# Patient Record
Sex: MALE | Race: WHITE | NOT HISPANIC OR LATINO | Employment: FULL TIME | URBAN - METROPOLITAN AREA
[De-identification: names, ages, dates, MRNs, and addresses within clinical notes are randomized per-mention and may not be internally consistent; named-entity substitution may affect disease eponyms.]

---

## 2017-06-28 ENCOUNTER — TRANSCRIBE ORDERS (OUTPATIENT)
Dept: ADMINISTRATIVE | Facility: HOSPITAL | Age: 57
End: 2017-06-28

## 2017-06-28 DIAGNOSIS — R01.1 UNDIAGNOSED CARDIAC MURMURS: Primary | ICD-10-CM

## 2017-07-05 ENCOUNTER — HOSPITAL ENCOUNTER (OUTPATIENT)
Dept: NON INVASIVE DIAGNOSTICS | Facility: CLINIC | Age: 57
Discharge: HOME | End: 2017-07-05
Payer: COMMERCIAL

## 2017-07-05 DIAGNOSIS — R01.1 UNDIAGNOSED CARDIAC MURMURS: ICD-10-CM

## 2017-07-05 LAB
CHEST PAIN STATEMENT: NORMAL
MAX DIASTOLIC BP: 98 MMHG
MAX HEART RATE: 150 BPM
MAX PREDICTED HEART RATE: 163 BPM
MAX. SYSTOLIC BP: 170 MMHG
PROTOCOL NAME: NORMAL
TARGET HR FORMULA: NORMAL
TEST INDICATION: NORMAL
TIME IN EXERCISE PHASE: 361 S

## 2017-07-05 PROCEDURE — 93017 CV STRESS TEST TRACING ONLY: CPT

## 2019-01-03 ENCOUNTER — APPOINTMENT (EMERGENCY)
Dept: RADIOLOGY | Facility: HOSPITAL | Age: 59
DRG: 438 | End: 2019-01-03
Payer: COMMERCIAL

## 2019-01-03 ENCOUNTER — HOSPITAL ENCOUNTER (INPATIENT)
Facility: HOSPITAL | Age: 59
LOS: 20 days | Discharge: HOME HEALTH | DRG: 438 | End: 2019-01-23
Attending: EMERGENCY MEDICINE | Admitting: SURGERY
Payer: COMMERCIAL

## 2019-01-03 DIAGNOSIS — R10.9 ABDOMINAL PAIN: ICD-10-CM

## 2019-01-03 DIAGNOSIS — K85.90 ACUTE PANCREATITIS, UNSPECIFIED COMPLICATION STATUS, UNSPECIFIED PANCREATITIS TYPE: ICD-10-CM

## 2019-01-03 DIAGNOSIS — R09.02 HYPOXIA: ICD-10-CM

## 2019-01-03 DIAGNOSIS — R06.89 ACUTE RESPIRATORY INSUFFICIENCY: ICD-10-CM

## 2019-01-03 DIAGNOSIS — K85.10 ACUTE BILIARY PANCREATITIS, UNSPECIFIED COMPLICATION STATUS: ICD-10-CM

## 2019-01-03 DIAGNOSIS — K83.09 CHOLANGITIS: ICD-10-CM

## 2019-01-03 DIAGNOSIS — I48.91 ATRIAL FIBRILLATION WITH RVR (HCC): ICD-10-CM

## 2019-01-03 DIAGNOSIS — G93.41 METABOLIC ENCEPHALOPATHY: ICD-10-CM

## 2019-01-03 DIAGNOSIS — K85.90 PANCREATITIS: Primary | ICD-10-CM

## 2019-01-03 LAB
ALBUMIN SERPL BCP-MCNC: 3.9 G/DL (ref 3.5–5)
ALP SERPL-CCNC: 80 U/L (ref 46–116)
ALT SERPL W P-5'-P-CCNC: 97 U/L (ref 12–78)
ANION GAP SERPL CALCULATED.3IONS-SCNC: 5 MMOL/L (ref 4–13)
AST SERPL W P-5'-P-CCNC: 94 U/L (ref 5–45)
ATRIAL RATE: 74 BPM
BASOPHILS # BLD AUTO: 0.02 THOUSANDS/ΜL (ref 0–0.1)
BASOPHILS NFR BLD AUTO: 0 % (ref 0–1)
BILIRUB SERPL-MCNC: 1.83 MG/DL (ref 0.2–1)
BUN SERPL-MCNC: 24 MG/DL (ref 5–25)
CALCIUM SERPL-MCNC: 8.4 MG/DL (ref 8.3–10.1)
CHLORIDE SERPL-SCNC: 106 MMOL/L (ref 100–108)
CHOLEST SERPL-MCNC: 146 MG/DL (ref 50–200)
CO2 SERPL-SCNC: 26 MMOL/L (ref 21–32)
CREAT SERPL-MCNC: 1.26 MG/DL (ref 0.6–1.3)
EOSINOPHIL # BLD AUTO: 0.01 THOUSAND/ΜL (ref 0–0.61)
EOSINOPHIL NFR BLD AUTO: 0 % (ref 0–6)
ERYTHROCYTE [DISTWIDTH] IN BLOOD BY AUTOMATED COUNT: 12.7 % (ref 11.6–15.1)
GFR SERPL CREATININE-BSD FRML MDRD: 62 ML/MIN/1.73SQ M
GLUCOSE SERPL-MCNC: 159 MG/DL (ref 65–140)
HCT VFR BLD AUTO: 46.1 % (ref 36.5–49.3)
HDLC SERPL-MCNC: 59 MG/DL (ref 40–60)
HGB BLD-MCNC: 15.8 G/DL (ref 12–17)
HOLD SPECIMEN: NORMAL
IMM GRANULOCYTES # BLD AUTO: 0.06 THOUSAND/UL (ref 0–0.2)
IMM GRANULOCYTES NFR BLD AUTO: 1 % (ref 0–2)
LDLC SERPL CALC-MCNC: 77 MG/DL (ref 0–100)
LIPASE SERPL-CCNC: 6403 U/L (ref 73–393)
LYMPHOCYTES # BLD AUTO: 0.45 THOUSANDS/ΜL (ref 0.6–4.47)
LYMPHOCYTES NFR BLD AUTO: 4 % (ref 14–44)
MCH RBC QN AUTO: 30.7 PG (ref 26.8–34.3)
MCHC RBC AUTO-ENTMCNC: 34.3 G/DL (ref 31.4–37.4)
MCV RBC AUTO: 90 FL (ref 82–98)
MONOCYTES # BLD AUTO: 0.74 THOUSAND/ΜL (ref 0.17–1.22)
MONOCYTES NFR BLD AUTO: 7 % (ref 4–12)
NEUTROPHILS # BLD AUTO: 10.07 THOUSANDS/ΜL (ref 1.85–7.62)
NEUTS SEG NFR BLD AUTO: 88 % (ref 43–75)
NONHDLC SERPL-MCNC: 87 MG/DL
NRBC BLD AUTO-RTO: 0 /100 WBCS
P AXIS: 56 DEGREES
PLATELET # BLD AUTO: 152 THOUSANDS/UL (ref 149–390)
PLATELET # BLD AUTO: 162 THOUSANDS/UL (ref 149–390)
PMV BLD AUTO: 9 FL (ref 8.9–12.7)
PMV BLD AUTO: 9.2 FL (ref 8.9–12.7)
POTASSIUM SERPL-SCNC: 3.5 MMOL/L (ref 3.5–5.3)
PR INTERVAL: 160 MS
PROT SERPL-MCNC: 6.9 G/DL (ref 6.4–8.2)
QRS AXIS: -36 DEGREES
QRSD INTERVAL: 88 MS
QT INTERVAL: 370 MS
QTC INTERVAL: 410 MS
RBC # BLD AUTO: 5.15 MILLION/UL (ref 3.88–5.62)
SODIUM SERPL-SCNC: 137 MMOL/L (ref 136–145)
T WAVE AXIS: 22 DEGREES
TRIGL SERPL-MCNC: 52 MG/DL
TROPONIN I SERPL-MCNC: <0.02 NG/ML
VENTRICULAR RATE: 74 BPM
WBC # BLD AUTO: 11.35 THOUSAND/UL (ref 4.31–10.16)

## 2019-01-03 PROCEDURE — 96361 HYDRATE IV INFUSION ADD-ON: CPT

## 2019-01-03 PROCEDURE — 80053 COMPREHEN METABOLIC PANEL: CPT | Performed by: EMERGENCY MEDICINE

## 2019-01-03 PROCEDURE — 99223 1ST HOSP IP/OBS HIGH 75: CPT | Performed by: SURGERY

## 2019-01-03 PROCEDURE — 96375 TX/PRO/DX INJ NEW DRUG ADDON: CPT

## 2019-01-03 PROCEDURE — 83690 ASSAY OF LIPASE: CPT | Performed by: EMERGENCY MEDICINE

## 2019-01-03 PROCEDURE — 85049 AUTOMATED PLATELET COUNT: CPT | Performed by: PHYSICIAN ASSISTANT

## 2019-01-03 PROCEDURE — 74177 CT ABD & PELVIS W/CONTRAST: CPT

## 2019-01-03 PROCEDURE — 36415 COLL VENOUS BLD VENIPUNCTURE: CPT

## 2019-01-03 PROCEDURE — C9113 INJ PANTOPRAZOLE SODIUM, VIA: HCPCS | Performed by: EMERGENCY MEDICINE

## 2019-01-03 PROCEDURE — 71046 X-RAY EXAM CHEST 2 VIEWS: CPT

## 2019-01-03 PROCEDURE — 93005 ELECTROCARDIOGRAM TRACING: CPT

## 2019-01-03 PROCEDURE — 80061 LIPID PANEL: CPT | Performed by: PHYSICIAN ASSISTANT

## 2019-01-03 PROCEDURE — 93010 ELECTROCARDIOGRAM REPORT: CPT | Performed by: INTERNAL MEDICINE

## 2019-01-03 PROCEDURE — 84484 ASSAY OF TROPONIN QUANT: CPT | Performed by: EMERGENCY MEDICINE

## 2019-01-03 PROCEDURE — 85025 COMPLETE CBC W/AUTO DIFF WBC: CPT | Performed by: EMERGENCY MEDICINE

## 2019-01-03 PROCEDURE — 99285 EMERGENCY DEPT VISIT HI MDM: CPT

## 2019-01-03 PROCEDURE — 96374 THER/PROPH/DIAG INJ IV PUSH: CPT

## 2019-01-03 RX ORDER — MORPHINE SULFATE 4 MG/ML
4 INJECTION, SOLUTION INTRAMUSCULAR; INTRAVENOUS ONCE
Status: COMPLETED | OUTPATIENT
Start: 2019-01-03 | End: 2019-01-03

## 2019-01-03 RX ORDER — ONDANSETRON 2 MG/ML
4 INJECTION INTRAMUSCULAR; INTRAVENOUS EVERY 6 HOURS PRN
Status: DISCONTINUED | OUTPATIENT
Start: 2019-01-03 | End: 2019-01-23 | Stop reason: HOSPADM

## 2019-01-03 RX ORDER — PANTOPRAZOLE SODIUM 40 MG/1
40 INJECTION, POWDER, FOR SOLUTION INTRAVENOUS ONCE
Status: COMPLETED | OUTPATIENT
Start: 2019-01-03 | End: 2019-01-03

## 2019-01-03 RX ORDER — ACETAMINOPHEN 325 MG/1
650 TABLET ORAL EVERY 6 HOURS PRN
Status: DISCONTINUED | OUTPATIENT
Start: 2019-01-03 | End: 2019-01-03

## 2019-01-03 RX ORDER — HYDROMORPHONE HCL/PF 1 MG/ML
1 SYRINGE (ML) INJECTION
Status: DISCONTINUED | OUTPATIENT
Start: 2019-01-03 | End: 2019-01-04

## 2019-01-03 RX ORDER — HYDROMORPHONE HCL/PF 1 MG/ML
1 SYRINGE (ML) INJECTION ONCE
Status: COMPLETED | OUTPATIENT
Start: 2019-01-03 | End: 2019-01-03

## 2019-01-03 RX ORDER — OXYCODONE HYDROCHLORIDE 10 MG/1
10 TABLET ORAL EVERY 4 HOURS PRN
Status: DISCONTINUED | OUTPATIENT
Start: 2019-01-03 | End: 2019-01-04

## 2019-01-03 RX ORDER — SODIUM CHLORIDE, SODIUM GLUCONATE, SODIUM ACETATE, POTASSIUM CHLORIDE, MAGNESIUM CHLORIDE, SODIUM PHOSPHATE, DIBASIC, AND POTASSIUM PHOSPHATE .53; .5; .37; .037; .03; .012; .00082 G/100ML; G/100ML; G/100ML; G/100ML; G/100ML; G/100ML; G/100ML
200 INJECTION, SOLUTION INTRAVENOUS CONTINUOUS
Status: DISCONTINUED | OUTPATIENT
Start: 2019-01-03 | End: 2019-01-04

## 2019-01-03 RX ORDER — OXYCODONE HYDROCHLORIDE 5 MG/1
5 TABLET ORAL EVERY 4 HOURS PRN
Status: DISCONTINUED | OUTPATIENT
Start: 2019-01-03 | End: 2019-01-04

## 2019-01-03 RX ORDER — ACETAMINOPHEN 325 MG/1
650 TABLET ORAL EVERY 6 HOURS PRN
Status: DISCONTINUED | OUTPATIENT
Start: 2019-01-03 | End: 2019-01-23 | Stop reason: HOSPADM

## 2019-01-03 RX ORDER — HYDROMORPHONE HCL/PF 1 MG/ML
0.2 SYRINGE (ML) INJECTION
Status: DISCONTINUED | OUTPATIENT
Start: 2019-01-03 | End: 2019-01-03

## 2019-01-03 RX ORDER — ONDANSETRON 2 MG/ML
4 INJECTION INTRAMUSCULAR; INTRAVENOUS ONCE
Status: COMPLETED | OUTPATIENT
Start: 2019-01-03 | End: 2019-01-03

## 2019-01-03 RX ORDER — MAGNESIUM HYDROXIDE/ALUMINUM HYDROXICE/SIMETHICONE 120; 1200; 1200 MG/30ML; MG/30ML; MG/30ML
30 SUSPENSION ORAL ONCE
Status: COMPLETED | OUTPATIENT
Start: 2019-01-03 | End: 2019-01-03

## 2019-01-03 RX ORDER — SODIUM CHLORIDE AND POTASSIUM CHLORIDE .9; .15 G/100ML; G/100ML
125 SOLUTION INTRAVENOUS CONTINUOUS
Status: DISCONTINUED | OUTPATIENT
Start: 2019-01-03 | End: 2019-01-03

## 2019-01-03 RX ADMIN — IOHEXOL 100 ML: 350 INJECTION, SOLUTION INTRAVENOUS at 09:23

## 2019-01-03 RX ADMIN — SODIUM CHLORIDE 1000 ML: 0.9 INJECTION, SOLUTION INTRAVENOUS at 22:43

## 2019-01-03 RX ADMIN — ACETAMINOPHEN 650 MG: 325 TABLET, FILM COATED ORAL at 13:14

## 2019-01-03 RX ADMIN — MORPHINE SULFATE 4 MG: 4 INJECTION INTRAVENOUS at 09:29

## 2019-01-03 RX ADMIN — SODIUM CHLORIDE 1000 ML: 0.9 INJECTION, SOLUTION INTRAVENOUS at 07:45

## 2019-01-03 RX ADMIN — ONDANSETRON 4 MG: 2 INJECTION INTRAMUSCULAR; INTRAVENOUS at 13:15

## 2019-01-03 RX ADMIN — ACETAMINOPHEN 650 MG: 325 TABLET, FILM COATED ORAL at 22:43

## 2019-01-03 RX ADMIN — HYDROMORPHONE HYDROCHLORIDE 1 MG: 1 INJECTION, SOLUTION INTRAMUSCULAR; INTRAVENOUS; SUBCUTANEOUS at 10:06

## 2019-01-03 RX ADMIN — SODIUM CHLORIDE, SODIUM GLUCONATE, SODIUM ACETATE, POTASSIUM CHLORIDE, MAGNESIUM CHLORIDE, SODIUM PHOSPHATE, DIBASIC, AND POTASSIUM PHOSPHATE 200 ML/HR: .53; .5; .37; .037; .03; .012; .00082 INJECTION, SOLUTION INTRAVENOUS at 22:33

## 2019-01-03 RX ADMIN — OXYCODONE HYDROCHLORIDE 5 MG: 5 TABLET ORAL at 22:32

## 2019-01-03 RX ADMIN — HYDROMORPHONE HYDROCHLORIDE 0.2 MG: 1 INJECTION, SOLUTION INTRAMUSCULAR; INTRAVENOUS; SUBCUTANEOUS at 11:44

## 2019-01-03 RX ADMIN — ALUMINUM HYDROXIDE, MAGNESIUM HYDROXIDE, AND SIMETHICONE 30 ML: 200; 200; 20 SUSPENSION ORAL at 08:06

## 2019-01-03 RX ADMIN — ONDANSETRON 4 MG: 2 INJECTION INTRAMUSCULAR; INTRAVENOUS at 07:50

## 2019-01-03 RX ADMIN — OXYCODONE HYDROCHLORIDE 10 MG: 10 TABLET ORAL at 18:35

## 2019-01-03 RX ADMIN — OXYCODONE HYDROCHLORIDE 5 MG: 5 TABLET ORAL at 13:14

## 2019-01-03 RX ADMIN — PANTOPRAZOLE SODIUM 40 MG: 40 INJECTION, POWDER, FOR SOLUTION INTRAVENOUS at 08:06

## 2019-01-03 RX ADMIN — HYDROMORPHONE HYDROCHLORIDE 0.2 MG: 1 INJECTION, SOLUTION INTRAMUSCULAR; INTRAVENOUS; SUBCUTANEOUS at 16:06

## 2019-01-03 RX ADMIN — SODIUM CHLORIDE, SODIUM GLUCONATE, SODIUM ACETATE, POTASSIUM CHLORIDE, MAGNESIUM CHLORIDE, SODIUM PHOSPHATE, DIBASIC, AND POTASSIUM PHOSPHATE 200 ML/HR: .53; .5; .37; .037; .03; .012; .00082 INJECTION, SOLUTION INTRAVENOUS at 16:01

## 2019-01-03 RX ADMIN — SODIUM CHLORIDE 1000 ML: 0.9 INJECTION, SOLUTION INTRAVENOUS at 18:32

## 2019-01-03 RX ADMIN — OXYCODONE HYDROCHLORIDE 10 MG: 10 TABLET ORAL at 12:51

## 2019-01-03 RX ADMIN — SODIUM CHLORIDE, SODIUM GLUCONATE, SODIUM ACETATE, POTASSIUM CHLORIDE, MAGNESIUM CHLORIDE, SODIUM PHOSPHATE, DIBASIC, AND POTASSIUM PHOSPHATE 200 ML/HR: .53; .5; .37; .037; .03; .012; .00082 INJECTION, SOLUTION INTRAVENOUS at 10:46

## 2019-01-03 NOTE — ED PROVIDER NOTES
History  Chief Complaint   Patient presents with    Vomiting     Patient presents to the ER with vomiting since Midnight  Patient is a 69-year-old gentleman who presents with epigastric abdominal pain described as burning and constant  Patient denies radiation to his back  He did have several episodes of vomiting that was described as bilious and nonbloody  Patient also had 1 episode of diarrhea  He admits to having sick contact with his wife having recent GI illness  Patient denies having any chest pain or shortness of breath  He denies any fevers  Patient also states that he has not had any dysuria  He does state his pain is worse with p  O  Intake  None       Past Medical History:   Diagnosis Date    Gastroesophageal reflux        Past Surgical History:   Procedure Laterality Date    CHOLECYSTECTOMY      COLONOSCOPY N/A 3/21/2016    Procedure: COLONOSCOPY;  Surgeon: Bao Oneil DO;  Location: [facility]; Service:     [place] EGD TRANSORAL BIOPSY SINGLE/MULTIPLE N/A 3/21/2016    Procedure: ESOPHAGOGASTRODUODENOSCOPY (EGD); Surgeon: Bao Oneil DO;  Location: [facility]; Service: General       History reviewed  No pertinent family history  I have reviewed and agree with the history as documented  Social History   Substance Use Topics    Smoking status: Never Smoker    Smokeless tobacco: Never Used    Alcohol use No      Comment: rarely        Review of Systems   Constitutional: Negative for chills, fatigue and fever  HENT: Negative for sinus pressure and sore throat  Eyes: Negative for visual disturbance  Respiratory: Negative for cough and shortness of breath  Cardiovascular: Negative for chest pain, palpitations and leg swelling  Gastrointestinal: Positive for abdominal pain, diarrhea, nausea and vomiting  Negative for constipation  Genitourinary: Negative for dysuria  Neurological: Negative for dizziness, light-headedness and headaches  Psychiatric/Behavioral: Negative for agitation and confusion  All other systems reviewed and are negative  Physical Exam  Physical Exam   Constitutional: He appears well-nourished  Mild distress secondary to abdominal discomfort   HENT:   Head: Normocephalic and atraumatic  Mouth/Throat: Oropharynx is clear and moist    Eyes: Pupils are equal, round, and reactive to light  Conjunctivae and EOM are normal    Neck: Normal range of motion  Neck supple  Cardiovascular: Normal rate, regular rhythm, normal heart sounds and intact distal pulses  No murmur heard  Pulmonary/Chest: Effort normal and breath sounds normal  No respiratory distress  He has no rales  Abdominal: Soft  Bowel sounds are normal  He exhibits no distension  There is tenderness  There is no rebound and no guarding  Epigastric tenderness with palpation, no guarding or rebound appreciated   Musculoskeletal: He exhibits no edema or deformity  Neurological: He is alert  He has normal strength  No cranial nerve deficit  Skin: Skin is warm and dry  Capillary refill takes less than 2 seconds  No rash noted  No erythema  Psychiatric: He has a normal mood and affect  Nursing note and vitals reviewed  Vital Signs  ED Triage Vitals [01/03/19 0733]   Temperature Pulse Respirations Blood Pressure SpO2   97 6 °F (36 4 °C) 69 18 153/91 97 %      Temp Source Heart Rate Source Patient Position - Orthostatic VS BP Location FiO2 (%)   Oral Monitor Lying Right arm --      Pain Score       8           Vitals:    01/03/19 0733   BP: 153/91   Pulse: 69   Patient Position - Orthostatic VS: Lying       Visual Acuity      ED Medications  Medications   ondansetron (ZOFRAN) injection 4 mg (4 mg Intravenous Given 1/3/19 0750)       Diagnostic Studies  Results Reviewed     Procedure Component Value Units Date/Time    Comprehensive metabolic panel [[id number]] Collected:  01/03/19 0744    Lab Status:   In process Specimen:  Blood from Arm, Left Updated:  01/03/19 0749    Lipase [[id number]] Collected:  01/03/19 0744    Lab Status: In process Specimen:  Blood from Arm, Left Updated:  01/03/19 0749    CBC and differential [[id number]] Collected:  01/03/19 0744    Lab Status: In process Specimen:  Blood from Arm, Left Updated:  01/03/19 0749                 No orders to display              Procedures  Procedures       Phone Contacts  ED Phone Contact    ED Course  ED Course as of Jan 08 0014   Thu Jan 03, 2019   0914 AST: (!) 94   0914 Patient with elevation in AST and ALT and lipase of 6400  Appears to have acute pancreatitis ALT: (!) 97   0935 Patient continues with abdominal pain, Dilaudid administered    0935 Past medical history includes cholecystectomy approximately 20 years ago and history of diverticulosis    0942 EKG pending    4184 Call out to read surgery for evaluation and admission    0945 Patient to be admitted to Dr Yojana Germain service    1006 CT findings reviewed     Acute interstitial edematous pancreatitis with extensive pancreatic and peripancreatic inflammatory edema   Within the limitations imposed by respiratory motion throughout the upper abdomen there is no convincing evidence of pancreatic necrosis or   underlying pancreatic mass   Nonetheless, when better pain control can be achieved and the patient is able to tolerate better breath-hold imaging, more accurate characterization utilizing pancreatic MRI with MRCP is recommended for further investigation   of the possible etiology and complications of acute pancreatitis  MDM  Number of Diagnoses or Management Options  Diagnosis management comments: Patient with abdominal pain may be related to gastroenteritis considering recent exposure to sick contacts, cannot rule out pancreatitis versus bowel other than viral   Patient administered PPI as well as Maalox and laboratory data pending    Patient did not improve after Maalox and PPI plan to administer morphine and check CT scan of abdomen  CritCare Time    Disposition  Final diagnoses:   None     ED Disposition     None      Follow-up Information    None         Patient's Medications    No medications on file     No discharge procedures on file      ED Provider  Electronically Signed by           Mirella Myers DO  01/03/19 501 So  Guerline Gupta,   01/08/19 0387

## 2019-01-03 NOTE — PROGRESS NOTES
Pt admit to unit by Dr Shayna Mayes by Acute Sx d/t acute pancreatitis  C/o 10/10 pain in LUQ abdomen radiating to the back  Phys  assessment WDL, ex labored breathing, abdominal guarding, and cool skin  Stating 85% on R/a, 98% on 2L nc  IVF running  PRN tylenol, oxycodone, and zofran given, POSS 2   Wife called per pt request

## 2019-01-03 NOTE — SOCIAL WORK
64yo male admitted with acute pancreatitis  He is alert but sleepy from pain medication  Wife is at bedside, Bethel Henderson, phone [phone]  They live in [place] in split level home with 2 BRENDA and 2 steps inside  Pt  Is independent with ADLS, employed, drives  He uses no DME, no Hx HHC, no hx rehab, no POA and no hx mental illness or D&A  He has 3 adult children who live locally to assist  PCP is Dr Terri Steen  They use "[email or web address]" pharmacy in  [facility]  Discussed possible need for [address] and wife chose [facility] if needed  Wife will transport home at discharge  CM reviewed d/c planning process including the following: identifying help at home, patient preference for d/c planning needs, Discharge Lounge, [facility] Meds to Bed program, availability of treatment team to discuss questions or concerns patient and/or family may have regarding understanding medications and recognizing signs and symptoms once discharged  CM also encouraged patient to follow up with all recommended appointments after discharge  Patient advised of importance for patient and family to participate in managing patients medical well being  Patient/caregiver received discharge checklist  Content reviewed  Patient/caregiver encouraged to participate in discharge plan of care prior to discharge home

## 2019-01-03 NOTE — H&P
Acute Care Surgery H&P- Isidro Wayne 1960, 60 y o  male MRN: [id number]    Unit/Bed#: ED 29 Encounter: [id number]    Primary Care Provider: Anne Marie Mooney DO   Date and time admitted to hospital: 1/3/2019  7:26 AM    Assessment and Plan:  61 y/o male with acute pancreatitis, dilated CBD with remote history of laparoscopic cholecystectomy    Plan:  IVF, pain management  Lipid panel  MRCP         History of Present Illness     HPI:  Isidro Wayne is a 60 y o  male who presents with acute onset of bandlike abdominal pain, upper abdomen  Patient states that he was in his usual state of health until around 1:00AM, when he developed sudden onset of upper abdominal pain that radiated across his upper abdomen  He denies radiation to his back  He admits to nausea and some vomiting as well as some loose stools  His wife states that she recently had a GI virus  He denies significant alcohol intake  He admits to laparoscopic cholecystectomy about 30 years ago  He also admits to frequent acid reflux and epigastric discomfort that is associated with the reflux  He takes Prilosec as needed  Review of Systems   Constitutional: Negative  HENT: Negative  Eyes: Negative  Respiratory: Negative  Cardiovascular: Negative  Gastrointestinal: Positive for abdominal pain, diarrhea and nausea  Endocrine: Negative  Genitourinary: Negative  Musculoskeletal: Negative  Allergic/Immunologic: Negative  Neurological: Negative  Hematological: Negative  Psychiatric/Behavioral: Negative  Historical Information   Past Medical History:   Diagnosis Date    Gastroesophageal reflux      Past Surgical History:   Procedure Laterality Date    CHOLECYSTECTOMY      COLONOSCOPY N/A 3/21/2016    Procedure: COLONOSCOPY;  Surgeon: Jossy Franks DO;  [facility]; Service:     [place] EGD TRANSORAL BIOPSY SINGLE/MULTIPLE N/A 3/21/2016    Procedure: ESOPHAGOGASTRODUODENOSCOPY (EGD);   Surgeon: Trell Mistry DO;  [facility]; Service: General     Social History   History   Alcohol Use No     Comment: rarely     History   Drug Use No     History   Smoking Status    Never Smoker   Smokeless Tobacco    Never Used     Family History: non-contributory    Meds/Allergies   all medications and allergies reviewed  No Known Allergies    Objective   First Vitals:   Blood Pressure: 153/91 (01/03/19 0733)  Pulse: 69 (01/03/19 0733)  Temperature: 97 6 °F (36 4 °C) (01/03/19 0733)  Temp Source: Oral (01/03/19 0733)  Respirations: 18 (01/03/19 0733)  Height: 6' 4" (193 cm) (01/03/19 0733)  Weight - Scale: 99 8 kg (220 lb) (01/03/19 0733)  SpO2: 97 % (01/03/19 0733)    Current Vitals:   Blood Pressure: 153/91 (01/03/19 0733)  Pulse: 69 (01/03/19 0733)  Temperature: 97 6 °F (36 4 °C) (01/03/19 0733)  Temp Source: Oral (01/03/19 0733)  Respirations: 18 (01/03/19 0733)  Height: 6' 4" (193 cm) (01/03/19 6230)  Weight - Scale: 99 8 kg (220 lb) (01/03/19 0733)  SpO2: 97 % (01/03/19 0733)    No intake or output data in the 24 hours ending 01/03/19 1029    Invasive Devices     Peripheral Intravenous Line            Peripheral IV 01/03/19 Left Antecubital less than 1 day                Physical Exam   Constitutional: He is oriented to person, place, and time  He appears well-developed and well-nourished  HENT:   Head: Normocephalic and atraumatic  Right Ear: External ear normal    Left Ear: External ear normal    Eyes: Pupils are equal, round, and reactive to light  Conjunctivae are normal    Neck: Normal range of motion  No tracheal deviation present  Cardiovascular: Normal rate and regular rhythm  Pulmonary/Chest: Breath sounds normal  No respiratory distress  He has no wheezes  He has no rales  Abdominal: Soft  He exhibits no distension  There is tenderness (point of maximal tenderness in RUQ; mild epigastric)  There is no rebound  Hypoactive BS   Musculoskeletal: Normal range of motion   He exhibits no edema, tenderness or deformity  Neurological: He is alert and oriented to person, place, and time  No cranial nerve deficit  Skin: Skin is warm and dry  No rash noted  Lab Results:   CBC:   Lab Results   Component Value Date    WBC 11 35 (H) 01/03/2019    HGB 15 8 01/03/2019    HCT 46 1 01/03/2019    MCV 90 01/03/2019     01/03/2019    MCH 30 7 01/03/2019    MCHC 34 3 01/03/2019    RDW 12 7 01/03/2019    MPV 9 0 01/03/2019    NRBC 0 01/03/2019   , CMP:   Lab Results   Component Value Date    SODIUM 137 01/03/2019    K 3 5 01/03/2019     01/03/2019    CO2 26 01/03/2019    BUN 24 01/03/2019    CREATININE 1 26 01/03/2019    CALCIUM 8 4 01/03/2019    AST 94 (H) 01/03/2019    ALT 97 (H) 01/03/2019    ALKPHOS 80 01/03/2019    EGFR 62 01/03/2019   , Amylase: No results found for: AMYLASE, Lipase:   Lab Results   Component Value Date    LIPASE 6,403 (H) 01/03/2019     Imaging: I have personally reviewed pertinent reports  EKG, Pathology, and Other Studies: I have personally reviewed pertinent reports        Code Status: Level 1 - Full Code  Advance Directive and Living Will:      Power of :    POLST:        Iveth Mancini PA-C  1/3/2019

## 2019-01-04 ENCOUNTER — APPOINTMENT (INPATIENT)
Dept: RADIOLOGY | Facility: HOSPITAL | Age: 59
DRG: 438 | End: 2019-01-04
Payer: COMMERCIAL

## 2019-01-04 ENCOUNTER — ANESTHESIA EVENT (INPATIENT)
Dept: GASTROENTEROLOGY | Facility: HOSPITAL | Age: 59
DRG: 438 | End: 2019-01-04
Payer: COMMERCIAL

## 2019-01-04 ENCOUNTER — ANESTHESIA (INPATIENT)
Dept: GASTROENTEROLOGY | Facility: HOSPITAL | Age: 59
DRG: 438 | End: 2019-01-04
Payer: COMMERCIAL

## 2019-01-04 PROBLEM — K85.90 PANCREATITIS: Status: ACTIVE | Noted: 2019-01-03

## 2019-01-04 LAB
ALBUMIN SERPL BCP-MCNC: 2.5 G/DL (ref 3.5–5)
ALBUMIN SERPL BCP-MCNC: 2.7 G/DL (ref 3.5–5)
ALP SERPL-CCNC: 34 U/L (ref 46–116)
ALP SERPL-CCNC: 48 U/L (ref 46–116)
ALT SERPL W P-5'-P-CCNC: 144 U/L (ref 12–78)
ALT SERPL W P-5'-P-CCNC: 324 U/L (ref 12–78)
AMYLASE SERPL-CCNC: 580 IU/L (ref 25–115)
ANION GAP SERPL CALCULATED.3IONS-SCNC: 6 MMOL/L (ref 4–13)
ANION GAP SERPL CALCULATED.3IONS-SCNC: 7 MMOL/L (ref 4–13)
AST SERPL W P-5'-P-CCNC: 103 U/L (ref 5–45)
AST SERPL W P-5'-P-CCNC: 255 U/L (ref 5–45)
BASOPHILS # BLD MANUAL: 0 THOUSAND/UL (ref 0–0.1)
BASOPHILS # BLD MANUAL: 0.02 THOUSAND/UL (ref 0–0.1)
BASOPHILS NFR MAR MANUAL: 0 % (ref 0–1)
BASOPHILS NFR MAR MANUAL: 1 % (ref 0–1)
BILIRUB DIRECT SERPL-MCNC: 0.48 MG/DL (ref 0–0.2)
BILIRUB SERPL-MCNC: 2.76 MG/DL (ref 0.2–1)
BILIRUB SERPL-MCNC: 3.49 MG/DL (ref 0.2–1)
BUN SERPL-MCNC: 26 MG/DL (ref 5–25)
BUN SERPL-MCNC: 30 MG/DL (ref 5–25)
CA-I BLD-SCNC: 0.96 MMOL/L (ref 1.12–1.32)
CALCIUM SERPL-MCNC: 6.7 MG/DL (ref 8.3–10.1)
CALCIUM SERPL-MCNC: 6.7 MG/DL (ref 8.3–10.1)
CHLORIDE SERPL-SCNC: 109 MMOL/L (ref 100–108)
CHLORIDE SERPL-SCNC: 110 MMOL/L (ref 100–108)
CO2 SERPL-SCNC: 22 MMOL/L (ref 21–32)
CO2 SERPL-SCNC: 22 MMOL/L (ref 21–32)
CREAT SERPL-MCNC: 1.38 MG/DL (ref 0.6–1.3)
CREAT SERPL-MCNC: 1.72 MG/DL (ref 0.6–1.3)
EOSINOPHIL # BLD MANUAL: 0 THOUSAND/UL (ref 0–0.4)
EOSINOPHIL # BLD MANUAL: 0 THOUSAND/UL (ref 0–0.4)
EOSINOPHIL NFR BLD MANUAL: 0 % (ref 0–6)
EOSINOPHIL NFR BLD MANUAL: 0 % (ref 0–6)
ERYTHROCYTE [DISTWIDTH] IN BLOOD BY AUTOMATED COUNT: 13.2 % (ref 11.6–15.1)
ERYTHROCYTE [DISTWIDTH] IN BLOOD BY AUTOMATED COUNT: 13.6 % (ref 11.6–15.1)
GFR SERPL CREATININE-BSD FRML MDRD: 43 ML/MIN/1.73SQ M
GFR SERPL CREATININE-BSD FRML MDRD: 56 ML/MIN/1.73SQ M
GIANT PLATELETS BLD QL SMEAR: PRESENT
GLUCOSE SERPL-MCNC: 137 MG/DL (ref 65–140)
GLUCOSE SERPL-MCNC: 150 MG/DL (ref 65–140)
HCT VFR BLD AUTO: 46.4 % (ref 36.5–49.3)
HCT VFR BLD AUTO: 51.5 % (ref 36.5–49.3)
HGB BLD-MCNC: 15.1 G/DL (ref 12–17)
HGB BLD-MCNC: 17.3 G/DL (ref 12–17)
LACTATE SERPL-SCNC: 3.6 MMOL/L (ref 0.5–2)
LACTATE SERPL-SCNC: 3.6 MMOL/L (ref 0.5–2)
LACTATE SERPL-SCNC: 4.2 MMOL/L (ref 0.5–2)
LIPASE SERPL-CCNC: 2217 U/L (ref 73–393)
LYMPHOCYTES # BLD AUTO: 0.08 THOUSAND/UL (ref 0.6–4.47)
LYMPHOCYTES # BLD AUTO: 0.18 THOUSAND/UL (ref 0.6–4.47)
LYMPHOCYTES # BLD AUTO: 4 % (ref 14–44)
LYMPHOCYTES # BLD AUTO: 5 % (ref 14–44)
MAGNESIUM SERPL-MCNC: 1.9 MG/DL (ref 1.6–2.6)
MCH RBC QN AUTO: 30.3 PG (ref 26.8–34.3)
MCH RBC QN AUTO: 30.4 PG (ref 26.8–34.3)
MCHC RBC AUTO-ENTMCNC: 32.5 G/DL (ref 31.4–37.4)
MCHC RBC AUTO-ENTMCNC: 33.6 G/DL (ref 31.4–37.4)
MCV RBC AUTO: 91 FL (ref 82–98)
MCV RBC AUTO: 93 FL (ref 82–98)
METAMYELOCYTES NFR BLD MANUAL: 4 % (ref 0–1)
METAMYELOCYTES NFR BLD MANUAL: 6 % (ref 0–1)
MONOCYTES # BLD AUTO: 0.06 THOUSAND/UL (ref 0–1.22)
MONOCYTES # BLD AUTO: 0.25 THOUSAND/UL (ref 0–1.22)
MONOCYTES NFR BLD: 3 % (ref 4–12)
MONOCYTES NFR BLD: 7 % (ref 4–12)
MYELOCYTES NFR BLD MANUAL: 3 % (ref 0–1)
NEUTROPHILS # BLD MANUAL: 1.69 THOUSAND/UL (ref 1.85–7.62)
NEUTROPHILS # BLD MANUAL: 2.97 THOUSAND/UL (ref 1.85–7.62)
NEUTS BAND NFR BLD MANUAL: 10 % (ref 0–8)
NEUTS BAND NFR BLD MANUAL: 28 % (ref 0–8)
NEUTS SEG NFR BLD AUTO: 53 % (ref 43–75)
NEUTS SEG NFR BLD AUTO: 74 % (ref 43–75)
NRBC BLD AUTO-RTO: 0 /100 WBCS
NRBC BLD AUTO-RTO: 0 /100 WBCS
PHOSPHATE SERPL-MCNC: 2.6 MG/DL (ref 2.7–4.5)
PLATELET # BLD AUTO: 139 THOUSANDS/UL (ref 149–390)
PLATELET # BLD AUTO: 160 THOUSANDS/UL (ref 149–390)
PLATELET BLD QL SMEAR: ABNORMAL
PLATELET BLD QL SMEAR: ADEQUATE
PMV BLD AUTO: 9.4 FL (ref 8.9–12.7)
PMV BLD AUTO: 9.5 FL (ref 8.9–12.7)
POTASSIUM SERPL-SCNC: 4.6 MMOL/L (ref 3.5–5.3)
POTASSIUM SERPL-SCNC: 5 MMOL/L (ref 3.5–5.3)
PROT SERPL-MCNC: 4.8 G/DL (ref 6.4–8.2)
PROT SERPL-MCNC: 5.4 G/DL (ref 6.4–8.2)
RBC # BLD AUTO: 4.98 MILLION/UL (ref 3.88–5.62)
RBC # BLD AUTO: 5.69 MILLION/UL (ref 3.88–5.62)
RBC MORPH BLD: NORMAL
RBC MORPH BLD: PRESENT
SMUDGE CELLS BLD QL SMEAR: PRESENT
SODIUM SERPL-SCNC: 138 MMOL/L (ref 136–145)
SODIUM SERPL-SCNC: 138 MMOL/L (ref 136–145)
VARIANT LYMPHS # BLD AUTO: 2 %
WBC # BLD AUTO: 2.09 THOUSAND/UL (ref 4.31–10.16)
WBC # BLD AUTO: 3.53 THOUSAND/UL (ref 4.31–10.16)
WBC TOXIC VACUOLES BLD QL SMEAR: PRESENT

## 2019-01-04 PROCEDURE — C2625 STENT, NON-COR, TEM W/DEL SY: HCPCS | Performed by: INTERNAL MEDICINE

## 2019-01-04 PROCEDURE — C1769 GUIDE WIRE: HCPCS | Performed by: INTERNAL MEDICINE

## 2019-01-04 PROCEDURE — 99232 SBSQ HOSP IP/OBS MODERATE 35: CPT | Performed by: SURGERY

## 2019-01-04 PROCEDURE — 85007 BL SMEAR W/DIFF WBC COUNT: CPT | Performed by: PHYSICIAN ASSISTANT

## 2019-01-04 PROCEDURE — 99254 IP/OBS CNSLTJ NEW/EST MOD 60: CPT | Performed by: INTERNAL MEDICINE

## 2019-01-04 PROCEDURE — 82248 BILIRUBIN DIRECT: CPT | Performed by: INTERNAL MEDICINE

## 2019-01-04 PROCEDURE — 71045 X-RAY EXAM CHEST 1 VIEW: CPT

## 2019-01-04 PROCEDURE — 94002 VENT MGMT INPAT INIT DAY: CPT

## 2019-01-04 PROCEDURE — 83735 ASSAY OF MAGNESIUM: CPT | Performed by: PHYSICIAN ASSISTANT

## 2019-01-04 PROCEDURE — 43273 ENDOSCOPIC PANCREATOSCOPY: CPT | Performed by: INTERNAL MEDICINE

## 2019-01-04 PROCEDURE — 83605 ASSAY OF LACTIC ACID: CPT | Performed by: EMERGENCY MEDICINE

## 2019-01-04 PROCEDURE — 87040 BLOOD CULTURE FOR BACTERIA: CPT | Performed by: PHYSICIAN ASSISTANT

## 2019-01-04 PROCEDURE — 85027 COMPLETE CBC AUTOMATED: CPT | Performed by: PHYSICIAN ASSISTANT

## 2019-01-04 PROCEDURE — 43274 ERCP DUCT STENT PLACEMENT: CPT | Performed by: INTERNAL MEDICINE

## 2019-01-04 PROCEDURE — 83605 ASSAY OF LACTIC ACID: CPT | Performed by: SURGERY

## 2019-01-04 PROCEDURE — 84100 ASSAY OF PHOSPHORUS: CPT | Performed by: PHYSICIAN ASSISTANT

## 2019-01-04 PROCEDURE — 74328 X-RAY BILE DUCT ENDOSCOPY: CPT

## 2019-01-04 PROCEDURE — C2617 STENT, NON-COR, TEM W/O DEL: HCPCS | Performed by: INTERNAL MEDICINE

## 2019-01-04 PROCEDURE — 80053 COMPREHEN METABOLIC PANEL: CPT | Performed by: PHYSICIAN ASSISTANT

## 2019-01-04 PROCEDURE — 94760 N-INVAS EAR/PLS OXIMETRY 1: CPT

## 2019-01-04 PROCEDURE — 83690 ASSAY OF LIPASE: CPT | Performed by: PHYSICIAN ASSISTANT

## 2019-01-04 PROCEDURE — 82330 ASSAY OF CALCIUM: CPT | Performed by: PHYSICIAN ASSISTANT

## 2019-01-04 PROCEDURE — 82150 ASSAY OF AMYLASE: CPT | Performed by: PHYSICIAN ASSISTANT

## 2019-01-04 PROCEDURE — 43264 ERCP REMOVE DUCT CALCULI: CPT | Performed by: INTERNAL MEDICINE

## 2019-01-04 DEVICE — BILIARY STENT
Type: IMPLANTABLE DEVICE | Site: BILE DUCT | Status: NON-FUNCTIONAL
Brand: ADVANIX™ BILIARY
Removed: 2019-04-26

## 2019-01-04 RX ORDER — SODIUM CHLORIDE, SODIUM LACTATE, POTASSIUM CHLORIDE, CALCIUM CHLORIDE 600; 310; 30; 20 MG/100ML; MG/100ML; MG/100ML; MG/100ML
200 INJECTION, SOLUTION INTRAVENOUS CONTINUOUS
Status: DISCONTINUED | OUTPATIENT
Start: 2019-01-04 | End: 2019-01-04

## 2019-01-04 RX ORDER — SODIUM CHLORIDE 9 MG/ML
200 INJECTION, SOLUTION INTRAVENOUS CONTINUOUS
Status: DISCONTINUED | OUTPATIENT
Start: 2019-01-04 | End: 2019-01-04

## 2019-01-04 RX ORDER — ONDANSETRON 2 MG/ML
INJECTION INTRAMUSCULAR; INTRAVENOUS AS NEEDED
Status: DISCONTINUED | OUTPATIENT
Start: 2019-01-04 | End: 2019-01-04 | Stop reason: SURG

## 2019-01-04 RX ORDER — FENTANYL CITRATE 50 UG/ML
50 INJECTION, SOLUTION INTRAMUSCULAR; INTRAVENOUS EVERY 2 HOUR PRN
Status: DISCONTINUED | OUTPATIENT
Start: 2019-01-04 | End: 2019-01-05

## 2019-01-04 RX ORDER — ALBUMIN, HUMAN INJ 5% 5 %
SOLUTION INTRAVENOUS CONTINUOUS PRN
Status: DISCONTINUED | OUTPATIENT
Start: 2019-01-04 | End: 2019-01-04 | Stop reason: SURG

## 2019-01-04 RX ORDER — HYDROMORPHONE HCL/PF 1 MG/ML
1 SYRINGE (ML) INJECTION EVERY 4 HOURS
Status: DISCONTINUED | OUTPATIENT
Start: 2019-01-04 | End: 2019-01-05

## 2019-01-04 RX ORDER — SODIUM CHLORIDE, SODIUM GLUCONATE, SODIUM ACETATE, POTASSIUM CHLORIDE, MAGNESIUM CHLORIDE, SODIUM PHOSPHATE, DIBASIC, AND POTASSIUM PHOSPHATE .53; .5; .37; .037; .03; .012; .00082 G/100ML; G/100ML; G/100ML; G/100ML; G/100ML; G/100ML; G/100ML
200 INJECTION, SOLUTION INTRAVENOUS CONTINUOUS
Status: DISCONTINUED | OUTPATIENT
Start: 2019-01-04 | End: 2019-01-07

## 2019-01-04 RX ORDER — SUCCINYLCHOLINE CHLORIDE 20 MG/ML
INJECTION INTRAMUSCULAR; INTRAVENOUS AS NEEDED
Status: DISCONTINUED | OUTPATIENT
Start: 2019-01-04 | End: 2019-01-04 | Stop reason: SURG

## 2019-01-04 RX ORDER — HYDROMORPHONE HCL/PF 1 MG/ML
0.5 SYRINGE (ML) INJECTION
Status: DISCONTINUED | OUTPATIENT
Start: 2019-01-04 | End: 2019-01-04

## 2019-01-04 RX ORDER — ROCURONIUM BROMIDE 10 MG/ML
INJECTION, SOLUTION INTRAVENOUS AS NEEDED
Status: DISCONTINUED | OUTPATIENT
Start: 2019-01-04 | End: 2019-01-04 | Stop reason: SURG

## 2019-01-04 RX ORDER — CHLORHEXIDINE GLUCONATE 0.12 MG/ML
15 RINSE ORAL EVERY 12 HOURS SCHEDULED
Status: DISCONTINUED | OUTPATIENT
Start: 2019-01-04 | End: 2019-01-14

## 2019-01-04 RX ORDER — PROPOFOL 10 MG/ML
INJECTION, EMULSION INTRAVENOUS AS NEEDED
Status: DISCONTINUED | OUTPATIENT
Start: 2019-01-04 | End: 2019-01-04 | Stop reason: SURG

## 2019-01-04 RX ORDER — PROPOFOL 10 MG/ML
5-50 INJECTION, EMULSION INTRAVENOUS
Status: DISCONTINUED | OUTPATIENT
Start: 2019-01-04 | End: 2019-01-04

## 2019-01-04 RX ORDER — HYDROMORPHONE HCL/PF 1 MG/ML
0.5 SYRINGE (ML) INJECTION
Status: DISCONTINUED | OUTPATIENT
Start: 2019-01-04 | End: 2019-01-05

## 2019-01-04 RX ORDER — HYDROMORPHONE HCL/PF 1 MG/ML
0.5 SYRINGE (ML) INJECTION EVERY 2 HOUR PRN
Status: DISCONTINUED | OUTPATIENT
Start: 2019-01-04 | End: 2019-01-04

## 2019-01-04 RX ORDER — SODIUM CHLORIDE, SODIUM GLUCONATE, SODIUM ACETATE, POTASSIUM CHLORIDE, MAGNESIUM CHLORIDE, SODIUM PHOSPHATE, DIBASIC, AND POTASSIUM PHOSPHATE .53; .5; .37; .037; .03; .012; .00082 G/100ML; G/100ML; G/100ML; G/100ML; G/100ML; G/100ML; G/100ML
1000 INJECTION, SOLUTION INTRAVENOUS ONCE
Status: COMPLETED | OUTPATIENT
Start: 2019-01-04 | End: 2019-01-05

## 2019-01-04 RX ORDER — MAGNESIUM SULFATE 1 G/100ML
1 INJECTION INTRAVENOUS ONCE
Status: COMPLETED | OUTPATIENT
Start: 2019-01-04 | End: 2019-01-05

## 2019-01-04 RX ORDER — LIDOCAINE HYDROCHLORIDE 10 MG/ML
INJECTION, SOLUTION INFILTRATION; PERINEURAL AS NEEDED
Status: DISCONTINUED | OUTPATIENT
Start: 2019-01-04 | End: 2019-01-04 | Stop reason: SURG

## 2019-01-04 RX ORDER — FENTANYL CITRATE 50 UG/ML
INJECTION, SOLUTION INTRAMUSCULAR; INTRAVENOUS AS NEEDED
Status: DISCONTINUED | OUTPATIENT
Start: 2019-01-04 | End: 2019-01-04 | Stop reason: SURG

## 2019-01-04 RX ORDER — SODIUM CHLORIDE, SODIUM LACTATE, POTASSIUM CHLORIDE, CALCIUM CHLORIDE 600; 310; 30; 20 MG/100ML; MG/100ML; MG/100ML; MG/100ML
INJECTION, SOLUTION INTRAVENOUS CONTINUOUS PRN
Status: DISCONTINUED | OUTPATIENT
Start: 2019-01-04 | End: 2019-01-04 | Stop reason: SURG

## 2019-01-04 RX ORDER — HYDROMORPHONE HCL/PF 1 MG/ML
1 SYRINGE (ML) INJECTION EVERY 2 HOUR PRN
Status: DISCONTINUED | OUTPATIENT
Start: 2019-01-04 | End: 2019-01-04

## 2019-01-04 RX ADMIN — SODIUM CHLORIDE, SODIUM GLUCONATE, SODIUM ACETATE, POTASSIUM CHLORIDE, MAGNESIUM CHLORIDE, SODIUM PHOSPHATE, DIBASIC, AND POTASSIUM PHOSPHATE 200 ML/HR: .53; .5; .37; .037; .03; .012; .00082 INJECTION, SOLUTION INTRAVENOUS at 05:23

## 2019-01-04 RX ADMIN — FENTANYL CITRATE 100 MCG: 50 INJECTION, SOLUTION INTRAMUSCULAR; INTRAVENOUS at 11:54

## 2019-01-04 RX ADMIN — SODIUM PHOSPHATE, MONOBASIC, MONOHYDRATE 21 MMOL: 276; 142 INJECTION, SOLUTION INTRAVENOUS at 17:25

## 2019-01-04 RX ADMIN — OXYCODONE HYDROCHLORIDE 10 MG: 10 TABLET ORAL at 02:28

## 2019-01-04 RX ADMIN — SODIUM CHLORIDE, SODIUM GLUCONATE, SODIUM ACETATE, POTASSIUM CHLORIDE, MAGNESIUM CHLORIDE, SODIUM PHOSPHATE, DIBASIC, AND POTASSIUM PHOSPHATE 200 ML/HR: .53; .5; .37; .037; .03; .012; .00082 INJECTION, SOLUTION INTRAVENOUS at 13:30

## 2019-01-04 RX ADMIN — ROCURONIUM BROMIDE 30 MG: 10 INJECTION INTRAVENOUS at 12:49

## 2019-01-04 RX ADMIN — DEXAMETHASONE SODIUM PHOSPHATE 4 MG: 10 INJECTION INTRAMUSCULAR; INTRAVENOUS at 12:48

## 2019-01-04 RX ADMIN — IOHEXOL 3 ML: 240 INJECTION, SOLUTION INTRATHECAL; INTRAVASCULAR; INTRAVENOUS; ORAL at 12:48

## 2019-01-04 RX ADMIN — MAGNESIUM SULFATE HEPTAHYDRATE 1 G: 1 INJECTION, SOLUTION INTRAVENOUS at 16:22

## 2019-01-04 RX ADMIN — SODIUM CHLORIDE 2000 ML: 0.9 INJECTION, SOLUTION INTRAVENOUS at 08:01

## 2019-01-04 RX ADMIN — SODIUM CHLORIDE, SODIUM GLUCONATE, SODIUM ACETATE, POTASSIUM CHLORIDE, MAGNESIUM CHLORIDE, SODIUM PHOSPHATE, DIBASIC, AND POTASSIUM PHOSPHATE 1000 ML: .53; .5; .37; .037; .03; .012; .00082 INJECTION, SOLUTION INTRAVENOUS at 15:47

## 2019-01-04 RX ADMIN — VASOPRESSIN 2 UNITS: 20 INJECTION INTRAVENOUS at 12:26

## 2019-01-04 RX ADMIN — SODIUM CHLORIDE, SODIUM LACTATE, POTASSIUM CHLORIDE, AND CALCIUM CHLORIDE: .6; .31; .03; .02 INJECTION, SOLUTION INTRAVENOUS at 11:58

## 2019-01-04 RX ADMIN — PIPERACILLIN SODIUM AND TAZOBACTAM SODIUM 3.38 G: 36; 4.5 INJECTION, POWDER, FOR SOLUTION INTRAVENOUS at 20:37

## 2019-01-04 RX ADMIN — HYDROMORPHONE HYDROCHLORIDE 0.5 MG: 1 INJECTION, SOLUTION INTRAMUSCULAR; INTRAVENOUS; SUBCUTANEOUS at 07:57

## 2019-01-04 RX ADMIN — HYDROMORPHONE HYDROCHLORIDE 0.5 MG: 1 INJECTION, SOLUTION INTRAMUSCULAR; INTRAVENOUS; SUBCUTANEOUS at 16:15

## 2019-01-04 RX ADMIN — HYDROMORPHONE HYDROCHLORIDE 0.5 MG: 1 INJECTION, SOLUTION INTRAMUSCULAR; INTRAVENOUS; SUBCUTANEOUS at 23:29

## 2019-01-04 RX ADMIN — SUCCINYLCHOLINE CHLORIDE 100 MG: 20 INJECTION, SOLUTION INTRAMUSCULAR; INTRAVENOUS at 11:54

## 2019-01-04 RX ADMIN — PHENYLEPHRINE HYDROCHLORIDE 60 MCG/MIN: 10 INJECTION INTRAVENOUS at 12:23

## 2019-01-04 RX ADMIN — ONDANSETRON 4 MG: 2 INJECTION INTRAMUSCULAR; INTRAVENOUS at 12:48

## 2019-01-04 RX ADMIN — HYDROMORPHONE HYDROCHLORIDE: 10 INJECTION INTRAMUSCULAR; INTRAVENOUS; SUBCUTANEOUS at 10:15

## 2019-01-04 RX ADMIN — HYDROMORPHONE HYDROCHLORIDE 1 MG: 1 INJECTION, SOLUTION INTRAMUSCULAR; INTRAVENOUS; SUBCUTANEOUS at 20:36

## 2019-01-04 RX ADMIN — SODIUM CHLORIDE, SODIUM LACTATE, POTASSIUM CHLORIDE, AND CALCIUM CHLORIDE 500 ML: .6; .31; .03; .02 INJECTION, SOLUTION INTRAVENOUS at 10:40

## 2019-01-04 RX ADMIN — ALBUMIN (HUMAN): 12.5 SOLUTION INTRAVENOUS at 12:24

## 2019-01-04 RX ADMIN — SODIUM CHLORIDE 1000 ML: 0.9 INJECTION, SOLUTION INTRAVENOUS at 07:57

## 2019-01-04 RX ADMIN — SODIUM CHLORIDE, SODIUM LACTATE, POTASSIUM CHLORIDE, AND CALCIUM CHLORIDE: .6; .31; .03; .02 INJECTION, SOLUTION INTRAVENOUS at 11:45

## 2019-01-04 RX ADMIN — CALCIUM GLUCONATE 3 G: 98 INJECTION, SOLUTION INTRAVENOUS at 17:20

## 2019-01-04 RX ADMIN — SODIUM CHLORIDE 1000 ML: 0.9 INJECTION, SOLUTION INTRAVENOUS at 20:37

## 2019-01-04 RX ADMIN — SODIUM CHLORIDE, SODIUM GLUCONATE, SODIUM ACETATE, POTASSIUM CHLORIDE, MAGNESIUM CHLORIDE, SODIUM PHOSPHATE, DIBASIC, AND POTASSIUM PHOSPHATE 1000 ML: .53; .5; .37; .037; .03; .012; .00082 INJECTION, SOLUTION INTRAVENOUS at 23:19

## 2019-01-04 RX ADMIN — PROPOFOL 150 MG: 10 INJECTION, EMULSION INTRAVENOUS at 11:54

## 2019-01-04 RX ADMIN — CHLORHEXIDINE GLUCONATE 0.12% ORAL RINSE 15 ML: 1.2 LIQUID ORAL at 20:36

## 2019-01-04 RX ADMIN — LIDOCAINE HYDROCHLORIDE 50 MG: 10 INJECTION, SOLUTION INFILTRATION; PERINEURAL at 11:54

## 2019-01-04 NOTE — ANESTHESIA PREPROCEDURE EVALUATION
Review of Systems/Medical History  Patient summary reviewed  Chart reviewed  No history of anesthetic complications     Cardiovascular  Negative cardio ROS Exercise tolerance (METS): >4,     Pulmonary  Negative pulmonary ROS        GI/Hepatic    GERD , Pancreatic problem (pancreatitis),             Endo/Other     GYN       Hematology   Musculoskeletal       Neurology      Comment: Altered mental status, on dilaudid PCA Psychology           Physical Exam    Airway    Mallampati score: III  TM Distance: >3 FB  Neck ROM: full     Dental       Cardiovascular  Comment: Negative ROS, Rhythm: regular, Rate: abnormal,     Pulmonary  Pulmonary exam normal Breath sounds clear to auscultation,     Other Findings  Somnolent, difficult to arouse  Anesthesia Plan  ASA Score- 1 Emergent    Anesthesia Type- general with ASA Monitors  Additional Monitors:   Airway Plan: ETT  Plan Factors-    Induction- intravenous  Postoperative Plan- Plan for postoperative opioid use  Planned trial extubation    Informed Consent- Anesthetic plan and risks discussed with patient and spouse  I personally reviewed this patient with the CRNA  Discussed and agreed on the Anesthesia Plan with the CRNA  Florentino Davalos

## 2019-01-04 NOTE — OP NOTE
OPERATIVE REPORT  PATIENT NAME: Mamie Figueroa    :  1960  MRN: [id number]  Pt Location: [facility]    SURGERY DATE: 2019    Surgeon(s) and Role:     Munira Olmstead MD - Primary    Preop Diagnosis:  Pancreatitis [K85 90]    Post-Op Diagnosis Codes:     * Pancreatitis [K85 90]    Procedure(s) (LRB):  ENDOSCOPIC RETROGRADE CHOLANGIOPANCREATOGRAPHY (ERCP) (N/A)    Specimen(s):  * No specimens in log *    Estimated Blood Loss:   Minimal    ENDOSCOPIC RETROGRADE CHOLAGIOPANCREATOGRAPHY    PROCEDURE: ERCP/ Sphincterotomy, Stone Extraction with Balloon, Biliary Plastic Stent Placement    INDICATIONS: Obstructive Jaundice, Elevated Liver Enzymes and Biliary Pancreatitis    POST-OP DIAGNOSIS: See the impression below    SEDATION: Monitored anesthesia care, check anesthesia records    PHYSICAL EXAM:    Blood pressure 125/72, pulse (!) 114, temperature 99 4 °F (37 4 °C), temperature source Tympanic, resp  rate 18, height 6' 4" (1 93 m), weight 99 8 kg (220 lb), SpO2 92 %  Body mass index is 26 78 kg/m²  General: NAD  Heart: S1 & S2 normal, RRR  Lungs: CTA, No rales or rhonchi  Abdomen: Soft, nontender, nondistended, good bowel sounds    CONSENT:  Informed consent was obtained for the procedure, including sedation after explaining the risks and benefits of the procedure  Risks including but not limited to severe pancreatitis, bleeding, perforation, infection, aspiration were discussed in detail  Also explained about less than 100% sensitivity with the exam and other alternatives  PREPARATION:   EKG tracing, pulse oximetry, blood pressure were monitored throughout the procedure  Patient was identified by myself both verbally and by visual inspection of ID band  DESCRIPTION:   Patient was placed in the prone position and was sedated with the above medication   The standard lateral viewing gastroduodenoscope was introduced in to the oropharynx and the esophagus was intubated under direct visualization using sliding technique  Scope was passed down the esophagus up to the second part of the duodenum  The scope was withdrawn and noted the ampulla on the medial wall  Ampulla was cannulated with sphinctertome catheter and cholangiogram was performed  FINDINGS:    The ampulla was identified  It was prominent  The intra duodenal portion was enlarged  The bile duct was cannulated on 1st attempt  Minimal contrast was injected to confirm that we were in the right location  A 1 cm sphincterotomy was done  Balloon sweeps were done with a 9-12 mm extraction balloon  A very small stone fragment was extracted  Further balloon sweeps did not reveal any stones  The 12 mm balloon easily traverse the biliary orifice  Finally a 10 [place] by 5 cm straight biliary stent was placed successfully  IMPRESSIONS:      1  Likely biliary pancreatitis and cholangitis (has had prior cholecystectomy)  Successful ERCP with sphincterotomy, possible small stone removed and a stent was placed  RECOMMENDATIONS:     1  Follow LFT's    2  Aggressive hydration for pancreatitis  3  Antibiotics  4  Follow clinically  5  Transfer to ICU  COMPLICATIONS: None; patient tolerated the procedure well          DISPOSITION: PACU           CONDITION: Stable

## 2019-01-04 NOTE — UTILIZATION REVIEW
[address]  Utilization Review Department  Phone: [phone]; Fax [phone]  [email or web address]  ATTENTION: Please call with any questions or concerns to [phone]  and carefully listen to the prompts so that you are directed to the right person  Send all requests for admission clinical reviews, approved or denied determinations and any other requests to fax [phone]  All voicemails are confidential       Initial Clinical Review    Admission: Date/Time/Statement: 1/3/19 AT 1027 ADMIT INPATIENT - 1/4/19 TRANSFER TO LEVEL 2 STEPDOWN      Orders Placed This Encounter   Procedures    Inpatient Admission     Standing Status:   Standing     Number of Occurrences:   1     Order Specific Question:   Admitting Physician     Answer:   Annabel Meredith [[id number]]     Order Specific Question:   Level of Care     Answer:   Med Surg [16]     Order Specific Question:   Estimated length of stay     Answer:   More than 2 Midnights     Order Specific Question:   Certification     Answer:   I certify that inpatient services are medically necessary for this patient for a duration of greater than two midnights  See H&P and MD Progress Notes for additional information about the patient's course of treatment  ED: Date/Time/Mode of Arrival:   ED Arrival Information     Expected Arrival Acuity Means of Arrival Escorted By Service Admission Type    - 1/3/2019 07:15 Urgent Walk-In Self Surgery-General Urgent    Arrival Complaint    abdominal pain        Chief Complaint:   Chief Complaint   Patient presents with    Vomiting     Patient presents to the ER with vomiting since Midnight  Chief Complaint: Upper Abdominal Pain with Nausea + Vomiting    History of Illness:   Anil Howard is a 60 y o  male who presents with acute onset of bandlike abdominal pain, upper abdomen    Patient states that he was in his usual state of health until around 1:00AM, when he developed sudden onset of upper abdominal pain that radiated across his upper abdomen  He denies radiation to his back  He admits to nausea and some vomiting as well as some loose stools  His wife states that she recently had a GI virus  He denies significant alcohol intake  He admits to laparoscopic cholecystectomy about 30 years ago  He also admits to frequent acid reflux and epigastric discomfort that is associated with the reflux  He takes Prilosec as needed        Review of Systems   Constitutional: Negative  Respiratory: Negative  Cardiovascular: Negative  Gastrointestinal: Positive for abdominal pain, diarrhea and nausea  Physical Exam:  Cardiovascular: Normal rate and regular rhythm  Pulmonary/Chest: Breath sounds normal  No respiratory distress  He has no wheezes  He has no rales  Abdominal: Soft  He exhibits no distension  There is tenderness (point of maximal tenderness in RUQ; mild epigastric)  There is no rebound     Hypoactive BS       ED Vital Signs:   ED Triage Vitals [01/03/19 0733]   Temperature Pulse Respirations Blood Pressure SpO2   97 6 °F (36 4 °C) 69 18 153/91 97 %      Temp Source Heart Rate Source Patient Position - Orthostatic VS BP Location FiO2 (%)   Oral Monitor Lying Right arm --      Pain Score       8        Wt Readings from Last 1 Encounters:   01/03/19 99 8 kg (220 lb)      01/03 0701 01/04 0700 01/04 0701 01/04 1237  Most Recent    Temperature (°F) 97 6100 7 98 399 4  99 4 (37 4)    Pulse 69110 100123  114    Respirations 1218 1220  18    Blood Pressure 102/64153/91 115/80144/86  125/72    Shock Index 0 450 99 0 850 91  0 91    SpO2 (%) w/ 2-3 L O2 8598 9099  92      I/O   01/02 0701 01/03 0700 01/03 0701 01/04 0700 01/04 0701 01/05 0700   I V  (mL/kg)  3366 7 (33 7)    IV Piggyback  2000 500   Total Intake(mL/kg)  5366 7 (53 8) 500 (5)   Urine (mL/kg/hr)  1000 260 (0 5)   Total Output  1000 260   Net  +4366 7 +240       LABSDiagnostic Test Results:     CBC   WBC 11 35 (H) 01/03/2019     HGB 15 8 01/03/2019     HCT 46 1 01/03/2019     MCV 90 01/03/2019      01/03/2019     MCH 30 7 01/03/2019     MCHC 34 3 01/03/2019     RDW 12 7 01/03/2019     MPV 9 0 01/03/2019     NRBC 0 01/03/2019     CMP:         Lab Results   Component Value Date     SODIUM 137 01/03/2019     K 3 5 01/03/2019      01/03/2019     CO2 26 01/03/2019     BUN 24 01/03/2019     CREATININE 1 26 01/03/2019     CALCIUM 8 4 01/03/2019     AST 94 (H) 01/03/2019     ALT 97 (H) 01/03/2019     ALKPHOS 80 01/03/2019     EGFR 62 01/03/2019   ,  Lipase:         Lab Results   Component Value Date     LIPASE 6,403 (H) 01/03/2019       BLOOD CULTURES X 2 PENDING      CT ABDOMEN PELVIS -  Acute interstitial edematous pancreatitis with extensive pancreatic and peripancreatic inflammatory edema   Within the limitations imposed by respiratory motion throughout the upper abdomen there is no convincing evidence of pancreatic necrosis or   underlying pancreatic mass   Nonetheless, when better pain control can be achieved and the patient is able to tolerate better breath-hold imaging, more accurate characterization utilizing pancreatic MRI with MRCP is recommended for further investigation   of the possible etiology and complications of acute pancreatitis        ED Treatment:   Medication Administration from 01/03/2019 0714 to 01/03/2019 1302       Date/Time Order Dose Route Action Action by Comments     01/03/2019 0750 ondansetron (ZOFRAN) injection 4 mg 4 mg Intravenous Given Jo Acuna RN      01/03/2019 0806 aluminum-magnesium hydroxide-simethicone (MYLANTA) 200-200-20 mg/5 mL oral suspension 30 mL 30 mL Oral Given Jo Acuna RN      01/03/2019 0806 pantoprazole (PROTONIX) injection 40 mg 40 mg Intravenous Given Jo Acuna RN      01/03/2019 0841 sodium chloride 0 9 % bolus 1,000 mL 0 mL Intravenous Stopped Jo Acuna RN      01/03/2019 0745 sodium chloride 0 9 % bolus 1,000 mL 1,000 mL Intravenous New Bag [facility]      01/03/2019 1719 morphine (PF) 4 mg/mL injection 4 mg 4 mg Intravenous Given Agnes Brown RN      01/03/2019 0923 iohexol (OMNIPAQUE) 350 MG/ML injection (MULTI-DOSE) 100 mL 100 mL Intravenous Given Prashanth Vidal      01/03/2019 1006 HYDROmorphone (DILAUDID) injection 1 mg 1 mg Intravenous Given Agnes Brown RN      01/03/2019 1046 multi-electrolyte (ISOLYTE-S PH 7 4 equivalent) IV solution 200 mL/hr Intravenous New 2309 Callie Rebollar RN      01/03/2019 1251 oxyCODONE (ROXICODONE) immediate release tablet 10 mg 10 mg Oral Given Agnes Brown RN      01/03/2019 1144 HYDROmorphone (DILAUDID) injection 0 2 mg 0 2 mg Intravenous Given Agnes Brown RN           Past Medical/Surgical History:   Past Medical History:   Diagnosis Date    Gastroesophageal reflux        Admitting Diagnosis: Pancreatitis [K85 90]  Abdominal pain [R10 9]    Age/Sex: 60 y o  male    Assessment/Plan:   59 y/o male with acute pancreatitis, dilated CBD with remote history of laparoscopic cholecystectomy     Plan:  IVF, pain management  Lipid panel  MRCP       Admission Orders:  1/3/19 AT 1027 ADMIT INPATIENT - 1/4/19 TRANSFER TO LEVEL 2 STEPDOWN/ ICU    CARDIO PULM MONITORING  VS PER ICU Q1HR  NEURO CHECKS Q1HR    Up + OOB with Assistance as Tolerated  SCD    NPO    Continuous IV Infusions:   HYDROmorphone    PCA  0 2 mg q6 mins prn       lactated ringers 200 mL/hr    sodium chloride 200 mL/hr Last Rate: Stopped (01/04/19 1036)       Scheduled Meds:   Current Facility-Administered Medications:  acetaminophen 650 mg Oral Q6H PRN     chlorhexidine 15 mL Swish & Spit Q12H [facility]     enoxaparin 40 mg Subcutaneous Daily     HYDROmorphone  Intravenous Continuous     lactated ringers 500 mL Intravenous Once  Last Rate: 500 mL (01/04/19 1040)   lactated ringers 200 mL/hr Intravenous Continuous     ondansetron 4 mg Intravenous Q6H PRN     piperacillin-tazobactam 3 375 g Intravenous Q6H     sodium chloride 200 mL/hr Intravenous Continuous  Last Rate: Stopped (01/04/19 1036)       PRN Meds:     Acetaminophen 650 mg q6hrs prn given x 1    IV HYDROmorphone 0 5 mg q1hr prn given x 1    Ondansetron    Oxycodone 5 mg q4hrs prn given x 2    Oxycodone 10 mg q4hrs prn given x 3    CONSULT GI         Gastroenterology  Consults Date of Service: 1/4/2019 10:30 AM      ASSESSMENT/PLAN:   Pancreatitis  Cholangitis     Pt presented with N/V/D & abdominal pain & was found to have elevated LFT's & Lipase, with pancreatitis on imaging  His lipase improved but his TB went from 1 8->3 5  He had a temp of 100 7 overnight & is tachycardic  He also is having some episodes of confusion   Despite no stone being seen on imaging I would proceed immediately to ERCP for further evaluation   -blood culture  -Start Abx - Zosyn  -NPO  -Change fluids to LR  -ERCP urgently

## 2019-01-04 NOTE — PROGRESS NOTES
Progress Note - Surgery   Homero Urena 60 y o  male MRN: [id number]  Unit/Bed#: -01 Encounter: [id number]    Assessment/Plan:  61 y/o male with acute pancreatitis, dilated CBD with remote history of laparoscopic cholecystectomy    Continue NPO  Isolyte @ 200 cc/hour  Follow-up lipase/LFTs  Pain control   GI consult, MRCP vs ERCP given worsening LFTs    Subjective/Objective     Subjective:  Patient febrile to 100 7 overnight  Patient still appears uncomfortable in bed complaining of persistent abdominal pain  Denies nausea vomiting chest pain or shortness of breath  Objective:     Vitals: Blood pressure 111/76, pulse (!) 110, temperature (!) 100 7 °F (38 2 °C), temperature source Oral, resp  rate 12, height 6' 4" (1 93 m), weight 99 8 kg (220 lb), SpO2 96 %  ,Body mass index is 26 78 kg/m²  I/O       01/02 0701 - 01/03 0700 01/03 0701 - 01/04 0700    I V  (mL/kg)  3366 7 (33 7)    IV Piggyback  2000    Total Intake(mL/kg)  5366 7 (53 8)    Urine (mL/kg/hr)  800    Total Output   800    Net   +4566 7                Physical Exam:  GEN: NAD  HEENT: MMM  CV: RRR  Lung: Normal effort  Ab: Soft,RUQ/epigastric ttp/ND  No rebound/guarding  Extrem: No CCE  Neuro:  A+Ox3    Lab, Imaging and other studies:   CBC with diff:   Lab Results   Component Value Date    WBC 3 53 (L) 01/04/2019    HGB 17 3 (H) 01/04/2019    HCT 51 5 (H) 01/04/2019    MCV 91 01/04/2019     01/04/2019    MCH 30 4 01/04/2019    MCHC 33 6 01/04/2019    RDW 13 2 01/04/2019    MPV 9 4 01/04/2019    NRBC 0 01/03/2019   , BMP/CMP:   Lab Results   Component Value Date    SODIUM 138 01/04/2019    K 4 6 01/04/2019     (H) 01/04/2019    CO2 22 01/04/2019    BUN 26 (H) 01/04/2019    CREATININE 1 38 (H) 01/04/2019    CALCIUM 6 7 (L) 01/04/2019     (H) 01/04/2019     (H) 01/04/2019    ALKPHOS 48 01/04/2019    EGFR 56 01/04/2019     VTE Pharmacologic Prophylaxis: Enoxaparin (Lovenox)  VTE Mechanical Prophylaxis: sequential compression device

## 2019-01-04 NOTE — CONSULTS
[address] 60 y o  male MRN: [id number]  Unit/Bed#: [facility] Encounter: [id number]    Physician Requesting Consult: Chaitanya Serra MD    Reason for Consultation / Chief Complaint: Acute pancreatitis     History of Present Illness:  Clarence Holland is a 60 y o  male who presents to [facility] with abdominal pain radiating upper abdomen that came on suddenly  He expressed nausea and some vomiting with loose stools  He has hx of GERD and a cholecystectomy 30 years ago  He had a CT abdomen with contrast which was consistent with acute interstitial edematous pancreatitis with extensive pancreatic and peripancreatic inflammatory edema  LFTs increased 1/4 AM and he was taken for an ERCP  A small stone was extracted, sphincterotomy was performed and a biliary stent was placed  He was intubated for the procedure  There were some secretions suctioned concerning for aspiration and he was tachypnic at the end of the procedure  For these reasons he was brought to the ICU intubated  History obtained from chart review  Past Medical History:  Past Medical History:   Diagnosis Date    Gastroesophageal reflux        Past Surgical History:  Past Surgical History:   Procedure Laterality Date    CHOLECYSTECTOMY      COLONOSCOPY N/A 3/21/2016    Procedure: COLONOSCOPY;  Surgeon: Melisa Muniz DO;  Location: [facility]; Service:     [place] EGD TRANSORAL BIOPSY SINGLE/MULTIPLE N/A 3/21/2016    Procedure: ESOPHAGOGASTRODUODENOSCOPY (EGD); Surgeon: Melisa Muniz DO;  Location: [facility]; Service: General       Past Family History:  History reviewed  No pertinent family history      Social History:  History   Smoking Status    Never Smoker   Smokeless Tobacco    Never Used     History   Alcohol Use No     Comment: rarely     History   Drug Use No     Marital Status: Single  Exercise History: N/A    Home Medications:   Prior to Admission medications    Not on File       Inpatient Medications:  Scheduled Meds:  Current Facility-Administered Medications:  acetaminophen 650 mg Oral Q6H PRN Anamaria Starring    chlorhexidine 15 mL Swish & Spit Q12H [address] PATSY Zheng-[facility]    enoxaparin 40 mg Subcutaneous Daily Lesrubén Handing, PA-C    HYDROmorphone  Intravenous Continuous Samantha Matamoros MD    lactated ringers 500 mL Intravenous Once , PA-LULU Last Rate: 500 mL (19 1040)   lactated ringers 200 mL/hr Intravenous Continuous , PA-C    ondansetron 4 mg Intravenous Q6H PRN Kristan Handing, PA-C    piperacillin-tazobactam 3 375 g Intravenous Q6H , PA-C    sodium chloride 200 mL/hr Intravenous Continuous Kristan Handing, PA-C Last Rate: Stopped (19 1036)     Facility-Administered Medications Ordered in Other Encounters:  fentanyl citrate (PF)  Intravenous PRN Scott Flint, CRNA   lidocaine   PRN Scott Flint, CRNA   phenlyephrine   PRN Scott Flint, CRNA   propofol  Intravenous PRN Scott Flint, CRNA   succinylcholine  Intravenous PRN Scott Flint, CRNA     Continuous Infusions:  HYDROmorphone     lactated ringers 200 mL/hr    sodium chloride 200 mL/hr Last Rate: Stopped (19 1036)     PRN Meds:    acetaminophen 650 mg Q6H PRN   ondansetron 4 mg Q6H PRN       Allergies:  No Known Allergies    ROS:   Review of Systems    Vitals:  Vitals:    19 2232 19 0742 19 1043 19 1107   BP: 111/76 115/80 144/86 125/72   BP Location: Right arm Right arm Right arm    Pulse: (!) 110 100 (!) 123 (!) 114   Resp: 20 18   Temp: (!) 100 7 °F (38 2 °C) 99 °F (37 2 °C) 98 3 °F (36 8 °C) 99 4 °F (37 4 °C)   TempSrc: Oral Oral Oral Tympanic   SpO2:  99% 90% 92%   Weight:       Height:         Temperature:   Temp (24hrs), Av 8 °F (37 1 °C), Min:97 7 °F (36 5 °C), Max:100 7 °F (38 2 °C)    Current Temperature: 99 4 °F (37 4 °C)    Weights:   IBW: 86 8 kg  Body mass index is 26 78 kg/m²      Hemodynamic Monitoring:  N/A     Non-Invasive/Invasive Ventilation Settings:  Respiratory    Lab Data (Last 4 hours)    None         O2/Vent Data (Last 4 hours)    None              No results found for: PHART, CGW8TYE, PO2ART, JQX9GHX, X1FFFPLV, BEART, SOURCE  SpO2: SpO2: 92 %     Physical Exam:  Physical Exam   Constitutional: No distress  HENT:   Head: Normocephalic and atraumatic  Eyes: Right eye exhibits no discharge  No scleral icterus  Neck: No JVD present  No tracheal deviation present  Cardiovascular: Regular rhythm  Tachycardic   Pulmonary/Chest: Effort normal    Intubated -80-5   Abdominal: Soft  He exhibits no distension  There is tenderness  There is no rebound and no guarding  RUQ tenderness  NG w/ dark bilious   Musculoskeletal: He exhibits no edema or deformity  Neurological:   Sedated on propofol   Skin: No rash noted  He is not diaphoretic  Labs:    Results from last 7 days  Lab Units 01/04/19  0435 01/03/19  1429 01/03/19  0744   WBC Thousand/uL 3 53*  --  11 35*   HEMOGLOBIN g/dL 17 3*  --  15 8   HEMATOCRIT % 51 5*  --  46 1   PLATELETS Thousands/uL 160 152 162   NEUTROS PCT %  --   --  88*   BANDS PCT % 10*  --   --    MONOS PCT %  --   --  7   MONO PCT % 7  --   --        Results from last 7 days  Lab Units 01/04/19  0435 01/03/19  0744   SODIUM mmol/L 138 137   POTASSIUM mmol/L 4 6 3 5   CHLORIDE mmol/L 109* 106   CO2 mmol/L 22 26   ANION GAP mmol/L 7 5   BUN mg/dL 26* 24   CREATININE mg/dL 1 38* 1 26   CALCIUM mg/dL 6 7* 8 4   ALT U/L 144* 97*   AST U/L 103* 94*   ALK PHOS U/L 48 80   ALBUMIN g/dL 2 7* 3 9   TOTAL BILIRUBIN mg/dL 3 49* 1 83*                Results from last 7 days  Lab Units 01/03/19  0818   TROPONIN I ng/mL <0 02         ABG:No results found for: PHART, EWA8JLA, PO2ART, TZS6URS, U2JWXZDS, BEART, SOURCE  VBG:          Imaging: Xr Chest 2 Views    Result Date: 1/3/2019  Impression: No active pulmonary disease on examination which is somewhat limited secondary to low lung volumes   Workstation performed: [id number] Ct Abdomen Pelvis W Contrast    Result Date: 1/3/2019  Impression: Acute interstitial edematous pancreatitis with extensive pancreatic and peripancreatic inflammatory edema  Within the limitations imposed by respiratory motion throughout the upper abdomen there is no convincing evidence of pancreatic necrosis or underlying pancreatic mass  Nonetheless, when better pain control can be achieved and the patient is able to tolerate better breath-hold imaging, more accurate characterization utilizing pancreatic MRI with MRCP is recommended for further investigation of the possible etiology and complications of acute pancreatitis  The study was marked in [facility] for immediate notification  Workstation performed: [id number]  I have personally reviewed pertinent reports  EKG: N/A  Micro:        ______________________________________________________________________    Assessment and Plan:     58yM w/ biliary pancreatitis and cholangitis    Neuro: Acute Encephalopathy - possibly 2/2 cholangitis  [facility] ICU daily  Sleep/Wake cycle     CV: Sinu tachycardia likely 2/2 volume depletion  Follow rhythm on telemetry   Continue Isolyte at 200  Accurate I/O    Lung: Currently intubated  Will decrease ventilator settings with plan for extubation    GI: Acute pancreatitis s/p ERCP and biliary stent placement  NPO/NGT  Will assess for early enteral feedings    F/E/N:   Rush@Zuki  F/u AM labs and replete as necessary  Obtain lactate  Will assess for early enteral feedings    :   If unable to extubate shortly will place rodriguez  Regardless will need accurate I/O with boluses as needed    ID:   Blood cultures pending  On Zosyn for cholangitis    Heme:   F/u Hgb   Lovenox for DVT PPx    Endo:   No issues    Msk/Skin:   No issues    Disposition:     Counseling / Coordination of Care  Total time spent today 25 minutes  Greater than 50% of total time was spent with the patient and / or family counseling and / or coordination of care   A description of the counseling / coordination of care: 25  ______________________________________________________________________    VTE Pharmacologic Prophylaxis: Enoxaparin (Lovenox)  VTE Mechanical Prophylaxis: sequential compression device    Invasive lines and devices: Invasive Devices     Peripheral Intravenous Line            Peripheral IV 01/03/19 Left Antecubital 1 day    Peripheral IV 01/04/19 Left Wrist less than 1 day    Peripheral IV 01/04/19 Right Hand less than 1 day          Airway            ETT  Cuffed;Oral 8 mm less than 1 day                Code Status: Level 1 - Full Code  POA:    POLST:      Given critical illness, patient length of stay will require greater than two midnights  Portions of the record may have been created with voice recognition software  Occasional wrong word or "sound a like" substitutions may have occurred due to the inherent limitations of voice recognition software  Read the chart carefully and recognize, using context, where substitutions have occurred        Caprice Husain         Inpatient consult to Surgical Critical Care     Performed by  Josef Rocha by Maria Eugenia Jones

## 2019-01-04 NOTE — QUICK NOTE
Nurse provider rounds completed with Destiney Strogn    Patient to be taken for ERCP soon, then to ICU post procedure  Continuing IVF and PCA

## 2019-01-04 NOTE — CONSULTS
Consultation - 126 [facility]  Mamie Figueroa 60 y o  male MRN: [id number]  Unit/Bed#: [facility] 827-01 Encounter: [id number]        ASSESSMENT/PLAN:   Pancreatitis  Cholangitis    Pt presented with N/V/D & abdominal pain & was found to have elevated LFT's & Lipase, with pancreatitis on imaging  His lipase improved but his TB went from 1 8->3 5  He had a temp of 100 7 overnight & is tachycardic  He also is having some episodes of confusion  Despite no stone being seen on imaging I would proceed immediately to ERCP for further evaluation   -blood culture  -Start Abx - Zosyn  -NPO  -Change fluids to LR  -ERCP urgently      Consults    Reason for Consult / Principal Problem: Acute pancreatitis    HPI: Mamie Figueroa is a 60y o  year old male s/p cholecystectomy >20yrs ago who presents with N/V/D & abd pain  He states yesterday morning after work, at [employer] everything started  He thought at 1st he had a stomach bug as his wife recently had one but the pain grew worse & he came to the ER  He was found to have elevated LFT's & Lipase  CT showed acute pancreatitis & dilated CBD  Pt complaining of severe pain throughout his abdomen but worse in the upper abdomen  He denies alcohol  No previous episodes  He states he has had elevated LFT's in the past but can't give me anymore information on this  Review of Systems: as per HPI  Review of Systems   Respiratory: Positive for shortness of breath  All other systems reviewed and are negative  Historical Information   Past Medical History:   Diagnosis Date    Gastroesophageal reflux      Past Surgical History:   Procedure Laterality Date    CHOLECYSTECTOMY      COLONOSCOPY N/A 3/21/2016    Procedure: COLONOSCOPY;  Surgeon: Tg Young DO;  Location: [facility]; Service:     TX EGD TRANSORAL BIOPSY SINGLE/MULTIPLE N/A 3/21/2016    Procedure: ESOPHAGOGASTRODUODENOSCOPY (EGD); Surgeon: Tg Young DO;  Location: [facility];   Service: General Social History   History   Alcohol Use No     Comment: rarely     History   Drug Use No     History   Smoking Status    Never Smoker   Smokeless Tobacco    Never Used     History reviewed  No pertinent family history  Meds/Allergies     No prescriptions prior to admission  Current Facility-Administered Medications   Medication Dose Route Frequency    acetaminophen (TYLENOL) tablet 650 mg  650 mg Oral Q6H PRN    enoxaparin (LOVENOX) subcutaneous injection 40 mg  40 mg Subcutaneous Daily    HYDROmorphone (DILAUDID) 1 mg/mL 50 mL PCA   Intravenous Continuous    lactated ringers bolus 500 mL  500 mL Intravenous Once    lactated ringers infusion  200 mL/hr Intravenous Continuous    ondansetron (ZOFRAN) injection 4 mg  4 mg Intravenous Q6H PRN    piperacillin-tazobactam (ZOSYN) 3 375 g in sodium chloride 0 9 % 50 mL IVPB  3 375 g Intravenous Q6H    sodium chloride 0 9 % bolus 2,000 mL  2,000 mL Intravenous Once    sodium chloride 0 9 % infusion  200 mL/hr Intravenous Continuous       No Known Allergies    Objective     Blood pressure 115/80, pulse 100, temperature 99 °F (37 2 °C), temperature source Oral, resp  rate 12, height 6' 4" (1 93 m), weight 99 8 kg (220 lb), SpO2 99 %  Intake/Output Summary (Last 24 hours) at 01/04/19 1030  Last data filed at 01/04/19 0523   Gross per 24 hour   Intake          5366 67 ml   Output             1000 ml   Net          4366 67 ml       PHYSICAL EXAM     Physical Exam   Constitutional:   Ill appearing   HENT:   Head: Normocephalic and atraumatic  Appears dry   Eyes: Scleral icterus is present  Neck: Normal range of motion  Cardiovascular:   tachy   Pulmonary/Chest: Breath sounds normal    Appears SOB   Abdominal: Soft  Bowel sounds are normal  There is tenderness  Musculoskeletal: Normal range of motion  Neurological:   Somewhat confused   Skin: Skin is warm and dry         Lab Results:   CBC: Lab Results   Component Value Date    WBC 3 53 (L) 01/04/2019    HGB 17 3 (H) 01/04/2019    HCT 51 5 (H) 01/04/2019    MCV 91 01/04/2019     01/04/2019    MCH 30 4 01/04/2019    MCHC 33 6 01/04/2019    RDW 13 2 01/04/2019    MPV 9 4 01/04/2019    NRBC 0 01/04/2019   ,   CMP: Lab Results   Component Value Date    K 4 6 01/04/2019     (H) 01/04/2019    CO2 22 01/04/2019    BUN 26 (H) 01/04/2019    CREATININE 1 38 (H) 01/04/2019    CALCIUM 6 7 (L) 01/04/2019     (H) 01/04/2019     (H) 01/04/2019    ALKPHOS 48 01/04/2019    EGFR 56 01/04/2019   ,   Lipase:   Lab Results   Component Value Date    LIPASE 2,217 (H) 01/04/2019   ,  Imaging Studies: I have personally reviewed pertinent reports  CT:  Acute interstitial edematous pancreatitis with extensive pancreatic and peripancreatic inflammatory edema  Within the limitations imposed by respiratory motion throughout the upper abdomen there is no convincing evidence of pancreatic necrosis or   underlying pancreatic mass  Nonetheless, when better pain control can be achieved and the patient is able to tolerate better breath-hold imaging, more accurate characterization utilizing pancreatic MRI with MRCP is recommended for further investigation   of the possible etiology and complications of acute pancreatitis

## 2019-01-04 NOTE — ANESTHESIA POSTPROCEDURE EVALUATION
Post-Op Assessment Note      CV Status:  Stable    Mental Status:  Unresponsive    Hydration Status:  Euvolemic    PONV Controlled:  Controlled    Airway Patency:  Patent  Airway: intubated    Post Op Vitals Reviewed: Yes          Staff: Anesthesiologist, CRNA       Comments: transferred to ICU intiubated, ventilated and monitored              BP   111/57   Temp      Pulse 110   Resp   16 bagged   SpO2   96%

## 2019-01-04 NOTE — PROGRESS NOTES
Patient febrile overnight  Dark urine output  Wbc ~3  Bilirubin increasing  CBD dilated  Tender with guarding throughout abdomen  Severe epigastric and back pain   Will give 2L bolus over 4 hours  Start dilaudid pca  Will escalate level of care to ICU  Gi consult for ERCP for suspected cholangitis and help elucidaet cause of pancreatitis  deneis alcohol use  H/o of cholecystectomy  Only takes vitamins at home

## 2019-01-05 ENCOUNTER — APPOINTMENT (INPATIENT)
Dept: RADIOLOGY | Facility: HOSPITAL | Age: 59
DRG: 438 | End: 2019-01-05
Payer: COMMERCIAL

## 2019-01-05 LAB
ALBUMIN SERPL BCP-MCNC: 2.1 G/DL (ref 3.5–5)
ALP SERPL-CCNC: 29 U/L (ref 46–116)
ALT SERPL W P-5'-P-CCNC: 420 U/L (ref 12–78)
AMMONIA PLAS-SCNC: <10 UMOL/L (ref 11–35)
ANION GAP SERPL CALCULATED.3IONS-SCNC: 8 MMOL/L (ref 4–13)
ANISOCYTOSIS BLD QL SMEAR: PRESENT
ARTERIAL PATENCY WRIST A: YES
ARTERIAL PATENCY WRIST A: YES
AST SERPL W P-5'-P-CCNC: 236 U/L (ref 5–45)
BASE EXCESS BLDA CALC-SCNC: -1.5 MMOL/L
BASE EXCESS BLDA CALC-SCNC: -4.2 MMOL/L
BASE EXCESS BLDA CALC-SCNC: -4.6 MMOL/L
BASE EXCESS BLDA CALC-SCNC: -5 MMOL/L (ref -2–3)
BASOPHILS # BLD MANUAL: 0 THOUSAND/UL (ref 0–0.1)
BASOPHILS NFR MAR MANUAL: 0 % (ref 0–1)
BILIRUB SERPL-MCNC: 2.57 MG/DL (ref 0.2–1)
BODY TEMPERATURE: 100 DEGREES FEHRENHEIT
BODY TEMPERATURE: 101.7 DEGREES FEHRENHEIT
BUN SERPL-MCNC: 37 MG/DL (ref 5–25)
CA-I BLD-SCNC: 0.95 MMOL/L (ref 1.12–1.32)
CA-I BLD-SCNC: 1.06 MMOL/L (ref 1.12–1.32)
CALCIUM SERPL-MCNC: 6.9 MG/DL (ref 8.3–10.1)
CHLORIDE SERPL-SCNC: 109 MMOL/L (ref 100–108)
CO2 SERPL-SCNC: 23 MMOL/L (ref 21–32)
CREAT SERPL-MCNC: 1.67 MG/DL (ref 0.6–1.3)
EOSINOPHIL # BLD MANUAL: 0 THOUSAND/UL (ref 0–0.4)
EOSINOPHIL NFR BLD MANUAL: 0 % (ref 0–6)
ERYTHROCYTE [DISTWIDTH] IN BLOOD BY AUTOMATED COUNT: 13.6 % (ref 11.6–15.1)
FIO2 GAS DIL.REBREATH: 50 L
GFR SERPL CREATININE-BSD FRML MDRD: 44 ML/MIN/1.73SQ M
GLUCOSE SERPL-MCNC: 140 MG/DL (ref 65–140)
GLUCOSE SERPL-MCNC: 146 MG/DL (ref 65–140)
HCO3 BLDA-SCNC: 21.5 MMOL/L (ref 22–28)
HCO3 BLDA-SCNC: 22.9 MMOL/L (ref 22–28)
HCO3 BLDA-SCNC: 23.8 MMOL/L (ref 22–28)
HCO3 BLDA-SCNC: 23.9 MMOL/L (ref 22–28)
HCT VFR BLD AUTO: 47.8 % (ref 36.5–49.3)
HCT VFR BLD CALC: 45 % (ref 36.5–49.3)
HGB BLD-MCNC: 16.1 G/DL (ref 12–17)
HGB BLDA-MCNC: 15.3 G/DL (ref 12–17)
HOROWITZ INDEX BLDA+IHG-RTO: 80 MM[HG]
HOROWITZ INDEX BLDA+IHG-RTO: 90 MM[HG]
HOROWITZ INDEX BLDA+IHG-RTO: 90 MM[HG]
LACTATE SERPL-SCNC: 3 MMOL/L (ref 0.5–2)
LACTATE SERPL-SCNC: 3 MMOL/L (ref 0.5–2)
LACTATE SERPL-SCNC: 3.1 MMOL/L (ref 0.5–2)
LACTATE SERPL-SCNC: 3.1 MMOL/L (ref 0.5–2)
LACTATE SERPL-SCNC: 3.2 MMOL/L (ref 0.5–2)
LACTATE SERPL-SCNC: 3.3 MMOL/L (ref 0.5–2)
LIPASE SERPL-CCNC: 1076 U/L (ref 73–393)
LYMPHOCYTES # BLD AUTO: 0.23 THOUSAND/UL (ref 0.6–4.47)
LYMPHOCYTES # BLD AUTO: 7 % (ref 14–44)
MAGNESIUM SERPL-MCNC: 2.1 MG/DL (ref 1.6–2.6)
MCH RBC QN AUTO: 30.7 PG (ref 26.8–34.3)
MCHC RBC AUTO-ENTMCNC: 33.7 G/DL (ref 31.4–37.4)
MCV RBC AUTO: 91 FL (ref 82–98)
METAMYELOCYTES NFR BLD MANUAL: 4 % (ref 0–1)
MONOCYTES # BLD AUTO: 0.2 THOUSAND/UL (ref 0–1.22)
MONOCYTES NFR BLD: 6 % (ref 4–12)
NEUTROPHILS # BLD MANUAL: 2.71 THOUSAND/UL (ref 1.85–7.62)
NEUTS BAND NFR BLD MANUAL: 1 % (ref 0–8)
NEUTS SEG NFR BLD AUTO: 80 % (ref 43–75)
NRBC BLD AUTO-RTO: 0 /100 WBCS
O2 CT BLDA-SCNC: 18.6 ML/DL (ref 16–23)
O2 CT BLDA-SCNC: 18.7 ML/DL (ref 16–23)
O2 CT BLDA-SCNC: 21.8 ML/DL (ref 16–23)
OXYHGB MFR BLDA: 91.8 % (ref 94–97)
OXYHGB MFR BLDA: 94.4 % (ref 94–97)
OXYHGB MFR BLDA: 96.9 % (ref 94–97)
PCO2 BLD: 24 MMOL/L (ref 21–32)
PCO2 BLD: 51.3 MM HG (ref 36–44)
PCO2 BLDA: 41.7 MM HG (ref 36–44)
PCO2 BLDA: 42.7 MM HG (ref 36–44)
PCO2 BLDA: 56.8 MM HG (ref 36–44)
PCO2 TEMP ADJ BLDA: 46 MM HG (ref 36–44)
PEEP RESPIRATORY: 5 CM[H2O]
PEEP RESPIRATORY: 5 CM[H2O]
PEEP RESPIRATORY: 8 CM[H2O]
PH BLD: 7.26 [PH] (ref 7.35–7.45)
PH BLD: 7.34 [PH] (ref 7.35–7.45)
PH BLDA: 7.24 [PH] (ref 7.35–7.45)
PH BLDA: 7.33 [PH] (ref 7.35–7.45)
PH BLDA: 7.37 [PH] (ref 7.35–7.45)
PHOSPHATE SERPL-MCNC: 2.9 MG/DL (ref 2.7–4.5)
PLATELET # BLD AUTO: 138 THOUSANDS/UL (ref 149–390)
PLATELET BLD QL SMEAR: ABNORMAL
PMV BLD AUTO: 9.5 FL (ref 8.9–12.7)
PO2 BLD: 71 MM HG (ref 75–129)
PO2 BLD: 82.9 MM HG (ref 75–129)
PO2 BLDA: 125.3 MM HG (ref 75–129)
PO2 BLDA: 63.8 MM HG (ref 75–129)
PO2 BLDA: 74 MM HG (ref 75–129)
POIKILOCYTOSIS BLD QL SMEAR: PRESENT
POTASSIUM BLD-SCNC: 4.7 MMOL/L (ref 3.5–5.3)
POTASSIUM SERPL-SCNC: 4.7 MMOL/L (ref 3.5–5.3)
PROT SERPL-MCNC: 4.7 G/DL (ref 6.4–8.2)
RBC # BLD AUTO: 5.25 MILLION/UL (ref 3.88–5.62)
RBC MORPH BLD: PRESENT
SAO2 % BLD FROM PO2: 91 % (ref 95–98)
SODIUM BLD-SCNC: 138 MMOL/L (ref 136–145)
SODIUM SERPL-SCNC: 140 MMOL/L (ref 136–145)
SPECIMEN SOURCE: ABNORMAL
VARIANT LYMPHS # BLD AUTO: 2 %
VENT AC: 18
VENT AC: 20
VENT AC: 20
VENT- AC: AC
VT SETTING VENT: 450 ML
WBC # BLD AUTO: 3.34 THOUSAND/UL (ref 4.31–10.16)

## 2019-01-05 PROCEDURE — 83605 ASSAY OF LACTIC ACID: CPT | Performed by: EMERGENCY MEDICINE

## 2019-01-05 PROCEDURE — 71045 X-RAY EXAM CHEST 1 VIEW: CPT

## 2019-01-05 PROCEDURE — 85014 HEMATOCRIT: CPT

## 2019-01-05 PROCEDURE — 82140 ASSAY OF AMMONIA: CPT | Performed by: EMERGENCY MEDICINE

## 2019-01-05 PROCEDURE — 82330 ASSAY OF CALCIUM: CPT

## 2019-01-05 PROCEDURE — 82805 BLOOD GASES W/O2 SATURATION: CPT | Performed by: SURGERY

## 2019-01-05 PROCEDURE — 94002 VENT MGMT INPAT INIT DAY: CPT

## 2019-01-05 PROCEDURE — 85007 BL SMEAR W/DIFF WBC COUNT: CPT | Performed by: EMERGENCY MEDICINE

## 2019-01-05 PROCEDURE — 85027 COMPLETE CBC AUTOMATED: CPT | Performed by: EMERGENCY MEDICINE

## 2019-01-05 PROCEDURE — 83735 ASSAY OF MAGNESIUM: CPT | Performed by: EMERGENCY MEDICINE

## 2019-01-05 PROCEDURE — 82330 ASSAY OF CALCIUM: CPT | Performed by: SURGERY

## 2019-01-05 PROCEDURE — 99291 CRITICAL CARE FIRST HOUR: CPT | Performed by: EMERGENCY MEDICINE

## 2019-01-05 PROCEDURE — 83690 ASSAY OF LIPASE: CPT | Performed by: EMERGENCY MEDICINE

## 2019-01-05 PROCEDURE — 84132 ASSAY OF SERUM POTASSIUM: CPT

## 2019-01-05 PROCEDURE — 94760 N-INVAS EAR/PLS OXIMETRY 1: CPT

## 2019-01-05 PROCEDURE — 82947 ASSAY GLUCOSE BLOOD QUANT: CPT

## 2019-01-05 PROCEDURE — 99232 SBSQ HOSP IP/OBS MODERATE 35: CPT | Performed by: INTERNAL MEDICINE

## 2019-01-05 PROCEDURE — 5A1955Z RESPIRATORY VENTILATION, GREATER THAN 96 CONSECUTIVE HOURS: ICD-10-PCS | Performed by: EMERGENCY MEDICINE

## 2019-01-05 PROCEDURE — 0BH17EZ INSERTION OF ENDOTRACHEAL AIRWAY INTO TRACHEA, VIA NATURAL OR ARTIFICIAL OPENING: ICD-10-PCS | Performed by: EMERGENCY MEDICINE

## 2019-01-05 PROCEDURE — 02HV33Z INSERTION OF INFUSION DEVICE INTO SUPERIOR VENA CAVA, PERCUTANEOUS APPROACH: ICD-10-PCS | Performed by: SURGERY

## 2019-01-05 PROCEDURE — 94660 CPAP INITIATION&MGMT: CPT

## 2019-01-05 PROCEDURE — 83605 ASSAY OF LACTIC ACID: CPT | Performed by: PHYSICIAN ASSISTANT

## 2019-01-05 PROCEDURE — 84295 ASSAY OF SERUM SODIUM: CPT

## 2019-01-05 PROCEDURE — 84100 ASSAY OF PHOSPHORUS: CPT | Performed by: EMERGENCY MEDICINE

## 2019-01-05 PROCEDURE — 82803 BLOOD GASES ANY COMBINATION: CPT

## 2019-01-05 PROCEDURE — 36620 INSERTION CATHETER ARTERY: CPT

## 2019-01-05 PROCEDURE — 80053 COMPREHEN METABOLIC PANEL: CPT | Performed by: EMERGENCY MEDICINE

## 2019-01-05 PROCEDURE — 31500 INSERT EMERGENCY AIRWAY: CPT | Performed by: EMERGENCY MEDICINE

## 2019-01-05 PROCEDURE — 82805 BLOOD GASES W/O2 SATURATION: CPT | Performed by: PHYSICIAN ASSISTANT

## 2019-01-05 PROCEDURE — 36600 WITHDRAWAL OF ARTERIAL BLOOD: CPT

## 2019-01-05 PROCEDURE — 3E043XZ INTRODUCTION OF VASOPRESSOR INTO CENTRAL VEIN, PERCUTANEOUS APPROACH: ICD-10-PCS | Performed by: SURGERY

## 2019-01-05 PROCEDURE — 36556 INSERT NON-TUNNEL CV CATH: CPT | Performed by: EMERGENCY MEDICINE

## 2019-01-05 PROCEDURE — 83605 ASSAY OF LACTIC ACID: CPT | Performed by: SURGERY

## 2019-01-05 RX ORDER — SODIUM CHLORIDE, SODIUM GLUCONATE, SODIUM ACETATE, POTASSIUM CHLORIDE, MAGNESIUM CHLORIDE, SODIUM PHOSPHATE, DIBASIC, AND POTASSIUM PHOSPHATE .53; .5; .37; .037; .03; .012; .00082 G/100ML; G/100ML; G/100ML; G/100ML; G/100ML; G/100ML; G/100ML
1000 INJECTION, SOLUTION INTRAVENOUS ONCE
Status: COMPLETED | OUTPATIENT
Start: 2019-01-05 | End: 2019-01-05

## 2019-01-05 RX ORDER — MIDAZOLAM HYDROCHLORIDE 1 MG/ML
2 INJECTION INTRAMUSCULAR; INTRAVENOUS ONCE
Status: COMPLETED | OUTPATIENT
Start: 2019-01-05 | End: 2019-01-05

## 2019-01-05 RX ORDER — METOPROLOL TARTRATE 5 MG/5ML
5 INJECTION INTRAVENOUS ONCE
Status: DISCONTINUED | OUTPATIENT
Start: 2019-01-05 | End: 2019-01-05

## 2019-01-05 RX ORDER — MIDAZOLAM HYDROCHLORIDE 1 MG/ML
INJECTION INTRAMUSCULAR; INTRAVENOUS
Status: COMPLETED
Start: 2019-01-05 | End: 2019-01-05

## 2019-01-05 RX ORDER — PROPOFOL 10 MG/ML
5-50 INJECTION, EMULSION INTRAVENOUS
Status: DISCONTINUED | OUTPATIENT
Start: 2019-01-05 | End: 2019-01-08

## 2019-01-05 RX ORDER — SUCCINYLCHOLINE CHLORIDE 20 MG/ML
100 INJECTION INTRAMUSCULAR; INTRAVENOUS ONCE
Status: COMPLETED | OUTPATIENT
Start: 2019-01-05 | End: 2019-01-05

## 2019-01-05 RX ORDER — PROPOFOL 10 MG/ML
INJECTION, EMULSION INTRAVENOUS
Status: COMPLETED
Start: 2019-01-05 | End: 2019-01-05

## 2019-01-05 RX ORDER — SODIUM CHLORIDE, SODIUM GLUCONATE, SODIUM ACETATE, POTASSIUM CHLORIDE, MAGNESIUM CHLORIDE, SODIUM PHOSPHATE, DIBASIC, AND POTASSIUM PHOSPHATE .53; .5; .37; .037; .03; .012; .00082 G/100ML; G/100ML; G/100ML; G/100ML; G/100ML; G/100ML; G/100ML
500 INJECTION, SOLUTION INTRAVENOUS ONCE
Status: DISCONTINUED | OUTPATIENT
Start: 2019-01-05 | End: 2019-01-05

## 2019-01-05 RX ORDER — ETOMIDATE 2 MG/ML
20 INJECTION INTRAVENOUS ONCE
Status: COMPLETED | OUTPATIENT
Start: 2019-01-05 | End: 2019-01-05

## 2019-01-05 RX ORDER — SODIUM CHLORIDE, SODIUM GLUCONATE, SODIUM ACETATE, POTASSIUM CHLORIDE, MAGNESIUM CHLORIDE, SODIUM PHOSPHATE, DIBASIC, AND POTASSIUM PHOSPHATE .53; .5; .37; .037; .03; .012; .00082 G/100ML; G/100ML; G/100ML; G/100ML; G/100ML; G/100ML; G/100ML
1000 INJECTION, SOLUTION INTRAVENOUS ONCE
Status: DISCONTINUED | OUTPATIENT
Start: 2019-01-05 | End: 2019-01-08

## 2019-01-05 RX ADMIN — PROPOFOL 20 MCG/KG/MIN: 10 INJECTION, EMULSION INTRAVENOUS at 08:44

## 2019-01-05 RX ADMIN — PROPOFOL 25 MCG/KG/MIN: 10 INJECTION, EMULSION INTRAVENOUS at 22:25

## 2019-01-05 RX ADMIN — PIPERACILLIN SODIUM AND TAZOBACTAM SODIUM 3.38 G: 36; 4.5 INJECTION, POWDER, FOR SOLUTION INTRAVENOUS at 05:26

## 2019-01-05 RX ADMIN — ETOMIDATE 20 MG: 20 INJECTION, SOLUTION INTRAVENOUS at 08:50

## 2019-01-05 RX ADMIN — SODIUM CHLORIDE, SODIUM GLUCONATE, SODIUM ACETATE, POTASSIUM CHLORIDE, MAGNESIUM CHLORIDE, SODIUM PHOSPHATE, DIBASIC, AND POTASSIUM PHOSPHATE 1000 ML: .53; .5; .37; .037; .03; .012; .00082 INJECTION, SOLUTION INTRAVENOUS at 21:28

## 2019-01-05 RX ADMIN — SODIUM CHLORIDE, SODIUM GLUCONATE, SODIUM ACETATE, POTASSIUM CHLORIDE, MAGNESIUM CHLORIDE, SODIUM PHOSPHATE, DIBASIC, AND POTASSIUM PHOSPHATE 1000 ML: .53; .5; .37; .037; .03; .012; .00082 INJECTION, SOLUTION INTRAVENOUS at 07:00

## 2019-01-05 RX ADMIN — MIDAZOLAM HYDROCHLORIDE 2 MG: 1 INJECTION INTRAMUSCULAR; INTRAVENOUS at 12:52

## 2019-01-05 RX ADMIN — ENOXAPARIN SODIUM 40 MG: 40 INJECTION SUBCUTANEOUS at 08:21

## 2019-01-05 RX ADMIN — ACETAMINOPHEN 650 MG: 325 TABLET, FILM COATED ORAL at 09:25

## 2019-01-05 RX ADMIN — CHLORHEXIDINE GLUCONATE 0.12% ORAL RINSE 15 ML: 1.2 LIQUID ORAL at 20:07

## 2019-01-05 RX ADMIN — SODIUM CHLORIDE, SODIUM GLUCONATE, SODIUM ACETATE, POTASSIUM CHLORIDE, MAGNESIUM CHLORIDE, SODIUM PHOSPHATE, DIBASIC, AND POTASSIUM PHOSPHATE 1000 ML: .53; .5; .37; .037; .03; .012; .00082 INJECTION, SOLUTION INTRAVENOUS at 14:33

## 2019-01-05 RX ADMIN — HYDROMORPHONE HYDROCHLORIDE 0.5 MG: 1 INJECTION, SOLUTION INTRAMUSCULAR; INTRAVENOUS; SUBCUTANEOUS at 08:21

## 2019-01-05 RX ADMIN — SODIUM CHLORIDE, SODIUM GLUCONATE, SODIUM ACETATE, POTASSIUM CHLORIDE, MAGNESIUM CHLORIDE, SODIUM PHOSPHATE, DIBASIC, AND POTASSIUM PHOSPHATE 200 ML/HR: .53; .5; .37; .037; .03; .012; .00082 INJECTION, SOLUTION INTRAVENOUS at 00:10

## 2019-01-05 RX ADMIN — PIPERACILLIN SODIUM AND TAZOBACTAM SODIUM 3.38 G: 36; 4.5 INJECTION, POWDER, FOR SOLUTION INTRAVENOUS at 00:25

## 2019-01-05 RX ADMIN — Medication 100 MG: at 08:50

## 2019-01-05 RX ADMIN — SODIUM CHLORIDE, SODIUM GLUCONATE, SODIUM ACETATE, POTASSIUM CHLORIDE, MAGNESIUM CHLORIDE, SODIUM PHOSPHATE, DIBASIC, AND POTASSIUM PHOSPHATE 200 ML/HR: .53; .5; .37; .037; .03; .012; .00082 INJECTION, SOLUTION INTRAVENOUS at 13:00

## 2019-01-05 RX ADMIN — PIPERACILLIN SODIUM AND TAZOBACTAM SODIUM 3.38 G: 36; 4.5 INJECTION, POWDER, FOR SOLUTION INTRAVENOUS at 13:03

## 2019-01-05 RX ADMIN — PIPERACILLIN SODIUM AND TAZOBACTAM SODIUM 3.38 G: 36; 4.5 INJECTION, POWDER, FOR SOLUTION INTRAVENOUS at 23:57

## 2019-01-05 RX ADMIN — MIDAZOLAM HYDROCHLORIDE 2 MG: 1 INJECTION, SOLUTION INTRAMUSCULAR; INTRAVENOUS at 12:52

## 2019-01-05 RX ADMIN — ONDANSETRON 4 MG: 2 INJECTION INTRAMUSCULAR; INTRAVENOUS at 02:18

## 2019-01-05 RX ADMIN — PROPOFOL 15 MCG/KG/MIN: 10 INJECTION, EMULSION INTRAVENOUS at 14:33

## 2019-01-05 RX ADMIN — SODIUM CHLORIDE, SODIUM GLUCONATE, SODIUM ACETATE, POTASSIUM CHLORIDE, MAGNESIUM CHLORIDE, SODIUM PHOSPHATE, DIBASIC, AND POTASSIUM PHOSPHATE 200 ML/HR: .53; .5; .37; .037; .03; .012; .00082 INJECTION, SOLUTION INTRAVENOUS at 19:44

## 2019-01-05 RX ADMIN — PIPERACILLIN SODIUM AND TAZOBACTAM SODIUM 3.38 G: 36; 4.5 INJECTION, POWDER, FOR SOLUTION INTRAVENOUS at 18:09

## 2019-01-05 RX ADMIN — HYDROMORPHONE HYDROCHLORIDE 1 MG: 1 INJECTION, SOLUTION INTRAMUSCULAR; INTRAVENOUS; SUBCUTANEOUS at 00:25

## 2019-01-05 RX ADMIN — CALCIUM GLUCONATE 2 G: 98 INJECTION, SOLUTION INTRAVENOUS at 21:28

## 2019-01-05 RX ADMIN — HYDROMORPHONE HYDROCHLORIDE 1 MG: 1 INJECTION, SOLUTION INTRAMUSCULAR; INTRAVENOUS; SUBCUTANEOUS at 04:30

## 2019-01-05 RX ADMIN — ACETAMINOPHEN 650 MG: 325 TABLET, FILM COATED ORAL at 19:26

## 2019-01-05 RX ADMIN — SODIUM CHLORIDE, SODIUM GLUCONATE, SODIUM ACETATE, POTASSIUM CHLORIDE, MAGNESIUM CHLORIDE, SODIUM PHOSPHATE, DIBASIC, AND POTASSIUM PHOSPHATE 1000 ML: .53; .5; .37; .037; .03; .012; .00082 INJECTION, SOLUTION INTRAVENOUS at 09:30

## 2019-01-05 RX ADMIN — SODIUM CHLORIDE, SODIUM GLUCONATE, SODIUM ACETATE, POTASSIUM CHLORIDE, MAGNESIUM CHLORIDE, SODIUM PHOSPHATE, DIBASIC, AND POTASSIUM PHOSPHATE 1000 ML: .53; .5; .37; .037; .03; .012; .00082 INJECTION, SOLUTION INTRAVENOUS at 12:52

## 2019-01-05 RX ADMIN — CHLORHEXIDINE GLUCONATE 0.12% ORAL RINSE 15 ML: 1.2 LIQUID ORAL at 08:21

## 2019-01-05 RX ADMIN — FENTANYL CITRATE 50 MCG/HR: 50 INJECTION, SOLUTION INTRAMUSCULAR; INTRAVENOUS at 09:15

## 2019-01-05 NOTE — PROGRESS NOTES
Pt mental status decreased from baseline, unable to answer orientation questions, will only respond "yes ma'am", does not follow commands  Weak cough noted with some NG content in oral cavity, pt suctioned  90% on 6 LPM NC, increased WOB noted  Resp called to apply face mask  Dr Uriel Genao notified, will be down to evaluate

## 2019-01-05 NOTE — PROGRESS NOTES
[facility]  Progress Note - Kenny Montalvo 60 y o  male MRN: [id number]    Unit/Bed#: 3150 [facility] -01 Encounter: [id number]    Assessment/Plan:  Acute pancreatitis  Cholangitis  -Current BiSAP score is 4  -likely secondary to biliary pancreatitis  -status post ERCP 1/4/19 with sphincterotomy, biliary stent placement and  CBD stone removal, T bili trending down, AST/ALT likely elevated secondary to manipulation  -lactate stable at 3 1  -urine output is being maintained at 0 5-1 cc per kg per hour, with heart rate now less than 120, however BUN is increasing-may need to consider more aggressive IV hydration now the patient is intubated, some studies show lactated Ringer's may be more beneficial in reduction of SIRS  -continue monitor electrolytes and replete accordingly, calcium currently low  -intubated earlier this morning due to respiratory distress likely secondary to fluid overload from IV fluids infusion as part of pancreatitis treatment  -T-max 100 6° with tachycardia with a presumable biliary source for possible pulmonary infectious(?  Aspiration) source meet sepsis criteria currently on Zosyn  -monitor for signs of abdominal compartment syndrome with aggressive IV hydration  -care as per critical care team        Subjective:   Patient intubated  Objective:     Vitals: Blood pressure 92/56, pulse (!) 110, temperature 100 4 °F (38 °C), temperature source Probe, resp  rate 18, height 6' 4" (1 93 m), weight 99 8 kg (220 lb), SpO2 94 %  ,Body mass index is 26 78 kg/m²  Intake/Output Summary (Last 24 hours) at 01/05/19 1247  Last data filed at 01/05/19 1101   Gross per 24 hour   Intake          8432 38 ml   Output             1960 ml   Net          6472 38 ml       Review of Systems: as per HPI  Review of Systems   Unable to perform ROS: Intubated       Physical Exam:     Physical Exam   HENT:   Head: Atraumatic  Eyes: No scleral icterus  Neck: Neck supple     Cardiovascular: Tachycardia   Pulmonary/Chest:   Intubated   Abdominal: Soft  He exhibits distension  There is no tenderness (mild distention)  Musculoskeletal: He exhibits no edema  Neurological:   Sedated   Skin: Skin is warm and dry     Psychiatric:   Sedated         Invasive Devices     Peripheral Intravenous Line            Peripheral IV 01/03/19 Left Antecubital 2 days    Peripheral IV 01/05/19 Right Arm less than 1 day          Drain            NG/OG/Enteral Tube Nasogastric 16 Fr Right nares less than 1 day    Urethral Catheter 16 Fr  less than 1 day          Airway            ETT  Cuffed 8 mm less than 1 day                        CBC: Lab Results   Component Value Date    WBC 3 34 (L) 01/05/2019    HGB 15 3 01/05/2019    HCT 45 01/05/2019    MCV 91 01/05/2019     (L) 01/05/2019    MCH 30 7 01/05/2019    MCHC 33 7 01/05/2019    RDW 13 6 01/05/2019    MPV 9 5 01/05/2019    NRBC 0 01/05/2019   ,   CMP: Lab Results   Component Value Date    K 4 7 01/05/2019     (H) 01/05/2019    CO2 24 01/05/2019    BUN 37 (H) 01/05/2019    CREATININE 1 67 (H) 01/05/2019    GLUCOSE 146 (H) 01/05/2019    CALCIUM 6 9 (L) 01/05/2019     (H) 01/05/2019     (H) 01/05/2019    ALKPHOS 29 (L) 01/05/2019    EGFR 44 01/05/2019   ,   Lipase:   Lab Results   Component Value Date    LIPASE 1,076 (H) 01/05/2019   ,  PT/INR: No results found for: PT, INR,   Troponin: No results found for: TROPONINI,   Magnesium: No components found for: MAG,   Phosphorous:   Lab Results   Component Value Date    PHOS 2 9 01/05/2019

## 2019-01-05 NOTE — PROCEDURES
Central Line Insertion  Date/Time: 1/5/2019 1:16 PM  Performed by: Brittani Khan  Authorized by: Brittani Khan     Consent:     Consent obtained:  Emergent situation    Consent given by:  Spouse    Risks discussed:  Arterial puncture, bleeding, infection, pneumothorax and incorrect placement  Pre-procedure details:     Hand hygiene: Hand hygiene performed prior to insertion      Sterile barrier technique: All elements of maximal sterile technique followed      Skin preparation:  2% chlorhexidine    Skin preparation agent: Skin preparation agent completely dried prior to procedure    Indications:     Central line indications: medications requiring central line and hemodynamic monitoring    Sedation:     Sedation type: Anxiolysis and moderate (conscious) sedation  Anesthesia (see MAR for exact dosages): Anesthesia method:  Local infiltration    Local anesthetic:  Lidocaine 1% w/o epi  Procedure details:     Location:  Left internal jugular    Vessel type: vein      Laterality:  Left    Approach: percutaneous technique used      Patient position:  Trendelenburg    Catheter type:  Triple lumen 20cm    Ultrasound guidance: yes    Post-procedure details:     Post-procedure:  Dressing applied and line sutured    Assessment:  Blood return through all ports and no pneumothorax on x-ray    Post-procedure complications: none    Comments: An attempt was made through the right IJ however the wire would not threat past 20cm  The needle was withdrawn and pressure was held  The patient was then re-prepped and draped for the left side  The left internal jugular vein was cannulated with one needle stick  The wire was advanced without difficulty  The line was secured with suture  CXR was performed

## 2019-01-05 NOTE — PROCEDURES
Intubation  Date/Time: 1/5/2019 9:23 AM  Performed by: Manuela Vega  Authorized by: Manuela Vega     Patient location:  Other (comment) (ICU)  Other Assisting Provider: No    Consent:     Consent obtained:  Emergent situation    Alternatives discussed:  No treatment  Universal protocol:     Procedure explained and questions answered to patient or proxy's satisfaction: yes      Relevant documents present and verified: yes      Test results available and properly labeled: yes      Radiology Images displayed and confirmed  If images not available, report reviewed: yes      Required blood products, implants, devices, and special equipment available: yes      Site/side marked: yes      Patient identity confirmed:  Arm band  Pre-procedure details:     Patient status:  Altered mental status    Pretreatment medications:  Etomidate    Paralytics:  Succinylcholine  Indications:     Indications for intubation: airway protection    Procedure details:     Preoxygenation:  BiPAP    CPR in progress: no      Intubation method:  Oral    Oral intubation technique: MacGrath  Laryngoscope blade: Mac 3    Tube size (mm):  8 0    Tube type:  Cuffed    Number of attempts:  1    Ventilation between attempts: no      Cricoid pressure: no      Tube visualized through cords: yes    Placement assessment:     ETT to lip:  24    Tube secured with:  ETT vital    Breath sounds:  Equal    Placement verification: chest rise, condensation, CXR verification, direct visualization, equal breath sounds and ETCO2 detector      CXR findings:  ETT in proper place  Post-procedure details:     Patient tolerance of procedure:   Tolerated well, no immediate complications

## 2019-01-05 NOTE — PROGRESS NOTES
Progress Note - Critical Care   Araceli Hull 60 y o  male MRN: [id number]  Unit/Bed#:  -01 Encounter: [id number]    Assessment: 63yM w/ biliary pancreatitis and presumed cholangitis s/p ERCP 1/4    Plan:          Neuro: Acute encephalopathy 2/2 cholangitis  Continue to monitor  Will improve as infection resolves  Restraints must be continued  Delirium precautions                 CV:   Remains in sinus tach 2/2 intravascular volume depletion  Isolyte was dropped to 100ml/hr from 200 last night for respiratory concerns  Unfortunately will need further aggressive resuscitation at the expense of his pulmonary status  Will bolus w/ another 1L of isolyte                 Lung:   Transitioned to high flow last night for decreasing oxygenation   CXR showing bilateral pleural effusions  Continue on high flow  May need intubation later because of necessary volume overload                 GI:   Abdomen tender 2/2 pancreatitis and cholangitis  Lipase trending down again to 1000 from 2200 from 6400  Cont NPO  Early feeding when able                 FEN:   F/u AM labs and replete as needed                 :   Continue rodriguez - necessary for accurate IO given mental status                 ID:   Day 2 of zosyn for cholangitis  F/u Blood cultures - in process from 1/4                 Heme:   Lovenox 40qd                 Endo:   No issues                            Msk/Skin:   Frequent turning                 Disposition:   Critically ill  Required ICU management    Chief Complaint: Unable to obtain ROS given mental status  HPI/24hr events:   Further fluid boluses (1L at 8PM and 1L at 10PM) for persistent lactate and tachycardia  Increased work of breathing and decreased oxygenation so was moved to high flow nasal cannula  CXR showing b/l effusions       Physical Exam:     Neuro: GCS 12 (E3 V4 M5), encephalopathic, not oriented x3  CV: sinus tachycardia 110-150, BP stable  Pulm: on high flow at 60 to keep sat >92%, CXR w/ effusions  Abd: Soft, somewhat distended, tender to epigastrum  : Jones in place, dark yellow urine      Vitals:    19 0505 19 0515 19 0535 19 0600   BP: 95/62 97/62 105/61 102/69   BP Location:       Pulse: (!) 124 (!) 122 (!) 120 (!) 118   Resp: 14 13 14 14   Temp:       TempSrc:       SpO2: 94% 94% 94% 94%   Weight:       Height:                 Temperature:   Temp (24hrs), Av 4 °F (37 4 °C), Min:98 3 °F (36 8 °C), Max:102 °F (38 9 °C)    Current: Temperature: 98 8 °F (37 1 °C)    Weights:   IBW: 86 8 kg    Body mass index is 26 78 kg/m²  Weight (last 2 days)     Date/Time   Weight    19 1315  99 8 (220)    19 0733  99 8 (220)              Hemodynamic Monitoring:  SvO2:        Non-Invasive/Invasive Ventilation Settings:  Respiratory    Lab Data (Last 4 hours)    None         O2/Vent Data (Last 4 hours)       0328          Non-Invasive Ventilation Mode HFNC                 No results found for: PHART, KIK1VHB, PO2ART, KIG2PQL, M6VBPYOY, BEART, SOURCE  SpO2: SpO2: 94 %    Intake and Outputs:  I/O       701 -  07 -  0700    I V  (mL/kg) 3366 7 (33 7) 5366 7 (53 8)    NG/GT  135    IV Piggyback  2358  7    Total Intake(mL/kg) 5366 7 (53 8) 7860 4 (78 8)    Urine (mL/kg/hr) 1000 1635 (0 7)    Emesis/NG output  450    Total Output 1000 2085    Net +4366 7 +5775 4              UOP: 1 64 for 24 hours  Nutrition:        Diet Orders            Start     Ordered    19 1047  Diet NPO  Diet effective now     Question Answer Comment   Diet Type NPO    RD to adjust diet per protocol?  Yes        19 1052          Labs:     Results from last 7 days  Lab Units 19  0314 19  0259 19  1348 19  0435  19  0744   WBC Thousand/uL  --  3 34* 2 09* 3 53*  --  11 35*   HEMOGLOBIN g/dL  --  16 1 15 1 17 3*  --  15 8   I STAT HEMOGLOBIN g/dl 15 3  --   --   --   --   --    HEMATOCRIT %  --  47 8 46 4 51 5*  --  46 1 HEMATOCRIT, ISTAT % 45  --   --   --   --   --    PLATELETS Thousands/uL  --  138* 139* 160  < > 162   NEUTROS PCT %  --   --   --   --   --  88*   MONOS PCT %  --   --   --   --   --  7   MONO PCT %  --  6 3* 7  --   --    < > = values in this interval not displayed  Results from last 7 days  Lab Units 01/05/19  0314 01/05/19  0259 01/04/19  1348 01/04/19  0435   SODIUM mmol/L  --  140 138 138   POTASSIUM mmol/L  --  4 7 5 0 4 6   CHLORIDE mmol/L  --  109* 110* 109*   CO2 mmol/L  --  23 22 22   CO2, I-STAT mmol/L 24  --   --   --    BUN mg/dL  --  37* 30* 26*   CREATININE mg/dL  --  1 67* 1 72* 1 38*   CALCIUM mg/dL  --  6 9* 6 7* 6 7*   ALK PHOS U/L  --  29* 34* 48   ALT U/L  --  420* 324* 144*   AST U/L  --  236* 255* 103*   GLUCOSE, ISTAT mg/dl 146*  --   --   --        Results from last 7 days  Lab Units 01/05/19  0259 01/04/19  1348   MAGNESIUM mg/dL 2 1 1 9       Results from last 7 days  Lab Units 01/05/19  0259 01/04/19  1348   PHOSPHORUS mg/dL 2 9 2 6*            Results from last 7 days  Lab Units 01/05/19  0331   LACTIC ACID mmol/L 3 1*       0  Lab Value Date/Time   TROPONINI <0 02 01/03/2019 0818       Imaging: Xr Chest Portable    Result Date: 1/4/2019  Impression: Left greater than right pleural effusions  Pulmonary vascular congestive failure  Satisfactory positioning of endotracheal and enteric tubes  Workstation performed: [id number]     Xr Chest 2 Views    Result Date: 1/3/2019  Impression: No active pulmonary disease on examination which is somewhat limited secondary to low lung volumes  Workstation performed: [id number]     Fl Ercp Biliary Only    Result Date: 1/4/2019  Impression: Fluoroscopic guidance provided for surgical procedure  Please refer to the separate procedure notes for additional details    Workstation performed: [id number]     Ct Abdomen Pelvis W Contrast    Result Date: 1/3/2019  Impression: Acute interstitial edematous pancreatitis with extensive pancreatic and peripancreatic inflammatory edema  Within the limitations imposed by respiratory motion throughout the upper abdomen there is no convincing evidence of pancreatic necrosis or underlying pancreatic mass  Nonetheless, when better pain control can be achieved and the patient is able to tolerate better breath-hold imaging, more accurate characterization utilizing pancreatic MRI with MRCP is recommended for further investigation of the possible etiology and complications of acute pancreatitis  The study was marked in [facility] for immediate notification  Workstation performed: [id number]  I have personally reviewed pertinent reports  EKG: N/A    Micro:  No results found for: Tiffani Sommer, WOUNDCULT, SPUTUMCULTUR    Allergies: No Known Allergies    Medications:   Scheduled Meds:  Current Facility-Administered Medications:  acetaminophen 650 mg Oral Q6H PRN Adonay Henning    chlorhexidine 15 mL Swish [employer] Spit Q12H [address] Fawn Zheng PA-C    enoxaparin 40 mg Subcutaneous Daily Isaura Roblero PA-C    fentanyl citrate (PF) 50 mcg Intravenous Q2H PRN Luli Andrea, ANJUM    HYDROmorphone 0 5 mg Intravenous Q3H PRN Luli Andrea, PA-LULU    HYDROmorphone 1 mg Intravenous Q4H Luli Andrea, PA-C    multi-electrolyte 100 mL/hr Intravenous Continuous Gilberto Sagastume MD Last Rate: 100 mL/hr (01/05/19 0348)   ondansetron 4 mg Intravenous Q6H PRN Selwyn Gorman PA-C    piperacillin-tazobactam 3 375 g Intravenous Q6H Hayley Brink PA-C Last Rate: 3 375 g (01/05/19 0526)     Continuous Infusions:  multi-electrolyte 100 mL/hr Last Rate: 100 mL/hr (01/05/19 0348)     PRN Meds:    acetaminophen 650 mg Q6H PRN   fentanyl citrate (PF) 50 mcg Q2H PRN   HYDROmorphone 0 5 mg Q3H PRN   ondansetron 4 mg Q6H PRN       VTE Pharmacologic Prophylaxis: Sequential compression device (Venodyne)  and Enoxaparin (Lovenox)  VTE Mechanical Prophylaxis: sequential compression device    Invasive lines and devices:   Invasive Devices     Peripheral Intravenous Line            Peripheral IV 01/03/19 Left Antecubital 1 day    Peripheral IV 01/04/19 Left Wrist less than 1 day    Peripheral IV 01/04/19 Right Hand less than 1 day          Drain            NG/OG/Enteral Tube Nasogastric 16 Fr Right nares less than 1 day    Urethral Catheter 16 Fr  less than 1 day                   Counseling / Coordination of Care  Total Critical Care time spent 25 minutes excluding procedures, teaching and family updates  Code Status: Level 1 - Full Code     Portions of the record may have been created with voice recognition software  Occasional wrong word or "sound a like" substitutions may have occurred due to the inherent limitations of voice recognition software  Read the chart carefully and recognize, using context, where substitutions have occurred       Dilia Ramirez MD

## 2019-01-05 NOTE — PROGRESS NOTES
Spoke with family and answered questions, reviewed labs values with them  Patient is calmer, propofol @ 15 mcg/kg/min  and fentanyl @ 50 mcg  Family was at bedside and left at approximately 17:30  Patient's urine output was 300cc with MAP maintained above 65 after five (5) Liters of fluid boluses and maitenance rate of 200cc/hr during day shift 7 am-3 pm  Spoke with Nikolai Gaines NP inquiring about additional labs  ABG, Ionized calcium and lactate sent  Patients temperature is increasing from a low of 98 3 to a high of 102 0  Patient is currently 15 7 liters positive

## 2019-01-05 NOTE — NUTRITION
01/05/19 1554   Recommendations/Interventions   Nutrition Recommendations Continue EN/PN as ordered; Tube Feeding Recommendation provided  (Pending on lipase, mag, Phosphorus, gradually increase as tolerated Jevity 1 2 to goal  65ml/hr = 1872kcal with 87gm Pro; 61gm Fat; 1263ml H20)

## 2019-01-05 NOTE — PROGRESS NOTES
Critical Care Interval Progress Note:   Jose Carlos Chaney 60 y o  male MRN: [id number]    Unit/Bed#: 3[facility] -01 Encounter: [id number]    Summary of Critical Care:    Still complains of abdominal pain, localized to upper abdomen - no improvement s/p ERCP procedure  Abdomen soft with mild distension and tenderness localized to upper abdomen w/o guarding/rebound  Reviewed all radiographs and recent labs  Persistent lactic acidosis despite (+)3L/24 HRS fluid balance with moderate urine output  Reviewed all lines and tubes  1 Cholangitis - S/P ERCP  Continue broad spectrum antibiotic coverage (pip-tazo)  2  Metabolic acidosis - Continue present IV fluids @ 200 ml/HR and bolus fluids  Follow serial endpoints of resuscitation  3  AZ  4  Hyperbilirubinemia - Repeat LFTs 1/5 s/p ERCP today    Counseling / Coordination of Care: Total Critical Care time spent 30 minutes excluding procedures, teaching and family updates

## 2019-01-05 NOTE — PROGRESS NOTES
Found patient to be agitated and trying to get oob his high flow on given dilaudid for pain and this helped however his am abg demonstrated need for bipap so for a brief time this was done pt sitting up in cardiac chair and appeared comfortable

## 2019-01-05 NOTE — PROGRESS NOTES
Pt was intubated secondary to his lung status and his continued requirement for large amount of volume for his illness he was sedated and tolerated procedure well

## 2019-01-05 NOTE — RESPIRATORY THERAPY NOTE
RT Protocol Note  Odette Chapman 60 y o  male MRN: [id number]  Unit/Bed#: [facility] -01 Encounter: [id number]    Assessment    Principal Problem:    Acute pancreatitis  Active Problems:    Pancreatitis      Home Pulmonary Medications:    Home Devices/Therapy:  (None per chart)    Past Medical History:   Diagnosis Date    Gastroesophageal reflux      Social History     Social History    Marital status: Single     Spouse name: N/A    Number of children: N/A    Years of education: N/A     Social History Main Topics    Smoking status: Never Smoker    Smokeless tobacco: Never Used    Alcohol use No      Comment: rarely    Drug use: No    Sexual activity: Not Asked     Other Topics Concern    None     Social History Narrative    None       Subjective         Objective    Physical Exam:   Assessment Type: Assess only  Chest Assessment: Chest expansion symmetrical  Bilateral Breath Sounds: Diminished, Clear    Vitals:  Blood pressure 95/62, pulse (!) 124, temperature 98 8 °F (37 1 °C), temperature source Oral, resp  rate 14, height 6' 4" (1 93 m), weight 99 8 kg (220 lb), SpO2 94 %  Imaging and other studies: I have personally reviewed pertinent reports  O2 Device: vent     Plan    Respiratory Plan: Vent/NIV/HFNC        Resp Comments: Pt desat to high 80s on 6L NC now placed on HFNC 60L and 60% Fio2  Pt Spo2 improved and pt tolerating well  Pt admit with acute pancreatitis  He has no known pulmonary history and takes no respiratory meds at home per chart  No bronchodilators indicated at this time  Will wean Fio2 as tolerated and continue to monitor pt per protocol

## 2019-01-05 NOTE — PROGRESS NOTES
Progress Note - General Surgery   Tommy Choudhury 60 y o  male MRN: [id number]  Unit/Bed#:  -01 Encounter: [id number]    Assessment:  60 y o  M w/ suspected biliary pancreatitis and cholangitis    He is POD 1 s/p ERCP w/ sphincterotomy, stent placement    Persistently encephalopathic post procedure; this has not resolved yet as of this AM    Plan:  Cont aggressive fluid resuscitation  Closely monitor respiratory status  Cont broad spectrum abx  Monitor blood cx  Trend lipase    Subjective/Objective   Chief Complaint:     Subjective:     Objective:     Blood pressure 102/69, pulse (!) 118, temperature 98 8 °F (37 1 °C), temperature source Oral, resp  rate 14, height 6' 4" (1 93 m), weight 99 8 kg (220 lb), SpO2 94 %  ,Body mass index is 26 78 kg/m²  Intake/Output Summary (Last 24 hours) at 01/05/19 0651  Last data filed at 01/05/19 0534   Gross per 24 hour   Intake          7860 37 ml   Output             2085 ml   Net          5775 37 ml       Invasive Devices     Peripheral Intravenous Line            Peripheral IV 01/03/19 Left Antecubital 1 day    Peripheral IV 01/04/19 Left Wrist less than 1 day    Peripheral IV 01/04/19 Right Hand less than 1 day          Drain            NG/OG/Enteral Tube Nasogastric 16 Fr Right nares less than 1 day    Urethral Catheter 16 Fr  less than 1 day                Physical Exam:   Agitated, unable to answer questions   Pulm normal respiratory effort, tachypneic  Abd soft, mildly tender, nondistended  CV Sinus tachycardia    Lab, Imaging and other studies:  I have personally reviewed pertinent lab results    , CBC:   Lab Results   Component Value Date    WBC 3 34 (L) 01/05/2019    HGB 15 3 01/05/2019    HCT 45 01/05/2019    MCV 91 01/05/2019     (L) 01/05/2019    MCH 30 7 01/05/2019    MCHC 33 7 01/05/2019    RDW 13 6 01/05/2019    MPV 9 5 01/05/2019    NRBC 0 01/05/2019   , CMP:   Lab Results   Component Value Date    SODIUM 140 01/05/2019    K 4 7 01/05/2019  (H) 01/05/2019    CO2 24 01/05/2019    BUN 37 (H) 01/05/2019    CREATININE 1 67 (H) 01/05/2019    GLUCOSE 146 (H) 01/05/2019    CALCIUM 6 9 (L) 01/05/2019     (H) 01/05/2019     (H) 01/05/2019    ALKPHOS 29 (L) 01/05/2019    EGFR 44 01/05/2019   , Coagulation: No results found for: PT, INR, APTT  VTE Pharmacologic Prophylaxis: Heparin  VTE Mechanical Prophylaxis: sequential compression device

## 2019-01-06 ENCOUNTER — APPOINTMENT (INPATIENT)
Dept: NON INVASIVE DIAGNOSTICS | Facility: HOSPITAL | Age: 59
DRG: 438 | End: 2019-01-06
Payer: COMMERCIAL

## 2019-01-06 LAB
ALBUMIN SERPL BCP-MCNC: 1.5 G/DL (ref 3.5–5)
ALP SERPL-CCNC: 26 U/L (ref 46–116)
ALT SERPL W P-5'-P-CCNC: 202 U/L (ref 12–78)
ANION GAP SERPL CALCULATED.3IONS-SCNC: 7 MMOL/L (ref 4–13)
ARTERIAL PATENCY WRIST A: YES
ARTERIAL PATENCY WRIST A: YES
AST SERPL W P-5'-P-CCNC: 96 U/L (ref 5–45)
ATRIAL RATE: 107 BPM
ATRIAL RATE: 77 BPM
ATRIAL RATE: 87 BPM
BASE EXCESS BLDA CALC-SCNC: -0.9 MMOL/L
BASE EXCESS BLDA CALC-SCNC: -1.2 MMOL/L
BASOPHILS # BLD MANUAL: 0 THOUSAND/UL (ref 0–0.1)
BASOPHILS NFR MAR MANUAL: 0 % (ref 0–1)
BILIRUB SERPL-MCNC: 1.67 MG/DL (ref 0.2–1)
BODY TEMPERATURE: 101.1 DEGREES FEHRENHEIT
BODY TEMPERATURE: 101.1 DEGREES FEHRENHEIT
BUN SERPL-MCNC: 42 MG/DL (ref 5–25)
CA-I BLD-SCNC: 1.05 MMOL/L (ref 1.12–1.32)
CALCIUM SERPL-MCNC: 7 MG/DL (ref 8.3–10.1)
CHLORIDE SERPL-SCNC: 109 MMOL/L (ref 100–108)
CO2 SERPL-SCNC: 25 MMOL/L (ref 21–32)
CREAT SERPL-MCNC: 2.18 MG/DL (ref 0.6–1.3)
DOHLE BOD BLD QL SMEAR: PRESENT
EOSINOPHIL # BLD MANUAL: 0.14 THOUSAND/UL (ref 0–0.4)
EOSINOPHIL NFR BLD MANUAL: 4 % (ref 0–6)
ERYTHROCYTE [DISTWIDTH] IN BLOOD BY AUTOMATED COUNT: 13.8 % (ref 11.6–15.1)
GFR SERPL CREATININE-BSD FRML MDRD: 32 ML/MIN/1.73SQ M
GLUCOSE SERPL-MCNC: 132 MG/DL (ref 65–140)
GLUCOSE SERPL-MCNC: 153 MG/DL (ref 65–140)
HCO3 BLDA-SCNC: 24 MMOL/L (ref 22–28)
HCO3 BLDA-SCNC: 24.2 MMOL/L (ref 22–28)
HCT VFR BLD AUTO: 35.3 % (ref 36.5–49.3)
HGB BLD-MCNC: 11.8 G/DL (ref 12–17)
HOROWITZ INDEX BLDA+IHG-RTO: 70 MM[HG]
HOROWITZ INDEX BLDA+IHG-RTO: 70 MM[HG]
LACTATE SERPL-SCNC: 2.8 MMOL/L (ref 0.5–2)
LG PLATELETS BLD QL SMEAR: PRESENT
LIPASE SERPL-CCNC: 488 U/L (ref 73–393)
LYMPHOCYTES # BLD AUTO: 0.18 THOUSAND/UL (ref 0.6–4.47)
LYMPHOCYTES # BLD AUTO: 5 % (ref 14–44)
MCH RBC QN AUTO: 30.3 PG (ref 26.8–34.3)
MCHC RBC AUTO-ENTMCNC: 33.4 G/DL (ref 31.4–37.4)
MCV RBC AUTO: 91 FL (ref 82–98)
METAMYELOCYTES NFR BLD MANUAL: 4 % (ref 0–1)
MONOCYTES # BLD AUTO: 0.22 THOUSAND/UL (ref 0–1.22)
MONOCYTES NFR BLD: 6 % (ref 4–12)
NEUTROPHILS # BLD MANUAL: 2.88 THOUSAND/UL (ref 1.85–7.62)
NEUTS BAND NFR BLD MANUAL: 14 % (ref 0–8)
NEUTS SEG NFR BLD AUTO: 66 % (ref 43–75)
NRBC BLD AUTO-RTO: 0 /100 WBCS
NRBC BLD AUTO-RTO: 1 /100 WBC (ref 0–2)
O2 CT BLDA-SCNC: 17.8 ML/DL (ref 16–23)
O2 CT BLDA-SCNC: 18.4 ML/DL (ref 16–23)
OXYHGB MFR BLDA: 97.4 % (ref 94–97)
OXYHGB MFR BLDA: 97.5 % (ref 94–97)
P AXIS: 29 DEGREES
PCO2 BLDA: 41.5 MM HG (ref 36–44)
PCO2 BLDA: 41.9 MM HG (ref 36–44)
PEEP RESPIRATORY: 8 CM[H2O]
PEEP RESPIRATORY: 8 CM[H2O]
PH BLDA: 7.38 [PH] (ref 7.35–7.45)
PH BLDA: 7.38 [PH] (ref 7.35–7.45)
PLATELET # BLD AUTO: 87 THOUSANDS/UL (ref 149–390)
PLATELET BLD QL SMEAR: ABNORMAL
PMV BLD AUTO: 9.6 FL (ref 8.9–12.7)
PO2 BLDA: 105.8 MM HG (ref 75–129)
PO2 BLDA: 120 MM HG (ref 75–129)
POTASSIUM SERPL-SCNC: 4 MMOL/L (ref 3.5–5.3)
PR INTERVAL: 142 MS
PROT SERPL-MCNC: 4 G/DL (ref 6.4–8.2)
QRS AXIS: -2 DEGREES
QRS AXIS: 3 DEGREES
QRS AXIS: 3 DEGREES
QRSD INTERVAL: 83 MS
QRSD INTERVAL: 88 MS
QRSD INTERVAL: 88 MS
QT INTERVAL: 304 MS
QT INTERVAL: 329 MS
QT INTERVAL: 375 MS
QTC INTERVAL: 396 MS
QTC INTERVAL: 406 MS
QTC INTERVAL: 425 MS
RBC # BLD AUTO: 3.9 MILLION/UL (ref 3.88–5.62)
RBC MORPH BLD: PRESENT
SODIUM SERPL-SCNC: 141 MMOL/L (ref 136–145)
SPECIMEN SOURCE: ABNORMAL
SPECIMEN SOURCE: ABNORMAL
T WAVE AXIS: -1 DEGREES
T WAVE AXIS: -3 DEGREES
T WAVE AXIS: -6 DEGREES
VARIANT LYMPHS # BLD AUTO: 1 %
VENT AC: 20
VENT AC: 20
VENT- AC: AC
VENT- AC: AC
VENTRICULAR RATE: 107 BPM
VENTRICULAR RATE: 77 BPM
VENTRICULAR RATE: 87 BPM
VT SETTING VENT: 450 ML
VT SETTING VENT: 450 ML
WBC # BLD AUTO: 3.6 THOUSAND/UL (ref 4.31–10.16)

## 2019-01-06 PROCEDURE — 83690 ASSAY OF LIPASE: CPT | Performed by: SURGERY

## 2019-01-06 PROCEDURE — 99254 IP/OBS CNSLTJ NEW/EST MOD 60: CPT | Performed by: INTERNAL MEDICINE

## 2019-01-06 PROCEDURE — 94003 VENT MGMT INPAT SUBQ DAY: CPT

## 2019-01-06 PROCEDURE — 93005 ELECTROCARDIOGRAM TRACING: CPT

## 2019-01-06 PROCEDURE — 93306 TTE W/DOPPLER COMPLETE: CPT

## 2019-01-06 PROCEDURE — 82330 ASSAY OF CALCIUM: CPT | Performed by: SURGERY

## 2019-01-06 PROCEDURE — 99232 SBSQ HOSP IP/OBS MODERATE 35: CPT | Performed by: INTERNAL MEDICINE

## 2019-01-06 PROCEDURE — 83605 ASSAY OF LACTIC ACID: CPT | Performed by: SURGERY

## 2019-01-06 PROCEDURE — 85007 BL SMEAR W/DIFF WBC COUNT: CPT | Performed by: SURGERY

## 2019-01-06 PROCEDURE — 82805 BLOOD GASES W/O2 SATURATION: CPT | Performed by: SURGERY

## 2019-01-06 PROCEDURE — 99291 CRITICAL CARE FIRST HOUR: CPT | Performed by: EMERGENCY MEDICINE

## 2019-01-06 PROCEDURE — 93010 ELECTROCARDIOGRAM REPORT: CPT | Performed by: INTERNAL MEDICINE

## 2019-01-06 PROCEDURE — 5A2204Z RESTORATION OF CARDIAC RHYTHM, SINGLE: ICD-10-PCS | Performed by: SURGERY

## 2019-01-06 PROCEDURE — 94760 N-INVAS EAR/PLS OXIMETRY 1: CPT

## 2019-01-06 PROCEDURE — 82948 REAGENT STRIP/BLOOD GLUCOSE: CPT

## 2019-01-06 PROCEDURE — 80053 COMPREHEN METABOLIC PANEL: CPT | Performed by: SURGERY

## 2019-01-06 PROCEDURE — 85027 COMPLETE CBC AUTOMATED: CPT | Performed by: SURGERY

## 2019-01-06 RX ORDER — HALOPERIDOL 5 MG/ML
INJECTION INTRAMUSCULAR
Status: COMPLETED
Start: 2019-01-06 | End: 2019-01-06

## 2019-01-06 RX ORDER — MIDAZOLAM HYDROCHLORIDE 1 MG/ML
INJECTION INTRAMUSCULAR; INTRAVENOUS
Status: COMPLETED
Start: 2019-01-06 | End: 2019-01-06

## 2019-01-06 RX ORDER — DIGOXIN 0.25 MG/ML
500 INJECTION INTRAMUSCULAR; INTRAVENOUS ONCE
Status: COMPLETED | OUTPATIENT
Start: 2019-01-06 | End: 2019-01-06

## 2019-01-06 RX ORDER — MIDAZOLAM HYDROCHLORIDE 1 MG/ML
3 INJECTION INTRAMUSCULAR; INTRAVENOUS ONCE
Status: COMPLETED | OUTPATIENT
Start: 2019-01-06 | End: 2019-01-06

## 2019-01-06 RX ORDER — MAGNESIUM SULFATE HEPTAHYDRATE 40 MG/ML
2 INJECTION, SOLUTION INTRAVENOUS ONCE
Status: COMPLETED | OUTPATIENT
Start: 2019-01-06 | End: 2019-01-06

## 2019-01-06 RX ORDER — ADENOSINE 3 MG/ML
INJECTION, SOLUTION INTRAVENOUS CODE/TRAUMA/SEDATION MEDICATION
Status: COMPLETED | OUTPATIENT
Start: 2019-01-06 | End: 2019-01-06

## 2019-01-06 RX ORDER — METOPROLOL TARTRATE 5 MG/5ML
5 INJECTION INTRAVENOUS ONCE
Status: COMPLETED | OUTPATIENT
Start: 2019-01-06 | End: 2019-01-06

## 2019-01-06 RX ORDER — DIGOXIN 0.25 MG/ML
250 INJECTION INTRAMUSCULAR; INTRAVENOUS EVERY 6 HOURS
Status: DISCONTINUED | OUTPATIENT
Start: 2019-01-06 | End: 2019-01-06

## 2019-01-06 RX ORDER — MIDAZOLAM HYDROCHLORIDE 1 MG/ML
2 INJECTION INTRAMUSCULAR; INTRAVENOUS ONCE
Status: COMPLETED | OUTPATIENT
Start: 2019-01-06 | End: 2019-01-06

## 2019-01-06 RX ORDER — METOPROLOL TARTRATE 5 MG/5ML
5 INJECTION INTRAVENOUS EVERY 6 HOURS
Status: DISCONTINUED | OUTPATIENT
Start: 2019-01-06 | End: 2019-01-09

## 2019-01-06 RX ORDER — AMIODARONE HYDROCHLORIDE 50 MG/ML
INJECTION, SOLUTION INTRAVENOUS CODE/TRAUMA/SEDATION MEDICATION
Status: COMPLETED | OUTPATIENT
Start: 2019-01-06 | End: 2019-01-06

## 2019-01-06 RX ORDER — HALOPERIDOL 5 MG/ML
2 INJECTION INTRAMUSCULAR ONCE
Status: COMPLETED | OUTPATIENT
Start: 2019-01-06 | End: 2019-01-06

## 2019-01-06 RX ORDER — FENTANYL CITRATE 50 UG/ML
INJECTION, SOLUTION INTRAMUSCULAR; INTRAVENOUS
Status: COMPLETED
Start: 2019-01-06 | End: 2019-01-06

## 2019-01-06 RX ORDER — FENTANYL CITRATE 50 UG/ML
50 INJECTION, SOLUTION INTRAMUSCULAR; INTRAVENOUS ONCE
Status: COMPLETED | OUTPATIENT
Start: 2019-01-06 | End: 2019-01-06

## 2019-01-06 RX ADMIN — ADENOSINE 12 MG: 3 SOLUTION INTRAVENOUS at 06:42

## 2019-01-06 RX ADMIN — PIPERACILLIN SODIUM AND TAZOBACTAM SODIUM 3.38 G: 36; 4.5 INJECTION, POWDER, FOR SOLUTION INTRAVENOUS at 11:13

## 2019-01-06 RX ADMIN — AMIODARONE HYDROCHLORIDE 150 MG: 50 INJECTION, SOLUTION INTRAVENOUS at 06:47

## 2019-01-06 RX ADMIN — HYDROCORTISONE SODIUM SUCCINATE 100 MG: 100 INJECTION, POWDER, FOR SOLUTION INTRAMUSCULAR; INTRAVENOUS at 09:06

## 2019-01-06 RX ADMIN — AMIODARONE HYDROCHLORIDE 150 MG: 50 INJECTION, SOLUTION INTRAVENOUS at 08:37

## 2019-01-06 RX ADMIN — HYDROCORTISONE SODIUM SUCCINATE 100 MG: 100 INJECTION, POWDER, FOR SOLUTION INTRAMUSCULAR; INTRAVENOUS at 13:41

## 2019-01-06 RX ADMIN — DEXTROSE MONOHYDRATE 1 MG/MIN: 50 INJECTION, SOLUTION INTRAVENOUS at 07:12

## 2019-01-06 RX ADMIN — DEXTROSE MONOHYDRATE 1 MG/MIN: 50 INJECTION, SOLUTION INTRAVENOUS at 22:37

## 2019-01-06 RX ADMIN — HYDROCORTISONE SODIUM SUCCINATE 100 MG: 100 INJECTION, POWDER, FOR SOLUTION INTRAMUSCULAR; INTRAVENOUS at 22:26

## 2019-01-06 RX ADMIN — SODIUM CHLORIDE, SODIUM LACTATE, POTASSIUM CHLORIDE, AND CALCIUM CHLORIDE 1000 ML: .6; .31; .03; .02 INJECTION, SOLUTION INTRAVENOUS at 05:19

## 2019-01-06 RX ADMIN — FENTANYL CITRATE 75 MCG/HR: 50 INJECTION, SOLUTION INTRAMUSCULAR; INTRAVENOUS at 22:25

## 2019-01-06 RX ADMIN — FENTANYL CITRATE 50 MCG: 50 INJECTION, SOLUTION INTRAMUSCULAR; INTRAVENOUS at 07:46

## 2019-01-06 RX ADMIN — ADENOSINE 6 MG: 3 SOLUTION INTRAVENOUS at 06:40

## 2019-01-06 RX ADMIN — MIDAZOLAM HYDROCHLORIDE 2 MG: 1 INJECTION, SOLUTION INTRAMUSCULAR; INTRAVENOUS at 07:46

## 2019-01-06 RX ADMIN — FENTANYL CITRATE 50 MCG: 50 INJECTION INTRAMUSCULAR; INTRAVENOUS at 07:46

## 2019-01-06 RX ADMIN — CHLORHEXIDINE GLUCONATE 0.12% ORAL RINSE 15 ML: 1.2 LIQUID ORAL at 22:26

## 2019-01-06 RX ADMIN — DEXMEDETOMIDINE 0.6 MCG/KG/HR: 100 INJECTION, SOLUTION, CONCENTRATE INTRAVENOUS at 18:06

## 2019-01-06 RX ADMIN — FENTANYL CITRATE 75 MCG/HR: 50 INJECTION, SOLUTION INTRAMUSCULAR; INTRAVENOUS at 04:32

## 2019-01-06 RX ADMIN — PHENYLEPHRINE HYDROCHLORIDE 50 MCG/MIN: 10 INJECTION INTRAVENOUS at 06:43

## 2019-01-06 RX ADMIN — HALOPERIDOL 2 MG: 5 INJECTION INTRAMUSCULAR at 08:28

## 2019-01-06 RX ADMIN — DIGOXIN 500 MCG: 0.25 INJECTION INTRAMUSCULAR; INTRAVENOUS at 18:04

## 2019-01-06 RX ADMIN — SODIUM CHLORIDE, SODIUM LACTATE, POTASSIUM CHLORIDE, AND CALCIUM CHLORIDE 1000 ML: .6; .31; .03; .02 INJECTION, SOLUTION INTRAVENOUS at 00:43

## 2019-01-06 RX ADMIN — PROPOFOL 10 MCG/KG/MIN: 10 INJECTION, EMULSION INTRAVENOUS at 06:09

## 2019-01-06 RX ADMIN — ACETAMINOPHEN 650 MG: 325 TABLET, FILM COATED ORAL at 02:29

## 2019-01-06 RX ADMIN — MIDAZOLAM HYDROCHLORIDE 3 MG: 1 INJECTION INTRAMUSCULAR; INTRAVENOUS at 07:45

## 2019-01-06 RX ADMIN — MIDAZOLAM HYDROCHLORIDE 3 MG: 1 INJECTION, SOLUTION INTRAMUSCULAR; INTRAVENOUS at 07:45

## 2019-01-06 RX ADMIN — PIPERACILLIN SODIUM AND TAZOBACTAM SODIUM 3.38 G: 36; 4.5 INJECTION, POWDER, FOR SOLUTION INTRAVENOUS at 05:55

## 2019-01-06 RX ADMIN — ACETAMINOPHEN 650 MG: 325 TABLET, FILM COATED ORAL at 09:27

## 2019-01-06 RX ADMIN — MAGNESIUM SULFATE HEPTAHYDRATE 2 G: 40 INJECTION, SOLUTION INTRAVENOUS at 18:04

## 2019-01-06 RX ADMIN — AMIODARONE HYDROCHLORIDE 150 MG: 50 INJECTION, SOLUTION INTRAVENOUS at 06:35

## 2019-01-06 RX ADMIN — PHENYLEPHRINE HYDROCHLORIDE 40 MCG/MIN: 10 INJECTION INTRAVENOUS at 07:19

## 2019-01-06 RX ADMIN — DEXMEDETOMIDINE 0.5 MCG/KG/HR: 100 INJECTION, SOLUTION, CONCENTRATE INTRAVENOUS at 08:14

## 2019-01-06 RX ADMIN — ENOXAPARIN SODIUM 40 MG: 40 INJECTION SUBCUTANEOUS at 09:01

## 2019-01-06 RX ADMIN — NOREPINEPHRINE BITARTRATE 30 MCG/MIN: 1 INJECTION INTRAVENOUS at 08:42

## 2019-01-06 RX ADMIN — VASOPRESSIN 0.04 UNITS/MIN: 20 INJECTION INTRAVENOUS at 09:04

## 2019-01-06 RX ADMIN — CHLORHEXIDINE GLUCONATE 0.12% ORAL RINSE 15 ML: 1.2 LIQUID ORAL at 09:01

## 2019-01-06 RX ADMIN — DIGOXIN 500 MCG: 0.25 INJECTION INTRAMUSCULAR; INTRAVENOUS at 09:00

## 2019-01-06 RX ADMIN — HALOPERIDOL LACTATE 2 MG: 5 INJECTION, SOLUTION INTRAMUSCULAR at 08:28

## 2019-01-06 RX ADMIN — DEXMEDETOMIDINE 0.7 MCG/KG/HR: 100 INJECTION, SOLUTION, CONCENTRATE INTRAVENOUS at 09:44

## 2019-01-06 RX ADMIN — DEXMEDETOMIDINE 0.5 MCG/KG/HR: 100 INJECTION, SOLUTION, CONCENTRATE INTRAVENOUS at 13:57

## 2019-01-06 RX ADMIN — PHENYLEPHRINE HYDROCHLORIDE 55 MCG/MIN: 10 INJECTION INTRAVENOUS at 17:04

## 2019-01-06 RX ADMIN — SODIUM CHLORIDE, SODIUM GLUCONATE, SODIUM ACETATE, POTASSIUM CHLORIDE, MAGNESIUM CHLORIDE, SODIUM PHOSPHATE, DIBASIC, AND POTASSIUM PHOSPHATE 200 ML/HR: .53; .5; .37; .037; .03; .012; .00082 INJECTION, SOLUTION INTRAVENOUS at 02:31

## 2019-01-06 RX ADMIN — METOPROLOL TARTRATE 5 MG: 5 INJECTION, SOLUTION INTRAVENOUS at 08:22

## 2019-01-06 RX ADMIN — PIPERACILLIN SODIUM AND TAZOBACTAM SODIUM 3.38 G: 36; 4.5 INJECTION, POWDER, FOR SOLUTION INTRAVENOUS at 17:06

## 2019-01-06 RX ADMIN — SODIUM CHLORIDE, SODIUM GLUCONATE, SODIUM ACETATE, POTASSIUM CHLORIDE, MAGNESIUM CHLORIDE, SODIUM PHOSPHATE, DIBASIC, AND POTASSIUM PHOSPHATE 200 ML/HR: .53; .5; .37; .037; .03; .012; .00082 INJECTION, SOLUTION INTRAVENOUS at 13:07

## 2019-01-06 NOTE — PROGRESS NOTES
Dr Jarad Hernandes notified of change in neuro exam   Pt no longer responding to pain  precedex on hold   Will reassess

## 2019-01-06 NOTE — PROGRESS NOTES
CCS Resident busy  Tylenol given for 102  1  Report given and neuro/turn completed with RN-evening  GCS 9 patient does not follow commands but does localize to pain

## 2019-01-06 NOTE — RESPIRATORY THERAPY NOTE
RT Ventilator Management Note  Clarence Holland 60 y o  male MRN: [id number]  Unit/Bed#:  -01 Encounter: [id number]      Daily Screen       1/5/2019 0913 1/5/2019 1148          Patient safety screen outcome[de-identified] Failed -      Not Ready for Weaning due to[de-identified] - -              Physical Exam:   Assessment Type: Assess only  Respiratory Pattern: Assisted, Symmetrical  Chest Assessment: Chest expansion symmetrical  Bilateral Breath Sounds: Diminished, Coarse, Crackles  Suction: ET Tube      Resp Comments: Pt remained on ACVC vent settings overnight  FiO2 weaned as tolerated  Pt currently on 20/450/60/8  Will continue to monitor

## 2019-01-06 NOTE — PROGRESS NOTES
Called by the surgical critical care team to responded patient who had gone into atrial fibrillation rapid ventricular response heart rate into the 190s at approximately 6:20 a m  today  At that time patient's blood pressure dropped significantly and patient became hypotensive  Surgical Critical Care team attempted synchronized cardioversion x4 in attempt to restore sinus rhythm 2 patient with witnessed conversion to atrial fibrillation  Unfortunately cardioversions were unsuccessful  When I arrived room patient was tachycardic with what appeared to be in regular rhythm on telemetry into the 190s with what appeared to be atrial fibrillation however in the setting of significant tachycardia this could not be confirmed  Two attempts at adenosine at both 6 mg and 12 mg were given however there was no affect on patient's rate and now pause of the AV node was seen on telemetry  I attempted amiodarone bolus total of 300 mg were given and patient was initiated on infusion with patient's heart rate improving into the 120s-140s  Patient was initiated on Sami-Synephrine for blood pressure support by critical care team   Will continue to monitor patient this time

## 2019-01-06 NOTE — CONSULTS
Cardiology Consult  Gemma Membreno 60 y o  male MRN: [id number]  Unit/Bed#: 3150 [facility] -01 Encounter: [id number]      Reason for Consult / Principal Problem: AF    Physician Requesting Consult: Debborah Lundborg, MD    OP Cardiologist: n/a    Assessment:  1  AF with RVR, new diagnosis; now NSR  -precipitated by acute pancreatitis/infection  -received 3x amio 150mg boluses overnight and now on gtt; was loaded with 500mcg dig this AM with 500mcg more planned q8  -echo is ordered  -[id number] score presumably 0  2  Severe pancreatitis, presumed gallstone  -s/p ERCP/sphincterectomy/stenting 1/4  3  Possible ascending cholangitis, aspiration PNA  4  GERD  5  AZ  6  Lactic acidosis  7  Hypoxic respiratory failure    Plan:  1  Agree with continuing amio gtt at 1mg/kg/hr for now  Avoid giving any more digoxin in the setting of worsening renal insufficiency  If RVR recurs, amiodarone bolus can be repeated  2  Agree with checking 2D echo  Will review after completion  3  Management of pancreatitis, respiratory failure per critical care/surgery teams  HPI: Gemma Membreno 60y o  year old male with a history of GERD who presented 1/3 with severe acute epigastric pain  He was diagnosed with pancreatitis and underwent ERCP 1/4 and had sphincterectomy, stone removal, and stent placement  He was started on empiric abx for possible ascending cholangitis with multiple SIRS criteria  He had worsening hypoxemia requiring BPAP/HFNC use and had to be intubated yesterday  He has had aggressive IVF resuscitation  Overnight the patient went into AF with RVR with rates 190s and was unsuccessfully cardioverted x4  Adenosine 6mg and 12mg was given without response  He was given 2x amio boluses 150mg and then started on gtt with improvement of heart rates  He received another bolus at 8am and was loaded with dig 500mcg with resolution of AF with RVR   He was started on ancelmo gtt and is now on 80mcg, shock vaso, Isolyte 200cc/hr  Course has been complicated by AZ, worsening 2 18 today  Lactic downtrending to 2 8  Has FHx of premature CAD  He had a negative exercise stress 07/05/2017 with duration of 6min  Never smoker, no EtOH or drug use  Unable to obtain ROS as pt is intubated, sedated  Family History: reviewed  Historical Information   Past Medical History:   Diagnosis Date    Gastroesophageal reflux      Past Surgical History:   Procedure Laterality Date    CHOLECYSTECTOMY      COLONOSCOPY N/A 3/21/2016    Procedure: COLONOSCOPY;  Surgeon: Adán Marie DO;  Location: [facility]; Service:     ERCP N/A 1/4/2019    Procedure: ENDOSCOPIC RETROGRADE CHOLANGIOPANCREATOGRAPHY (ERCP); Surgeon: Zaria Hernandez MD;  Location: [facility]; Service: Gastroenterology    AK EGD TRANSORAL BIOPSY SINGLE/MULTIPLE N/A 3/21/2016    Procedure: ESOPHAGOGASTRODUODENOSCOPY (EGD); Surgeon: Adán Marie DO;  Location: [facility]; Service: General     Social History   History   Alcohol Use No     Comment: rarely     History   Drug Use No     History   Smoking Status    Never Smoker   Smokeless Tobacco    Never Used     Family History: History reviewed  No pertinent family history  Review of Systems:  Review of Systems  Except as noted in the HPI and above, a comprehensive 14 point review of systems was negative      Scheduled Meds:  Current Facility-Administered Medications:  acetaminophen 650 mg Oral Q6H PRN Bryanna Khanllor    amiodarone 1 mg/min Intravenous Continuous Julia Hou PA-C Last Rate: 1 mg/min (01/06/19 9778)   chlorhexidine 15 mL Swish & Spit Q12H [facility] Fawn Zheng PA-C    dexmedetomidine 0 1-0 7 mcg/kg/hr Intravenous Titrated Bhargavi Lee MD Last Rate: 0 7 mcg/kg/hr (01/06/19 0944)   digoxin 250 mcg Intravenous Q6H Bhargavi Lee MD    enoxaparin 40 mg Subcutaneous Daily Chapin Manzano PA-C    fentaNYL 75 mcg/hr Intravenous Continuous Bhargavi Lee MD Last Rate: 75 mcg/hr (01/06/19 1012) hydrocortisone sodium succinate 100 mg Intravenous [facility] Ema Lyon MD    metoprolol 5 mg Intravenous Q6H Ema Lyon MD    multi-electrolyte 200 mL/hr Intravenous Continuous Ema Lyon MD Last Rate: 200 mL/hr (01/06/19 0231)   multi-electrolyte 1,000 mL Intravenous Once Chandler Donato PA-C Last Rate: Stopped (01/05/19 1300)   norepinephrine 1-30 mcg/min Intravenous Titrated Micaela Leone MD Last Rate: Stopped (01/06/19 0917)   ondansetron 4 mg Intravenous Q6H PRN Anabella Garcia PA-C    phenylephrine        phenylephine  mcg/min Intravenous Titrated Ema Lyon MD Last Rate: 80 mcg/min (01/06/19 1132)   piperacillin-tazobactam 3 375 g Intravenous Q6H Jessica Contreras PA-C Last Rate: 3 375 g (01/06/19 1113)   propofol 5-50 mcg/kg/min Intravenous Titrated Ema Lyon MD Last Rate: Stopped (01/06/19 0706)   vasopressin (PITRESSIN) in 0 9 % sodium chloride 100 mL 0 04 Units/min Intravenous Continuous Micaela Leone MD Last Rate: 0 04 Units/min (01/06/19 0904)     Continuous Infusions:  amiodarone 1 mg/min Last Rate: 1 mg/min (01/06/19 0712)   dexmedetomidine 0 1-0 7 mcg/kg/hr Last Rate: 0 7 mcg/kg/hr (01/06/19 0944)   fentaNYL 75 mcg/hr Last Rate: 75 mcg/hr (01/06/19 0432)   multi-electrolyte 200 mL/hr Last Rate: 200 mL/hr (01/06/19 0231)   norepinephrine 1-30 mcg/min Last Rate: Stopped (01/06/19 0917)   phenylephine  mcg/min Last Rate: 80 mcg/min (01/06/19 1132)   propofol 5-50 mcg/kg/min Last Rate: Stopped (01/06/19 0706)   vasopressin (PITRESSIN) in 0 9 % sodium chloride 100 mL 0 04 Units/min Last Rate: 0 04 Units/min (01/06/19 0904)     PRN Meds:   acetaminophen    ondansetron  all current active meds have been reviewed    No Known Allergies    Objective   Vitals: Blood pressure (!) 75/48, pulse 64, temperature (!) 101 5 °F (38 6 °C), resp  rate 20, height 6' 4" (1 93 m), weight 99 8 kg (220 lb), SpO2 98 %  , Body mass index is 26 78 kg/m² , Orthostatic Blood Pressures      Most Recent Value   Blood Pressure   75/48 filed at 01/06/2019 3389   Patient Position - Orthostatic VS  Lying filed at 01/06/2019 0600            Intake/Output Summary (Last 24 hours) at 01/06/19 1136  Last data filed at 01/06/19 1008   Gross per 24 hour   Intake         44163 61 ml   Output             1665 ml   Net          9546 61 ml       Invasive Devices     Central Venous Catheter Line            CVC Central Lines 01/05/19 Triple 20cm less than 1 day          Arterial Line            Arterial Line 01/05/19 Right Radial less than 1 day          Drain            NG/OG/Enteral Tube Nasogastric 16 Fr Right nares 1 day    Urethral Catheter 16 Fr  1 day          Airway            ETT  Cuffed 8 mm 1 day                Physical Exam:  Physical Exam   Constitutional: He appears well-developed and well-nourished  No distress  HENT:   Head: Normocephalic and atraumatic  Eyes: Pupils are equal, round, and reactive to light  Conjunctivae are normal    Neck: Normal range of motion  Neck supple  No JVD present  Cardiovascular: Normal rate, regular rhythm, normal heart sounds and intact distal pulses  Exam reveals no gallop and no friction rub  No murmur heard  Pulmonary/Chest: Effort normal  He has no wheezes  He has no rales  Vented sounds   Abdominal: He exhibits distension  Musculoskeletal: Normal range of motion  He exhibits edema (1+ pitting edema, anasarca)  Neurological:   sedated   Skin: Skin is warm and dry  He is not diaphoretic  Psychiatric: He has a normal mood and affect  Lab Results: I have personally reviewed pertinent lab results  Imaging: I have personally reviewed pertinent reports  EKG: Personally reviewed    ECHO: ordered  Previous Cath/PCI: n/a  Code Status: Level 1 - Full Code  Advance Directive and Living Will:      Power of :    POLST:        Ck Shanks MD  Cardiology Fellow

## 2019-01-06 NOTE — PROGRESS NOTES
[facility]  Progress Note - Abelardo Dinh 60 y o  male MRN: [id number]    Unit/Bed#: 3150 [facility] -01 Encounter: [id number]    Assessment/Plan:  Severe Acute pancreatitis  Cholangitis  -Current BiSAP score is 4, now at 48 hr patient with acute severe pancreatitis as demonstrated by persistent organ failure based off modified Clint score  -possibly secondary to biliary pancreatitis  -status post ERCP 1/4/19 with sphincterotomy, biliary stent placement and  CBD stone removal, T bili and AST/ALT trending down  -lactate slowly decreasing to 2 8  -urine output is being maintained at 0 5-1 cc per kg per hour, with heart rate now less than 120, unfortunately BUN continues to increase which is consistent with severe acute pancreatitis as is Hct decreased by 10% at 48hr   -events noted overnight patient went to AFib with RVR with cardioversion attempts with shock and medications patient now in sinus rhythm on amiodarone and cardiology follow  -patient remains intubated  -unfortunately patient continues to remain septic, concern for necrotizing pancreatitis +/- aspiration pneumonia, may need to consider antibiotic escalation to meropenem or imipenem for better penetration, and addition of Vanc for HAP; less likely biliary cause as there is no longer an obstruction with liver enzymes and T bili continuing to trend down  -consider CT to assess for necrosis  -continue monitor electrolytes and replete accordingly, calcium currently low  -monitor for signs of abdominal compartment syndrome with aggressive IV hydration  -patient remains critically ill with mortality rates approaching>15%  -care as per critical care team    Subjective:   Patient remains intubated, sedated  No response to abdominal exam   Overnight events of AFib with RVR noted patient now in normal sinus rhythm  Objective:     Vitals: Blood pressure (!) 75/48, pulse 62, temperature (!) 101 5 °F (38 6 °C), resp   rate 20, height 6' 4" (1 93 m), weight 99 8 kg (220 lb), SpO2 99 %  ,Body mass index is 26 78 kg/m²  Intake/Output Summary (Last 24 hours) at 01/06/19 1256  Last data filed at 01/06/19 1200   Gross per 24 hour   Intake         91693 54 ml   Output             1570 ml   Net         24544 54 ml       Review of Systems: as per HPI  Review of Systems   Unable to perform ROS: Intubated       Physical Exam:     Physical Exam   Constitutional: No distress  HENT:   Head: Atraumatic  Eyes: Scleral icterus is present  Neck: Neck supple  Cardiovascular: Normal rate  Pulmonary/Chest: Effort normal and breath sounds normal    Intubated   Abdominal: Soft  There is no tenderness  Sluggish bowel sounds   Musculoskeletal: He exhibits no edema  Neurological:   Sedated   Skin: Skin is warm and dry     Psychiatric:   Sedated         Invasive Devices     Central Venous Catheter Line            CVC Central Lines 01/05/19 Triple 20cm less than 1 day          Arterial Line            Arterial Line 01/05/19 Right Radial less than 1 day          Drain            NG/OG/Enteral Tube Nasogastric 16 Fr Right nares 1 day    Urethral Catheter 16 Fr  1 day          Airway            ETT  Cuffed 8 mm 1 day                        CBC: Lab Results   Component Value Date    WBC 3 60 (L) 01/06/2019    HGB 11 8 (L) 01/06/2019    HCT 35 3 (L) 01/06/2019    MCV 91 01/06/2019    PLT 87 (L) 01/06/2019    MCH 30 3 01/06/2019    MCHC 33 4 01/06/2019    RDW 13 8 01/06/2019    MPV 9 6 01/06/2019    NRBC 0 01/06/2019    NRBC 1 01/06/2019   ,   CMP: Lab Results   Component Value Date    K 4 0 01/06/2019     (H) 01/06/2019    CO2 25 01/06/2019    BUN 42 (H) 01/06/2019    CREATININE 2 18 (H) 01/06/2019    CALCIUM 7 0 (L) 01/06/2019    AST 96 (H) 01/06/2019     (H) 01/06/2019    ALKPHOS 26 (L) 01/06/2019    EGFR 32 01/06/2019   ,   Lipase:   Lab Results   Component Value Date    LIPASE 488 (H) 01/06/2019

## 2019-01-06 NOTE — RESPIRATORY THERAPY NOTE
RT Ventilator Management Note  [facility] 60 y o  male MRN: [id number]  Unit/Bed#:  -01 Encounter: [id number]      Daily Screen       1/5/2019 0913 1/5/2019 1148          Patient safety screen outcome[de-identified] Failed -      Not Ready for Weaning due to[de-identified] - -              Physical Exam:   Assessment Type: Assess only  Respiratory Pattern: Assisted, Symmetrical  Chest Assessment: Chest expansion symmetrical  Bilateral Breath Sounds: Diminished, Coarse, Crackles  Suction: ET Tube      Resp Comments: pt stable on current vent settings  pt found on 100% fio2 due to cardiovwersion  starting to wean fio2 by 10%   no weans at this time

## 2019-01-06 NOTE — PROGRESS NOTES
Progress Note - Critical Care   Wilfred Crouch 60 y o  male MRN: [id number]  Unit/Bed#:  ICU  Encounter: [id number]    Assessment: 63yM w/ biliary pancreatitis and presumed cholangitis s/p ERCP     Plan:          Neuro: Acute encephalopathy 2/2 cholangitis  Continue to monitor - intermittently following commands now  Will improve as infection resolves  Restraints must be continued  Delirium precautions                 CV:   Remains in sinus tach 2/2 intravascular volume depletion  10 8L in yesterday  Further aggressive resuscitation - giving another liter                 Lung:   Mixed picture of necessary volume overload and aspiration around time of ERCP  Intubated yesterday  Decreasing FiO2 on vent during day into night  ET tube still w/ gastric contents being suctioned   - 42 - 106 - 23 - BE-1 2                 GI:   Abdomen tender 2/2 pancreatitis and cholangitis  Lipase continues to trend down to 488 today  AST/ALT down and Tbili down to 1 67 from 2 57 w/ max of 3 5 on   Trickle TF continue  Continue with serial abdominal exams due to concern for abdominal compartment syndrome                 FEN:   Creatinine has increased to 2 18 from 1 67 yesterday with normal baseline  Lactate 2 8 from 3 3 earlier in day  Base deficit 1 2  Continue Isolyte at 200  Trickle TF continue                 :   Continue rodriguez - necessary for accurate IO given mental status  UO 1 3L in last 24 hrs  Color is improving from ricky yellow                 ID:   Febrile to 102  100 4 this AM  Day3 of zosyn for possible cholangitis  F/u Blood cultures from  - No growth x 24hrs                 Heme:   Lovenox 40qd                 Endo:   No issues                            Msk/Skin:   Frequent turning                 Disposition:   Critically ill  Required ICU management    Chief Complaint: Unable to obtain ROS given mental status       HPI/24hr events:   Further resuscitation with IVF     Physical Exam: Neuro: Intermittently following commands this AM   CV: sinus tachycardia mostly around 110 when agitation is controlled  Pulm: Intubated on AC  Abd: Soft, somewhat distended, no concerns for compartment syndrome at this time  : Jones in place landers yellow urine  Extremities: LOPEZ, in restraints, some edema developing in hands but feet without edema at this time  Vitals:    19 0200 19 0300 19 0339 19 0400   BP: (!) 83/58      BP Location: Right arm      Pulse: 102 (!) 108  102   Resp: 20 20  20   Temp: (!) 101 5 °F (38 6 °C) (!) 101 5 °F (38 6 °C)  100 4 °F (38 °C)   TempSrc: Probe Probe  Probe   SpO2: 99% 97% 98% 97%   Weight:       Height:         Arterial Line BP: 98/50  Arterial Line MAP (mmHg): 64 mmHg    Temperature:   Temp (24hrs), Av 1 °F (38 4 °C), Min:99 3 °F (37 4 °C), Max:102 2 °F (39 °C)    Current: Temperature: 100 4 °F (38 °C)    Weights:   IBW: 86 8 kg    Body mass index is 26 78 kg/m²    Weight (last 2 days)     None          Hemodynamic Monitoring:  SvO2:        Non-Invasive/Invasive Ventilation Settings:  Respiratory    Lab Data (Last 4 hours)       0428            pH, Arterial       7 376           pCO2, Arterial       41 9           pO2, Arterial       105 8           HCO3, Arterial       24 0           Base Excess, Arterial       -1 2                O2/Vent Data        0339   Most Recent         Vent Mode AC/VC  AC/VC      Resp Rate (BPM) (BPM) 20  20      Vt (mL) (mL) 450  450      FIO2 (%) (%) 60  60      PEEP (cmH2O) (cmH2O) 8  8      MV 9 6  9 6                Lab Results   Component Value Date    PHART 7 376 2019    WEB1XBA 41 9 2019    PO2ART 105 8 2019    SYR8WLL 24 0 2019    BEART -1 2 2019    SOURCE Line, Arterial 2019     SpO2: SpO2: 97 %    Intake and Outputs:  I/O        0701 -  0700  07 -  0700    I V  (mL/kg) 3366 7 (33 7) 5366 7 (53 8)    NG/GT  135    IV Piggyback 2000 2358 7    Total Intake(mL/kg) 5366 7 (53 8) 7860 4 (78 8)    Urine (mL/kg/hr) 1000 1635 (0 7)    Emesis/NG output  450    Total Output 1000 2085    Net +4366 7 +5775 4              UOP: 1 64 for 24 hours  Nutrition:        Diet Orders            Start     Ordered    01/05/19 3683  Diet Enteral/Parenteral; Tube Feeding No Oral Diet; Jevity 1 2 Daquan; Continuous; 10  Diet effective now     Comments:  Start trickle feeds at 10 cc/hr, call physician for residuals >350   Question Answer Comment   Diet Type Enteral/Parenteral    Enteral/Parenteral Tube Feeding No Oral Diet    Tube Feeding Formula: Jevity 1 2 Daquan    Bolus/Cyclic/Continuous Continuous    Tube Feeding Goal Rate (mL/hr): 10    RD to adjust diet per protocol? Yes        01/05/19 0845          Labs:     Results from last 7 days  Lab Units 01/05/19  0314 01/05/19  0259 01/04/19  1348 01/04/19  0435  01/03/19  0744   WBC Thousand/uL  --  3 34* 2 09* 3 53*  --  11 35*   HEMOGLOBIN g/dL  --  16 1 15 1 17 3*  --  15 8   I STAT HEMOGLOBIN g/dl 15 3  --   --   --   --   --    HEMATOCRIT %  --  47 8 46 4 51 5*  --  46 1   HEMATOCRIT, ISTAT % 45  --   --   --   --   --    PLATELETS Thousands/uL  --  138* 139* 160  < > 162   NEUTROS PCT %  --   --   --   --   --  88*   MONOS PCT %  --   --   --   --   --  7   MONO PCT %  --  6 3* 7  --   --    < > = values in this interval not displayed       Results from last 7 days  Lab Units 01/06/19  0427 01/05/19  0314 01/05/19  0259 01/04/19  1348   SODIUM mmol/L 141  --  140 138   POTASSIUM mmol/L 4 0  --  4 7 5 0   CHLORIDE mmol/L 109*  --  109* 110*   CO2 mmol/L 25  --  23 22   CO2, I-STAT mmol/L  --  24  --   --    BUN mg/dL 42*  --  37* 30*   CREATININE mg/dL 2 18*  --  1 67* 1 72*   CALCIUM mg/dL 7 0*  --  6 9* 6 7*   ALK PHOS U/L 26*  --  29* 34*   ALT U/L 202*  --  420* 324*   AST U/L 96*  --  236* 255*   GLUCOSE, ISTAT mg/dl  --  146*  --   --        Results from last 7 days  Lab Units 01/05/19  0259 01/04/19  1847 MAGNESIUM mg/dL 2 1 1 9       Results from last 7 days  Lab Units 01/05/19  0259 01/04/19  1348   PHOSPHORUS mg/dL 2 9 2 6*            Results from last 7 days  Lab Units 01/06/19  0427   LACTIC ACID mmol/L 2 8*       0  Lab Value Date/Time   TROPONINI <0 02 01/03/2019 0818       Imaging: Xr Chest Portable    Result Date: 1/4/2019  Impression: Left greater than right pleural effusions  Pulmonary vascular congestive failure  Satisfactory positioning of endotracheal and enteric tubes  Workstation performed: [id number]     Xr Chest 2 Views    Result Date: 1/3/2019  Impression: No active pulmonary disease on examination which is somewhat limited secondary to low lung volumes  Workstation performed: [id number]     Fl Ercp Biliary Only    Result Date: 1/4/2019  Impression: Fluoroscopic guidance provided for surgical procedure  Please refer to the separate procedure notes for additional details  Workstation performed: [id number]     Ct Abdomen Pelvis W Contrast    Result Date: 1/3/2019  Impression: Acute interstitial edematous pancreatitis with extensive pancreatic and peripancreatic inflammatory edema  Within the limitations imposed by respiratory motion throughout the upper abdomen there is no convincing evidence of pancreatic necrosis or underlying pancreatic mass  Nonetheless, when better pain control can be achieved and the patient is able to tolerate better breath-hold imaging, more accurate characterization utilizing pancreatic MRI with MRCP is recommended for further investigation of the possible etiology and complications of acute pancreatitis  The study was marked in [facility] for immediate notification  Workstation performed: [id number]  I have personally reviewed pertinent reports        EKG: N/A    Micro:  Lab Results   Component Value Date    BLOODCX No Growth at 24 hrs  01/04/2019    BLOODCX No Growth at 24 hrs  01/04/2019       Allergies: No Known Allergies    Medications:   Scheduled Meds:    Current Facility-Administered Medications:  acetaminophen 650 mg Oral Q6H PRN Iveth Henning    chlorhexidine 15 mL Swish & Spit Q12H [facility] Fawn Zheng PA-C    enoxaparin 40 mg Subcutaneous Daily Nirali Grullon PA-C    fentaNYL 75 mcg/hr Intravenous Continuous Lety Black MD Last Rate: 75 mcg/hr (01/06/19 0432)   multi-electrolyte 200 mL/hr Intravenous Continuous Lety Black MD Last Rate: 200 mL/hr (01/06/19 0231)   multi-electrolyte 1,000 mL Intravenous Once Doris LipANUJM porras Last Rate: Stopped (01/05/19 1300)   ondansetron 4 mg Intravenous Q6H PRN Nirali Grullon PA-C    piperacillin-tazobactam 3 375 g Intravenous Q6H Pan Chou PA-C Last Rate: 3 375 g (01/05/19 2357)   propofol 5-50 mcg/kg/min Intravenous Titrated Lety Black MD Last Rate: 10 mcg/kg/min (01/06/19 0225)     Continuous Infusions:    fentaNYL 75 mcg/hr Last Rate: 75 mcg/hr (01/06/19 0432)   multi-electrolyte 200 mL/hr Last Rate: 200 mL/hr (01/06/19 0231)   propofol 5-50 mcg/kg/min Last Rate: 10 mcg/kg/min (01/06/19 0225)     PRN Meds:    acetaminophen 650 mg Q6H PRN   ondansetron 4 mg Q6H PRN       VTE Pharmacologic Prophylaxis: Sequential compression device (Venodyne)  and Enoxaparin (Lovenox)  VTE Mechanical Prophylaxis: sequential compression device    Invasive lines and devices: Invasive Devices     Central Venous Catheter Line            CVC Central Lines 01/05/19 Triple 20cm less than 1 day          Peripheral Intravenous Line            Peripheral IV 01/05/19 Right Arm less than 1 day          Arterial Line            Arterial Line 01/05/19 Right Radial less than 1 day          Drain            NG/OG/Enteral Tube Nasogastric 16 Fr Right nares 1 day    Urethral Catheter 16 Fr  1 day          Airway            ETT  Cuffed 8 mm less than 1 day                   Counseling / Coordination of Care  Total Critical Care time spent 25 minutes excluding procedures, teaching and family updates         Code Status: Level 1 - Full Code Portions of the record may have been created with voice recognition software  Occasional wrong word or "sound a like" substitutions may have occurred due to the inherent limitations of voice recognition software  Read the chart carefully and recognize, using context, where substitutions have occurred       Jennifer Mcdonnell MD

## 2019-01-07 LAB
ALBUMIN SERPL BCP-MCNC: 1.4 G/DL (ref 3.5–5)
ALP SERPL-CCNC: 38 U/L (ref 46–116)
ALT SERPL W P-5'-P-CCNC: 132 U/L (ref 12–78)
ANION GAP SERPL CALCULATED.3IONS-SCNC: 7 MMOL/L (ref 4–13)
APTT PPP: 28 SECONDS (ref 26–38)
AST SERPL W P-5'-P-CCNC: 76 U/L (ref 5–45)
ATRIAL RATE: 126 BPM
ATRIAL RATE: 78 BPM
ATRIAL RATE: 91 BPM
BASOPHILS # BLD MANUAL: 0 THOUSAND/UL (ref 0–0.1)
BASOPHILS NFR MAR MANUAL: 0 % (ref 0–1)
BILIRUB SERPL-MCNC: 1.13 MG/DL (ref 0.2–1)
BUN SERPL-MCNC: 40 MG/DL (ref 5–25)
CALCIUM SERPL-MCNC: 7.4 MG/DL (ref 8.3–10.1)
CHLORIDE SERPL-SCNC: 106 MMOL/L (ref 100–108)
CO2 SERPL-SCNC: 25 MMOL/L (ref 21–32)
CREAT SERPL-MCNC: 1.65 MG/DL (ref 0.6–1.3)
DOHLE BOD BLD QL SMEAR: PRESENT
EOSINOPHIL # BLD MANUAL: 0 THOUSAND/UL (ref 0–0.4)
EOSINOPHIL NFR BLD MANUAL: 0 % (ref 0–6)
ERYTHROCYTE [DISTWIDTH] IN BLOOD BY AUTOMATED COUNT: 13.9 % (ref 11.6–15.1)
ERYTHROCYTE [DISTWIDTH] IN BLOOD BY AUTOMATED COUNT: 14.1 % (ref 11.6–15.1)
GFR SERPL CREATININE-BSD FRML MDRD: 45 ML/MIN/1.73SQ M
GLUCOSE SERPL-MCNC: 167 MG/DL (ref 65–140)
HCT VFR BLD AUTO: 33.7 % (ref 36.5–49.3)
HCT VFR BLD AUTO: 36.4 % (ref 36.5–49.3)
HGB BLD-MCNC: 11.4 G/DL (ref 12–17)
HGB BLD-MCNC: 12.5 G/DL (ref 12–17)
INR PPP: 1.03 (ref 0.86–1.17)
LACTATE SERPL-SCNC: 1.7 MMOL/L (ref 0.5–2)
LIPASE SERPL-CCNC: 189 U/L (ref 73–393)
LYMPHOCYTES # BLD AUTO: 0.18 THOUSAND/UL (ref 0.6–4.47)
LYMPHOCYTES # BLD AUTO: 3 % (ref 14–44)
MCH RBC QN AUTO: 30.1 PG (ref 26.8–34.3)
MCH RBC QN AUTO: 30.6 PG (ref 26.8–34.3)
MCHC RBC AUTO-ENTMCNC: 33.8 G/DL (ref 31.4–37.4)
MCHC RBC AUTO-ENTMCNC: 34.3 G/DL (ref 31.4–37.4)
MCV RBC AUTO: 89 FL (ref 82–98)
MCV RBC AUTO: 89 FL (ref 82–98)
MONOCYTES # BLD AUTO: 0.06 THOUSAND/UL (ref 0–1.22)
MONOCYTES NFR BLD: 1 % (ref 4–12)
NEUTROPHILS # BLD MANUAL: 5.72 THOUSAND/UL (ref 1.85–7.62)
NEUTS BAND NFR BLD MANUAL: 1 % (ref 0–8)
NEUTS SEG NFR BLD AUTO: 92 % (ref 43–75)
NRBC BLD AUTO-RTO: 1 /100 WBCS
P AXIS: 19 DEGREES
PLATELET # BLD AUTO: 101 THOUSANDS/UL (ref 149–390)
PLATELET # BLD AUTO: 79 THOUSANDS/UL (ref 149–390)
PLATELET BLD QL SMEAR: ABNORMAL
PMV BLD AUTO: 10.1 FL (ref 8.9–12.7)
PMV BLD AUTO: 10.1 FL (ref 8.9–12.7)
POTASSIUM SERPL-SCNC: 3.8 MMOL/L (ref 3.5–5.3)
PR INTERVAL: 150 MS
PROT SERPL-MCNC: 4.6 G/DL (ref 6.4–8.2)
PROTHROMBIN TIME: 13.6 SECONDS (ref 11.8–14.2)
QRS AXIS: -6 DEGREES
QRS AXIS: -7 DEGREES
QRS AXIS: -9 DEGREES
QRSD INTERVAL: 100 MS
QRSD INTERVAL: 92 MS
QRSD INTERVAL: 96 MS
QT INTERVAL: 325 MS
QT INTERVAL: 338 MS
QT INTERVAL: 442 MS
QTC INTERVAL: 416 MS
QTC INTERVAL: 444 MS
QTC INTERVAL: 504 MS
RBC # BLD AUTO: 3.79 MILLION/UL (ref 3.88–5.62)
RBC # BLD AUTO: 4.08 MILLION/UL (ref 3.88–5.62)
RBC MORPH BLD: PRESENT
SODIUM SERPL-SCNC: 138 MMOL/L (ref 136–145)
T WAVE AXIS: 113 DEGREES
T WAVE AXIS: 13 DEGREES
T WAVE AXIS: 51 DEGREES
TOXIC GRANULES BLD QL SMEAR: PRESENT
VARIANT LYMPHS # BLD AUTO: 3 %
VENTRICULAR RATE: 112 BPM
VENTRICULAR RATE: 78 BPM
VENTRICULAR RATE: 91 BPM
WBC # BLD AUTO: 6.15 THOUSAND/UL (ref 4.31–10.16)
WBC # BLD AUTO: 6.77 THOUSAND/UL (ref 4.31–10.16)

## 2019-01-07 PROCEDURE — 85730 THROMBOPLASTIN TIME PARTIAL: CPT | Performed by: INTERNAL MEDICINE

## 2019-01-07 PROCEDURE — 99291 CRITICAL CARE FIRST HOUR: CPT | Performed by: SURGERY

## 2019-01-07 PROCEDURE — 36620 INSERTION CATHETER ARTERY: CPT

## 2019-01-07 PROCEDURE — 83690 ASSAY OF LIPASE: CPT | Performed by: SURGERY

## 2019-01-07 PROCEDURE — 99232 SBSQ HOSP IP/OBS MODERATE 35: CPT | Performed by: INTERNAL MEDICINE

## 2019-01-07 PROCEDURE — 94003 VENT MGMT INPAT SUBQ DAY: CPT | Performed by: SOCIAL WORKER

## 2019-01-07 PROCEDURE — 93010 ELECTROCARDIOGRAM REPORT: CPT | Performed by: INTERNAL MEDICINE

## 2019-01-07 PROCEDURE — 85007 BL SMEAR W/DIFF WBC COUNT: CPT | Performed by: EMERGENCY MEDICINE

## 2019-01-07 PROCEDURE — 94760 N-INVAS EAR/PLS OXIMETRY 1: CPT

## 2019-01-07 PROCEDURE — 83605 ASSAY OF LACTIC ACID: CPT | Performed by: EMERGENCY MEDICINE

## 2019-01-07 PROCEDURE — 85610 PROTHROMBIN TIME: CPT | Performed by: INTERNAL MEDICINE

## 2019-01-07 PROCEDURE — 93306 TTE W/DOPPLER COMPLETE: CPT | Performed by: INTERNAL MEDICINE

## 2019-01-07 PROCEDURE — 85027 COMPLETE CBC AUTOMATED: CPT | Performed by: INTERNAL MEDICINE

## 2019-01-07 PROCEDURE — 85027 COMPLETE CBC AUTOMATED: CPT | Performed by: EMERGENCY MEDICINE

## 2019-01-07 PROCEDURE — 80053 COMPREHEN METABOLIC PANEL: CPT | Performed by: EMERGENCY MEDICINE

## 2019-01-07 PROCEDURE — 93005 ELECTROCARDIOGRAM TRACING: CPT

## 2019-01-07 PROCEDURE — 94760 N-INVAS EAR/PLS OXIMETRY 1: CPT | Performed by: SOCIAL WORKER

## 2019-01-07 RX ORDER — HEPARIN SODIUM 1000 [USP'U]/ML
4000 INJECTION, SOLUTION INTRAVENOUS; SUBCUTANEOUS AS NEEDED
Status: DISCONTINUED | OUTPATIENT
Start: 2019-01-07 | End: 2019-01-10

## 2019-01-07 RX ORDER — FENTANYL CITRATE 50 UG/ML
50 INJECTION, SOLUTION INTRAMUSCULAR; INTRAVENOUS ONCE
Status: COMPLETED | OUTPATIENT
Start: 2019-01-07 | End: 2019-01-07

## 2019-01-07 RX ORDER — POTASSIUM CHLORIDE 14.9 MG/ML
20 INJECTION INTRAVENOUS ONCE
Status: COMPLETED | OUTPATIENT
Start: 2019-01-07 | End: 2019-01-08

## 2019-01-07 RX ORDER — HEPARIN SODIUM 1000 [USP'U]/ML
2000 INJECTION, SOLUTION INTRAVENOUS; SUBCUTANEOUS AS NEEDED
Status: DISCONTINUED | OUTPATIENT
Start: 2019-01-07 | End: 2019-01-10

## 2019-01-07 RX ORDER — METOPROLOL TARTRATE 5 MG/5ML
5 INJECTION INTRAVENOUS ONCE
Status: COMPLETED | OUTPATIENT
Start: 2019-01-07 | End: 2019-01-07

## 2019-01-07 RX ORDER — HEPARIN SODIUM 10000 [USP'U]/100ML
3-30 INJECTION, SOLUTION INTRAVENOUS
Status: DISCONTINUED | OUTPATIENT
Start: 2019-01-07 | End: 2019-01-10

## 2019-01-07 RX ORDER — FENTANYL CITRATE 50 UG/ML
INJECTION, SOLUTION INTRAMUSCULAR; INTRAVENOUS
Status: COMPLETED
Start: 2019-01-07 | End: 2019-01-07

## 2019-01-07 RX ORDER — HYDROMORPHONE HCL/PF 1 MG/ML
0.5 SYRINGE (ML) INJECTION
Status: DISCONTINUED | OUTPATIENT
Start: 2019-01-07 | End: 2019-01-09

## 2019-01-07 RX ADMIN — HEPARIN SODIUM AND DEXTROSE 11.1 UNITS/KG/HR: 10000; 5 INJECTION INTRAVENOUS at 20:03

## 2019-01-07 RX ADMIN — DEXMEDETOMIDINE 0.2 MCG/KG/HR: 100 INJECTION, SOLUTION, CONCENTRATE INTRAVENOUS at 00:46

## 2019-01-07 RX ADMIN — CHLORHEXIDINE GLUCONATE 0.12% ORAL RINSE 15 ML: 1.2 LIQUID ORAL at 20:23

## 2019-01-07 RX ADMIN — CHLORHEXIDINE GLUCONATE 0.12% ORAL RINSE 15 ML: 1.2 LIQUID ORAL at 08:41

## 2019-01-07 RX ADMIN — DEXMEDETOMIDINE 0.5 MCG/KG/HR: 100 INJECTION, SOLUTION, CONCENTRATE INTRAVENOUS at 20:38

## 2019-01-07 RX ADMIN — FENTANYL CITRATE 50 MCG: 50 INJECTION INTRAMUSCULAR; INTRAVENOUS at 04:11

## 2019-01-07 RX ADMIN — FENTANYL CITRATE 50 MCG: 50 INJECTION INTRAMUSCULAR; INTRAVENOUS at 04:52

## 2019-01-07 RX ADMIN — HYDROMORPHONE HYDROCHLORIDE 0.5 MG: 1 INJECTION, SOLUTION INTRAMUSCULAR; INTRAVENOUS; SUBCUTANEOUS at 15:44

## 2019-01-07 RX ADMIN — SODIUM CHLORIDE, SODIUM GLUCONATE, SODIUM ACETATE, POTASSIUM CHLORIDE, MAGNESIUM CHLORIDE, SODIUM PHOSPHATE, DIBASIC, AND POTASSIUM PHOSPHATE 200 ML/HR: .53; .5; .37; .037; .03; .012; .00082 INJECTION, SOLUTION INTRAVENOUS at 07:00

## 2019-01-07 RX ADMIN — DEXTROSE MONOHYDRATE 1 MG/MIN: 50 INJECTION, SOLUTION INTRAVENOUS at 14:10

## 2019-01-07 RX ADMIN — HYDROMORPHONE HYDROCHLORIDE 0.5 MG: 1 INJECTION, SOLUTION INTRAMUSCULAR; INTRAVENOUS; SUBCUTANEOUS at 12:02

## 2019-01-07 RX ADMIN — PIPERACILLIN SODIUM AND TAZOBACTAM SODIUM 3.38 G: 36; 4.5 INJECTION, POWDER, FOR SOLUTION INTRAVENOUS at 17:11

## 2019-01-07 RX ADMIN — HYDROMORPHONE HYDROCHLORIDE 0.5 MG: 1 INJECTION, SOLUTION INTRAMUSCULAR; INTRAVENOUS; SUBCUTANEOUS at 20:08

## 2019-01-07 RX ADMIN — METOPROLOL TARTRATE 5 MG: 5 INJECTION, SOLUTION INTRAVENOUS at 15:05

## 2019-01-07 RX ADMIN — METOPROLOL TARTRATE 5 MG: 5 INJECTION, SOLUTION INTRAVENOUS at 04:52

## 2019-01-07 RX ADMIN — HYDROCORTISONE SODIUM SUCCINATE 100 MG: 100 INJECTION, POWDER, FOR SOLUTION INTRAMUSCULAR; INTRAVENOUS at 14:59

## 2019-01-07 RX ADMIN — DEXMEDETOMIDINE 0.5 MCG/KG/HR: 100 INJECTION, SOLUTION, CONCENTRATE INTRAVENOUS at 08:39

## 2019-01-07 RX ADMIN — HYDROCORTISONE SODIUM SUCCINATE 100 MG: 100 INJECTION, POWDER, FOR SOLUTION INTRAMUSCULAR; INTRAVENOUS at 05:12

## 2019-01-07 RX ADMIN — DEXMEDETOMIDINE 0.5 MCG/KG/HR: 100 INJECTION, SOLUTION, CONCENTRATE INTRAVENOUS at 12:20

## 2019-01-07 RX ADMIN — PIPERACILLIN SODIUM AND TAZOBACTAM SODIUM 3.38 G: 36; 4.5 INJECTION, POWDER, FOR SOLUTION INTRAVENOUS at 00:47

## 2019-01-07 RX ADMIN — FENTANYL CITRATE 75 MCG/HR: 50 INJECTION, SOLUTION INTRAMUSCULAR; INTRAVENOUS at 15:05

## 2019-01-07 RX ADMIN — METOPROLOL TARTRATE 5 MG: 5 INJECTION, SOLUTION INTRAVENOUS at 08:41

## 2019-01-07 RX ADMIN — FENTANYL CITRATE 50 MCG: 50 INJECTION, SOLUTION INTRAMUSCULAR; INTRAVENOUS at 04:11

## 2019-01-07 RX ADMIN — ENOXAPARIN SODIUM 40 MG: 40 INJECTION SUBCUTANEOUS at 08:40

## 2019-01-07 RX ADMIN — METOPROLOL TARTRATE 5 MG: 1 INJECTION, SOLUTION INTRAVENOUS at 04:51

## 2019-01-07 RX ADMIN — PIPERACILLIN SODIUM AND TAZOBACTAM SODIUM 3.38 G: 36; 4.5 INJECTION, POWDER, FOR SOLUTION INTRAVENOUS at 12:40

## 2019-01-07 RX ADMIN — DEXMEDETOMIDINE 0.5 MCG/KG/HR: 100 INJECTION, SOLUTION, CONCENTRATE INTRAVENOUS at 16:37

## 2019-01-07 RX ADMIN — PIPERACILLIN SODIUM AND TAZOBACTAM SODIUM 3.38 G: 36; 4.5 INJECTION, POWDER, FOR SOLUTION INTRAVENOUS at 05:12

## 2019-01-07 RX ADMIN — POTASSIUM CHLORIDE 20 MEQ: 200 INJECTION, SOLUTION INTRAVENOUS at 08:40

## 2019-01-07 RX ADMIN — HYDROCORTISONE SODIUM SUCCINATE 100 MG: 100 INJECTION, POWDER, FOR SOLUTION INTRAMUSCULAR; INTRAVENOUS at 21:11

## 2019-01-07 NOTE — PROGRESS NOTES
Cardiology Progress note  Unit/Bed#:  - Encounter: [id number]        Shilpi Choudhury 60 y o  male [id number]  Hospital Stay Days: 4    Assessment and Plan      Current Problem List   Principal Problem:    Acute pancreatitis  Active Problems:    Pancreatitis    Assessment/Plan:         1  A-Fib with RVR - Went back into last night at 3:00 for short time, received metoprolol X 2  Slowed down  · Patient converted overnight, but now is back in A-Fib  · Currently at 1 of amio  · Continue current regimen  · Echo showed systolic function, EF of 92%, moderatly dilated  Atrium bilaterally moderately dilated  Mitral valve within normal limits  Aortic valve WNL  Mild tricuspid regurg  IVC moderately dilated  · EKG this morning showed Iregular irregular  Rate of 75  Normal Axis  No T wave changes    · Low pressor PRN for rate control  2   Severe Pancreatitis  ·  S/P ERCP sphincterotomy  3  Possible Ascending cholangitis/Aspiration PNA  ·  Per ICU team    ·        Subjective     Patient seen and examined  Patient is back into A-Fib, no RVR currently, pulses stable overnight  Multiple episodes of bradycardia overnight      Objective     Vitals: Temp (24hrs), Av 1 °F (37 3 °C), Min:97 9 °F (36 6 °C), Max:100 °F (37 8 °C)  Current: Temperature: 99 3 °F (37 4 °C)  Patient Vitals for the past 24 hrs:   BP Temp Temp src Pulse Resp SpO2   19 1500 - 99 3 °F (37 4 °C) - 80 20 95 %   19 1400 - 99 3 °F (37 4 °C) - 78 20 94 %   19 1300 - 99 3 °F (37 4 °C) - 94 22 96 %   19 1203 - - - - - 96 %   19 1200 - 99 3 °F (37 4 °C) - 82 20 96 %   19 1100 - 99 3 °F (37 4 °C) - 80 20 96 %   19 1000 - 99 3 °F (37 4 °C) - 84 20 96 %   19 0925 - 99 3 °F (37 4 °C) - 86 22 95 %   19 0900 - 99 3 °F (37 4 °C) - (!) 112 (!) 31 95 %   19 0800 - 99 3 °F (37 4 °C) - 84 20 97 %   19 0700 - 99 °F (37 2 °C) - 86 20 97 %   19 0600 - 99 °F (37 2 °C) - 88 20 98 %   01/07/19 0500 116/89 98 6 °F (37 °C) - 84 20 97 %   01/07/19 0430 - - - - - 97 %   01/07/19 0400 121/82 98 2 °F (36 8 °C) Probe (!) 108 20 96 %   01/07/19 0300 121/79 97 9 °F (36 6 °C) - (!) 48 20 99 %   01/07/19 0200 119/78 97 9 °F (36 6 °C) - (!) 50 20 99 %   01/07/19 0100 109/75 98 2 °F (36 8 °C) - (!) 52 20 98 %   01/07/19 0000 115/75 98 2 °F (36 8 °C) Probe (!) 50 20 99 %   01/06/19 2353 - 98 2 °F (36 8 °C) - (!) 54 20 100 %   01/06/19 2347 - - - - - 98 %   01/06/19 2300 107/69 98 2 °F (36 8 °C) - (!) 52 20 100 %   01/06/19 2200 109/71 98 6 °F (37 °C) - (!) 50 19 100 %   01/06/19 2100 - 99 °F (37 2 °C) - (!) 52 20 99 %   01/06/19 2000 - 99 3 °F (37 4 °C) Probe (!) 54 20 97 %   01/06/19 1926 - - - - - 98 %   01/06/19 1900 - 99 7 °F (37 6 °C) - 56 20 98 %   01/06/19 1830 - 99 7 °F (37 6 °C) - 56 20 98 %   01/06/19 1800 - 99 7 °F (37 6 °C) - 76 20 99 %   01/06/19 1730 - 99 7 °F (37 6 °C) - 66 20 98 %   01/06/19 1700 - 100 °F (37 8 °C) - 60 20 98 %   01/06/19 1634 - - - - - 99 %   01/06/19 1630 - 100 °F (37 8 °C) - 60 20 99 %   01/06/19 1600 - 100 °F (37 8 °C) - 60 20 99 %    Body mass index is 26 78 kg/m²  Physical Exam:  GENERAL: NAD  HEENT:  Intubated  CARDIAC:  Iregular rate and ryhthym  PULMONARY:  CTA B/L, no wheezing/rales/rhonci, non-labored breathing  ABDOMEN:  Soft, NT/ND,no rebound/guarding/rigidity  Extremities:  No edema, cyanosis, or clubbing  NEUROLOGIC: No laterlizing def  SKIN:  No rashes or erythema noted on exposed skin     Psych: Normal affect    Invasive Devices     Central Venous Catheter Line            CVC Central Lines 01/05/19 Triple 20cm 2 days          Arterial Line            Arterial Line 01/07/19 Left Radial less than 1 day          Drain            NG/OG/Enteral Tube Nasogastric 16 Fr Right nares 3 days    Urethral Catheter 16 Fr  3 days          Airway            ETT  Cuffed 8 mm 2 days                    Labs:     Results from last 7 days  Lab Units 01/07/19 0443 01/06/19  0427 01/05/19  0314 01/05/19  0259 01/04/19  1348 01/04/19  0435 01/03/19  1429 01/03/19  0744   WBC Thousand/uL 6 15 3 60*  --  3 34* 2 09* 3 53*  --  11 35*   HEMOGLOBIN g/dL 12 5 11 8*  --  16 1 15 1 17 3*  --  15 8   I STAT HEMOGLOBIN g/dl  --   --  15 3  --   --   --   --   --    HEMATOCRIT % 36 4* 35 3*  --  47 8 46 4 51 5*  --  46 1   HEMATOCRIT, ISTAT %  --   --  45  --   --   --   --   --    PLATELETS Thousands/uL 101* 87*  --  138* 139* 160 152 162   NEUTROS PCT %  --   --   --   --   --   --   --  88*   MONOS PCT %  --   --   --   --   --   --   --  7   MONO PCT % 1* 6  --  6 3* 7  --   --       Results from last 7 days  Lab Units 01/07/19 0443 01/06/19  0427 01/05/19  0314 01/05/19  0259 01/04/19  1348 01/04/19  0435 01/03/19  0818 01/03/19  0744   SODIUM mmol/L 138 141  --  140 138 138  --  137   POTASSIUM mmol/L 3 8 4 0  --  4 7 5 0 4 6  --  3 5   CHLORIDE mmol/L 106 109*  --  109* 110* 109*  --  106   CO2 mmol/L 25 25  --  23 22 22  --  26   CO2, I-STAT mmol/L  --   --  24  --   --   --   --   --    BUN mg/dL 40* 42*  --  37* 30* 26*  --  24   CREATININE mg/dL 1 65* 2 18*  --  1 67* 1 72* 1 38*  --  1 26   CALCIUM mg/dL 7 4* 7 0*  --  6 9* 6 7* 6 7*  --  8 4   ALK PHOS U/L 38* 26*  --  29* 34* 48  --  80   ALT U/L 132* 202*  --  420* 324* 144*  --  97*   AST U/L 76* 96*  --  236* 255* 103*  --  94*   GLUCOSE, ISTAT mg/dl  --   --  146*  --   --   --   --   --    TROPONIN I ng/mL  --   --   --   --   --   --  <0 02  --    MAGNESIUM mg/dL  --   --   --  2 1 1 9  --   --   --    PHOSPHORUS mg/dL  --   --   --  2 9 2 6*  --   --   --    EGFR ml/min/1 73sq m 45 32  --  44 43 56  --  62           Results from last 7 days  Lab Units 01/07/19  0443   LACTIC ACID mmol/L 1 7       Results from last 7 days  Lab Units 01/03/19  0818   TROPONIN I ng/mL <0 02     No results found for: PHART, DVG8GOL, PO2ART, JIV0ACQ, J0CDDFPA, BEART, SOURCE  No components found for: HIV1X2  No results found for: HAV, HEPAIGM, HEPBIGM, HEPBCAB, HBEAG, HEPCAB  No results found for: SPEP, UPEP No results found for: HGBA1C  No results found for: CHOL Lab Results   Component Value Date    HDL 59 01/03/2019    Lab Results   Component Value Date    LDLCALC 77 01/03/2019    Lab Results   Component Value Date    TRIG 52 01/03/2019     No components found for: PROCAL      Micro:    Results from last 7 days  Lab Units 01/04/19  1044   BLOOD CULTURE  No Growth at 72 hrs  No Growth at 72 hrs  Urinalysis:  No results found for: AMPHETUR, BDZUR, COCAINEUR, OPIATEUR, PCPUR, THCUR, ETOH, ACTMNPHEN, SALICYLATE       Invalid input(s): URIBILINOGEN        Intake and Outputs:  I/O       01/05 0701 - 01/06 0700 01/06 0701 - 01/07 0700 01/07 0701 - 01/08 0700    I V  (mL/kg) 9956 2 (99 8) 7266 7 (72 8) 1566 7 (15 7)    NG/GT 60  20    IV Piggyback 1100 1150 100    Feedings 158 212 80    Total Intake(mL/kg) 99294 2 (113) 8628 7 (86 5) 1766 7 (17 7)    Urine (mL/kg/hr) 1415 (0 6) 1375 (0 6) 700 (0 8)    Emesis/NG output 200      Total Output 1615 1375 700    Net +9659 2 +7253 7 +1066 7               Nutrition:  Diet Enteral/Parenteral; Tube Feeding No Oral Diet; Jevity 1 5; Continuous; 71  Radiology Results:   XR chest portable   Final Result by Rohan Mosqueda MD (01/05 1400)   Left IJ catheter tip in the SVC  No pneumothorax  Central vascular prominence with bibasilar opacities and moderate to large bilateral pleural effusions  Workstation performed: [id number]         XR chest portable   Final Result by Rohan Mosqueda MD (01/05 1359)      Central vascular prominence with bibasilar opacities and moderate to large bilateral pleural effusions  Workstation performed: [id number]         XR chest portable   Final Result by Francia Owusu MD (01/05 1253)      Increasing large left pleural effusion  Pulmonary vascular congestive changes              Workstation performed: [id number]         XR chest portable   Final Result by Magi Palafox MD (01/04 1511)      Left greater than right pleural effusions  Pulmonary vascular congestive failure  Satisfactory positioning of endotracheal and enteric tubes  Workstation performed: [id number]         FL ERCP biliary only   Final Result by Mikie Montoya MD (01/04 1347)      Fluoroscopic guidance provided for surgical procedure  Please refer to the separate procedure notes for additional details  Workstation performed: [id number]         XR chest 2 views   Final Result by Vasiliy Ann DO (01/03 1033)   No active pulmonary disease on examination which is somewhat limited secondary to low lung volumes  Workstation performed: [id number]         CT abdomen pelvis w contrast   Final Result by Magi Palafox MD (01/03 1444)      Acute interstitial edematous pancreatitis with extensive pancreatic and peripancreatic inflammatory edema  Within the limitations imposed by respiratory motion throughout the upper abdomen there is no convincing evidence of pancreatic necrosis or    underlying pancreatic mass  Nonetheless, when better pain control can be achieved and the patient is able to tolerate better breath-hold imaging, more accurate characterization utilizing pancreatic MRI with MRCP is recommended for further investigation    of the possible etiology and complications of acute pancreatitis  The study was marked in [facility] for immediate notification        Workstation performed: [id number]           Scheduled Medications:    chlorhexidine 15 mL Q12H [address]   enoxaparin 40 mg Daily   hydrocortisone sodium succinate 100 mg Q8H [address]   metoprolol 5 mg Q6H   multi-electrolyte 1,000 mL Once   piperacillin-tazobactam 3 375 g Q6H     PRN MEDS:    acetaminophen 650 mg Q6H PRN   HYDROmorphone 0 5 mg Q3H PRN   ondansetron 4 mg Q6H PRN   perflutren lipid microsphere 0 8 mL/min Once in imaging     Last 24 Hour Meds: :   Medication Administration - last 24 hours from 01/06/2019 1539 to 01/07/2019 1539       Date/Time Order Dose Route Action Action by     01/07/2019 0840 enoxaparin (LOVENOX) subcutaneous injection 40 mg 40 mg Subcutaneous Given Jose James RN     01/07/2019 1240 piperacillin-tazobactam (ZOSYN) 3 375 g in sodium chloride 0 9 % 50 mL IVPB 3 375 g Intravenous [address] Jose James RN     01/07/2019 0512 piperacillin-tazobactam (ZOSYN) 3 375 g in sodium chloride 0 9 % 50 mL IVPB 3 375 g Intravenous [address] [facility]     01/07/2019 0047 piperacillin-tazobactam (ZOSYN) 3 375 g in sodium chloride 0 9 % 50 mL IVPB 3 375 g Intravenous [address] [facility] val[facility]     01/06/2019 1706 piperacillin-tazobactam (ZOSYN) 3 375 g in sodium chloride 0 9 % 50 mL IVPB 3 375 g Intravenous [address] Perez Manzo RN     01/07/2019 0841 chlorhexidine (PERIDEX) 0 12 % oral rinse 15 mL 15 mL Swish & Spit Given Jose James RN     01/06/2019 2226 chlorhexidine (PERIDEX) 0 12 % oral rinse 15 mL 15 mL Swish & Spit [facility]     01/07/2019 1116 multi-electrolyte (ISOLYTE-S PH 7 4 equivalent) IV solution 0 mL/hr Intravenous Stopped Jose James RN     01/07/2019 0700 multi-electrolyte (ISOLYTE-S PH 7 4 equivalent) IV solution 200 mL/hr Intravenous New Bag Jose James RN     01/07/2019 1505 fentaNYL 1250 mcg in sodium chloride 0 9% 125mL drip 75 mcg/hr Intravenous [address] [facility]     01/06/2019 2225 fentaNYL 1250 mcg in sodium chloride 0 9% 125mL drip 75 mcg/hr Intravenous [address] [facility]     01/06/2019 2224 fentaNYL 1250 mcg in sodium chloride 0 9% 125mL drip 0 mcg/hr Intravenous Stopped Dana Schaefer RN     01/07/2019 1410 amiodarone (CORDARONE) 900 mg in dextrose 5 % 500 mL infusion 1 mg/min Intravenous [address] [facility]     01/06/2019 2237 amiodarone (CORDARONE) 900 mg in dextrose 5 % 500 mL infusion 1 mg/min Intravenous [address] [facility]     01/06/2019 2231 amiodarone (CORDARONE) 900 mg in dextrose 5 % 500 mL infusion 0 mg/min Intravenous Stopped David Cortez RN     01/07/2019 1241 phenylephrine (JAM-SYNEPHRINE) 50 mg (STANDARD CONCENTRATION) in sodium chloride 0 9% 250 mL 15 mcg/min Intravenous Rate/Dose Change Monserrat Pina RN     01/07/2019 8551 phenylephrine (JAM-SYNEPHRINE) 50 mg (STANDARD CONCENTRATION) in sodium chloride 0 9% 250 mL 20 mcg/min Intravenous Rate/Dose Change Monserrat Pina RN     01/07/2019 6790 phenylephrine (JAM-SYNEPHRINE) 50 mg (STANDARD CONCENTRATION) in sodium chloride 0 9% 250 mL 25 mcg/min Intravenous Rate/Dose Change David Cortez RN     01/06/2019 1814 phenylephrine (JAM-SYNEPHRINE) 50 mg (STANDARD CONCENTRATION) in sodium chloride 0 9% 250 mL 40 mcg/min Intravenous Rate/Dose Change Chayo Yoon RN     01/06/2019 1722 phenylephrine (JAM-SYNEPHRINE) 50 mg (STANDARD CONCENTRATION) in sodium chloride 0 9% 250 mL 45 mcg/min Intravenous Rate/Dose Change Chayo Yoon RN     01/06/2019 1705 phenylephrine (JAM-SYNEPHRINE) 50 mg (STANDARD CONCENTRATION) in sodium chloride 0 9% 250 mL 50 mcg/min Intravenous Rate/Dose Change Chayo Yoon RN     01/06/2019 1704 phenylephrine (JAM-SYNEPHRINE) 50 mg (STANDARD CONCENTRATION) in sodium chloride 0 9% 250 mL 55 mcg/min Intravenous [address] Chayo Yoon RN     01/07/2019 1220 dexmedetomidine (PRECEDEX) 200 mcg in sodium chloride 0 9 % 50 mL infusion 0 5 mcg/kg/hr Intravenous [address] Monserrat Pina RN     01/07/2019 0839 dexmedetomidine (PRECEDEX) 200 mcg in sodium chloride 0 9 % 50 mL infusion 0 5 mcg/kg/hr Intravenous [address] Monserrat Pina RN     01/07/2019 0448 dexmedetomidine (PRECEDEX) 200 mcg in sodium chloride 0 9 % 50 mL infusion 0 5 mcg/kg/hr Intravenous Rate/Dose Change David Cortez RN     01/07/2019 0046 dexmedetomidine (PRECEDEX) 200 mcg in sodium chloride 0 9 % 50 mL infusion 0 2 mcg/kg/hr Intravenous [address] David [place], [place]     01/07/2019 0118 dexmedetomidine (PRECEDEX) 200 mcg in sodium chloride 0 9 % 50 mL infusion 0 mcg/kg/hr Intravenous Stopped Sophie Soares RN     01/06/2019 2155 dexmedetomidine (PRECEDEX) 200 mcg in sodium chloride 0 9 % 50 mL infusion 0 3 mcg/kg/hr Intravenous Rate/Dose Change Sophie Soares RN     01/06/2019 1814 dexmedetomidine (PRECEDEX) 200 mcg in sodium chloride 0 9 % 50 mL infusion 0 5 mcg/kg/hr Intravenous Rate/Dose Change Randa Herbert RN     01/06/2019 1806 dexmedetomidine (PRECEDEX) 200 mcg in sodium chloride 0 9 % 50 mL infusion 0 6 mcg/kg/hr Intravenous [address] Randa eHrbert RN     01/06/2019 1642 dexmedetomidine (PRECEDEX) 200 mcg in sodium chloride 0 9 % 50 mL infusion 0 6 mcg/kg/hr Intravenous Rate/Dose Change Randa Herbert RN     01/07/2019 1459 hydrocortisone sodium succinate (PF) (Solu-CORTEF) injection 100 mg 100 mg Intravenous Given Deisy Lino RN     01/07/2019 9591 hydrocortisone sodium succinate (PF) (Solu-CORTEF) injection 100 mg 100 mg Intravenous Given Sophie Soares, AMELIA     01/06/2019 2226 hydrocortisone sodium succinate (PF) (Solu-CORTEF) injection 100 mg 100 mg Intravenous Given Sophie Soares, AMELIA     01/07/2019 1505 metoprolol (LOPRESSOR) injection 5 mg 5 mg Intravenous Given Deisy Lino, RN     01/07/2019 0841 metoprolol (LOPRESSOR) injection 5 mg 5 mg Intravenous Given Deiys Lino, RN     01/07/2019 5958 metoprolol (LOPRESSOR) injection 5 mg 5 mg Intravenous Not Given Sophie Soares, AMELIA     01/06/2019 3005 metoprolol (LOPRESSOR) injection 5 mg 5 mg Intravenous Not Given Sophie Soares, AMELIA     01/06/2019 1804 digoxin (LANOXIN) 500 mcg in dextrose 5 % 25 mL (20 mcg/mL) IV soln 500 mcg Intravenous Not Given Randa Herbert RN     01/06/2019 1901 magnesium sulfate 2 g/50 mL IVPB (premix) 2 g 0 g Intravenous Stopped Randa Herbert RN     01/06/2019 1804 magnesium sulfate 2 g/50 mL IVPB (premix) 2 g 2 g Intravenous [address] Randa Herbert RN     01/06/2019 3609 digoxin (LANOXIN) injection 500 mcg 500 mcg Intravenous Given Osei Camacho RN     01/07/2019 0411 fentanyl citrate (PF) 100 MCG/2ML 50 mcg 50 mcg Intravenous Given Javon Dickens RN     01/07/2019 0451 metoprolol (LOPRESSOR) injection 5 mg 5 mg Intravenous Given Javon Dickens RN     01/07/2019 0452 metoprolol (LOPRESSOR) injection 5 mg 5 mg Intravenous Given Javon Dickens RN     01/07/2019 0452 fentanyl citrate (PF) 100 MCG/2ML 50 mcg 50 mcg Intravenous Given Javon Dickens RN     01/07/2019 0840 potassium chloride 20 mEq IVPB (premix) 20 mEq Intravenous New Bag Alek Pedersen RN     01/07/2019 1202 HYDROmorphone (DILAUDID) injection 0 5 mg 0 5 mg Intravenous Given AMELIA Urrutia, DO    PLEASE NOTE:  This encounter was completed utilizing the [employer] Direct Speech Voice Recognition Software  Grammatical errors, random word insertions, pronoun errors and incomplete sentences are occasional consequences of the system due to software limitations, ambient noise and hardware issues  These may be missed by proof reading prior to affixing electronic signature  Any questions or concerns about the content, text or information contained within the body of this dictation should be directly addressed to the physician for clarification  Please do not hesitate to call me directly if you have any any questions or concerns

## 2019-01-07 NOTE — PROGRESS NOTES
[facility] resident Kristyn Rico made aware of pt  Intermittent dropping of his HR down to 48  Lowered Precedex to  3  No new orders at this time

## 2019-01-07 NOTE — QUICK NOTE
Patient seen     Improved since earlier this am  Remains in sinus rythym  Pressor requirements decreased    Vitals:    01/06/19 1926   BP:    Pulse:    Resp:    Temp:    SpO2: 98%     Gtts: Amio @1  Precedex  Sami @40   Levo- OFF    Intubated/Sedated  RRR, sinus   Abdomen soft, non distended, not tense   Non tender   Rodriguez in place, urine yellow in rodriguez     Plan:   Continue sedation   Follow up echo   Continue gtts, wean sami as able   Continue amio   Continue [email or web address] for now, will likely advance tomorrow

## 2019-01-07 NOTE — PROGRESS NOTES
[facility]  Progress Note - Araceli Hull 60 y o  male MRN: [id number]    Unit/Bed#: 3150 [facility] -01 Encounter: [id number]    Assessment/Plan:  Severe Acute pancreatitis  Cholangitis  -Current BiSAP score is 4  -status post ERCP 1/4/19 with sphincterotomy, biliary stent placement and  CBD stone removal, T bili-1 13 and AST-76 ALT-132 AP-38 trending down  -lactate has normalized, creatinine improving, improved responsiveness, BUN decreasing  -urine output is being maintained at 0 5-1 cc per kg per hour  -patient remains intubated  -patient has remained afebrile for past 24 hr  -continues on Zosyn day 4  Defer antibiotic management as per critical care team  -consider CT to assess for necrosis, however patient is now improving  -continue monitor electrolytes and replete accordingly, calcium currently low  -monitor for signs of abdominal compartment syndrome with aggressive IV hydration  -patient remains critically ill   -continues to have issues with cardiac rhythm is atrial fibrillation managed as per Critical Care and Cardiology  -continue trickle feeds  -care as per critical care team    Subjective:   Remains intubated, afebrile times 24 hr   More responsive  Continues to have issues with heart rate control  Objective:     Vitals: Blood pressure 116/89, pulse 82, temperature 99 3 °F (37 4 °C), resp  rate 20, height 6' 4" (1 93 m), weight 99 8 kg (220 lb), SpO2 96 %  ,Body mass index is 26 78 kg/m²  Intake/Output Summary (Last 24 hours) at 01/07/19 1627  Last data filed at 01/07/19 1556   Gross per 24 hour   Intake          5934 76 ml   Output             1725 ml   Net          4209 76 ml       Review of Systems: as per HPI  Review of Systems   Unable to perform ROS: Intubated       Physical Exam:     Physical Exam   Constitutional: No distress  HENT:   Head: Atraumatic  Eyes: Scleral icterus is present  Neck: Neck supple     Cardiovascular:   Irregularly irregular rhythm Pulmonary/Chest:   Intubated   Abdominal: Soft  Bowel sounds are normal    Musculoskeletal: He exhibits no edema  Neurological:   Sedated but responds to noxious stimuli   Skin: Skin is warm and dry     Psychiatric:   Sedated         Invasive Devices     Central Venous Catheter Line            CVC Central Lines 01/05/19 Triple 20cm 2 days          Arterial Line            Arterial Line 01/07/19 Left Radial less than 1 day          Drain            NG/OG/Enteral Tube Nasogastric 16 Fr Right nares 3 days    Urethral Catheter 16 Fr  3 days          Airway            ETT  Cuffed 8 mm 2 days                        CBC: Lab Results   Component Value Date    WBC 6 15 01/07/2019    HGB 12 5 01/07/2019    HCT 36 4 (L) 01/07/2019    MCV 89 01/07/2019     (L) 01/07/2019    MCH 30 6 01/07/2019    MCHC 34 3 01/07/2019    RDW 13 9 01/07/2019    MPV 10 1 01/07/2019    NRBC 1 01/07/2019   ,   CMP: Lab Results   Component Value Date    K 3 8 01/07/2019     01/07/2019    CO2 25 01/07/2019    BUN 40 (H) 01/07/2019    CREATININE 1 65 (H) 01/07/2019    CALCIUM 7 4 (L) 01/07/2019    AST 76 (H) 01/07/2019     (H) 01/07/2019    ALKPHOS 38 (L) 01/07/2019    EGFR 45 01/07/2019   ,   Lipase:   Lab Results   Component Value Date    LIPASE 189 01/07/2019

## 2019-01-07 NOTE — RESPIRATORY THERAPY NOTE
RT Ventilator Management Note  Odette Chapman 60 y o  male MRN: [id number]  Unit/Bed#:  -01 Encounter: [id number]      Daily Screen       1/5/2019 0913 1/5/2019 1148          Patient safety screen outcome[de-identified] Failed -      Not Ready for Weaning due to[de-identified] - -              Physical Exam:   Assessment Type: Assess only  Respiratory Pattern: Assisted, Symmetrical  Chest Assessment: Chest expansion symmetrical  Bilateral Breath Sounds: Coarse, Crackles  Suction: ET Tube      Resp Comments: (P) Pt remained on ACVC vent settings overnight  FiO2 titrated to 50%  No other changes made  Will continue to monitor

## 2019-01-07 NOTE — PROGRESS NOTES
Progress Note - Critical Care   Wardcristiane Dugan 60 y o  male MRN: [id number]  Unit/Bed#:  -01 Encounter: [id number]    Assessment: 63yM w/ severe biliary pancreatitis and presumed cholangitis s/p ERCP 1/4    Plan:          Neuro:   Acute encephalopathy 2/2 cholangitis  Follows commands this AM - continue to follow  Will improve as infection resolves  On fentanyl at 75ml/hr  No standing sedation - will give versed for agitation  Restraints must be continued                 CV:   New onset unstable Afib requiring multiple attempts at cardioversion AM 1/6, placed on amiodarone, given digoxin and Lopressor  Phenylephrine, levo and vasopressin were started then as well  He converted to sinus rhythm but now is back in rate controlled Afib  Levo and vaso have come off and phenylephrine has come down to 40 w/ MAP currently >80  Continue with amiodarine  Continue w/ lopressor 5q6 w/ hold parameters  Cardiology is following                 Lung:   Mixed picture of volume overload and aspiration around time of ERCP  Intubated 1/5  On [facility] -50-8                 GI:   Abdomen tender 2/2 pancreatitis and cholangitis  No concern for abdominal compartment syndrome yet  Lipase yesterday 488  LFTs still going down  AST 76, , Tbili 1 13  F/u AM lipase  Trickle TF continue  Continue with serial abdominal exams due to concern for abdominal compartment syndrome  GI is following                 FEN:   AZ - improving  Cr 1 65 from 2 18 from 1 67 w/ normal baseline  Lactate has cleared   1 7 this AM from 2 8 yesterday AM  8 6L in yesterday and UO 1 375 last 24 hrs (~0 6ml/kg/hr)  +27 1 L since admission  K 3 8  Continue Isolyte at 200  Trickle TF continue                 :   UO 1 375 last 24 hrs (~0 6ml/kg/hr)  Color has improved  Continue rodriguez - necessary for accurate IO given mental status                 ID:   Afebrile since 1/6 2PM  WBC 6 15 from 3 6 - diff is pending  Day4 of zosyn for possible cholangitis  F/u Blood cultures from  - No growth x 48hrs                 Heme:   Lovenox 40qd  Hgb stable at 12 5 from 11 8                 Endo:   No issues                            Msk/Skin:   Frequent turning                 Disposition:   Critically ill  Required ICU management    Chief Complaint: Unable to obtain ROS given mental status  HPI/24hr events:   Cardioversion yesterday AM  Overall stability has improved  Physical Exam:     Neuro: Intermittently follows commands  CV: Afib but rate controlled  On phenylephrine at 40  Pulm: Intubated on -247-44-5  Abd: Soft, edematous  : Jones w/ yellow urine  Extremities: Edema of hands, no edema of feet  LOPEZ      Vitals:    19 0400 19 0430 19 0500 19 0600   BP: 121/82  116/89    Pulse: (!) 108  84 88   Resp: 20  20 20   Temp: 98 2 °F (36 8 °C)  98 6 °F (37 °C) 99 °F (37 2 °C)   TempSrc: Probe      SpO2: 96% 97% 97% 98%   Weight:       Height:         Arterial Line BP: 142/84  Arterial Line MAP (mmHg): 100 mmHg    Temperature:   Temp (24hrs), Av 1 °F (37 8 °C), Min:97 9 °F (36 6 °C), Max:101 5 °F (38 6 °C)    Current: Temperature: 99 °F (37 2 °C)    Weights:   IBW: 86 8 kg    Body mass index is 26 78 kg/m²  Weight (last 2 days)     None          Hemodynamic Monitoring:  SvO2:        Non-Invasive/Invasive Ventilation Settings:  Respiratory    Lab Data (Last 4 hours)    None         O2/Vent Data (Last 4 hours)       0430           Vent Mode AC/VC       Resp Rate (BPM) (BPM) 20       Vt (mL) (mL) 450       FIO2 (%) (%) 50       PEEP (cmH2O) (cmH2O) 8       MV 9 5                 No results found for: PHART, FYB0HKT, PO2ART, DLQ2NYJ, E7PIMQZL, BEART, SOURCE  SpO2: SpO2: 98 %    Intake and Outputs:  I/O       701 -  07 -  07    I V  (mL/kg) 3366 7 (33 7) 5366 7 (53 8)    NG/GT  135    IV Piggyback  4664  7    Total Intake(mL/kg) 5366 7 (53 8) 7860 4 (78 8)    Urine (mL/kg/hr) 1000 1635 (0 7)    Emesis/NG output  450    Total Output 1000 2085    Net +4366 7 +5775 4              UOP: 1 64 for 24 hours  Nutrition:        Diet Orders            Start     Ordered    01/05/19 2262  Diet Enteral/Parenteral; Tube Feeding No Oral Diet; Jevity 1 2 Daquan; Continuous; 10  Diet effective now     Comments:  Start trickle feeds at 10 cc/hr, call physician for residuals >350   Question Answer Comment   Diet Type Enteral/Parenteral    Enteral/Parenteral Tube Feeding No Oral Diet    Tube Feeding Formula: Jevity 1 2 Daquan    Bolus/Cyclic/Continuous Continuous    Tube Feeding Goal Rate (mL/hr): 10    RD to adjust diet per protocol? Yes        01/05/19 0845          Labs:     Results from last 7 days  Lab Units 01/07/19 0443 01/06/19 0427 01/05/19  0314 01/05/19  0259 01/04/19  1348  01/03/19  0744   WBC Thousand/uL 6 15 3 60*  --  3 34* 2 09*  < > 11 35*   HEMOGLOBIN g/dL 12 5 11 8*  --  16 1 15 1  < > 15 8   I STAT HEMOGLOBIN g/dl  --   --  15 3  --   --   --   --    HEMATOCRIT % 36 4* 35 3*  --  47 8 46 4  < > 46 1   HEMATOCRIT, ISTAT %  --   --  45  --   --   --   --    PLATELETS Thousands/uL 101* 87*  --  138* 139*  < > 162   NEUTROS PCT %  --   --   --   --   --   --  88*   MONOS PCT %  --   --   --   --   --   --  7   MONO PCT %  --  6  --  6 3*  < >  --    < > = values in this interval not displayed       Results from last 7 days  Lab Units 01/07/19 0443 01/06/19  0427 01/05/19  0314 01/05/19  0259   SODIUM mmol/L 138 141  --  140   POTASSIUM mmol/L 3 8 4 0  --  4 7   CHLORIDE mmol/L 106 109*  --  109*   CO2 mmol/L 25 25  --  23   CO2, I-STAT mmol/L  --   --  24  --    BUN mg/dL 40* 42*  --  37*   CREATININE mg/dL 1 65* 2 18*  --  1 67*   CALCIUM mg/dL 7 4* 7 0*  --  6 9*   ALK PHOS U/L 38* 26*  --  29*   ALT U/L 132* 202*  --  420*   AST U/L 76* 96*  --  236*   GLUCOSE, ISTAT mg/dl  --   --  146*  --        Results from last 7 days  Lab Units 01/05/19  0259 01/04/19  1348   MAGNESIUM mg/dL 2 1 1 9 Results from last 7 days  Lab Units 01/05/19  0259 01/04/19  1348   PHOSPHORUS mg/dL 2 9 2 6*            Results from last 7 days  Lab Units 01/07/19  0443   LACTIC ACID mmol/L 1 7       0  Lab Value Date/Time   TROPONINI <0 02 01/03/2019 0818       Imaging: Xr Chest Portable    Result Date: 1/4/2019  Impression: Left greater than right pleural effusions  Pulmonary vascular congestive failure  Satisfactory positioning of endotracheal and enteric tubes  Workstation performed: [id number]     Xr Chest 2 Views    Result Date: 1/3/2019  Impression: No active pulmonary disease on examination which is somewhat limited secondary to low lung volumes  Workstation performed: [id number]     Fl Ercp Biliary Only    Result Date: 1/4/2019  Impression: Fluoroscopic guidance provided for surgical procedure  Please refer to the separate procedure notes for additional details  Workstation performed: [id number]     Ct Abdomen Pelvis W Contrast    Result Date: 1/3/2019  Impression: Acute interstitial edematous pancreatitis with extensive pancreatic and peripancreatic inflammatory edema  Within the limitations imposed by respiratory motion throughout the upper abdomen there is no convincing evidence of pancreatic necrosis or underlying pancreatic mass  Nonetheless, when better pain control can be achieved and the patient is able to tolerate better breath-hold imaging, more accurate characterization utilizing pancreatic MRI with MRCP is recommended for further investigation of the possible etiology and complications of acute pancreatitis  The study was marked in [facility] for immediate notification  Workstation performed: [id number]  I have personally reviewed pertinent reports        EKG: N/A    Micro:  Lab Results   Component Value Date    BLOODCX No Growth at 48 hrs  01/04/2019    BLOODCX No Growth at 48 hrs  01/04/2019       Allergies: No Known Allergies    Medications:   Scheduled Meds:    Current Facility-Administered Medications:  acetaminophen 650 mg Oral Q6H PRN Chucky Benton    amiodarone 1 mg/min Intravenous Continuous Reza Ornelas PA-C Last Rate: 1 mg/min (01/06/19 2237)   chlorhexidine 15 mL Swish [employer] Spit Q12H [address] Fawn Zheng PA-C    dexmedetomidine 0 1-0 7 mcg/kg/hr Intravenous Titrated [place] Mg MD Last Rate: 0 5 mcg/kg/hr (01/07/19 0448)   enoxaparin 40 mg Subcutaneous Daily Pillo Ingram PA-C    fentaNYL 75 mcg/hr Intravenous Continuous [place] Mg MD Last Rate: 75 mcg/hr (01/06/19 2225)   hydrocortisone sodium succinate 100 mg Intravenous [facility] Salinas Mg MD    metoprolol 5 mg Intravenous Q6H [place] gM MD    multi-electrolyte 200 mL/hr Intravenous Continuous [place] Mg MD Last Rate: 200 mL/hr (01/06/19 1307)   multi-electrolyte 1,000 mL Intravenous Once Reza Ornelas PA-C Last Rate: Stopped (01/05/19 1300)   norepinephrine 1-30 mcg/min Intravenous Titrated Courtney Hackett MD Last Rate: Stopped (01/06/19 0917)   ondansetron 4 mg Intravenous Q6H PRN Pillo Ingram PA-C    perflutren lipid microsphere 0 8 mL/min Intravenous Once in imaging Gabi Diaz MD    phenylephine  mcg/min Intravenous Titrated [place] Mg MD Last Rate: 40 mcg/min (01/06/19 1814)   piperacillin-tazobactam 3 375 g Intravenous Q6H Ying Gann PA-C Last Rate: 3 375 g (01/07/19 0512)   propofol 5-50 mcg/kg/min Intravenous Titrated [place] Mg MD Last Rate: Stopped (01/06/19 0706)   vasopressin (PITRESSIN) in 0 9 % sodium chloride 100 mL 0 04 Units/min Intravenous Continuous Courtney Hackett MD Last Rate: Stopped (01/06/19 1357)     Continuous Infusions:    amiodarone 1 mg/min Last Rate: 1 mg/min (01/06/19 2237)   dexmedetomidine 0 1-0 7 mcg/kg/hr Last Rate: 0 5 mcg/kg/hr (01/07/19 2528)   fentaNYL 75 mcg/hr Last Rate: 75 mcg/hr (01/06/19 6840)   multi-electrolyte 200 mL/hr Last Rate: 200 mL/hr (01/06/19 1307)   norepinephrine 1-30 mcg/min Last Rate: Stopped (01/06/19 0921) phenylephine  mcg/min Last Rate: 40 mcg/min (01/06/19 1814)   propofol 5-50 mcg/kg/min Last Rate: Stopped (01/06/19 0706)   vasopressin (PITRESSIN) in 0 9 % sodium chloride 100 mL 0 04 Units/min Last Rate: Stopped (01/06/19 1357)     PRN Meds:    acetaminophen 650 mg Q6H PRN   ondansetron 4 mg Q6H PRN   perflutren lipid microsphere 0 8 mL/min Once in imaging       VTE Pharmacologic Prophylaxis: Sequential compression device (Venodyne)  and Enoxaparin (Lovenox)  VTE Mechanical Prophylaxis: sequential compression device    Invasive lines and devices: Invasive Devices     Central Venous Catheter Line            CVC Central Lines 01/05/19 Triple 20cm 1 day          Arterial Line            Arterial Line 01/07/19 Left Radial less than 1 day          Drain            NG/OG/Enteral Tube Nasogastric 16 Fr Right nares 2 days    Urethral Catheter 16 Fr  2 days          Airway            ETT  Cuffed 8 mm 1 day                   Counseling / Coordination of Care  Total Critical Care time spent 25 minutes excluding procedures, teaching and family updates  Code Status: Level 1 - Full Code     Portions of the record may have been created with voice recognition software  Occasional wrong word or "sound a like" substitutions may have occurred due to the inherent limitations of voice recognition software  Read the chart carefully and recognize, using context, where substitutions have occurred       Akhil Matta MD

## 2019-01-07 NOTE — RESPIRATORY THERAPY NOTE
RT Ventilator Management Note  Isidro Wayne 60 y o  male MRN: [id number]  Unit/Bed#:  -01 Encounter: [id number]      Daily Screen       1/5/2019 1148 1/7/2019 0745          Patient safety screen outcome[de-identified] - Failed      Not Ready for Weaning due to[de-identified] - Underline problem not resolved              Physical Exam:   Assessment Type: Assess only  General Appearance: Drowsy, Lethargic  Respiratory Pattern: Assisted  Chest Assessment: Chest expansion symmetrical  Bilateral Breath Sounds: Diminished, Clear  Suction: ET Tube      Resp Comments: attempted sbt  Pt had increased resp rate with low TV  Pt placed back on a/c

## 2019-01-07 NOTE — PROGRESS NOTES
Progress Note - General Surgery   Homero Urena 60 y o  male MRN: [id number]  Unit/Bed#:  -01 Encounter: [id number]    Assessment:  60year old F with acute biliary pancreatitis, presumed cholangitis  s/p ERCP, new onset unstable afib    Plan:  Continue trickle tube feeds, slowly advance as tolerated  Amiodarone per ICU and cardiology  Serial abdominal exams  Trend endpoints  Antibiotics and supportive care per ICU    Subjective/Objective     Subjective: Multiple episodes of rapid afib requiring digoxin, amio load, amio gtt  Pressor requirements decreased and tolerative trickel tube feeds otherwise  Objective:    Blood pressure 116/89, pulse 84, temperature 99 3 °F (37 4 °C), resp  rate 20, height 6' 4" (1 93 m), weight 99 8 kg (220 lb), SpO2 97 %  ,Body mass index is 26 78 kg/m²        Intake/Output Summary (Last 24 hours) at 01/07/19 0830  Last data filed at 01/07/19 0600   Gross per 24 hour   Intake          6398 03 ml   Output             1250 ml   Net          5148 03 ml       Invasive Devices     Central Venous Catheter Line            CVC Central Lines 01/05/19 Triple 20cm 1 day          Arterial Line            Arterial Line 01/07/19 Left Radial less than 1 day          Drain            NG/OG/Enteral Tube Nasogastric 16 Fr Right nares 2 days    Urethral Catheter 16 Fr  2 days          Airway            ETT  Cuffed 8 mm 1 day                Physical Exam:   General: intubated and sedated, arousable  Eyes: PERRL  ENT: moist mucous membranes  Neck: ETT in place  CV: regular rate, sinus rhythm this morning  Chest: breath sounds bilaterally  Mechanically ventilated  Abdomen: soft, tender, distended without rigidity  Extremities: atraumatic, no edema        Results from last 7 days  Lab Units 01/07/19  0443 01/06/19  0427 01/05/19  0314 01/05/19  0259   WBC Thousand/uL 6 15 3 60*  --  3 34*   HEMOGLOBIN g/dL 12 5 11 8*  --  16 1   I STAT HEMOGLOBIN g/dl  --   --  15 3  --    HEMATOCRIT % 36 4* 35 3* --  47 8   HEMATOCRIT, ISTAT %  --   --  45  --    PLATELETS Thousands/uL 101* 87*  --  138*       Results from last 7 days  Lab Units 01/07/19  0443 01/06/19  0427 01/05/19  0314 01/05/19  0259   POTASSIUM mmol/L 3 8 4 0  --  4 7   CHLORIDE mmol/L 106 109*  --  109*   CO2 mmol/L 25 25  --  23   CO2, I-STAT mmol/L  --   --  24  --    BUN mg/dL 40* 42*  --  37*   CREATININE mg/dL 1 65* 2 18*  --  1 67*   GLUCOSE, ISTAT mg/dl  --   --  146*  --    CALCIUM mg/dL 7 4* 7 0*  --  6 9*

## 2019-01-08 LAB
ALBUMIN SERPL BCP-MCNC: 1.3 G/DL (ref 3.5–5)
ALP SERPL-CCNC: 78 U/L (ref 46–116)
ALT SERPL W P-5'-P-CCNC: 100 U/L (ref 12–78)
ANION GAP SERPL CALCULATED.3IONS-SCNC: 5 MMOL/L (ref 4–13)
APTT PPP: 32 SECONDS (ref 26–38)
APTT PPP: 35 SECONDS (ref 26–38)
APTT PPP: 36 SECONDS (ref 26–38)
APTT PPP: 45 SECONDS (ref 26–38)
AST SERPL W P-5'-P-CCNC: 81 U/L (ref 5–45)
BASOPHILS # BLD MANUAL: 0 THOUSAND/UL (ref 0–0.1)
BASOPHILS NFR MAR MANUAL: 0 % (ref 0–1)
BILIRUB SERPL-MCNC: 0.8 MG/DL (ref 0.2–1)
BUN SERPL-MCNC: 39 MG/DL (ref 5–25)
CALCIUM SERPL-MCNC: 7.1 MG/DL (ref 8.3–10.1)
CHLORIDE SERPL-SCNC: 107 MMOL/L (ref 100–108)
CO2 SERPL-SCNC: 27 MMOL/L (ref 21–32)
CREAT SERPL-MCNC: 1.4 MG/DL (ref 0.6–1.3)
EOSINOPHIL # BLD MANUAL: 0 THOUSAND/UL (ref 0–0.4)
EOSINOPHIL NFR BLD MANUAL: 0 % (ref 0–6)
ERYTHROCYTE [DISTWIDTH] IN BLOOD BY AUTOMATED COUNT: 14.2 % (ref 11.6–15.1)
GFR SERPL CREATININE-BSD FRML MDRD: 55 ML/MIN/1.73SQ M
GLUCOSE SERPL-MCNC: 191 MG/DL (ref 65–140)
GLUCOSE SERPL-MCNC: 196 MG/DL (ref 65–140)
GLUCOSE SERPL-MCNC: 227 MG/DL (ref 65–140)
GLUCOSE SERPL-MCNC: 233 MG/DL (ref 65–140)
GLUCOSE SERPL-MCNC: 242 MG/DL (ref 65–140)
HCT VFR BLD AUTO: 32.7 % (ref 36.5–49.3)
HGB BLD-MCNC: 11.2 G/DL (ref 12–17)
LIPASE SERPL-CCNC: 185 U/L (ref 73–393)
LYMPHOCYTES # BLD AUTO: 0.47 THOUSAND/UL (ref 0.6–4.47)
LYMPHOCYTES # BLD AUTO: 5 % (ref 14–44)
MAGNESIUM SERPL-MCNC: 3.1 MG/DL (ref 1.6–2.6)
MCH RBC QN AUTO: 30.4 PG (ref 26.8–34.3)
MCHC RBC AUTO-ENTMCNC: 34.3 G/DL (ref 31.4–37.4)
MCV RBC AUTO: 89 FL (ref 82–98)
METAMYELOCYTES NFR BLD MANUAL: 1 % (ref 0–1)
MONOCYTES # BLD AUTO: 0.37 THOUSAND/UL (ref 0–1.22)
MONOCYTES NFR BLD: 4 % (ref 4–12)
NEUTROPHILS # BLD MANUAL: 8.42 THOUSAND/UL (ref 1.85–7.62)
NEUTS BAND NFR BLD MANUAL: 1 % (ref 0–8)
NEUTS SEG NFR BLD AUTO: 89 % (ref 43–75)
NRBC BLD AUTO-RTO: 0 /100 WBCS
NRBC BLD AUTO-RTO: 1 /100 WBC (ref 0–2)
PHOSPHATE SERPL-MCNC: 1.2 MG/DL (ref 2.7–4.5)
PHOSPHATE SERPL-MCNC: 1.3 MG/DL (ref 2.7–4.5)
PLATELET # BLD AUTO: 80 THOUSANDS/UL (ref 149–390)
PLATELET BLD QL SMEAR: ABNORMAL
PMV BLD AUTO: 10.6 FL (ref 8.9–12.7)
POTASSIUM SERPL-SCNC: 3.5 MMOL/L (ref 3.5–5.3)
POTASSIUM SERPL-SCNC: 4 MMOL/L (ref 3.5–5.3)
PROT SERPL-MCNC: 4.5 G/DL (ref 6.4–8.2)
RBC # BLD AUTO: 3.69 MILLION/UL (ref 3.88–5.62)
RBC MORPH BLD: NORMAL
SODIUM SERPL-SCNC: 139 MMOL/L (ref 136–145)
WBC # BLD AUTO: 9.35 THOUSAND/UL (ref 4.31–10.16)

## 2019-01-08 PROCEDURE — 94003 VENT MGMT INPAT SUBQ DAY: CPT | Performed by: SOCIAL WORKER

## 2019-01-08 PROCEDURE — 99291 CRITICAL CARE FIRST HOUR: CPT | Performed by: SURGERY

## 2019-01-08 PROCEDURE — 83690 ASSAY OF LIPASE: CPT | Performed by: SURGERY

## 2019-01-08 PROCEDURE — 80053 COMPREHEN METABOLIC PANEL: CPT | Performed by: SURGERY

## 2019-01-08 PROCEDURE — 85730 THROMBOPLASTIN TIME PARTIAL: CPT | Performed by: EMERGENCY MEDICINE

## 2019-01-08 PROCEDURE — 94760 N-INVAS EAR/PLS OXIMETRY 1: CPT | Performed by: SOCIAL WORKER

## 2019-01-08 PROCEDURE — 85730 THROMBOPLASTIN TIME PARTIAL: CPT | Performed by: INTERNAL MEDICINE

## 2019-01-08 PROCEDURE — 84132 ASSAY OF SERUM POTASSIUM: CPT | Performed by: EMERGENCY MEDICINE

## 2019-01-08 PROCEDURE — 99232 SBSQ HOSP IP/OBS MODERATE 35: CPT | Performed by: INTERNAL MEDICINE

## 2019-01-08 PROCEDURE — 85007 BL SMEAR W/DIFF WBC COUNT: CPT | Performed by: SURGERY

## 2019-01-08 PROCEDURE — 85027 COMPLETE CBC AUTOMATED: CPT | Performed by: SURGERY

## 2019-01-08 PROCEDURE — 82948 REAGENT STRIP/BLOOD GLUCOSE: CPT

## 2019-01-08 PROCEDURE — 83735 ASSAY OF MAGNESIUM: CPT | Performed by: SURGERY

## 2019-01-08 PROCEDURE — 84100 ASSAY OF PHOSPHORUS: CPT | Performed by: SURGERY

## 2019-01-08 PROCEDURE — 84100 ASSAY OF PHOSPHORUS: CPT | Performed by: EMERGENCY MEDICINE

## 2019-01-08 PROCEDURE — 94760 N-INVAS EAR/PLS OXIMETRY 1: CPT

## 2019-01-08 RX ORDER — FUROSEMIDE 10 MG/ML
20 INJECTION INTRAMUSCULAR; INTRAVENOUS ONCE
Status: COMPLETED | OUTPATIENT
Start: 2019-01-08 | End: 2019-01-08

## 2019-01-08 RX ORDER — AMIODARONE HYDROCHLORIDE 200 MG/1
200 TABLET ORAL
Status: DISCONTINUED | OUTPATIENT
Start: 2019-01-08 | End: 2019-01-13

## 2019-01-08 RX ADMIN — HEPARIN SODIUM 4000 UNITS: 1000 INJECTION, SOLUTION INTRAVENOUS; SUBCUTANEOUS at 03:40

## 2019-01-08 RX ADMIN — DEXMEDETOMIDINE 0.7 MCG/KG/HR: 100 INJECTION, SOLUTION, CONCENTRATE INTRAVENOUS at 16:41

## 2019-01-08 RX ADMIN — DEXMEDETOMIDINE 0.7 MCG/KG/HR: 100 INJECTION, SOLUTION, CONCENTRATE INTRAVENOUS at 20:19

## 2019-01-08 RX ADMIN — HYDROCORTISONE SODIUM SUCCINATE 50 MG: 100 INJECTION, POWDER, FOR SOLUTION INTRAMUSCULAR; INTRAVENOUS at 20:28

## 2019-01-08 RX ADMIN — FENTANYL CITRATE 75 MCG/HR: 50 INJECTION, SOLUTION INTRAMUSCULAR; INTRAVENOUS at 08:40

## 2019-01-08 RX ADMIN — AMIODARONE HYDROCHLORIDE 200 MG: 200 TABLET ORAL at 12:05

## 2019-01-08 RX ADMIN — POTASSIUM PHOSPHATE, MONOBASIC AND POTASSIUM PHOSPHATE, DIBASIC 30 MMOL: 224; 236 INJECTION, SOLUTION INTRAVENOUS at 11:58

## 2019-01-08 RX ADMIN — HEPARIN SODIUM 4000 UNITS: 1000 INJECTION, SOLUTION INTRAVENOUS; SUBCUTANEOUS at 18:25

## 2019-01-08 RX ADMIN — FUROSEMIDE 20 MG: 10 INJECTION, SOLUTION INTRAVENOUS at 11:00

## 2019-01-08 RX ADMIN — INSULIN LISPRO 2 UNITS: 100 INJECTION, SOLUTION INTRAVENOUS; SUBCUTANEOUS at 14:31

## 2019-01-08 RX ADMIN — INSULIN LISPRO 2 UNITS: 100 INJECTION, SOLUTION INTRAVENOUS; SUBCUTANEOUS at 23:27

## 2019-01-08 RX ADMIN — HEPARIN SODIUM AND DEXTROSE 19.1 UNITS/KG/HR: 10000; 5 INJECTION INTRAVENOUS at 12:06

## 2019-01-08 RX ADMIN — DEXTROSE MONOHYDRATE 1 MG/MIN: 50 INJECTION, SOLUTION INTRAVENOUS at 05:43

## 2019-01-08 RX ADMIN — HEPARIN SODIUM 4000 UNITS: 1000 INJECTION, SOLUTION INTRAVENOUS; SUBCUTANEOUS at 11:02

## 2019-01-08 RX ADMIN — METOPROLOL TARTRATE 5 MG: 5 INJECTION, SOLUTION INTRAVENOUS at 20:28

## 2019-01-08 RX ADMIN — METOPROLOL TARTRATE 5 MG: 5 INJECTION, SOLUTION INTRAVENOUS at 14:33

## 2019-01-08 RX ADMIN — HYDROMORPHONE HYDROCHLORIDE 0.5 MG: 1 INJECTION, SOLUTION INTRAMUSCULAR; INTRAVENOUS; SUBCUTANEOUS at 07:17

## 2019-01-08 RX ADMIN — PIPERACILLIN SODIUM AND TAZOBACTAM SODIUM 3.38 G: 36; 4.5 INJECTION, POWDER, FOR SOLUTION INTRAVENOUS at 12:04

## 2019-01-08 RX ADMIN — HYDROMORPHONE HYDROCHLORIDE 0.5 MG: 1 INJECTION, SOLUTION INTRAMUSCULAR; INTRAVENOUS; SUBCUTANEOUS at 19:36

## 2019-01-08 RX ADMIN — FUROSEMIDE 20 MG: 10 INJECTION, SOLUTION INTRAVENOUS at 14:21

## 2019-01-08 RX ADMIN — PIPERACILLIN SODIUM AND TAZOBACTAM SODIUM 3.38 G: 36; 4.5 INJECTION, POWDER, FOR SOLUTION INTRAVENOUS at 00:05

## 2019-01-08 RX ADMIN — HYDROCORTISONE SODIUM SUCCINATE 100 MG: 100 INJECTION, POWDER, FOR SOLUTION INTRAMUSCULAR; INTRAVENOUS at 05:39

## 2019-01-08 RX ADMIN — FUROSEMIDE 20 MG: 10 INJECTION, SOLUTION INTRAMUSCULAR; INTRAVENOUS at 18:15

## 2019-01-08 RX ADMIN — CHLORHEXIDINE GLUCONATE 0.12% ORAL RINSE 15 ML: 1.2 LIQUID ORAL at 08:38

## 2019-01-08 RX ADMIN — DEXMEDETOMIDINE 0.7 MCG/KG/HR: 100 INJECTION, SOLUTION, CONCENTRATE INTRAVENOUS at 13:18

## 2019-01-08 RX ADMIN — CHLORHEXIDINE GLUCONATE 0.12% ORAL RINSE 15 ML: 1.2 LIQUID ORAL at 20:28

## 2019-01-08 RX ADMIN — PIPERACILLIN SODIUM AND TAZOBACTAM SODIUM 3.38 G: 36; 4.5 INJECTION, POWDER, FOR SOLUTION INTRAVENOUS at 05:39

## 2019-01-08 RX ADMIN — METOPROLOL TARTRATE 5 MG: 5 INJECTION, SOLUTION INTRAVENOUS at 08:39

## 2019-01-08 RX ADMIN — DEXMEDETOMIDINE 0.4 MCG/KG/HR: 100 INJECTION, SOLUTION, CONCENTRATE INTRAVENOUS at 02:07

## 2019-01-08 RX ADMIN — HYDROMORPHONE HYDROCHLORIDE 0.5 MG: 1 INJECTION, SOLUTION INTRAMUSCULAR; INTRAVENOUS; SUBCUTANEOUS at 00:46

## 2019-01-08 RX ADMIN — DEXMEDETOMIDINE 0.5 MCG/KG/HR: 100 INJECTION, SOLUTION, CONCENTRATE INTRAVENOUS at 08:50

## 2019-01-08 RX ADMIN — HYDROMORPHONE HYDROCHLORIDE 0.5 MG: 1 INJECTION, SOLUTION INTRAMUSCULAR; INTRAVENOUS; SUBCUTANEOUS at 16:33

## 2019-01-08 RX ADMIN — INSULIN LISPRO 2 UNITS: 100 INJECTION, SOLUTION INTRAVENOUS; SUBCUTANEOUS at 17:08

## 2019-01-08 RX ADMIN — AMIODARONE HYDROCHLORIDE 200 MG: 200 TABLET ORAL at 16:33

## 2019-01-08 RX ADMIN — HYDROMORPHONE HYDROCHLORIDE 0.5 MG: 1 INJECTION, SOLUTION INTRAMUSCULAR; INTRAVENOUS; SUBCUTANEOUS at 12:49

## 2019-01-08 RX ADMIN — METOPROLOL TARTRATE 5 MG: 5 INJECTION, SOLUTION INTRAVENOUS at 03:43

## 2019-01-08 RX ADMIN — PIPERACILLIN SODIUM AND TAZOBACTAM SODIUM 3.38 G: 36; 4.5 INJECTION, POWDER, FOR SOLUTION INTRAVENOUS at 17:08

## 2019-01-08 NOTE — RESPIRATORY THERAPY NOTE
resp care      01/08/19 1142   Respiratory Assessment   Resp Comments pt started on psv wean 10  will cont as fiorella      ETT  Cuffed 8 mm   Placement Date/Time: 01/05/19 (c) 1226   Type: Cuffed  Tube Size: 8 mm  Location: Oral  Insertion attempts: 1  Placement Verification: Chest x-ray;End tidal CO2;Symmetrical chest wall movement  Secured at (cm): 24   Secured at (cm) 24   Measured from Lips   Secured Location Right   Additional Assessments   SpO2 93 %

## 2019-01-08 NOTE — RESPIRATORY THERAPY NOTE
RT Ventilator Management Note  Odette Chapman 60 y o  male MRN: [id number]  Unit/Bed#: 3150 [address] -01 Encounter: [id number]      Daily Screen       1/7/2019 0745 1/8/2019 0729          Patient safety screen outcome[de-identified] Failed Failed      Not Ready for Weaning due to[de-identified] Underline problem not resolved PEEP > 8cmH2O;Underline problem not resolved              Physical Exam:   Assessment Type: Assess only  General Appearance: Drowsy  Respiratory Pattern: Assisted  Chest Assessment: Chest expansion symmetrical  Bilateral Breath Sounds: Rhonchi  O2 Device: vent      Resp Comments: Pt agitated with low sats and increased bp after sx  Pt still has mod  thick brown secretions  SBT not started

## 2019-01-08 NOTE — PROGRESS NOTES
Transferred pt into new bed it is a [facility] bed we were going to stand him and sit him in chair but he became very agitated and was pushing us away and refused to turn to be cleaned up as he was incontinent of stool  We abandoned standing or even sitting on bediside and placed him into total lift chair and then changed beds out   Pt was very agitated and tried several times to extubate himself

## 2019-01-08 NOTE — PROGRESS NOTES
Cardiology  Unit/Bed#: 3150 [facility] ICU  Encounter: [id number]        Dallin Amado 60 y o  male [id number]  Hospital Stay Days: 5    Assessment and Plan      Current Problem List   Principal Problem:    Acute pancreatitis  Active Problems:    Pancreatitis    Assessment/Plan:         1  A-Fib with RVR - patient now has reconverted to sinus rhythm              overnight  ? Will transition to p o  Amiodarone today, continue amiodarone drip  ? Echo showed systolic function, EF of 30%, moderatly dilated  Atrium bilaterally moderately dilated  Mitral valve within normal limits  Aortic valve WNL  Mild tricuspid regurg  IVC moderately dilated  ? Low pressor PRN for rate control  2   Severe Pancreatitis  ? S/P ERCP sphincterotomy             3  Possible Ascending cholangitis/Aspiration PNA  ? Per ICU team            Subjective     Patient seen and examined at bedside  Patient is currently intubated  Overnight it appears that patient reconverted to sinus rhythm  Patient amiodarone drip still at 1  Will transition patient to p o  Amiodarone today  Patient had 2 episodes of bradycardia on telemetry overnight  Patient is now in sinus rhythm     Objective     Vitals: Temp (24hrs), Av °F (37 2 °C), Min:98 2 °F (36 8 °C), Max:99 3 °F (37 4 °C)  Current: Temperature: 98 2 °F (36 8 °C)  Patient Vitals for the past 24 hrs:   Temp Temp src Pulse Resp SpO2   19 1000 98 2 °F (36 8 °C) Probe 64 21 93 %   19 0900 98 6 °F (37 °C) Probe 68 (!) 24 92 %   19 0800 99 °F (37 2 °C) Probe 86 (!) 29 91 %   19 0600 99 °F (37 2 °C) Probe 80 (!) 24 93 %   19 0500 99 °F (37 2 °C) Probe 74 22 93 %   19 0400 98 6 °F (37 °C) Probe 60 22 94 %   19 0354 - - - - 93 %   19 0345 98 6 °F (37 °C) - 74 (!) 23 93 %   19 0300 98 6 °F (37 °C) - 70 20 93 %   19 0200 99 °F (37 2 °C) Probe 58 21 91 %   19 0100 99 3 °F (37 4 °C) - 78 22 92 %   19 0046 - - 74 (!) 25 93 %   01/08/19 0035 - - - - 92 %   01/08/19 0000 99 °F (37 2 °C) - 70 22 90 %   01/07/19 2300 99 °F (37 2 °C) - 72 20 91 %   01/07/19 2200 99 °F (37 2 °C) - 58 21 93 %   01/07/19 2100 99 °F (37 2 °C) Probe 56 20 91 %   01/07/19 2045 99 °F (37 2 °C) - (!) 54 20 91 %   01/07/19 2030 99 °F (37 2 °C) - 64 21 93 %   01/07/19 2015 98 6 °F (37 °C) - 62 22 92 %   01/07/19 2004 - - - - 95 %   01/07/19 2000 98 6 °F (37 °C) Probe (!) 54 20 91 %   01/07/19 1915 99 °F (37 2 °C) Probe 60 20 93 %   01/07/19 1900 - Probe - - -   01/07/19 1800 99 3 °F (37 4 °C) Probe (!) 50 20 91 %   01/07/19 1756 99 3 °F (37 4 °C) - (!) 52 21 92 %   01/07/19 1737 99 3 °F (37 4 °C) - 78 20 94 %   01/07/19 1700 99 3 °F (37 4 °C) - 80 20 93 %   01/07/19 1600 99 3 °F (37 4 °C) - 82 20 96 %   01/07/19 1556 - - - - 96 %   01/07/19 1551 99 3 °F (37 4 °C) - 82 20 95 %   01/07/19 1500 99 3 °F (37 4 °C) - 80 20 95 %   01/07/19 1400 99 3 °F (37 4 °C) - 78 20 94 %   01/07/19 1300 99 3 °F (37 4 °C) - 94 22 96 %   01/07/19 1203 - - - - 96 %   01/07/19 1200 99 3 °F (37 4 °C) - 82 20 96 %   01/07/19 1100 99 3 °F (37 4 °C) - 80 20 96 %    Body mass index is 26 78 kg/m²  Physical Exam:  GENERAL:  More awake today appears slightly agitated  HEENT:  Patient intubated  CARDIAC:  RRR, +S1/S2, no S3/S4 heard, no m/g/r  PULMONARY:  CTA B/L, no wheezing/rales/rhonci, non-labored breathing  ABDOMEN:  Soft, NT/ND,no rebound/guarding/rigidity  Extremities:  No edema, cyanosis, or clubbing  SKIN:  No rashes or erythema noted on exposed skin     Psych: Normal affect    Invasive Devices     Central Venous Catheter Line            CVC Central Lines 01/05/19 Triple 20cm 2 days          Arterial Line            Arterial Line 01/07/19 Left Radial 1 day          Drain            NG/OG/Enteral Tube Nasogastric 16 Fr Right nares 3 days    Urethral Catheter 16 Fr  3 days          Airway            ETT  Cuffed 8 mm 3 days                    Labs:     Results from last 7 days  Lab Units 01/08/19  0534 01/07/19 1955 01/07/19 0443 01/06/19 0427 01/05/19  0314 01/05/19  0259 01/04/19  1348 01/04/19  0435 01/03/19  1429 01/03/19  0744   WBC Thousand/uL 9 35 6 77 6 15 3 60*  --  3 34* 2 09* 3 53*  --  11 35*   HEMOGLOBIN g/dL 11 2* 11 4* 12 5 11 8*  --  16 1 15 1 17 3*  --  15 8   I STAT HEMOGLOBIN g/dl  --   --   --   --  15 3  --   --   --   --   --    HEMATOCRIT % 32 7* 33 7* 36 4* 35 3*  --  47 8 46 4 51 5*  --  46 1   HEMATOCRIT, ISTAT %  --   --   --   --  45  --   --   --   --   --    PLATELETS Thousands/uL 80* 79* 101* 87*  --  138* 139* 160 152 162   NEUTROS PCT %  --   --   --   --   --   --   --   --   --  88*   MONOS PCT %  --   --   --   --   --   --   --   --   --  7   MONO PCT % 4  --  1* 6  --  6 3* 7  --   --       Results from last 7 days  Lab Units 01/08/19  0946 01/08/19  0534 01/08/19  0204 01/07/19 1955 01/07/19 0443 01/06/19 0427 01/05/19 0314 01/05/19  0259 01/04/19  1348 01/04/19  0435 01/03/19  0818 01/03/19  0744   SODIUM mmol/L  --  139  --   --  138 141  --  140 138 138  --  137   POTASSIUM mmol/L  --  4 0  --   --  3 8 4 0  --  4 7 5 0 4 6  --  3 5   CHLORIDE mmol/L  --  107  --   --  106 109*  --  109* 110* 109*  --  106   CO2 mmol/L  --  27  --   --  25 25  --  23 22 22  --  26   CO2, I-STAT mmol/L  --   --   --   --   --   --  24  --   --   --   --   --    BUN mg/dL  --  39*  --   --  40* 42*  --  37* 30* 26*  --  24   CREATININE mg/dL  --  1 40*  --   --  1 65* 2 18*  --  1 67* 1 72* 1 38*  --  1 26   CALCIUM mg/dL  --  7 1*  --   --  7 4* 7 0*  --  6 9* 6 7* 6 7*  --  8 4   ALK PHOS U/L  --  78  --   --  38* 26*  --  29* 34* 48  --  80   ALT U/L  --  100*  --   --  132* 202*  --  420* 324* 144*  --  97*   AST U/L  --  81*  --   --  76* 96*  --  236* 255* 103*  --  94*   GLUCOSE, ISTAT mg/dl  --   --   --   --   --   --  146*  --   --   --   --   --    TROPONIN I ng/mL  --   --   --   --   --   --   --   --   --   --  <0 02  --    MAGNESIUM mg/dL  --  3 1*  -- --   --   --   --  2 1 1 9  --   --   --    PHOSPHORUS mg/dL  --  1 3*  --   --   --   --   --  2 9 2 6*  --   --   --    INR   --   --   --  1 03  --   --   --   --   --   --   --   --    PTT seconds 35  --  32 28  --   --   --   --   --   --   --   --    EGFR ml/min/1 73sq m  --  55  --   --  45 32  --  44 43 56  --  62       Results from last 7 days  Lab Units 01/08/19  0946 01/08/19  0204 01/07/19  1955   INR   --   --  1 03   PTT seconds 35 32 28       Results from last 7 days  Lab Units 01/07/19  0443   LACTIC ACID mmol/L 1 7       Results from last 7 days  Lab Units 01/03/19  0818   TROPONIN I ng/mL <0 02     No results found for: PHART, FYT0XFK, PO2ART, CZE4MYA, T7KXHFIT, BEART, SOURCE  No components found for: HIV1X2  No results found for: HAV, HEPAIGM, HEPBIGM, HEPBCAB, HBEAG, HEPCAB  No results found for: SPEP, UPEP No results found for: HGBA1C  No results found for: CHOL Lab Results   Component Value Date    HDL 59 01/03/2019    Lab Results   Component Value Date    LDLCALC 77 01/03/2019    Lab Results   Component Value Date    TRIG 52 01/03/2019     No components found for: PROCAL      Micro:    Results from last 7 days  Lab Units 01/04/19  1044   BLOOD CULTURE  No Growth at 72 hrs  No Growth at 72 hrs  Urinalysis:  No results found for: AMPHETUR, BDZUR, COCAINEUR, OPIATEUR, PCPUR, THCUR, ETOH, ACTMNPHEN, SALICYLATE       Invalid input(s): URIBILINOGEN        Intake and Outputs:  I/O       01/06 0701 - 01/07 0700 01/07 0701 - 01/08 0700 01/08 0701 - 01/09 0700    P  O   0     I V  (mL/kg) 7266 7 (72 8) 2477 1 (24 8) 140 6 (1 4)    NG/GT  20     IV Piggyback 1150 100     Feedings 212 912 142    Total Intake(mL/kg) 8628 7 (86 5) 3509 1 (35 2) 282 6 (2 8)    Urine (mL/kg/hr) 1375 (0 6) 1550 (0 6) 225 (0 6)    Emesis/NG output  0     Total Output 1375 1550 225    Net +7253 7 +1959 1 +57 6               Nutrition:  Diet Enteral/Parenteral; Tube Feeding No Oral Diet; Jevity 1 5; Continuous; 71  Radiology Results:   XR chest portable   Final Result by Allyson Abraham MD (01/05 1400)   Left IJ catheter tip in the SVC  No pneumothorax  Central vascular prominence with bibasilar opacities and moderate to large bilateral pleural effusions  Workstation performed: [id number]         XR chest portable   Final Result by Allyson Abraham MD (01/05 1359)      Central vascular prominence with bibasilar opacities and moderate to large bilateral pleural effusions  Workstation performed: [id number]         XR chest portable   Final Result by Herminia Urena MD (01/05 1253)      Increasing large left pleural effusion  Pulmonary vascular congestive changes  Workstation performed: [id number]         XR chest portable   Final Result by Herminia Urena MD (01/04 1511)      Left greater than right pleural effusions  Pulmonary vascular congestive failure  Satisfactory positioning of endotracheal and enteric tubes  Workstation performed: [id number]         FL ERCP biliary only   Final Result by Devyn Maher MD (01/04 1347)      Fluoroscopic guidance provided for surgical procedure  Please refer to the separate procedure notes for additional details  Workstation performed: [id number]         XR chest 2 views   Final Result by Maria Ines Duenas DO (01/03 1033)   No active pulmonary disease on examination which is somewhat limited secondary to low lung volumes  Workstation performed: [id number]         CT abdomen pelvis w contrast   Final Result by Herminia Urena MD (01/03 0833)      Acute interstitial edematous pancreatitis with extensive pancreatic and peripancreatic inflammatory edema  Within the limitations imposed by respiratory motion throughout the upper abdomen there is no convincing evidence of pancreatic necrosis or    underlying pancreatic mass    Nonetheless, when better pain control can be achieved and the patient is able to tolerate better breath-hold imaging, more accurate characterization utilizing pancreatic MRI with MRCP is recommended for further investigation    of the possible etiology and complications of acute pancreatitis  The study was marked in [facility] for immediate notification        Workstation performed: [id number]           Scheduled Medications:    amiodarone 200 mg TID With Meals   chlorhexidine 15 mL Q12H [address]   furosemide 20 mg Once   hydrocortisone sodium succinate 50 mg Q12H [address]   insulin lispro 1-6 Units Q6H [address]   metoprolol 5 mg Q6H   piperacillin-tazobactam 3 375 g Q6H   potassium phosphate 30 mmol Once     PRN MEDS:    acetaminophen 650 mg Q6H PRN   heparin (porcine) 2,000 Units PRN   heparin (porcine) 4,000 Units PRN   HYDROmorphone 0 5 mg Q3H PRN   ondansetron 4 mg Q6H PRN   perflutren lipid microsphere 0 8 mL/min Once in imaging     Last 24 Hour Meds: :   Medication Administration - last 24 hours from 01/07/2019 1047 to 01/08/2019 1047       Date/Time Order Dose Route Action Action by     01/08/2019 0539 piperacillin-tazobactam (ZOSYN) 3 375 g in sodium chloride 0 9 % 50 mL IVPB 3 375 g Intravenous New Bag Rene Rob, AMELIA     01/08/2019 0005 piperacillin-tazobactam (ZOSYN) 3 375 g in sodium chloride 0 9 % 50 mL IVPB 3 375 g Intravenous [employer], RN     01/07/2019 1746 piperacillin-tazobactam (ZOSYN) 3 375 g in sodium chloride 0 9 % 50 mL IVPB 0 g Intravenous Stopped Uday Acosta RN     01/07/2019 1711 piperacillin-tazobactam (ZOSYN) 3 375 g in sodium chloride 0 9 % 50 mL IVPB 3 375 g Intravenous [address] Uday Acosta RN     01/07/2019 1240 piperacillin-tazobactam (ZOSYN) 3 375 g in sodium chloride 0 9 % 50 mL IVPB 3 375 g Intravenous [address] Sharri Smalls RN     01/08/2019 0838 chlorhexidine (PERIDEX) 0 12 % oral rinse 15 mL 15 mL Swish & Spit Given Stefan Hamilton, [address] [facility]     01/07/2019 2023 chlorhexidine (PERIDEX) 0 12 % oral rinse 15 mL 15 mL Swish & Spit Given Shima Bui RN     01/07/2019 1116 multi-electrolyte (ISOLYTE-S PH 7 4 equivalent) IV solution 0 mL/hr Intravenous Stopped Jose James RN     01/08/2019 0840 fentaNYL 1250 mcg in sodium chloride 0 9% 125mL drip 75 mcg/hr Intravenous [address] [facility]     01/07/2019 1505 fentaNYL 1250 mcg in sodium chloride 0 9% 125mL drip 75 mcg/hr Intravenous [address] [facility]     01/08/2019 0543 amiodarone (CORDARONE) 900 mg in dextrose 5 % 500 mL infusion 1 mg/min Intravenous New Bag Jamey Joseph RN     01/07/2019 1410 amiodarone (CORDARONE) 900 mg in dextrose 5 % 500 mL infusion 1 mg/min Intravenous New Bag Jose James, AMELIA     01/07/2019 1800 phenylephrine (JAM-SYNEPHRINE) 50 mg (STANDARD CONCENTRATION) in sodium chloride 0 9% 250 mL 0 mcg/min Intravenous Stopped Aria Izquierdo RN     01/07/2019 1744 phenylephrine (JAM-SYNEPHRINE) 50 mg (STANDARD CONCENTRATION) in sodium chloride 0 9% 250 mL 10 mcg/min Intravenous Rate/Dose Change Aria Izquierdo RN     01/07/2019 1241 phenylephrine (JAM-SYNEPHRINE) 50 mg (STANDARD CONCENTRATION) in sodium chloride 0 9% 250 mL 15 mcg/min Intravenous Rate/Dose Change Jose James RN     01/08/2019 0850 dexmedetomidine (PRECEDEX) 200 mcg in sodium chloride 0 9 % 50 mL infusion 0 5 mcg/kg/hr Intravenous [address] Swati Pal RN     01/08/2019 0207 dexmedetomidine (PRECEDEX) 200 mcg in sodium chloride 0 9 % 50 mL infusion 0 4 mcg/kg/hr Intravenous New Bag Jignesh Nicole, AMELIA     01/07/2019 2048 dexmedetomidine (PRECEDEX) 200 mcg in sodium chloride 0 9 % 50 mL infusion 0 4 mcg/kg/hr Intravenous Rate/Dose Change Jignesh Nicole, AMELIA     01/07/2019 2038 dexmedetomidine (PRECEDEX) 200 mcg in sodium chloride 0 9 % 50 mL infusion 0 5 mcg/kg/hr Intravenous New Bag Jignesh Nicole, AMELIA     01/07/2019 1637 dexmedetomidine (PRECEDEX) 200 mcg in sodium chloride 0 9 % 50 mL infusion 0 5 mcg/kg/hr Intravenous [address] Aria Izquierdo RN     01/07/2019 1220 dexmedetomidine (PRECEDEX) 200 mcg in sodium chloride 0 9 % 50 mL infusion 0 5 mcg/kg/hr [facility]venous [facility]     01/08/2019 3283 hydrocortisone sodium succinate (PF) (Solu-CORTEF) injection 100 mg 100 mg Intravenous Given Angeline Trevizo, RN     01/07/2019 2111 hydrocortisone sodium succinate (PF) (Solu-CORTEF) injection 100 mg 100 mg Intravenous Given Silvia Lancaster, RN     01/07/2019 1459 hydrocortisone sodium succinate (PF) (Solu-CORTEF) injection 100 mg 100 mg Intravenous Given Radha Thurston, RN     01/08/2019 0839 metoprolol (LOPRESSOR) injection 5 mg 5 mg Intravenous Given Ivonne Argueta, RN     01/08/2019 0343 metoprolol (LOPRESSOR) injection 5 mg 5 mg Intravenous Given Silvia Canthor, RN     01/07/2019 2030 metoprolol (LOPRESSOR) injection 5 mg 5 mg Intravenous Not Given Silvia Canthor, RN     01/07/2019 1505 metoprolol (LOPRESSOR) injection 5 mg 5 mg Intravenous Given Radha Thurston, RN     01/08/2019 8909 HYDROmorphone (DILAUDID) injection 0 5 mg 0 5 mg Intravenous Given Angeline Trevizo, RN     01/08/2019 0046 HYDROmorphone (DILAUDID) injection 0 5 mg 0 5 mg Intravenous Given Silvia Lancaster, RN     01/07/2019 2008 HYDROmorphone (DILAUDID) injection 0 5 mg 0 5 mg Intravenous Given Silvia Canthor, RN     01/07/2019 1544 HYDROmorphone (DILAUDID) injection 0 5 mg 0 5 mg Intravenous Given Dean Naik, RN     01/07/2019 1202 HYDROmorphone (DILAUDID) injection 0 5 mg 0 5 mg Intravenous Given Miroslava Terri, RN     01/07/2019 1952 heparin (ACS LOW)   Intravenous Not Given Nelsy Galvez, RN     01/08/2019 9802 heparin (porcine) 25,000 units in 250 mL infusion (premix) 15 1 Units/kg/hr Intravenous Rate/Dose Change Silvia Lancaster, RN     01/07/2019 2003 heparin (porcine) 25,000 units in 250 mL infusion (premix) 11 1 Units/kg/hr Intravenous 500 Qamar Nicole, AMELIA     01/07/2019 1915 heparin (porcine) 25,000 units in 250 mL infusion (premix)   Intravenous Canceled Entry Silvia Lancaster RN     01/08/2019 0340 heparin (porcine) injection 4,000 Units 4,000 Units Intravenous Given Chayo Maxwell RN     01/08/2019 0723 insulin lispro (HumaLOG) 100 units/mL subcutaneous injection 1-6 Units 1 Units Subcutaneous Not Given Monty Choudhury RN     01/08/2019 8915 insulin lispro (HumaLOG) 100 units/mL subcutaneous injection 1-6 Units 1 Units Subcutaneous Not Given AMELIA Wiggins, DO    PLEASE NOTE:  This encounter was completed utilizing the [employer] Direct Speech Voice Recognition Software  Grammatical errors, random word insertions, pronoun errors and incomplete sentences are occasional consequences of the system due to software limitations, ambient noise and hardware issues  These may be missed by proof reading prior to affixing electronic signature  Any questions or concerns about the content, text or information contained within the body of this dictation should be directly addressed to the physician for clarification  Please do not hesitate to call me directly if you have any any questions or concerns

## 2019-01-08 NOTE — PROGRESS NOTES
Progress Note - General Surgery   Cristiane Floyd 60 y o  male MRN: [id number]  Unit/Bed#:  -01 Encounter: [id number]    Assessment:  60 y o  M w/ suspected biliary pancreatitis and cholangitis    s/p ERCP w/ sphincterotomy, stent placement    Hypoxic respiratory failure and septic shock from cholangitis  This appears to be improving  His lactate has cleared and his his UOP is acceptable  May need some diuresis for vent weaning    Plan:  Wean vent  Diurese as able  Cont abx and supportive measures    Subjective/Objective   Chief Complaint:     Subjective:     Objective:     Blood pressure 116/89, pulse 80, temperature 99 °F (37 2 °C), temperature source Probe, resp  rate (!) 24, height 6' 4" (1 93 m), weight 99 8 kg (220 lb), SpO2 93 %  ,Body mass index is 26 78 kg/m²  Intake/Output Summary (Last 24 hours) at 01/08/19 0942  Last data filed at 01/08/19 0549   Gross per 24 hour   Intake          2844 92 ml   Output             1300 ml   Net          1544 92 ml       Invasive Devices     Central Venous Catheter Line            CVC Central Lines 01/05/19 Triple 20cm 2 days          Arterial Line            Arterial Line 01/07/19 Left Radial 1 day          Drain            NG/OG/Enteral Tube Nasogastric 16 Fr Right nares 3 days    Urethral Catheter 16 Fr  3 days          Airway            ETT  Cuffed 8 mm 3 days                Physical Exam:   GCS 11T  Pulm A/C ventilation  Abd soft, nontender  CV Sinus tachycardia    Lab, Imaging and other studies:  I have personally reviewed pertinent lab results    , CBC:   Lab Results   Component Value Date    WBC 9 35 01/08/2019    HGB 11 2 (L) 01/08/2019    HCT 32 7 (L) 01/08/2019    MCV 89 01/08/2019    PLT 80 (L) 01/08/2019    MCH 30 4 01/08/2019    MCHC 34 3 01/08/2019    RDW 14 2 01/08/2019    MPV 10 6 01/08/2019    NRBC 0 01/08/2019    NRBC 1 01/08/2019   , CMP:   Lab Results   Component Value Date    SODIUM 139 01/08/2019    K 4 0 01/08/2019     01/08/2019    CO2 27 01/08/2019    BUN 39 (H) 01/08/2019    CREATININE 1 40 (H) 01/08/2019    CALCIUM 7 1 (L) 01/08/2019    AST 81 (H) 01/08/2019     (H) 01/08/2019    ALKPHOS 78 01/08/2019    EGFR 55 01/08/2019   , Coagulation:   Lab Results   Component Value Date    INR 1 03 01/07/2019     VTE Pharmacologic Prophylaxis: Heparin  VTE Mechanical Prophylaxis: sequential compression device

## 2019-01-08 NOTE — UTILIZATION REVIEW
Continued Stay Review    Date: 1-8-19       Vital Signs: /89   Pulse 82   Temp 98 2 °F (36 8 °C) (Probe)   Resp (!) 23   Ht 6' 4" (1 93 m)   Wt 99 8 kg (220 lb)   SpO2 91%   BMI 26 78 kg/m²        Assessment/Plan:    60 y o  M w/ suspected biliary pancreatitis and cholangitis  s/p ERCP w/ sphincterotomy, stent placement     Hypoxic respiratory failure and septic shock from cholangitis  Continue iv antibiotics   Plan to wean from mechanical ventilation,  And continue diuresis   Arterial line present  Cvc line present   NG /OG present  enteral tube feedig  Urinary catheter  Present   Continue telemetry     Cardiology   Atrial fibrillation:  New onset atrial fibrillation in the setting of sepsis and catecholamine surges   At this time his shock precludes usage of AV justine blockers   Digoxin is usually ineffective in this setting   Continue IV amiodarone which seems to be controlling his rates and today was in sinus rhythm          acetaminophen 650 mg Oral Q6H PRN   amiodarone 1 mg/min Intravenous Continuous   amiodarone 200 mg Oral TID With Meals   chlorhexidine 15 mL Swish & Spit Q12H [address]   dexmedetomidine 0 1-0 7 mcg/kg/hr Intravenous Titrated   fentaNYL 75 mcg/hr Intravenous Continuous   furosemide 20 mg Intravenous Once   heparin (porcine) 3-20 Units/kg/hr (Order-Specific) Intravenous Titrated   heparin (porcine) 2,000 Units Intravenous PRN   heparin (porcine) 4,000 Units Intravenous PRN   hydrocortisone sodium succinate 50 mg Intravenous Q12H [address]   HYDROmorphone 0 5 mg Intravenous Q3H PRN   influenza vaccine 0 5 mL Intramuscular Prior to discharge   insulin lispro 1-6 Units Subcutaneous Q6H [address]   metoprolol 5 mg Intravenous Q6H   ondansetron 4 mg Intravenous Q6H PRN   perflutren lipid microsphere 0 8 mL/min Intravenous Once in imaging   piperacillin-tazobactam 3 375 g Intravenous Q6H   potassium phosphate 30 mmol Intravenous Once     Pertinent Labs/Diagnostic Results:     Hb 11 2  hct  22 7    Bun 39  Creatinine  1 40     Age/Sex: 60 y o  male     Discharge Plan: to be determined

## 2019-01-08 NOTE — RESPIRATORY THERAPY NOTE
RT Ventilator Management Note  Kell Harris 60 y o  male MRN: [id number]  Unit/Bed#: Dee [facility] -01 Encounter: [id number]      Daily Screen       1/5/2019 1148 1/7/2019 0745          Patient safety screen outcome[de-identified] - Failed      Not Ready for Weaning due to[de-identified] - Underline problem not resolved              Physical Exam:   Assessment Type: Assess only  Respiratory Pattern: Assisted  Chest Assessment: Chest expansion symmetrical  Bilateral Breath Sounds: Diminished  O2 Device: vent      Resp Comments: pt remained on AC/VC mode/settings all evening/night  Increased FiO2 due to low SpO2 at one point during the night  No other vent changes made   Pt stable on vent

## 2019-01-08 NOTE — PROGRESS NOTES
Progress Note - Critical Care   Clarence Holland 60 y o  male MRN: [id number]  Unit/Bed#:  -01 Encounter: [id number]    Assessment: 63yM w/ severe biliary pancreatitis and presumed cholangitis s/p ERCP 1/4    Plan:          Neuro:   Acute encephalopathy 2/2 cholangitis  Follows commands  - On fentanyl at 75ml/hr  - Precedex at 0 4  - No standing sedation - will give versed for agitation  - Restraints must be continued                 CV:   New onset unstable Afib requiring multiple attempts at cardioversion AM 1/6, placed on amiodarone, given digoxin and Lopressor  Phenylephrine, levo and vasopressin were started then as well  He has since converted to sinus  HR 60-90 w/ a few episodes of self-resolving bradycardia  Heparin gtt started after converting to sinus last night   Phenylephrine weaned off  Hypertensive this /70  - Continue with amiodarone at 1  Continue w/ lopressor 5q6 w/ hold parameters  - Cardiology is following                 Lung:   Mixed picture of volume overload and aspiration around time of ERCP  Intubated 1/5  On AC -60-8  - Goal wean vent as diurese                 GI:   Abdomen tender 2/2 pancreatitis and cholangitis  - F/u AM labs  - Trickle TF continue  - Continue with serial abdominal exams due to concern for abdominal compartment syndrome  - GI is following                 FEN:   AZ - improving  - F/u AM Cr  Cr yesterday 1 65 from 2 18 from 1 67 w/ normal baseline  3 51 In yesterday  1 55 UO (~0 65ml/kg/hr)  1 96L positive in 24 hrs  29L positive since admission  - Would start diuresis today as believe his intravascular volume can handle it  - Trickle TF continue                 :   UO 1 55 (~0 65ml/kg/hr)  Clear yellow urine  - Will start diuresis today  -Continue rodriguez - necessary for accurate IO given mental status                 ID:   Afebrile   WBC pending  Normal yesterday     Day5 of zosyn for possible cholangitis  - Last day of zosyn  - F/u Blood cultures from  - No growth x 72hrs                 Heme:   Hep ACS gtt  Hgb pending  Has been stable  Endo:   Decrease hydrocortisone from 100q8 to 50q12                            Msk/Skin:   Frequent turning                 Disposition:   Critically ill  Required ICU management    Chief Complaint: Unable to obtain ROS given mental status  HPI/24hr events:   Intermittent bradycardia events  Self-resolved  Came off of phenylephrine  Physical Exam:     Neuro: Will follow commands  CV: RRR  Off of pressors  Slightly hypertensive  Pulm: Remains intubated -[id number]  Abd: Soft, non-tender, edema  : Jones w/ clear yellow urine  Scrotal edema  Extremities: Edema of hands  LOPEZ      Vitals:    19 0354 19 0400 19 0500 19 0600   BP:       Pulse:  60 74 80   Resp:  22 22 (!) 24   Temp:  98 6 °F (37 °C) 99 °F (37 2 °C) 99 °F (37 2 °C)   TempSrc:  Probe Probe Probe   SpO2: 93% 94% 93% 93%   Weight:       Height:         Arterial Line BP: 160/70  Arterial Line MAP (mmHg): 94 mmHg    Temperature:   Temp (24hrs), Av 1 °F (37 3 °C), Min:98 6 °F (37 °C), Max:99 3 °F (37 4 °C)    Current: Temperature: 99 °F (37 2 °C)    Weights:   IBW: 86 8 kg    Body mass index is 26 78 kg/m²  Weight (last 2 days)     None          Hemodynamic Monitoring:  SvO2:        Non-Invasive/Invasive Ventilation Settings:  Respiratory    Lab Data (Last 4 hours)    None         O2/Vent Data (Last 4 hours)       0354           Vent Mode AC/VC       Resp Rate (BPM) (BPM) 20       Vt (mL) (mL) 450       FIO2 (%) (%) 60       PEEP (cmH2O) (cmH2O) 8       MV 9 67                 No results found for: PHART, TTR6TWP, PO2ART, FCQ2NLQ, P8RQCVDF, BEART, SOURCE  SpO2: SpO2: 93 %    Intake and Outputs:  I/O       701 -  0700 701 -  07    I V  (mL/kg) 3366 7 (33 7) 5366 7 (53 8)    NG/GT  135    IV Piggyback  3028  7    Total Intake(mL/kg) 5366 7 (53 8) 7860 4 (78 8) Urine (mL/kg/hr) 1000 1635 (0 7)    Emesis/NG output  450    Total Output 1000 2085    Net +4366 7 +5775 4              Nutrition:        Diet Orders            Start     Ordered    01/07/19 1104  Diet Enteral/Parenteral; Tube Feeding No Oral Diet; Jevity 1 5; Continuous; 71  Diet effective now     Comments:  Increase TF by 10 cc every 8 hours to a goal of 65   Question Answer Comment   Diet Type Enteral/Parenteral    Enteral/Parenteral Tube Feeding No Oral Diet    Tube Feeding Formula: Jevity 1 5    Bolus/Cyclic/Continuous Continuous    Tube Feeding Goal Rate (mL/hr): 71    RD to adjust diet per protocol? Yes        01/07/19 1119          Labs:     Results from last 7 days  Lab Units 01/07/19  1955 01/07/19 0443 01/06/19 0427 01/05/19  0259  01/03/19  0744   WBC Thousand/uL 6 77 6 15 3 60*  --  3 34*  < > 11 35*   HEMOGLOBIN g/dL 11 4* 12 5 11 8*  --  16 1  < > 15 8   I STAT HEMOGLOBIN   --   --   --   < >  --   --   --    HEMATOCRIT % 33 7* 36 4* 35 3*  --  47 8  < > 46 1   HEMATOCRIT, ISTAT   --   --   --   < >  --   --   --    PLATELETS Thousands/uL 79* 101* 87*  --  138*  < > 162   NEUTROS PCT %  --   --   --   --   --   --  88*   MONOS PCT %  --   --   --   --   --   --  7   MONO PCT %  --  1* 6  --  6  < >  --    < > = values in this interval not displayed       Results from last 7 days  Lab Units 01/07/19 0443 01/06/19 0427 01/05/19  0314 01/05/19  0259   SODIUM mmol/L 138 141  --  140   POTASSIUM mmol/L 3 8 4 0  --  4 7   CHLORIDE mmol/L 106 109*  --  109*   CO2 mmol/L 25 25  --  23   CO2, I-STAT mmol/L  --   --  24  --    BUN mg/dL 40* 42*  --  37*   CREATININE mg/dL 1 65* 2 18*  --  1 67*   CALCIUM mg/dL 7 4* 7 0*  --  6 9*   ALK PHOS U/L 38* 26*  --  29*   ALT U/L 132* 202*  --  420*   AST U/L 76* 96*  --  236*   GLUCOSE, ISTAT mg/dl  --   --  146*  --        Results from last 7 days  Lab Units 01/05/19  0259 01/04/19  1348   MAGNESIUM mg/dL 2 1 1 9       Results from last 7 days  Lab Units 01/05/19  0259 01/04/19  1348   PHOSPHORUS mg/dL 2 9 2 6*        Results from last 7 days  Lab Units 01/08/19  0204 01/07/19  1955   INR   --  1 03   PTT seconds 32 28       Results from last 7 days  Lab Units 01/07/19  0443   LACTIC ACID mmol/L 1 7       0  Lab Value Date/Time   TROPONINI <0 02 01/03/2019 0818       Imaging: Xr Chest Portable    Result Date: 1/4/2019  Impression: Left greater than right pleural effusions  Pulmonary vascular congestive failure  Satisfactory positioning of endotracheal and enteric tubes  Workstation performed: [id number]     Xr Chest 2 Views    Result Date: 1/3/2019  Impression: No active pulmonary disease on examination which is somewhat limited secondary to low lung volumes  Workstation performed: [id number]     Fl Ercp Biliary Only    Result Date: 1/4/2019  Impression: Fluoroscopic guidance provided for surgical procedure  Please refer to the separate procedure notes for additional details  Workstation performed: [id number]     Ct Abdomen Pelvis W Contrast    Result Date: 1/3/2019  Impression: Acute interstitial edematous pancreatitis with extensive pancreatic and peripancreatic inflammatory edema  Within the limitations imposed by respiratory motion throughout the upper abdomen there is no convincing evidence of pancreatic necrosis or underlying pancreatic mass  Nonetheless, when better pain control can be achieved and the patient is able to tolerate better breath-hold imaging, more accurate characterization utilizing pancreatic MRI with MRCP is recommended for further investigation of the possible etiology and complications of acute pancreatitis  The study was marked in [facility] for immediate notification  Workstation performed: [id number]  I have personally reviewed pertinent reports        EKG: N/A    Micro:  Lab Results   Component Value Date    BLOODCX No Growth at 72 hrs  01/04/2019    BLOODCX No Growth at 72 hrs  01/04/2019       Allergies: No Known Allergies    Medications:   Scheduled Meds:    Current Facility-Administered Medications:  acetaminophen 650 mg Oral Q6H PRN Lindsay [place]    amiodarone 1 mg/min Intravenous Continuous Kaylan Shearer PA-C Last Rate: 1 mg/min (01/08/19 0543)   chlorhexidine 15 mL Swish & Spit Q12H [address] Fawn Zheng PA-C    dexmedetomidine 0 1-0 7 mcg/kg/hr Intravenous Titrated Cristiane Crooks MD Last Rate: 0 4 mcg/kg/hr (01/08/19 0207)   fentaNYL 75 mcg/hr Intravenous Continuous Cristiane Crooks MD Last Rate: 75 mcg/hr (01/07/19 1505)   heparin (porcine) 3-20 Units/kg/hr (Order-Specific) Intravenous Titrated John Faith MD Last Rate: 15 1 Units/kg/hr (01/08/19 0338)   heparin (porcine) 2,000 Units Intravenous PRN John Faith MD    heparin (porcine) 4,000 Units Intravenous PRN John Faith MD    hydrocortisone sodium succinate 50 mg Intravenous Q12H [address] Cristiane Crooks MD    HYDROmorphone 0 5 mg Intravenous Q3H PRN Cristiane Crooks MD    insulin lispro 1-6 Units Subcutaneous Q6H [address] Chuyita Anglin DO    metoprolol 5 mg Intravenous Q6H Cristiane Crooks MD    multi-electrolyte 1,000 mL Intravenous Once Kaylan Shearer PA-C Last Rate: Stopped (01/05/19 1300)   norepinephrine 1-30 mcg/min Intravenous Titrated Gricelda Gerard MD Last Rate: Stopped (01/06/19 0917)   ondansetron 4 mg Intravenous Q6H PRN Nato Stanley PA-C    perflutren lipid microsphere 0 8 mL/min Intravenous Once in imaging Konstantin Ford MD    phenylephine  mcg/min Intravenous Titrated Cristiane Crooks MD Last Rate: Stopped (01/07/19 1800)   piperacillin-tazobactam 3 375 g Intravenous Q6H Lauri Estrada MD Last Rate: 3 375 g (01/08/19 0539)   propofol 5-50 mcg/kg/min Intravenous Titrated Cristiane Crooks MD Last Rate: Stopped (01/06/19 0706)   vasopressin (PITRESSIN) in 0 9 % sodium chloride 100 mL 0 04 Units/min Intravenous Continuous Gricelda Gerard MD Last Rate: Stopped (01/06/19 0329)     Continuous Infusions:    amiodarone 1 mg/min Last Rate: 1 mg/min (01/08/19 0543)   dexmedetomidine 0 1-0 7 mcg/kg/hr Last Rate: 0 4 mcg/kg/hr (01/08/19 0207)   fentaNYL 75 mcg/hr Last Rate: 75 mcg/hr (01/07/19 1505)   heparin (porcine) 3-20 Units/kg/hr (Order-Specific) Last Rate: 15 1 Units/kg/hr (01/08/19 0338)   norepinephrine 1-30 mcg/min Last Rate: Stopped (01/06/19 0917)   phenylephine  mcg/min Last Rate: Stopped (01/07/19 1800)   propofol 5-50 mcg/kg/min Last Rate: Stopped (01/06/19 0706)   vasopressin (PITRESSIN) in 0 9 % sodium chloride 100 mL 0 04 Units/min Last Rate: Stopped (01/06/19 1357)     PRN Meds:    acetaminophen 650 mg Q6H PRN   heparin (porcine) 2,000 Units PRN   heparin (porcine) 4,000 Units PRN   HYDROmorphone 0 5 mg Q3H PRN   ondansetron 4 mg Q6H PRN   perflutren lipid microsphere 0 8 mL/min Once in imaging       VTE Pharmacologic Prophylaxis: Sequential compression device (Venodyne)  and Enoxaparin (Lovenox)  VTE Mechanical Prophylaxis: sequential compression device    Invasive lines and devices: Invasive Devices     Central Venous Catheter Line            CVC Central Lines 01/05/19 Triple 20cm 2 days          Arterial Line            Arterial Line 01/07/19 Left Radial 1 day          Drain            NG/OG/Enteral Tube Nasogastric 16 Fr Right nares 3 days    Urethral Catheter 16 Fr  3 days          Airway            ETT  Cuffed 8 mm 2 days                   Counseling / Coordination of Care  Total Critical Care time spent 25 minutes excluding procedures, teaching and family updates  Code Status: Level 1 - Full Code     Portions of the record may have been created with voice recognition software  Occasional wrong word or "sound a like" substitutions may have occurred due to the inherent limitations of voice recognition software  Read the chart carefully and recognize, using context, where substitutions have occurred       Stevie Yañez MD

## 2019-01-08 NOTE — NUTRITION
Replete Phosphorus  Adjust Mg  Recommend decrease Jevity 1 5 to goal rate of 63 ml/hr to provide qd: 1512 ml,2268 Kcal,96 5 gm PRO,326 gm CHO,75 gm Fat,1149 ml Free H2O,2 3 mg CHO/kg/min  Check Ionized Ca  Monitor renal parameters

## 2019-01-09 ENCOUNTER — APPOINTMENT (INPATIENT)
Dept: RADIOLOGY | Facility: HOSPITAL | Age: 59
DRG: 438 | End: 2019-01-09
Payer: COMMERCIAL

## 2019-01-09 LAB
ALBUMIN SERPL BCP-MCNC: 1.5 G/DL (ref 3.5–5)
ALP SERPL-CCNC: 92 U/L (ref 46–116)
ALT SERPL W P-5'-P-CCNC: 94 U/L (ref 12–78)
ANION GAP SERPL CALCULATED.3IONS-SCNC: 8 MMOL/L (ref 4–13)
ANISOCYTOSIS BLD QL SMEAR: PRESENT
APTT PPP: 50 SECONDS (ref 26–38)
APTT PPP: 51 SECONDS (ref 26–38)
AST SERPL W P-5'-P-CCNC: 83 U/L (ref 5–45)
BACTERIA BLD CULT: NORMAL
BACTERIA BLD CULT: NORMAL
BASOPHILS # BLD MANUAL: 0.24 THOUSAND/UL (ref 0–0.1)
BASOPHILS NFR MAR MANUAL: 2 % (ref 0–1)
BILIRUB SERPL-MCNC: 1.22 MG/DL (ref 0.2–1)
BUN SERPL-MCNC: 36 MG/DL (ref 5–25)
CALCIUM SERPL-MCNC: 6.9 MG/DL (ref 8.3–10.1)
CHLORIDE SERPL-SCNC: 107 MMOL/L (ref 100–108)
CO2 SERPL-SCNC: 28 MMOL/L (ref 21–32)
CREAT SERPL-MCNC: 1.32 MG/DL (ref 0.6–1.3)
EOSINOPHIL # BLD MANUAL: 0 THOUSAND/UL (ref 0–0.4)
EOSINOPHIL NFR BLD MANUAL: 0 % (ref 0–6)
ERYTHROCYTE [DISTWIDTH] IN BLOOD BY AUTOMATED COUNT: 14.5 % (ref 11.6–15.1)
GFR SERPL CREATININE-BSD FRML MDRD: 59 ML/MIN/1.73SQ M
GLUCOSE SERPL-MCNC: 191 MG/DL (ref 65–140)
GLUCOSE SERPL-MCNC: 192 MG/DL (ref 65–140)
GLUCOSE SERPL-MCNC: 196 MG/DL (ref 65–140)
GLUCOSE SERPL-MCNC: 211 MG/DL (ref 65–140)
HCT VFR BLD AUTO: 35.5 % (ref 36.5–49.3)
HGB BLD-MCNC: 12 G/DL (ref 12–17)
LYMPHOCYTES # BLD AUTO: 0.37 THOUSAND/UL (ref 0.6–4.47)
LYMPHOCYTES # BLD AUTO: 3 % (ref 14–44)
MCH RBC QN AUTO: 29.9 PG (ref 26.8–34.3)
MCHC RBC AUTO-ENTMCNC: 33.8 G/DL (ref 31.4–37.4)
MCV RBC AUTO: 89 FL (ref 82–98)
MONOCYTES # BLD AUTO: 0.24 THOUSAND/UL (ref 0–1.22)
MONOCYTES NFR BLD: 2 % (ref 4–12)
MYELOCYTES NFR BLD MANUAL: 1 % (ref 0–1)
NEUTROPHILS # BLD MANUAL: 11.12 THOUSAND/UL (ref 1.85–7.62)
NEUTS BAND NFR BLD MANUAL: 1 % (ref 0–8)
NEUTS SEG NFR BLD AUTO: 90 % (ref 43–75)
NRBC BLD AUTO-RTO: 0 /100 WBCS
PHOSPHATE SERPL-MCNC: 1.5 MG/DL (ref 2.7–4.5)
PLATELET # BLD AUTO: 72 THOUSANDS/UL (ref 149–390)
PLATELET BLD QL SMEAR: ABNORMAL
PMV BLD AUTO: 10.3 FL (ref 8.9–12.7)
POLYCHROMASIA BLD QL SMEAR: PRESENT
POTASSIUM SERPL-SCNC: 3.5 MMOL/L (ref 3.5–5.3)
PROT SERPL-MCNC: 4.7 G/DL (ref 6.4–8.2)
RBC # BLD AUTO: 4.01 MILLION/UL (ref 3.88–5.62)
RBC MORPH BLD: PRESENT
SODIUM SERPL-SCNC: 143 MMOL/L (ref 136–145)
TOXIC GRANULES BLD QL SMEAR: PRESENT
VARIANT LYMPHS # BLD AUTO: 1 %
WBC # BLD AUTO: 12.22 THOUSAND/UL (ref 4.31–10.16)

## 2019-01-09 PROCEDURE — 82948 REAGENT STRIP/BLOOD GLUCOSE: CPT

## 2019-01-09 PROCEDURE — 84100 ASSAY OF PHOSPHORUS: CPT | Performed by: NURSE PRACTITIONER

## 2019-01-09 PROCEDURE — 97163 PT EVAL HIGH COMPLEX 45 MIN: CPT

## 2019-01-09 PROCEDURE — 80053 COMPREHEN METABOLIC PANEL: CPT | Performed by: PHYSICIAN ASSISTANT

## 2019-01-09 PROCEDURE — 85730 THROMBOPLASTIN TIME PARTIAL: CPT | Performed by: EMERGENCY MEDICINE

## 2019-01-09 PROCEDURE — G8987 SELF CARE CURRENT STATUS: HCPCS

## 2019-01-09 PROCEDURE — G8978 MOBILITY CURRENT STATUS: HCPCS

## 2019-01-09 PROCEDURE — 99291 CRITICAL CARE FIRST HOUR: CPT | Performed by: SURGERY

## 2019-01-09 PROCEDURE — 71045 X-RAY EXAM CHEST 1 VIEW: CPT

## 2019-01-09 PROCEDURE — 94760 N-INVAS EAR/PLS OXIMETRY 1: CPT | Performed by: SOCIAL WORKER

## 2019-01-09 PROCEDURE — 85027 COMPLETE CBC AUTOMATED: CPT | Performed by: PHYSICIAN ASSISTANT

## 2019-01-09 PROCEDURE — G8988 SELF CARE GOAL STATUS: HCPCS

## 2019-01-09 PROCEDURE — 94760 N-INVAS EAR/PLS OXIMETRY 1: CPT

## 2019-01-09 PROCEDURE — 97167 OT EVAL HIGH COMPLEX 60 MIN: CPT

## 2019-01-09 PROCEDURE — 94003 VENT MGMT INPAT SUBQ DAY: CPT | Performed by: SOCIAL WORKER

## 2019-01-09 PROCEDURE — G8979 MOBILITY GOAL STATUS: HCPCS

## 2019-01-09 PROCEDURE — 85007 BL SMEAR W/DIFF WBC COUNT: CPT | Performed by: PHYSICIAN ASSISTANT

## 2019-01-09 PROCEDURE — 99232 SBSQ HOSP IP/OBS MODERATE 35: CPT | Performed by: INTERNAL MEDICINE

## 2019-01-09 RX ORDER — HYDROMORPHONE HCL/PF 1 MG/ML
0.5 SYRINGE (ML) INJECTION EVERY 2 HOUR PRN
Status: DISCONTINUED | OUTPATIENT
Start: 2019-01-09 | End: 2019-01-23 | Stop reason: HOSPADM

## 2019-01-09 RX ORDER — POTASSIUM CHLORIDE 29.8 MG/ML
40 INJECTION INTRAVENOUS ONCE
Status: COMPLETED | OUTPATIENT
Start: 2019-01-09 | End: 2019-01-09

## 2019-01-09 RX ORDER — LORAZEPAM 2 MG/ML
INJECTION INTRAMUSCULAR
Status: COMPLETED
Start: 2019-01-09 | End: 2019-01-09

## 2019-01-09 RX ORDER — MIDAZOLAM HYDROCHLORIDE 1 MG/ML
2 INJECTION INTRAMUSCULAR; INTRAVENOUS ONCE
Status: COMPLETED | OUTPATIENT
Start: 2019-01-09 | End: 2019-01-09

## 2019-01-09 RX ORDER — FUROSEMIDE 10 MG/ML
20 INJECTION INTRAMUSCULAR; INTRAVENOUS ONCE
Status: COMPLETED | OUTPATIENT
Start: 2019-01-09 | End: 2019-01-09

## 2019-01-09 RX ORDER — LORAZEPAM 2 MG/ML
2 INJECTION INTRAMUSCULAR ONCE
Status: COMPLETED | OUTPATIENT
Start: 2019-01-09 | End: 2019-01-09

## 2019-01-09 RX ORDER — FUROSEMIDE 10 MG/ML
40 INJECTION INTRAMUSCULAR; INTRAVENOUS ONCE
Status: COMPLETED | OUTPATIENT
Start: 2019-01-09 | End: 2019-01-09

## 2019-01-09 RX ADMIN — DEXMEDETOMIDINE 1.5 MCG/KG/HR: 100 INJECTION, SOLUTION, CONCENTRATE INTRAVENOUS at 21:50

## 2019-01-09 RX ADMIN — HEPARIN SODIUM 2000 UNITS: 1000 INJECTION INTRAVENOUS; SUBCUTANEOUS at 09:32

## 2019-01-09 RX ADMIN — INSULIN LISPRO 2 UNITS: 100 INJECTION, SOLUTION INTRAVENOUS; SUBCUTANEOUS at 06:17

## 2019-01-09 RX ADMIN — HYDROMORPHONE HYDROCHLORIDE 0.5 MG: 1 INJECTION, SOLUTION INTRAMUSCULAR; INTRAVENOUS; SUBCUTANEOUS at 00:27

## 2019-01-09 RX ADMIN — METOPROLOL TARTRATE 25 MG: 25 TABLET, FILM COATED ORAL at 22:09

## 2019-01-09 RX ADMIN — METOPROLOL TARTRATE 5 MG: 5 INJECTION, SOLUTION INTRAVENOUS at 09:36

## 2019-01-09 RX ADMIN — CHLORHEXIDINE GLUCONATE 0.12% ORAL RINSE 15 ML: 1.2 LIQUID ORAL at 09:54

## 2019-01-09 RX ADMIN — LORAZEPAM 2 MG: 2 INJECTION, SOLUTION INTRAMUSCULAR; INTRAVENOUS at 11:27

## 2019-01-09 RX ADMIN — POTASSIUM CHLORIDE 40 MEQ: 400 INJECTION, SOLUTION INTRAVENOUS at 00:50

## 2019-01-09 RX ADMIN — FUROSEMIDE 40 MG: 10 INJECTION, SOLUTION INTRAMUSCULAR; INTRAVENOUS at 12:39

## 2019-01-09 RX ADMIN — DEXMEDETOMIDINE 0.7 MCG/KG/HR: 100 INJECTION, SOLUTION, CONCENTRATE INTRAVENOUS at 00:28

## 2019-01-09 RX ADMIN — HYDROMORPHONE HYDROCHLORIDE 0.5 MG: 1 INJECTION, SOLUTION INTRAMUSCULAR; INTRAVENOUS; SUBCUTANEOUS at 14:25

## 2019-01-09 RX ADMIN — INSULIN LISPRO 4 UNITS: 100 INJECTION, SOLUTION INTRAVENOUS; SUBCUTANEOUS at 17:51

## 2019-01-09 RX ADMIN — HEPARIN SODIUM 2000 UNITS: 1000 INJECTION INTRAVENOUS; SUBCUTANEOUS at 00:12

## 2019-01-09 RX ADMIN — POTASSIUM CHLORIDE 40 MEQ: 400 INJECTION, SOLUTION INTRAVENOUS at 09:36

## 2019-01-09 RX ADMIN — HYDROCORTISONE SODIUM SUCCINATE 50 MG: 100 INJECTION, POWDER, FOR SOLUTION INTRAMUSCULAR; INTRAVENOUS at 22:08

## 2019-01-09 RX ADMIN — HYDROMORPHONE HYDROCHLORIDE 0.5 MG: 1 INJECTION, SOLUTION INTRAMUSCULAR; INTRAVENOUS; SUBCUTANEOUS at 18:00

## 2019-01-09 RX ADMIN — DEXMEDETOMIDINE 1.5 MCG/KG/HR: 100 INJECTION, SOLUTION, CONCENTRATE INTRAVENOUS at 19:57

## 2019-01-09 RX ADMIN — HEPARIN SODIUM 2000 UNITS: 1000 INJECTION INTRAVENOUS; SUBCUTANEOUS at 17:49

## 2019-01-09 RX ADMIN — SODIUM PHOSPHATE, MONOBASIC, MONOHYDRATE 30 MMOL: 276; 142 INJECTION, SOLUTION INTRAVENOUS at 02:02

## 2019-01-09 RX ADMIN — METOPROLOL TARTRATE 25 MG: 25 TABLET, FILM COATED ORAL at 15:31

## 2019-01-09 RX ADMIN — AMIODARONE HYDROCHLORIDE 200 MG: 200 TABLET ORAL at 08:00

## 2019-01-09 RX ADMIN — DEXMEDETOMIDINE 0.7 MCG/KG/HR: 100 INJECTION, SOLUTION, CONCENTRATE INTRAVENOUS at 03:34

## 2019-01-09 RX ADMIN — DEXTROSE MONOHYDRATE 1 MG/MIN: 50 INJECTION, SOLUTION INTRAVENOUS at 14:29

## 2019-01-09 RX ADMIN — HEPARIN SODIUM AND DEXTROSE 27.1 UNITS/KG/HR: 10000; 5 INJECTION INTRAVENOUS at 12:39

## 2019-01-09 RX ADMIN — DEXMEDETOMIDINE 1.5 MCG/KG/HR: 100 INJECTION, SOLUTION, CONCENTRATE INTRAVENOUS at 22:42

## 2019-01-09 RX ADMIN — AMIODARONE HYDROCHLORIDE 200 MG: 200 TABLET ORAL at 15:31

## 2019-01-09 RX ADMIN — POTASSIUM & SODIUM PHOSPHATES POWDER PACK 280-160-250 MG 2 PACKET: 280-160-250 PACK at 17:48

## 2019-01-09 RX ADMIN — METOPROLOL TARTRATE 5 MG: 5 INJECTION, SOLUTION INTRAVENOUS at 03:00

## 2019-01-09 RX ADMIN — CHLORHEXIDINE GLUCONATE 0.12% ORAL RINSE 15 ML: 1.2 LIQUID ORAL at 22:09

## 2019-01-09 RX ADMIN — HEPARIN SODIUM AND DEXTROSE 29.1 UNITS/KG/HR: 10000; 5 INJECTION INTRAVENOUS at 22:41

## 2019-01-09 RX ADMIN — MIDAZOLAM 2 MG: 1 INJECTION INTRAMUSCULAR; INTRAVENOUS at 06:26

## 2019-01-09 RX ADMIN — DEXMEDETOMIDINE 1.5 MCG/KG/HR: 100 INJECTION, SOLUTION, CONCENTRATE INTRAVENOUS at 16:48

## 2019-01-09 RX ADMIN — INSULIN LISPRO 2 UNITS: 100 INJECTION, SOLUTION INTRAVENOUS; SUBCUTANEOUS at 12:52

## 2019-01-09 RX ADMIN — POTASSIUM & SODIUM PHOSPHATES POWDER PACK 280-160-250 MG 2 PACKET: 280-160-250 PACK at 22:09

## 2019-01-09 RX ADMIN — HYDROMORPHONE HYDROCHLORIDE 0.5 MG: 1 INJECTION, SOLUTION INTRAMUSCULAR; INTRAVENOUS; SUBCUTANEOUS at 03:20

## 2019-01-09 RX ADMIN — HYDROMORPHONE HYDROCHLORIDE 0.5 MG: 1 INJECTION, SOLUTION INTRAMUSCULAR; INTRAVENOUS; SUBCUTANEOUS at 08:19

## 2019-01-09 RX ADMIN — DEXMEDETOMIDINE 0.7 MCG/KG/HR: 100 INJECTION, SOLUTION, CONCENTRATE INTRAVENOUS at 11:26

## 2019-01-09 RX ADMIN — ONDANSETRON 4 MG: 2 INJECTION INTRAMUSCULAR; INTRAVENOUS at 23:26

## 2019-01-09 RX ADMIN — AMIODARONE HYDROCHLORIDE 200 MG: 200 TABLET ORAL at 12:39

## 2019-01-09 RX ADMIN — HYDROMORPHONE HYDROCHLORIDE 0.5 MG: 1 INJECTION, SOLUTION INTRAMUSCULAR; INTRAVENOUS; SUBCUTANEOUS at 05:57

## 2019-01-09 RX ADMIN — FUROSEMIDE 20 MG: 10 INJECTION, SOLUTION INTRAVENOUS at 06:25

## 2019-01-09 RX ADMIN — DEXMEDETOMIDINE 1.5 MCG/KG/HR: 100 INJECTION, SOLUTION, CONCENTRATE INTRAVENOUS at 15:22

## 2019-01-09 RX ADMIN — DEXMEDETOMIDINE 1 MCG/KG/HR: 100 INJECTION, SOLUTION, CONCENTRATE INTRAVENOUS at 13:33

## 2019-01-09 RX ADMIN — LORAZEPAM 2 MG: 2 INJECTION INTRAMUSCULAR at 11:27

## 2019-01-09 RX ADMIN — HYDROCORTISONE SODIUM SUCCINATE 50 MG: 100 INJECTION, POWDER, FOR SOLUTION INTRAMUSCULAR; INTRAVENOUS at 09:36

## 2019-01-09 RX ADMIN — FENTANYL CITRATE 75 MCG/HR: 50 INJECTION, SOLUTION INTRAMUSCULAR; INTRAVENOUS at 00:30

## 2019-01-09 RX ADMIN — HEPARIN SODIUM AND DEXTROSE 25.1 UNITS/KG/HR: 10000; 5 INJECTION INTRAVENOUS at 02:57

## 2019-01-09 RX ADMIN — POTASSIUM & SODIUM PHOSPHATES POWDER PACK 280-160-250 MG 2 PACKET: 280-160-250 PACK at 15:30

## 2019-01-09 NOTE — PROGRESS NOTES
Progress Note - General Surgery   Kylelemayco Holes 60 y o  male MRN: [id number]  Unit/Bed#:  -01 Encounter: [id number]    Assessment:  60 y o  M w/ suspected biliary pancreatitis and cholangitis    s/p ERCP w/ sphincterotomy, stent placement    Hypoxic respiratory failure and septic shock from cholangitis  Shock has resolved and he is HOD stable  He is in sinus, he has diuresed well yesterday  Will likely need more lasix today    On amio gtt and heparin gtt per cards for Afib     Plan:  Wean vent  Continue diuresis  Off abx at this time    Subjective/Objective   Chief Complaint:     Subjective:     Objective:     Blood pressure 116/89, pulse 68, temperature 98 9 °F (37 2 °C), temperature source Oral, resp  rate 20, height 6' 4" (1 93 m), weight 99 8 kg (220 lb), SpO2 96 %  ,Body mass index is 26 78 kg/m²  Intake/Output Summary (Last 24 hours) at 01/09/19 4506  Last data filed at 01/09/19 8682   Gross per 24 hour   Intake          3387 21 ml   Output             4625 ml   Net         -1237 79 ml       Invasive Devices     Central Venous Catheter Line            CVC Central Lines 01/05/19 Triple 20cm 3 days          Arterial Line            Arterial Line 01/07/19 Left Radial 2 days          Drain            NG/OG/Enteral Tube Nasogastric 16 Fr Right nares 4 days    Urethral Catheter 16 Fr  4 days          Airway            ETT  Cuffed 8 mm 3 days                Physical Exam:   GCS 11T  Pulm A/c ventillation  Abd soft, nontender  CV Sinus tachycardia    Lab, Imaging and other studies:  I have personally reviewed pertinent lab results    , CBC:   No results found for: WBC, HGB, HCT, MCV, PLT, ADJUSTEDWBC, MCH, MCHC, RDW, MPV, NRBC, CMP:   Lab Results   Component Value Date    K 3 5 01/08/2019   , Coagulation:   No results found for: PT, INR, APTT  VTE Pharmacologic Prophylaxis: Heparin  VTE Mechanical Prophylaxis: sequential compression device

## 2019-01-09 NOTE — PROGRESS NOTES
Progress Note - Kell Harris 60 y o  male MRN: [id number]    Unit/Bed#: Dee Bennett -01 Encounter: [id number]        Subjective:     He remains intubated  Good urine output  Noted to have diarrhea    Objective:     Vitals: Blood pressure 132/76, pulse 72, temperature 99 °F (37 2 °C), temperature source Tympanic, resp  rate (!) 28, height 6' 4" (1 93 m), weight 99 8 kg (220 lb), SpO2 93 %  ,Body mass index is 26 78 kg/m²  Intake/Output Summary (Last 24 hours) at 01/09/19 1306  Last data filed at 01/09/19 1200   Gross per 24 hour   Intake          3417 93 ml   Output             4825 ml   Net         -1407 07 ml       Physical Exam:     General Appearance:  Intubated  Lungs: On ventilation  Heart: Regular rate and rhythm, in sinus but tachycardic  Abdomen: Soft, non-tender, non-distended; bowel sounds normal  Extremities:  +2 edema    Invasive Devices     Central Venous Catheter Line            CVC Central Lines 01/05/19 Triple 20cm 3 days          Arterial Line            Arterial Line 01/07/19 Left Radial 2 days          Drain            NG/OG/Enteral Tube Nasogastric 16 Fr Right nares 4 days    Urethral Catheter 16 Fr  4 days          Airway            ETT  Cuffed 8 mm 4 days                Lab Results:      Results from last 7 days  Lab Units 01/09/19  0602  01/03/19  0744   WBC Thousand/uL 12 22*  < > 11 35*   HEMOGLOBIN g/dL 12 0  < > 15 8   I STAT HEMOGLOBIN   --   < >  --    HEMATOCRIT % 35 5*  < > 46 1   HEMATOCRIT, ISTAT   --   < >  --    PLATELETS Thousands/uL 72*  < > 162   NEUTROS PCT %  --   --  88*   LYMPHS PCT %  --   --  4*   LYMPHO PCT % 3*  < >  --    MONOS PCT %  --   --  7   MONO PCT % 2*  < >  --    EOS PCT % 0  < > 0   < > = values in this interval not displayed      Results from last 7 days  Lab Units 01/09/19  0602  01/05/19  0314   POTASSIUM mmol/L 3 5  < >  --    CHLORIDE mmol/L 107  < >  --    CO2 mmol/L 28  < >  --    CO2, I-STAT mmol/L  --   --  24   BUN mg/dL 36*  < >  -- CREATININE mg/dL 1 32*  < >  --    CALCIUM mg/dL 6 9*  < >  --    ALK PHOS U/L 92  < >  --    ALT U/L 94*  < >  --    AST U/L 83*  < >  --    GLUCOSE, ISTAT mg/dl  --   --  146*   < > = values in this interval not displayed  Results from last 7 days  Lab Units 01/07/19  1955   INR  1 03       Results from last 7 days  Lab Units 01/08/19  0534  01/04/19  0435   LIPASE u/L 185  < > 2,217*   AMYLASE IU/L  --   --  580*   < > = values in this interval not displayed  Imaging Studies: I have personally reviewed pertinent imaging studies  Xr Chest Portable    Result Date: 1/5/2019  Impression: Left IJ catheter tip in the SVC  No pneumothorax  Central vascular prominence with bibasilar opacities and moderate to large bilateral pleural effusions  Workstation performed: [id number]     Xr Chest Portable    Result Date: 1/5/2019  Impression: Central vascular prominence with bibasilar opacities and moderate to large bilateral pleural effusions  Workstation performed: [id number]     Xr Chest Portable    Result Date: 1/5/2019  Impression: Increasing large left pleural effusion  Pulmonary vascular congestive changes  Workstation performed: [id number]     Xr Chest Portable    Result Date: 1/4/2019  Impression: Left greater than right pleural effusions  Pulmonary vascular congestive failure  Satisfactory positioning of endotracheal and enteric tubes  Workstation performed: [id number]     Xr Chest 2 Views    Result Date: 1/3/2019  Impression: No active pulmonary disease on examination which is somewhat limited secondary to low lung volumes  Workstation performed: [id number]     Fl Ercp Biliary Only    Result Date: 1/4/2019  Impression: Fluoroscopic guidance provided for surgical procedure  Please refer to the separate procedure notes for additional details    Workstation performed: [id number]     Ct Abdomen Pelvis W Contrast    Result Date: 1/3/2019  Impression: Acute interstitial edematous pancreatitis with extensive pancreatic and peripancreatic inflammatory edema  Within the limitations imposed by respiratory motion throughout the upper abdomen there is no convincing evidence of pancreatic necrosis or underlying pancreatic mass  Nonetheless, when better pain control can be achieved and the patient is able to tolerate better breath-hold imaging, more accurate characterization utilizing pancreatic MRI with MRCP is recommended for further investigation of the possible etiology and complications of acute pancreatitis  The study was marked in [facility] for immediate notification  Workstation performed: [id number]       ASSESMENT/ PLAN:     1  Acute pancreatitis -likely biliary  Status post ERCP with sphincterotomy and biliary stent placement  Continue supportive care  He had diarrhea today this is likely secondary to feeding  Continue to monitor  2  Acute cholangitis -status post ERCP and stent placement  LFTs are improving  Of antibiotics  3  Bilateral effusion and edema - continue diuresis    4  AFib -now rate controlled and in sinus  on amiodarone drip and anticoagulation

## 2019-01-09 NOTE — PROGRESS NOTES
Patient becoming more agitated and pointing to his back  He shakes his head, "yes" to having pain in back  Patient has been repositioned more than 2 times per hour  Patient given more pain medication

## 2019-01-09 NOTE — PROGRESS NOTES
Patient is more cooperative and not as agitated  Patient had a bowel movement and was resistant to turn  Patient was given dilaudid and patient was less agitated  Patient is easy to arouse from sleep  Hi-low tubing was found to be cut in half hanging by a small sliver  Respiratory and CCS Resident Sara aware  Mouth care was completed and deep mouth suctioning  Patient is putting out significant amounts of urine post lasix administration for  Three separate doses

## 2019-01-09 NOTE — PROGRESS NOTES
Patient continues to be cooperative and somewhat less agitated  Patient's ptt result was 45, heparin continuous medication rates increased  K and phos rechecked  K was 4 5 and phos was 1 2  Repletion was ordered

## 2019-01-09 NOTE — PHYSICAL THERAPY NOTE
Physical Therapy Evaluation     Patient's Name: Lio Husbands    Admitting Diagnosis  Pancreatitis [K85 90]  Abdominal pain [R10 9]    Problem List  Patient Active Problem List   Diagnosis    Acute pancreatitis    Pancreatitis       Past Medical History  Past Medical History:   Diagnosis Date    Gastroesophageal reflux        Past Surgical History  Past Surgical History:   Procedure Laterality Date    CHOLECYSTECTOMY      COLONOSCOPY N/A 3/21/2016    Procedure: COLONOSCOPY;  Surgeon: Eric Roy DO;  [facility]: [facility]; Service:     ERCP N/A 1/4/2019    Procedure: ENDOSCOPIC RETROGRADE CHOLANGIOPANCREATOGRAPHY (ERCP); Surgeon: Westley Londono MD;  [facility]; Service: Gastroenterology    [place] EGD TRANSORAL BIOPSY SINGLE/MULTIPLE N/A 3/21/2016    Procedure: ESOPHAGOGASTRODUODENOSCOPY (EGD); Surgeon: Eric Roy DO;  [facility];   Service: General      01/09/19 1200   Note Type   Note type Eval/Treat   Pain Assessment   Pain Assessment FLACC   Pain Rating: FLACC (Rest) - Face 0   Pain Rating: FLACC (Rest) - Legs 0   Pain Rating: FLACC (Rest) - Activity 0   Pain Rating: FLACC (Rest) - Cry 0   Pain Rating: FLACC (Rest) - Consolability 0   Score: FLACC (Rest) 0   Pain Rating: FLACC (Activity) - Face 0   Pain Rating: FLACC (Activity) - Legs 0   Pain Rating: FLACC (Activity) - Activity 0   Pain Rating: FLACC (Activity) - Cry 0   Pain Rating: FLACC (Activity) - Consolability 0   Score: FLACC (Activity) 0   Home Living   Type of Home House   Home Layout Two level  (split level per CM note)   Prior Function   Level of [place] Independent with ADLs and functional mobility   Lives With Spouse   Receives Help From Family   ADL Assistance Independent   IADLs Independent   Vocational Full time employment   Comments Per CM note, pt intubated and non verbal at this time   Restrictions/Precautions   Weight Bearing Precautions Per Order No   General   Family/Caregiver Present No   Cognition Orientation Level Oriented to person   RLE Assessment   RLE Assessment (grossly 3/5 ankle; unable to follow commands for add testing)   LLE Assessment   LLE Assessment (grossly 3/5 ankle; unable to follow commands for add testing)   Coordination   Movements are Fluid and Coordinated 0   Bed Mobility   Supine to Sit Unable to assess   Sit to Supine Unable to assess   Transfers   Sit to Stand Unable to assess   Ambulation/Elevation   Gait pattern Not appropriate; Not tested   Endurance Deficit   Endurance Deficit Yes   Endurance Deficit Description severe fatigue with inability to maintain alert throughout session without constant verbal and tactile stimulation   Activity Tolerance   Activity Tolerance Patient limited by fatigue;Treatment limited secondary to medical complications (Comment)   Nurse Made Aware yes, lonnie Henriquez gave clearance to work with pt   Assessment   Prognosis Fair   Problem List Decreased strength;Decreased range of motion;Decreased endurance; Impaired balance;Decreased mobility; Decreased coordination;Decreased safety awareness;Decreased cognition   Assessment Pt is 60 y o  male seen for PT evaluation s/p admit to [facility] on 1/3/2019 w/ Acute pancreatitis s/p ERCP with new onset Afib  PT consulted to assess pt's functional mobility and d/c needs  Order placed for PT eval and tx  Comorbidities affecting pt's physical performance at time of assessment include: intubated, fatigue  PTA, pt was ambulates unrestricted distances and all terrain, lives in multi-level home and works full time  Personal factors affecting pt at time of IE include: inaccessible home environment, stairs to enter home, inability to ambulate household distances, decreased cognition, decreased initiation and engagement, unable to perform physical activity, inability to perform IADLs and inability to perform ADLs   Please find objective findings from PT assessment regarding body systems outlined above with impairments and limitations including weakness, decreased ROM, impaired balance, decreased endurance, decreased activity tolerance, decreased functional mobility tolerance and decreased safety awareness  Pt required constant verbal and tactile stimulation to maintain alert throughout session with severe fatigue  Pt inconsistently following single step commands for mobility  Demonstrated ability to move B/L ankle although did not follow instruction for additional LE testing  The following objective measures performed on IE also reveal limitations: Barthel Index: 0/100  Pt's clinical presentation is currently unstable/unpredictable seen in pt's presentation of intubated, critical care monitoring  Pt to benefit from continued PT tx to address deficits as defined above and maximize level of functional independent mobility and consistency  From PT/mobility standpoint, recommendation at time of d/c would be IP rehab pending progress in order to facilitate return to PLOF  Barriers to Discharge Inaccessible home environment   Goals   Patient Goals None stated, non verbal intubated at this time   STG Expiration Date 01/21/19   Short Term Goal #1 1  Follow >75% of mobility commands during session  2  Complete long sit in chair position with mod Ax1  3  Improve LE strength to 4/5 to A with transfers  5  PT to see for additional mobility assessment   Plan   Treatment/Interventions Bed mobility;Gait training;LE strengthening/ROM; Functional transfer training;Patient/family training;OT;Spoke to nursing   PT Frequency 2-3x/wk  (consider increased frequency if following increased commands)   Recommendation   Recommendation Short-term skilled PT   Equipment Recommended (TBD)   PT - OK to Discharge (to rehab when medically stable )   Barthel Index   Feeding 0   Bathing 0   Grooming Score 0   Dressing Score 0   Bladder Score 0   Bowels Score 0   Toilet Use Score 0   Transfers (Bed/Chair) Score 0   Mobility (Level Surface) Score 0   Stairs Score 0   Barthel Index Score 0           Jammie Baltazar, PT

## 2019-01-09 NOTE — SOCIAL WORK
Pt is intubated and sedated  CM met pt's wife at bedside, introduced self and made aware of CM role at dc  CM discuss with pt's wife dcp for pt  PT and OT's recommendation is STR  CM gave pt's wife Tirsodima Bardalestania STR referral list to review

## 2019-01-09 NOTE — PLAN OF CARE
Problem: PHYSICAL THERAPY ADULT  Goal: Performs mobility at highest level of function for planned discharge setting  See evaluation for individualized goals  Treatment/Interventions: Bed mobility, Gait training, LE strengthening/ROM, Functional transfer training, Patient/family training, OT, Spoke to nursing  Equipment Recommended:  (TBD)       See flowsheet documentation for full assessment, interventions and recommendations  Prognosis: Fair  Problem List: Decreased strength, Decreased range of motion, Decreased endurance, Impaired balance, Decreased mobility, Decreased coordination, Decreased safety awareness, Decreased cognition  Assessment: Pt is 60 y o  male seen for PT evaluation s/p admit to [facility] on 1/3/2019 w/ Acute pancreatitis s/p ERCP with new onset Afib  PT consulted to assess pt's functional mobility and d/c needs  Order placed for PT eval and tx  Comorbidities affecting pt's physical performance at time of assessment include: intubated, fatigue  PTA, pt was ambulates unrestricted distances and all terrain, lives in multi-level home and works full time  Personal factors affecting pt at time of IE include: inaccessible home environment, stairs to enter home, inability to ambulate household distances, decreased cognition, decreased initiation and engagement, unable to perform physical activity, inability to perform IADLs and inability to perform ADLs  Please find objective findings from PT assessment regarding body systems outlined above with impairments and limitations including weakness, decreased ROM, impaired balance, decreased endurance, decreased activity tolerance, decreased functional mobility tolerance and decreased safety awareness  Pt required constant verbal and tactile stimulation to maintain alert throughout session with severe fatigue  Pt inconsistently following single step commands for mobility   Demonstrated ability to move B/L ankle although did not follow instruction for additional LE testing  The following objective measures performed on IE also reveal limitations: Barthel Index: 0/100  Pt's clinical presentation is currently unstable/unpredictable seen in pt's presentation of intubated, critical care monitoring  Pt to benefit from continued PT tx to address deficits as defined above and maximize level of functional independent mobility and consistency  From PT/mobility standpoint, recommendation at time of d/c would be IP rehab pending progress in order to facilitate return to PLOF  Barriers to Discharge: Inaccessible home environment     Recommendation: Short-term skilled PT     PT - OK to Discharge:  (to rehab when medically stable )    See flowsheet documentation for full assessment

## 2019-01-09 NOTE — PLAN OF CARE
Problem: OCCUPATIONAL THERAPY ADULT  Goal: Performs self-care activities at highest level of function for planned discharge setting  See evaluation for individualized goals  Treatment Interventions: ADL retraining, Functional transfer training, UE strengthening/ROM, Endurance training, Cognitive reorientation, Patient/family training, Equipment evaluation/education, Neuromuscular reeducation, Fine motor coordination activities, Compensatory technique education, Continued evaluation, Energy conservation, Activityengagement          See flowsheet documentation for full assessment, interventions and recommendations  Limitation: Decreased ADL status, Decreased UE ROM, Decreased UE strength, Decreased Safe judgement during ADL, Decreased cognition, Decreased endurance, Decreased sensation, Visual deficit, Decreased fine motor control, Decreased self-care trans, Decreased high-level ADLs  Prognosis: Fair  Assessment: Pt is a 59 y/o male seen for OT eval s/p adm to [facility] w/ acute onset of bandlike abdominal pain  Pt is dx'd w/ acute pancreatitis  Comorbidities include a h/o GERD  Pt with active OT orders and up and OOB as tolerated orders  Pt is s/p the following procedure "ENDOSCOPIC RETROGRADE CHOLANGIOPANCREATOGRAPHY (ERCP) (N/A)"  Pt lives with spouse in split level home, 2 BRENDA, 2 steps inside  Pt was I w/  ADLS and IADLS, drove, & required no use of DME PTA  Pt is currently demonstrating the following occupational deficits: Total A overall ADLS, bed mobility  Unable to assess transfers/functional mobility at this time   These deficits that are impacting pt's baseline areas of occupation are a result of the following impairments: pain, endurance, activity tolerance, functional mobility, forward functional reach, balance, trunk control, functional standing tolerance, unsupportive home environment, decreased I w/ ADLS/IADLS, strength, ROM, cognitive impairments, decreased safety awareness and decreased insight into deficits  The following Occupational Performance Areas to address include: grooming, bathing/shower, toilet hygiene, dressing, socialization, health maintenance, functional mobility, clothing management, household maintenance and job performance/volunteering  Pt scored overall 0/100 on the Barthel Index  Based on the aforementioned OT evaluation, functional performance deficits, and assessments, pt has been identified as a high complexity evaluation  Recommend STR upon D/C when medically stable   Pt to continue to benefit from acute immediate OT services to address the following goals 3-5x/week to  w/in 10-14 days:      OT Discharge Recommendation: Short Term Rehab  OT - OK to Discharge: Yes (when medically stable)      Comments: Chiqui Silvestre MS, OTR/L

## 2019-01-09 NOTE — OCCUPATIONAL THERAPY NOTE
[address]  Evaluation     Patient Name: Roque Mc  [id number] Date: 1/9/2019  Problem List  Patient Active Problem List   Diagnosis    Acute pancreatitis    Pancreatitis     Past Medical History  Past Medical History:   Diagnosis Date    Gastroesophageal reflux      Past Surgical History  Past Surgical History:   Procedure Laterality Date    CHOLECYSTECTOMY      COLONOSCOPY N/A 3/21/2016    Procedure: COLONOSCOPY;  Surgeon: Pastora Tyson DO;  [facility]; Service:     ERCP N/A 1/4/2019    Procedure: ENDOSCOPIC RETROGRADE CHOLANGIOPANCREATOGRAPHY (ERCP); Surgeon: Caren Kuhn MD;  [facility]; Service: Gastroenterology    [place] EGD TRANSORAL BIOPSY SINGLE/MULTIPLE N/A 3/21/2016    Procedure: ESOPHAGOGASTRODUODENOSCOPY (EGD); Surgeon: Pastora Tyson DO;  [facility]; Service: General           01/09/19 1156   Note Type   Note type Eval/Treat   Restrictions/Precautions   Weight Bearing Precautions Per Order No   Other Precautions Agitated;Cognitive; Bed Alarm; Restraints;Multiple lines;Telemetry; Fall Risk;Pain  (intubated; peg tube; B/L wrist restraints)   Pain Assessment   Pain Assessment FLACC   Pain Rating: FLACC (Rest) - Face 0   Pain Rating: FLACC (Rest) - Legs 0   Pain Rating: FLACC (Rest) - Activity 0   Pain Rating: FLACC (Rest) - Cry 0   Pain Rating: FLACC (Rest) - Consolability 0   Score: FLACC (Rest) 0   Pain Rating: FLACC (Activity) - Face 0   Pain Rating: FLACC (Activity) - Legs 0   Pain Rating: FLACC (Activity) - Activity 0   Pain Rating: FLACC (Activity) - Cry 0   Pain Rating: FLACC (Activity) - Consolability 0   Score: FLACC (Activity) 0   Home Living   Type of Home House   Home Layout Two level; Other (Comment)  (2 BRENDA, 2 steps inside)   Home Equipment Other (Comment)  (none)   Additional Comments Pt unable to report 2* to intubation, per chart review pt lives in split level home w/ 2 BRENDA, 2 steps inside   Prior Function   Level of [place] Independent with ADLs and functional mobility   Lives With Spouse   Receives Help From Family   ADL Assistance Independent   IADLs Independent   Vocational Full time employment   Comments Per chart review, pt I PTA, no DME   Lifestyle   Autonomy I w/ ADLS/IADLS, transfers/functional mobility PTA   Reciprocal Relationships Pt lives w/ spouse per chart review   Service to Others Works full time; pt unable to report what he does   Intrinsic Gratification Pt unable to report   Psychosocial   Psychosocial (WDL) X   Patient Behaviors/Mood Appropriate for situation   ADL   Eating Assistance Unable to assess   Grooming Assistance 1  Total Assistance   [address] 1  Total Assistance   [address] 1  Total Assistance   [address] 1  Total Assistance   [address] 1  Total [address]  1  Total [address] 1  Total Assistance   Bed Mobility   Supine to Sit Unable to assess   Sit to Supine Unable to assess   Transfers   Sit to Stand Unable to assess   Functional Mobility   Additional Comments Unable to assess   Balance   Static Sitting Poor -   Activity Tolerance   Activity Tolerance Patient limited by fatigue;Treatment limited secondary to medical complications (Comment)   Medical Staff Made Aware RNLaverne, PT, Suzanne   Nurse Made Aware yes, [place]   RUE Assessment   RUE Assessment X  (noted wingling in fingers; PROM WFL; no AROM observed)   LUE Assessment   LUE Assessment X  (noted some active digits/wrist mvmt; PROM WFL; limited AROM )   Hand Function   Gross Motor Coordination Impaired   Fine Motor Coordination Impaired   Sensation   Light Touch Not tested   Proprioception   Proprioception Not tested   Cognition   Overall Cognitive Status Unable to assess   Arousal/Participation Lethargic  (semi-arousable; minimally responsive)   Attention ARACELI   Orientation Level Oriented to person;Disoriented to place; Disoriented to time;Disoriented to situation   Memory Unable to assess   Following Commands Follows one step commands inconsistently  (approx 10% of the time)   Comments Pt is very lethargic; mainly keeps eyes closed throughout session; makes grimace occasionally to name; able slightly wiggle fingers/toes but inconsistently folllowing commands and minially responsive   Assessment   Limitation Decreased ADL status; Decreased UE ROM; Decreased UE strength;Decreased Safe judgement during ADL;Decreased cognition;Decreased endurance;Decreased sensation;Visual deficit; Decreased fine motor control;Decreased self-care trans;Decreased high-level ADLs   Prognosis Fair   Assessment Pt is a 61 y/o male seen for OT eval s/p adm to [facility] w/ acute onset of bandlike abdominal pain  Pt is dx'd w/ acute pancreatitis  Comorbidities include a h/o GERD  Pt with active OT orders and up and OOB as tolerated orders  Pt is s/p the following procedure "ENDOSCOPIC RETROGRADE CHOLANGIOPANCREATOGRAPHY (ERCP) (N/A)"  Pt lives with spouse in split level home, 2 BRENDA, 2 steps inside  Pt was I w/  ADLS and IADLS, drove, & required no use of DME PTA  Pt is currently demonstrating the following occupational deficits: Total A overall ADLS, bed mobility  Unable to assess transfers/functional mobility at this time  These deficits that are impacting pt's baseline areas of occupation are a result of the following impairments: pain, endurance, activity tolerance, functional mobility, forward functional reach, balance, trunk control, functional standing tolerance, unsupportive home environment, decreased I w/ ADLS/IADLS, strength, ROM, cognitive impairments, decreased safety awareness and decreased insight into deficits  The following Occupational Performance Areas to address include: grooming, bathing/shower, toilet hygiene, dressing, socialization, health maintenance, functional mobility, clothing management, household maintenance and job performance/volunteering   Pt scored overall 0/100 on the Barthel Index  Based on the aforementioned OT evaluation, functional performance deficits, and assessments, pt has been identified as a high complexity evaluation  Recommend STR upon D/C when medically stable  Pt to continue to benefit from acute immediate OT services to address the following goals 3-5x/week to  w/in 10-14 days:    Goals   Patient Goals not able to express 2* to intubation   LTG Time Frame 10-   Long Term Goal #1 see below listed goals   Plan   Treatment Interventions ADL retraining;Functional transfer training;UE strengthening/ROM; Endurance training;Cognitive reorientation;Patient/family training;Equipment evaluation/education; Neuromuscular reeducation; Fine motor coordination activities; Compensatory technique education;Continued evaluation; Energy conservation; Activityengagement   Goal Expiration Date 19   OT Frequency 3-5x/wk   Recommendation   OT Discharge Recommendation Short Term Rehab   OT - OK to Discharge Yes  (when medically stable)   Barthel Index   Feeding 0   Bathing 0   Grooming Score 0   Dressing Score 0   Bladder Score 0   Bowels Score 0   Toilet Use Score 0   Transfers (Bed/Chair) Score 0   Mobility (Level Surface) Score 0   Stairs Score 0   Barthel Index Score 0   Modified Valentine Scale   Modified Valentine Scale 5     GOALS    1) Pt will complete rolling left/right in bed with Mod assist, as prerequisite for further engagement in ADLS     2) Pt will complete supine to sit transfer with Mod A using B/L UEs to initiate bed mobility     3) Pt will tolerate sitting at EOB 20 minutes with Mod assist and stable vital signs, as prerequisite for further engagement in ADLS     4) Pt will complete grooming task with Min assist and increased time to increase independence in functional tasks    5) Pt will increase B/L UE ROM 1/2 MMT to increase functional UB use during ADLS     6) Pt will complete UB ADLS with Mod A and use of AD/DME as needed to increase independence in functional tasks    7) Pt will complete LB ADLS with Mod A and use of AD/DME as needed to increase independence in functional tasks    8) Pt will follow 100% simple one step verbal commands and be A/Ox4 consistently t/o use of external environmental cues to increased awareness for functional tasks    9) Pt will demonstrate 75% carryover of E C  techniques t/o fx'l I/ADL/ leisure tasks w/o cues s/p skilled education    10) Pt shahram tolerate coma stim w/ G participation/tolerance for 20 mins to increase arousal for engagement in functional tasks    Shaji Guzman MS, OTR/L

## 2019-01-09 NOTE — RESPIRATORY THERAPY NOTE
RT Ventilator Management Note  Zay Ward 60 y o  male MRN: [id number]  Unit/Bed#: [facility] -01 Encounter: [id number]      Daily Screen       1/8/2019 1142 1/9/2019 0754          Patient safety screen outcome[de-identified] Passed Failed      Not Ready for Weaning due to[de-identified] - PEEP > 8cmH2O;Underline problem not resolved;Poor inspiratory effort              Physical Exam:   Assessment Type: Assess only  General Appearance: Sedated  Respiratory Pattern: Assisted  Chest Assessment: Chest expansion symmetrical  Subjective Data: Rn made me and resident aware that the pt bit through his hi-lo tube  Resp Comments: Pt awake and responsive  02 decreased to 50%  Attempted SBT  Pt had increased resp rate >40, increased rsbi and appeared agonal with resp pattern  Pt placed back on AC after only a few min  on sbt  psv 10

## 2019-01-09 NOTE — RESPIRATORY THERAPY NOTE
RT Ventilator Management Note  Hal Michelle 60 y o  male MRN: [id number]  Unit/Bed#: [address] [facility] -01 Encounter: [id number]      Daily Screen       1/8/2019 0729 1/8/2019 1142          Patient safety screen outcome[de-identified] Failed Passed      Not Ready for Weaning due to[de-identified] PEEP > 8cmH2O;Underline problem not resolved -              Physical Exam:   Assessment Type: Assess only  General Appearance: Sedated  Respiratory Pattern: Assisted  Chest Assessment: Chest expansion symmetrical  Bilateral Breath Sounds: Diminished, Coarse  Cough: Non-productive  Suction: ET Tube  Subjective Data: Rn made me and resident aware that the pt bit through his hi-lo tube  Resp Comments: Pt is stable on current vent settings  No distress noted  No changes made to vent  Will continue to moniter pt

## 2019-01-09 NOTE — PROGRESS NOTES
Progress Note - Critical Care   Albin Shay 60 y o  male MRN: [id number]  Unit/Bed#:  -01 Encounter: [id number]    Assessment: 63yM w/ severe biliary pancreatitis and presumed cholangitis s/p ERCP 1/4    Plan:           Neuro:   Acute encephalopathy 2/2 cholangitis  Follows commands  - On fentanyl at 75ml/hr  Decrease to 25    - Precedex at 0 4  - No standing sedation - will give versed for agitation (Given this AM)  - Restraints must be continued                 CV:   New onset unstable Afib requiring multiple attempts at cardioversion AM 1/6, placed on amiodarone, given digoxin and Lopressor  Phenylephrine, levo and vasopressin were started then as well  He has since converted to sinus  Pressors have come off  Has been mostly hypertensive  BP range 124/64 - 190/78  - Continue heparin gtt  PTT have been low (45 from 36 from 35)  - Continue with amiodarone at 1  Continue w/ amiodarone 200mg TID thru NGT  Continue w/ lopressor 4Y7 w/ hold parameters (Received last 5 doses)  - Cardiology is following                 Lung:   Mixed picture of volume overload and aspiration around time of ERCP  Intubated 1/5  On AC -60-8  - F/u CXR  - Goal wean vent as diurese                 GI:   Abdomen tender 2/2 pancreatitis and cholangitis  - F/u AM labs  - Continue Jevity 1 5 at goal 71 (Nutrition rec goal of 63)  - Dark stool - will monitor  - GI is following                 FEN:   AZ - improving  Cr yesterday 1 4 from 1 65 from 2 18 from 1 67 w/ normal baseline  Received 20 x 3 IV lasix yesterday  Giving another 20 IV this AM  3 39 In yesterday  4 63 UO (1 78ml/kg/hr)  1 24L negative in 24 hrs   27 8L positive since admission  - Further diuresis throughout day  - TF at goal                 :   UO 4 63  Clear yellow urine  - Further diuresis  -Continue rodriguez - necessary for accurate IO given mental status                 ID:   Afebrile   Completed 5 days of zosyn  - F/u Blood cultures from 1/4 - No growth x 4days                 Heme:   Hep ACS gtt  Hgb pending  Has been stable  Endo:   Hydrocortisone at 50q12  - Decrease to 50qd                            Msk/Skin:   Frequent turning                 Disposition:   Critically ill  Required ICU management    Chief Complaint: Unable to obtain ROS given mental status  Shakes head that he does not have pain, yes to he is anxious  HPI/24hr events:   Diuresis continued  Making good urine  Physical Exam:     Neuro: Follows commands  Moves all extremities  CV: RRR off of pressors  Hypertensive  Pulm: Remains intubated  -905-26-8  Abd: Soft, non-tender, non-distended  : Jones w/ clear yellow urine  Extremities: Edema of hands, feet    Vitals:    19 0041 19 0200 19 0307 19 0430   BP:       Pulse: 68 76 68    Resp: 20 (!) 25 20    Temp:       TempSrc:       SpO2: 94% 94% 95% 96%   Weight:       Height:         Arterial Line BP: 168/82  Arterial Line MAP (mmHg): 104 mmHg    Temperature:   Temp (24hrs), Av 7 °F (37 1 °C), Min:98 2 °F (36 8 °C), Max:99 3 °F (37 4 °C)    Current: Temperature: 98 9 °F (37 2 °C)    Weights:   IBW: 86 8 kg    Body mass index is 26 78 kg/m²  Weight (last 2 days)     None          Hemodynamic Monitoring:  SvO2:        Non-Invasive/Invasive Ventilation Settings:  Respiratory    Lab Data (Last 4 hours)    None         O2/Vent Data (Last 4 hours)      430           Vent Mode AC/VC       Resp Rate (BPM) (BPM) 20       Vt (mL) (mL) 450       FIO2 (%) (%) 60       PEEP (cmH2O) (cmH2O) 8       MV 11 2                 No results found for: PHART, KFC6AOD, PO2ART, OKA9CCH, M2UMCXMN, BEART, SOURCE  SpO2: SpO2: 96 %    Intake and Outputs:  I/O       701 -  0700 701 -  07    I V  (mL/kg) 3366 7 (33 7) 5366 7 (53 8)    NG/GT  135    IV Piggyback 20008  7    Total Intake(mL/kg) 5366 7 (53 8) 7860 4 (78 8)    Urine (mL/kg/hr) 1000 1635 (0 7)    Emesis/NG output  450    Total Output 1000 2085    Net +4366 7 +5775 4              Nutrition:        Diet Orders            Start     Ordered    01/07/19 1104  Diet Enteral/Parenteral; Tube Feeding No Oral Diet; Jevity 1 5; Continuous; 71  Diet effective now     Comments:  Increase TF by 10 cc every 8 hours to a goal of 65   Question Answer Comment   Diet Type Enteral/Parenteral    Enteral/Parenteral Tube Feeding No Oral Diet    Tube Feeding Formula: Jevity 1 5    Bolus/Cyclic/Continuous Continuous    Tube Feeding Goal Rate (mL/hr): 71    RD to adjust diet per protocol? Yes        01/07/19 1119          Labs:     Results from last 7 days  Lab Units 01/08/19  0534 01/07/19  1955 01/07/19  0443 01/06/19  0427 01/03/19  0744   WBC Thousand/uL 9 35 6 77 6 15 3 60*  < > 11 35*   HEMOGLOBIN g/dL 11 2* 11 4* 12 5 11 8*  < > 15 8   I STAT HEMOGLOBIN   --   --   --   --   < >  --    HEMATOCRIT % 32 7* 33 7* 36 4* 35 3*  < > 46 1   HEMATOCRIT, ISTAT   --   --   --   --   < >  --    PLATELETS Thousands/uL 80* 79* 101* 87*  < > 162   NEUTROS PCT %  --   --   --   --   --  88*   MONOS PCT %  --   --   --   --   --  7   MONO PCT % 4  --  1* 6  < >  --    < > = values in this interval not displayed       Results from last 7 days  Lab Units 01/08/19 2322 01/08/19  0534 01/07/19 0443 01/06/19  0427 01/05/19  0314   SODIUM mmol/L  --  139 138 141  --    POTASSIUM mmol/L 3 5 4 0 3 8 4 0  --    CHLORIDE mmol/L  --  107 106 109*  --    CO2 mmol/L  --  27 25 25  --    CO2, I-STAT mmol/L  --   --   --   --  24   BUN mg/dL  --  39* 40* 42*  --    CREATININE mg/dL  --  1 40* 1 65* 2 18*  --    CALCIUM mg/dL  --  7 1* 7 4* 7 0*  --    ALK PHOS U/L  --  78 38* 26*  --    ALT U/L  --  100* 132* 202*  --    AST U/L  --  81* 76* 96*  --    GLUCOSE, ISTAT mg/dl  --   --   --   --  146*       Results from last 7 days  Lab Units 01/08/19  0534 01/05/19  0259 01/04/19  1348   MAGNESIUM mg/dL 3 1* 2 1 1 9       Results from last 7 days  Lab Units 01/08/19  2322 01/08/19  0534 01/05/19  0259   PHOSPHORUS mg/dL 1 2* 1 3* 2 9        Results from last 7 days  Lab Units 01/08/19  2322 01/08/19  1707 01/08/19  0946  01/07/19  1955   INR   --   --   --   --  1 03   PTT seconds 45* 36 35  < > 28   < > = values in this interval not displayed  Results from last 7 days  Lab Units 01/07/19  0443   LACTIC ACID mmol/L 1 7       0  Lab Value Date/Time   TROPONINI <0 02 01/03/2019 0818       Imaging: Xr Chest Portable    Result Date: 1/4/2019  Impression: Left greater than right pleural effusions  Pulmonary vascular congestive failure  Satisfactory positioning of endotracheal and enteric tubes  Workstation performed: [id number]     Xr Chest 2 Views    Result Date: 1/3/2019  Impression: No active pulmonary disease on examination which is somewhat limited secondary to low lung volumes  Workstation performed: [id number]     Fl Ercp Biliary Only    Result Date: 1/4/2019  Impression: Fluoroscopic guidance provided for surgical procedure  Please refer to the separate procedure notes for additional details  Workstation performed: [id number]     Ct Abdomen Pelvis W Contrast    Result Date: 1/3/2019  Impression: Acute interstitial edematous pancreatitis with extensive pancreatic and peripancreatic inflammatory edema  Within the limitations imposed by respiratory motion throughout the upper abdomen there is no convincing evidence of pancreatic necrosis or underlying pancreatic mass  Nonetheless, when better pain control can be achieved and the patient is able to tolerate better breath-hold imaging, more accurate characterization utilizing pancreatic MRI with MRCP is recommended for further investigation of the possible etiology and complications of acute pancreatitis  The study was marked in [facility] for immediate notification  Workstation performed: [id number]  I have personally reviewed pertinent reports        EKG: N/A    Micro:  Lab Results   Component Value Date BLOODCX No Growth After 4 Days  01/04/2019    BLOODCX No Growth After 4 Days   01/04/2019       Allergies: No Known Allergies    Medications:   Scheduled Meds:    Current Facility-Administered Medications:  acetaminophen 650 mg Oral Q6H PRN Gino Jarrell    amiodarone 1 mg/min Intravenous Continuous Lajuan Mcardle, PA-C Last Rate: 1 mg/min (01/08/19 0543)   amiodarone 200 mg Oral TID With Meals Fred Munroe MD    chlorhexidine 15 mL Swish & Spit Q12H [facility] Fawn Zheng PA-C    dexmedetomidine 0 1-0 7 mcg/kg/hr Intravenous Titrated Hugh Richardson MD Last Rate: 0 7 mcg/kg/hr (01/09/19 0334)   fentaNYL 75 mcg/hr Intravenous Continuous Hugh Richardson MD Last Rate: 75 mcg/hr (01/09/19 0030)   furosemide 20 mg Intravenous Once Hugh Richardson MD    heparin (porcine) 3-30 Units/kg/hr (Order-Specific) Intravenous Titrated Althea Simeon MD Last Rate: 25 1 Units/kg/hr (01/09/19 0257)   heparin (porcine) 2,000 Units Intravenous PRN Fred Munroe MD    heparin (porcine) 4,000 Units Intravenous PRN Fred Munroe MD    hydrocortisone sodium succinate 50 mg Intravenous Q12H [facility] Hugh Richardson MD    HYDROmorphone 0 5 mg Intravenous Q3H PRN Hugh Richardson MD    influenza vaccine 0 5 mL Intramuscular Prior to discharge Warden Pushpa MD    insulin lispro 1-6 Units Subcutaneous Q6H [facility] Olimpia Roman DO    metoprolol 5 mg Intravenous Q6H Hugh Richardson MD    midazolam 2 mg Intravenous Once Hugh Richardson MD    ondansetron 4 mg Intravenous Q6H PRN Liss Griffin PA-C    perflutren lipid microsphere 0 8 mL/min Intravenous Once in imaging Ron Dang MD    sodium phosphate 30 mmol Intravenous Once Althea Simeon MD Last Rate: 30 mmol (01/09/19 0202)     Continuous Infusions:    amiodarone 1 mg/min Last Rate: 1 mg/min (01/08/19 0543)   dexmedetomidine 0 1-0 7 mcg/kg/hr Last Rate: 0 7 mcg/kg/hr (01/09/19 0334)   fentaNYL 75 mcg/hr Last Rate: 75 mcg/hr (01/09/19 0030)   heparin (porcine) 3-30 Units/kg/hr (Order-Specific) Last Rate: 25 1 Units/kg/hr (01/09/19 0257)     PRN Meds:    acetaminophen 650 mg Q6H PRN   heparin (porcine) 2,000 Units PRN   heparin (porcine) 4,000 Units PRN   HYDROmorphone 0 5 mg Q3H PRN   influenza vaccine 0 5 mL Prior to discharge   ondansetron 4 mg Q6H PRN   perflutren lipid microsphere 0 8 mL/min Once in imaging       VTE Pharmacologic Prophylaxis: Sequential compression device (Venodyne)  and Enoxaparin (Lovenox)  VTE Mechanical Prophylaxis: sequential compression device    Invasive lines and devices: Invasive Devices     Central Venous Catheter Line            CVC Central Lines 01/05/19 Triple 20cm 3 days          Arterial Line            Arterial Line 01/07/19 Left Radial 2 days          Drain            NG/OG/Enteral Tube Nasogastric 16 Fr Right nares 4 days    Urethral Catheter 16 Fr  4 days          Airway            ETT  Cuffed 8 mm 3 days                   Counseling / Coordination of Care  Total Critical Care time spent 25 minutes excluding procedures, teaching and family updates  Code Status: Level 1 - Full Code     Portions of the record may have been created with voice recognition software  Occasional wrong word or "sound a like" substitutions may have occurred due to the inherent limitations of voice recognition software  Read the chart carefully and recognize, using context, where substitutions have occurred       Sami English MD

## 2019-01-09 NOTE — PROGRESS NOTES
Cardiology Progress note  Unit/Bed#:  - Encounter: [id number]        Ru Landry 60 y o  male [id number]  Hospital Stay Days: 6    Assessment and Plan      Current Problem List   Principal Problem:    Acute pancreatitis  Active Problems:    Pancreatitis    Assessment/Plan:  1   A-Fib with RVR - patient now continues in sinus rhythm                  ? Continue p o  Amiodarone, continue amiodarone drip, will transition off drip once patient is extubated  ? Echo showed systolic function, EF of 28%, moderatly dilated  Atrium bilaterally moderately dilated  Mitral valve within normal limits  Aortic valve WNL   Mild tricuspid regurg  IVC moderately dilated  ? Lopressor PRN for rate control  ? Continue telemetry  ? Monitor electrolytes  ? On heparin drip, continue       2   Severe Pancreatitis  ?  S/P ERCP sphincterotomy           3  Possible Ascending cholangitis/Aspiration PNA  ?  Per ICU team      Subjective     Patient seen and examined at bedside  No acute events overnight  Patient remains on amio drip + PO amiodarone  Patient still in sinus rhythm, still intubated, now off antibiotics      Objective     Vitals: Temp (24hrs), Av 1 °F (37 3 °C), Min:98 9 °F (37 2 °C), Max:99 3 °F (37 4 °C)  Current: Temperature: 99 °F (37 2 °C)  Patient Vitals for the past 24 hrs:   BP Temp Temp src Pulse Resp SpO2   19 1200 132/76 99 °F (37 2 °C) Tympanic 72 (!) 28 93 %   19 1143 - - - - - 93 %   19 0900 152/81 - - 90 (!) 32 94 %   19 0800 154/93 - - 86 (!) 30 94 %   19 0754 - - - - - 94 %   19 0700 - - - 62 20 94 %   19 0645 - - - 64 22 94 %   19 0430 - - - - - 96 %   19 0400 - - - 68 22 97 %   19 0330 - - - 64 (!) 24 96 %   19 0307 - - - 68 20 95 %   19 0200 - - - 76 (!) 25 94 %   19 0115 - - - 68 21 94 %   19 0041 - - - 68 20 94 %   19 0033 - 98 9 °F (37 2 °C) Oral - - -   19 0001 - - - 82 (!) 28 94 %   01/08/19 2350 - - - 70 22 93 %   01/08/19 1939 - - - - - 93 %   01/08/19 1900 - - - 70 22 94 %   01/08/19 1800 - - - 68 22 94 %   01/08/19 1600 - 99 3 °F (37 4 °C) Oral 82 (!) 28 93 %   01/08/19 1531 - - - - - 94 %   01/08/19 1500 - - - 66 21 94 %   01/08/19 1400 - - - 70 21 92 %    Body mass index is 26 78 kg/m²  Physical Exam:  GENERAL: Patient intubated  HEENT:  NC/AT  CARDIAC:  RRR, +S1/S2, no S3/S4 heard, no m/g/r  PULMONARY:  CTA B/L, no wheezing/rales/rhonci, non-labored breathing  ABDOMEN:  Soft, NT/ND,no rebound/guarding/rigidity  Extremities:  No edema, cyanosis, or clubbing  NEUROLOGIC: Grossly intact  SKIN:  No rashes or erythema noted on exposed skin     Psych: Normal affect    Invasive Devices     Central Venous Catheter Line            CVC Central Lines 01/05/19 Triple 20cm 4 days          Arterial Line            Arterial Line 01/07/19 Left Radial 2 days          Drain            NG/OG/Enteral Tube Nasogastric 16 Fr Right nares 4 days    Urethral Catheter 16 Fr  4 days          Airway            ETT  Cuffed 8 mm 4 days                    Labs:     Results from last 7 days  Lab Units 01/09/19  0602 01/08/19  0534 01/07/19  1955 01/07/19  0443 01/06/19  0427 01/05/19  0314 01/05/19  0259 01/04/19  1348 01/04/19  0435 01/03/19  1429 01/03/19  0744   WBC Thousand/uL 12 22* 9 35 6 77 6 15 3 60*  --  3 34* 2 09* 3 53*  --  11 35*   HEMOGLOBIN g/dL 12 0 11 2* 11 4* 12 5 11 8*  --  16 1 15 1 17 3*  --  15 8   I STAT HEMOGLOBIN g/dl  --   --   --   --   --  15 3  --   --   --   --   --    HEMATOCRIT % 35 5* 32 7* 33 7* 36 4* 35 3*  --  47 8 46 4 51 5*  --  46 1   HEMATOCRIT, ISTAT %  --   --   --   --   --  45  --   --   --   --   --    PLATELETS Thousands/uL 72* 80* 79* 101* 87*  --  138* 139* 160 152 162   NEUTROS PCT %  --   --   --   --   --   --   --   --   --   --  88*   MONOS PCT %  --   --   --   --   --   --   --   --   --   --  7   MONO PCT % 2* 4  --  1* 6  --  6 3* 7  --   -- Results from last 7 days  Lab Units 01/09/19  0602 01/08/19  2322 01/08/19  1707 01/08/19  0946 01/08/19  0534 01/08/19  0204 01/07/19  1955 01/07/19  0443 01/06/19  0427 01/05/19  0314 01/05/19  0259 01/04/19  1348 01/04/19  0435 01/03/19  0818 01/03/19  0744   SODIUM mmol/L 143  --   --   --  139  --   --  138 141  --  140 138 138  --  137   POTASSIUM mmol/L 3 5 3 5  --   --  4 0  --   --  3 8 4 0  --  4 7 5 0 4 6  --  3 5   CHLORIDE mmol/L 107  --   --   --  107  --   --  106 109*  --  109* 110* 109*  --  106   CO2 mmol/L 28  --   --   --  27  --   --  25 25  --  23 22 22  --  26   CO2, I-STAT mmol/L  --   --   --   --   --   --   --   --   --  24  --   --   --   --   --    BUN mg/dL 36*  --   --   --  39*  --   --  40* 42*  --  37* 30* 26*  --  24   CREATININE mg/dL 1 32*  --   --   --  1 40*  --   --  1 65* 2 18*  --  1 67* 1 72* 1 38*  --  1 26   CALCIUM mg/dL 6 9*  --   --   --  7 1*  --   --  7 4* 7 0*  --  6 9* 6 7* 6 7*  --  8 4   ALK PHOS U/L 92  --   --   --  78  --   --  38* 26*  --  29* 34* 48  --  80   ALT U/L 94*  --   --   --  100*  --   --  132* 202*  --  420* 324* 144*  --  97*   AST U/L 83*  --   --   --  81*  --   --  76* 96*  --  236* 255* 103*  --  94*   GLUCOSE, ISTAT mg/dl  --   --   --   --   --   --   --   --   --  146*  --   --   --   --   --    TROPONIN I ng/mL  --   --   --   --   --   --   --   --   --   --   --   --   --  <0 02  --    MAGNESIUM mg/dL  --   --   --   --  3 1*  --   --   --   --   --  2 1 1 9  --   --   --    PHOSPHORUS mg/dL  --  1 2*  --   --  1 3*  --   --   --   --   --  2 9 2 6*  --   --   --    INR   --   --   --   --   --   --  1 03  --   --   --   --   --   --   --   --    PTT seconds 51* 45* 36 35  --  32 28  --   --   --   --   --   --   --   --    EGFR ml/min/1 73sq m 59  --   --   --  55  --   --  45 32  --  44 43 56  --  62       Results from last 7 days  Lab Units 01/09/19  0602 01/08/19  4612 01/08/19  1707 01/08/19  0946 01/08/19  5962 01/07/19 1955   INR   --   --   --   --   --  1 03   PTT seconds 51* 45* 36 35 32 28       Results from last 7 days  Lab Units 01/07/19  0443   LACTIC ACID mmol/L 1 7       Results from last 7 days  Lab Units 01/03/19  0818   TROPONIN I ng/mL <0 02     No results found for: PHART, TBE9SDT, PO2ART, END8WYP, C0BJYYXQ, BEART, SOURCE  No components found for: HIV1X2  No results found for: HAV, HEPAIGM, HEPBIGM, HEPBCAB, HBEAG, HEPCAB  No results found for: SPEP, UPEP No results found for: HGBA1C  No results found for: CHOL Lab Results   Component Value Date    HDL 59 01/03/2019    Lab Results   Component Value Date    LDLCALC 77 01/03/2019    Lab Results   Component Value Date    TRIG 52 01/03/2019     No components found for: PROCAL      Micro:    Results from last 7 days  Lab Units 01/04/19  1044   BLOOD CULTURE  No Growth After 5 Days  No Growth After 5 Days  Urinalysis:  No results found for: AMPHETUR, BDZUR, COCAINEUR, OPIATEUR, PCPUR, THCUR, ETOH, ACTMNPHEN, SALICYLATE       Invalid input(s): URIBILINOGEN        Intake and Outputs:  I/O       01/07 0701 - 01/08 0700 01/08 0701 - 01/09 0700 01/09 0701 - 01/10 0700    P  O  0      I V  (mL/kg) 2477 1 (24 8) 1651 2 (16 5) 482 3 (4 8)    NG/GT 20      IV Piggyback 100 600 100    Feedings 912 1136 410    Total Intake(mL/kg) 3509 1 (35 2) 3387 2 (33 9) 992 3 (9 9)    Urine (mL/kg/hr) 1550 (0 6) 4625 (1 9) 1200 (1 8)    Emesis/NG output 0      Total Output 1550 4625 1200    Net +1959 1 -1237 8 -207 7           Unmeasured Stool Occurrence  3 x         Nutrition:  Diet Enteral/Parenteral; Tube Feeding No Oral Diet; Jevity 1 5; Continuous; 71  Radiology Results:   XR chest portable ICU   Final Result by Pedro Hidalgo MD (01/09 3529)      Persistent bilateral pleural effusions, left greater than right, enlarged at the left lung base  Underlying interstitial edema stable  Lines and tubes stable              Workstation performed: [id number]         XR chest portable   Final Result by Anatoliy Jane MD (01/05 1400)   Left IJ catheter tip in the SVC  No pneumothorax  Central vascular prominence with bibasilar opacities and moderate to large bilateral pleural effusions  Workstation performed: [id number]         XR chest portable   Final Result by Anatoliy Jane MD (01/05 1359)      Central vascular prominence with bibasilar opacities and moderate to large bilateral pleural effusions  Workstation performed: [id number]         XR chest portable   Final Result by Edna Khan MD (01/05 1253)      Increasing large left pleural effusion  Pulmonary vascular congestive changes  Workstation performed: [id number]         XR chest portable   Final Result by Edna Khan MD (01/04 1511)      Left greater than right pleural effusions  Pulmonary vascular congestive failure  Satisfactory positioning of endotracheal and enteric tubes  Workstation performed: [id number]         [place] ERCP biliary only   Final Result by Adán Hauser MD (01/04 1347)      Fluoroscopic guidance provided for surgical procedure  Please refer to the separate procedure notes for additional details  Workstation performed: [id number]         XR chest 2 views   Final Result by Silvia Addison DO (01/03 1033)   No active pulmonary disease on examination which is somewhat limited secondary to low lung volumes  Workstation performed: [id number]         CT abdomen pelvis w contrast   Final Result by Edna Khan MD (01/03 4344)      Acute interstitial edematous pancreatitis with extensive pancreatic and peripancreatic inflammatory edema  Within the limitations imposed by respiratory motion throughout the upper abdomen there is no convincing evidence of pancreatic necrosis or    underlying pancreatic mass    Nonetheless, when better pain control can be achieved and the patient is able to tolerate better breath-hold imaging, more accurate characterization utilizing pancreatic MRI with MRCP is recommended for further investigation    of the possible etiology and complications of acute pancreatitis  The study was marked in [facility] for immediate notification        Workstation performed: [id number]         XR chest portable    (Results Pending)     Scheduled Medications:    amiodarone 200 mg TID With Meals   chlorhexidine 15 mL Q12H SANA   hydrocortisone sodium succinate 50 mg Q12H [address]   insulin lispro 2-12 Units Q6H [address]   metoprolol 5 mg Q6H     PRN MEDS:    acetaminophen 650 mg Q6H PRN   heparin (porcine) 2,000 Units PRN   heparin (porcine) 4,000 Units PRN   HYDROmorphone 0 5 mg Q2H PRN   influenza vaccine 0 5 mL Prior to discharge   ondansetron 4 mg Q6H PRN   perflutren lipid microsphere 0 8 mL/min Once in imaging     Last 24 Hour Meds: :   Medication Administration - last 24 hours from 01/08/2019 1339 to 01/09/2019 1339       Date/Time Order Dose Route Action Action by     01/08/2019 1738 piperacillin-tazobactam (ZOSYN) 3 375 g in sodium chloride 0 9 % 50 mL IVPB 0 g Intravenous Stopped Kari Miller RN     01/08/2019 1708 piperacillin-tazobactam (ZOSYN) 3 375 g in sodium chloride 0 9 % 50 mL IVPB 3 375 g Intravenous [address] Dawna Steele RN     01/09/2019 0954 chlorhexidine (PERIDEX) 0 12 % oral rinse 15 mL 15 mL Swish & Spit Given Mason Canales [facility]     01/08/2019 2028 chlorhexidine (PERIDEX) 0 12 % oral rinse 15 mL 15 mL Swish & Spit Given Kari Miller RN     01/09/2019 1126 fentaNYL 1250 mcg in sodium chloride 0 9% 125mL drip 0 mcg/hr Intravenous Stopped Dawna Steele RN     01/09/2019 0800 fentaNYL 1250 mcg in sodium chloride 0 9% 125mL drip 25 mcg/hr Intravenous Rate/Dose Change Dawna Steele RN     01/09/2019 0030 fentaNYL 1250 mcg in sodium chloride 0 9% 125mL drip 75 mcg/hr Intravenous [address] Kari Miller RN     01/09/2019 0029 fentaNYL 1250 mcg in sodium chloride 0 9% 125mL drip 0 mcg/hr Intravenous Stopped Kirstin Choudhury AMELIA Kyle     01/09/2019 1333 dexmedetomidine (PRECEDEX) 200 mcg in sodium chloride 0 9 % 50 mL infusion 1 mcg/kg/hr Intravenous [address] Candy Garcia RN     01/09/2019 1127 dexmedetomidine (PRECEDEX) 200 mcg in sodium chloride 0 9 % 50 mL infusion 1 mcg/kg/hr Intravenous Rate/Dose Change Candy Garcia RN     01/09/2019 1126 dexmedetomidine (PRECEDEX) 200 mcg in sodium chloride 0 9 % 50 mL infusion 0 7 mcg/kg/hr Intravenous [address] Candy Garcia RN     01/09/2019 0334 dexmedetomidine (PRECEDEX) 200 mcg in sodium chloride 0 9 % 50 mL infusion 0 7 mcg/kg/hr Intravenous [address] Ortiz Ratliff RN     01/09/2019 0333 dexmedetomidine (PRECEDEX) 200 mcg in sodium chloride 0 9 % 50 mL infusion 0 mcg/kg/hr Intravenous Stopped Ortiz Ratliff RN     01/09/2019 0028 dexmedetomidine (PRECEDEX) 200 mcg in sodium chloride 0 9 % 50 mL infusion 0 7 mcg/kg/hr Intravenous [address] Ortiz Ratliff RN     01/09/2019 0027 dexmedetomidine (PRECEDEX) 200 mcg in sodium chloride 0 9 % 50 mL infusion 0 mcg/kg/hr Intravenous Stopped Ortiz Ratliff RN     01/08/2019 2019 dexmedetomidine (PRECEDEX) 200 mcg in sodium chloride 0 9 % 50 mL infusion 0 7 mcg/kg/hr Intravenous [address] Ortiz Ratliff RN     01/08/2019 2018 dexmedetomidine (PRECEDEX) 200 mcg in sodium chloride 0 9 % 50 mL infusion 0 mcg/kg/hr Intravenous Stopped Ortiz Ratliff RN     01/08/2019 1641 dexmedetomidine (PRECEDEX) 200 mcg in sodium chloride 0 9 % 50 mL infusion 0 7 mcg/kg/hr Intravenous [address] Candy Garcia RN     01/08/2019 1913 vasopressin (PITRESSIN) 20 Units in sodium chloride 0 9 % 100 mL infusion 0 Units/min Intravenous Stopped Ortiz Ratliff RN     01/09/2019 0936 metoprolol (LOPRESSOR) injection 5 mg 5 mg Intravenous Given Candy Garcia RN     01/09/2019 0300 metoprolol (LOPRESSOR) injection 5 mg 5 mg Intravenous Given Ortiz Ratliff RN     01/08/2019 2028 metoprolol (LOPRESSOR) injection 5 mg 5 mg Intravenous Given Ortiz Ratliff RN 01/08/2019 1433 metoprolol (LOPRESSOR) injection 5 mg 5 mg Intravenous Given Theresa Bess RN     01/08/2019 1912 potassium chloride 20 mEq IVPB (premix) 0 mEq Intravenous Stopped [facility], RN     01/09/2019 8837 HYDROmorphone (DILAUDID) injection 0 5 mg 0 5 mg Intravenous Given Theresa Bess RN     01/09/2019 0557 HYDROmorphone (DILAUDID) injection 0 5 mg 0 5 mg Intravenous Given [facility], RN     01/09/2019 0320 HYDROmorphone (DILAUDID) injection 0 5 mg 0 5 mg Intravenous Given [facility], RN     01/09/2019 6514 HYDROmorphone (DILAUDID) injection 0 5 mg 0 5 mg Intravenous Given [facility], RN     01/08/2019 1936 HYDROmorphone (DILAUDID) injection 0 5 mg 0 5 mg Intravenous Given [facility], RN     01/08/2019 1633 HYDROmorphone (DILAUDID) injection 0 5 mg 0 5 mg Intravenous Given Theresa Bess RN     01/09/2019 1239 heparin (porcine) 25,000 units in 250 mL infusion (premix) 27 1 Units/kg/hr Intravenous [address] [facility]     01/09/2019 0933 heparin (porcine) 25,000 units in 250 mL infusion (premix) 27 1 Units/kg/hr Intravenous Rate/Dose Change Theresa Bess RN     01/09/2019 0257 heparin (porcine) 25,000 units in 250 mL infusion (premix) 25 1 Units/kg/hr Intravenous [address] [facility]      01/09/2019 0256 heparin (porcine) 25,000 units in 250 mL infusion (premix) 0 Units/kg/hr Intravenous Stopped [facility], RN     01/09/2019 0006 heparin (porcine) 25,000 units in 250 mL infusion (premix) 25 1 Units/kg/hr Intravenous Rate/Dose Change [facility], RN     01/08/2019 1946 heparin (porcine) 25,000 units in 250 mL infusion (premix) 23 11 Units/kg/hr Intravenous Rate/Dose Change [facility]     01/08/2019 1826 heparin (porcine) 25,000 units in 250 mL infusion (premix) 23 111 Units/kg/hr Intravenous Rate/Dose Change Theresa Bess RN     01/08/2019 1825 heparin (porcine) injection 4,000 Units 4,000 Units Intravenous Given [place] Callahan RN 01/09/2019 0932 heparin (porcine) injection 2,000 Units 2,000 Units Intravenous Given Theresa Bess RN     01/09/2019 0012 heparin (porcine) injection 2,000 Units 2,000 Units Intravenous Given [place] AMELIA Callahan     01/09/2019 1237 insulin lispro (HumaLOG) 100 units/mL subcutaneous injection 1-6 Units 1 Units Subcutaneous Not Given Theresa Bess RN     01/09/2019 0617 insulin lispro (HumaLOG) 100 units/mL subcutaneous injection 1-6 Units 2 Units Subcutaneous Given [place] AMELIA Callahan     01/08/2019 2327 insulin lispro (HumaLOG) 100 units/mL subcutaneous injection 1-6 Units 2 Units Subcutaneous Given [place] AMELIA Callahan     01/08/2019 1708 insulin lispro (HumaLOG) 100 units/mL subcutaneous injection 1-6 Units 2 Units Subcutaneous Given Theresa Bess RN     01/08/2019 1431 insulin lispro (HumaLOG) 100 units/mL subcutaneous injection 1-6 Units 2 Units Subcutaneous Given Theresa Bess RN     01/09/2019 3042 hydrocortisone sodium succinate (PF) (Solu-CORTEF) injection 50 mg 50 mg Intravenous Given Theresa Bess RN     01/08/2019 2028 hydrocortisone sodium succinate (PF) (Solu-CORTEF) injection 50 mg 50 mg Intravenous Given [place] Sindy RN     01/08/2019 1600 potassium phosphate 30 mmol in sodium chloride 0 9 % 250 mL infusion 0 mmol Intravenous Stopped Theresa Bess RN     01/09/2019 1239 amiodarone tablet 200 mg 200 mg Oral Given Theresa Bess RN     01/09/2019 0800 amiodarone tablet 200 mg 200 mg Oral Given Theresa Bess RN     01/08/2019 1633 amiodarone tablet 200 mg 200 mg Oral Given Theresa Bess RN     01/08/2019 1421 furosemide (LASIX) injection 20 mg 20 mg Intravenous Given Theresa Bess RN     01/08/2019 1815 furosemide (LASIX) injection 20 mg 20 mg Intravenous Given Theresa Bess RN     01/09/2019 0202 sodium phosphate 30 mmol in dextrose 5 % 250 mL Infusion 30 mmol Intravenous [address] [facility] Sindy,      01/09/2019 0250 potassium chloride 40 mEq IVPB (premix) 0 mEq Intravenous Stopped Rene Ryan, RN     01/09/2019 0050 potassium chloride 40 mEq IVPB (premix) 40 mEq Intravenous [address] Rene Ryan RN     01/09/2019 6825 midazolam (VERSED) injection 2 mg 2 mg Intravenous Given Rene Ryan RN     01/09/2019 3505 furosemide (LASIX) injection 20 mg 20 mg Intravenous Given Rene Ryan RN     01/09/2019 1200 potassium chloride 40 mEq IVPB (premix) 0 mEq Intravenous Stopped Corrinne Bonier, RN     01/09/2019 0936 potassium chloride 40 mEq IVPB (premix) 40 mEq Intravenous [address] [facility]     01/09/2019 1127 LORazepam (ATIVAN) 2 mg/mL injection 2 mg 2 mg Intravenous Given Corrinne Bonier, RN     01/09/2019 1239 furosemide (LASIX) injection 40 mg 40 mg Intravenous Given Corrinne Bonier, RN     01/09/2019 1252 insulin lispro (HumaLOG) 100 units/mL subcutaneous injection 2-12 Units 2 Units Subcutaneous Given Corrinne Bonier, RN          PLEASE NOTE:  This encounter was completed utilizing the [employer] Direct Speech Voice Recognition Software  Grammatical errors, random word insertions, pronoun errors and incomplete sentences are occasional consequences of the system due to software limitations, ambient noise and hardware issues  These may be missed by proof reading prior to affixing electronic signature  Any questions or concerns about the content, text or information contained within the body of this dictation should be directly addressed to the physician for clarification  Please do not hesitate to call me directly if you have any any questions or concerns

## 2019-01-10 ENCOUNTER — APPOINTMENT (INPATIENT)
Dept: RADIOLOGY | Facility: HOSPITAL | Age: 59
DRG: 438 | End: 2019-01-10
Payer: COMMERCIAL

## 2019-01-10 LAB
ANION GAP SERPL CALCULATED.3IONS-SCNC: 5 MMOL/L (ref 4–13)
ANION GAP SERPL CALCULATED.3IONS-SCNC: 6 MMOL/L (ref 4–13)
APTT PPP: 62 SECONDS (ref 26–38)
APTT PPP: 69 SECONDS (ref 26–38)
BASE EX.OXY STD BLDV CALC-SCNC: 75.4 % (ref 60–80)
BASE EXCESS BLDV CALC-SCNC: 5.4 MMOL/L
BASOPHILS # BLD MANUAL: 0 THOUSAND/UL (ref 0–0.1)
BASOPHILS NFR MAR MANUAL: 0 % (ref 0–1)
BUN SERPL-MCNC: 33 MG/DL (ref 5–25)
BUN SERPL-MCNC: 36 MG/DL (ref 5–25)
CA-I BLD-SCNC: 0.96 MMOL/L (ref 1.12–1.32)
CALCIUM SERPL-MCNC: 6.9 MG/DL (ref 8.3–10.1)
CALCIUM SERPL-MCNC: 7.3 MG/DL (ref 8.3–10.1)
CHLORIDE SERPL-SCNC: 105 MMOL/L (ref 100–108)
CHLORIDE SERPL-SCNC: 108 MMOL/L (ref 100–108)
CO2 SERPL-SCNC: 32 MMOL/L (ref 21–32)
CO2 SERPL-SCNC: 32 MMOL/L (ref 21–32)
CREAT SERPL-MCNC: 1.23 MG/DL (ref 0.6–1.3)
CREAT SERPL-MCNC: 1.31 MG/DL (ref 0.6–1.3)
EOSINOPHIL # BLD MANUAL: 0 THOUSAND/UL (ref 0–0.4)
EOSINOPHIL NFR BLD MANUAL: 0 % (ref 0–6)
ERYTHROCYTE [DISTWIDTH] IN BLOOD BY AUTOMATED COUNT: 14.3 % (ref 11.6–15.1)
GFR SERPL CREATININE-BSD FRML MDRD: 59 ML/MIN/1.73SQ M
GFR SERPL CREATININE-BSD FRML MDRD: 64 ML/MIN/1.73SQ M
GLUCOSE SERPL-MCNC: 142 MG/DL (ref 65–140)
GLUCOSE SERPL-MCNC: 146 MG/DL (ref 65–140)
GLUCOSE SERPL-MCNC: 186 MG/DL (ref 65–140)
GLUCOSE SERPL-MCNC: 196 MG/DL (ref 65–140)
GLUCOSE SERPL-MCNC: 200 MG/DL (ref 65–140)
GLUCOSE SERPL-MCNC: 233 MG/DL (ref 65–140)
HCO3 BLDV-SCNC: 32.5 MMOL/L (ref 24–30)
HCT VFR BLD AUTO: 32.9 % (ref 36.5–49.3)
HGB BLD-MCNC: 11.3 G/DL (ref 12–17)
LYMPHOCYTES # BLD AUTO: 0.15 THOUSAND/UL (ref 0.6–4.47)
LYMPHOCYTES # BLD AUTO: 1 % (ref 14–44)
MAGNESIUM SERPL-MCNC: 2 MG/DL (ref 1.6–2.6)
MCH RBC QN AUTO: 30.1 PG (ref 26.8–34.3)
MCHC RBC AUTO-ENTMCNC: 34.3 G/DL (ref 31.4–37.4)
MCV RBC AUTO: 88 FL (ref 82–98)
METAMYELOCYTES NFR BLD MANUAL: 1 % (ref 0–1)
MONOCYTES # BLD AUTO: 0.15 THOUSAND/UL (ref 0–1.22)
MONOCYTES NFR BLD: 1 % (ref 4–12)
MYELOCYTES NFR BLD MANUAL: 2 % (ref 0–1)
NEUTROPHILS # BLD MANUAL: 13.84 THOUSAND/UL (ref 1.85–7.62)
NEUTS BAND NFR BLD MANUAL: 2 % (ref 0–8)
NEUTS SEG NFR BLD AUTO: 93 % (ref 43–75)
NRBC BLD AUTO-RTO: 0 /100 WBCS
O2 CT BLDV-SCNC: 14.3 ML/DL
PCO2 BLDV: 58.6 MM HG (ref 42–50)
PH BLDV: 7.36 [PH] (ref 7.3–7.4)
PHOSPHATE SERPL-MCNC: 2.6 MG/DL (ref 2.7–4.5)
PLATELET # BLD AUTO: 70 THOUSANDS/UL (ref 149–390)
PLATELET BLD QL SMEAR: ABNORMAL
PMV BLD AUTO: 10.4 FL (ref 8.9–12.7)
PO2 BLDV: 43.8 MM HG (ref 35–45)
POTASSIUM SERPL-SCNC: 3.4 MMOL/L (ref 3.5–5.3)
POTASSIUM SERPL-SCNC: 3.8 MMOL/L (ref 3.5–5.3)
RBC # BLD AUTO: 3.75 MILLION/UL (ref 3.88–5.62)
RBC MORPH BLD: NORMAL
SODIUM SERPL-SCNC: 143 MMOL/L (ref 136–145)
SODIUM SERPL-SCNC: 145 MMOL/L (ref 136–145)
WBC # BLD AUTO: 14.57 THOUSAND/UL (ref 4.31–10.16)

## 2019-01-10 PROCEDURE — 94003 VENT MGMT INPAT SUBQ DAY: CPT

## 2019-01-10 PROCEDURE — 71045 X-RAY EXAM CHEST 1 VIEW: CPT

## 2019-01-10 PROCEDURE — 94640 AIRWAY INHALATION TREATMENT: CPT

## 2019-01-10 PROCEDURE — 85730 THROMBOPLASTIN TIME PARTIAL: CPT | Performed by: EMERGENCY MEDICINE

## 2019-01-10 PROCEDURE — 82330 ASSAY OF CALCIUM: CPT | Performed by: SURGERY

## 2019-01-10 PROCEDURE — 94760 N-INVAS EAR/PLS OXIMETRY 1: CPT

## 2019-01-10 PROCEDURE — 82948 REAGENT STRIP/BLOOD GLUCOSE: CPT

## 2019-01-10 PROCEDURE — 85007 BL SMEAR W/DIFF WBC COUNT: CPT | Performed by: SURGERY

## 2019-01-10 PROCEDURE — 82805 BLOOD GASES W/O2 SATURATION: CPT | Performed by: EMERGENCY MEDICINE

## 2019-01-10 PROCEDURE — 84100 ASSAY OF PHOSPHORUS: CPT | Performed by: SURGERY

## 2019-01-10 PROCEDURE — 99233 SBSQ HOSP IP/OBS HIGH 50: CPT | Performed by: SURGERY

## 2019-01-10 PROCEDURE — 80048 BASIC METABOLIC PNL TOTAL CA: CPT | Performed by: SURGERY

## 2019-01-10 PROCEDURE — 85027 COMPLETE CBC AUTOMATED: CPT | Performed by: SURGERY

## 2019-01-10 PROCEDURE — 5A09357 ASSISTANCE WITH RESPIRATORY VENTILATION, LESS THAN 24 CONSECUTIVE HOURS, CONTINUOUS POSITIVE AIRWAY PRESSURE: ICD-10-PCS | Performed by: SURGERY

## 2019-01-10 PROCEDURE — 85730 THROMBOPLASTIN TIME PARTIAL: CPT | Performed by: SURGERY

## 2019-01-10 PROCEDURE — 83735 ASSAY OF MAGNESIUM: CPT | Performed by: SURGERY

## 2019-01-10 RX ORDER — HYDRALAZINE HYDROCHLORIDE 20 MG/ML
10 INJECTION INTRAMUSCULAR; INTRAVENOUS ONCE
Status: COMPLETED | OUTPATIENT
Start: 2019-01-10 | End: 2019-01-10

## 2019-01-10 RX ORDER — FUROSEMIDE 10 MG/ML
40 INJECTION INTRAMUSCULAR; INTRAVENOUS ONCE
Status: COMPLETED | OUTPATIENT
Start: 2019-01-10 | End: 2019-01-10

## 2019-01-10 RX ORDER — SODIUM CHLORIDE FOR INHALATION 0.9 %
VIAL, NEBULIZER (ML) INHALATION
Status: COMPLETED
Start: 2019-01-10 | End: 2019-01-10

## 2019-01-10 RX ORDER — NITROGLYCERIN 20 MG/100ML
5-200 INJECTION INTRAVENOUS
Status: DISCONTINUED | OUTPATIENT
Start: 2019-01-10 | End: 2019-01-11

## 2019-01-10 RX ORDER — LEVALBUTEROL 1.25 MG/.5ML
1.25 SOLUTION, CONCENTRATE RESPIRATORY (INHALATION) ONCE
Status: DISCONTINUED | OUTPATIENT
Start: 2019-01-10 | End: 2019-01-10

## 2019-01-10 RX ORDER — METOPROLOL TARTRATE 5 MG/5ML
5 INJECTION INTRAVENOUS EVERY 6 HOURS
Status: DISCONTINUED | OUTPATIENT
Start: 2019-01-10 | End: 2019-01-11

## 2019-01-10 RX ORDER — HYDROMORPHONE HCL/PF 1 MG/ML
SYRINGE (ML) INJECTION
Status: COMPLETED
Start: 2019-01-10 | End: 2019-01-10

## 2019-01-10 RX ORDER — POTASSIUM CHLORIDE 14.9 MG/ML
20 INJECTION INTRAVENOUS ONCE
Status: COMPLETED | OUTPATIENT
Start: 2019-01-10 | End: 2019-01-10

## 2019-01-10 RX ORDER — POTASSIUM CHLORIDE 29.8 MG/ML
40 INJECTION INTRAVENOUS ONCE
Status: COMPLETED | OUTPATIENT
Start: 2019-01-10 | End: 2019-01-10

## 2019-01-10 RX ORDER — FUROSEMIDE 10 MG/ML
40 INJECTION INTRAMUSCULAR; INTRAVENOUS ONCE
Status: COMPLETED | OUTPATIENT
Start: 2019-01-10 | End: 2019-01-11

## 2019-01-10 RX ORDER — LABETALOL 20 MG/4 ML (5 MG/ML) INTRAVENOUS SYRINGE
5 ONCE
Status: COMPLETED | OUTPATIENT
Start: 2019-01-10 | End: 2019-01-10

## 2019-01-10 RX ORDER — POTASSIUM CHLORIDE 20MEQ/15ML
20 LIQUID (ML) ORAL 2 TIMES DAILY
Status: DISCONTINUED | OUTPATIENT
Start: 2019-01-10 | End: 2019-01-10

## 2019-01-10 RX ADMIN — DEXMEDETOMIDINE 1.3 MCG/KG/HR: 100 INJECTION, SOLUTION, CONCENTRATE INTRAVENOUS at 11:53

## 2019-01-10 RX ADMIN — DEXMEDETOMIDINE 1.5 MCG/KG/HR: 100 INJECTION, SOLUTION, CONCENTRATE INTRAVENOUS at 00:31

## 2019-01-10 RX ADMIN — DEXMEDETOMIDINE 1.5 MCG/KG/HR: 100 INJECTION, SOLUTION, CONCENTRATE INTRAVENOUS at 02:29

## 2019-01-10 RX ADMIN — METOPROLOL TARTRATE 25 MG: 25 TABLET, FILM COATED ORAL at 15:18

## 2019-01-10 RX ADMIN — DEXMEDETOMIDINE 0.7 MCG/KG/HR: 100 INJECTION, SOLUTION, CONCENTRATE INTRAVENOUS at 23:30

## 2019-01-10 RX ADMIN — POTASSIUM CHLORIDE 20 MEQ: 200 INJECTION, SOLUTION INTRAVENOUS at 18:00

## 2019-01-10 RX ADMIN — ALBUTEROL SULFATE 2.5 MG: 2.5 SOLUTION RESPIRATORY (INHALATION) at 12:09

## 2019-01-10 RX ADMIN — DEXMEDETOMIDINE 1.5 MCG/KG/HR: 100 INJECTION, SOLUTION, CONCENTRATE INTRAVENOUS at 10:03

## 2019-01-10 RX ADMIN — INSULIN LISPRO 4 UNITS: 100 INJECTION, SOLUTION INTRAVENOUS; SUBCUTANEOUS at 12:06

## 2019-01-10 RX ADMIN — LABETALOL 20 MG/4 ML (5 MG/ML) INTRAVENOUS SYRINGE 5 MG: at 12:02

## 2019-01-10 RX ADMIN — DEXMEDETOMIDINE 0.7 MCG/KG/HR: 100 INJECTION, SOLUTION, CONCENTRATE INTRAVENOUS at 20:37

## 2019-01-10 RX ADMIN — HYDRALAZINE HYDROCHLORIDE 10 MG: 20 INJECTION INTRAMUSCULAR; INTRAVENOUS at 15:57

## 2019-01-10 RX ADMIN — DEXMEDETOMIDINE 1.3 MCG/KG/HR: 100 INJECTION, SOLUTION, CONCENTRATE INTRAVENOUS at 13:41

## 2019-01-10 RX ADMIN — HYDROMORPHONE HYDROCHLORIDE 0.5 MG: 1 INJECTION, SOLUTION INTRAMUSCULAR; INTRAVENOUS; SUBCUTANEOUS at 01:45

## 2019-01-10 RX ADMIN — CHLORHEXIDINE GLUCONATE 0.12% ORAL RINSE 15 ML: 1.2 LIQUID ORAL at 20:40

## 2019-01-10 RX ADMIN — FUROSEMIDE 40 MG: 10 INJECTION, SOLUTION INTRAMUSCULAR; INTRAVENOUS at 10:53

## 2019-01-10 RX ADMIN — CHLORHEXIDINE GLUCONATE 0.12% ORAL RINSE 15 ML: 1.2 LIQUID ORAL at 08:44

## 2019-01-10 RX ADMIN — DEXMEDETOMIDINE 1.5 MCG/KG/HR: 100 INJECTION, SOLUTION, CONCENTRATE INTRAVENOUS at 06:55

## 2019-01-10 RX ADMIN — INSULIN LISPRO 2 UNITS: 100 INJECTION, SOLUTION INTRAVENOUS; SUBCUTANEOUS at 00:04

## 2019-01-10 RX ADMIN — METOPROLOL TARTRATE 5 MG: 5 INJECTION, SOLUTION INTRAVENOUS at 20:40

## 2019-01-10 RX ADMIN — FUROSEMIDE 40 MG: 10 INJECTION, SOLUTION INTRAMUSCULAR; INTRAVENOUS at 00:27

## 2019-01-10 RX ADMIN — HEPARIN SODIUM AND DEXTROSE 29.1 UNITS/KG/HR: 10000; 5 INJECTION INTRAVENOUS at 10:28

## 2019-01-10 RX ADMIN — Medication 2.5 MG: at 12:09

## 2019-01-10 RX ADMIN — METOPROLOL TARTRATE 25 MG: 25 TABLET, FILM COATED ORAL at 08:44

## 2019-01-10 RX ADMIN — DEXMEDETOMIDINE 1.5 MCG/KG/HR: 100 INJECTION, SOLUTION, CONCENTRATE INTRAVENOUS at 06:09

## 2019-01-10 RX ADMIN — DEXMEDETOMIDINE 1.5 MCG/KG/HR: 100 INJECTION, SOLUTION, CONCENTRATE INTRAVENOUS at 03:48

## 2019-01-10 RX ADMIN — FUROSEMIDE 40 MG: 10 INJECTION, SOLUTION INTRAMUSCULAR; INTRAVENOUS at 15:25

## 2019-01-10 RX ADMIN — HYDROCORTISONE SODIUM SUCCINATE 50 MG: 100 INJECTION, POWDER, FOR SOLUTION INTRAMUSCULAR; INTRAVENOUS at 20:40

## 2019-01-10 RX ADMIN — POTASSIUM & SODIUM PHOSPHATES POWDER PACK 280-160-250 MG 2 PACKET: 280-160-250 PACK at 00:09

## 2019-01-10 RX ADMIN — AMIODARONE HYDROCHLORIDE 200 MG: 200 TABLET ORAL at 12:02

## 2019-01-10 RX ADMIN — HYDROCORTISONE SODIUM SUCCINATE 50 MG: 100 INJECTION, POWDER, FOR SOLUTION INTRAMUSCULAR; INTRAVENOUS at 09:32

## 2019-01-10 RX ADMIN — DEXMEDETOMIDINE 1.1 MCG/KG/HR: 100 INJECTION, SOLUTION, CONCENTRATE INTRAVENOUS at 18:07

## 2019-01-10 RX ADMIN — NITROGLYCERIN 50 MCG/MIN: 20 INJECTION INTRAVENOUS at 16:39

## 2019-01-10 RX ADMIN — POTASSIUM CHLORIDE 20 MEQ: 20 SOLUTION ORAL at 10:56

## 2019-01-10 RX ADMIN — POTASSIUM CHLORIDE 40 MEQ: 400 INJECTION, SOLUTION INTRAVENOUS at 08:46

## 2019-01-10 RX ADMIN — HYDROMORPHONE HYDROCHLORIDE 0.5 MG: 1 INJECTION, SOLUTION INTRAMUSCULAR; INTRAVENOUS; SUBCUTANEOUS at 20:38

## 2019-01-10 RX ADMIN — INSULIN LISPRO 4 UNITS: 100 INJECTION, SOLUTION INTRAVENOUS; SUBCUTANEOUS at 06:06

## 2019-01-10 RX ADMIN — AMIODARONE HYDROCHLORIDE 200 MG: 200 TABLET ORAL at 15:44

## 2019-01-10 RX ADMIN — DEXMEDETOMIDINE 1.5 MCG/KG/HR: 100 INJECTION, SOLUTION, CONCENTRATE INTRAVENOUS at 08:35

## 2019-01-10 RX ADMIN — DEXTROSE MONOHYDRATE 1 MG/MIN: 50 INJECTION, SOLUTION INTRAVENOUS at 06:11

## 2019-01-10 RX ADMIN — POTASSIUM CHLORIDE 40 MEQ: 400 INJECTION, SOLUTION INTRAVENOUS at 21:35

## 2019-01-10 RX ADMIN — AMIODARONE HYDROCHLORIDE 200 MG: 200 TABLET ORAL at 08:43

## 2019-01-10 RX ADMIN — ISODIUM CHLORIDE 3 ML: 0.03 SOLUTION RESPIRATORY (INHALATION) at 12:09

## 2019-01-10 RX ADMIN — DEXMEDETOMIDINE 1.5 MCG/KG/HR: 100 INJECTION, SOLUTION, CONCENTRATE INTRAVENOUS at 05:13

## 2019-01-10 RX ADMIN — DEXMEDETOMIDINE 1.1 MCG/KG/HR: 100 INJECTION, SOLUTION, CONCENTRATE INTRAVENOUS at 15:29

## 2019-01-10 NOTE — NUTRITION
Replete Phosphorus and K+  If unable to safely advance PO diet in next 24 hrs then consider resume TF of Jevity 1 5 at eventual goal rate of 73 ml/hr to provide q: 1752 ml,2102 Kcal,97 gm PRO,297 gm CHO,69 gm Fat,1414 ml Free H2O,2 1 mg CHO/kg/min

## 2019-01-10 NOTE — PROGRESS NOTES
Progress Note - Critical Care   Teressa Garcia 60 y o  male MRN: [id number]  Unit/Bed#:  -01 Encounter: [id number]    Assessment: 63yM w/ severe biliary pancreatitis and presumed cholangitis s/p ERCP 1/4    Plan:           Neuro:   Acute encephalopathy 2/2 cholangitis - resolved  Follows commands  Redirectable  - Off fentanyl infusion  Receiving PRN dilaudid  4x 0 5mg in last 24 hrs  - Precedex at 1 5  - Ativan for additional agitation  - Restraints must be continued                 CV:   New onset unstable Afib requiring multiple attempts at cardioversion AM 1/6, placed on amiodarone, given digoxin and Lopressor  Phenylephrine, levo and vasopressin were started then as well  Pressors have come off  Has been mostly hypertensive  BP range 104/50 - 228/98  - Continue heparin gtt  PTT has become therapeutic at midnight to 62  - Continue with amiodarone at 1  Continue w/ amiodarone 200mg TID thru NGT    - 25mg TID lopressor has been started  - Cardiology is following                 Lung:   Mixed picture of volume overload and aspiration around time of ERCP  Intubated 1/5  On AC -50-8  - F/u AM CXR  - Spontaneous breathing trial  - Goal wean vent as diurese  Possible extubation today  GI:   Pancreatitis and cholangitis - resolved  Abdomen non-tender   - Continue Jevity 1 5 at goal 63  - Rectal tube placed for loose BMs  - GI is following                 FEN:   AZ - improving  Cr 1 3 today (from 1 4 <1 65 < 2 18 < 1 67 w/ normal baseline)  Received 3x Lasix IV yesterday: 20 in AM, 40 at noon, 40 at midnight  Diuresed well with each dose  BP tolerating this  3 8 In yesterday  5 35 UO (2 02ml/kg/hr based on initial weight of 110kg)   1 56L negative in 24 hrs  26 2L positive since admission  - Further diuresis throughout day  Hypokal of 3 4  - Replete w/ 40 IV K  Mg 2 0 and phos 2 6  - TF at goal                 :   UO 5 35  Clear yellow urine  - Further diuresis  - Continue rodriguez - necessary for accurate IO and scrotal swelling                ID:   Afebrile   Completed 5 days of zosyn  WBC 14 6 from 12 2  - Continue to follow  - F/u Blood cultures from  - No growth x 5 days                 Heme:   Hep ACS gtt  Hgb stable 11 3 from 12                  Endo:   Hydrocortisone at 50q12  - Decrease to 50qd  Elevated glucose on tube feeds  Glucose 233 this AM from 196 from 211  Received 12U insulin last 24hrs  On SSI algorithm 4    - Increase to algorithm 5                            Msk/Skin:   Frequent turning                 Disposition:   Critically ill  Required ICU management    Chief Complaint: N/A    HPI/24hr events:   Diuresis continued  Making good urine  Still follows commands  Physical Exam:     Neuro: Follows commands, will nod yes that he is anxious  No to pain  Moves all extremities  CV: RRR hypertensive off pressors  Pulm: Remains intubated  -[id number]  Abd: Soft, NT, ND  : Rodriguez w/ yellow urine, scrotal edema  Extremities: Edema of hands and feet    Vitals:    01/10/19 0215 01/10/19 0315 01/10/19 0353 01/10/19 0502   BP:       BP Location:       Pulse:   76 66   Resp:   (!) 29 (!) 23   Temp: 98 7 °F (37 1 °C)      TempSrc: Axillary      SpO2:  96% 97% 95%   Weight:       Height:         Arterial Line BP: 148/70  Arterial Line MAP (mmHg): 90 mmHg    Temperature:   Temp (24hrs), Av 9 °F (37 2 °C), Min:98 7 °F (37 1 °C), Max:99 1 °F (37 3 °C)    Current: Temperature: 98 7 °F (37 1 °C)    Weights:   IBW: 86 8 kg    Body mass index is 26 78 kg/m²    Weight (last 2 days)     None          Hemodynamic Monitoring:  SvO2:        Non-Invasive/Invasive Ventilation Settings:  Respiratory    Lab Data (Last 4 hours)    None         O2/Vent Data (Last 4 hours)      01/10 0315           Vent Mode AC/VC       Resp Rate (BPM) (BPM) 20       Vt (mL) (mL) 450       FIO2 (%) (%) 50       PEEP (cmH2O) (cmH2O) 8       MV 11 8                 No results found for: PHART, ZYN3OTD, PO2ART, CKZ9EWN, R0GIODDQ, BEART, SOURCE  SpO2: SpO2: 95 %    Intake and Outputs:  I/O       01/03 0701 - 01/04 0700 01/04 0701 - 01/05 0700    I V  (mL/kg) 3366 7 (33 7) 5366 7 (53 8)    NG/GT  135    IV Piggyback 2000 2358  7    Total Intake(mL/kg) 5366 7 (53 8) 7860 4 (78 8)    Urine (mL/kg/hr) 1000 1635 (0 7)    Emesis/NG output  450    Total Output 1000 2085    Net +4366 7 +5775 4              Nutrition:        Diet Orders            Start     Ordered    01/09/19 1600  Diet Enteral/Parenteral; Tube Feeding No Oral Diet; Jevity 1 5; Continuous; 63  Diet effective now     Comments:  Increase TF by 10 cc every 8 hours to a goal of 63   Question Answer Comment   Diet Type Enteral/Parenteral    Enteral/Parenteral Tube Feeding No Oral Diet    Tube Feeding Formula: Jevity 1 5    Bolus/Cyclic/Continuous Continuous    Tube Feeding Goal Rate (mL/hr): 63    RD to adjust diet per protocol? Yes        01/09/19 1559          Labs:     Results from last 7 days  Lab Units 01/10/19  0434 01/09/19  0602 01/08/19  0534  01/07/19  0443  01/03/19  0744   WBC Thousand/uL 14 57* 12 22* 9 35  < > 6 15  < > 11 35*   HEMOGLOBIN g/dL 11 3* 12 0 11 2*  < > 12 5  < > 15 8   I STAT HEMOGLOBIN   --   --   --   --   --   < >  --    HEMATOCRIT % 32 9* 35 5* 32 7*  < > 36 4*  < > 46 1   HEMATOCRIT, ISTAT   --   --   --   --   --   < >  --    PLATELETS Thousands/uL 70* 72* 80*  < > 101*  < > 162   NEUTROS PCT %  --   --   --   --   --   --  88*   MONOS PCT %  --   --   --   --   --   --  7   MONO PCT %  --  2* 4  --  1*  < >  --    < > = values in this interval not displayed       Results from last 7 days  Lab Units 01/10/19  0434 01/09/19  0602 01/08/19  2322 01/08/19  0534 01/07/19  0443  01/05/19  0314   SODIUM mmol/L 145 143  --  139 138  < >  --    POTASSIUM mmol/L 3 4* 3 5 3 5 4 0 3 8  < >  --    CHLORIDE mmol/L 108 107  --  107 106  < >  --    CO2 mmol/L 32 28  --  27 25  < >  --    CO2, I-STAT mmol/L  --   --   --   --   --   -- 24   BUN mg/dL 36* 36*  --  39* 40*  < >  --    CREATININE mg/dL 1 31* 1 32*  --  1 40* 1 65*  < >  --    CALCIUM mg/dL 6 9* 6 9*  --  7 1* 7 4*  < >  --    ALK PHOS U/L  --  92  --  78 38*  < >  --    ALT U/L  --  94*  --  100* 132*  < >  --    AST U/L  --  83*  --  81* 76*  < >  --    GLUCOSE, ISTAT mg/dl  --   --   --   --   --   --  146*   < > = values in this interval not displayed  Results from last 7 days  Lab Units 01/10/19  0434 01/08/19  0534 01/05/19  0259   MAGNESIUM mg/dL 2 0 3 1* 2 1       Results from last 7 days  Lab Units 01/10/19  0434 01/09/19  1343 01/08/19  2322   PHOSPHORUS mg/dL 2 6* 1 5* 1 2*        Results from last 7 days  Lab Units 01/10/19  0001 01/09/19  1521 01/09/19  0602  01/07/19  1955   INR   --   --   --   --  1 03   PTT seconds 62* 50* 51*  < > 28   < > = values in this interval not displayed  Results from last 7 days  Lab Units 01/07/19  0443   LACTIC ACID mmol/L 1 7       0  Lab Value Date/Time   TROPONINI <0 02 01/03/2019 0818       Imaging: Xr Chest Portable    Result Date: 1/4/2019  Impression: Left greater than right pleural effusions  Pulmonary vascular congestive failure  Satisfactory positioning of endotracheal and enteric tubes  Workstation performed: [id number]     Xr Chest 2 Views    Result Date: 1/3/2019  Impression: No active pulmonary disease on examination which is somewhat limited secondary to low lung volumes  Workstation performed: [id number]     Fl Ercp Biliary Only    Result Date: 1/4/2019  Impression: Fluoroscopic guidance provided for surgical procedure  Please refer to the separate procedure notes for additional details  Workstation performed: [id number]     Ct Abdomen Pelvis W Contrast    Result Date: 1/3/2019  Impression: Acute interstitial edematous pancreatitis with extensive pancreatic and peripancreatic inflammatory edema    Within the limitations imposed by respiratory motion throughout the upper abdomen there is no convincing evidence of pancreatic necrosis or underlying pancreatic mass  Nonetheless, when better pain control can be achieved and the patient is able to tolerate better breath-hold imaging, more accurate characterization utilizing pancreatic MRI with MRCP is recommended for further investigation of the possible etiology and complications of acute pancreatitis  The study was marked in [facility] for immediate notification  Workstation performed: [id number]  I have personally reviewed pertinent reports  EKG: N/A    Micro:  Lab Results   Component Value Date    BLOODCX No Growth After 5 Days  01/04/2019    BLOODCX No Growth After 5 Days   01/04/2019       Allergies: No Known Allergies    Medications:   Scheduled Meds:    Current Facility-Administered Medications:  acetaminophen 650 mg Oral Q6H PRN Fayetta Hamburg    amiodarone 1 mg/min Intravenous Continuous Doris Welsh PA-C Last Rate: 1 mg/min (01/10/19 7938)   amiodarone 200 mg Oral TID With Meals Mitch Horvath MD    chlorhexidine 15 mL Swish [employer] Spit Q12H [address] Fawn Zheng PA-C    dexmedetomidine 0 1-1 5 mcg/kg/hr Intravenous Titrated DAMARIS Saleh Last Rate: 1 5 mcg/kg/hr (01/10/19 4103)   heparin (porcine) 3-30 Units/kg/hr (Order-Specific) Intravenous Titrated Serge Sharma MD Last Rate: 29 1 Units/kg/hr (01/09/19 2241)   heparin (porcine) 2,000 Units Intravenous PRN Mitch Horvath MD    heparin (porcine) 4,000 Units Intravenous PRN Mitch Horvath MD    hydrocortisone sodium succinate 50 mg Intravenous Q12H [address] Lety Black MD    HYDROmorphone 0 5 mg Intravenous Q2H PRN Lety Black MD    influenza vaccine 0 5 mL Intramuscular Prior to discharge Miltonmarcelo Emanuel MD    insulin lispro 2-12 Units Subcutaneous Q6H [address] [facility] [facility], MD    metoprolol tartrate 25 mg Oral TID Mitch Horvath MD    ondansetron 4 mg Intravenous Q6H PRN Nirali Grullon PA-C    perflutren lipid microsphere 0 8 mL/min Intravenous Once in imaging Mitchell Quinteros MD Continuous Infusions:    amiodarone 1 mg/min Last Rate: 1 mg/min (01/10/19 5976)   dexmedetomidine 0 1-1 5 mcg/kg/hr Last Rate: 1 5 mcg/kg/hr (01/10/19 0609)   heparin (porcine) 3-30 Units/kg/hr (Order-Specific) Last Rate: 29 1 Units/kg/hr (01/09/19 2241)     PRN Meds:    acetaminophen 650 mg Q6H PRN   heparin (porcine) 2,000 Units PRN   heparin (porcine) 4,000 Units PRN   HYDROmorphone 0 5 mg Q2H PRN   influenza vaccine 0 5 mL Prior to discharge   ondansetron 4 mg Q6H PRN   perflutren lipid microsphere 0 8 mL/min Once in imaging       VTE Pharmacologic Prophylaxis: Sequential compression device (Venodyne)  and Enoxaparin (Lovenox)  VTE Mechanical Prophylaxis: sequential compression device    Invasive lines and devices: Invasive Devices     Central Venous Catheter Line            CVC Central Lines 01/05/19 Triple 20cm 4 days          Arterial Line            Arterial Line 01/07/19 Left Radial 3 days          Drain            NG/OG/Enteral Tube Nasogastric 16 Fr Right nares 5 days    Urethral Catheter 16 Fr  5 days          Airway            ETT  Cuffed 8 mm 4 days                   Counseling / Coordination of Care  Total Critical Care time spent 25 minutes excluding procedures, teaching and family updates  Code Status: Level 1 - Full Code     Portions of the record may have been created with voice recognition software  Occasional wrong word or "sound a like" substitutions may have occurred due to the inherent limitations of voice recognition software  Read the chart carefully and recognize, using context, where substitutions have occurred       Bella Heck MD

## 2019-01-10 NOTE — PROGRESS NOTES
Progress Note - General Surgery   Tor Rather 60 y o  male MRN: [id number]  Unit/Bed#:  -01 Encounter: [id number]    Assessment:  60 y o  M w/ suspected biliary pancreatitis and cholangitis    s/p ERCP w/ sphincterotomy, stent placement    Hypoxic respiratory failure and septic shock from cholangitis  Wean vent as able    On amio gtt and heparin gtt per cards for Afib     Abx course finished    Plan:  Wean vent  Diurese as able    Subjective/Objective   Chief Complaint:     Subjective:     Objective:     Blood pressure 147/83, pulse 66, temperature 98 7 °F (37 1 °C), temperature source Axillary, resp  rate (!) 23, height 6' 4" (1 93 m), weight 99 8 kg (220 lb), SpO2 95 %  ,Body mass index is 26 78 kg/m²  Intake/Output Summary (Last 24 hours) at 01/10/19 0557  Last data filed at 01/10/19 0401   Gross per 24 hour   Intake          4039 21 ml   Output             5450 ml   Net         -1410 79 ml       Invasive Devices     Central Venous Catheter Line            CVC Central Lines 01/05/19 Triple 20cm 4 days          Arterial Line            Arterial Line 01/07/19 Left Radial 3 days          Drain            NG/OG/Enteral Tube Nasogastric 16 Fr Right nares 5 days    Urethral Catheter 16 Fr  5 days          Airway            ETT  Cuffed 8 mm 4 days                Physical Exam:   GCS 11T  Pulm A/c ventillation  Abd soft, nontender  CV Sinus tachycardia    Lab, Imaging and other studies:  I have personally reviewed pertinent lab results    , CBC:   Lab Results   Component Value Date    WBC 14 57 (H) 01/10/2019    HGB 11 3 (L) 01/10/2019    HCT 32 9 (L) 01/10/2019    MCV 88 01/10/2019    PLT 70 (L) 01/10/2019    MCH 30 1 01/10/2019    MCHC 34 3 01/10/2019    RDW 14 3 01/10/2019    MPV 10 4 01/10/2019    NRBC 0 01/10/2019   , CMP:   Lab Results   Component Value Date    SODIUM 145 01/10/2019    K 3 4 (L) 01/10/2019     01/10/2019    CO2 32 01/10/2019    BUN 36 (H) 01/10/2019    CREATININE 1 31 (H) 01/10/2019    CALCIUM 6 9 (L) 01/10/2019    AST 83 (H) 01/09/2019    ALT 94 (H) 01/09/2019    ALKPHOS 92 01/09/2019    EGFR 59 01/10/2019   , Coagulation:   No results found for: PT, INR, APTT  VTE Pharmacologic Prophylaxis: Heparin  VTE Mechanical Prophylaxis: sequential compression device

## 2019-01-10 NOTE — RESPIRATORY THERAPY NOTE
RT Ventilator Management Note  Odette Chapman 60 y o  male MRN: [id number]  [facility] Encounter: [id number]      Daily Screen       1/9/2019 0754 1/10/2019 0817          Patient safety screen outcome[de-identified] Failed Failed      Not Ready for Weaning due to[de-identified] PEEP > 8cmH2O;Underline problem not resolved;Poor inspiratory effort PEEP > 8cmH2O              Physical Exam:   Assessment Type: Assess only  General Appearance: Sleeping  Respiratory Pattern: Assisted  Chest Assessment: Chest expansion symmetrical      Resp Comments: BS clear no rx needed at this time

## 2019-01-10 NOTE — RESPIRATORY THERAPY NOTE
RT Ventilator Management Note  Odette Chapman 60 y o  male MRN: [id number]  Unit/Bed#: 3[address] [facility] -01 Encounter: [id number]      Daily Screen       1/8/2019 1142 1/9/2019 0754          Patient safety screen outcome[de-identified] Passed Failed      Not Ready for Weaning due to[de-identified] - PEEP > 8cmH2O;Underline problem not resolved;Poor inspiratory effort              Physical Exam:   Assessment Type: Assess only  General Appearance: Sleeping  Respiratory Pattern: Assisted  Chest Assessment: Chest expansion symmetrical  Bilateral Breath Sounds: Diminished, Coarse  Cough: Non-productive  Suction: ET Tube      Resp Comments: Pt doing well on current vent settings  No changes made to vent at this time  Will continue to moniter pt

## 2019-01-11 ENCOUNTER — TELEPHONE (OUTPATIENT)
Dept: GASTROENTEROLOGY | Facility: CLINIC | Age: 59
End: 2019-01-11

## 2019-01-11 ENCOUNTER — APPOINTMENT (INPATIENT)
Dept: RADIOLOGY | Facility: HOSPITAL | Age: 59
DRG: 438 | End: 2019-01-11
Payer: COMMERCIAL

## 2019-01-11 LAB
ANION GAP SERPL CALCULATED.3IONS-SCNC: 7 MMOL/L (ref 4–13)
BASOPHILS # BLD MANUAL: 0 THOUSAND/UL (ref 0–0.1)
BASOPHILS NFR MAR MANUAL: 0 % (ref 0–1)
BUN SERPL-MCNC: 37 MG/DL (ref 5–25)
CA-I BLD-SCNC: 1 MMOL/L (ref 1.12–1.32)
CALCIUM SERPL-MCNC: 7.5 MG/DL (ref 8.3–10.1)
CHLORIDE SERPL-SCNC: 106 MMOL/L (ref 100–108)
CO2 SERPL-SCNC: 33 MMOL/L (ref 21–32)
CREAT SERPL-MCNC: 1.26 MG/DL (ref 0.6–1.3)
EOSINOPHIL # BLD MANUAL: 0 THOUSAND/UL (ref 0–0.4)
EOSINOPHIL NFR BLD MANUAL: 0 % (ref 0–6)
ERYTHROCYTE [DISTWIDTH] IN BLOOD BY AUTOMATED COUNT: 14.5 % (ref 11.6–15.1)
GFR SERPL CREATININE-BSD FRML MDRD: 62 ML/MIN/1.73SQ M
GLUCOSE SERPL-MCNC: 134 MG/DL (ref 65–140)
GLUCOSE SERPL-MCNC: 139 MG/DL (ref 65–140)
GLUCOSE SERPL-MCNC: 141 MG/DL (ref 65–140)
GLUCOSE SERPL-MCNC: 144 MG/DL (ref 65–140)
GLUCOSE SERPL-MCNC: 148 MG/DL (ref 65–140)
HCT VFR BLD AUTO: 35.5 % (ref 36.5–49.3)
HGB BLD-MCNC: 11.8 G/DL (ref 12–17)
LYMPHOCYTES # BLD AUTO: 0.28 THOUSAND/UL (ref 0.6–4.47)
LYMPHOCYTES # BLD AUTO: 2 % (ref 14–44)
MAGNESIUM SERPL-MCNC: 2.1 MG/DL (ref 1.6–2.6)
MCH RBC QN AUTO: 29.9 PG (ref 26.8–34.3)
MCHC RBC AUTO-ENTMCNC: 33.2 G/DL (ref 31.4–37.4)
MCV RBC AUTO: 90 FL (ref 82–98)
MONOCYTES # BLD AUTO: 0.42 THOUSAND/UL (ref 0–1.22)
MONOCYTES NFR BLD: 3 % (ref 4–12)
NEUTROPHILS # BLD MANUAL: 13.06 THOUSAND/UL (ref 1.85–7.62)
NEUTS BAND NFR BLD MANUAL: 1 % (ref 0–8)
NEUTS SEG NFR BLD AUTO: 92 % (ref 43–75)
NRBC BLD AUTO-RTO: 0 /100 WBCS
PHOSPHATE SERPL-MCNC: 3.6 MG/DL (ref 2.7–4.5)
PLATELET # BLD AUTO: 99 THOUSANDS/UL (ref 149–390)
PLATELET BLD QL SMEAR: ABNORMAL
PMV BLD AUTO: 10.2 FL (ref 8.9–12.7)
POLYCHROMASIA BLD QL SMEAR: PRESENT
POTASSIUM SERPL-SCNC: 4 MMOL/L (ref 3.5–5.3)
RBC # BLD AUTO: 3.95 MILLION/UL (ref 3.88–5.62)
RBC MORPH BLD: PRESENT
SODIUM SERPL-SCNC: 146 MMOL/L (ref 136–145)
TOXIC GRANULES BLD QL SMEAR: PRESENT
VARIANT LYMPHS # BLD AUTO: 2 %
WBC # BLD AUTO: 14.04 THOUSAND/UL (ref 4.31–10.16)

## 2019-01-11 PROCEDURE — G8997 SWALLOW GOAL STATUS: HCPCS

## 2019-01-11 PROCEDURE — 71045 X-RAY EXAM CHEST 1 VIEW: CPT

## 2019-01-11 PROCEDURE — 97530 THERAPEUTIC ACTIVITIES: CPT

## 2019-01-11 PROCEDURE — 83735 ASSAY OF MAGNESIUM: CPT | Performed by: SURGERY

## 2019-01-11 PROCEDURE — 99232 SBSQ HOSP IP/OBS MODERATE 35: CPT | Performed by: INTERNAL MEDICINE

## 2019-01-11 PROCEDURE — 94660 CPAP INITIATION&MGMT: CPT

## 2019-01-11 PROCEDURE — 84100 ASSAY OF PHOSPHORUS: CPT | Performed by: SURGERY

## 2019-01-11 PROCEDURE — 97535 SELF CARE MNGMENT TRAINING: CPT

## 2019-01-11 PROCEDURE — 99233 SBSQ HOSP IP/OBS HIGH 50: CPT | Performed by: SURGERY

## 2019-01-11 PROCEDURE — 82948 REAGENT STRIP/BLOOD GLUCOSE: CPT

## 2019-01-11 PROCEDURE — 85027 COMPLETE CBC AUTOMATED: CPT | Performed by: NURSE PRACTITIONER

## 2019-01-11 PROCEDURE — 80048 BASIC METABOLIC PNL TOTAL CA: CPT | Performed by: NURSE PRACTITIONER

## 2019-01-11 PROCEDURE — 82330 ASSAY OF CALCIUM: CPT | Performed by: SURGERY

## 2019-01-11 PROCEDURE — 85007 BL SMEAR W/DIFF WBC COUNT: CPT | Performed by: NURSE PRACTITIONER

## 2019-01-11 PROCEDURE — 92610 EVALUATE SWALLOWING FUNCTION: CPT

## 2019-01-11 PROCEDURE — 94760 N-INVAS EAR/PLS OXIMETRY 1: CPT

## 2019-01-11 PROCEDURE — G8996 SWALLOW CURRENT STATUS: HCPCS

## 2019-01-11 RX ORDER — ACETAZOLAMIDE 500 MG/5ML
500 INJECTION, POWDER, LYOPHILIZED, FOR SOLUTION INTRAVENOUS ONCE
Status: COMPLETED | OUTPATIENT
Start: 2019-01-11 | End: 2019-01-11

## 2019-01-11 RX ORDER — OXYCODONE HYDROCHLORIDE 5 MG/1
5 TABLET ORAL EVERY 6 HOURS PRN
Status: DISCONTINUED | OUTPATIENT
Start: 2019-01-11 | End: 2019-01-14

## 2019-01-11 RX ORDER — ACETAZOLAMIDE 500 MG/5ML
500 INJECTION, POWDER, LYOPHILIZED, FOR SOLUTION INTRAVENOUS EVERY 12 HOURS SCHEDULED
Status: COMPLETED | OUTPATIENT
Start: 2019-01-11 | End: 2019-01-11

## 2019-01-11 RX ORDER — LIDOCAINE HYDROCHLORIDE 10 MG/ML
10 INJECTION, SOLUTION EPIDURAL; INFILTRATION; INTRACAUDAL; PERINEURAL ONCE
Status: DISCONTINUED | OUTPATIENT
Start: 2019-01-11 | End: 2019-01-11

## 2019-01-11 RX ORDER — HYDRALAZINE HYDROCHLORIDE 20 MG/ML
10 INJECTION INTRAMUSCULAR; INTRAVENOUS EVERY 4 HOURS PRN
Status: DISCONTINUED | OUTPATIENT
Start: 2019-01-11 | End: 2019-01-14

## 2019-01-11 RX ORDER — OXYCODONE HYDROCHLORIDE 10 MG/1
10 TABLET ORAL EVERY 6 HOURS PRN
Status: DISCONTINUED | OUTPATIENT
Start: 2019-01-11 | End: 2019-01-14

## 2019-01-11 RX ORDER — HEPARIN SODIUM 5000 [USP'U]/ML
5000 INJECTION, SOLUTION INTRAVENOUS; SUBCUTANEOUS EVERY 8 HOURS SCHEDULED
Status: DISCONTINUED | OUTPATIENT
Start: 2019-01-11 | End: 2019-01-18

## 2019-01-11 RX ORDER — FUROSEMIDE 10 MG/ML
40 INJECTION INTRAMUSCULAR; INTRAVENOUS ONCE
Status: DISCONTINUED | OUTPATIENT
Start: 2019-01-11 | End: 2019-01-11

## 2019-01-11 RX ADMIN — HYDROMORPHONE HYDROCHLORIDE 0.5 MG: 1 INJECTION, SOLUTION INTRAMUSCULAR; INTRAVENOUS; SUBCUTANEOUS at 22:46

## 2019-01-11 RX ADMIN — WATER 10 ML: 1 INJECTION INTRAMUSCULAR; INTRAVENOUS; SUBCUTANEOUS at 17:36

## 2019-01-11 RX ADMIN — HYDRALAZINE HYDROCHLORIDE 10 MG: 20 INJECTION INTRAMUSCULAR; INTRAVENOUS at 12:02

## 2019-01-11 RX ADMIN — ACETAZOLAMIDE 500 MG: 500 INJECTION, POWDER, LYOPHILIZED, FOR SOLUTION INTRAVENOUS at 22:46

## 2019-01-11 RX ADMIN — HEPARIN SODIUM 5000 UNITS: 5000 INJECTION INTRAVENOUS; SUBCUTANEOUS at 13:47

## 2019-01-11 RX ADMIN — FUROSEMIDE 40 MG: 10 INJECTION, SOLUTION INTRAMUSCULAR; INTRAVENOUS at 02:28

## 2019-01-11 RX ADMIN — ACETAZOLAMIDE 500 MG: 500 INJECTION, POWDER, LYOPHILIZED, FOR SOLUTION INTRAVENOUS at 09:45

## 2019-01-11 RX ADMIN — METOPROLOL TARTRATE 25 MG: 25 TABLET, FILM COATED ORAL at 17:25

## 2019-01-11 RX ADMIN — HEPARIN SODIUM 5000 UNITS: 5000 INJECTION INTRAVENOUS; SUBCUTANEOUS at 22:46

## 2019-01-11 RX ADMIN — ACETAZOLAMIDE 500 MG: 500 INJECTION, POWDER, LYOPHILIZED, FOR SOLUTION INTRAVENOUS at 17:25

## 2019-01-11 RX ADMIN — METOPROLOL TARTRATE 25 MG: 25 TABLET, FILM COATED ORAL at 22:47

## 2019-01-11 RX ADMIN — METOPROLOL TARTRATE 5 MG: 5 INJECTION, SOLUTION INTRAVENOUS at 09:10

## 2019-01-11 RX ADMIN — AMIODARONE HYDROCHLORIDE 200 MG: 200 TABLET ORAL at 09:10

## 2019-01-11 RX ADMIN — HEPARIN SODIUM 5000 UNITS: 5000 INJECTION INTRAVENOUS; SUBCUTANEOUS at 09:10

## 2019-01-11 RX ADMIN — AMIODARONE HYDROCHLORIDE 200 MG: 200 TABLET ORAL at 12:02

## 2019-01-11 RX ADMIN — DEXMEDETOMIDINE 0.7 MCG/KG/HR: 100 INJECTION, SOLUTION, CONCENTRATE INTRAVENOUS at 02:34

## 2019-01-11 RX ADMIN — CHLORHEXIDINE GLUCONATE 0.12% ORAL RINSE 15 ML: 1.2 LIQUID ORAL at 09:09

## 2019-01-11 RX ADMIN — METOPROLOL TARTRATE 25 MG: 25 TABLET, FILM COATED ORAL at 10:05

## 2019-01-11 RX ADMIN — HYDROMORPHONE HYDROCHLORIDE 0.5 MG: 1 INJECTION, SOLUTION INTRAMUSCULAR; INTRAVENOUS; SUBCUTANEOUS at 03:29

## 2019-01-11 RX ADMIN — CALCIUM CHLORIDE 1 G: 100 INJECTION, SOLUTION INTRAVENOUS; INTRAVENTRICULAR at 13:39

## 2019-01-11 RX ADMIN — AMIODARONE HYDROCHLORIDE 200 MG: 200 TABLET ORAL at 17:25

## 2019-01-11 RX ADMIN — WATER 10 ML: 1 INJECTION INTRAMUSCULAR; INTRAVENOUS; SUBCUTANEOUS at 09:54

## 2019-01-11 NOTE — PROGRESS NOTES
Progress Note - General Surgery   Kenny Montalvo 60 y o  male MRN: [id number]  Unit/Bed#:  -01 Encounter: [id number]    Assessment:  60year old M with biliary pancreatitis s/p ERCP sphincterotomy and stent, hypoxic respiratory failure, Afib    Plan:  Continue diuresis  Respiratory support with BiPAP as needed  NPO for now while requiring respiratory support  Continue amiodarone and heparin drip per cardiology  Supportive care per ICU    Subjective/Objective     Subjective:  Extubated yesterday to BiPAP, self discontinued NG tube  Comfortable on BiPAP overnight, with some mild confusion  Otherwise doing relatively well  Denies nausea vomiting, and having bowel movements  Objective:    Blood pressure 103/55, pulse 56, temperature 98 8 °F (37 1 °C), temperature source Oral, resp  rate 18, height 6' 4" (1 93 m), weight 99 8 kg (220 lb), SpO2 99 %  ,Body mass index is 26 78 kg/m²        Intake/Output Summary (Last 24 hours) at 01/11/19 0629  Last data filed at 01/11/19 3846   Gross per 24 hour   Intake          2528 85 ml   Output             5755 ml   Net         -3226 15 ml       Invasive Devices     Central Venous Catheter Line            CVC Central Lines 01/05/19 Triple 20cm 5 days          Drain            Urethral Catheter 16 Fr  6 days                Physical Exam:   General: NAD, awake and alert  Eyes: PERRL  ENT: moist mucous membranes  Neck: supple  CV: regular rate  Chest: breath sounds bilaterally  Abdomen: soft, NT ND  Extremities: atraumatic, 2+ edema        Results from last 7 days  Lab Units 01/10/19  0434 01/09/19  0602 01/08/19  0534   WBC Thousand/uL 14 57* 12 22* 9 35   HEMOGLOBIN g/dL 11 3* 12 0 11 2*   HEMATOCRIT % 32 9* 35 5* 32 7*   PLATELETS Thousands/uL 70* 72* 80*       Results from last 7 days  Lab Units 01/11/19  0544 01/10/19  1543 01/10/19  0434  01/05/19  0314   POTASSIUM mmol/L 4 0 3 8 3 4*  < >  --    CHLORIDE mmol/L 106 105 108  < >  --    CO2 mmol/L 33* 32 32  < >  -- CO2, I-STAT mmol/L  --   --   --   --  24   BUN mg/dL 37* 33* 36*  < >  --    CREATININE mg/dL 1 26 1 23 1 31*  < >  --    GLUCOSE, ISTAT mg/dl  --   --   --   --  146*   CALCIUM mg/dL 7 5* 7 3* 6 9*  < >  --    < > = values in this interval not displayed  Results from last 7 days  Lab Units 01/10/19  0605 01/10/19  0001 01/09/19  1521  01/07/19 1955   INR   --   --   --   --  1 03   PTT seconds 69* 62* 50*  < > 28   < > = values in this interval not displayed

## 2019-01-11 NOTE — OCCUPATIONAL THERAPY NOTE
Occupational Therapy Treatment Note      Hal Bansaledward    1/11/2019    Patient Active Problem List   Diagnosis    Acute pancreatitis    Pancreatitis       Past Medical History:   Diagnosis Date    Gastroesophageal reflux        Past Surgical History:   Procedure Laterality Date    CHOLECYSTECTOMY      COLONOSCOPY N/A 3/21/2016    Procedure: COLONOSCOPY;  Surgeon: Alfonza Nyhan, DO;  Location: [facility]; Service:     ERCP N/A 1/4/2019    Procedure: ENDOSCOPIC RETROGRADE CHOLANGIOPANCREATOGRAPHY (ERCP); Surgeon: Daniel Valencia MD;  Location: [facility]; Service: Gastroenterology    NV EGD TRANSORAL BIOPSY SINGLE/MULTIPLE N/A 3/21/2016    Procedure: ESOPHAGOGASTRODUODENOSCOPY (EGD); Surgeon: Alfonza Nyhan, DO;  Location: [facility]; Service: General        01/11/19 1431   Restrictions/Precautions   Weight Bearing Precautions Per Order No   Other Precautions Agitated;Cognitive; Bed Alarm; Chair Alarm;Multiple lines;Telemetry; Fall Risk;Pain;O2   Lifestyle   Autonomy I w/ ADLS/IADLS, transfers/functional mobility PTA   Reciprocal Relationships Pt lives w/ spouse per chart review   Service to Others Works full time; pt unable to report what he does   Intrinsic Gratification Pt unable to report   Pain Assessment   Pain Assessment No/denies pain   Pain Score No Pain   Transfers   Sit to Stand 3  Moderate assistance   Additional items Verbal cues;Armrests  (1/2 sit/stand)   Stand to Sit 3  Moderate assistance   Additional items Assist x 1;Verbal cues; Increased time required;Armrests  (1/2 sit-stand)   Additional Comments Attempted sit-stand from chair however pt only performe 1/2 full stand and reported he was not agreeable to full standing  Required Mod A for 1/2 sit-stand and VC for safety; use of arm rests for support into standing   Functional Mobility   Additional Comments Pt not agreeable   Cognition   Overall Cognitive Status Impaired   Arousal/Participation Arousable; Uncooperative   Attention Attends with cues to redirect   Orientation Level Oriented to person;Oriented to place;Oriented to time   Memory Decreased recall of precautions;Decreased recall of recent events;Decreased short term memory   Following Commands Follows one step commands with increased time or repetition   Comments Pt initially cooperative and pleasant  Agreeable to therapy initially however throughout session pt became agitated and not agreeable to any activity  Pt had reported he wanted to get back to bed and then later reported he never said he wanted to get back to bed  Pt became increasingly agitated and asked therapists to leave the room   Activity Tolerance   Activity Tolerance Treatment limited secondary to agitation   Medical Staff Made Aware PTStephanie   Assessment   Assessment Patient participated in Skilled OT session 1/11/19 with interventions consisting of  education on activity engagement*   Patient agreeable to OT treatment session, upon arrival patient was found seated OOB to Recliner  In comparison to previous session, patient with improvements in alertness, verbal communication, activity tolerance, attempt at standing and orientation  Patient requiring verbal cues for correct technique, one step directives and frequent rest periods  Pt agitated throughout session; educated on importance of engaging in therapy however despite education pt not agreeable to further activity and asked therapists to leave the room  Patient continues to be functioning below baseline level, occupational performance remains limited secondary to factors listed above and increased risk for falls and injury  From OT standpoint, recommendation at time of d/c would be Short Term Rehab when medically stable  Patient to benefit from continued Occupational Therapy treatment while in the hospital to address deficits as defined above and maximize level of functional independence with ADLs and functional mobility      Plan   Treatment Interventions ADL retraining;Functional transfer training;UE strengthening/ROM; Endurance training;Cognitive reorientation;Patient/family training;Equipment evaluation/education; Compensatory technique education;Continued evaluation; Energy conservation; Activityengagement   Goal Expiration Date 01/23/19   Treatment Day 1   OT Frequency 3-5x/wk   Recommendation   OT Discharge Recommendation Short Term Rehab   OT - OK to Discharge Yes  (when medically stable)   Barthel Index   Feeding 5   Bathing 0   Grooming Score 5   Dressing Score 5   Bladder Score 0   Bowels Score 5   Toilet Use Score 5   Transfers (Bed/Chair) Score 5   Mobility (Level Surface) Score 0   Stairs Score 0   Barthel Index Score 30   Modified [place] Scale   Modified [place] Scale 4       Chiqui Silvestre MS, OTR/L

## 2019-01-11 NOTE — PROGRESS NOTES
Patient continues to do well on Bipap  Bipap was explained to patient by nurse earlier  Patient is GCS 14-15 and can be forgetful  Patient remains off Nitroglycerin drip and his precedex is down to  7 mcg/min  Patient remains NPO for now  I/O goals met for the day -2000 cc  Patient may have to remain on BiPAP or Hi flow during the day to help continue his positive progress  Patient continues to be restrained bilaterally for central line and potential fall prevention as he heads to the end of the bed but otherwise cooperative

## 2019-01-11 NOTE — PROGRESS NOTES
Progress Note - Cardiology   Odette Chapman 60 y o  male MRN: [id number]  Unit/Bed#: [address] [facility] -01 Encounter: [id number]  01/11/19  4:12 PM      Impression and Plan:     50-year-old with acute pancreatitis, status post ERCP and sphincterotomy, status post stone extraction and biliary stent placement, developed new onset atrial fibrillation with rapid rates, unsuccessfully electrically cardioverted, subsequently spontaneously converted but now is back in atrial fibrillation  Currently on IV and oral amiodarone-with probably inconsistent and limited absorption     Anticoagulation was initiated 36 hours after onset of atrial fibrillation      Currently on nasal cannula oxygen, extubated, off pressors      Plan:     Atrial fibrillation:  New onset atrial fibrillation in the setting of sepsis and catecholamine surges   Overall he will be much easier to manage while he is in sinus rhythm than to rate control his atrial fibrillation  Plan is to continue him on oral amiodarone and overlap this with IV amiodarone for 1 more day   I am not certain how well his absorption is with oral amiodarone  He was started on heparin  36 hours after onset of atrial fibrillation  Subsequently discontinued due to thrombocytopenia  Soon after initiation of heparin, went back into sinus rhythm  I think risk of thromboembolism would not be very high but risk of bleeding might be not negligible in the setting of thrombocytopenia with platelets around 70-35 as well as IV heparin  I discussed this with the surgical resident on 1/10/2019  Tomorrow will discontinue IV amiodarone  Hold off on IV heparin also  Cholangitis/Sepsis and pancreatitis:  Defer to the other teams  Altered diuresing very well  Likely since sepsis improving     Hypertension:  Started oral beta-blocker-increase to 25 3 times daily    Not on pressors anymore and in running hypertensive       ===================================================================    Chief Complaint:   Chief Complaint   Patient presents with    Vomiting     Patient presents to the ER with vomiting since Midnight  Subjective/Objective     Subjective: Intubated moves all extremities    Objective:  appears ill but comfortable , no distress at the time of exam      Patient Active Problem List   Diagnosis    Acute pancreatitis    Pancreatitis       Vitals: /88 (BP Location: Left arm)   Pulse 104   Temp 98 4 °F (36 9 °C) (Oral)   Resp (!) 27   Ht 6' 4" (1 93 m)   Wt 99 8 kg (220 lb)   SpO2 99%   BMI 26 78 kg/m²     I/O this shift: In: 709 1 [P O :240; I V :419 1; IV Piggyback:50]  Out: 8455 [Urine:1350]  Wt Readings from Last 3 Encounters:   01/03/19 99 8 kg (220 lb)   03/21/16 88 5 kg (195 lb)   07/17/13 95 8 kg (211 lb 2 1 oz)       Intake/Output Summary (Last 24 hours) at 01/11/19 1612  Last data filed at 01/11/19 1600   Gross per 24 hour   Intake          1649 94 ml   Output             4455 ml   Net         -2805 06 ml     I/O last 3 completed shifts:   In: 4499 5 [I V :2827 5; NG/GT:440; IV Piggyback:200; Feedings:1032]  Out: 6853 [Urine:8130; Emesis/NG output:300]    Invasive Devices     Central Venous Catheter Line            CVC Central Lines 01/05/19 Triple 20cm 6 days          Drain            Urethral Catheter 16 Fr  7 days                  Physical Exam:  GEN: Francheska Bazzi appears ill, alert,  pleasant and cooperative   HEENT: pupils equal, round, and reactive to light; extraocular muscles intact  NECK: supple, no carotid bruits or JVD  HEART: regular rhythm, normal S1 and S2, no murmur, no clicks, gallops or rubs   LUNGS: clear to auscultation bilaterally; no wheezes or rhonchi, no rales  ABDOMEN/GI: normal bowel sounds, soft, no tenderness, no distention  EXTREMITIES/Musculoskeltal: peripheral pulses normal; no clubbing, cyanosis, positive for edema and anasarca  NEURO: no focal motor findings   SKIN: normal without suspicious lesions on exposed skin              Lab Results:         Results from last 7 days  Lab Units 01/11/19  0544 01/10/19  0434 01/09/19  0602   WBC Thousand/uL 14 04* 14 57* 12 22*   HEMOGLOBIN g/dL 11 8* 11 3* 12 0   HEMATOCRIT % 35 5* 32 9* 35 5*   PLATELETS Thousands/uL 99* 70* 72*           Results from last 7 days  Lab Units 01/11/19  0544 01/10/19  1543 01/10/19  0434 01/09/19  0602  01/08/19  0534 01/07/19  0443  01/05/19  0314   POTASSIUM mmol/L 4 0 3 8 3 4* 3 5  < > 4 0 3 8  < >  --    CHLORIDE mmol/L 106 105 108 107  --  107 106  < >  --    CO2 mmol/L 33* 32 32 28  --  27 25  < >  --    CO2, I-STAT mmol/L  --   --   --   --   --   --   --   --  24   BUN mg/dL 37* 33* 36* 36*  --  39* 40*  < >  --    CREATININE mg/dL 1 26 1 23 1 31* 1 32*  --  1 40* 1 65*  < >  --    CALCIUM mg/dL 7 5* 7 3* 6 9* 6 9*  --  7 1* 7 4*  < >  --    ALK PHOS U/L  --   --   --  92  --  78 38*  < >  --    ALT U/L  --   --   --  94*  --  100* 132*  < >  --    AST U/L  --   --   --  83*  --  81* 76*  < >  --    GLUCOSE, ISTAT mg/dl  --   --   --   --   --   --   --   --  146*   < > = values in this interval not displayed  Results from last 7 days  Lab Units 01/07/19  1955   INR  1 03       Imaging: I have personally reviewed pertinent reports      EKG/Telemtry: No events on telemetry in sinus rhythm      Scheduled Meds:    Current Facility-Administered Medications:  acetaminophen 650 mg Oral Q6H PRN Ezella Belinda Henning   acetaZOLAMIDE 500 mg Intravenous Q12H [address] Janes Gvazdauskas, CRNP   amiodarone 200 mg Oral TID With Meals Matthew Yun MD   chlorhexidine 15 mL Swish & Spit Q12H [address] [place] VIVIANA Zheng PA-C   heparin (porcine) 5,000 Units Subcutaneous [facility] Ema Lyon MD   hydrALAZINE 10 mg Intravenous Q4H PRN DAMARIS Fernando   HYDROmorphone 0 5 mg Intravenous Q2H PRN Ema Lyon MD   influenza vaccine 0 5 mL Intramuscular Prior to discharge Sal Hatfield MD   insulin lispro 1-6 Units Subcutaneous TID AC DAMARIS Skinner   insulin lispro 1-6 Units Subcutaneous HS DAMARIS Martínez   metoprolol tartrate 25 mg Oral TID Violet Medrano MD   ondansetron 4 mg Intravenous Q6H PRN Nacho Burnett PA-C   oxyCODONE 10 mg Oral Q6H PRN DAMARIS Martínez   oxyCODONE 5 mg Oral Q6H PRN DAMARIS Martínez   perflutren lipid microsphere 0 8 mL/min Intravenous Once in imaging Erum Munroe MD     Continuous Infusions:       VTE Pharmacologic Prophylaxis: Heparin  VTE Mechanical Prophylaxis: sequential compression device      Counseling / Coordination of Care  Total time spent 20 minutes including teaching and family updates  More than 50% was spent counseling pt and family

## 2019-01-11 NOTE — TELEPHONE ENCOUNTER
----- Message from Emely Blackburn MD sent at 1/11/2019  3:20 PM EST -----  Please schedule follow-up in 1 month

## 2019-01-11 NOTE — PROGRESS NOTES
Progress Note - Cardiology for 1/10/19  Darcy Lu 60 y o  male MRN: [id number]  Unit/Bed#:  -01 Encounter: [id number]  01/11/19  8:42 AM      Patient was seen and examined on 1/10/2019 around 9:00 a m  Impression and Plan:     58-year-old with acute pancreatitis, status post ERCP and sphincterotomy, status post stone extraction and biliary stent placement, developed new onset atrial fibrillation with rapid rates, unsuccessfully electrically cardioverted, subsequently spontaneously converted but now is back in atrial fibrillation  Currently on IV and oral amiodarone-with probably inconsistent and limited absorption     Anticoagulation was initiated 36 hours after onset of atrial fibrillation      Overall his pressor requirements have decreased from 3 pressors this morning to a low dose of phenylephrine at this time   Remains intubated and on 50% FiO2 with stable oxygenation      Plan:     Atrial fibrillation:  New onset atrial fibrillation in the setting of sepsis and catecholamine surges   Overall he will be much easier to manage while he is in sinus rhythm than to rate control his atrial fibrillation  Plan is to continue him on oral amiodarone and overlap this with IV amiodarone   I am not certain how well his absorption is with oral amiodarone  He has been on heparin with thrombocytopenia  Heparin was started 36 hours after onset of atrial fibrillation  Soon after that, went back into sinus rhythm  I think risk of thromboembolism would not be very high but risk of bleeding might be not negligible in the setting of thrombocytopenia with platelets around 80-55 as well as IV heparin  I discussed this with the surgical rest and on 1/10/2019    Sepsis and pancreatitis:  Defer to the other teams  Altered diuresing very well  Likely since sepsis improving     Hypertension:  Start oral beta-blocker    Not on pressors anymore in running hypertensive       ===================================================================    Chief Complaint:   Chief Complaint   Patient presents with    Vomiting     Patient presents to the ER with vomiting since Midnight  Subjective/Objective     Subjective: Intubated moves all extremities    Objective:  appears ill but comfortable , no distress at the time of exam      Patient Active Problem List   Diagnosis    Acute pancreatitis    Pancreatitis       Vitals: /85   Pulse 70   Temp 98 8 °F (37 1 °C) (Oral)   Resp 20   Ht 6' 4" (1 93 m)   Wt 99 8 kg (220 lb)   SpO2 100%   BMI 26 78 kg/m²     No intake/output data recorded  Wt Readings from Last 3 Encounters:   01/03/19 99 8 kg (220 lb)   03/21/16 88 5 kg (195 lb)   07/17/13 95 8 kg (211 lb 2 1 oz)       Intake/Output Summary (Last 24 hours) at 01/11/19 0842  Last data filed at 01/11/19 0607   Gross per 24 hour   Intake          2164 45 ml   Output             5605 ml   Net         -3440 55 ml     I/O last 3 completed shifts:   In: 4499 5 [I V :2827 5; NG/GT:440; IV Piggyback:200; Feedings:1032]  Out: 3907 [Urine:8130; Emesis/NG output:300]    Invasive Devices     Central Venous Catheter Line            CVC Central Lines 01/05/19 Triple 20cm 5 days          Drain            Urethral Catheter 16 Fr  6 days                  Physical Exam:  GEN: Kaylin Apt appears ill, alert and oriented x 3, pleasant and cooperative   HEENT: pupils equal, round, and reactive to light; extraocular muscles intact  NECK: supple, no carotid bruits or JVD  HEART: regular rhythm, normal S1 and S2, no murmur, no clicks, gallops or rubs   LUNGS: clear to auscultation bilaterally; no wheezes or rhonchi, no rales  ABDOMEN/GI: normal bowel sounds, soft, no tenderness, no distention  EXTREMITIES/Musculoskeltal: peripheral pulses normal; no clubbing, cyanosis, positive for edema and anasarca  NEURO: no focal motor findings   SKIN: normal without suspicious lesions on exposed skin              Lab Results:       Results from last 7 days  Lab Units 01/11/19  0544 01/10/19  0434 01/09/19  0602   WBC Thousand/uL 14 04* 14 57* 12 22*   HEMOGLOBIN g/dL 11 8* 11 3* 12 0   HEMATOCRIT % 35 5* 32 9* 35 5*   PLATELETS Thousands/uL 99* 70* 72*           Results from last 7 days  Lab Units 01/11/19  0544 01/10/19  1543 01/10/19  0434 01/09/19  0602  01/08/19  0534 01/07/19  0443  01/05/19  0314   POTASSIUM mmol/L 4 0 3 8 3 4* 3 5  < > 4 0 3 8  < >  --    CHLORIDE mmol/L 106 105 108 107  --  107 106  < >  --    CO2 mmol/L 33* 32 32 28  --  27 25  < >  --    CO2, I-STAT mmol/L  --   --   --   --   --   --   --   --  24   BUN mg/dL 37* 33* 36* 36*  --  39* 40*  < >  --    CREATININE mg/dL 1 26 1 23 1 31* 1 32*  --  1 40* 1 65*  < >  --    CALCIUM mg/dL 7 5* 7 3* 6 9* 6 9*  --  7 1* 7 4*  < >  --    ALK PHOS U/L  --   --   --  92  --  78 38*  < >  --    ALT U/L  --   --   --  94*  --  100* 132*  < >  --    AST U/L  --   --   --  83*  --  81* 76*  < >  --    GLUCOSE, ISTAT mg/dl  --   --   --   --   --   --   --   --  146*   < > = values in this interval not displayed  Results from last 7 days  Lab Units 01/07/19  1955   INR  1 03       Imaging: I have personally reviewed pertinent reports      EKG/Telemtry: No events on telemetry in sinus rhythm      Scheduled Meds:    Current Facility-Administered Medications:  acetaminophen 650 mg Oral Q6H PRN Yumiko Moore    amiodarone 1 mg/min Intravenous Continuous Dami Rogers PA-C Last Rate: 1 mg/min (01/10/19 6556)   amiodarone 200 mg Oral TID With Meals Niki Garduno MD    chlorhexidine 15 mL Swish & Spit Q12H [address] [place] VIVIANA Zheng PA-C    dexmedetomidine 0 1-1 5 mcg/kg/hr Intravenous Titrated DAMARIS Morales Last Rate: 0 7 mcg/kg/hr (01/11/19 0234)   furosemide 40 mg Intravenous Once Caitlin Lu MD    heparin (porcine) 5,000 Units Subcutaneous [facility] Caitlin Lu MD    HYDROmorphone 0 5 mg Intravenous Q2H PRN Sharifne Hallmark MD [place]    influenza vaccine 0 5 mL Intramuscular Prior to discharge Debborah Lundborg, MD    insulin lispro 4-20 Units Subcutaneous Q6H [address] Jennifer Zhang MD    metoprolol 5 mg Intravenous Q6H Jennifer Zhang MD    nitroGLYcerin 5-200 mcg/min Intravenous Titrated DAMARIS Arroyo Last Rate: Stopped (01/10/19 2245)   ondansetron 4 mg Intravenous Q6H PRN Sarah Blue PA-C    perflutren lipid microsphere 0 8 mL/min Intravenous Once in imaging Thu Hull MD      Continuous Infusions:    amiodarone 1 mg/min Last Rate: 1 mg/min (01/10/19 2200)   dexmedetomidine 0 1-1 5 mcg/kg/hr Last Rate: 0 7 mcg/kg/hr (01/11/19 0234)   nitroGLYcerin 5-200 mcg/min Last Rate: Stopped (01/10/19 2245)       VTE Pharmacologic Prophylaxis: Heparin  VTE Mechanical Prophylaxis: sequential compression device      Counseling / Coordination of Care  Total time spent 20 minutes including teaching and family updates  More than 50% was spent counseling pt and family

## 2019-01-11 NOTE — PROGRESS NOTES
[facility]  Progress Note - Jose Carlos Chaney 60 y o  male MRN: [id number]    [facility] Encounter: [id number]    Assessment/Plan:  Severe Acute pancreatitis-resolving  Cholangitis-resolving  -likely secondary to obstructed biliary stone  -status post ERCP 1/4/19 with sphincterotomy, biliary stent placement and  CBD stone removal, T bili-1 22, AST-83 ALT-94 AP-92  overall trend is lower  -patient overall is improving  -abdominal exam benign, good bowel sounds  -patient was tolerating tube feeds however this was pulled out overnight  -recommend transition to oral feedings as per speech pathology recommendations  -continue management as per Critical Care and surgery teams  -recommend outpatient GI follow-up in 1 month, will need repeat ERCP for stent removal in 6-8 weeks  -GI will sign off for now  Please call with any future questions or concerns    Subjective:   Patient appears confused but responds appropriately to questioning  Denies any nausea vomiting abdominal pain  Objective:     Vitals: Blood pressure 163/87, pulse 70, temperature 98 2 °F (36 8 °C), temperature source Oral, resp  rate 21, height 6' 4" (1 93 m), weight 99 8 kg (220 lb), SpO2 98 %  ,Body mass index is 26 78 kg/m²  Intake/Output Summary (Last 24 hours) at 01/11/19 1455  Last data filed at 01/11/19 1200   Gross per 24 hour   Intake          1566 57 ml   Output             4355 ml   Net         -2788 43 ml       Review of Systems: as per HPI  Review of Systems    Physical Exam:     Physical Exam   Constitutional: No distress  HENT:   Head: Atraumatic  Eyes: No scleral icterus  Neck: Neck supple  Cardiovascular:   Irregular rhythm   Pulmonary/Chest: Effort normal  No respiratory distress  Abdominal: Soft  Bowel sounds are normal  There is no tenderness  Musculoskeletal: He exhibits edema  Neurological: He is alert  Skin: Skin is warm and dry     Psychiatric:   Moderately confused Invasive Devices     Central Venous Catheter Line            CVC Central Lines 01/05/19 Triple 20cm 6 days          Drain            Urethral Catheter 16 Fr  6 days                        CBC: Lab Results   Component Value Date    WBC 14 04 (H) 01/11/2019    HGB 11 8 (L) 01/11/2019    HCT 35 5 (L) 01/11/2019    MCV 90 01/11/2019    PLT 99 (L) 01/11/2019    MCH 29 9 01/11/2019    MCHC 33 2 01/11/2019    RDW 14 5 01/11/2019    MPV 10 2 01/11/2019    NRBC 0 01/11/2019   ,   CMP: Lab Results   Component Value Date    K 4 0 01/11/2019     01/11/2019    CO2 33 (H) 01/11/2019    BUN 37 (H) 01/11/2019    CREATININE 1 26 01/11/2019    CALCIUM 7 5 (L) 01/11/2019    EGFR 62 01/11/2019

## 2019-01-11 NOTE — PROGRESS NOTES
Progress Note - Critical Care   Yeni Fried 60 y o  male MRN: [id number]  Unit/Bed#:  -01 Encounter: [id number]    Assessment: 63yM w/ severe biliary pancreatitis and presumed cholangitis s/p ERCP 1/4    Plan:           Neuro:   Acute encephalopathy 2/2 cholangitis - resolved  Follows commands  Redirectable  - Receiving PRN dilaudid  2x 0 5mg in last 24 hrs  - If able to swallow will give oxy elixir today  - Precedex at 0 7  - Restraints must be continued                 CV:   New onset unstable Afib requiring multiple attempts at cardioversion AM 1/6, placed on amiodarone, given digoxin and Lopressor  Phenylephrine, levo and vasopressin were started then as well  Pressors have come off  Has been mostly hypertensive  Was started on a nitro drip yesterday which was weaned off around 11PM  Patient pulled out arterial line  Has been normotensive on cuff  Cuff systolics [phone]  393/08 when I saw patient at 6AM    Heparin drip discontinued yesterday per cardiology  - Continue with amiodarone at 1  Speech to assess for tolerance of oral meds  Hopefully can still continue w/ amiodarone 200mg TID    - 25mg TID lopressor had been started thru NGT  When NGT came out switched to 5 IV q6  - Cardiology is following                 Lung:   Mixed picture of volume overload and aspiration around time of ERCP  Intubated 1/5  Extubated 1/10  Has been on BiPAP all night  8PM VBG 7 362 - 58 6 - 43 8 - 32 5 - 5 4  CXR looks much better than yesterday  Left sided effusion  Consideration for tapping this today and improving respiratory status  - Continue BiPAP  GI:   Pancreatitis and cholangitis - resolved  Abdomen non-tender   Jevity 1 5 w/ goal 63 has been held since NGT came out                 FEN:   AZ - improving  Cr pending today  (1 26 < from 1 4 <1 65 < 2 18 < 1 67 w/ normal baseline)  Received 40 lasix 2:30 AM this morning, 40 x3 IV yesterday (0027, 1053 and 1525)   Diursed well with each dose  2 5 In yesterday  5 45 UO  3 3L negative in 24 hrs  23L positive since admission  - Further diuresis - another 40 IV lasix for 8AM  - F/u electrolytes                 :   UO 5 45  Clear yellow urine  - Further diuresis  - Continue rodriguez - necessary for accurate IO and scrotal swelling                ID:   Afebrile  Blood cultures from  - No growth x 5 days  Completed 5 days of zosyn  WBC 14 6 yesterday from   - Continue to follow                 Heme:   Hep ACS gtt was stopped yesterday  SQH q8 this AM                 Endo:   Hydrocortisone has been weaned off  Glucose controlled  Also not on steroids or Jevity now  - SSI algorithm 5                            Msk/Skin:   Frequent turning                 Disposition:   Critically ill  Required ICU management    Chief Complaint: N/A    HPI/24hr events:   Continues w/ diuresis  Physical Exam:     Neuro: Follows commands  Mostly anxious  CV: RRR  Pulm: BiPAP   Abd: Soft, NT, ND  : Rodriguez w/ clear yellow urine  Extremities: Improved edema    Vitals:    19 0316 19 0323 19 0400 19 0500   BP: 126/65  108/63 103/55   Pulse: 68  56 56   Resp: (!) 23  17 18   Temp:       TempSrc:       SpO2:  99%  99%   Weight:       Height:         Arterial Line BP: 186/66  Arterial Line MAP (mmHg): 92 mmHg    Temperature:   Temp (24hrs), Av 2 °F (36 8 °C), Min:97 5 °F (36 4 °C), Max:98 8 °F (37 1 °C)    Current: Temperature: 98 8 °F (37 1 °C)    Weights:   IBW: 86 8 kg    Body mass index is 26 78 kg/m²    Weight (last 2 days)     None          Hemodynamic Monitoring:  SvO2:        Non-Invasive/Invasive Ventilation Settings:  Respiratory    Lab Data (Last 4 hours)    None         O2/Vent Data (Last 4 hours)       0323          Non-Invasive Ventilation Mode BiPAP                 No results found for: PHART, KUX6GPH, PO2ART, RNP5REW, X6ABQGGW, BEART, SOURCE  SpO2: SpO2: 99 %    Intake and Outputs:  I/O        07 -  0700 01/04 0701 - 01/05 0700    I V  (mL/kg) 3366 7 (33 7) 5366 7 (53 8)    NG/GT  135    IV Piggyback 2000 2358  7    Total Intake(mL/kg) 5366 7 (53 8) 7860 4 (78 8)    Urine (mL/kg/hr) 1000 1635 (0 7)    Emesis/NG output  450    Total Output 1000 2085    Net +4366 7 +5775 4              Nutrition:        Diet Orders            Start     Ordered    01/09/19 1600  Diet Enteral/Parenteral; Tube Feeding No Oral Diet; Jevity 1 5; Continuous; 63  Diet effective now     Comments:  Increase TF by 10 cc every 8 hours to a goal of 63   Question Answer Comment   Diet Type Enteral/Parenteral    Enteral/Parenteral Tube Feeding No Oral Diet    Tube Feeding Formula: Jevity 1 5    Bolus/Cyclic/Continuous Continuous    Tube Feeding Goal Rate (mL/hr): 63    RD to adjust diet per protocol?  Yes        01/09/19 1559          Labs:     Results from last 7 days  Lab Units 01/10/19  0434 01/09/19  0602 01/08/19  0534   WBC Thousand/uL 14 57* 12 22* 9 35   HEMOGLOBIN g/dL 11 3* 12 0 11 2*   HEMATOCRIT % 32 9* 35 5* 32 7*   PLATELETS Thousands/uL 70* 72* 80*   MONO PCT % 1* 2* 4        Results from last 7 days  Lab Units 01/11/19  0544 01/10/19  1543 01/10/19  0434 01/09/19  0602  01/08/19  0534 01/07/19  0443  01/05/19  0314   SODIUM mmol/L 146* 143 145 143  --  139 138  < >  --    POTASSIUM mmol/L 4 0 3 8 3 4* 3 5  < > 4 0 3 8  < >  --    CHLORIDE mmol/L 106 105 108 107  --  107 106  < >  --    CO2 mmol/L 33* 32 32 28  --  27 25  < >  --    CO2, I-STAT mmol/L  --   --   --   --   --   --   --   --  24   BUN mg/dL 37* 33* 36* 36*  --  39* 40*  < >  --    CREATININE mg/dL 1 26 1 23 1 31* 1 32*  --  1 40* 1 65*  < >  --    CALCIUM mg/dL 7 5* 7 3* 6 9* 6 9*  --  7 1* 7 4*  < >  --    ALK PHOS U/L  --   --   --  92  --  78 38*  < >  --    ALT U/L  --   --   --  94*  --  100* 132*  < >  --    AST U/L  --   --   --  83*  --  81* 76*  < >  --    GLUCOSE, ISTAT mg/dl  --   --   --   --   --   --   --   --  146*   < > = values in this interval not displayed  Results from last 7 days  Lab Units 01/10/19  0434 01/08/19  0534 01/05/19  0259   MAGNESIUM mg/dL 2 0 3 1* 2 1       Results from last 7 days  Lab Units 01/10/19  0434 01/09/19  1343 01/08/19  2322   PHOSPHORUS mg/dL 2 6* 1 5* 1 2*        Results from last 7 days  Lab Units 01/10/19  0605 01/10/19  0001 01/09/19  1521  01/07/19  1955   INR   --   --   --   --  1 03   PTT seconds 69* 62* 50*  < > 28   < > = values in this interval not displayed  Results from last 7 days  Lab Units 01/07/19  0443   LACTIC ACID mmol/L 1 7       0  Lab Value Date/Time   TROPONINI <0 02 01/03/2019 0818       Imaging: Xr Chest Portable    Result Date: 1/4/2019  Impression: Left greater than right pleural effusions  Pulmonary vascular congestive failure  Satisfactory positioning of endotracheal and enteric tubes  Workstation performed: [id number]     Xr Chest 2 Views    Result Date: 1/3/2019  Impression: No active pulmonary disease on examination which is somewhat limited secondary to low lung volumes  Workstation performed: [id number]     Fl Ercp Biliary Only    Result Date: 1/4/2019  Impression: Fluoroscopic guidance provided for surgical procedure  Please refer to the separate procedure notes for additional details  Workstation performed: [id number]     Ct Abdomen Pelvis W Contrast    Result Date: 1/3/2019  Impression: Acute interstitial edematous pancreatitis with extensive pancreatic and peripancreatic inflammatory edema  Within the limitations imposed by respiratory motion throughout the upper abdomen there is no convincing evidence of pancreatic necrosis or underlying pancreatic mass  Nonetheless, when better pain control can be achieved and the patient is able to tolerate better breath-hold imaging, more accurate characterization utilizing pancreatic MRI with MRCP is recommended for further investigation of the possible etiology and complications of acute pancreatitis   The study was marked in [facility] for immediate notification  Workstation performed: [id number]  I have personally reviewed pertinent reports  EKG: N/A    Micro:  Lab Results   Component Value Date    BLOODCX No Growth After 5 Days  01/04/2019    BLOODCX No Growth After 5 Days  01/04/2019       Allergies: No Known Allergies    Medications:   Scheduled Meds:    Current Facility-Administered Medications:  acetaminophen 650 mg Oral Q6H PRN Lindsay Fletcheree    amiodarone 1 mg/min Intravenous Continuous Kaylan Shaerer PA-C Last Rate: 1 mg/min (01/10/19 6863)   amiodarone 200 mg Oral TID With Meals John Faith MD    chlorhexidine 15 mL Swish & Spit Q12H [address] Fawn Zheng PA-C    dexmedetomidine 0 1-1 5 mcg/kg/hr Intravenous Titrated [place] Corralel, CRNP Last Rate: 0 7 mcg/kg/hr (01/11/19 0234)   HYDROmorphone 0 5 mg Intravenous Q2H PRN Cristiane Crooks MD    influenza vaccine 0 5 mL Intramuscular Prior to discharge Lauri Estrada MD    insulin lispro 4-20 Units Subcutaneous Q6H [address] Cristiane Crooks MD    metoprolol 5 mg Intravenous Q6H Cristiane Crooks MD    nitroGLYcerin 5-200 mcg/min Intravenous Titrated [place] Wesley, CRNP Last Rate: Stopped (01/10/19 2245)   ondansetron 4 mg Intravenous Q6H PRN Nato Stanley PA-C    perflutren lipid microsphere 0 8 mL/min Intravenous Once in imaging Konstantin Ford MD      Continuous Infusions:    amiodarone 1 mg/min Last Rate: 1 mg/min (01/10/19 5218)   dexmedetomidine 0 1-1 5 mcg/kg/hr Last Rate: 0 7 mcg/kg/hr (01/11/19 0234)   nitroGLYcerin 5-200 mcg/min Last Rate: Stopped (01/10/19 2245)     PRN Meds:    acetaminophen 650 mg Q6H PRN   HYDROmorphone 0 5 mg Q2H PRN   influenza vaccine 0 5 mL Prior to discharge   ondansetron 4 mg Q6H PRN   perflutren lipid microsphere 0 8 mL/min Once in imaging       VTE Pharmacologic Prophylaxis: Sequential compression device (Venodyne)  and Enoxaparin (Lovenox)  VTE Mechanical Prophylaxis: sequential compression device    Invasive lines and devices:   Invasive Devices     Central Venous Catheter Line            CVC Central Lines 01/05/19 Triple 20cm 5 days          Drain            Urethral Catheter 16 Fr  6 days                   Counseling / Coordination of Care  Total Critical Care time spent 25 minutes excluding procedures, teaching and family updates  Code Status: Level 1 - Full Code     Portions of the record may have been created with voice recognition software  Occasional wrong word or "sound a like" substitutions may have occurred due to the inherent limitations of voice recognition software  Read the chart carefully and recognize, using context, where substitutions have occurred       Gerald Milligan MD

## 2019-01-11 NOTE — SPEECH THERAPY NOTE
Bedside Swallow Evaluation:    Summary:  Pt presents w/ adequate oral and pharyngeal stages of swallowing with consistencies assessed  However, patient just extubated yesterday and taken off bi-pap this am and presents with some SOB  Recommendations:  Diet: NDD3/soft   Liquid: Thin   Meds: Whole with thin liquids   Supervision: Intermittent  May need assistance with tray set up  Positioning:Upright  Strategies: Pt to take PO/Meds only when fully alert and upright  Aspiration precautions      Therapy Prognosis: Good  Prognosis considerations: SOB and respiratory status   Frequency: 1-2 f/u sessions    Consider consult w/:  N/A      Reason for consult:  Determine safest and least restrictive diet  respiratory compromise    Precautions:  None    Current diet: NPO    Premorbid diet[de-identified] Regular with thin liquids     Previous VBS: None on record    O2 requirement: Yes  On NC    Voice/Speech: WFL    Social: Patient lives with wife     Follows commands: WFL                          Cognitive Status: WFL    Oral mec exam:  Full dentition  Full symmetry    Items administered:  Puree and hard solid with thin liquids  Liquids were taken by straw  Oral stage: WFL  Lip closure: WFL  Mastication: WFL  Bolus formation: WFL  Bolus control: WFL  Transfer: WFL  Oral residue: No  Pocketing: No    Pharyngeal stage: WFL  Swallow promptness: WFL  Laryngeal rise: WFL  Wet voice: No  Throat clear: No  Cough: No  Secondary swallows: No  Audible swallows: No  No s/s aspiration    Esophageal stage:  No s/s reported    Aspiration precautions posted    Results d/w:  Pt, nursing, physician    Goal(s):  Pt will tolerate least restrictive diet w/out s/s aspiration or oral/pharyngeal difficulties

## 2019-01-11 NOTE — PLAN OF CARE
Problem: SLP ADULT - SWALLOWING, IMPAIRED  Goal: Initial SLP swallow eval performed  Outcome: Completed Date Met: 01/11/19

## 2019-01-11 NOTE — PLAN OF CARE
Problem: PHYSICAL THERAPY ADULT  Goal: Performs mobility at highest level of function for planned discharge setting  See evaluation for individualized goals  Treatment/Interventions: Bed mobility, Gait training, LE strengthening/ROM, Functional transfer training, Patient/family training, OT, Spoke to nursing  Equipment Recommended:  (TBD)       See flowsheet documentation for full assessment, interventions and recommendations  Outcome: Progressing  Prognosis: Fair  Problem List: Decreased strength, Decreased range of motion, Decreased endurance, Impaired balance, Decreased mobility, Decreased safety awareness, Pain  Assessment: Pt initially agreeable to PT session although 2* to confusion and fatigue became increasingly agitated throughout session  Demonstrated 2 sit<>stand with Mod A  Pt refused to do additional mobility 2* to fatigue  Noted increased confusion requesting to return to bed and then reporting he did not want to move to bed  Pt educated on importance of mobility  Refused additional therapy despite education  Barriers to Discharge: Inaccessible home environment     Recommendation: Short-term skilled PT     PT - OK to Discharge:  (to rehab when medically stable )    See flowsheet documentation for full assessment

## 2019-01-11 NOTE — PLAN OF CARE
Problem: OCCUPATIONAL THERAPY ADULT  Goal: Performs self-care activities at highest level of function for planned discharge setting  See evaluation for individualized goals  Treatment Interventions: ADL retraining, Functional transfer training, UE strengthening/ROM, Endurance training, Cognitive reorientation, Patient/family training, Equipment evaluation/education, Neuromuscular reeducation, Fine motor coordination activities, Compensatory technique education, Continued evaluation, Energy conservation, Activityengagement          See flowsheet documentation for full assessment, interventions and recommendations  Outcome: Progressing  Limitation: Decreased ADL status, Decreased UE ROM, Decreased UE strength, Decreased Safe judgement during ADL, Decreased cognition, Decreased endurance, Decreased sensation, Visual deficit, Decreased fine motor control, Decreased self-care trans, Decreased high-level ADLs  Prognosis: Fair  Assessment: Patient participated in Skilled OT session 1/11/19 with interventions consisting of  education on activity engagement*   Patient agreeable to OT treatment session, upon arrival patient was found seated OOB to Recliner  In comparison to previous session, patient with improvements in alertness, verbal communication, activity tolerance, attempt at standing and orientation  Patient requiring verbal cues for correct technique, one step directives and frequent rest periods  Pt agitated throughout session; educated on importance of engaging in therapy however despite education pt not agreeable to further activity and asked therapists to leave the room  Patient continues to be functioning below baseline level, occupational performance remains limited secondary to factors listed above and increased risk for falls and injury  From OT standpoint, recommendation at time of d/c would be Short Term Rehab when medically stable     Patient to benefit from continued Occupational Therapy treatment while in the hospital to address deficits as defined above and maximize level of functional independence with ADLs and functional mobility        OT Discharge Recommendation: Short Term Rehab  OT - OK to Discharge: Yes (when medically stable)      Comments: Chiqui Silvestre MS, OTR/L

## 2019-01-11 NOTE — PHYSICAL THERAPY NOTE
Physical Therapy Evaluation     Patient's Name: Ru Landry    Admitting Diagnosis  Pancreatitis [K85 90]  Abdominal pain [R10 9]    Problem List  Patient Active Problem List   Diagnosis    Acute pancreatitis    Pancreatitis       Past Medical History  Past Medical History:   Diagnosis Date    Gastroesophageal reflux        Past Surgical History  Past Surgical History:   Procedure Laterality Date    CHOLECYSTECTOMY      COLONOSCOPY N/A 3/21/2016    Procedure: COLONOSCOPY;  Surgeon: Lenora Ye DO;  Location: [facility]; Service:     ERCP N/A 1/4/2019    Procedure: ENDOSCOPIC RETROGRADE CHOLANGIOPANCREATOGRAPHY (ERCP); Surgeon: Maddie Mccoy MD;  Location: [facility]; Service: Gastroenterology    ND EGD TRANSORAL BIOPSY SINGLE/MULTIPLE N/A 3/21/2016    Procedure: ESOPHAGOGASTRODUODENOSCOPY (EGD); Surgeon: Lenora Ye DO;  Location: [facility]; Service: General        01/11/19 1430   Pain Assessment   Pain Assessment No/denies pain   Pain Score No Pain   Pain Type Acute pain   Pain Location Abdomen   Pain Rating: FLACC (Rest) - Face 0   Pain Rating: FLACC (Rest) - Legs 0   Pain Rating: FLACC (Rest) - Activity 0   Pain Rating: FLACC (Rest) - Cry 0   Pain Rating: FLACC (Rest) - Consolability 0   Score: FLACC (Rest) 0   Pain Rating: FLACC (Activity) - Face 0   Pain Rating: FLACC (Activity) - Legs 0   Pain Rating: FLACC (Activity) - Activity 0   Pain Rating: FLACC (Activity) - Cry 0   Pain Rating: FLACC (Activity) - Consolability 0   Score: FLACC (Activity) 0   Restrictions/Precautions   Weight Bearing Precautions Per Order No   Other Precautions Multiple lines; Fall Risk;Telemetry;O2; Chair Alarm; Bed Alarm;Cognitive;Agitated   General   Chart Reviewed Yes   Family/Caregiver Present No   Cognition   Orientation Level Oriented to person;Oriented to place   Subjective   Subjective "I only slept an hour and a half last night"   Bed Mobility   Supine to Sit Unable to assess   Sit to Supine Unable to assess Transfers   Sit to Stand 3  Moderate assistance   Additional items Assist x 1; Increased time required   Stand to Sit 3  Moderate assistance   Additional items Assist x 1; Increased time required   Ambulation/Elevation   Gait pattern Not appropriate   Balance   Static Sitting Poor +   Dynamic Sitting Poor -   Static Standing Poor -   Endurance Deficit   Endurance Deficit Yes   Endurance Deficit Description fatigue   Activity Tolerance   Activity Tolerance Patient limited by fatigue;Patient limited by pain   Nurse Made Aware yes, nsg gave clearance to work with pt   Assessment   Prognosis Fair   Problem List Decreased strength;Decreased range of motion;Decreased endurance; Impaired balance;Decreased mobility; Decreased safety awareness;Pain   Assessment Pt initially agreeable to PT session although 2* to confusion and fatigue became increasingly agitated throughout session  Demonstrated 2 sit<>stand with Mod A  Pt refused to do additional mobility 2* to fatigue  Noted increased confusion requesting to return to bed and then reporting he did not want to move to bed  Pt educated on importance of mobility  Refused additional therapy despite education  Goals   STG Expiration Date 01/21/19   Short Term Goal #2 Continue to progress evaluation goals with addition: 1  Complete sit<>stand transfers with S  2  Complete stand pivot transfers with MinAx1  Plan   Treatment/Interventions Bed mobility;Gait training; Endurance training;Functional transfer training;LE strengthening/ROM;OT;Spoke to case management   PT Frequency 2-3x/wk   Recommendation   Recommendation Short-term skilled PT   PT - OK to Discharge (to rehab when medically stable )           Johny Sotelo, PT

## 2019-01-11 NOTE — PROGRESS NOTES
Patient has very labored breathing turning head from side to side, using accessory muscles with blood pressure above 200's SBP with nitroglycerin running at 130 mcg/min  Patient was confusing, stooled all over the bed and his arterial line was pulled out  VBG sent with PCO2 elevated @ 58  6  Patient was extubated in am, contacted respiratory for time  Patient was on NC when arrived  He was Extubated @ 11:10am 1/10

## 2019-01-11 NOTE — PROGRESS NOTES
Patient placed on Bipap by respiratory 18/8 @ 60%  Patient tolerating Bipap however his BP is dropping and Nitroglycerin drip has been reduced progressively and now off @ 22:13  Patient given 40 meq of K via TLC  Patient had to be restrained wrists bilaterally as he was pulling off Bipap mask and legs off the bed  Precedex is held for now  Patient calmer  Lasix to be given when BP is stronger

## 2019-01-12 ENCOUNTER — APPOINTMENT (INPATIENT)
Dept: RADIOLOGY | Facility: HOSPITAL | Age: 59
DRG: 438 | End: 2019-01-12
Payer: COMMERCIAL

## 2019-01-12 PROBLEM — E87.6 HYPOKALEMIA: Status: ACTIVE | Noted: 2019-01-12

## 2019-01-12 PROBLEM — R41.0 DELIRIUM: Status: ACTIVE | Noted: 2019-01-12

## 2019-01-12 PROBLEM — J81.1 PULMONARY EDEMA: Status: ACTIVE | Noted: 2019-01-12

## 2019-01-12 PROBLEM — E87.3 METABOLIC ALKALOSIS: Status: ACTIVE | Noted: 2019-01-12

## 2019-01-12 PROBLEM — R06.89 ACUTE RESPIRATORY INSUFFICIENCY: Status: ACTIVE | Noted: 2019-01-12

## 2019-01-12 PROBLEM — D72.829 LEUKOCYTOSIS: Status: ACTIVE | Noted: 2019-01-12

## 2019-01-12 LAB
ALBUMIN SERPL BCP-MCNC: 1.7 G/DL (ref 3.5–5)
ALP SERPL-CCNC: 74 U/L (ref 46–116)
ALT SERPL W P-5'-P-CCNC: 63 U/L (ref 12–78)
ANION GAP SERPL CALCULATED.3IONS-SCNC: 7 MMOL/L (ref 4–13)
ANION GAP SERPL CALCULATED.3IONS-SCNC: 7 MMOL/L (ref 4–13)
ANION GAP SERPL CALCULATED.3IONS-SCNC: 8 MMOL/L (ref 4–13)
AST SERPL W P-5'-P-CCNC: 57 U/L (ref 5–45)
BACTERIA UR QL AUTO: ABNORMAL /HPF
BILIRUB SERPL-MCNC: 1.84 MG/DL (ref 0.2–1)
BILIRUB UR QL STRIP: NEGATIVE
BUN SERPL-MCNC: 39 MG/DL (ref 5–25)
BUN SERPL-MCNC: 41 MG/DL (ref 5–25)
BUN SERPL-MCNC: 41 MG/DL (ref 5–25)
CALCIUM SERPL-MCNC: 7.1 MG/DL (ref 8.3–10.1)
CALCIUM SERPL-MCNC: 7.8 MG/DL (ref 8.3–10.1)
CALCIUM SERPL-MCNC: 7.8 MG/DL (ref 8.3–10.1)
CHLORIDE SERPL-SCNC: 108 MMOL/L (ref 100–108)
CHLORIDE SERPL-SCNC: 108 MMOL/L (ref 100–108)
CHLORIDE SERPL-SCNC: 109 MMOL/L (ref 100–108)
CLARITY UR: CLEAR
CO2 SERPL-SCNC: 28 MMOL/L (ref 21–32)
CO2 SERPL-SCNC: 28 MMOL/L (ref 21–32)
CO2 SERPL-SCNC: 30 MMOL/L (ref 21–32)
COLOR UR: ABNORMAL
CREAT SERPL-MCNC: 1.27 MG/DL (ref 0.6–1.3)
CREAT SERPL-MCNC: 1.35 MG/DL (ref 0.6–1.3)
CREAT SERPL-MCNC: 1.35 MG/DL (ref 0.6–1.3)
ERYTHROCYTE [DISTWIDTH] IN BLOOD BY AUTOMATED COUNT: 14.8 % (ref 11.6–15.1)
GFR SERPL CREATININE-BSD FRML MDRD: 57 ML/MIN/1.73SQ M
GFR SERPL CREATININE-BSD FRML MDRD: 57 ML/MIN/1.73SQ M
GFR SERPL CREATININE-BSD FRML MDRD: 61 ML/MIN/1.73SQ M
GLUCOSE SERPL-MCNC: 125 MG/DL (ref 65–140)
GLUCOSE SERPL-MCNC: 128 MG/DL (ref 65–140)
GLUCOSE SERPL-MCNC: 131 MG/DL (ref 65–140)
GLUCOSE SERPL-MCNC: 133 MG/DL (ref 65–140)
GLUCOSE SERPL-MCNC: 140 MG/DL (ref 65–140)
GLUCOSE SERPL-MCNC: 143 MG/DL (ref 65–140)
GLUCOSE UR STRIP-MCNC: NEGATIVE MG/DL
HCT VFR BLD AUTO: 37.7 % (ref 36.5–49.3)
HGB BLD-MCNC: 12.4 G/DL (ref 12–17)
HGB UR QL STRIP.AUTO: ABNORMAL
HYALINE CASTS #/AREA URNS LPF: ABNORMAL /LPF
KETONES UR STRIP-MCNC: NEGATIVE MG/DL
LEUKOCYTE ESTERASE UR QL STRIP: NEGATIVE
MAGNESIUM SERPL-MCNC: 2.1 MG/DL (ref 1.6–2.6)
MCH RBC QN AUTO: 29.5 PG (ref 26.8–34.3)
MCHC RBC AUTO-ENTMCNC: 32.9 G/DL (ref 31.4–37.4)
MCV RBC AUTO: 90 FL (ref 82–98)
NITRITE UR QL STRIP: NEGATIVE
NON-SQ EPI CELLS URNS QL MICRO: ABNORMAL /HPF
PH UR STRIP.AUTO: 8 [PH] (ref 4.5–8)
PLATELET # BLD AUTO: 159 THOUSANDS/UL (ref 149–390)
PMV BLD AUTO: 10 FL (ref 8.9–12.7)
POTASSIUM SERPL-SCNC: 3.2 MMOL/L (ref 3.5–5.3)
POTASSIUM SERPL-SCNC: 3.2 MMOL/L (ref 3.5–5.3)
POTASSIUM SERPL-SCNC: 3.5 MMOL/L (ref 3.5–5.3)
PROT SERPL-MCNC: 5.4 G/DL (ref 6.4–8.2)
PROT UR STRIP-MCNC: ABNORMAL MG/DL
RBC # BLD AUTO: 4.2 MILLION/UL (ref 3.88–5.62)
RBC #/AREA URNS AUTO: ABNORMAL /HPF
SODIUM SERPL-SCNC: 144 MMOL/L (ref 136–145)
SODIUM SERPL-SCNC: 144 MMOL/L (ref 136–145)
SODIUM SERPL-SCNC: 145 MMOL/L (ref 136–145)
SP GR UR STRIP.AUTO: 1.02 (ref 1–1.03)
UROBILINOGEN UR QL STRIP.AUTO: 2 E.U./DL
WBC # BLD AUTO: 18.06 THOUSAND/UL (ref 4.31–10.16)
WBC #/AREA URNS AUTO: ABNORMAL /HPF

## 2019-01-12 PROCEDURE — 80053 COMPREHEN METABOLIC PANEL: CPT | Performed by: PHYSICIAN ASSISTANT

## 2019-01-12 PROCEDURE — 94760 N-INVAS EAR/PLS OXIMETRY 1: CPT

## 2019-01-12 PROCEDURE — 90682 RIV4 VACC RECOMBINANT DNA IM: CPT | Performed by: SURGERY

## 2019-01-12 PROCEDURE — 83735 ASSAY OF MAGNESIUM: CPT | Performed by: PHYSICIAN ASSISTANT

## 2019-01-12 PROCEDURE — 99232 SBSQ HOSP IP/OBS MODERATE 35: CPT | Performed by: INTERNAL MEDICINE

## 2019-01-12 PROCEDURE — 82948 REAGENT STRIP/BLOOD GLUCOSE: CPT

## 2019-01-12 PROCEDURE — 71045 X-RAY EXAM CHEST 1 VIEW: CPT

## 2019-01-12 PROCEDURE — 81001 URINALYSIS AUTO W/SCOPE: CPT | Performed by: PHYSICIAN ASSISTANT

## 2019-01-12 PROCEDURE — 99233 SBSQ HOSP IP/OBS HIGH 50: CPT | Performed by: SURGERY

## 2019-01-12 PROCEDURE — 80048 BASIC METABOLIC PNL TOTAL CA: CPT | Performed by: NURSE PRACTITIONER

## 2019-01-12 PROCEDURE — 85027 COMPLETE CBC AUTOMATED: CPT | Performed by: NURSE PRACTITIONER

## 2019-01-12 PROCEDURE — 80048 BASIC METABOLIC PNL TOTAL CA: CPT | Performed by: PHYSICIAN ASSISTANT

## 2019-01-12 PROCEDURE — 94660 CPAP INITIATION&MGMT: CPT

## 2019-01-12 RX ORDER — FUROSEMIDE 10 MG/ML
40 INJECTION INTRAMUSCULAR; INTRAVENOUS EVERY 12 HOURS
Status: COMPLETED | OUTPATIENT
Start: 2019-01-12 | End: 2019-01-12

## 2019-01-12 RX ORDER — POTASSIUM CHLORIDE 20MEQ/15ML
40 LIQUID (ML) ORAL ONCE
Status: COMPLETED | OUTPATIENT
Start: 2019-01-12 | End: 2019-01-12

## 2019-01-12 RX ORDER — POTASSIUM CHLORIDE 14.9 MG/ML
20 INJECTION INTRAVENOUS ONCE
Status: COMPLETED | OUTPATIENT
Start: 2019-01-12 | End: 2019-01-13

## 2019-01-12 RX ORDER — FUROSEMIDE 10 MG/ML
40 INJECTION INTRAMUSCULAR; INTRAVENOUS ONCE
Status: COMPLETED | OUTPATIENT
Start: 2019-01-12 | End: 2019-01-12

## 2019-01-12 RX ORDER — POTASSIUM CHLORIDE 20 MEQ/1
20 TABLET, EXTENDED RELEASE ORAL 2 TIMES DAILY
Status: DISCONTINUED | OUTPATIENT
Start: 2019-01-12 | End: 2019-01-17

## 2019-01-12 RX ORDER — FUROSEMIDE 10 MG/ML
40 INJECTION INTRAMUSCULAR; INTRAVENOUS EVERY 12 HOURS
Status: DISCONTINUED | OUTPATIENT
Start: 2019-01-12 | End: 2019-01-12

## 2019-01-12 RX ORDER — POTASSIUM CHLORIDE 14.9 MG/ML
20 INJECTION INTRAVENOUS ONCE
Status: COMPLETED | OUTPATIENT
Start: 2019-01-13 | End: 2019-01-13

## 2019-01-12 RX ORDER — POTASSIUM CHLORIDE 29.8 MG/ML
40 INJECTION INTRAVENOUS ONCE
Status: COMPLETED | OUTPATIENT
Start: 2019-01-12 | End: 2019-01-12

## 2019-01-12 RX ORDER — WARFARIN SODIUM 5 MG/1
5 TABLET ORAL
Status: DISCONTINUED | OUTPATIENT
Start: 2019-01-12 | End: 2019-01-17

## 2019-01-12 RX ADMIN — INSULIN LISPRO 1 UNITS: 100 INJECTION, SOLUTION INTRAVENOUS; SUBCUTANEOUS at 21:49

## 2019-01-12 RX ADMIN — AMIODARONE HYDROCHLORIDE 200 MG: 200 TABLET ORAL at 08:48

## 2019-01-12 RX ADMIN — CHLORHEXIDINE GLUCONATE 0.12% ORAL RINSE 15 ML: 1.2 LIQUID ORAL at 08:47

## 2019-01-12 RX ADMIN — HEPARIN SODIUM 5000 UNITS: 5000 INJECTION INTRAVENOUS; SUBCUTANEOUS at 14:10

## 2019-01-12 RX ADMIN — INFLUENZA A VIRUS A/MICHIGAN/45/2015 (H1N1) RECOMBINANT HEMAGGLUTININ ANTIGEN, INFLUENZA A VIRUS A/SINGAPORE/INFIMH-16-0019/2016 (H3N2) RECOMBINANT HEMAGGLUTININ ANTIGEN, INFLUENZA B VIRUS B/MARYLAND/15/2016 RECOMBINANT HEMAGGLUTININ ANTIGEN, AND INFLUENZA B VIRUS B/PHUKET/3073/2013 RECOMBINANT HEMAGGLUTININ ANTIGEN 0.5 ML: 45; 45; 45; 45 INJECTION INTRAMUSCULAR at 10:14

## 2019-01-12 RX ADMIN — POTASSIUM CHLORIDE 20 MEQ: 1500 TABLET, EXTENDED RELEASE ORAL at 10:15

## 2019-01-12 RX ADMIN — POTASSIUM CHLORIDE 20 MEQ: 200 INJECTION, SOLUTION INTRAVENOUS at 23:22

## 2019-01-12 RX ADMIN — HEPARIN SODIUM 5000 UNITS: 5000 INJECTION INTRAVENOUS; SUBCUTANEOUS at 05:10

## 2019-01-12 RX ADMIN — FUROSEMIDE 40 MG: 10 INJECTION, SOLUTION INTRAMUSCULAR; INTRAVENOUS at 23:22

## 2019-01-12 RX ADMIN — AMIODARONE HYDROCHLORIDE 200 MG: 200 TABLET ORAL at 12:59

## 2019-01-12 RX ADMIN — POTASSIUM CHLORIDE 40 MEQ: 20 SOLUTION ORAL at 23:22

## 2019-01-12 RX ADMIN — HEPARIN SODIUM 5000 UNITS: 5000 INJECTION INTRAVENOUS; SUBCUTANEOUS at 21:41

## 2019-01-12 RX ADMIN — METOPROLOL TARTRATE 25 MG: 25 TABLET, FILM COATED ORAL at 16:10

## 2019-01-12 RX ADMIN — WARFARIN SODIUM 5 MG: 5 TABLET ORAL at 18:14

## 2019-01-12 RX ADMIN — METOPROLOL TARTRATE 25 MG: 25 TABLET, FILM COATED ORAL at 21:41

## 2019-01-12 RX ADMIN — HYDROMORPHONE HYDROCHLORIDE 0.5 MG: 1 INJECTION, SOLUTION INTRAMUSCULAR; INTRAVENOUS; SUBCUTANEOUS at 02:26

## 2019-01-12 RX ADMIN — FUROSEMIDE 40 MG: 10 INJECTION, SOLUTION INTRAMUSCULAR; INTRAVENOUS at 01:55

## 2019-01-12 RX ADMIN — POTASSIUM CHLORIDE 40 MEQ: 400 INJECTION, SOLUTION INTRAVENOUS at 06:45

## 2019-01-12 RX ADMIN — POTASSIUM CHLORIDE 20 MEQ: 1500 TABLET, EXTENDED RELEASE ORAL at 18:14

## 2019-01-12 RX ADMIN — CHLORHEXIDINE GLUCONATE 0.12% ORAL RINSE 15 ML: 1.2 LIQUID ORAL at 21:41

## 2019-01-12 RX ADMIN — FUROSEMIDE 40 MG: 10 INJECTION, SOLUTION INTRAMUSCULAR; INTRAVENOUS at 10:52

## 2019-01-12 RX ADMIN — AMIODARONE HYDROCHLORIDE 200 MG: 200 TABLET ORAL at 16:10

## 2019-01-12 RX ADMIN — METOPROLOL TARTRATE 25 MG: 25 TABLET, FILM COATED ORAL at 08:47

## 2019-01-12 NOTE — PROGRESS NOTES
Progress Note - Cardiology   Tish Machuca 60 y o  male MRN: [id number]  Unit/Bed#: [facility] -01 Encounter: [id number]  01/12/19  9:43 AM      Impression and Plan:     58-year-old with acute pancreatitis, status post ERCP and sphincterotomy, status post stone extraction and biliary stent placement, developed new onset atrial fibrillation with rapid rates, unsuccessfully electrically cardioverted, subsequently spontaneously converted but now is back in atrial fibrillation  Currently on IV and oral amiodarone-with probably inconsistent and limited absorption     Anticoagulation was initiated 36 hours after onset of atrial fibrillation and continued for 3 days and subsequently discontinued due to persistent thrombocytopenia  Atrial fibrillation converted back to sinus rhythm around 48 hr after onset        He was on nasal cannula oxygen yesterday after extubation and off pressors but today back on BiPAP-initiated last night  Chest x-ray showing pulmonary edema and bilateral pleural effusions  Anasarca and decreased breath sounds bilaterally at the bases on exam      Plan:    Acute respiratory failure:  Decompensation in the last 24 hr likely secondary to volume overload, aggressive diuresis will help  Lasix 80 mg twice daily -2 doses today  He has profound anasarca is over 20 L positive through his hospital stay  Once intubated, he will be difficult to wean off as well as would have a higher propensity for atrial fibrillation     Atrial fibrillation:  New onset atrial fibrillation in the setting of sepsis and catecholamine surges   Overall he will be much easier to manage while he is in sinus rhythm than to rate control his atrial fibrillation  Off IV amiodarone, overlapped for almost 72 hr - due to questionable p o  Cory Thomas was started on heparin  36 hours after onset of atrial fibrillation    Subsequently discontinued due to thrombocytopenia -0 which develop- within a few hours of starting heparin, not suggestive of HIT  Thrombocytopenia also improved immediately after discontinuation of heparin  Start PO Coumadin if no other procedures are scheduled and use oral anticoagulation for about 4 weeks  IV amiodarone is discontinued  Continue p o  Amiodarone till all his acute issues resolve  Cholangitis/Sepsis and pancreatitis:  Status post ERCP and sphincterotomy, stone extraction and biliary stent placement-1/4/2019     Hypertension: Continue oral beta-blocker-increase to 25 3 times daily  Not on pressors anymore     Thrombocytopenia:  Temporally related to initiation of heparin, normalized now      ===================================================================    Chief Complaint:   Chief Complaint   Patient presents with    Vomiting     Patient presents to the ER with vomiting since Midnight  Subjective/Objective     Subjective: Intubated moves all extremities    Objective:  appears ill but comfortable , no distress at the time of exam      Patient Active Problem List   Diagnosis    Acute pancreatitis    Pancreatitis    Delirium    Leukocytosis    Acute respiratory insufficiency    Pulmonary edema    Hypokalemia    Metabolic alkalosis       Vitals: /74   Pulse 92   Temp 98 1 °F (36 7 °C) (Oral)   Resp (!) 28   Ht 6' 4" (1 93 m)   Wt 99 8 kg (220 lb)   SpO2 100%   BMI 26 78 kg/m²     I/O this shift:  In: 100 [IV Piggyback:100]  Out: 250 [Urine:250]  Wt Readings from Last 3 Encounters:   01/03/19 99 8 kg (220 lb)   03/21/16 88 5 kg (195 lb)   07/17/13 95 8 kg (211 lb 2 1 oz)       Intake/Output Summary (Last 24 hours) at 01/12/19 0943  Last data filed at 01/12/19 0901   Gross per 24 hour   Intake          1372 59 ml   Output             3325 ml   Net         -1952 41 ml     I/O last 3 completed shifts: In: 2240 4 [P O :720;  I V :1370 4; IV Piggyback:150]  Out: 6005 [Urine:6005]    Invasive Devices     Central Venous Catheter Line            CVC Central Lines 01/05/19 Triple 20cm 6 days          Drain            Urethral Catheter 16 Fr  7 days                  Physical Exam:  GEN: Raheem Hunter appears ill, alert,  pleasant and cooperative , on BiPAP  HEENT: pupils equal, round, and reactive to light; extraocular muscles intact  NECK: supple, no carotid bruits or JVD  HEART: regular rhythm, normal S1 and S2, no murmur, no clicks, gallops or rubs   LUNGS: clear to auscultation bilaterally; no wheezes or rhonchi, no rales decreased breath sounds at the bases bilaterally   ABDOMEN/GI: normal bowel sounds, soft, no tenderness, no distention  EXTREMITIES/Musculoskeltal: peripheral pulses normal; no clubbing, cyanosis, positive for edema and profound anasarca  NEURO: no focal motor findings   SKIN: normal without suspicious lesions on exposed skin              Lab Results:         Results from last 7 days  Lab Units 01/12/19  0500 01/11/19  0544 01/10/19  0434   WBC Thousand/uL 18 06* 14 04* 14 57*   HEMOGLOBIN g/dL 12 4 11 8* 11 3*   HEMATOCRIT % 37 7 35 5* 32 9*   PLATELETS Thousands/uL 159 99* 70*           Results from last 7 days  Lab Units 01/12/19  0500 01/11/19  0544 01/10/19  1543  01/09/19  0602  01/08/19  0534 01/07/19  0443   POTASSIUM mmol/L 3 2* 4 0 3 8  < > 3 5  < > 4 0 3 8   CHLORIDE mmol/L 108 106 105  < > 107  --  107 106   CO2 mmol/L 30 33* 32  < > 28  --  27 25   BUN mg/dL 39* 37* 33*  < > 36*  --  39* 40*   CREATININE mg/dL 1 35* 1 26 1 23  < > 1 32*  --  1 40* 1 65*   CALCIUM mg/dL 7 8* 7 5* 7 3*  < > 6 9*  --  7 1* 7 4*   ALK PHOS U/L  --   --   --   --  92  --  78 38*   ALT U/L  --   --   --   --  94*  --  100* 132*   AST U/L  --   --   --   --  83*  --  81* 76*   < > = values in this interval not displayed  Results from last 7 days  Lab Units 01/07/19  1955   INR  1 03       Imaging: I have personally reviewed pertinent reports      EKG/Telemtry:remains in sinus rhythm      Scheduled Meds:    Current Facility-Administered Medications:  acetaminophen 650 mg Oral Q6H PRN Mireille Arevalo   amiodarone 200 mg Oral TID With Meals Millicent Abrams MD   chlorhexidine 15 mL Swish & Spit Q12H [facility] Fawn Zheng PA-C   heparin (porcine) 5,000 Units Subcutaneous [facility] Rebecca Rogers MD   hydrALAZINE 10 mg Intravenous Q4H PRN Jodene Severance, CRNP   HYDROmorphone 0 5 mg Intravenous Q2H PRN Rebecca Rogers MD   influenza vaccine 0 5 mL Intramuscular Prior to discharge Selvin Hernandez MD   insulin lispro 1-6 Units Subcutaneous TID AC DAMARIS Skinner   insulin lispro 1-6 Units Subcutaneous HS Jodene Severance, CRNP   metoprolol tartrate 25 mg Oral TID Millicent Abrams MD   ondansetron 4 mg Intravenous Q6H PRN Yuli Escalona PA-C   oxyCODONE 10 mg Oral Q6H PRN Jodene Severance, CRNP   oxyCODONE 5 mg Oral Q6H PRN Jodene Severance, CRNP   perflutren lipid microsphere 0 8 mL/min Intravenous Once in imaging Isela Wills MD   potassium chloride 20 mEq Oral BID Fawn Zheng PA-C   sterile water         Continuous Infusions:       VTE Pharmacologic Prophylaxis: Heparin  VTE Mechanical Prophylaxis: sequential compression device      Counseling / Coordination of Care  Total time spent 20 minutes including teaching and family updates  More than 50% was spent counseling pt and family

## 2019-01-12 NOTE — RESPIRATORY THERAPY NOTE
resp care      01/12/19 0816   Non-Invasive Information   Interface Face mask   Non-Invasive Ventilation Mode BiPAP   $ CPAP/BiPAP Charge - Initial & Daily Mgmt Yes   SpO2 100 %   $ Pulse Oximetry Spot Check Charge Completed   Resp Comments Pt feeling SOB, states "he needs air"   Pt placed on BIPAP at this time   Non-Invasive Settings   FiO2 (%) 40   Temperature (Set) 31   IPAP (cm) 15 cm   EPAP (cm) 8 cm   Rate (Set) 8   Rise Time 3   Inspiratory Time (Set) 1   Non-Invasive Readings   Heater Temperature (Obs) 31   Skin Intervention Skin barrier applied;Skin intact   Total Rate 29   MV (Mech) 23   Peak Pressure (Obs) 16   Spontaneous Vt (mL) 790   Leak (lpm) 0   Non-Invasive Alarms   Insp Pressure High (cm H20) 30   Insp Pressure Low (cm H20) 5   Low Insp Pressure Time (sec) 20 sec   MV Low (L/min) 3   Vt High (mL) 2000   Vt Low (mL) 150   High Resp Rate (BPM) 50 BPM   Low Resp Rate (BPM) 8 BPM   Apnea Interval (sec) 20   Apnea Rate 8

## 2019-01-12 NOTE — PROGRESS NOTES
Progress Note - Critical Care   Araceli Hull 60 y o  male MRN: [id number]  Unit/Bed#: [facility] -01 Encounter: [id number]    Attending Physician: Prince Curran MD      ______________________________________________________________________  Assessment and Plan:   Principal Problem:    Acute pancreatitis  Active Problems:    Pancreatitis    Delirium    Leukocytosis    Acute respiratory insufficiency    Pulmonary edema    Hypokalemia    Metabolic alkalosis  Resolved Problems:    * No resolved hospital problems  *      57 y/o male with acute biliary pancreatitis and acute cholangitis s/p ERCP, stone retrieval and bile duct stent placement with acute respiratory failure    Neuro:   Analgesia- IV dilaudid (2 doses in last 24 hours) until tolerating more PO intake  PO tylenol and oxycodone ordered as well  Acute metabolic encephalopathy- improving  Oriented to self and place this morning  Will do more formal exam/CAM ICU once BiPAP mask is off and able to more easily communicate  CV:   Atrial fibrillation with RVR- improved on PO amiodarone and metoprolol  IV infusion d/c'd yesterday afternoon  Remains in NSR with rates in mid 80s  Continue PO amiodarone 200 mg TID and metoprolol 25 mg TID per cards  A/C prior with heparin infusion and subsequent thrombocytopenia which is resolved being off of heparin  D/W cards need for PO A/C long term  Appreciate cardiology's recommendations  Pulm:   Acute respiratory insufficiency- extubated on 1/10  Remains on BiPAP  Pulmonary edema improving nicely with diuresis  Received 3 doses diamox and one dose lasix in last 24h (last dose lasix at 2 am)  Negative 1 9 L today  Trial off of BiPAP today during the day if tolerates NC without increased WOB and continue diuresis with diamox q12h  Goal net negative 1 L  Moderate L pleural effusion- f/u AM CXR for stability  No thoracentesis at this time due to improving respiratory status   Continue aggressive pulmonary toilette/IS/OOB today  GI:   Acute biliary pancreatitis and acute cholangitis s/p ERCP/stone retrieval and bile duct stent placement- LFTs improving at last check  Abdominal exam benign and tolerating PO intake  F/u with GI in 6 weeks for stent removal  Pancreatitis has clinically resolved at this time  Abx completed (zosyn x5 days)    Dysphagia- continue dental soft diet with thin liquids  Speech therapy following  :   AZ- likely due to sepsis from pancreatitis  Overall improved  Continue to monitor  Cr 1 35 from 1 26 yesterday, peaked at 2 18 this admission  Back off on diuresis if continues to climb  Continue rodriguez catheter while diuresing  F/E/N:   Goal net - 1 Liter today  Continue diamox due to prior metabolic alkalosis from lasix within last 48 hours  Hypokalemia- replete and recheck at noon along with Mg as pt is still diuresing from lasix  Continue dental soft diet with thin liquids  No mIVFs  ID:   Aspiration pneumonia- completed a course of zosyn for this  Clinically improving from a respiratory standpoint  Continue off of antibiotics for now  BCx from 1/4 negative  Acute cholangitis- resolved s/p stone retrieval and completed course of zosyn    Heme:   Leukocytosis- WBC ct 18 today from 14  Remains afebrile  No obvious source of infection  Trend  Thrombocytopenia- resolved  Endo:   Hyperglycemia- improved  On SSI  No coverage needed in last 48 hours  Goal BS <180  Msk/Skin:   No active issues  Routine skin care  Elevate extremities due to full body edema/anasarca from volume resuscitation  Prophylaxis:  SCDs, SQH    Disposition:   Continue ICU level care today due to respiratory status/still on BiPAP  Anticipate transfer out within next 24 to 48 hours if continues to improve  Code Status: Level 1 - Full Code    Counseling / Coordination of Care  Total Critical Care time spent 30 minutes excluding procedures, teaching and family updates  ______________________________________________________________________    Chief Complaint: "Feeling okay"    24 Hour Events: Remains on BiPAP, had 5 minute rests overnight where he was comfortable and saturated well  Diuresed nicely with diamox given metabolic alkalosis, given one time dose of 40 mg IV lasix at 2 am  -1 9 liters this morning,  ml/hr following lasix this morning  Rodriguez in place  Review of Systems   Constitutional: Negative  HENT: Negative  Eyes: Negative  Respiratory: Negative  Negative for shortness of breath  Per RN would ask for BiPAP to be replaced overnight   Cardiovascular: Negative  Negative for chest pain  Gastrointestinal: Positive for abdominal distention  Negative for abdominal pain, nausea and vomiting  Endocrine: Negative  Genitourinary: Negative  Musculoskeletal: Negative  Skin: Negative  Allergic/Immunologic: Negative  Neurological: Negative  Hematological: Negative  Psychiatric/Behavioral: Negative       ______________________________________________________________________    Physical Exam:     Physical Exam   Constitutional: He is oriented to person, place, and time  He appears well-developed and well-nourished  No distress  HENT:   Head: Normocephalic  Eyes: Pupils are equal, round, and reactive to light  Neck: Normal range of motion  Neck supple  No JVD present  Cardiovascular: Normal rate and regular rhythm  Exam reveals no gallop and no friction rub  No murmur heard  Pulmonary/Chest: Effort normal    + Decreased BS L base  + comfortable on BiPAP   Abdominal: Soft  Bowel sounds are normal  He exhibits distension  There is no tenderness  There is no rebound and no guarding  Genitourinary: Penis normal    Genitourinary Comments: + rodriguez in place   Musculoskeletal: Normal range of motion  He exhibits edema  Neurological: He is alert and oriented to person, place, and time     + strength 5/5 B/L UE/LE  + sensation intact   Skin: Skin is warm and dry  Capillary refill takes less than 2 seconds  No rash noted  He is not diaphoretic  Psychiatric: He has a normal mood and affect      ______________________________________________________________________  Vitals:    19 0400 19 0414 19 0500 19 0600   BP: 128/70  163/81 146/77   Pulse: 78  88 82   Resp: 20  20 20   Temp:       TempSrc:       SpO2: 99% 100% 98% 100%   Weight:       Height:           Temperature:   Temp (24hrs), Av 2 °F (36 8 °C), Min:98 1 °F (36 7 °C), Max:98 4 °F (36 9 °C)    Current Temperature: 98 3 °F (36 8 °C)  Weights:   IBW: 86 8 kg    Body mass index is 26 78 kg/m²  Weight (last 2 days)     None        Hemodynamic Monitoring:  N/A     Non-Invasive/Invasive Ventilation Settings:  Respiratory    Lab Data (Last 4 hours)    None         O2/Vent Data (Last 4 hours)      414          Non-Invasive Ventilation Mode BiPAP                 No results found for: PHART, YXG1ZKX, PO2ART, JRW3HXI, C4BVGGFY, BEART, SOURCE  SpO2: SpO2: 100 %, SpO2 Device: O2 Device:  (ett)  Intake and Outputs:  I/O       01/10 0701 -  0700 701 -  0700    P  O   720    I V  (mL/kg) 1682 9 (16 9) 683 5 (6 8)    NG/     IV Piggyback 200 50    Feedings 276     Total Intake(mL/kg) 2478 9 (24 8) 1453 5 (14 6)    Urine (mL/kg/hr) 5455 (2 3) 3375 (1 4)    Emesis/NG output 300     Total Output 5755 3375    Net -3276 2 -1921 5          Unmeasured Stool Occurrence 2 x         UOP: 200 ml/hr   Nutrition:        Diet Orders            Start     Ordered    19 1034  Diet Dysphagia/Modified Consistency; Dysphagia 3-Dental Soft; Thin Liquid  Diet effective now     Question Answer Comment   Diet Type Dysphagia/Modified Consistency    Dysphagia/Modified Consistency Dysphagia 3-Dental Soft    Liquid Modifier Thin Liquid    RD to adjust diet per protocol?  Yes        19 1034          Labs:     Results from last 7 days  Lab Units 01/12/19  0500 01/11/19  0544 01/10/19  0434 01/09/19  0602 01/08/19  0534 01/07/19  1955 01/07/19  0443 01/06/19  0427   WBC Thousand/uL 18 06* 14 04* 14 57* 12 22* 9 35 6 77 6 15 3 60*   HEMOGLOBIN g/dL 12 4 11 8* 11 3* 12 0 11 2* 11 4* 12 5 11 8*   HEMATOCRIT % 37 7 35 5* 32 9* 35 5* 32 7* 33 7* 36 4* 35 3*   PLATELETS Thousands/uL 159 99* 70* 72* 80* 79* 101* 87*   BANDS PCT %  --  1 2 1 1  --  1 14*   MONO PCT %  --  3* 1* 2* 4  --  1* 6       Results from last 7 days  Lab Units 01/12/19  0500 01/11/19  0544 01/10/19  1543 01/10/19  0434 01/09/19  0602  01/08/19  0534 01/07/19  0443 01/06/19  0427   SODIUM mmol/L 145 146* 143 145 143  --  139 138 141   POTASSIUM mmol/L 3 2* 4 0 3 8 3 4* 3 5  < > 4 0 3 8 4 0   CHLORIDE mmol/L 108 106 105 108 107  --  107 106 109*   CO2 mmol/L 30 33* 32 32 28  --  27 25 25   ANION GAP mmol/L 7 7 6 5 8  --  5 7 7   BUN mg/dL 39* 37* 33* 36* 36*  --  39* 40* 42*   CREATININE mg/dL 1 35* 1 26 1 23 1 31* 1 32*  --  1 40* 1 65* 2 18*   CALCIUM mg/dL 7 8* 7 5* 7 3* 6 9* 6 9*  --  7 1* 7 4* 7 0*   ALT U/L  --   --   --   --  94*  --  100* 132* 202*   AST U/L  --   --   --   --  83*  --  81* 76* 96*   ALK PHOS U/L  --   --   --   --  92  --  78 38* 26*   ALBUMIN g/dL  --   --   --   --  1 5*  --  1 3* 1 4* 1 5*   TOTAL BILIRUBIN mg/dL  --   --   --   --  1 22*  --  0 80 1 13* 1 67*   < > = values in this interval not displayed  Results from last 7 days  Lab Units 01/11/19  0544 01/10/19  0434 01/09/19  1343 01/08/19  2322 01/08/19  0534   MAGNESIUM mg/dL 2 1 2 0  --   --  3 1*   PHOSPHORUS mg/dL 3 6 2 6* 1 5* 1 2* 1 3*        Results from last 7 days  Lab Units 01/10/19  0605 01/10/19  0001 01/09/19  1521 01/09/19  0602 01/08/19  2322 01/08/19  1707 01/08/19  0946  01/07/19  1955   INR   --   --   --   --   --   --   --   --  1 03   PTT seconds 69* 62* 50* 51* 45* 36 35  < > 28   < > = values in this interval not displayed          Results from last 7 days  Lab Units 01/07/19  0443 19  0427 19  2228 19  2003 19  1813 19  1343   LACTIC ACID mmol/L 1 7 2 8* 3 0* 3 0* 3 2* 3 3*     ABG:  Lab Results   Component Value Date    PHART 7 376 2019    ERL5URW 41 9 2019    PO2ART 105 8 2019    QZY5MQE 24 0 2019    BEART -1 2 2019    SOURCE Line, Arterial 2019     VBG:  Results from last 7 days  Lab Units 01/10/19  2034 19  0428   PH SERGEY  7 362  --    PCO2 SERGEY mm Hg 58 6*  --    PO2 SERGEY mm Hg 43 8  --    HCO3 SERGEY mmol/L 32 5*  --    BASE EXC SERGEY mmol/L 5 4  --    ABG SOURCE   --  Line, Arterial         No results found for: [facility]   Imagin/12 CXR: pending   CXR: Improving pulmonary edema, moderate L sided effusion I have personally reviewed pertinent reports         EKG: no new, NSR with rates in 80s on tele    Micro:   BCX: negative    Allergies: No Known Allergies     Medications:   Scheduled Meds:  Current Facility-Administered Medications:  acetaminophen 650 mg Oral Q6H PRN Oumou Pawan    amiodarone 200 mg Oral TID With Meals Edda Claude, MD    chlorhexidine 15 mL Swish & Spit Q12H [address] Fawn VIVIANA Zheng PA-C    heparin (porcine) 5,000 Units Subcutaneous [facility] Dexter Shields MD    hydrALAZINE 10 mg Intravenous Q4H PRN DAMARIS Abdi    HYDROmorphone 0 5 mg Intravenous Q2H PRN Dexter Shields MD    influenza vaccine 0 5 mL Intramuscular Prior to discharge Marifer Cook MD    insulin lispro 1-6 Units Subcutaneous TID DAMARIS Craven    insulin lispro 1-6 Units Subcutaneous HS DAMARIS Abdi    metoprolol tartrate 25 mg Oral TID Edda Claude, MD    ondansetron 4 mg Intravenous Q6H PRN Wallace Melgar PA-C    oxyCODONE 10 mg Oral Q6H PRN DAMARIS Abdi    oxyCODONE 5 mg Oral Q6H PRN DAMARIS Abdi    perflutren lipid microsphere 0 8 mL/min Intravenous Once in imaging Orsun Luna MD    potassium chloride 40 mEq Intravenous Once ANJUM Lehman Rate: 40 mEq (01/12/19 0645)   sterile water          Continuous Infusions:   PRN Meds:    acetaminophen 650 mg Q6H PRN   hydrALAZINE 10 mg Q4H PRN   HYDROmorphone 0 5 mg Q2H PRN   influenza vaccine 0 5 mL Prior to discharge   ondansetron 4 mg Q6H PRN   oxyCODONE 10 mg Q6H PRN   oxyCODONE 5 mg Q6H PRN   perflutren lipid microsphere 0 8 mL/min Once in imaging     VTE Pharmacologic Prophylaxis: Heparin  VTE Mechanical Prophylaxis: sequential compression device  Invasive lines and devices: Invasive Devices     Central Venous Catheter Line            CVC Central Lines 01/05/19 Triple 20cm 6 days          Drain            Urethral Catheter 16 Fr  7 days                     Portions of the record may have been created with voice recognition software  Occasional wrong word or "sound a like" substitutions may have occurred due to the inherent limitations of voice recognition software  Read the chart carefully and recognize, using context, where substitutions have occurred      Nadege Vela PA-C

## 2019-01-12 NOTE — ASSESSMENT & PLAN NOTE
- Acute biliary pancreatitis status post ERCP with sphincterotomy and biliary stent placement on 01/04/2019  Pancreatitis now resolved  - Diet as tolerated  - Continue daily Lasix 40 mg for volume overload secondary to fluid resuscitation in acute phase of pancreatitis  Monitor urine output and edema  - Activity as tolerated  - Continue multimodal analgesic regimen as needed  - Outpatient follow-up with gastroenterology  Surgical follow-up as needed

## 2019-01-12 NOTE — PROGRESS NOTES
Progress Note - General Surgery   Hal Michelle 60 y o  male MRN: [id number]  Unit/Bed#: [facility]01 Encounter: [id number]    Assessment/Plan:  60 y o  male with acute pancreatitis     Acute respiratory insufficiency   Assessment & Plan    Extubated 1/10  Wean O2 as tolerated     * Acute pancreatitis   Assessment & Plan    S/p ERCP with sphincterotomy and biliary stent placement 1/4   Diet as tolerated  Monitor UOP -- likely d/c rodriguez catheter today  OOB, ambulate  Transfer out of unit  Follow up with GI as outpt         Subjective/Objective     Subjective: Patient tolerating diet  No abdominal pain today  On BIPAP overnight, now on nasal cannula      Objective:     Vitals: Blood pressure 146/77, pulse 82, temperature 98 3 °F (36 8 °C), temperature source Oral, resp  rate 20, height 6' 4" (1 93 m), weight 99 8 kg (220 lb), SpO2 100 %  ,Body mass index is 26 78 kg/m²  I/O       01/10 0701 - 01/11 0700 01/11 0701 - 01/12 0700    P  O   720    I V  (mL/kg) 1682 9 (16 9) 683 5 (6 8)    NG/     IV Piggyback 200 50    Feedings 276     Total Intake(mL/kg) 2478 9 (24 8) 1453 5 (14 6)    Urine (mL/kg/hr) 5455 (2 3) 3375 (1 4)    Emesis/NG output 300     Total Output 5755 3375    Net -3276 2 -1921 5          Unmeasured Stool Occurrence 2 x           Physical Exam:  GEN: NAD  HEENT: MMM  CV: RRR  Lung: Normal effort  Ab: Soft, NT/ND  Extrem: No CCE  Neuro:  A+Ox3    Lab, Imaging and other studies:   CBC with diff:   Lab Results   Component Value Date    WBC 18 06 (H) 01/12/2019    HGB 12 4 01/12/2019    HCT 37 7 01/12/2019    MCV 90 01/12/2019     01/12/2019    MCH 29 5 01/12/2019    MCHC 32 9 01/12/2019    RDW 14 8 01/12/2019    MPV 10 0 01/12/2019   , BMP/CMP:   Lab Results   Component Value Date    SODIUM 145 01/12/2019    K 3 2 (L) 01/12/2019     01/12/2019    CO2 30 01/12/2019    BUN 39 (H) 01/12/2019    CREATININE 1 35 (H) 01/12/2019    CALCIUM 7 8 (L) 01/12/2019    EGFR 57 01/12/2019   , Magnesium: No components found for: MAG  VTE Pharmacologic Prophylaxis: Heparin  VTE Mechanical Prophylaxis: sequential compression device

## 2019-01-13 ENCOUNTER — APPOINTMENT (INPATIENT)
Dept: RADIOLOGY | Facility: HOSPITAL | Age: 59
DRG: 438 | End: 2019-01-13
Payer: COMMERCIAL

## 2019-01-13 PROBLEM — R13.10 DYSPHAGIA: Status: ACTIVE | Noted: 2019-01-13

## 2019-01-13 LAB
ALBUMIN SERPL BCP-MCNC: 1.5 G/DL (ref 3.5–5)
ALP SERPL-CCNC: 61 U/L (ref 46–116)
ALT SERPL W P-5'-P-CCNC: 51 U/L (ref 12–78)
ANION GAP SERPL CALCULATED.3IONS-SCNC: 9 MMOL/L (ref 4–13)
ANION GAP SERPL CALCULATED.3IONS-SCNC: 9 MMOL/L (ref 4–13)
AST SERPL W P-5'-P-CCNC: 59 U/L (ref 5–45)
BASOPHILS # BLD AUTO: 0.02 THOUSANDS/ΜL (ref 0–0.1)
BASOPHILS NFR BLD AUTO: 0 % (ref 0–1)
BILIRUB SERPL-MCNC: 1.48 MG/DL (ref 0.2–1)
BUN SERPL-MCNC: 37 MG/DL (ref 5–25)
BUN SERPL-MCNC: 39 MG/DL (ref 5–25)
CALCIUM SERPL-MCNC: 7.4 MG/DL (ref 8.3–10.1)
CALCIUM SERPL-MCNC: 7.4 MG/DL (ref 8.3–10.1)
CHLORIDE SERPL-SCNC: 109 MMOL/L (ref 100–108)
CHLORIDE SERPL-SCNC: 109 MMOL/L (ref 100–108)
CO2 SERPL-SCNC: 26 MMOL/L (ref 21–32)
CO2 SERPL-SCNC: 26 MMOL/L (ref 21–32)
CREAT SERPL-MCNC: 1.17 MG/DL (ref 0.6–1.3)
CREAT SERPL-MCNC: 1.33 MG/DL (ref 0.6–1.3)
EOSINOPHIL # BLD AUTO: 0.02 THOUSAND/ΜL (ref 0–0.61)
EOSINOPHIL NFR BLD AUTO: 0 % (ref 0–6)
ERYTHROCYTE [DISTWIDTH] IN BLOOD BY AUTOMATED COUNT: 14.6 % (ref 11.6–15.1)
GFR SERPL CREATININE-BSD FRML MDRD: 58 ML/MIN/1.73SQ M
GFR SERPL CREATININE-BSD FRML MDRD: 68 ML/MIN/1.73SQ M
GLUCOSE SERPL-MCNC: 132 MG/DL (ref 65–140)
GLUCOSE SERPL-MCNC: 136 MG/DL (ref 65–140)
GLUCOSE SERPL-MCNC: 136 MG/DL (ref 65–140)
GLUCOSE SERPL-MCNC: 152 MG/DL (ref 65–140)
HCT VFR BLD AUTO: 35 % (ref 36.5–49.3)
HGB BLD-MCNC: 11.7 G/DL (ref 12–17)
IMM GRANULOCYTES # BLD AUTO: 0.23 THOUSAND/UL (ref 0–0.2)
IMM GRANULOCYTES NFR BLD AUTO: 2 % (ref 0–2)
INR PPP: 1.28 (ref 0.86–1.17)
LYMPHOCYTES # BLD AUTO: 0.73 THOUSANDS/ΜL (ref 0.6–4.47)
LYMPHOCYTES NFR BLD AUTO: 5 % (ref 14–44)
MAGNESIUM SERPL-MCNC: 2.1 MG/DL (ref 1.6–2.6)
MCH RBC QN AUTO: 29.9 PG (ref 26.8–34.3)
MCHC RBC AUTO-ENTMCNC: 33.4 G/DL (ref 31.4–37.4)
MCV RBC AUTO: 90 FL (ref 82–98)
MONOCYTES # BLD AUTO: 0.82 THOUSAND/ΜL (ref 0.17–1.22)
MONOCYTES NFR BLD AUTO: 6 % (ref 4–12)
NEUTROPHILS # BLD AUTO: 12.44 THOUSANDS/ΜL (ref 1.85–7.62)
NEUTS SEG NFR BLD AUTO: 87 % (ref 43–75)
NRBC BLD AUTO-RTO: 0 /100 WBCS
PHOSPHATE SERPL-MCNC: 3.5 MG/DL (ref 2.7–4.5)
PLATELET # BLD AUTO: 205 THOUSANDS/UL (ref 149–390)
PMV BLD AUTO: 10 FL (ref 8.9–12.7)
POTASSIUM SERPL-SCNC: 3.3 MMOL/L (ref 3.5–5.3)
POTASSIUM SERPL-SCNC: 3.9 MMOL/L (ref 3.5–5.3)
PROT SERPL-MCNC: 4.9 G/DL (ref 6.4–8.2)
PROTHROMBIN TIME: 16.1 SECONDS (ref 11.8–14.2)
RBC # BLD AUTO: 3.91 MILLION/UL (ref 3.88–5.62)
SODIUM SERPL-SCNC: 144 MMOL/L (ref 136–145)
SODIUM SERPL-SCNC: 144 MMOL/L (ref 136–145)
WBC # BLD AUTO: 14.26 THOUSAND/UL (ref 4.31–10.16)

## 2019-01-13 PROCEDURE — 99233 SBSQ HOSP IP/OBS HIGH 50: CPT | Performed by: SURGERY

## 2019-01-13 PROCEDURE — 80053 COMPREHEN METABOLIC PANEL: CPT | Performed by: PHYSICIAN ASSISTANT

## 2019-01-13 PROCEDURE — 84100 ASSAY OF PHOSPHORUS: CPT | Performed by: PHYSICIAN ASSISTANT

## 2019-01-13 PROCEDURE — 85025 COMPLETE CBC W/AUTO DIFF WBC: CPT | Performed by: PHYSICIAN ASSISTANT

## 2019-01-13 PROCEDURE — 83735 ASSAY OF MAGNESIUM: CPT | Performed by: PHYSICIAN ASSISTANT

## 2019-01-13 PROCEDURE — 82948 REAGENT STRIP/BLOOD GLUCOSE: CPT

## 2019-01-13 PROCEDURE — 71045 X-RAY EXAM CHEST 1 VIEW: CPT

## 2019-01-13 PROCEDURE — 94760 N-INVAS EAR/PLS OXIMETRY 1: CPT

## 2019-01-13 PROCEDURE — 99232 SBSQ HOSP IP/OBS MODERATE 35: CPT | Performed by: INTERNAL MEDICINE

## 2019-01-13 PROCEDURE — 80048 BASIC METABOLIC PNL TOTAL CA: CPT | Performed by: PHYSICIAN ASSISTANT

## 2019-01-13 PROCEDURE — 94660 CPAP INITIATION&MGMT: CPT

## 2019-01-13 PROCEDURE — 85610 PROTHROMBIN TIME: CPT | Performed by: PHYSICIAN ASSISTANT

## 2019-01-13 RX ORDER — AMIODARONE HYDROCHLORIDE 200 MG/1
200 TABLET ORAL
Status: COMPLETED | OUTPATIENT
Start: 2019-01-13 | End: 2019-01-16

## 2019-01-13 RX ORDER — OLANZAPINE 10 MG/1
10 TABLET, ORALLY DISINTEGRATING ORAL ONCE
Status: COMPLETED | OUTPATIENT
Start: 2019-01-13 | End: 2019-01-13

## 2019-01-13 RX ORDER — FUROSEMIDE 10 MG/ML
40 INJECTION INTRAMUSCULAR; INTRAVENOUS EVERY 12 HOURS
Status: DISCONTINUED | OUTPATIENT
Start: 2019-01-13 | End: 2019-01-13

## 2019-01-13 RX ORDER — QUETIAPINE FUMARATE 25 MG/1
25 TABLET, FILM COATED ORAL DAILY
Status: DISCONTINUED | OUTPATIENT
Start: 2019-01-13 | End: 2019-01-13

## 2019-01-13 RX ORDER — QUETIAPINE FUMARATE 25 MG/1
25 TABLET, FILM COATED ORAL EVERY 12 HOURS SCHEDULED
Status: DISCONTINUED | OUTPATIENT
Start: 2019-01-13 | End: 2019-01-14

## 2019-01-13 RX ORDER — HALOPERIDOL 5 MG/ML
2 INJECTION INTRAMUSCULAR ONCE
Status: COMPLETED | OUTPATIENT
Start: 2019-01-13 | End: 2019-01-13

## 2019-01-13 RX ORDER — FUROSEMIDE 10 MG/ML
40 INJECTION INTRAMUSCULAR; INTRAVENOUS
Status: DISCONTINUED | OUTPATIENT
Start: 2019-01-13 | End: 2019-01-14

## 2019-01-13 RX ORDER — METOPROLOL TARTRATE 50 MG/1
50 TABLET, FILM COATED ORAL 3 TIMES DAILY
Status: DISCONTINUED | OUTPATIENT
Start: 2019-01-13 | End: 2019-01-23 | Stop reason: HOSPADM

## 2019-01-13 RX ORDER — POTASSIUM CHLORIDE 14.9 MG/ML
20 INJECTION INTRAVENOUS
Status: COMPLETED | OUTPATIENT
Start: 2019-01-13 | End: 2019-01-14

## 2019-01-13 RX ORDER — QUETIAPINE FUMARATE 25 MG/1
25 TABLET, FILM COATED ORAL
Status: DISCONTINUED | OUTPATIENT
Start: 2019-01-13 | End: 2019-01-13 | Stop reason: SDUPTHER

## 2019-01-13 RX ORDER — AMIODARONE HYDROCHLORIDE 200 MG/1
200 TABLET ORAL
Status: DISCONTINUED | OUTPATIENT
Start: 2019-01-17 | End: 2019-01-23 | Stop reason: HOSPADM

## 2019-01-13 RX ADMIN — POTASSIUM CHLORIDE 20 MEQ: 1500 TABLET, EXTENDED RELEASE ORAL at 08:18

## 2019-01-13 RX ADMIN — AMIODARONE HYDROCHLORIDE 200 MG: 200 TABLET ORAL at 16:25

## 2019-01-13 RX ADMIN — POTASSIUM CHLORIDE 20 MEQ: 1500 TABLET, EXTENDED RELEASE ORAL at 17:35

## 2019-01-13 RX ADMIN — QUETIAPINE FUMARATE 25 MG: 25 TABLET ORAL at 20:45

## 2019-01-13 RX ADMIN — POTASSIUM CHLORIDE 20 MEQ: 200 INJECTION, SOLUTION INTRAVENOUS at 20:35

## 2019-01-13 RX ADMIN — OLANZAPINE 10 MG: 10 TABLET, ORALLY DISINTEGRATING ORAL at 09:14

## 2019-01-13 RX ADMIN — METOPROLOL TARTRATE 50 MG: 50 TABLET, FILM COATED ORAL at 16:25

## 2019-01-13 RX ADMIN — FUROSEMIDE 40 MG: 10 INJECTION, SOLUTION INTRAMUSCULAR; INTRAVENOUS at 09:14

## 2019-01-13 RX ADMIN — METOPROLOL TARTRATE 25 MG: 25 TABLET, FILM COATED ORAL at 08:18

## 2019-01-13 RX ADMIN — HEPARIN SODIUM 5000 UNITS: 5000 INJECTION INTRAVENOUS; SUBCUTANEOUS at 14:28

## 2019-01-13 RX ADMIN — POTASSIUM CHLORIDE 20 MEQ: 200 INJECTION, SOLUTION INTRAVENOUS at 22:40

## 2019-01-13 RX ADMIN — CHLORHEXIDINE GLUCONATE 0.12% ORAL RINSE 15 ML: 1.2 LIQUID ORAL at 20:45

## 2019-01-13 RX ADMIN — WARFARIN SODIUM 5 MG: 5 TABLET ORAL at 17:35

## 2019-01-13 RX ADMIN — AMIODARONE HYDROCHLORIDE 200 MG: 200 TABLET ORAL at 11:56

## 2019-01-13 RX ADMIN — QUETIAPINE FUMARATE 25 MG: 25 TABLET ORAL at 11:55

## 2019-01-13 RX ADMIN — HEPARIN SODIUM 5000 UNITS: 5000 INJECTION INTRAVENOUS; SUBCUTANEOUS at 22:40

## 2019-01-13 RX ADMIN — POTASSIUM CHLORIDE 20 MEQ: 200 INJECTION, SOLUTION INTRAVENOUS at 03:06

## 2019-01-13 RX ADMIN — POTASSIUM CHLORIDE 20 MEQ: 200 INJECTION, SOLUTION INTRAVENOUS at 17:35

## 2019-01-13 RX ADMIN — CHLORHEXIDINE GLUCONATE 0.12% ORAL RINSE 15 ML: 1.2 LIQUID ORAL at 08:18

## 2019-01-13 RX ADMIN — METOPROLOL TARTRATE 50 MG: 50 TABLET, FILM COATED ORAL at 20:45

## 2019-01-13 RX ADMIN — FUROSEMIDE 40 MG: 10 INJECTION, SOLUTION INTRAMUSCULAR; INTRAVENOUS at 17:35

## 2019-01-13 RX ADMIN — HALOPERIDOL LACTATE 2 MG: 5 INJECTION INTRAMUSCULAR at 01:01

## 2019-01-13 RX ADMIN — HEPARIN SODIUM 5000 UNITS: 5000 INJECTION INTRAVENOUS; SUBCUTANEOUS at 05:57

## 2019-01-13 RX ADMIN — AMIODARONE HYDROCHLORIDE 200 MG: 200 TABLET ORAL at 08:18

## 2019-01-13 NOTE — PROGRESS NOTES
Progress Note - Critical Care   Chris Branham 60 y o  male MRN: [id number]  Unit/Bed#: CW -01 Encounter: [id number]    Assessment: 61 y/o male with acute biliary pancreatitis and acute cholangitis s/p ERCP, stone retrieval and bile duct stent placement with acute respiratory failure    Plan:          Neuro:    Pain control:   - IV dilaudid, has oxycodone and tylenol ordered but not tolerating po well  Encephalopathy   - this morning became more confused and attemted to get out of bed, gave 0 2mg Haldol                 CV:    A  Fib with RVR   - PO amiodarone 200 mg TID and metoprolol 25 mg TID per cards  Thrombocytopenia from heparin infusion (resolved)  - Cards recs po coumadin x4w for anticoagulation                Lung:    - extubated 1/10, requiring intermittent bipap and support overnight, currently being diuresed      - Trial off bipap again todal   - goal net negative 1L   - am cxr pending                 GI:    Biliary pancreatitis s/p ERCP with stone retreival and stent placement   - clinically improved from pancreatitis standpoint   - tolerating po   - f/u GI in 6w for stent retrieval                 FEN:    - No IVF    - lytes wnl this am   - dysphagia diet   - I/O:      - 3L negative over 24h    - 580 in IV and po    - net negative 2 5L                 :    - continue rodriguez for diuresis   - [place] on presentation overall improved Cr: 1 33 this am up from 1 27                 ID:    - aspiration pneumonia, completed course of zosyn   - cholangitis, resolved post-ERCP, zosyn complete   - WBC 14 2 down from 18 yesterday, continue to follow                 Heme:    - Hb 11 7, 12 4 yesterday, continue to monitor   - platelets this am 158                 Endo:    No h/o DM, will follow daily blood sugars                            Msk/Skin:    - frequent turning and OOB as tolerated with PT   - dvt ppx with subcu heparin                 Disposition: ICU stay for bipap needs and delirium    Chief Complaint: "I need that blow by machine" (bipap)    HPI/24hr events: Patient required bipap overnight for work of breathing    Physical Exam:   Physical Exam   Constitutional: He appears well-developed and well-nourished  HENT:   Head: Normocephalic and atraumatic  Eyes: Conjunctivae and EOM are normal  No scleral icterus  Neck: Normal range of motion  Neck supple  Cardiovascular: Normal rate and regular rhythm  Murmur heard  Systolic murmur is present with a grade of 3/6   Pulmonary/Chest: Effort normal and breath sounds normal    Abdominal: Soft  Bowel sounds are normal  There is no tenderness  Musculoskeletal: Normal range of motion  Neurological: He is alert  Skin: Skin is warm and dry  Psychiatric: He has a normal mood and affect  His behavior is normal    Nursing note and vitals reviewed  Vitals:    19 0305 19 0405 19 0505 19 0605   BP: 140/67 145/68 169/65 (!) 175/76   Pulse: 88 84 86 92   Resp: (!) 23 (!) 23 (!) 27 (!) 38   Temp:  99 °F (37 2 °C)     TempSrc:  Axillary     SpO2: 96% 100%  98%   Weight:       Height:         Arterial Line BP: 186/66  Arterial Line MAP (mmHg): 92 mmHg    Temperature:   Temp (24hrs), Av 5 °F (36 9 °C), Min:98 1 °F (36 7 °C), Max:99 °F (37 2 °C)    Current: Temperature: 99 °F (37 2 °C)    Weights:   IBW: 86 8 kg    Body mass index is 26 78 kg/m²  Weight (last 2 days)     None          Hemodynamic Monitoring:  N/A     Non-Invasive/Invasive Ventilation Settings:  Respiratory    Lab Data (Last 4 hours)    None         O2/Vent Data (Last 4 hours)    None              No results found for: PHART, JNS0ZEB, PO2ART, HYT3UDL, J2UIWIYC, BEART, SOURCE  SpO2: SpO2: 98 %    Intake and Outputs:  I/O        07 -  0700 701 -  0700    P  O  720 480    I V  (mL/kg) 683 5 (6 8)     IV Piggyback 50 100    Total Intake(mL/kg) 1453 5 (14 6) 580 (5 8)    Urine (mL/kg/hr) 3375 (1 4) 3075 (1 3)    Total Output 1902 7453    Net -[facility] 5 -2495          Unmeasured Stool Occurrence  2 x          Nutrition:        Diet Orders            Start     Ordered    01/11/19 1034  Diet Dysphagia/Modified Consistency; Dysphagia 3-Dental Soft; Thin Liquid  Diet effective now     Question Answer Comment   Diet Type Dysphagia/Modified Consistency    Dysphagia/Modified Consistency Dysphagia 3-Dental Soft    Liquid Modifier Thin Liquid    RD to adjust diet per protocol? Yes        01/11/19 1034          Labs:     Results from last 7 days  Lab Units 01/13/19  0505 01/12/19  0500 01/11/19  0544 01/10/19  0434   WBC Thousand/uL 14 26* 18 06* 14 04* 14 57*   HEMOGLOBIN g/dL 11 7* 12 4 11 8* 11 3*   HEMATOCRIT % 35 0* 37 7 35 5* 32 9*   PLATELETS Thousands/uL 205 159 99* 70*   NEUTROS PCT % 87*  --   --   --    MONOS PCT % 6  --   --   --    MONO PCT %  --   --  3* 1*      Results from last 7 days  Lab Units 01/13/19  0506 01/12/19  2142 01/12/19  1136  01/09/19  0602   SODIUM mmol/L 144 144 144  < > 143   POTASSIUM mmol/L 3 9 3 2* 3 5  < > 3 5   CHLORIDE mmol/L 109* 109* 108  < > 107   CO2 mmol/L 26 28 28  < > 28   BUN mg/dL 39* 41* 41*  < > 36*   CREATININE mg/dL 1 33* 1 27 1 35*  < > 1 32*   CALCIUM mg/dL 7 4* 7 1* 7 8*  < > 6 9*   ALK PHOS U/L 61  --  74  --  92   ALT U/L 51  --  63  --  94*   AST U/L 59*  --  57*  --  83*   < > = values in this interval not displayed  Results from last 7 days  Lab Units 01/13/19  0506 01/12/19  1136 01/11/19  0544   MAGNESIUM mg/dL 2 1 2 1 2 1       Results from last 7 days  Lab Units 01/13/19  0506 01/11/19  0544 01/10/19  0434   PHOSPHORUS mg/dL 3 5 3 6 2 6*        Results from last 7 days  Lab Units 01/13/19  0505 01/10/19  0605 01/10/19  0001 01/09/19  1521  01/07/19  1955   INR  1 28*  --   --   --   --  1 03   PTT seconds  --  69* 62* 50*  < > 28   < > = values in this interval not displayed      Results from last 7 days  Lab Units 01/07/19  0443   LACTIC ACID mmol/L 1 7       0  Lab Value Date/Time   TROPONINI <0 02 01/03/2019 0818       Imaging:  I have personally reviewed pertinent reports  and I have personally reviewed pertinent films in PACS      Micro:  Lab Results   Component Value Date    BLOODCX No Growth After 5 Days  01/04/2019    BLOODCX No Growth After 5 Days  01/04/2019       Allergies: No Known Allergies    Medications:   Scheduled Meds:    Current Facility-Administered Medications:  acetaminophen 650 mg Oral Q6H PRN Leretha Conchisreedy   amiodarone 200 mg Oral TID With Meals Meghan Mckeon MD   chlorhexidine 15 mL Swish & Spit Q12H [address] Fawn Zheng PA-C   heparin (porcine) 5,000 Units Subcutaneous [facility] Gerald Milligan MD   hydrALAZINE 10 mg Intravenous Q4H PRN Kathie SenFIONA ferrellNP   HYDROmorphone 0 5 mg Intravenous Q2H PRN Gerald Milligan MD   insulin lispro 1-6 Units Subcutaneous TID AC DAMARIS Skinner   insulin lispro 1-6 Units Subcutaneous HS DAMARSI Hinson   metoprolol tartrate 25 mg Oral TID Meghan Mckeon MD   ondansetron 4 mg Intravenous Q6H PRN Dayanna Bustos PA-C   oxyCODONE 10 mg Oral Q6H PRN Kathie Senna, CRNP   oxyCODONE 5 mg Oral Q6H PRN Kathie Senna, CRNP   perflutren lipid microsphere 0 8 mL/min Intravenous Once in imaging Ronnie Marin MD   potassium chloride 20 mEq Oral BID Fawn Zheng PA-C   warfarin 5 mg Oral Daily (warfarin) Rosalee Torres PA-C     Continuous Infusions:   PRN Meds:    acetaminophen 650 mg Q6H PRN   hydrALAZINE 10 mg Q4H PRN   HYDROmorphone 0 5 mg Q2H PRN   ondansetron 4 mg Q6H PRN   oxyCODONE 10 mg Q6H PRN   oxyCODONE 5 mg Q6H PRN   perflutren lipid microsphere 0 8 mL/min Once in imaging       VTE Pharmacologic Prophylaxis: Warfarin (Coumadin)  VTE Mechanical Prophylaxis: sequential compression device    Invasive lines and devices:   Invasive Devices     Peripheral Intravenous Line            Peripheral IV 01/13/19 Right Forearm less than 1 day          Drain            Urethral Catheter 16 Fr  8 days Counseling / Coordination of Care  Total Critical Care time spent 30 minutes excluding procedures, teaching and family updates  Code Status: Level 1 - Full Code     Portions of the record may have been created with voice recognition software  Occasional wrong word or "sound a like" substitutions may have occurred due to the inherent limitations of voice recognition software  Read the chart carefully and recognize, using context, where substitutions have occurred       Courtney Hackett MD

## 2019-01-13 NOTE — PROGRESS NOTES
Progress Note - General Surgery   Homero Urena 60 y o  male MRN: [id number]  Unit/Bed#:  -01 Encounter: [id number]    Assessment:  60year old M with biliary pancreatitis s/p ERCP sphincterotomy and stent, hypoxic respiratory failure, Afib    - WBC 14 4 from 18    Plan:  Dysphagia diet as tolerated  Lasix dosing per ICU for vascular congestion  F/U AM CXR  Continue PO amio per cardiology, started on Coumadin 5  BiPAP / respiratory support as needed      Subjective/Objective   Chief Complaint:     Subjective: No complaints  States pain is reasonably controlled  Was on NC during the day but BiPAP overnight, 15/8 40%    Objective:     Blood pressure (!) 175/76, pulse 92, temperature 99 °F (37 2 °C), temperature source Axillary, resp  rate (!) 38, height 6' 4" (1 93 m), weight 99 8 kg (220 lb), SpO2 94 %  ,Body mass index is 26 78 kg/m²        Intake/Output Summary (Last 24 hours) at 01/13/19 0806  Last data filed at 01/13/19 0601   Gross per 24 hour   Intake             1020 ml   Output             3225 ml   Net            -2205 ml       Invasive Devices     Peripheral Intravenous Line            Peripheral IV 01/13/19 Right Forearm less than 1 day          Drain            Urethral Catheter 16 Fr  8 days                Physical Exam:   Gen: A&O, NAD  Cardio: RRR  Lungs: CTAB  Abd: Soft, non distended, minimally tender      Lab, Imaging and other studies:  CBC:   Lab Results   Component Value Date    WBC 14 26 (H) 01/13/2019    HGB 11 7 (L) 01/13/2019    HCT 35 0 (L) 01/13/2019    MCV 90 01/13/2019     01/13/2019    MCH 29 9 01/13/2019    MCHC 33 4 01/13/2019    RDW 14 6 01/13/2019    MPV 10 0 01/13/2019    NRBC 0 01/13/2019   , CMP:   Lab Results   Component Value Date    SODIUM 144 01/13/2019    K 3 9 01/13/2019     (H) 01/13/2019    CO2 26 01/13/2019    BUN 39 (H) 01/13/2019    CREATININE 1 33 (H) 01/13/2019    CALCIUM 7 4 (L) 01/13/2019    AST 59 (H) 01/13/2019    ALT 51 01/13/2019 ALKPHOS 61 01/13/2019    EGFR 58 01/13/2019     VTE Pharmacologic Prophylaxis: Heparin  VTE Mechanical Prophylaxis: sequential compression device

## 2019-01-13 NOTE — PROGRESS NOTES
Progress Note - Cardiology   Dallin Amado 60 y o  male MRN: [id number]  Unit/Bed#: [address] [facility] -01 Encounter: [id number]  01/13/19  9:38 AM      Impression and Plan:     53-year-old with acute pancreatitis, status post ERCP and sphincterotomy, status post stone extraction and biliary stent placement, developed new onset atrial fibrillation with rapid rates, unsuccessfully electrically cardioverted, subsequently spontaneously converted but now is back in atrial fibrillation  Currently on IV and oral amiodarone-with probably inconsistent and limited absorption     Anticoagulation was initiated 36 hours after onset of atrial fibrillation and continued for 3 days and subsequently discontinued due to persistent thrombocytopenia  Atrial fibrillation converted back to sinus rhythm around 48 hr after onset        He was on nasal cannula oxygen yesterday after extubation and off pressors but today back on BiPAP-initiated last night  Chest x-ray showing pulmonary edema and bilateral pleural effusions  Anasarca and decreased breath sounds bilaterally at the bases on exam      Plan:    Acute respiratory failure:  Decompensation in the last 48 hr likely secondary to volume overload, aggressive diuresis will help  Received a total of 40 mg IV-3 doses yesterday with 2 5 L negative  He has profound anasarca is over 18 L positive through his hospital stay  Once intubated, he will be difficult to wean off as well as would have a higher propensity for atrial fibrillation  Continue the same with 40 mg IV 3 doses today also  Short of breath when he is off BiPAP      Atrial fibrillation:  New onset atrial fibrillation in the setting of sepsis and catecholamine surges   Overall he will be much easier to manage while he is in sinus rhythm than to rate control his atrial fibrillation  Off IV amiodarone, overlapped for almost 72 hr - due to questionable p o   Kenna Gram was started on heparin  36 hours after onset of atrial fibrillation  Subsequently discontinued due to thrombocytopenia -which developed- within a few hours of starting heparin, not suggestive of HIT  Thrombocytopenia also improved immediately after discontinuation of heparin  Continue p o  Coumadin at 5 mg daily-initiated last night  Continue p o  Amiodarone till all his acute issues resolve - can change to 200 mg daily from 1/16/2018    Cholangitis/Sepsis and pancreatitis:  Status post ERCP and sphincterotomy, stone extraction and biliary stent placement-1/4/2019     Hypertension: Continue oral beta-blocker-increase to 50 - 3 times daily  Not on pressors anymore     Thrombocytopenia:  Temporally related to initiation of heparin, normalized now      ===================================================================    Chief Complaint:   Chief Complaint   Patient presents with    Vomiting     Patient presents to the ER with vomiting since Midnight  Subjective/Objective     Subjective:  Confused but responding to questions  Objective:  appears ill but comfortable , no distress at the time of exam      Patient Active Problem List   Diagnosis    Acute pancreatitis    Pancreatitis    Delirium    Leukocytosis    Acute respiratory insufficiency    Pulmonary edema    Hypokalemia    Metabolic alkalosis    Dysphagia       Vitals: /80   Pulse 94   Temp 99 1 °F (37 3 °C) (Oral)   Resp (!) 35   Ht 6' 4" (1 93 m)   Wt 99 8 kg (220 lb)   SpO2 99%   BMI 26 78 kg/m²     I/O this shift:  In: 160 [P O :160]  Out: 100 [Urine:100]  Wt Readings from Last 3 Encounters:   01/03/19 99 8 kg (220 lb)   03/21/16 88 5 kg (195 lb)   07/17/13 95 8 kg (211 lb 2 1 oz)       Intake/Output Summary (Last 24 hours) at 01/13/19 0938  Last data filed at 01/13/19 0800   Gross per 24 hour   Intake             1080 ml   Output             3325 ml   Net            -2245 ml     I/O last 3 completed shifts:   In: 1320 [P O :960; I V :60; IV Piggyback:300]  Out: 0668 [Urine:5500]    Invasive Devices     Peripheral Intravenous Line            Peripheral IV 01/13/19 Right Forearm less than 1 day          Drain            Urethral Catheter 16 Fr  8 days                  Physical Exam:  GEN: Hal Michelle appears ill, alert, pleasant and cooperative, confused , on BiPAP  HEENT: pupils equal, round, and reactive to light; extraocular muscles intact  NECK: supple, no carotid bruits or JVD  HEART: regular rhythm, normal S1 and S2, no murmur, no clicks, gallops or rubs   LUNGS: clear to auscultation bilaterally; no wheezes or rhonchi, no rales decreased breath sounds at the bases bilaterally   ABDOMEN/GI: normal bowel sounds, soft, no tenderness, no distention  EXTREMITIES/Musculoskeltal: peripheral pulses normal; no clubbing, cyanosis, positive for edema and profound anasarca  NEURO: no focal motor findings   SKIN: normal without suspicious lesions on exposed skin              Lab Results:         Results from last 7 days  Lab Units 01/13/19  0505 01/12/19  0500 01/11/19  0544   WBC Thousand/uL 14 26* 18 06* 14 04*   HEMOGLOBIN g/dL 11 7* 12 4 11 8*   HEMATOCRIT % 35 0* 37 7 35 5*   PLATELETS Thousands/uL 205 159 99*           Results from last 7 days  Lab Units 01/13/19  0506 01/12/19  2142 01/12/19  1136  01/09/19  0602   POTASSIUM mmol/L 3 9 3 2* 3 5  < > 3 5   CHLORIDE mmol/L 109* 109* 108  < > 107   CO2 mmol/L 26 28 28  < > 28   BUN mg/dL 39* 41* 41*  < > 36*   CREATININE mg/dL 1 33* 1 27 1 35*  < > 1 32*   CALCIUM mg/dL 7 4* 7 1* 7 8*  < > 6 9*   ALK PHOS U/L 61  --  74  --  92   ALT U/L 51  --  63  --  94*   AST U/L 59*  --  57*  --  83*   < > = values in this interval not displayed  Results from last 7 days  Lab Units 01/13/19  0505 01/07/19  1955   INR  1 28* 1 03       Imaging: I have personally reviewed pertinent reports      EKG/Telemtry:remains in sinus rhythm      Scheduled Meds:    Current Facility-Administered Medications:  acetaminophen 650 mg Oral Q6H PRN Yoselin Loach Henning   amiodarone 200 mg Oral TID With Meals James Spivey MD   chlorhexidine 15 mL Swish & Spit Q12H [address] [facility] Saba Zheng PA-C   furosemide 40 mg Intravenous Q12H Senthil Stout PA-C   heparin (porcine) 5,000 Units Subcutaneous [facility] Beatrice Casanova MD   hydrALAZINE 10 mg Intravenous Q4H PRN DAMARIS Funes   HYDROmorphone 0 5 mg Intravenous Q2H PRN Beatrice Casanova MD   insulin lispro 1-6 Units Subcutaneous TID AC DAMARIS Skinner   insulin lispro 1-6 Units Subcutaneous HS DAMARIS Funes   metoprolol tartrate 50 mg Oral TID James Spivey MD   ondansetron 4 mg Intravenous Q6H PRN Alina Bass PA-C   oxyCODONE 10 mg Oral Q6H PRN DAMARIS Funes   oxyCODONE 5 mg Oral Q6H PRN DAMARIS Funes   perflutren lipid microsphere 0 8 mL/min Intravenous Once in imaging Gable Leventhal, MD   potassium chloride 20 mEq Oral BID Fawn Zheng PA-C   QUEtiapine 25 mg Oral HS Senthil Stout PA-C   warfarin 5 mg Oral Daily (warfarin) Senthil Stout PA-C     Continuous Infusions:       VTE Pharmacologic Prophylaxis: Heparin  VTE Mechanical Prophylaxis: sequential compression device      Counseling / Coordination of Care  Total time spent 20 minutes including teaching and family updates  More than 50% was spent counseling pt and family

## 2019-01-14 ENCOUNTER — APPOINTMENT (INPATIENT)
Dept: RADIOLOGY | Facility: HOSPITAL | Age: 59
DRG: 438 | End: 2019-01-14
Payer: COMMERCIAL

## 2019-01-14 PROBLEM — G93.41 METABOLIC ENCEPHALOPATHY: Status: ACTIVE | Noted: 2019-01-14

## 2019-01-14 LAB
ALBUMIN SERPL BCP-MCNC: 1.5 G/DL (ref 3.5–5)
ALP SERPL-CCNC: 67 U/L (ref 46–116)
ALT SERPL W P-5'-P-CCNC: 49 U/L (ref 12–78)
ANION GAP SERPL CALCULATED.3IONS-SCNC: 7 MMOL/L (ref 4–13)
ANION GAP SERPL CALCULATED.3IONS-SCNC: 8 MMOL/L (ref 4–13)
AST SERPL W P-5'-P-CCNC: 50 U/L (ref 5–45)
BASOPHILS # BLD AUTO: 0.01 THOUSANDS/ΜL (ref 0–0.1)
BASOPHILS NFR BLD AUTO: 0 % (ref 0–1)
BILIRUB SERPL-MCNC: 1.04 MG/DL (ref 0.2–1)
BUN SERPL-MCNC: 38 MG/DL (ref 5–25)
BUN SERPL-MCNC: 39 MG/DL (ref 5–25)
CALCIUM SERPL-MCNC: 7.3 MG/DL (ref 8.3–10.1)
CALCIUM SERPL-MCNC: 7.5 MG/DL (ref 8.3–10.1)
CHLORIDE SERPL-SCNC: 110 MMOL/L (ref 100–108)
CHLORIDE SERPL-SCNC: 111 MMOL/L (ref 100–108)
CO2 SERPL-SCNC: 26 MMOL/L (ref 21–32)
CO2 SERPL-SCNC: 26 MMOL/L (ref 21–32)
CREAT SERPL-MCNC: 1.21 MG/DL (ref 0.6–1.3)
CREAT SERPL-MCNC: 1.23 MG/DL (ref 0.6–1.3)
EOSINOPHIL # BLD AUTO: 0.06 THOUSAND/ΜL (ref 0–0.61)
EOSINOPHIL NFR BLD AUTO: 1 % (ref 0–6)
ERYTHROCYTE [DISTWIDTH] IN BLOOD BY AUTOMATED COUNT: 14.6 % (ref 11.6–15.1)
GFR SERPL CREATININE-BSD FRML MDRD: 64 ML/MIN/1.73SQ M
GFR SERPL CREATININE-BSD FRML MDRD: 65 ML/MIN/1.73SQ M
GLUCOSE SERPL-MCNC: 101 MG/DL (ref 65–140)
GLUCOSE SERPL-MCNC: 108 MG/DL (ref 65–140)
GLUCOSE SERPL-MCNC: 109 MG/DL (ref 65–140)
GLUCOSE SERPL-MCNC: 112 MG/DL (ref 65–140)
GLUCOSE SERPL-MCNC: 129 MG/DL (ref 65–140)
GLUCOSE SERPL-MCNC: 150 MG/DL (ref 65–140)
GLUCOSE SERPL-MCNC: 80 MG/DL (ref 65–140)
HCT VFR BLD AUTO: 33 % (ref 36.5–49.3)
HGB BLD-MCNC: 11.2 G/DL (ref 12–17)
IMM GRANULOCYTES # BLD AUTO: 0.12 THOUSAND/UL (ref 0–0.2)
IMM GRANULOCYTES NFR BLD AUTO: 1 % (ref 0–2)
INR PPP: 1.63 (ref 0.86–1.17)
LYMPHOCYTES # BLD AUTO: 0.83 THOUSANDS/ΜL (ref 0.6–4.47)
LYMPHOCYTES NFR BLD AUTO: 8 % (ref 14–44)
MAGNESIUM SERPL-MCNC: 2.3 MG/DL (ref 1.6–2.6)
MCH RBC QN AUTO: 30.2 PG (ref 26.8–34.3)
MCHC RBC AUTO-ENTMCNC: 33.9 G/DL (ref 31.4–37.4)
MCV RBC AUTO: 89 FL (ref 82–98)
MONOCYTES # BLD AUTO: 0.97 THOUSAND/ΜL (ref 0.17–1.22)
MONOCYTES NFR BLD AUTO: 9 % (ref 4–12)
NEUTROPHILS # BLD AUTO: 9.14 THOUSANDS/ΜL (ref 1.85–7.62)
NEUTS SEG NFR BLD AUTO: 81 % (ref 43–75)
NRBC BLD AUTO-RTO: 0 /100 WBCS
PHOSPHATE SERPL-MCNC: 3.4 MG/DL (ref 2.7–4.5)
PLATELET # BLD AUTO: 248 THOUSANDS/UL (ref 149–390)
PMV BLD AUTO: 9.4 FL (ref 8.9–12.7)
POTASSIUM SERPL-SCNC: 3.8 MMOL/L (ref 3.5–5.3)
POTASSIUM SERPL-SCNC: 3.8 MMOL/L (ref 3.5–5.3)
PROT SERPL-MCNC: 5.1 G/DL (ref 6.4–8.2)
PROTHROMBIN TIME: 19.4 SECONDS (ref 11.8–14.2)
RBC # BLD AUTO: 3.71 MILLION/UL (ref 3.88–5.62)
SODIUM SERPL-SCNC: 144 MMOL/L (ref 136–145)
SODIUM SERPL-SCNC: 144 MMOL/L (ref 136–145)
WBC # BLD AUTO: 11.13 THOUSAND/UL (ref 4.31–10.16)

## 2019-01-14 PROCEDURE — 80048 BASIC METABOLIC PNL TOTAL CA: CPT | Performed by: EMERGENCY MEDICINE

## 2019-01-14 PROCEDURE — 99233 SBSQ HOSP IP/OBS HIGH 50: CPT | Performed by: SURGERY

## 2019-01-14 PROCEDURE — 92526 ORAL FUNCTION THERAPY: CPT

## 2019-01-14 PROCEDURE — 71045 X-RAY EXAM CHEST 1 VIEW: CPT

## 2019-01-14 PROCEDURE — 94760 N-INVAS EAR/PLS OXIMETRY 1: CPT

## 2019-01-14 PROCEDURE — 85025 COMPLETE CBC W/AUTO DIFF WBC: CPT | Performed by: PHYSICIAN ASSISTANT

## 2019-01-14 PROCEDURE — 0F798DZ DILATION OF COMMON BILE DUCT WITH INTRALUMINAL DEVICE, VIA NATURAL OR ARTIFICIAL OPENING ENDOSCOPIC: ICD-10-PCS | Performed by: INTERNAL MEDICINE

## 2019-01-14 PROCEDURE — BF111ZZ FLUOROSCOPY OF BILIARY AND PANCREATIC DUCTS USING LOW OSMOLAR CONTRAST: ICD-10-PCS | Performed by: INTERNAL MEDICINE

## 2019-01-14 PROCEDURE — 99232 SBSQ HOSP IP/OBS MODERATE 35: CPT | Performed by: INTERNAL MEDICINE

## 2019-01-14 PROCEDURE — 94660 CPAP INITIATION&MGMT: CPT

## 2019-01-14 PROCEDURE — 80053 COMPREHEN METABOLIC PANEL: CPT | Performed by: PHYSICIAN ASSISTANT

## 2019-01-14 PROCEDURE — 85610 PROTHROMBIN TIME: CPT | Performed by: PHYSICIAN ASSISTANT

## 2019-01-14 PROCEDURE — 0FC98ZZ EXTIRPATION OF MATTER FROM COMMON BILE DUCT, VIA NATURAL OR ARTIFICIAL OPENING ENDOSCOPIC: ICD-10-PCS | Performed by: INTERNAL MEDICINE

## 2019-01-14 PROCEDURE — 83735 ASSAY OF MAGNESIUM: CPT | Performed by: PHYSICIAN ASSISTANT

## 2019-01-14 PROCEDURE — 97535 SELF CARE MNGMENT TRAINING: CPT

## 2019-01-14 PROCEDURE — 82948 REAGENT STRIP/BLOOD GLUCOSE: CPT

## 2019-01-14 PROCEDURE — 97112 NEUROMUSCULAR REEDUCATION: CPT

## 2019-01-14 PROCEDURE — 84100 ASSAY OF PHOSPHORUS: CPT | Performed by: PHYSICIAN ASSISTANT

## 2019-01-14 RX ORDER — FUROSEMIDE 80 MG
80 TABLET ORAL ONCE
Status: COMPLETED | OUTPATIENT
Start: 2019-01-14 | End: 2019-01-14

## 2019-01-14 RX ORDER — OXYCODONE HYDROCHLORIDE 5 MG/1
5 TABLET ORAL EVERY 6 HOURS PRN
Status: DISCONTINUED | OUTPATIENT
Start: 2019-01-14 | End: 2019-01-22

## 2019-01-14 RX ORDER — FUROSEMIDE 80 MG
80 TABLET ORAL
Status: DISCONTINUED | OUTPATIENT
Start: 2019-01-14 | End: 2019-01-15

## 2019-01-14 RX ADMIN — POTASSIUM CHLORIDE 20 MEQ: 1500 TABLET, EXTENDED RELEASE ORAL at 08:24

## 2019-01-14 RX ADMIN — CHLORHEXIDINE GLUCONATE 0.12% ORAL RINSE 15 ML: 1.2 LIQUID ORAL at 08:25

## 2019-01-14 RX ADMIN — METOPROLOL TARTRATE 50 MG: 50 TABLET, FILM COATED ORAL at 20:21

## 2019-01-14 RX ADMIN — WARFARIN SODIUM 5 MG: 5 TABLET ORAL at 18:08

## 2019-01-14 RX ADMIN — HEPARIN SODIUM 5000 UNITS: 5000 INJECTION INTRAVENOUS; SUBCUTANEOUS at 21:56

## 2019-01-14 RX ADMIN — QUETIAPINE FUMARATE 25 MG: 25 TABLET ORAL at 08:24

## 2019-01-14 RX ADMIN — METOPROLOL TARTRATE 50 MG: 50 TABLET, FILM COATED ORAL at 08:24

## 2019-01-14 RX ADMIN — HEPARIN SODIUM 5000 UNITS: 5000 INJECTION INTRAVENOUS; SUBCUTANEOUS at 15:04

## 2019-01-14 RX ADMIN — FUROSEMIDE 80 MG: 80 TABLET ORAL at 18:08

## 2019-01-14 RX ADMIN — AMIODARONE HYDROCHLORIDE 200 MG: 200 TABLET ORAL at 12:48

## 2019-01-14 RX ADMIN — FUROSEMIDE 80 MG: 80 TABLET ORAL at 12:48

## 2019-01-14 RX ADMIN — AMIODARONE HYDROCHLORIDE 200 MG: 200 TABLET ORAL at 08:24

## 2019-01-14 RX ADMIN — AMIODARONE HYDROCHLORIDE 200 MG: 200 TABLET ORAL at 16:20

## 2019-01-14 RX ADMIN — POTASSIUM CHLORIDE 20 MEQ: 1500 TABLET, EXTENDED RELEASE ORAL at 18:08

## 2019-01-14 RX ADMIN — METOPROLOL TARTRATE 50 MG: 50 TABLET, FILM COATED ORAL at 16:20

## 2019-01-14 RX ADMIN — FUROSEMIDE 40 MG: 10 INJECTION, SOLUTION INTRAMUSCULAR; INTRAVENOUS at 06:02

## 2019-01-14 RX ADMIN — INSULIN LISPRO 1 UNITS: 100 INJECTION, SOLUTION INTRAVENOUS; SUBCUTANEOUS at 21:56

## 2019-01-14 RX ADMIN — HEPARIN SODIUM 5000 UNITS: 5000 INJECTION INTRAVENOUS; SUBCUTANEOUS at 06:02

## 2019-01-14 RX ADMIN — CHLORHEXIDINE GLUCONATE 0.12% ORAL RINSE 15 ML: 1.2 LIQUID ORAL at 20:21

## 2019-01-14 NOTE — RESPIRATORY THERAPY NOTE
Physical Exam:   Assessment Type: Assess only  General Appearance: Awake, Alert  Respiratory Pattern: Normal  Chest Assessment: Chest expansion symmetrical  Bilateral Breath Sounds: Diminished, Clear  R Breath Sounds: (P) Clear  L Breath Sounds: (P) Diminished  O2 Device: BiPAP      Resp Comments: Pt taken off of BIPAP at this time and placed on 6L NC  No resp distress noted  CXR appears improved from yesterday  Pt will continue on NC as tolerated  WIll continue to monitor per resp protocol

## 2019-01-14 NOTE — OCCUPATIONAL THERAPY NOTE
Occupational Therapy Treatment Note      Eulalia Becerra    1/14/2019    Patient Active Problem List   Diagnosis    Acute pancreatitis    Pancreatitis    Delirium    Leukocytosis    Acute respiratory insufficiency    Pulmonary edema    Hypokalemia    Metabolic alkalosis    Dysphagia    Metabolic encephalopathy       Past Medical History:   Diagnosis Date    Gastroesophageal reflux        Past Surgical History:   Procedure Laterality Date    CHOLECYSTECTOMY      COLONOSCOPY N/A 3/21/2016    Procedure: COLONOSCOPY;  Surgeon: Gama Pretty DO;  Location: [facility]; Service:     ERCP N/A 1/4/2019    Procedure: ENDOSCOPIC RETROGRADE CHOLANGIOPANCREATOGRAPHY (ERCP); Surgeon: Jodi Burris MD;  Location: [facility]; Service: Gastroenterology    DE EGD TRANSORAL BIOPSY SINGLE/MULTIPLE N/A 3/21/2016    Procedure: ESOPHAGOGASTRODUODENOSCOPY (EGD); Surgeon: Gama Pretty DO;  Location: [facility]; Service: General        01/14/19 1143   Restrictions/Precautions   Weight Bearing Precautions Per Order No   Other Precautions Cognitive; Chair Alarm;Multiple lines;Telemetry;Pain; Fall Risk;O2   Lifestyle   Autonomy I w/ ADLS/IADLS, transfers/functional mobility PTA   Reciprocal Relationships Pt lives w/ spouse per chart review   Service to Others Works full time; pt unable to report what he does   Intrinsic Gratification Pt unable to report   Pain Assessment   Pain Assessment No/denies pain   Pain Score No Pain   ADL   Grooming Assistance 4  Minimal Assistance   Grooming Deficit Setup;Steadying;Verbal cueing; Increased time to complete;Supervision/safety;Standing with assistive device; Teeth care; Other (Comment)  (deodorant)   Grooming Comments Pt completed grooming while in standing w/ RW and tray table for unilateral UE support   Pt able to gargle w/ mouth wash and apply deodorant w/ cues for initiation and Min A while in standing for balance   Functional Standing Tolerance   Time 5   Activity Pt stood in front of chair w/ RW for approx 5 mins to increase trunk control/standing tolerance for further engagement in ADL/leisure tasks  Pt stood w/ RW and MIn A x2 for steadying support  Pt completed grooming while in standing   Transfers   Sit to Stand 4  Minimal assistance   Additional items Assist x 2; Increased time required;Verbal cues   Stand to Sit 4  Minimal assistance   Additional items Assist x 2; Increased time required;Verbal cues   Additional Comments Pt performed 2 sit-stands from chair w/ Min A x2 for safety/balance and VC for proper hand placement  Use of RW in standing for support   Cognition   Overall Cognitive Status Impaired   Arousal/Participation Arousable;Responsive; Cooperative   Attention Attends with cues to redirect   Orientation Level Oriented to person;Disoriented to time;Disoriented to situation;Oriented to place   Memory Decreased short term memory;Decreased recall of recent events;Decreased recall of precautions   Following Commands Follows one step commands with increased time or repetition   Comments Pt is pleasant and cooperative; has decreased short term recall and understanding of deficits; requires VC for initiation of task and for safety  Requires re-direction to task  Activity Tolerance   Activity Tolerance Patient tolerated treatment well   Medical Staff Made Aware PT, Cheri Hicks and RNFlorence   Assessment   Assessment Patient participated in Skilled OT session 1/14/19 with interventions consisting of ADL re training with the use of correct body mechnaics, Energy Conservation techniques, deep breathing technique, safety awareness and fall prevention techniques,  therapeutic activities to: increase activity tolerance, increase standing tolerance time with unilateral UE support to complete sink level ADLs, increase dynamic sit/ stand balance during functional activity , increase postural control, increase trunk control and increase OOB/ sitting tolerance    Patient agreeable to OT treatment session, upon arrival patient was found seated OOB to Recliner  In comparison to previous session, patient with improvements in transfers, activity tolerance, standing balance, grooming and decreased agitation  Patient requiring verbal cues for safety, verbal cues for correct technique, cognitive assistance to anticipate next step, one step directives and frequent rest periods  Patient continues to be functioning below baseline level, occupational performance remains limited secondary to factors listed above and increased risk for falls and injury  From OT standpoint, recommendation at time of d/c would be Short Term Rehab when medically stable  Patient to benefit from continued Occupational Therapy treatment while in the hospital to address deficits as defined above and maximize level of functional independence with ADLs and functional mobility  Plan   Treatment Interventions ADL retraining;Functional transfer training;UE strengthening/ROM; Endurance training;Cognitive reorientation;Patient/family training;Equipment evaluation/education; Compensatory technique education;Continued evaluation; Energy conservation; Activityengagement   Goal Expiration Date 01/23/19   Treatment Day 2   OT Frequency 3-5x/wk   Recommendation   OT Discharge Recommendation Short Term Rehab   OT - OK to Discharge Yes  (when medically stable)   Barthel Index   Feeding 5   Bathing 0   Grooming Score 0   Dressing Score 5   Bladder Score 0   Bowels Score 5   Toilet Use Score 5   Transfers (Bed/Chair) Score 5   Mobility (Level Surface) Score 0   Stairs Score 0   Barthel Index Score 25   Modified Custer Scale   Modified [place]airo Scale 4       Chiqui Silvestre MS, OTR/L

## 2019-01-14 NOTE — RESPIRATORY THERAPY NOTE
RT Ventilator Management Note  Yeni Fried 60 y o  male MRN: [id number]  Unit/Bed#: [address] [facility] -01 Encounter: [id number]      Daily Screen       1/9/2019 0754 1/10/2019 0817          Patient safety screen outcome[de-identified] Failed Failed      Not Ready for Weaning due to[de-identified] PEEP > 8cmH2O;Underline problem not resolved;Poor inspiratory effort PEEP > 8cmH2O              Physical Exam:   Assessment Type: (P) Assess only  General Appearance: (P) Awake  Respiratory Pattern: (P) Normal  Chest Assessment: (P) Chest expansion symmetrical  Bilateral Breath Sounds: (P) Diminished, Clear  R Breath Sounds: Clear  L Breath Sounds: Diminished      Resp Comments: Pt placed on BIPAP at this time per pt request  No resp distress noted  Will continue to monitor per resp protocol

## 2019-01-14 NOTE — PLAN OF CARE
Problem: PHYSICAL THERAPY ADULT  Goal: Performs mobility at highest level of function for planned discharge setting  See evaluation for individualized goals  Treatment/Interventions: Bed mobility, Gait training, LE strengthening/ROM, Functional transfer training, Patient/family training, OT, Spoke to nursing  Equipment Recommended:  (TBD)       See flowsheet documentation for full assessment, interventions and recommendations  Outcome: Progressing  Prognosis: Fair  Problem List: Decreased strength, Decreased range of motion, Decreased mobility, Decreased safety awareness, Pain, Decreased endurance  Assessment: Pt motivated to complete therapy at start of session  First stand minimal tolerance 2* to decreased activity tolerance, deficits in strength and balance  Pt required verbal instruction to decrease retropulsion  Second standing trial improved although continued to require verbal and tactile balance instruction with dynamic activity  Pt required increased encouragement to continue to stand and participate with fatigue  Pt will benefit from continued inpt skilled PT to maximize functional mobility & safety  Barriers to Discharge: Inaccessible home environment     Recommendation: Short-term skilled PT     PT - OK to Discharge:  (to rehab when medically stable )    See flowsheet documentation for full assessment

## 2019-01-14 NOTE — SPEECH THERAPY NOTE
Speech Language/Pathology    Speech/Language Pathology Progress Note    Patient Name: Odette Chapman  APTEG'T Date: 1/14/2019     Problem List  Patient Active Problem List   Diagnosis    Acute pancreatitis    Pancreatitis    Delirium    Leukocytosis    Acute respiratory insufficiency    Pulmonary edema    Hypokalemia    Metabolic alkalosis    Dysphagia    Metabolic encephalopathy        Past Medical History  Past Medical History:   Diagnosis Date    Gastroesophageal reflux         Past Surgical History  Past Surgical History:   Procedure Laterality Date    CHOLECYSTECTOMY      COLONOSCOPY N/A 3/21/2016    Procedure: COLONOSCOPY;  Surgeon: Radha Alexandra DO;  Location: [facility]; Service:     ERCP N/A 1/4/2019    Procedure: ENDOSCOPIC RETROGRADE CHOLANGIOPANCREATOGRAPHY (ERCP); Surgeon: Vale Laughlin MD;  Location: [facility]; Service: Gastroenterology    DE EGD TRANSORAL BIOPSY SINGLE/MULTIPLE N/A 3/21/2016    Procedure: ESOPHAGOGASTRODUODENOSCOPY (EGD); Surgeon: Radha Alexandra DO;  Location: [facility]; Service: General         Subjective:  "I'm not in the right state just yet (in regards to PICC placement consideration)"  Patient is OOB in chair  Requires encouragement to eat  Objective: The patient intermittently requests bipap  Bipap removed for lunch meal  He is OOB in chair and is assessed with level 3/soft solids with thin liquids  The patient can feed himself  Bite size can be large, but rate is adequate  Mastication is efficient, with no oral residue observed  He is trialed with regular cookie and tolerates well  Cough x1 observed, but does not appear related to PO intake  The patient takes small, consecutive sips of thin liquids via straw with no overt s/s aspiration  RN reports that patient has some difficulty with oral transfer and clearance of medications with thin liquids, and is given meds in puree  Tolerated without difficulty       Assessment:  Continue level 3/soft solids as SOB continues to fluctuate  Plan/Recommendations:  Continue ST to trial patient with upgraded regular tray

## 2019-01-14 NOTE — PROGRESS NOTES
Progress Note - Critical Care   Davison Fiscal 60 y o  male MRN: [id number]  Unit/Bed#:  -01 Encounter: [id number]    Assessment: 59 y/o male with acute biliary pancreatitis and acute cholangitis s/p ERCP, stone retrieval and bile duct stent placement (1/4/19) with acute respiratory failure 2/2 volume overload    Plan:          Neuro:    Pain control:   - PRN pain meds, none given 1/13 and 1/12   Encephalopathy   - Delirium: On seroquel 25 BID                 CV:    A  Fib with RVR   - PO amiodarone 200 mg TID (200qd starting Thursday) and metoprolol 50 mg TID per cards  Thrombocytopenia from heparin infusion (resolved)  - Cards recs po coumadin x4w for anticoagulation, no need for heparin bridge  INR yesterday 1 28    - F/u AM INR                Lung:    - extubated 1/10, requiring intermittent BiPAP  Has been on BiPAP all night    - Off of BiPAP this AM  To chair  - AM CXR showing improvement of b/l effusions   - If not better from respiratory standpoint by afternoon will consider left thoracentesis                  GI:    Biliary pancreatitis s/p ERCP with stone retreival and stent placement   - tolerating po dysphagia III w/ thins  160 PO yesterday, 720 day before      - Encourage oral intake   - f/u GI in 6w for stent retrieval                 FEN:    - I/O:      - 2 9L negative over 24h    - 410 In  160 PO    - UO 3 3    - Total admission +16L    - No IVF    - Continue w/ lasix 40 TID   - K 3 8 - continue w/ potassium tab 20mEq BID   - dysphagia II w/ thins diet                   :    - continue rodriguez for diuresis   - AZ 2/2 pancreatitis w/ peak of 2 18 (1/6) down to 1 23 today - stable                 ID:    - aspiration pneumonia, completed course of zosyn   - cholangitis, resolved post-ERCP, zosyn complete   - WBC 11 from 14 from 18                 Heme:    - Hb 11 2, 11 7 yesterday, continue to monitor   - platelets 752                 Endo:    No h/o DM, will follow daily blood sugars                            Msk/Skin:    - frequent turning and OOB as tolerated with PT   - dvt ppx with subcu heparin                 Disposition: ICU stay for bipap needs and delirium    Chief Complaint:   On BiPAP  Gave thumbs up  HPI/24hr events: Continues w/ BiPAP overnight    Physical Exam:   Physical Exam   Constitutional: He appears well-developed and well-nourished  HENT:   Head: Normocephalic and atraumatic  Eyes: Conjunctivae and EOM are normal  No scleral icterus  Neck: Normal range of motion  Neck supple  Cardiovascular: Normal rate and regular rhythm  Pulmonary/Chest: Effort normal  No respiratory distress  Abdominal: Soft  Bowel sounds are normal  There is no tenderness  Musculoskeletal: Normal range of motion  Neurological: He is alert  Skin: Skin is warm and dry  Psychiatric: He has a normal mood and affect  His behavior is normal    Nursing note and vitals reviewed  Much improved edema of hips, and abdomen over last several days  Vitals:    19 2205 19 2305 19 0005 19 0105   BP:  143/71 145/62 115/71   Pulse: 64 72 80 78   Resp: 20 22 (!) 38 (!) 33   Temp:   99 °F (37 2 °C)    TempSrc:   Axillary    SpO2: 95% 98% 100% 100%   Weight:       Height:         Arterial Line BP: 186/66  Arterial Line MAP (mmHg): 92 mmHg    Temperature:   Temp (24hrs), Av 9 °F (37 2 °C), Min:98 1 °F (36 7 °C), Max:99 7 °F (37 6 °C)    Current: Temperature: 99 °F (37 2 °C)    Weights:   IBW: 86 8 kg    Body mass index is 26 78 kg/m²    Weight (last 2 days)     None          Hemodynamic Monitoring:  N/A     Non-Invasive/Invasive Ventilation Settings:  Respiratory    Lab Data (Last 4 hours)    None         O2/Vent Data (Last 4 hours)       0332          Non-Invasive Ventilation Mode BiPAP                 No results found for: PHART, EQK9LAL, PO2ART, GIU2NAO, H6PQIJCH, BEART, SOURCE  SpO2: SpO2: 100 %    Intake and Outputs:  I/O        0701 -  0700 01/12 0701 - 01/13 0700    P  O  720 480    I V  (mL/kg) 683 5 (6 8)     IV Piggyback 50 100    Total Intake(mL/kg) 1453 5 (14 6) 580 (5 8)    Urine (mL/kg/hr) 3375 (1 4) 3075 (1 3)    Total Output 3375 3075    Net -1921 5 -2495          Unmeasured Stool Occurrence  2 x          Nutrition:        Diet Orders            Start     Ordered    01/11/19 1034  Diet Dysphagia/Modified Consistency; Dysphagia 3-Dental Soft; Thin Liquid  Diet effective now     Question Answer Comment   Diet Type Dysphagia/Modified Consistency    Dysphagia/Modified Consistency Dysphagia 3-Dental Soft    Liquid Modifier Thin Liquid    RD to adjust diet per protocol? Yes        01/11/19 1034          Labs:     Results from last 7 days  Lab Units 01/13/19  0505 01/12/19  0500 01/11/19  0544 01/10/19  0434   WBC Thousand/uL 14 26* 18 06* 14 04* 14 57*   HEMOGLOBIN g/dL 11 7* 12 4 11 8* 11 3*   HEMATOCRIT % 35 0* 37 7 35 5* 32 9*   PLATELETS Thousands/uL 205 159 99* 70*   NEUTROS PCT % 87*  --   --   --    MONOS PCT % 6  --   --   --    MONO PCT %  --   --  3* 1*      Results from last 7 days  Lab Units 01/14/19  0139 01/13/19  1644 01/13/19  0506  01/12/19  1136  01/09/19  0602   SODIUM mmol/L 144 144 144  < > 144  < > 143   POTASSIUM mmol/L 3 8 3 3* 3 9  < > 3 5  < > 3 5   CHLORIDE mmol/L 110* 109* 109*  < > 108  < > 107   CO2 mmol/L 26 26 26  < > 28  < > 28   BUN mg/dL 38* 37* 39*  < > 41*  < > 36*   CREATININE mg/dL 1 23 1 17 1 33*  < > 1 35*  < > 1 32*   CALCIUM mg/dL 7 5* 7 4* 7 4*  < > 7 8*  < > 6 9*   ALK PHOS U/L  --   --  61  --  74  --  92   ALT U/L  --   --  51  --  63  --  94*   AST U/L  --   --  59*  --  57*  --  83*   < > = values in this interval not displayed      Results from last 7 days  Lab Units 01/13/19  0506 01/12/19  1136 01/11/19  0544   MAGNESIUM mg/dL 2 1 2 1 2 1       Results from last 7 days  Lab Units 01/13/19  0506 01/11/19  0544 01/10/19  0434   PHOSPHORUS mg/dL 3 5 3 6 2 6*        Results from last 7 days  Lab Units 01/13/19  0505 01/10/19  0605 01/10/19  0001 01/09/19  1521  01/07/19  1955   INR  1 28*  --   --   --   --  1 03   PTT seconds  --  69* 62* 50*  < > 28   < > = values in this interval not displayed  0  Lab Value Date/Time   TROPONINI <0 02 01/03/2019 0818       Imaging:  I have personally reviewed pertinent reports  and I have personally reviewed pertinent films in PACS      Micro:  Lab Results   Component Value Date    BLOODCX No Growth After 5 Days  01/04/2019    BLOODCX No Growth After 5 Days   01/04/2019       Allergies: No Known Allergies    Medications:   Scheduled Meds:    Current Facility-Administered Medications:  acetaminophen 650 mg Oral Q6H PRN Aliene Maul   amiodarone 200 mg Oral TID With Meals Sandhya Chavarria PA-C   [START ON 1/17/2019] amiodarone 200 mg Oral Daily With Breakfast Sandhya Chavarria PA-C   chlorhexidine 15 mL Swish [employer] Spit Q12H [address] Fawn Zheng PA-C   furosemide 40 mg Intravenous TID (diuretic) Sandhya Chavarria PA-C   heparin (porcine) 5,000 Units Subcutaneous [facility] Noberto Felty, MD   hydrALAZINE 10 mg Intravenous Q4H PRN Arslan Shelling, DAMARIS   HYDROmorphone 0 5 mg Intravenous Q2H PRN Noberto Felty, MD   insulin lispro 1-6 Units Subcutaneous TID AC DAMARIS Skinner   insulin lispro 1-6 Units Subcutaneous HS Arslan Shelling, CRNP   metoprolol tartrate 50 mg Oral TID Irish Gonzales MD   ondansetron 4 mg Intravenous Q6H PRN Aftab Moraes PA-C   oxyCODONE 10 mg Oral Q6H PRN Arslan Shelling, CRNP   oxyCODONE 5 mg Oral Q6H PRN Arslan Shelling, CRNP   perflutren lipid microsphere 0 8 mL/min Intravenous Once in imaging Lan Langston MD   potassium chloride 20 mEq Oral BID Fawn Zheng PA-C   QUEtiapine 25 mg Oral Q12H [address] Sandhya Chavarria PA-C   warfarin 5 mg Oral Daily (warfarin) Sandhya Chavarria PA-C     Continuous Infusions:   PRN Meds:    acetaminophen 650 mg Q6H PRN   hydrALAZINE 10 mg Q4H PRN   HYDROmorphone 0 5 mg Q2H PRN   ondansetron 4 mg Q6H PRN   oxyCODONE 10 mg Q6H PRN   oxyCODONE 5 mg Q6H PRN   perflutren lipid microsphere 0 8 mL/min Once in imaging       VTE Pharmacologic Prophylaxis: Warfarin (Coumadin)  VTE Mechanical Prophylaxis: sequential compression device    Invasive lines and devices: Invasive Devices     Peripheral Intravenous Line            Peripheral IV 01/14/19 Left Arm less than 1 day          Drain            Urethral Catheter 16 Fr  9 days                   Counseling / Coordination of Care  Total Critical Care time spent 30 minutes excluding procedures, teaching and family updates  Code Status: Level 1 - Full Code     Portions of the record may have been created with voice recognition software  Occasional wrong word or "sound a like" substitutions may have occurred due to the inherent limitations of voice recognition software  Read the chart carefully and recognize, using context, where substitutions have occurred       Rebecca Rogers MD

## 2019-01-14 NOTE — PROGRESS NOTES
Cardiology Progress Note - Dallin Seats 60 y o  male MRN: [id number]    Unit/Bed#: [address] [facility] -01 Encounter: [id number]    Assessment and plan  1  Acute respiratory failure  2  Paroxysmal atrial fibrillation  3  Cholangitis/sepsis  4  Pancreatitis  5  Hypertension    Recommendations:  Maintaining sinus rhythm continue oral amiodarone as written  Continue diuresis  No signs of decompensated heart failure  Hemodynamics have been stable  Continue anticoagulation  Subjective:    No significant events overnight  Patient resting comfortably denies chest pain or dyspnea  Sinus rhythm on telemetry  ROS    Objective:   Vitals: Blood pressure 142/77, pulse 92, temperature 98 3 °F (36 8 °C), temperature source Oral, resp  rate (!) 28, height 6' 4" (1 93 m), weight 99 8 kg (220 lb), SpO2 100 %  , Body mass index is 26 78 kg/m² , Orthostatic Blood Pressures      Most Recent Value   Blood Pressure  142/77 filed at 01/14/2019 0800   Patient Position - Orthostatic VS  Sitting filed at 01/14/2019 7477         Systolic (63ZTK), AFZ:824 , Min:115 , RXN:883     Diastolic (97ULU), JBE:75, Min:56, Max:82      Intake/Output Summary (Last 24 hours) at 01/14/19 0914  Last data filed at 01/14/19 0601   Gross per 24 hour   Intake              250 ml   Output             3200 ml   Net            -2950 ml     Weight (last 2 days)     None            Telemetry Review: No significant arrhythmias seen on telemetry review  EKG personally reviewed by Syl Estevez DO  Physical Exam   Constitutional: He is oriented to person, place, and time  He appears well-nourished  No distress  HENT:   Head: Atraumatic  Eyes: Pupils are equal, round, and reactive to light  Conjunctivae are normal    Neck: Neck supple  Cardiovascular: Normal rate, regular rhythm and normal heart sounds  Exam reveals no friction rub  No murmur heard  Pulmonary/Chest: Effort normal  No respiratory distress  He has decreased breath sounds   He has no wheezes  He has no rhonchi  He has no rales  Abdominal: Bowel sounds are normal  He exhibits no distension  There is no tenderness  There is no rebound  Musculoskeletal: He exhibits no edema  Neurological: He is alert and oriented to person, place, and time  No cranial nerve deficit  Skin: Skin is warm and dry  No erythema  Nursing note and vitals reviewed          Laboratory Results:        CBC with diff:   Results from last 7 days  Lab Units 01/14/19  0609 01/13/19  0505 01/12/19  0500 01/11/19  0544 01/10/19  0434 01/09/19  0602 01/08/19  0534   WBC Thousand/uL 11 13* 14 26* 18 06* 14 04* 14 57* 12 22* 9 35   HEMOGLOBIN g/dL 11 2* 11 7* 12 4 11 8* 11 3* 12 0 11 2*   HEMATOCRIT % 33 0* 35 0* 37 7 35 5* 32 9* 35 5* 32 7*   MCV fL 89 90 90 90 88 89 89   PLATELETS Thousands/uL 248 205 159 99* 70* 72* 80*   MCH pg 30 2 29 9 29 5 29 9 30 1 29 9 30 4   MCHC g/dL 33 9 33 4 32 9 33 2 34 3 33 8 34 3   RDW % 14 6 14 6 14 8 14 5 14 3 14 5 14 2   MPV fL 9 4 10 0 10 0 10 2 10 4 10 3 10 6   NRBC AUTO /100 WBCs 0 0  --  0 0 0 0   NRBC /100 WBC  --   --   --   --   --   --  1         CMP:  Results from last 7 days  Lab Units 01/14/19  0609 01/14/19  0139 01/13/19  1644 01/13/19  0506 01/12/19  2142 01/12/19  1136 01/12/19  0500  01/09/19  0602  01/08/19  0534   POTASSIUM mmol/L 3 8 3 8 3 3* 3 9 3 2* 3 5 3 2*  < > 3 5  < > 4 0   CHLORIDE mmol/L 111* 110* 109* 109* 109* 108 108  < > 107  --  107   CO2 mmol/L 26 26 26 26 28 28 30  < > 28  --  27   BUN mg/dL 39* 38* 37* 39* 41* 41* 39*  < > 36*  --  39*   CREATININE mg/dL 1 21 1 23 1 17 1 33* 1 27 1 35* 1 35*  < > 1 32*  --  1 40*   CALCIUM mg/dL 7 3* 7 5* 7 4* 7 4* 7 1* 7 8* 7 8*  < > 6 9*  --  7 1*   AST U/L 50*  --   --  59*  --  57*  --   --  83*  --  81*   ALT U/L 49  --   --  51  --  63  --   --  94*  --  100*   ALK PHOS U/L 67  --   --  61  --  74  --   --  92  --  78   EGFR ml/min/1 73sq m 65 64 68 58 61 57 57  < > 59  --  55   < > = values in this interval not displayed  BMP:  Results from last 7 days  Lab Units 19  0609 19  0139 19  1644 19  0506 19  2142 19  1136 19  0500   POTASSIUM mmol/L 3 8 3 8 3 3* 3 9 3 2* 3 5 3 2*   CHLORIDE mmol/L 111* 110* 109* 109* 109* 108 108   CO2 mmol/L 26 26 26 26 28 28 30   BUN mg/dL 39* 38* 37* 39* 41* 41* 39*   CREATININE mg/dL 1 21 1 23 1 17 1 33* 1 27 1 35* 1 35*   CALCIUM mg/dL 7 3* 7 5* 7 4* 7 4* 7 1* 7 8* 7 8*       BNP: No results for input(s): BNP in the last 72 hours  Magnesium:   Results from last 7 days  Lab Units 19  0609 19  0506 19  1136 19  0544 01/10/19  0434 19  0534   MAGNESIUM mg/dL 2 3 2 1 2 1 2 1 2 0 3 1*       Coags:   Results from last 7 days  Lab Units 19  0609 19  0505 01/10/19  0605 01/10/19  0001 19  1521 19  0602 19  2322 19  1707 19  0946  19  1955   PTT seconds  --   --  69* 62* 50* 51* 45* 36 35  < > 28   INR  1 63* 1 28*  --   --   --   --   --   --   --   --  1 03   < > = values in this interval not displayed      TSH:        Hemoglobin A1C       Lipid Profile:       Cardiac testing:   Results for orders placed during the hospital encounter of 19   Echo complete with contrast if indicated    Xavier Oseguera [address] [place] [address] [address] [facility]  [phone]    Transthoracic Echocardiogram  2D, M-mode, Doppler, and Color Doppler    Study date:  2019    Patient: Lazarus Slim  MR number: [id number]  Account number: [de-identified]  : 1960  Age: 60 years  Gender: Male  Status: Inpatient  Location: Bedside  Height: 76 in  Weight: 220 lb  BP: 83/ 58 mmHg    Indications: Atrial fibrillation    Diagnoses: I48 0 - Atrial fibrillation    Sonographer:  JOSÉ MIGUEL Hinton, RDCS  Interpreting Physician:  Tony Cuello MD  Primary Physician:  Adrianna Lau MD  Referring Physician:  Mami Weldon MD  Group:  [facility]  Cardiology Fellow:  Lexy Martin, DO    SUMMARY    LEFT VENTRICLE:  Size was normal   Systolic function was normal  Ejection fraction was estimated to be 60 %  Although no diagnostic regional wall motion abnormality was identified, this possibility cannot be completely excluded on the basis of this study  Wall thickness was mildly increased  Left ventricular diastolic function parameters were normal     RIGHT VENTRICLE:  The ventricle was mildly to moderately dilated  Systolic function was normal     LEFT ATRIUM:  The atrium was mildly dilated  RIGHT ATRIUM:  The atrium was mildly dilated  MITRAL VALVE:  There was mild regurgitation  TRICUSPID VALVE:  There was mild regurgitation  IVC, HEPATIC VEINS:  The inferior vena cava was moderately dilated  Respirophasic changes were blunted (less than 50% variation)  HISTORY: PRIOR HISTORY: Atrial fibrillation with RVR; Pancreatitis; GERD    PROCEDURE: The procedure was performed at the bedside  This was a routine study  The transthoracic approach was used  The study included complete 2D imaging, M-mode, complete spectral Doppler, and color Doppler  The heart rate was 62 bpm,  at the start of the study  Images were obtained from the parasternal, apical, subcostal, and suprasternal notch acoustic windows  Intravenous contrast ( 0 8mL/min Definity in NSS) was administered to opacify the left ventricle  Echocardiographic views were limited due to poor acoustic window availability, decreased penetration, lung interference, and patient on mechanical ventilator  This was a technically difficult study  LEFT VENTRICLE: Size was normal  Systolic function was normal  Ejection fraction was estimated to be 60 %  Although no diagnostic regional wall motion abnormality was identified, this possibility cannot be completely excluded on the basis  of this study  Wall thickness was mildly increased   The changes were consistent with concentric remodeling (increased wall thickness with normal wall mass)  DOPPLER: Left ventricular diastolic function parameters were normal     RIGHT VENTRICLE: The ventricle was mildly to moderately dilated  Systolic function was normal     LEFT ATRIUM: The atrium was mildly dilated  RIGHT ATRIUM: The atrium was mildly dilated  MITRAL VALVE: There was mild annular calcification  Valve structure was normal  There was normal leaflet separation  DOPPLER: The transmitral velocity was within the normal range  There was no evidence for stenosis  There was mild  regurgitation  AORTIC VALVE: The valve was trileaflet  Leaflets exhibited normal thickness and normal cuspal separation  DOPPLER: Transaortic velocity was within the normal range  There was no evidence for stenosis  There was no regurgitation  TRICUSPID VALVE: The valve structure was normal  There was normal leaflet separation  DOPPLER: The transtricuspid velocity was within the normal range  There was no evidence for stenosis  There was mild regurgitation  Pulmonary artery  systolic pressure was within the normal range  Estimated peak PA pressure was 35 mmHg  PULMONIC VALVE: Not well visualized  PERICARDIUM: There was no pericardial effusion  The pericardium was normal in appearance  AORTA: The root exhibited normal size  SYSTEMIC VEINS: IVC: The inferior vena cava was moderately dilated  Respirophasic changes were blunted (less than 50% variation)      SYSTEM MEASUREMENT TABLES    2D  %FS: 34 54 %  Ao Diam: 3 23 cm  EDV(Teich): 74 06 ml  EF(Teich): 64 15 %  ESV(Teich): 26 55 ml  IVSd: 1 24 cm  LA Area: 22 35 cm2  LA Diam: 4 24 cm  LVEDV MOD A4C: 119 98 ml  LVEF MOD A4C: 61 96 %  LVESV MOD A4C: 45 64 ml  LVIDd: 4 1 cm  LVIDs: 2 68 cm  LVLd A4C: 8 74 cm  LVLs A4C: 6 73 cm  LVPWd: 1 cm  RA Area: 21 32 cm2  RVIDd: 4 43 cm  SV MOD A4C: 74 34 ml  SV(Teich): 47 51 ml    CW  TR Vmax: 2 53 m/s  TR maxP 58 mmHg    MM  TAPSE: 2 02 cm    PW  E': 0 08 m/s  E/E': 5 71  MV A Aiden: 0 46 m/s  MV Dec Pine: 2 11 m/s2  MV DecT: 210 24 ms  MV E Aiden: 0 44 m/s  MV E/A Ratio: 0 97  MV PHT: 60 97 ms  MVA By PHT: 3 61 cm2    [employer] Accredited Echocardiography Laboratory    Prepared and electronically signed by    Litzy Kay MD  Signed 07-Jan-2019 11:27:02       No results found for this or any previous visit  No results found for this or any previous visit  No results found for this or any previous visit  Meds/Allergies     No prescriptions prior to admission            Assessment:  Principal Problem:    Acute pancreatitis  Active Problems:    Pancreatitis    Delirium    Leukocytosis    Acute respiratory insufficiency    Pulmonary edema    Hypokalemia    Metabolic alkalosis    Dysphagia

## 2019-01-14 NOTE — PLAN OF CARE
Problem: SAFETY,RESTRAINT: NV/NON-SELF DESTRUCTIVE BEHAVIOR  Goal: Remains free of harm/injury (restraint for non violent/non self-detsructive behavior)  INTERVENTIONS:  - Instruct patient/family regarding restraint use   - Assess and monitor physiologic and psychological status   - Provide interventions and comfort measures to meet assessed patient needs   - Identify and implement measures to help patient regain control  - Assess readiness for release of restraint    Outcome: Completed Date Met: 01/14/19    Goal: Returns to optimal restraint-free functioning  INTERVENTIONS:  - Assess the patient's behavior and symptoms that indicate continued need for restraint  - Identify and implement measures to help patient regain control  - Assess readiness for release of restraint    Outcome: Completed Date Met: 01/14/19

## 2019-01-14 NOTE — NUTRITION
Pt will have >75% meal completions  Monitor LFTs and renal parameters   Scheduled Ensure Compact with L and D

## 2019-01-14 NOTE — PHYSICAL THERAPY NOTE
PHYSICAL THERAPY NOTE          Patient Name: Hal Michelle  [id number] Date: 1/14/2019 01/14/19 1145   Pain Assessment   Pain Assessment No/denies pain   Pain Score No Pain   Restrictions/Precautions   Weight Bearing Precautions Per Order No   Other Precautions Cognitive; Chair Alarm; Bed Alarm;Multiple lines;Telemetry;O2;Fall Risk;Impulsive   General   Chart Reviewed Yes   Family/Caregiver Present No   Cognition   Orientation Level Oriented to person   Subjective   Subjective "I want to go home and shower"   Bed Mobility   Supine to Sit Unable to assess  (Pt found resting in chair)   Sit to Supine Unable to assess  (pt left as requested resting in chair)   Transfers   Sit to Stand 4  Minimal assistance   Additional items Assist x 2   Stand to Sit 4  Minimal assistance   Additional items Assist x 2; Increased time required   Ambulation/Elevation   Gait pattern Not tested   Balance   Static Sitting Poor +   Dynamic Sitting Poor -   Static Standing Poor -   Endurance Deficit   Endurance Deficit Yes   Endurance Deficit Description fatigue   Activity Tolerance   Activity Tolerance Patient limited by fatigue;Patient limited by pain   Medical Staff [id number], OT D/C planning   Nurse Made Aware yes, lonnie Henriquez gave clearance to work with pt  Aware of pt request for bipap despite Spo2 stable throughout therapy session   Exercises   Balance training  dynamic standing balance during ADLs without UE support  Assessment   Prognosis Fair   Problem List Decreased strength;Decreased range of motion;Decreased mobility; Decreased safety awareness;Pain;Decreased endurance   Assessment Pt motivated to complete therapy at start of session  First stand minimal tolerance 2* to decreased activity tolerance, deficits in strength and balance  Pt required verbal instruction to decrease retropulsion   Second standing trial improved although continued to require verbal and tactile balance instruction with dynamic activity  Pt required increased encouragement to continue to stand and participate with fatigue  Pt will benefit from continued inpt skilled PT to maximize functional mobility & safety  Barriers to Discharge Inaccessible home environment   Goals   Patient Goals To go home and shower   STG Expiration Date 01/21/19   Plan   Treatment/Interventions Endurance training;LE strengthening/ROM; Bed mobility;Gait training;Equipment eval/education;Spoke to case management;Spoke to nursing;OT   PT Frequency 2-3x/wk   Recommendation   Recommendation Short-term skilled PT   PT - OK to Discharge (to rehab when medically stable )   Gi Skaggs, PT

## 2019-01-14 NOTE — UTILIZATION REVIEW
Continued Stay Review    Date:  19       60year old male    61 y/o male with acute biliary pancreatitis and acute cholangitis s/p ERCP, stone retrieval and bile duct stent placement with acute respiratory failure and metabolic encephalopathy      Arterial line    Bipap       Vital Signs: Temp (24hrs), Av 5 °F (36 9 °C), Min:98 1 °F (36 7 °C), Max:99 °F (37 2 °C)   19 0305 19 0405 19 0505 19 0605   BP: 140/67 145/68 169/65 (!) 175/76   Pulse: 88 84 86 92   Resp: (!) 23 (!) 23 (!) 27 (!) 38   Temp:   99 °F (37 2 °C)       TempSrc:   Axillary       SpO2: 96% 100%   98%     Abnormal and pertinent labs:    Lab Units 19  0505 19  0500 19  0544 01/10/19  0434   WBC Thousand/uL 14 26* 18 06* 14 04* 14 57*   HEMOGLOBIN g/dL 11 7* 12 4 11 8* 11 3*   HEMATOCRIT % 35 0* 37 7 35 5* 32 9*     Lab Units 19  0506 19  2142 19  1136 19  0602   POTASSIUM mmol/L 3 9 3 2* 3 5 3 5   CHLORIDE mmol/L 109* 109* 108 107   BUN mg/dL 39* 41* 41* 36*   CREATININE mg/dL 1 33* 1 27 1 35* 1 32*           Assessment/Plan:      Critical care     - extubated 1/10, requiring bipap and support overnight, currently being diuresed                - Trial off bipap again todal             - goal net negative 1L             - am cxr pending   -Continue rdoriguez for diuresis         - aspiration pneumonia, completed course of zosyn             - cholangitis, resolved post-ERCP, zosyn complete             - WBC 14 2 down from 18 yesterday, continue to follow                Disposition: ICU stay for bipap needs and delirium       Medications:       acetaminophen 650 mg Oral Q6H PRN   amiodarone 200 mg Oral TID With Meals   [START ON 2019] amiodarone 200 mg Oral Daily With Breakfast   chlorhexidine 15 mL Swish & Spit Q12H [address]   furosemide 40 mg Intravenous TID (diuretic)   heparin (porcine) 5,000 Units Subcutaneous Q8H [address]   hydrALAZINE 10 mg Intravenous Q4H PRN   HYDROmorphone 0 5 mg Intravenous Q2H PRN   insulin lispro 1-6 Units Subcutaneous TID AC   insulin lispro 1-6 Units Subcutaneous HS   metoprolol tartrate 50 mg Oral TID   ondansetron 4 mg Intravenous Q6H PRN   oxyCODONE 10 mg Oral Q6H PRN   oxyCODONE 5 mg Oral Q6H PRN   perflutren lipid microsphere 0 8 mL/min Intravenous Once in imaging   potassium chloride 20 mEq Oral BID   QUEtiapine 25 mg Oral Q12H [address]   warfarin 5 mg Oral Daily (warfarin)

## 2019-01-14 NOTE — PROGRESS NOTES
Progress Note - General Surgery   Eulalia Becerra 60 y o  male MRN: [id number]  Unit/Bed#: CW -01 Encounter: [id number]    Assessment:  60year old male with biliary pancreatitis, cholangitis, s/p ERCP and sphincterotomy, acute hypoxic respiratory failure    Plan:  Continue dysphagia diet  Diurese per ICU  BiPAP for respiratory support, wean as tolerated  Pain control  Out of bed    Subjective/Objective     Subjective: On BiPAP overnight  Tolerating diet and moving bowels  Denies abdominal pain  Objective:    Blood pressure 115/71, pulse 78, temperature 99 °F (37 2 °C), temperature source Axillary, resp  rate (!) 33, height 6' 4" (1 93 m), weight 99 8 kg (220 lb), SpO2 100 %  ,Body mass index is 26 78 kg/m²        Intake/Output Summary (Last 24 hours) at 01/14/19 0625  Last data filed at 01/14/19 0001   Gross per 24 hour   Intake              360 ml   Output             2850 ml   Net            -2490 ml       Invasive Devices     Peripheral Intravenous Line            Peripheral IV 01/14/19 Left Arm less than 1 day          Drain            Urethral Catheter 16 Fr  9 days                Physical Exam:   General: NAD, awake and alert  Eyes: PERRL  ENT: moist mucous membranes  Neck: supple  CV: RRR +S1/S2  Chest: breath sounds bilaterally  Abdomen: soft, NT ND  Extremities: atraumatic, edematous        Results from last 7 days  Lab Units 01/13/19  0505 01/12/19  0500 01/11/19  0544   WBC Thousand/uL 14 26* 18 06* 14 04*   HEMOGLOBIN g/dL 11 7* 12 4 11 8*   HEMATOCRIT % 35 0* 37 7 35 5*   PLATELETS Thousands/uL 205 159 99*       Results from last 7 days  Lab Units 01/14/19  0139 01/13/19  1644 01/13/19  0506   POTASSIUM mmol/L 3 8 3 3* 3 9   CHLORIDE mmol/L 110* 109* 109*   CO2 mmol/L 26 26 26   BUN mg/dL 38* 37* 39*   CREATININE mg/dL 1 23 1 17 1 33*   CALCIUM mg/dL 7 5* 7 4* 7 4*       Results from last 7 days  Lab Units 01/13/19  0505 01/10/19  0605 01/10/19  0001 01/09/19  1521  01/07/19  1955   INR  1 28* --   --   --   --  1 03   PTT seconds  --  69* 62* 50*  < > 28   < > = values in this interval not displayed

## 2019-01-14 NOTE — PLAN OF CARE
Problem: OCCUPATIONAL THERAPY ADULT  Goal: Performs self-care activities at highest level of function for planned discharge setting  See evaluation for individualized goals  Treatment Interventions: ADL retraining, Functional transfer training, UE strengthening/ROM, Endurance training, Cognitive reorientation, Patient/family training, Equipment evaluation/education, Neuromuscular reeducation, Fine motor coordination activities, Compensatory technique education, Continued evaluation, Energy conservation, Activityengagement          See flowsheet documentation for full assessment, interventions and recommendations  Outcome: Progressing  Limitation: Decreased ADL status, Decreased UE ROM, Decreased UE strength, Decreased Safe judgement during ADL, Decreased cognition, Decreased endurance, Decreased sensation, Visual deficit, Decreased fine motor control, Decreased self-care trans, Decreased high-level ADLs  Prognosis: Fair  Assessment: Patient participated in Skilled OT session 1/14/19 with interventions consisting of ADL re training with the use of correct body mechnaics, Energy Conservation techniques, deep breathing technique, safety awareness and fall prevention techniques,  therapeutic activities to: increase activity tolerance, increase standing tolerance time with unilateral UE support to complete sink level ADLs, increase dynamic sit/ stand balance during functional activity , increase postural control, increase trunk control and increase OOB/ sitting tolerance   Patient agreeable to OT treatment session, upon arrival patient was found seated OOB to Recliner  In comparison to previous session, patient with improvements in transfers, activity tolerance, standing balance, grooming and decreased agitation  Patient requiring verbal cues for safety, verbal cues for correct technique, cognitive assistance to anticipate next step, one step directives and frequent rest periods   Patient continues to be functioning below baseline level, occupational performance remains limited secondary to factors listed above and increased risk for falls and injury  From OT standpoint, recommendation at time of d/c would be Short Term Rehab when medically stable  Patient to benefit from continued Occupational Therapy treatment while in the hospital to address deficits as defined above and maximize level of functional independence with ADLs and functional mobility        OT Discharge Recommendation: Short Term Rehab  OT - OK to Discharge: Yes (when medically stable)      Comments: Chiqui Silvestre MS, OTR/L

## 2019-01-15 ENCOUNTER — APPOINTMENT (INPATIENT)
Dept: RADIOLOGY | Facility: HOSPITAL | Age: 59
DRG: 438 | End: 2019-01-15
Payer: COMMERCIAL

## 2019-01-15 LAB
ANION GAP SERPL CALCULATED.3IONS-SCNC: 7 MMOL/L (ref 4–13)
BUN SERPL-MCNC: 34 MG/DL (ref 5–25)
CALCIUM SERPL-MCNC: 7.6 MG/DL (ref 8.3–10.1)
CHLORIDE SERPL-SCNC: 107 MMOL/L (ref 100–108)
CO2 SERPL-SCNC: 28 MMOL/L (ref 21–32)
CREAT SERPL-MCNC: 1.31 MG/DL (ref 0.6–1.3)
GFR SERPL CREATININE-BSD FRML MDRD: 59 ML/MIN/1.73SQ M
GLUCOSE SERPL-MCNC: 114 MG/DL (ref 65–140)
GLUCOSE SERPL-MCNC: 118 MG/DL (ref 65–140)
GLUCOSE SERPL-MCNC: 121 MG/DL (ref 65–140)
GLUCOSE SERPL-MCNC: 143 MG/DL (ref 65–140)
INR PPP: 1.91 (ref 0.86–1.17)
POTASSIUM SERPL-SCNC: 3.3 MMOL/L (ref 3.5–5.3)
PROTHROMBIN TIME: 22 SECONDS (ref 11.8–14.2)
SODIUM SERPL-SCNC: 142 MMOL/L (ref 136–145)

## 2019-01-15 PROCEDURE — 36569 INSJ PICC 5 YR+ W/O IMAGING: CPT

## 2019-01-15 PROCEDURE — 80048 BASIC METABOLIC PNL TOTAL CA: CPT | Performed by: EMERGENCY MEDICINE

## 2019-01-15 PROCEDURE — 94762 N-INVAS EAR/PLS OXIMTRY CONT: CPT

## 2019-01-15 PROCEDURE — 71045 X-RAY EXAM CHEST 1 VIEW: CPT

## 2019-01-15 PROCEDURE — 94760 N-INVAS EAR/PLS OXIMETRY 1: CPT

## 2019-01-15 PROCEDURE — 99232 SBSQ HOSP IP/OBS MODERATE 35: CPT | Performed by: SURGERY

## 2019-01-15 PROCEDURE — 97112 NEUROMUSCULAR REEDUCATION: CPT

## 2019-01-15 PROCEDURE — 99232 SBSQ HOSP IP/OBS MODERATE 35: CPT | Performed by: INTERNAL MEDICINE

## 2019-01-15 PROCEDURE — 97535 SELF CARE MNGMENT TRAINING: CPT

## 2019-01-15 PROCEDURE — C1751 CATH, INF, PER/CENT/MIDLINE: HCPCS

## 2019-01-15 PROCEDURE — 85610 PROTHROMBIN TIME: CPT | Performed by: EMERGENCY MEDICINE

## 2019-01-15 PROCEDURE — 94660 CPAP INITIATION&MGMT: CPT

## 2019-01-15 PROCEDURE — 82948 REAGENT STRIP/BLOOD GLUCOSE: CPT

## 2019-01-15 RX ORDER — POTASSIUM CHLORIDE 20MEQ/15ML
60 LIQUID (ML) ORAL ONCE
Status: DISCONTINUED | OUTPATIENT
Start: 2019-01-15 | End: 2019-01-15 | Stop reason: SDUPTHER

## 2019-01-15 RX ORDER — ONDANSETRON 4 MG/1
4 TABLET, ORALLY DISINTEGRATING ORAL EVERY 6 HOURS PRN
Status: DISCONTINUED | OUTPATIENT
Start: 2019-01-15 | End: 2019-01-16

## 2019-01-15 RX ORDER — FUROSEMIDE 40 MG/1
TABLET ORAL
Status: COMPLETED
Start: 2019-01-15 | End: 2019-01-15

## 2019-01-15 RX ORDER — FUROSEMIDE 40 MG/1
40 TABLET ORAL
Status: DISCONTINUED | OUTPATIENT
Start: 2019-01-15 | End: 2019-01-16

## 2019-01-15 RX ORDER — POTASSIUM CHLORIDE 20 MEQ/1
60 TABLET, EXTENDED RELEASE ORAL ONCE
Status: COMPLETED | OUTPATIENT
Start: 2019-01-15 | End: 2019-01-15

## 2019-01-15 RX ADMIN — POTASSIUM CHLORIDE 60 MEQ: 1500 TABLET, EXTENDED RELEASE ORAL at 10:43

## 2019-01-15 RX ADMIN — METOPROLOL TARTRATE 50 MG: 50 TABLET, FILM COATED ORAL at 20:03

## 2019-01-15 RX ADMIN — FUROSEMIDE 40 MG: 40 TABLET ORAL at 12:44

## 2019-01-15 RX ADMIN — FUROSEMIDE 80 MG: 80 TABLET ORAL at 06:21

## 2019-01-15 RX ADMIN — HEPARIN SODIUM 5000 UNITS: 5000 INJECTION INTRAVENOUS; SUBCUTANEOUS at 21:26

## 2019-01-15 RX ADMIN — AMIODARONE HYDROCHLORIDE 200 MG: 200 TABLET ORAL at 09:20

## 2019-01-15 RX ADMIN — METOPROLOL TARTRATE 50 MG: 50 TABLET, FILM COATED ORAL at 15:41

## 2019-01-15 RX ADMIN — WARFARIN SODIUM 5 MG: 5 TABLET ORAL at 17:14

## 2019-01-15 RX ADMIN — METOPROLOL TARTRATE 50 MG: 50 TABLET, FILM COATED ORAL at 09:20

## 2019-01-15 RX ADMIN — HEPARIN SODIUM 5000 UNITS: 5000 INJECTION INTRAVENOUS; SUBCUTANEOUS at 13:59

## 2019-01-15 RX ADMIN — POTASSIUM CHLORIDE 20 MEQ: 1500 TABLET, EXTENDED RELEASE ORAL at 17:14

## 2019-01-15 RX ADMIN — HEPARIN SODIUM 5000 UNITS: 5000 INJECTION INTRAVENOUS; SUBCUTANEOUS at 06:21

## 2019-01-15 RX ADMIN — FUROSEMIDE 40 MG: 40 TABLET ORAL at 17:14

## 2019-01-15 RX ADMIN — ONDANSETRON 4 MG: 4 TABLET, ORALLY DISINTEGRATING ORAL at 11:24

## 2019-01-15 RX ADMIN — AMIODARONE HYDROCHLORIDE 200 MG: 200 TABLET ORAL at 15:41

## 2019-01-15 RX ADMIN — FUROSEMIDE: 40 TABLET ORAL at 12:00

## 2019-01-15 RX ADMIN — AMIODARONE HYDROCHLORIDE 200 MG: 200 TABLET ORAL at 12:44

## 2019-01-15 RX ADMIN — POTASSIUM CHLORIDE 20 MEQ: 1500 TABLET, EXTENDED RELEASE ORAL at 09:20

## 2019-01-15 RX ADMIN — ONDANSETRON 4 MG: 2 INJECTION INTRAMUSCULAR; INTRAVENOUS at 09:19

## 2019-01-15 NOTE — OCCUPATIONAL THERAPY NOTE
Occupational Therapy Treatment Note      Ru Landry    1/15/2019    Patient Active Problem List   Diagnosis    Acute pancreatitis    Pancreatitis    Delirium    Leukocytosis    Acute respiratory insufficiency    Pulmonary edema    Hypokalemia    Metabolic alkalosis    Dysphagia    Metabolic encephalopathy       Past Medical History:   Diagnosis Date    Gastroesophageal reflux        Past Surgical History:   Procedure Laterality Date    CHOLECYSTECTOMY      COLONOSCOPY N/A 3/21/2016    Procedure: COLONOSCOPY;  Surgeon: Lenora Ye DO;  Location: [facility]; Service:     ERCP N/A 1/4/2019    Procedure: ENDOSCOPIC RETROGRADE CHOLANGIOPANCREATOGRAPHY (ERCP); Surgeon: Maddie Mccoy MD;  Location: [facility]; Service: Gastroenterology    TN EGD TRANSORAL BIOPSY SINGLE/MULTIPLE N/A 3/21/2016    Procedure: ESOPHAGOGASTRODUODENOSCOPY (EGD); Surgeon: Lenora Ye DO;  Location: [facility]; Service: General      01/15/19 1136   Restrictions/Precautions   Weight Bearing Precautions Per Order No   Other Precautions Impulsive;Cognitive; Chair Alarm; Bed Alarm;Multiple lines;Telemetry; Fall Risk;Pain   Lifestyle   Autonomy I w/ ADLS/IADLS, transfers/functional mobility PTA   Reciprocal Relationships Pt lives w/ spouse per chart review   Service to Others Works full time; pt unable to report what he does   Intrinsic Gratification Pt unable to report   Pain Assessment   Pain Assessment FLACC   Pain Type Acute pain   Pain Location Abdomen   Pain Rating: FLACC (Rest) - Face 0   Pain Rating: FLACC (Rest) - Legs 0   Pain Rating: FLACC (Rest) - Activity 0   Pain Rating: FLACC (Rest) - Cry 0   Pain Rating: FLACC (Rest) - Consolability 0   Score: FLACC (Rest) 0   Pain Rating: FLACC (Activity) - Face 0   Pain Rating: FLACC (Activity) - Legs 0   Pain Rating: FLACC (Activity) - Activity 0   Pain Rating: FLACC (Activity) - Cry 0   Pain Rating: FLACC (Activity) - Consolability 0   Score: FLACC (Activity) 0   Bed Mobility   Rolling R 4  Minimal assistance   Additional items Assist x 1; Increased time required;Verbal cues   Rolling L 4  Minimal assistance   Additional items Assist x 1; Increased time required;Verbal cues   Supine to Sit 4  Minimal assistance   Additional items Assist x 1; Increased time required;Verbal cues;LE management   Sit to Supine Unable to assess   Additional Comments pt went from sit-supine w/Min A x1 for LE management and VC for proper technique  pt rolled L and R w/ Min A for initiation into rolling and VC for safety   Transfers   Sit to Stand 3  Moderate assistance   Additional items Assist x 2; Increased time required;Verbal cues   Stand to Sit 3  Moderate assistance   Additional items Assist x 2; Increased time required;Verbal cues   Stand pivot 3  Moderate assistance   Additional items Assist x 2; Increased time required;Verbal cues   Additional Comments Pt performed sit-stand from chair w/ Mod A x2 for safety, HHA used for balance and VC for initiation of task  Pt performed SPT from chair to EOB w/ Mod A x2, HHA  Cognition   Overall Cognitive Status Impaired   Arousal/Participation Responsive; Cooperative   Attention Attends with cues to redirect   Orientation Level Oriented to person;Oriented to place;Oriented to time;Disoriented to situation   Memory Decreased recall of precautions;Decreased recall of recent events;Decreased short term memory   Following Commands Follows one step commands with increased time or repetition   Comments Pt is pleasat and cooperative; has decreased recall of recent events and decreased short term memory; requires VC for safety throughout functional tasks; waxes and wanes cognitively; able to identify spouse in room   Additional Activities   Additional Activities Other (Comment)  (Home setup)   Additional Activities Comments Pt's spouse present to confirm home setup and pt's prior level of function  Pt lives in split level home w/ 2 BRENDA and 2 steps inside   Was I PTA w/ ADLS/IADLS, transfers and functional mobility  Pt worked full time as an   Pt lives w/ his spouse and enjoys trains  pt has not had any recent falls  Pt has a tub/shower combo and standard toilet at home; no other DME  Activity Tolerance   Activity Tolerance Patient tolerated treatment well; Other (Comment)  (limited session 2* to medical team coming to place PICC line)   Medical Staff Made Aware PT, Theresa and Florence CISNEROS   Assessment   Assessment Patient participated in Skilled OT session 1/15/19 with interventions consisting of Energy Conservation techniques, deep breathing technique, safety awareness and fall prevention techniques,  therapeutic activities to: increase activity tolerance, increase postural control and increase trunk control   Patient agreeable to OT treatment session, upon arrival patient was found seated OOB to Recliner  In comparison to previous session, patient with improvements in transfers and bed mobility  Patient requiring verbal cues for safety, verbal cues for correct technique, verbal cues for pacing thru activity steps, cognitive assistance to anticipate next step and one step directives  Patient continues to be functioning below baseline level, occupational performance remains limited secondary to factors listed above and increased risk for falls and injury  From OT standpoint, recommendation at time of d/c would be Short Term Rehab when medically stable  Patient to benefit from continued Occupational Therapy treatment while in the hospital to address deficits as defined above and maximize level of functional independence with ADLs and functional mobility  Plan   Treatment Interventions ADL retraining;Functional transfer training;UE strengthening/ROM; Endurance training;Cognitive reorientation;Patient/family training;Equipment evaluation/education; Compensatory technique education;Continued evaluation; Activityengagement; Energy conservation   Goal Expiration Date 01/23/19 Treatment Day 3   OT Frequency 3-5x/wk   Recommendation   OT Discharge Recommendation Short Term Rehab   OT - OK to Discharge Yes  (when medically stable)   Barthel Index   Feeding 5   Bathing 0   Grooming Score 0   Dressing Score 5   Bladder Score 0   Bowels Score 5   Toilet Use Score 5   Transfers (Bed/Chair) Score 5   Mobility (Level Surface) Score 0   Stairs Score 0   Barthel Index Score 25   Modified [place] Scale   Modified [place] Scale 4         Chiqui Silvestre MS, OTR/L

## 2019-01-15 NOTE — PLAN OF CARE
Problem: PHYSICAL THERAPY ADULT  Goal: Performs mobility at highest level of function for planned discharge setting  See evaluation for individualized goals  Treatment/Interventions: Bed mobility, Gait training, LE strengthening/ROM, Functional transfer training, Patient/family training, OT, Spoke to nursing  Equipment Recommended:  (TBD)       See flowsheet documentation for full assessment, interventions and recommendations  Outcome: Progressing  Prognosis: Good  Problem List: Decreased strength, Decreased endurance, Impaired balance, Decreased mobility, Decreased cognition, Impaired judgement, Decreased safety awareness, Pain  Assessment: PT INITIATED TREATMENT SESSION IN ORDER TO ASSIST PATIENT IN ACHIEVING GOALS TO IMPROVE TRANSFERS AND TO TRIAL AMBULATION AS APPROPRIATE  UPON ARRIVAL PATIENT SEATED IN CHAIR REPORTING PAIN IN ABDOMEN (RN AWARE)  HE REQUIRED MOD-AX2 FOR SIT-->STAND TRANSFER AND STAND PIVOT TRANSFER (CHAIR TO BED)  PLANNED TO TRIAL FURTHER AMBULATION HOWEVER TEAM ARRIVED FOR PLACEMENT OF PICC WHICH REQUIRED PATIENT IN SUPINE POSITION  DURING THE TRANSFER PATIENT REQUIRED VC TO PUSH FROM CHAIR SIT-->STAND, MAINTAIN UPRIGHT POSTURE, AND REACH BACK FOR BED STAND-->SIT  NEXT SESSION PLAN TO TRIAL AMBULATION W/ RW- RECOMMEND AX2 GUARDING PATIENT AND 3RD PERSON FOR CHAIR FOLLOW  PT D/C RECOMMENDATION FOR STR REMAINS APPROPRIATE  PATIENT WILL BENEFIT FROM CONTINUED SKILLED PT THIS ADMISSION TO ACHIEVE MAXIMAL FUNCTION AND SAFETY  Barriers to Discharge: Inaccessible home environment     Recommendation: (S) Short-term skilled PT     PT - OK to Discharge: (S) Yes (TO STR WHEN MED NEO )    See flowsheet documentation for full assessment     Rohini Adair PT

## 2019-01-15 NOTE — PHYSICAL THERAPY NOTE
PT TREATMENT       01/15/19 1137   Pain Assessment   Pain Assessment FLACC   Pain Type Acute pain   Pain Location Abdomen   Pain Rating: FLACC (Rest) - Face 0   Pain Rating: FLACC (Rest) - Legs 0   Pain Rating: FLACC (Rest) - Activity 0   Pain Rating: FLACC (Rest) - Cry 0   Pain Rating: FLACC (Rest) - Consolability 0   Score: FLACC (Rest) 0   Pain Rating: FLACC (Activity) - Face 1   Pain Rating: FLACC (Activity) - Legs 0   Pain Rating: FLACC (Activity) - Activity 0   Pain Rating: FLACC (Activity) - Cry 0   Pain Rating: FLACC (Activity) - Consolability 0   Score: FLACC (Activity) 1   Restrictions/Precautions   Other Precautions Fall Risk;Pain;Telemetry;Multiple lines; Bed Alarm;Cognitive  (BED ALARM ACTIVE POST PT TREAT)   General   Chart Reviewed Yes   Response to Previous Treatment Patient with no complaints from previous session  Family/Caregiver Present Yes  (SPOUSE)   Cognition   Overall Cognitive Status Impaired   Arousal/Participation Alert   Attention Attends with cues to redirect   Memory Decreased recall of precautions;Decreased recall of recent events;Decreased short term memory   Following Commands Follows one step commands with increased time or repetition   Subjective   Subjective "I READ WHAT YOU GAVE ME"   Bed Mobility   Sit to Supine 4  Minimal assistance   Additional items Assist x 1;LE management   Transfers   Sit to Stand 3  Moderate assistance   Additional items Assist x 2; Increased time required;Verbal cues   Stand to Sit 3  Moderate assistance   Additional items Assist x 2; Increased time required;Verbal cues   Stand pivot 3  Moderate assistance   Additional items Assist x 2; Increased time required;Verbal cues   Ambulation/Elevation   Gait pattern Short stride;Decreased foot clearance   Gait Assistance 3  Moderate assist   Additional items Assist x 2   Assistive Device Other (Comment)  (HHA)   Distance 2 FEET (CHAIR TO BED)    Balance   Static Sitting Fair -   Static Standing Poor + Ambulatory Poor   Activity Tolerance   Activity Tolerance Patient tolerated treatment well   Medical Staff Made Aware OT [address]   Nurse Made Aware AMELIA CHAVEZ   Assessment   Prognosis Good   Problem List Decreased strength;Decreased endurance; Impaired balance;Decreased mobility; Decreased cognition; Impaired judgement;Decreased safety awareness;Pain   Assessment PT INITIATED TREATMENT SESSION IN ORDER TO ASSIST PATIENT IN ACHIEVING GOALS TO IMPROVE TRANSFERS AND TO TRIAL AMBULATION AS APPROPRIATE  UPON ARRIVAL PATIENT SEATED IN CHAIR REPORTING PAIN IN ABDOMEN (RN AWARE)  HE REQUIRED MOD-AX2 FOR SIT-->STAND TRANSFER AND STAND PIVOT TRANSFER (CHAIR TO BED)  PLANNED TO TRIAL FURTHER AMBULATION HOWEVER TEAM ARRIVED FOR PLACEMENT OF PICC WHICH REQUIRED PATIENT IN SUPINE POSITION  DURING THE TRANSFER PATIENT REQUIRED VC TO PUSH FROM CHAIR SIT-->STAND, MAINTAIN UPRIGHT POSTURE, AND REACH BACK FOR BED STAND-->SIT  NEXT SESSION PLAN TO TRIAL AMBULATION W/ RW- RECOMMEND AX2 GUARDING PATIENT AND 3RD PERSON FOR CHAIR FOLLOW  PT D/C RECOMMENDATION FOR STR REMAINS APPROPRIATE  PATIENT WILL BENEFIT FROM CONTINUED SKILLED PT THIS ADMISSION TO ACHIEVE MAXIMAL FUNCTION AND SAFETY  Goals   Patient Goals TO STAND UP    STG Expiration Date 01/21/19   Short Term Goal #1 IN ADDITION TO GOALS ESTABLISHED DURING EVALUATION PATIENT WILL: 1) AMBULATE 100 FEET  MIN-AX1 W/ LEAST RESTRICTIVE DEVICE IN ORDER TO PARTICIPATION IN COMMUNITY LEVEL MOBILITY    Treatment Day 2   Plan   Treatment/Interventions Functional transfer training;LE strengthening/ROM; Therapeutic exercise; Endurance training;Patient/family training;Equipment eval/education; Bed mobility;Gait training;OT;Spoke to nursing   Progress Progressing toward goals   PT Frequency Other (Comment)  (3-5X/WK)   Recommendation   Recommendation Short-term skilled PT   PT - OK to Discharge Yes  (TO STR WHEN MED NEO )     Froilan Dahl, PT

## 2019-01-15 NOTE — PROGRESS NOTES
Progress Note - General Surgery   Hal Michelle 60 y o  male MRN: [id number]  Unit/Bed#:  -01 Encounter: [id number]    Assessment:  60year old male with biliary pancreatitis s/p ERCP and stent, acute hypoxic respiratory failure, delirium, new onset AFib with RVR    Plan:  Wean nasal cannula as tolerated  Monitor creatinine  Diuresis as able  Continue amiodarone, coumadin  Continue dysphagia diet, speech evals  Okay for step down    Subjective/Objective     Subjective: No acute events  Tolerating diet and moving bowels  Slept well on BiPAP overnight, but not requiring while awake  No abdominal pain  Delirium has improved  Objective:    Blood pressure 128/61, pulse 86, temperature 99 1 °F (37 3 °C), temperature source Oral, resp  rate (!) 27, height 6' 4" (1 93 m), weight 99 8 kg (220 lb), SpO2 95 %  ,Body mass index is 26 78 kg/m²        Intake/Output Summary (Last 24 hours) at 01/15/19 0832  Last data filed at 01/15/19 0601   Gross per 24 hour   Intake             2240 ml   Output             2775 ml   Net             -535 ml       Invasive Devices     Drain            Urethral Catheter 16 Fr  10 days                Physical Exam:   General: NAD, awake alert and oriented  Eyes: PERRL  ENT: moist mucous membranes  Neck: supple  CV: RRR +S1/S2  Chest: breath sounds bilaterally  Abdomen: soft, NT ND  Extremities: atraumatic, no edema        Results from last 7 days  Lab Units 01/14/19  0609 01/13/19  0505 01/12/19  0500   WBC Thousand/uL 11 13* 14 26* 18 06*   HEMOGLOBIN g/dL 11 2* 11 7* 12 4   HEMATOCRIT % 33 0* 35 0* 37 7   PLATELETS Thousands/uL 248 205 159       Results from last 7 days  Lab Units 01/15/19  0501 01/14/19  0609 01/14/19  0139   POTASSIUM mmol/L 3 3* 3 8 3 8   CHLORIDE mmol/L 107 111* 110*   CO2 mmol/L 28 26 26   BUN mg/dL 34* 39* 38*   CREATININE mg/dL 1 31* 1 21 1 23   CALCIUM mg/dL 7 6* 7 3* 7 5*       Results from last 7 days  Lab Units 01/15/19  0501 01/14/19  0609 01/13/19  0505 01/10/19  0605 01/10/19  0001 01/09/19  1521   INR  1 91* 1 63* 1 28*  --   --   --    PTT seconds  --   --   --  69* 62* 50*

## 2019-01-15 NOTE — PLAN OF CARE
Problem: OCCUPATIONAL THERAPY ADULT  Goal: Performs self-care activities at highest level of function for planned discharge setting  See evaluation for individualized goals  Treatment Interventions: ADL retraining, Functional transfer training, UE strengthening/ROM, Endurance training, Cognitive reorientation, Patient/family training, Equipment evaluation/education, Neuromuscular reeducation, Fine motor coordination activities, Compensatory technique education, Continued evaluation, Energy conservation, Activityengagement          See flowsheet documentation for full assessment, interventions and recommendations  Outcome: Progressing  Limitation: Decreased ADL status, Decreased UE ROM, Decreased UE strength, Decreased Safe judgement during ADL, Decreased cognition, Decreased endurance, Decreased sensation, Visual deficit, Decreased fine motor control, Decreased self-care trans, Decreased high-level ADLs  Prognosis: Fair  Assessment: Patient participated in Skilled OT session 1/15/19 with interventions consisting of Energy Conservation techniques, deep breathing technique, safety awareness and fall prevention techniques,  therapeutic activities to: increase activity tolerance, increase postural control and increase trunk control   Patient agreeable to OT treatment session, upon arrival patient was found seated OOB to Recliner  In comparison to previous session, patient with improvements in transfers and bed mobility  Patient requiring verbal cues for safety, verbal cues for correct technique, verbal cues for pacing thru activity steps, cognitive assistance to anticipate next step and one step directives  Patient continues to be functioning below baseline level, occupational performance remains limited secondary to factors listed above and increased risk for falls and injury  From OT standpoint, recommendation at time of d/c would be Short Term Rehab when medically stable     Patient to benefit from continued Occupational Therapy treatment while in the hospital to address deficits as defined above and maximize level of functional independence with ADLs and functional mobility       OT Discharge Recommendation: Short Term Rehab  OT - OK to Discharge: Yes (when medically stable)      Comments: Chiqui Silvestre MS, OTR/L

## 2019-01-15 NOTE — PROGRESS NOTES
Progress Note - Critical Care   Jose Carlos Chaney 60 y o  male MRN: [id number]  Unit/Bed#:  -01 Encounter: [id number]    Assessment: 61 y/o male with acute biliary pancreatitis and acute cholangitis s/p ERCP, stone retrieval and bile duct stent placement (1/4/19) with acute respiratory failure 2/2 volume overload    Plan:          Neuro:    - Pain well controlled - no doses of prn meds last 3 days   - Encephalopathy/ Delirium - improved, no issues overnight, seroquel D/Cd yesterday                  CV:    - A fib w/ RVR     - PO amiodarone 200mg TID, scheduled for q Daily starting 1/17    - 50 mg TID    - cardiology following - recommending Coumadin x 4 weeks  INR 1 91 today              Lung:    - Extubated 1/10, intermitted BIPAP overnight (10 hrs) per patient request, O2 sats fine on NC and RA    - patient has subjective SOB if off BIPAP for too long or eating/talking  Possible anxiety component    - No AM CXR today   - Consider L thoracentesis if SOB continues despite diuresis                 GI:     - Biliary pancreatitis s/p ERCP w/ stone retrieval and stent placement   - Tolerating Dysphagia 3 diet w/ thin liquids per speech  > 75% meal completions  Scheduled Ensure   - f/u w/ GI in 6W for stent retrieval    - 3 loose stools over past 24 hrs  FEN:    - I/O: -298 last 245 hrs, UOP 2,825 last 24 hrs   - No IVF   - Continue LASIX 40mg TID   -  Hypokalemia at 3 3 - 60 meq given this am                 :    - Continue Jones for diuresis   - Cr 1 31 from 1 21 yesterday                  ID:    - aspiration pneumonia - completed course of Zosyn   - Cholangitis - resolved s/p ERCP, completed course of Zosyn   - No CBC this am - improved last 3 days                 Heme:    - Lab holiday this am   - Continue Coumadin 5mg PO q daily  INR this am 1 91  Recheck in am    - Heparin subQ and SCDs for DVT ppx  D/C once INR therapeutic                  Endo:    - Blood sugars  WNL   Continue to monitor                          Msk/Skin:    - Frequent turning, OOB as tolerated w/ PT                 Disposition: Stepdown 2 given improved delirium and improved respiratory status     Chief Complaint: SOB w/ prolonged time off of BIPAP    HPI/24hr events: No acute events overnight  Plan for PICC line this am given lack of access    Physical Exam:  Physical Exam   Constitutional: He is oriented to person, place, and time  He appears well-developed and well-nourished  No distress  HENT:   Head: Normocephalic and atraumatic  Right Ear: External ear normal    Left Ear: External ear normal    Eyes: Pupils are equal, round, and reactive to light  Conjunctivae and EOM are normal  Right eye exhibits no discharge  Left eye exhibits no discharge  Neck: Normal range of motion  Cardiovascular: Normal rate, regular rhythm, normal heart sounds and intact distal pulses  Exam reveals no gallop and no friction rub  No murmur heard  Pulmonary/Chest: Effort normal  No respiratory distress  He has decreased breath sounds  He has no wheezes  He has no rales  Abdominal: Soft  Bowel sounds are normal  He exhibits no distension  There is no tenderness  There is no guarding  Musculoskeletal: Normal range of motion  He exhibits edema (b/l hands and legs, pitting)  He exhibits no tenderness or deformity  Lymphadenopathy:     He has no cervical adenopathy  Neurological: He is alert and oriented to person, place, and time  No cranial nerve deficit or sensory deficit  He exhibits normal muscle tone  Skin: Skin is warm and dry  Psychiatric: He has a normal mood and affect  His behavior is normal    Nursing note and vitals reviewed        Vitals:    01/15/19 0000 01/15/19 0100 01/15/19 0200 01/15/19 0348   BP: 167/78 156/65 127/59    BP Location:       Pulse: 86 80 76    Resp: (!) 33 (!) 28 (!) 25    Temp:       TempSrc:       SpO2: 97% 98% 98% 98%   Weight:       Height:         Arterial Line BP: 186/66  Arterial Line MAP (mmHg): 92 mmHg    Temperature:   Temp (24hrs), Av 4 °F (36 9 °C), Min:98 °F (36 7 °C), Max:98 9 °F (37 2 °C)    Current: Temperature: 98 9 °F (37 2 °C)    Weights:   IBW: 86 8 kg    Body mass index is 26 78 kg/m²  Weight (last 2 days)     None          Hemodynamic Monitoring:  N/A     Non-Invasive/Invasive Ventilation Settings:  Respiratory    Lab Data (Last 4 hours)    None         O2/Vent Data (Last 4 hours)      01/15 0348          Non-Invasive Ventilation Mode BiPAP                 No results found for: PHART, AUI9QOD, PO2ART, JXF5RQD, T1FKWCCE, BEART, SOURCE  SpO2: SpO2: 99 %    Intake and Outputs:  I/O        07 -  0700 701 - 01/15 0700    P  O  160 1940    IV Piggyback 250     Total Intake(mL/kg) 410 (4 1) 1940 (19 4)    Urine (mL/kg/hr) 3300 (1 4) 2825 (1 2)    Total Output 3300 2825    Net -2890 -885          Unmeasured Stool Occurrence 1 x 1 x        Nutrition:        Diet Orders            Start     Ordered    19 1621  Dietary nutrition supplements  Once     Question Answer Comment   Select Supplement: Ensure Compact-Vanilla    Frequency Lunch, Dinner        19 1620    19 1034  Diet Dysphagia/Modified Consistency; Dysphagia 3-Dental Soft; Thin Liquid  Diet effective now     Question Answer Comment   Diet Type Dysphagia/Modified Consistency    Dysphagia/Modified Consistency Dysphagia 3-Dental Soft    Liquid Modifier Thin Liquid    RD to adjust diet per protocol?  Yes        19 1034            Labs:     Results from last 7 days  Lab Units 19  0609 19  0505 19  0500 19  0544   WBC Thousand/uL 11 13* 14 26* 18 06* 14 04*   HEMOGLOBIN g/dL 11 2* 11 7* 12 4 11 8*   HEMATOCRIT % 33 0* 35 0* 37 7 35 5*   PLATELETS Thousands/uL 248 205 159 99*   NEUTROS PCT % 81* 87*  --   --    MONOS PCT % 9 6  --   --    MONO PCT %  --   --   --  3*      Results from last 7 days  Lab Units 01/15/19  0501 19  0609 19  0139  19  1155 01/12/19  1136   SODIUM mmol/L 142 144 144  < > 144  < > 144   POTASSIUM mmol/L 3 3* 3 8 3 8  < > 3 9  < > 3 5   CHLORIDE mmol/L 107 111* 110*  < > 109*  < > 108   CO2 mmol/L 28 26 26  < > 26  < > 28   BUN mg/dL 34* 39* 38*  < > 39*  < > 41*   CREATININE mg/dL 1 31* 1 21 1 23  < > 1 33*  < > 1 35*   CALCIUM mg/dL 7 6* 7 3* 7 5*  < > 7 4*  < > 7 8*   ALK PHOS U/L  --  67  --   --  61  --  74   ALT U/L  --  49  --   --  51  --  63   AST U/L  --  50*  --   --  59*  --  57*   < > = values in this interval not displayed  Results from last 7 days  Lab Units 01/14/19  0609 01/13/19  0506 01/12/19  1136   MAGNESIUM mg/dL 2 3 2 1 2 1       Results from last 7 days  Lab Units 01/14/19  0609 01/13/19  0506 01/11/19  0544   PHOSPHORUS mg/dL 3 4 3 5 3 6        Results from last 7 days  Lab Units 01/15/19  0501 01/14/19  0609 01/13/19  0505 01/10/19  0605 01/10/19  0001 01/09/19  1521   INR  1 91* 1 63* 1 28*  --   --   --    PTT seconds  --   --   --  69* 62* 50*           0  Lab Value Date/Time   TROPONINI <0 02 01/03/2019 0818       Imaging:  I have personally reviewed pertinent reports  EKG: I have personally reviewed pertinent reports    Micro:  Lab Results   Component Value Date    BLOODCX No Growth After 5 Days  01/04/2019    BLOODCX No Growth After 5 Days   01/04/2019       Allergies: No Known Allergies    Medications:   Scheduled Meds:  Current Facility-Administered Medications:  acetaminophen 650 mg Oral Q6H PRN Lindsay Seneca   amiodarone 200 mg Oral TID With Meals Terrill Severin, PA-C   [START ON 1/17/2019] amiodarone 200 mg Oral Daily With Breakfast Terrill Severin, PA-C   furosemide 80 mg Oral TID (diuretic) DAMARIS Liu   heparin (porcine) 5,000 Units Subcutaneous [facility] Cristiane Crooks MD   HYDROmorphone 0 5 mg Intravenous Q2H PRN Cristiane Crooks MD   insulin lispro 1-6 Units Subcutaneous TID AC DAMARIS Skinner   insulin lispro 1-6 Units Subcutaneous HS DAMARIS Butler metoprolol tartrate 50 mg Oral TID Kelly Mercer MD   ondansetron 4 mg Intravenous Q6H PRN Tamiko Fleming PA-C   oxyCODONE 5 mg Oral Q6H PRN DAMARIS Liu   potassium chloride 20 mEq Oral BID Fawn Zheng PA-C   warfarin 5 mg Oral Daily (warfarin) Domonique Dobbins PA-C     Continuous Infusions:   PRN Meds:    acetaminophen 650 mg Q6H PRN   HYDROmorphone 0 5 mg Q2H PRN   ondansetron 4 mg Q6H PRN   oxyCODONE 5 mg Q6H PRN       VTE Pharmacologic Prophylaxis: Heparin  VTE Mechanical Prophylaxis: sequential compression device    Invasive lines and devices: Invasive Devices     Drain            Urethral Catheter 16 Fr  10 days                   Counseling / Coordination of Care  Total Critical Care time spent 30 minutes excluding procedures, teaching and family updates  Code Status: Level 1 - Full Code     Portions of the record may have been created with voice recognition software  Occasional wrong word or "sound a like" substitutions may have occurred due to the inherent limitations of voice recognition software  Read the chart carefully and recognize, using context, where substitutions have occurred       Shawn Dale,

## 2019-01-15 NOTE — PROGRESS NOTES
Cardiology Progress Note - Shilpi Choudhury 60 y o  male MRN: [id number]    Unit/Bed#: [address] -01 Encounter: [id number]    Assessment and plan  1  Acute respiratory failure  2  Paroxysmal atrial fibrillation  3  Cholangitis/sepsis  4  Pancreatitis  5  Hypertension    Recommendations:  Currently in NSR, cont AMIO  Change to 200mg daily on Thursday  Follow INR closely when amio dose changes  Continue diuresis  Needs increased protein support most 3rd spaced  No signs of decompensated heart failure  Hemodynamics have been stable  Continue anticoagulation  Subjective:    No significant events overnight  Denies Chest pain or SOB  NSR on tele      ROS    Objective:   Vitals: Blood pressure 128/61, pulse 86, temperature 99 1 °F (37 3 °C), temperature source Oral, resp  rate (!) 27, height 6' 4" (1 93 m), weight 99 8 kg (220 lb), SpO2 95 %  , Body mass index is 26 78 kg/m² ,   Orthostatic Blood Pressures      Most Recent Value   Blood Pressure  128/61 filed at 01/15/2019 0700   Patient Position - Orthostatic VS  Lying filed at 01/15/2019 5674         Systolic (40PIB), KRISTA:257 , Min:101 , XPI:638     Diastolic (76KEJ), DEQ:10, Min:51, Max:82      Intake/Output Summary (Last 24 hours) at 01/15/19 0851  Last data filed at 01/15/19 0601   Gross per 24 hour   Intake             2240 ml   Output             2775 ml   Net             -535 ml     Weight (last 2 days)     None            Telemetry Review: No significant arrhythmias seen on telemetry review  EKG personally reviewed by Villa Baig DO  Physical Exam   Constitutional: He is oriented to person, place, and time  He appears well-nourished  No distress  HENT:   Head: Atraumatic  Eyes: Pupils are equal, round, and reactive to light  Conjunctivae are normal    Neck: Neck supple  Cardiovascular: Normal rate, regular rhythm and normal heart sounds  Exam reveals no friction rub  No murmur heard    Pulmonary/Chest: Effort normal  No respiratory distress  He has decreased breath sounds  He has no wheezes  He has no rhonchi  He has no rales  Abdominal: Soft  Bowel sounds are normal  He exhibits no distension  There is no tenderness  There is no rebound  Musculoskeletal: He exhibits no edema, tenderness or deformity  Neurological: He is alert and oriented to person, place, and time  No cranial nerve deficit  Skin: Skin is warm and dry  No erythema  Nursing note and vitals reviewed  Laboratory Results:        CBC with diff:     Results from last 7 days  Lab Units 01/14/19  0609 01/13/19  0505 01/12/19  0500 01/11/19  0544 01/10/19  0434 01/09/19  0602   WBC Thousand/uL 11 13* 14 26* 18 06* 14 04* 14 57* 12 22*   HEMOGLOBIN g/dL 11 2* 11 7* 12 4 11 8* 11 3* 12 0   HEMATOCRIT % 33 0* 35 0* 37 7 35 5* 32 9* 35 5*   MCV fL 89 90 90 90 88 89   PLATELETS Thousands/uL 248 205 159 99* 70* 72*   MCH pg 30 2 29 9 29 5 29 9 30 1 29 9   MCHC g/dL 33 9 33 4 32 9 33 2 34 3 33 8   RDW % 14 6 14 6 14 8 14 5 14 3 14 5   MPV fL 9 4 10 0 10 0 10 2 10 4 10 3   NRBC AUTO /100 WBCs 0 0  --  0 0 0         CMP:  Results from last 7 days  Lab Units 01/15/19  0501 01/14/19  0609 01/14/19  0139 01/13/19  1644 01/13/19  0506 01/12/19  2142 01/12/19  1136  01/09/19  0602   POTASSIUM mmol/L 3 3* 3 8 3 8 3 3* 3 9 3 2* 3 5  < > 3 5   CHLORIDE mmol/L 107 111* 110* 109* 109* 109* 108  < > 107   CO2 mmol/L 28 26 26 26 26 28 28  < > 28   BUN mg/dL 34* 39* 38* 37* 39* 41* 41*  < > 36*   CREATININE mg/dL 1 31* 1 21 1 23 1 17 1 33* 1 27 1 35*  < > 1 32*   CALCIUM mg/dL 7 6* 7 3* 7 5* 7 4* 7 4* 7 1* 7 8*  < > 6 9*   AST U/L  --  50*  --   --  59*  --  57*  --  83*   ALT U/L  --  49  --   --  51  --  63  --  94*   ALK PHOS U/L  --  67  --   --  61  --  74  --  92   EGFR ml/min/1 73sq m 59 65 64 68 58 61 57  < > 59   < > = values in this interval not displayed        BMP:    Results from last 7 days  Lab Units 01/15/19  0501 01/14/19  0609 01/14/19  0139 01/13/19  1643 19  0506 19  2142 19  1136   POTASSIUM mmol/L 3 3* 3 8 3 8 3 3* 3 9 3 2* 3 5   CHLORIDE mmol/L 107 111* 110* 109* 109* 109* 108   CO2 mmol/L 28 26 26 26 26 28 28   BUN mg/dL 34* 39* 38* 37* 39* 41* 41*   CREATININE mg/dL 1 31* 1 21 1 23 1 17 1 33* 1 27 1 35*   CALCIUM mg/dL 7 6* 7 3* 7 5* 7 4* 7 4* 7 1* 7 8*       BNP: No results for input(s): BNP in the last 72 hours      Magnesium:     Results from last 7 days  Lab Units 19  0609 19  0506 19  1136 19  0544 01/10/19  0434   MAGNESIUM mg/dL 2 3 2 1 2 1 2 1 2 0       Coags:     Results from last 7 days  Lab Units 01/15/19  0501 19  0609 19  0505 01/10/19  0605 01/10/19  0001 19  1521 19  0602 19  2322 19  1707 19  0946   PTT seconds  --   --   --  69* 62* 50* 51* 45* 36 35   INR  1 91* 1 63* 1 28*  --   --   --   --   --   --   --        TSH:        Hemoglobin A1C       Lipid Profile:       Cardiac testing:   Results for orders placed during the hospital encounter of 19   Echo complete with contrast if indicated    Narrative [address]  [facility] [place], [address] [facility]  [phone]    Transthoracic Echocardiogram  2D, M-mode, Doppler, and Color Doppler    Study date:  2019    Patient: Scout Burnette  MR number: [id number]  Account number: [de-identified]  : 1960  Age: 60 years  Gender: Male  Status: Inpatient  Location: Bedside  Height: 76 in  Weight: 220 lb  BP: 83/ 58 mmHg    Indications: Atrial fibrillation    Diagnoses: I48 0 - Atrial fibrillation    Sonographer:  JOSÉ MIGUEL Muniz, RDCS  Interpreting Physician:  Melissa Jurado MD  Primary Physician:  Imelda Garcia MD  Referring Physician:  Raúl Clark MD  Group:  [id number]  Cardiology Fellow:  Walter Payton DO    SUMMARY    LEFT VENTRICLE:  Size was normal   Systolic function was normal  Ejection fraction was estimated to be 60 %   Although no diagnostic regional wall motion abnormality was identified, this possibility cannot be completely excluded on the basis of this study  Wall thickness was mildly increased  Left ventricular diastolic function parameters were normal     RIGHT VENTRICLE:  The ventricle was mildly to moderately dilated  Systolic function was normal     LEFT ATRIUM:  The atrium was mildly dilated  RIGHT ATRIUM:  The atrium was mildly dilated  MITRAL VALVE:  There was mild regurgitation  TRICUSPID VALVE:  There was mild regurgitation  IVC, HEPATIC VEINS:  The inferior vena cava was moderately dilated  Respirophasic changes were blunted (less than 50% variation)  HISTORY: PRIOR HISTORY: Atrial fibrillation with RVR; Pancreatitis; GERD    PROCEDURE: The procedure was performed at the bedside  This was a routine study  The transthoracic approach was used  The study included complete 2D imaging, M-mode, complete spectral Doppler, and color Doppler  The heart rate was 62 bpm,  at the start of the study  Images were obtained from the parasternal, apical, subcostal, and suprasternal notch acoustic windows  Intravenous contrast ( 0 8mL/min Definity in NSS) was administered to opacify the left ventricle  Echocardiographic views were limited due to poor acoustic window availability, decreased penetration, lung interference, and patient on mechanical ventilator  This was a technically difficult study  LEFT VENTRICLE: Size was normal  Systolic function was normal  Ejection fraction was estimated to be 60 %  Although no diagnostic regional wall motion abnormality was identified, this possibility cannot be completely excluded on the basis  of this study  Wall thickness was mildly increased  The changes were consistent with concentric remodeling (increased wall thickness with normal wall mass)   DOPPLER: Left ventricular diastolic function parameters were normal     RIGHT VENTRICLE: The ventricle was mildly to moderately dilated  Systolic function was normal     LEFT ATRIUM: The atrium was mildly dilated  RIGHT ATRIUM: The atrium was mildly dilated  MITRAL VALVE: There was mild annular calcification  Valve structure was normal  There was normal leaflet separation  DOPPLER: The transmitral velocity was within the normal range  There was no evidence for stenosis  There was mild  regurgitation  AORTIC VALVE: The valve was trileaflet  Leaflets exhibited normal thickness and normal cuspal separation  DOPPLER: Transaortic velocity was within the normal range  There was no evidence for stenosis  There was no regurgitation  TRICUSPID VALVE: The valve structure was normal  There was normal leaflet separation  DOPPLER: The transtricuspid velocity was within the normal range  There was no evidence for stenosis  There was mild regurgitation  Pulmonary artery  systolic pressure was within the normal range  Estimated peak PA pressure was 35 mmHg  PULMONIC VALVE: Not well visualized  PERICARDIUM: There was no pericardial effusion  The pericardium was normal in appearance  AORTA: The root exhibited normal size  SYSTEMIC VEINS: IVC: The inferior vena cava was moderately dilated  Respirophasic changes were blunted (less than 50% variation)      SYSTEM MEASUREMENT TABLES    2D  %FS: 34 54 %  Ao Diam: 3 23 cm  EDV(Teich): 74 06 ml  EF(Teich): 64 15 %  ESV(Teich): 26 55 ml  IVSd: 1 24 cm  LA Area: 22 35 cm2  LA Diam: 4 24 cm  LVEDV MOD A4C: 119 98 ml  LVEF MOD A4C: 61 96 %  LVESV MOD A4C: 45 64 ml  LVIDd: 4 1 cm  LVIDs: 2 68 cm  LVLd A4C: 8 74 cm  LVLs A4C: 6 73 cm  LVPWd: 1 cm  RA Area: 21 32 cm2  RVIDd: 4 43 cm  SV MOD A4C: 74 34 ml  SV(Teich): 47 51 ml    CW  TR Vmax: 2 53 m/s  TR maxP 58 mmHg    MM  TAPSE: 2 02 cm    PW  E': 0 08 m/s  E/E': 5 71  MV A Aiden: 0 46 m/s  MV Dec Magoffin: 2 11 m/s2  MV DecT: 210 24 ms  MV E Aiden: 0 44 m/s  MV E/A Ratio: 0 97  MV PHT: 60 97 ms  MVA By PHT: 3 61 cm2    [employer] Accredited Echocardiography Laboratory    Prepared and electronically signed by    Brennan Allison MD  Signed 07-Jan-2019 11:27:02       No results found for this or any previous visit  No results found for this or any previous visit  No results found for this or any previous visit  Meds/Allergies     No prescriptions prior to admission            Assessment:  Principal Problem:    Acute pancreatitis  Active Problems:    Pancreatitis    Delirium    Leukocytosis    Acute respiratory insufficiency    Pulmonary edema    Hypokalemia    Metabolic alkalosis    Dysphagia    Metabolic encephalopathy

## 2019-01-16 LAB
GLUCOSE SERPL-MCNC: 109 MG/DL (ref 65–140)
GLUCOSE SERPL-MCNC: 121 MG/DL (ref 65–140)
GLUCOSE SERPL-MCNC: 128 MG/DL (ref 65–140)
GLUCOSE SERPL-MCNC: 271 MG/DL (ref 65–140)

## 2019-01-16 PROCEDURE — 97116 GAIT TRAINING THERAPY: CPT

## 2019-01-16 PROCEDURE — 92526 ORAL FUNCTION THERAPY: CPT

## 2019-01-16 PROCEDURE — 99232 SBSQ HOSP IP/OBS MODERATE 35: CPT | Performed by: INTERNAL MEDICINE

## 2019-01-16 PROCEDURE — 97530 THERAPEUTIC ACTIVITIES: CPT

## 2019-01-16 PROCEDURE — 94760 N-INVAS EAR/PLS OXIMETRY 1: CPT

## 2019-01-16 PROCEDURE — 82948 REAGENT STRIP/BLOOD GLUCOSE: CPT

## 2019-01-16 PROCEDURE — 97110 THERAPEUTIC EXERCISES: CPT

## 2019-01-16 PROCEDURE — 94762 N-INVAS EAR/PLS OXIMTRY CONT: CPT

## 2019-01-16 PROCEDURE — 94660 CPAP INITIATION&MGMT: CPT

## 2019-01-16 RX ORDER — FUROSEMIDE 40 MG/1
40 TABLET ORAL
Status: DISCONTINUED | OUTPATIENT
Start: 2019-01-17 | End: 2019-01-21

## 2019-01-16 RX ORDER — ONDANSETRON 4 MG/1
4 TABLET, ORALLY DISINTEGRATING ORAL EVERY 6 HOURS PRN
Status: DISCONTINUED | OUTPATIENT
Start: 2019-01-16 | End: 2019-01-23 | Stop reason: HOSPADM

## 2019-01-16 RX ORDER — TAMSULOSIN HYDROCHLORIDE 0.4 MG/1
0.4 CAPSULE ORAL
Status: DISCONTINUED | OUTPATIENT
Start: 2019-01-16 | End: 2019-01-23 | Stop reason: HOSPADM

## 2019-01-16 RX ADMIN — AMIODARONE HYDROCHLORIDE 200 MG: 200 TABLET ORAL at 08:32

## 2019-01-16 RX ADMIN — METOPROLOL TARTRATE 50 MG: 50 TABLET, FILM COATED ORAL at 08:32

## 2019-01-16 RX ADMIN — FUROSEMIDE 40 MG: 40 TABLET ORAL at 05:06

## 2019-01-16 RX ADMIN — HEPARIN SODIUM 5000 UNITS: 5000 INJECTION INTRAVENOUS; SUBCUTANEOUS at 13:01

## 2019-01-16 RX ADMIN — POTASSIUM CHLORIDE 20 MEQ: 1500 TABLET, EXTENDED RELEASE ORAL at 08:32

## 2019-01-16 RX ADMIN — ALTEPLASE 2 MG: 2.2 INJECTION, POWDER, LYOPHILIZED, FOR SOLUTION INTRAVENOUS at 21:50

## 2019-01-16 RX ADMIN — WARFARIN SODIUM 5 MG: 5 TABLET ORAL at 17:26

## 2019-01-16 RX ADMIN — METOPROLOL TARTRATE 50 MG: 50 TABLET, FILM COATED ORAL at 21:50

## 2019-01-16 RX ADMIN — TAMSULOSIN HYDROCHLORIDE 0.4 MG: 0.4 CAPSULE ORAL at 17:26

## 2019-01-16 RX ADMIN — POTASSIUM CHLORIDE 20 MEQ: 1500 TABLET, EXTENDED RELEASE ORAL at 17:26

## 2019-01-16 RX ADMIN — HEPARIN SODIUM 5000 UNITS: 5000 INJECTION INTRAVENOUS; SUBCUTANEOUS at 21:50

## 2019-01-16 RX ADMIN — METOPROLOL TARTRATE 50 MG: 50 TABLET, FILM COATED ORAL at 17:27

## 2019-01-16 RX ADMIN — HEPARIN SODIUM 5000 UNITS: 5000 INJECTION INTRAVENOUS; SUBCUTANEOUS at 05:05

## 2019-01-16 RX ADMIN — INSULIN LISPRO 4 UNITS: 100 INJECTION, SOLUTION INTRAVENOUS; SUBCUTANEOUS at 17:26

## 2019-01-16 RX ADMIN — FUROSEMIDE 40 MG: 40 TABLET ORAL at 13:01

## 2019-01-16 NOTE — PHYSICAL THERAPY NOTE
PHYSICAL THERAPY NOTE          Patient Name: Lela Garcia  [id number] Date: 1/16/2019 01/16/19 1020   Pain Assessment   Pain Assessment No/denies pain   Restrictions/Precautions   Weight Bearing Precautions Per Order No   Other Precautions Fall Risk;Multiple lines;Pain;O2;Chair Alarm; Bed Alarm  (chair alarm at conlusion of PT session )   General   Chart Reviewed Yes   Response to Previous Treatment Patient with no complaints from previous session  Family/Caregiver Present No   Cognition   Overall Cognitive Status Impaired   Arousal/Participation Alert   Attention Attends with cues to redirect   Orientation Level Oriented to person;Oriented to place   Memory Decreased recall of recent events   Following Commands Follows one step commands with increased time or repetition   Subjective   Subjective " pt willing to participate in PT treatment session"   Bed Mobility   Additional Comments NA, Pt seated OOB in chair at time of PT session    Transfers   Sit to Stand 3  Moderate assistance   Additional items Assist x 1; Increased time required;Verbal cues   Stand to Sit 4  Minimal assistance   Additional items Assist x 1; Increased time required;Verbal cues   Additional Comments VC and TC needed for hand placement and safety    Ambulation/Elevation   Gait pattern Excessively slow; Short stride; Foward flexed; Inconsistent seng   Gait Assistance 4  Minimal assist   Additional items Assist x 1  (with chair follow)   Assistive Device Rolling walker   Distance 40ft x 2    Balance   Static Sitting Fair -   Static Standing Poor +   Ambulatory Poor   Endurance Deficit   Endurance Deficit Yes   Endurance Deficit Description fatigue    Activity Tolerance   Activity Tolerance Patient limited by fatigue   Nurse Made Aware Pt apporpriate to be seen and mobilize per nsg    Exercises   Hip Abduction Sitting;10 reps;AROM; Bilateral  (x 2 sets )   Knee AROM Long Arc Quad Sitting;10 reps;AROM; Bilateral  (x 2 sets )   Ankle Pumps Sitting;10 reps;AROM; Bilateral  (x 2 sets )   Marching Sitting;10 reps;AROM; Bilateral  (x 2 sets )   Assessment   Prognosis Good   Problem List Decreased strength;Decreased endurance; Impaired balance;Decreased mobility   Assessment Pt resting seated OOB in chair at time of PT treatment session  Pt reports feeling " a little better" and is willing to participate in PT treatment session  Pt able to perform all transfers with mod/min A x1 which is much improved compared to previous session  Pt continues to require VC and TC for hand placement and safety as well as min A to facilitate proper anterior weight shifts with sit to stand transfers  Pt able to tolerate increased ambulation distances this session with min A x 1 and the use of a RW  Chair follow also required  No gross LOB noted, however pt noted to have decreased gait speed, step length and forward flexed posture  VC and TC also required for RW negotiation when changing directions  Standing rest break required with ambulation  Pts O2 sats remained in the low-mid 902 on 2L during activity throughout session  Pt able to tolerate and complete all therex seated OOB in chair without any increase in complaints  VC and TC needed for hand for proper form and pacing  Pt assisted back into chair at conclusion of PT session with all needs within reach  Pt denies any further questions at this time  PT will continue to follow  D/C recommendation when medically cleared is rehab due to decrease functional mobility compared to baseline and increased A needed from caregiver at current time  Barriers to Discharge Inaccessible home environment   Goals   Patient Goals " to get better"   STG Expiration Date 01/21/19   Treatment Day 3   Plan   Treatment/Interventions Functional transfer training;LE strengthening/ROM; Therapeutic exercise; Endurance training;Patient/family training;Equipment eval/education; Bed mobility;Gait training;Spoke to nursing   Progress Progressing toward goals   PT Frequency Other (Comment)  (3-5x a week )   Recommendation   Recommendation Short-term skilled PT   Equipment Recommended Walker  (RW)   PT - OK to Discharge Yes  (to rehab when medically cleared )   Gutierrez Castro, PT

## 2019-01-16 NOTE — PLAN OF CARE
Problem: SLP ADULT - SWALLOWING, IMPAIRED  Goal: Advance to least restrictive diet without signs or symptoms of aspiration for planned discharge setting  See evaluation for individualized goals         Outcome: Completed Date Met: 01/16/19

## 2019-01-16 NOTE — PROGRESS NOTES
Cardiology Progress Note - Silvia Krueger 60 y o  male MRN: [id number]    Unit/Bed#: [facility] 826-01 Encounter: [id number]    Assessment and plan  1  Acute respiratory failure  2  Paroxysmal atrial fibrillation  3  Cholangitis/sepsis  4  Pancreatitis  5  Hypertension     Recommendations:  Currently in NSR, cont AMIO    maintain a 200 mg daily  Continue to follow INR closely goal 2-3  Continue diuresis still with significant diuresis could decreased b i d  Dosing  Needs increased protein support most 3rd spaced  No signs of decompensated heart failure  overall he has been stable from a cardiac standpoint will continue to follow  Subjective:    No significant events overnight  Feels much better today denies chest pain or dyspnea heart rate regular in the 80s  Lower extremity edema has been improving  He is eating better  ROS    Objective:   Vitals: Blood pressure 133/74, pulse 83, temperature 98 9 °F (37 2 °C), temperature source Oral, resp  rate 18, height 6' 4" (1 93 m), weight 99 8 kg (220 lb), SpO2 97 %  , Body mass index is 26 78 kg/m² , Orthostatic Blood Pressures      Most Recent Value   Blood Pressure  133/74 filed at 01/16/2019 0710   Patient Position - Orthostatic VS  Sitting filed at 01/16/2019 3687         Systolic (53BNO), XSW:907 , Min:117 , YZR:763     Diastolic (48BXV), WXW:19, Min:67, Max:74      Intake/Output Summary (Last 24 hours) at 01/16/19 0935  Last data filed at 01/16/19 0850   Gross per 24 hour   Intake              670 ml   Output             3775 ml   Net            -3105 ml     Weight (last 2 days)     None            Telemetry Review: No significant arrhythmias seen on telemetry review  EKG personally reviewed by Ross Garcia DO  Physical Exam   Constitutional: He is oriented to person, place, and time  He appears well-nourished  No distress  HENT:   Head: Atraumatic  Eyes: Pupils are equal, round, and reactive to light   Conjunctivae are normal    Neck: Neck supple  Cardiovascular: Normal rate, regular rhythm and normal heart sounds  Exam reveals no friction rub  No murmur heard  Pulmonary/Chest: Effort normal and breath sounds normal  No respiratory distress  He has no wheezes  He has no rales  Abdominal: Bowel sounds are normal  He exhibits no distension  There is no tenderness  There is no rebound  Musculoskeletal: He exhibits edema  Neurological: He is alert and oriented to person, place, and time  No cranial nerve deficit  Skin: Skin is warm and dry  No erythema  Nursing note and vitals reviewed          Laboratory Results:        CBC with diff:   Results from last 7 days  Lab Units 01/14/19  0609 01/13/19  0505 01/12/19  0500 01/11/19  0544 01/10/19  0434   WBC Thousand/uL 11 13* 14 26* 18 06* 14 04* 14 57*   HEMOGLOBIN g/dL 11 2* 11 7* 12 4 11 8* 11 3*   HEMATOCRIT % 33 0* 35 0* 37 7 35 5* 32 9*   MCV fL 89 90 90 90 88   PLATELETS Thousands/uL 248 205 159 99* 70*   MCH pg 30 2 29 9 29 5 29 9 30 1   MCHC g/dL 33 9 33 4 32 9 33 2 34 3   RDW % 14 6 14 6 14 8 14 5 14 3   MPV fL 9 4 10 0 10 0 10 2 10 4   NRBC AUTO /100 WBCs 0 0  --  0 0         CMP:  Results from last 7 days  Lab Units 01/15/19  0501 01/14/19  0609 01/14/19  0139 01/13/19  1644 01/13/19  0506 01/12/19  2142 01/12/19  1136   POTASSIUM mmol/L 3 3* 3 8 3 8 3 3* 3 9 3 2* 3 5   CHLORIDE mmol/L 107 111* 110* 109* 109* 109* 108   CO2 mmol/L 28 26 26 26 26 28 28   BUN mg/dL 34* 39* 38* 37* 39* 41* 41*   CREATININE mg/dL 1 31* 1 21 1 23 1 17 1 33* 1 27 1 35*   CALCIUM mg/dL 7 6* 7 3* 7 5* 7 4* 7 4* 7 1* 7 8*   AST U/L  --  50*  --   --  59*  --  57*   ALT U/L  --  49  --   --  51  --  63   ALK PHOS U/L  --  67  --   --  61  --  74   EGFR ml/min/1 73sq m 59 65 64 68 58 61 57         BMP:  Results from last 7 days  Lab Units 01/15/19  0501 01/14/19  0609 01/14/19  0139 01/13/19  1644 01/13/19  0506 01/12/19  2142 01/12/19  1136   POTASSIUM mmol/L 3 3* 3 8 3 8 3 3* 3 9 3 2* 3 5   CHLORIDE mmol/L 107 111* 110* 109* 109* 109* 108   CO2 mmol/L 28 26 26 26 26 28 28   BUN mg/dL 34* 39* 38* 37* 39* 41* 41*   CREATININE mg/dL 1 31* 1 21 1 23 1 17 1 33* 1 27 1 35*   CALCIUM mg/dL 7 6* 7 3* 7 5* 7 4* 7 4* 7 1* 7 8*       BNP: No results for input(s): BNP in the last 72 hours  Magnesium:   Results from last 7 days  Lab Units 19  0609 19  0506 19  1136 19  0544 01/10/19  0434   MAGNESIUM mg/dL 2 3 2 1 2 1 2 1 2 0       Coags:   Results from last 7 days  Lab Units 01/15/19  0501 19  0609 19  0505 01/10/19  0605 01/10/19  0001 19  1521   PTT seconds  --   --   --  69* 62* 50*   INR  1 91* 1 63* 1 28*  --   --   --        TSH:        Hemoglobin A1C       Lipid Profile:       Cardiac testing:   Results for orders placed during the hospital encounter of 19   Echo complete with contrast if indicated    Narrative [address]  [phone]    Transthoracic Echocardiogram  2D, M-mode, Doppler, and Color Doppler    Study date:  2019    Patient: Jluis Gibbons  MR number: [id number]  Account number: [de-identified]  : 1960  Age: 60 years  Gender: Male  Status: Inpatient  Location: Bedside  Height: 76 in  Weight: 220 lb  BP: 83/ 58 mmHg    Indications: Atrial fibrillation    Diagnoses: I48 0 - Atrial fibrillation    Sonographer:  JOSÉ MIGUEL Lee, RDCS  Interpreting Physician:  Nika Galdamez MD  Primary Physician:  Vishal Piedra MD  Referring Physician:  Annamarie Prader, MD  Group:  [facility]  Cardiology Fellow:  Will Jeffery DO    SUMMARY    LEFT VENTRICLE:  Size was normal   Systolic function was normal  Ejection fraction was estimated to be 60 %  Although no diagnostic regional wall motion abnormality was identified, this possibility cannot be completely excluded on the basis of this study  Wall thickness was mildly increased    Left ventricular diastolic function parameters were normal     RIGHT VENTRICLE:  The ventricle was mildly to moderately dilated  Systolic function was normal     LEFT ATRIUM:  The atrium was mildly dilated  RIGHT ATRIUM:  The atrium was mildly dilated  MITRAL VALVE:  There was mild regurgitation  TRICUSPID VALVE:  There was mild regurgitation  IVC, HEPATIC VEINS:  The inferior vena cava was moderately dilated  Respirophasic changes were blunted (less than 50% variation)  HISTORY: PRIOR HISTORY: Atrial fibrillation with RVR; Pancreatitis; GERD    PROCEDURE: The procedure was performed at the bedside  This was a routine study  The transthoracic approach was used  The study included complete 2D imaging, M-mode, complete spectral Doppler, and color Doppler  The heart rate was 62 bpm,  at the start of the study  Images were obtained from the parasternal, apical, subcostal, and suprasternal notch acoustic windows  Intravenous contrast ( 0 8mL/min Definity in NSS) was administered to opacify the left ventricle  Echocardiographic views were limited due to poor acoustic window availability, decreased penetration, lung interference, and patient on mechanical ventilator  This was a technically difficult study  LEFT VENTRICLE: Size was normal  Systolic function was normal  Ejection fraction was estimated to be 60 %  Although no diagnostic regional wall motion abnormality was identified, this possibility cannot be completely excluded on the basis  of this study  Wall thickness was mildly increased  The changes were consistent with concentric remodeling (increased wall thickness with normal wall mass)  DOPPLER: Left ventricular diastolic function parameters were normal     RIGHT VENTRICLE: The ventricle was mildly to moderately dilated  Systolic function was normal     LEFT ATRIUM: The atrium was mildly dilated  RIGHT ATRIUM: The atrium was mildly dilated  MITRAL VALVE: There was mild annular calcification   Valve structure was normal  There was normal leaflet separation  DOPPLER: The transmitral velocity was within the normal range  There was no evidence for stenosis  There was mild  regurgitation  AORTIC VALVE: The valve was trileaflet  Leaflets exhibited normal thickness and normal cuspal separation  DOPPLER: Transaortic velocity was within the normal range  There was no evidence for stenosis  There was no regurgitation  TRICUSPID VALVE: The valve structure was normal  There was normal leaflet separation  DOPPLER: The transtricuspid velocity was within the normal range  There was no evidence for stenosis  There was mild regurgitation  Pulmonary artery  systolic pressure was within the normal range  Estimated peak PA pressure was 35 mmHg  PULMONIC VALVE: Not well visualized  PERICARDIUM: There was no pericardial effusion  The pericardium was normal in appearance  AORTA: The root exhibited normal size  SYSTEMIC VEINS: IVC: The inferior vena cava was moderately dilated  Respirophasic changes were blunted (less than 50% variation)  SYSTEM MEASUREMENT TABLES    2D  %FS: 34 54 %  Ao Diam: 3 23 cm  EDV(Teich): 74 06 ml  EF(Teich): 64 15 %  ESV(Teich): 26 55 ml  IVSd: 1 24 cm  LA Area: 22 35 cm2  LA Diam: 4 24 cm  LVEDV MOD A4C: 119 98 ml  LVEF MOD A4C: 61 96 %  LVESV MOD A4C: 45 64 ml  LVIDd: 4 1 cm  LVIDs: 2 68 cm  LVLd A4C: 8 74 cm  LVLs A4C: 6 73 cm  LVPWd: 1 cm  RA Area: 21 32 cm2  RVIDd: 4 43 cm  SV MOD A4C: 74 34 ml  SV(Teich): 47 51 ml    CW  TR Vmax: 2 53 m/s  TR maxP 58 mmHg    MM  TAPSE: 2 02 cm    PW  E': 0 08 m/s  E/E': 5 71  MV A Aiden: 0 46 m/s  MV Dec Glascock: 2 11 m/s2  MV DecT: 210 24 ms  MV E Aiden: 0 44 m/s  MV E/A Ratio: 0 97  MV PHT: 60 97 ms  MVA By PHT: 3 61 cm2    [employer] Accredited Echocardiography Laboratory    Prepared and electronically signed by    Lydia Altamirano MD  Signed 2019 11:27:02       No results found for this or any previous visit    No results found for this or any previous visit  No results found for this or any previous visit  Meds/Allergies   all current active meds have been reviewed  No prescriptions prior to admission            Assessment:  Principal Problem:    Acute pancreatitis  Active Problems:    Pancreatitis    Delirium    Leukocytosis    Acute respiratory insufficiency    Pulmonary edema    Hypokalemia    Metabolic alkalosis    Dysphagia    Metabolic encephalopathy

## 2019-01-16 NOTE — SOCIAL WORK
CM called pt wife Safia Bhat [phone] and aware PT recommendation is for short term rehab  Mallorie requested referral to [facility] and fellow [facility] , referral in [facility] as such

## 2019-01-16 NOTE — PROGRESS NOTES
Progress Note - General Surgery   Dallin Amado 60 y o  male MRN: [id number]  Unit/Bed#: [facility] 826-01 Encounter: [id number]    Assessment:  60year old male with biliary pancreatitis s/p ERCP and stent, acute hypoxic respiratory failure, delirium, new onset AFib with RVR    Plan:  Wean nasal cannula as tolerated, BiPAP overnight  Trending creatinine  Diuresis as able  Continue amiodarone, coumadin  Continue dysphagia diet, speech evals    Subjective/Objective     Subjective:  No acute events overnight  Slept well with BiPAP overnight  Patient is tolerating his diet  Pain well controlled  Denies fever chills chest pain or shortness of breath  Objective:    Blood pressure 135/70, pulse 83, temperature 98 4 °F (36 9 °C), temperature source Axillary, resp  rate 20, height 6' 4" (1 93 m), weight 99 8 kg (220 lb), SpO2 99 %  ,Body mass index is 26 78 kg/m²        Intake/Output Summary (Last 24 hours) at 01/16/19 0541  Last data filed at 01/16/19 0300   Gross per 24 hour   Intake              970 ml   Output             3650 ml   Net            -2680 ml       Invasive Devices     Peripherally Inserted Central Catheter Line            PICC Line 29/24/14 Right Basilic less than 1 day          Drain            Urethral Catheter 16 Fr  11 days                Physical Exam:   General: NAD, awake alert and oriented  Eyes: PERRL  ENT: moist mucous membranes  Neck: supple  CV: RRR +S1/S2  Chest: breath sounds bilaterally  Abdomen: soft, mildly tender to palpation in the epigastrium, ND  Extremities: atraumatic, no edema      Results from last 7 days  Lab Units 01/14/19  0609 01/13/19  0505 01/12/19  0500   WBC Thousand/uL 11 13* 14 26* 18 06*   HEMOGLOBIN g/dL 11 2* 11 7* 12 4   HEMATOCRIT % 33 0* 35 0* 37 7   PLATELETS Thousands/uL 248 205 159       Results from last 7 days  Lab Units 01/15/19  0501 01/14/19  0609 01/14/19  0139   POTASSIUM mmol/L 3 3* 3 8 3 8   CHLORIDE mmol/L 107 111* 110*   CO2 mmol/L 28 26 26   BUN mg/dL 34* 39* 38*   CREATININE mg/dL 1 31* 1 21 1 23   CALCIUM mg/dL 7 6* 7 3* 7 5*       Results from last 7 days  Lab Units 01/15/19  0501 01/14/19  0609 01/13/19  0505 01/10/19  0605 01/10/19  0001 01/09/19  1521   INR  1 91* 1 63* 1 28*  --   --   --    PTT seconds  --   --   --  69* 62* 50*

## 2019-01-16 NOTE — RESTORATIVE TECHNICIAN NOTE
Restorative Specialist Mobility Note       Activity: Chair, Dangle, Stand at bedside, Turn (Pt left sitting in chair at bedside with call bell, phone, and tray within reach  Chair alarm on  )     Assistive Device: Front wheel walker        Repositioned: Up in chair, Sitting              Anti-Embolism Device On:  Bilateral, Sequential compression devices, below knee

## 2019-01-16 NOTE — RESTORATIVE TECHNICIAN NOTE
Restorative Specialist Mobility Note       Activity: Ambulate in schmitt, Chair, Stand at bedside, Turn (Assisted PT Krysta Moody  Please refer to PT notes for more information  Pt left sitting in chair at bedside with call bell, phone, and tray within reach  Chair alarm on  )     Assistive Device: Four wheel walker        Repositioned: Up in chair, Sitting              Anti-Embolism Device On:  Bilateral

## 2019-01-16 NOTE — PLAN OF CARE
Problem: PHYSICAL THERAPY ADULT  Goal: Performs mobility at highest level of function for planned discharge setting  See evaluation for individualized goals  Treatment/Interventions: Bed mobility, Gait training, LE strengthening/ROM, Functional transfer training, Patient/family training, OT, Spoke to nursing  Equipment Recommended:  (TBD)       See flowsheet documentation for full assessment, interventions and recommendations  Outcome: Progressing  Prognosis: Good  Problem List: Decreased strength, Decreased endurance, Impaired balance, Decreased mobility  Assessment: Pt resting seated OOB in chair at time of PT treatment session  Pt reports feeling " a little better" and is willing to participate in PT treatment session  Pt able to perform all transfers with mod/min A x1 which is much improved compared to previous session  Pt continues to require VC and TC for hand placement and safety as well as min A to facilitate proper anterior weight shifts with sit to stand transfers  Pt able to tolerate increased ambulation distances this session with min A x 1 and the use of a RW  Chair follow also required  No gross LOB noted, however pt noted to have decreased gait speed, step length and forward flexed posture  VC and TC also required for RW negotiation when changing directions  Standing rest break required with ambulation  Pts O2 sats remained in the low-mid 902 on 2L during activity throughout session  Pt able to tolerate and complete all therex seated OOB in chair without any increase in complaints  VC and TC needed for hand for proper form and pacing  Pt assisted back into chair at conclusion of PT session with all needs within reach  Pt denies any further questions at this time  PT will continue to follow  D/C recommendation when medically cleared is rehab due to decrease functional mobility compared to baseline and increased A needed from caregiver at current time     Barriers to Discharge: Inaccessible home environment     Recommendation: Short-term skilled PT     PT - OK to Discharge: Yes (to rehab when medically cleared )    See flowsheet documentation for full assessment

## 2019-01-16 NOTE — MEDICAL STUDENT
Progress note - Medical student     Subjective: No acute events overnight  Was on BIPAP intermittently  Tolerating diet and moving bowels  Objective:    Vitals:  T: (97 8-99 3) 98  HR: (74-94) 74  BP: (117-153)/(61-74) 117/67  RR: (20-33) 20  SPO2: (93%-99%) 98%      I: 670 (670 PO intake)  O: 3400 (Urine 3400)  Net: -2 7 L      Physical Exam:   General: NAD, awake alert and oriented  Eyes: pupils equal, round, and reactive to light  ENT: moist mucous membranes  Neck: supple  CV: RRR +S1/S2  Chest: equal breath sounds bilaterally  Abdomen: soft, NT ND  Extremities: 2+ pitting edema bilaterally  Results from last 7 days  Lab Units 01/14/19  0609 01/13/19  0505 01/12/19  0500   WBC Thousand/uL 11 13* 14 26* 18 06*   HEMOGLOBIN g/dL 11 2* 11 7* 12 4   HEMATOCRIT % 33 0* 35 0* 37 7   PLATELETS Thousands/uL 248 205 159         Results from last 7 days  Lab Units 01/15/19  0501 01/14/19  0609 01/14/19  0139   POTASSIUM mmol/L 3 3* 3 8 3 8   CHLORIDE mmol/L 107 111* 110*   CO2 mmol/L 28 26 26   BUN mg/dL 34* 39* 38*   CREATININE mg/dL 1 31* 1 21 1 23   CALCIUM mg/dL 7 6* 7 3* 7 5*         Results from last 7 days  Lab Units 01/15/19  0501 01/14/19  0609 01/13/19  0505 01/10/19  0605 01/10/19  0001 01/09/19  1521   INR   1 91* 1 63* 1 28*  --   --   --    PTT seconds  --   --   --  69* 62* 50*          Assessment and Plan:    60year old male with biliary pancreatitis s/p ERCP and stent, acute hypoxic respiratory failure, and new onset Afib with RVR  1) Wean Nasal cannula and bipap   2) Continue diuresis  3) Continue amiodarone, coumadin   Cardiology is following    Mike Palumbo, MS3

## 2019-01-16 NOTE — SPEECH THERAPY NOTE
Speech Language/Pathology    Speech/Language Pathology Progress Note    Patient Name: Ward LOPEZ Date: 1/16/2019     Problem List  Patient Active Problem List   Diagnosis    Acute pancreatitis    Pancreatitis    Delirium    Leukocytosis    Acute respiratory insufficiency    Pulmonary edema    Hypokalemia    Metabolic alkalosis    Dysphagia    Metabolic encephalopathy        Past Medical History  Past Medical History:   Diagnosis Date    Gastroesophageal reflux         Past Surgical History  Past Surgical History:   Procedure Laterality Date    CHOLECYSTECTOMY      COLONOSCOPY N/A 3/21/2016    Procedure: COLONOSCOPY;  Surgeon: Aviva Massey DO;  Location: [facility]; Service:     ERCP N/A 1/4/2019    Procedure: ENDOSCOPIC RETROGRADE CHOLANGIOPANCREATOGRAPHY (ERCP); Surgeon: Sg Romero MD;  Location: [facility]; Service: Gastroenterology    MD EGD TRANSORAL BIOPSY SINGLE/MULTIPLE N/A 3/21/2016    Procedure: ESOPHAGOGASTRODUODENOSCOPY (EGD); Surgeon: Aviva Massey DO;  Location:  GI LAB; Service: General         Subjective: The patient is awake and alert  OOB in chair and seen at lunch meal      Objective: The patient is trialed at lunch meal with regular solids and thin liquids  He independently sets up tray and can cut food without difficulty  Bite size and rate are adequate  The patient has timely mastication and transfer, with no oral residue  The patient takes small sips of thin liquids via straw  He has an intermittent cough, but does not appear related to PO intake  RN reports no difficulty taking medications this am      Assessment:  The patient tolerated regular trials and thin liquids trials well  Plan/Recommendations:  Recommend diet upgrade to regular solids and thin liquids  No further ST appears warranted  Please re-consult with concerns

## 2019-01-17 LAB
ANION GAP SERPL CALCULATED.3IONS-SCNC: 7 MMOL/L (ref 4–13)
BASOPHILS # BLD AUTO: 0.03 THOUSANDS/ΜL (ref 0–0.1)
BASOPHILS NFR BLD AUTO: 0 % (ref 0–1)
BUN SERPL-MCNC: 27 MG/DL (ref 5–25)
CALCIUM SERPL-MCNC: 7.7 MG/DL (ref 8.3–10.1)
CHLORIDE SERPL-SCNC: 102 MMOL/L (ref 100–108)
CO2 SERPL-SCNC: 31 MMOL/L (ref 21–32)
CREAT SERPL-MCNC: 1.14 MG/DL (ref 0.6–1.3)
EOSINOPHIL # BLD AUTO: 0.06 THOUSAND/ΜL (ref 0–0.61)
EOSINOPHIL NFR BLD AUTO: 1 % (ref 0–6)
ERYTHROCYTE [DISTWIDTH] IN BLOOD BY AUTOMATED COUNT: 14 % (ref 11.6–15.1)
GFR SERPL CREATININE-BSD FRML MDRD: 70 ML/MIN/1.73SQ M
GLUCOSE SERPL-MCNC: 109 MG/DL (ref 65–140)
GLUCOSE SERPL-MCNC: 111 MG/DL (ref 65–140)
GLUCOSE SERPL-MCNC: 111 MG/DL (ref 65–140)
GLUCOSE SERPL-MCNC: 120 MG/DL (ref 65–140)
GLUCOSE SERPL-MCNC: 120 MG/DL (ref 65–140)
HCT VFR BLD AUTO: 34.9 % (ref 36.5–49.3)
HGB BLD-MCNC: 11.6 G/DL (ref 12–17)
IMM GRANULOCYTES # BLD AUTO: 0.12 THOUSAND/UL (ref 0–0.2)
IMM GRANULOCYTES NFR BLD AUTO: 1 % (ref 0–2)
INR PPP: 3.28 (ref 0.86–1.17)
LYMPHOCYTES # BLD AUTO: 0.96 THOUSANDS/ΜL (ref 0.6–4.47)
LYMPHOCYTES NFR BLD AUTO: 10 % (ref 14–44)
MCH RBC QN AUTO: 29.7 PG (ref 26.8–34.3)
MCHC RBC AUTO-ENTMCNC: 33.2 G/DL (ref 31.4–37.4)
MCV RBC AUTO: 89 FL (ref 82–98)
MONOCYTES # BLD AUTO: 1.14 THOUSAND/ΜL (ref 0.17–1.22)
MONOCYTES NFR BLD AUTO: 12 % (ref 4–12)
NEUTROPHILS # BLD AUTO: 7.32 THOUSANDS/ΜL (ref 1.85–7.62)
NEUTS SEG NFR BLD AUTO: 76 % (ref 43–75)
NRBC BLD AUTO-RTO: 0 /100 WBCS
PLATELET # BLD AUTO: 407 THOUSANDS/UL (ref 149–390)
PMV BLD AUTO: 9 FL (ref 8.9–12.7)
POTASSIUM SERPL-SCNC: 3.2 MMOL/L (ref 3.5–5.3)
PROTHROMBIN TIME: 33.4 SECONDS (ref 11.8–14.2)
RBC # BLD AUTO: 3.91 MILLION/UL (ref 3.88–5.62)
SODIUM SERPL-SCNC: 140 MMOL/L (ref 136–145)
WBC # BLD AUTO: 9.63 THOUSAND/UL (ref 4.31–10.16)

## 2019-01-17 PROCEDURE — 97530 THERAPEUTIC ACTIVITIES: CPT

## 2019-01-17 PROCEDURE — 82948 REAGENT STRIP/BLOOD GLUCOSE: CPT

## 2019-01-17 PROCEDURE — 97110 THERAPEUTIC EXERCISES: CPT

## 2019-01-17 PROCEDURE — 97535 SELF CARE MNGMENT TRAINING: CPT

## 2019-01-17 PROCEDURE — 80048 BASIC METABOLIC PNL TOTAL CA: CPT | Performed by: SURGERY

## 2019-01-17 PROCEDURE — 85610 PROTHROMBIN TIME: CPT | Performed by: SURGERY

## 2019-01-17 PROCEDURE — 94660 CPAP INITIATION&MGMT: CPT

## 2019-01-17 PROCEDURE — 85025 COMPLETE CBC W/AUTO DIFF WBC: CPT | Performed by: SURGERY

## 2019-01-17 PROCEDURE — 99232 SBSQ HOSP IP/OBS MODERATE 35: CPT | Performed by: INTERNAL MEDICINE

## 2019-01-17 RX ORDER — WARFARIN SODIUM 2 MG/1
2 TABLET ORAL
Status: DISCONTINUED | OUTPATIENT
Start: 2019-01-17 | End: 2019-01-18

## 2019-01-17 RX ORDER — POTASSIUM CHLORIDE 20 MEQ/1
40 TABLET, EXTENDED RELEASE ORAL 2 TIMES DAILY
Status: DISCONTINUED | OUTPATIENT
Start: 2019-01-17 | End: 2019-01-23 | Stop reason: HOSPADM

## 2019-01-17 RX ADMIN — FUROSEMIDE 40 MG: 40 TABLET ORAL at 17:17

## 2019-01-17 RX ADMIN — POTASSIUM CHLORIDE 40 MEQ: 1500 TABLET, EXTENDED RELEASE ORAL at 17:17

## 2019-01-17 RX ADMIN — POTASSIUM CHLORIDE 40 MEQ: 1500 TABLET, EXTENDED RELEASE ORAL at 09:44

## 2019-01-17 RX ADMIN — FUROSEMIDE 40 MG: 40 TABLET ORAL at 09:44

## 2019-01-17 RX ADMIN — METOPROLOL TARTRATE 50 MG: 50 TABLET, FILM COATED ORAL at 17:17

## 2019-01-17 RX ADMIN — TAMSULOSIN HYDROCHLORIDE 0.4 MG: 0.4 CAPSULE ORAL at 17:17

## 2019-01-17 RX ADMIN — HEPARIN SODIUM 5000 UNITS: 5000 INJECTION INTRAVENOUS; SUBCUTANEOUS at 05:22

## 2019-01-17 RX ADMIN — WARFARIN SODIUM 2 MG: 2 TABLET ORAL at 17:17

## 2019-01-17 RX ADMIN — METOPROLOL TARTRATE 50 MG: 50 TABLET, FILM COATED ORAL at 09:43

## 2019-01-17 RX ADMIN — HEPARIN SODIUM 5000 UNITS: 5000 INJECTION INTRAVENOUS; SUBCUTANEOUS at 14:57

## 2019-01-17 RX ADMIN — AMIODARONE HYDROCHLORIDE 200 MG: 200 TABLET ORAL at 09:44

## 2019-01-17 RX ADMIN — HEPARIN SODIUM 5000 UNITS: 5000 INJECTION INTRAVENOUS; SUBCUTANEOUS at 21:23

## 2019-01-17 RX ADMIN — METOPROLOL TARTRATE 50 MG: 50 TABLET, FILM COATED ORAL at 21:23

## 2019-01-17 NOTE — PROGRESS NOTES
Cardiology Progress Note - Darcy Lu 60 y o  male MRN: [id number]    Unit/Bed#: [facility] 826-01 Encounter: [id number]    Assessment and plan  1  Acute respiratory failure  2  Paroxysmal atrial fibrillation  3  Cholangitis/sepsis  4  Pancreatitis  5  Hypertension     Recommendations:  Currently in NSR, cont AMIO    maintain a 200 mg daily  Continue to follow INR closely goal 2-3  Continue diuresis  Needs increased protein support most 3rd spaced  no signs of decompensated congestive heart failure  Continue to promote negative fluid balance  Elevate legs  Subjective:    No significant events overnight  Denies chest pain, shortness of breath, palpitations    ROS    Objective:   Vitals: Blood pressure 122/72, pulse 80, temperature 98 4 °F (36 9 °C), temperature source Oral, resp  rate 18, height 6' 4" (1 93 m), weight 108 kg (238 lb 1 6 oz), SpO2 96 %  , Body mass index is 28 98 kg/m² ,   Orthostatic Blood Pressures      Most Recent Value   Blood Pressure  122/72 filed at 01/17/2019 0713   Patient Position - Orthostatic VS  Sitting filed at 01/17/2019 8477         Systolic (68YQS), FXO:611 , Min:116 , JJS:193     Diastolic (43MEE), BHU:33, Min:68, Max:94      Intake/Output Summary (Last 24 hours) at 01/17/19 0942  Last data filed at 01/17/19 0900   Gross per 24 hour   Intake              240 ml   Output             1050 ml   Net             -810 ml     Weight (last 2 days)     Date/Time   Weight    01/17/19 0600  108 (238 1)                Telemetry Review: No significant arrhythmias seen on telemetry review  EKG personally reviewed by Brendan Montes De Oca DO  Physical Exam   Constitutional: He is oriented to person, place, and time  He appears well-nourished  No distress  HENT:   Head: Atraumatic  Eyes: Pupils are equal, round, and reactive to light  Conjunctivae are normal    Neck: Neck supple  Cardiovascular: Normal rate, regular rhythm and normal heart sounds    Exam reveals no friction rub     No murmur heard  Pulmonary/Chest: Effort normal and breath sounds normal  No respiratory distress  He has no wheezes  He has no rales  Abdominal: Bowel sounds are normal  He exhibits no distension  There is no tenderness  There is no rebound  Musculoskeletal: He exhibits edema  He exhibits no deformity  Neurological: He is alert and oriented to person, place, and time  No cranial nerve deficit  Skin: Skin is warm and dry  No erythema  Nursing note and vitals reviewed          Laboratory Results:        CBC with diff:     Results from last 7 days  Lab Units 01/17/19  0529 01/14/19  0609 01/13/19  0505 01/12/19  0500 01/11/19  0544   WBC Thousand/uL 9 63 11 13* 14 26* 18 06* 14 04*   HEMOGLOBIN g/dL 11 6* 11 2* 11 7* 12 4 11 8*   HEMATOCRIT % 34 9* 33 0* 35 0* 37 7 35 5*   MCV fL 89 89 90 90 90   PLATELETS Thousands/uL 407* 248 205 159 99*   MCH pg 29 7 30 2 29 9 29 5 29 9   MCHC g/dL 33 2 33 9 33 4 32 9 33 2   RDW % 14 0 14 6 14 6 14 8 14 5   MPV fL 9 0 9 4 10 0 10 0 10 2   NRBC AUTO /100 WBCs 0 0 0  --  0         CMP:    Results from last 7 days  Lab Units 01/17/19  0529 01/15/19  0501 01/14/19  0609 01/14/19  0139 01/13/19  1644 01/13/19  0506 01/12/19  2142 01/12/19  1136   POTASSIUM mmol/L 3 2* 3 3* 3 8 3 8 3 3* 3 9 3 2* 3 5   CHLORIDE mmol/L 102 107 111* 110* 109* 109* 109* 108   CO2 mmol/L 31 28 26 26 26 26 28 28   BUN mg/dL 27* 34* 39* 38* 37* 39* 41* 41*   CREATININE mg/dL 1 14 1 31* 1 21 1 23 1 17 1 33* 1 27 1 35*   CALCIUM mg/dL 7 7* 7 6* 7 3* 7 5* 7 4* 7 4* 7 1* 7 8*   AST U/L  --   --  50*  --   --  59*  --  57*   ALT U/L  --   --  49  --   --  51  --  63   ALK PHOS U/L  --   --  67  --   --  61  --  74   EGFR ml/min/1 73sq m 70 59 65 64 68 58 61 57         BMP:    Results from last 7 days  Lab Units 01/17/19  0529 01/15/19  0501 01/14/19  0609 01/14/19  0139 01/13/19  1644 01/13/19  0506 01/12/19  2142   POTASSIUM mmol/L 3 2* 3 3* 3 8 3 8 3 3* 3 9 3 2*   CHLORIDE mmol/L 102 107 111* 110* 109* 109* 109*   CO2 mmol/L 31 28 26 26 26 26 28   BUN mg/dL 27* 34* 39* 38* 37* 39* 41*   CREATININE mg/dL 1 14 1 31* 1 21 1 23 1 17 1 33* 1 27   CALCIUM mg/dL 7 7* 7 6* 7 3* 7 5* 7 4* 7 4* 7 1*       BNP: No results for input(s): BNP in the last 72 hours  Magnesium:     Results from last 7 days  Lab Units 19  0609 19  0506 19  1136 19  0544   MAGNESIUM mg/dL 2 3 2 1 2 1 2 1       Coags:     Results from last 7 days  Lab Units 19  0535 01/15/19  0501 19  0609 19  0505   INR  3 28* 1 91* 1 63* 1 28*       TSH:        Hemoglobin A1C       Lipid Profile:       Cardiac testing:   Results for orders placed during the hospital encounter of 19   Echo complete with contrast if indicated    [facility] [address]  [facility] [address] [facility]  [phone]    Transthoracic Echocardiogram  2D, M-mode, Doppler, and Color Doppler    Study date:  2019    Patient: Meghan Johnson  MR number: [id number]  Account number: [de-identified]  : 1960  Age: 60 years  Gender: Male  Status: Inpatient  Location: Bedside  Height: 76 in  Weight: 220 lb  BP: 83/ 58 mmHg    Indications: Atrial fibrillation    Diagnoses: I48 0 - Atrial fibrillation    Sonographer:  JOSÉ MIGUEL Cross, RDCS  Interpreting Physician:  Rene Ramirez MD  Primary Physician:  Felipe Sauer MD  Referring Physician:  Gayla Sebastian MD  Group:  [facility]  Cardiology Fellow:  Letitia Anderson DO    SUMMARY    LEFT VENTRICLE:  Size was normal   Systolic function was normal  Ejection fraction was estimated to be 60 %  Although no diagnostic regional wall motion abnormality was identified, this possibility cannot be completely excluded on the basis of this study  Wall thickness was mildly increased  Left ventricular diastolic function parameters were normal     RIGHT VENTRICLE:  The ventricle was mildly to moderately dilated    Systolic function was normal     LEFT ATRIUM:  The atrium was mildly dilated  RIGHT ATRIUM:  The atrium was mildly dilated  MITRAL VALVE:  There was mild regurgitation  TRICUSPID VALVE:  There was mild regurgitation  IVC, HEPATIC VEINS:  The inferior vena cava was moderately dilated  Respirophasic changes were blunted (less than 50% variation)  HISTORY: PRIOR HISTORY: Atrial fibrillation with RVR; Pancreatitis; GERD    PROCEDURE: The procedure was performed at the bedside  This was a routine study  The transthoracic approach was used  The study included complete 2D imaging, M-mode, complete spectral Doppler, and color Doppler  The heart rate was 62 bpm,  at the start of the study  Images were obtained from the parasternal, apical, subcostal, and suprasternal notch acoustic windows  Intravenous contrast ( 0 8mL/min Definity in NSS) was administered to opacify the left ventricle  Echocardiographic views were limited due to poor acoustic window availability, decreased penetration, lung interference, and patient on mechanical ventilator  This was a technically difficult study  LEFT VENTRICLE: Size was normal  Systolic function was normal  Ejection fraction was estimated to be 60 %  Although no diagnostic regional wall motion abnormality was identified, this possibility cannot be completely excluded on the basis  of this study  Wall thickness was mildly increased  The changes were consistent with concentric remodeling (increased wall thickness with normal wall mass)  DOPPLER: Left ventricular diastolic function parameters were normal     RIGHT VENTRICLE: The ventricle was mildly to moderately dilated  Systolic function was normal     LEFT ATRIUM: The atrium was mildly dilated  RIGHT ATRIUM: The atrium was mildly dilated  MITRAL VALVE: There was mild annular calcification  Valve structure was normal  There was normal leaflet separation  DOPPLER: The transmitral velocity was within the normal range   There was no evidence for stenosis  There was mild  regurgitation  AORTIC VALVE: The valve was trileaflet  Leaflets exhibited normal thickness and normal cuspal separation  DOPPLER: Transaortic velocity was within the normal range  There was no evidence for stenosis  There was no regurgitation  TRICUSPID VALVE: The valve structure was normal  There was normal leaflet separation  DOPPLER: The transtricuspid velocity was within the normal range  There was no evidence for stenosis  There was mild regurgitation  Pulmonary artery  systolic pressure was within the normal range  Estimated peak PA pressure was 35 mmHg  PULMONIC VALVE: Not well visualized  PERICARDIUM: There was no pericardial effusion  The pericardium was normal in appearance  AORTA: The root exhibited normal size  SYSTEMIC VEINS: IVC: The inferior vena cava was moderately dilated  Respirophasic changes were blunted (less than 50% variation)  SYSTEM MEASUREMENT TABLES    2D  %FS: 34 54 %  Ao Diam: 3 23 cm  EDV(Teich): 74 06 ml  EF(Teich): 64 15 %  ESV(Teich): 26 55 ml  IVSd: 1 24 cm  LA Area: 22 35 cm2  LA Diam: 4 24 cm  LVEDV MOD A4C: 119 98 ml  LVEF MOD A4C: 61 96 %  LVESV MOD A4C: 45 64 ml  LVIDd: 4 1 cm  LVIDs: 2 68 cm  LVLd A4C: 8 74 cm  LVLs A4C: 6 73 cm  LVPWd: 1 cm  RA Area: 21 32 cm2  RVIDd: 4 43 cm  SV MOD A4C: 74 34 ml  SV(Teich): 47 51 ml    CW  TR Vmax: 2 53 m/s  TR maxP 58 mmHg    MM  TAPSE: 2 02 cm    PW  E': 0 08 m/s  E/E': 5 71  MV A Aiden: 0 46 m/s  MV Dec [place]: 2 11 m/s2  MV DecT: 210 24 ms  MV E Aiden: 0 44 m/s  MV E/A Ratio: 0 97  MV PHT: 60 97 ms  MVA By PHT: 3 61 cm2    [employer] Accredited Echocardiography Laboratory    Prepared and electronically signed by    Tony Cuello MD  Signed 2019 11:27:02       No results found for this or any previous visit  No results found for this or any previous visit  No results found for this or any previous visit      Meds/Allergies   all current active meds have been reviewed  No prescriptions prior to admission            Assessment:  Principal Problem:    Acute pancreatitis  Active Problems:    Pancreatitis    Delirium    Leukocytosis    Acute respiratory insufficiency    Pulmonary edema    Hypokalemia    Metabolic alkalosis    Dysphagia    Metabolic encephalopathy

## 2019-01-17 NOTE — PROGRESS NOTES
I spoke with Sushma Pappas from [facility] to see if patient should be getting blood sugar checks  He does not have diabetes  Sushma Pappas will look at his chart and speak with the patient  Patient most likely on sugar checks from previous IV steriods  Awaiting possible new order  Hemoglobin A1C to be checked in the morning

## 2019-01-17 NOTE — RESTORATIVE TECHNICIAN NOTE
Restorative Specialist Mobility Note       Activity: Ambulate in schmitt, Chair, Stand at bedside, Turn (Pt left sitting in chair at bedside with call bell, phone, and tray within reach )     Assistive Device: Front wheel walker (NC O2 2L)        Repositioned: Up in chair, Sitting              Anti-Embolism Device On:  Bilateral, Sequential compression devices, below knee

## 2019-01-17 NOTE — PHYSICAL THERAPY NOTE
Physical Therapy Treatment     Patient Name: Tish Machuca    OOKYF'V Date: 1/17/2019     Problem List  Patient Active Problem List   Diagnosis    Acute pancreatitis    Pancreatitis    Delirium    Leukocytosis    Acute respiratory insufficiency    Pulmonary edema    Hypokalemia    Metabolic alkalosis    Dysphagia    Metabolic encephalopathy        Past Medical History  Past Medical History:   Diagnosis Date    Gastroesophageal reflux         Past Surgical History  Past Surgical History:   Procedure Laterality Date    CHOLECYSTECTOMY      COLONOSCOPY N/A 3/21/2016    Procedure: COLONOSCOPY;  Surgeon: Mague Hernadez DO;  [facility]: [facility]; Service:     ERCP N/A 1/4/2019    Procedure: ENDOSCOPIC RETROGRADE CHOLANGIOPANCREATOGRAPHY (ERCP); Surgeon: Donald Rocha MD;  Location:  GI LAB; Service: Gastroenterology    OK EGD TRANSORAL BIOPSY SINGLE/MULTIPLE N/A 3/21/2016    Procedure: ESOPHAGOGASTRODUODENOSCOPY (EGD); Surgeon: Mague Hernadez DO;  Location:  GI LAB; Service: General        01/17/19 0929   Pain Assessment   Pain Assessment No/denies pain   Pain Score No Pain   Restrictions/Precautions   Weight Bearing Precautions Per Order No   Other Precautions Fall Risk;O2;Chair Alarm;Cognitive  (chair alarm at conclusion of session )   General   Chart Reviewed Yes   Response to Previous Treatment Patient reporting fatigue but able to participate     Family/Caregiver Present No   Cognition   Overall Cognitive Status Impaired   Arousal/Participation Alert   Attention Attends with cues to redirect   Orientation Level Oriented to person;Oriented to place;Oriented to time;Disoriented to situation  (cues for sequence of events- forgetful)   Memory Decreased recall of precautions;Decreased recall of recent events;Decreased short term memory   Following Commands Follows one step commands with increased time or repetition  (improves w/ repitition) Comments cues for safety throughout- forgetful of prevous conversations/ education- pt is aware that he is not at his cognitive baseline- pleasant and cooperative adn improvement note w/ reorientation and repitition    Subjective   Subjective "I'm tired; but I want to try to do my best " My wife is coming later"    Bed Mobility   Additional Comments seated OOB in recliner on presentation- 2Lo2    Transfers   Sit to Stand 4  Minimal assistance   Additional items Assist x 1; Increased time required; Impulsive;Verbal cues   Stand to Sit 4  Minimal assistance   Additional items Assist x 1; Increased time required; Impulsive;Verbal cues  (cues for hand placement on chair )   Stand pivot 3  Moderate assistance   Additional items Assist x 1  (SBA of another )   Additional Comments continued VC/ tactile cues required for safety w/ transfers- improvement w/ repetition- Pt performed repeated sit< stnad and standing tolerance/ therex / marching in front of recliner- Pt was able to toelrate 1 min of standing marching w/ occasional CGA for balance during same- cues for upright posture  pt reporting difficulty breathing adn some SOB- however Spo2 throughout on cont monitoring remained low to mid 90's throughout      Balance   Static Sitting Fair +   Dynamic Sitting Fair -   Static Standing Poor +   Ambulatory Poor +   Endurance Deficit   Endurance Deficit Yes   Endurance Deficit Description fatigue and SOB    Activity Tolerance   Activity Tolerance Patient tolerated treatment well   Medical Staff Made Aware RN- Carine Bailey C/ CM updated    Nurse Made Aware yes- Mariana   Exercises   Hip Flexion Sitting;15 reps  (x2)   Hip Abduction Standing;10 reps;Right;Left  (UE support on RW 2 sets )   Knee AROM Long Arc Quad Sitting;15 reps;Right;Left  (x3)   Ankle Pumps Sitting;25 reps;Bilateral  (x2)   Balance training  dynamic stadning balance w/ single UE support on RW- reaching out of YAO- pt w/o LOB throughotu   Assessment   Prognosis Good Problem List Decreased strength;Decreased endurance; Impaired balance;Decreased mobility; Decreased coordination;Decreased cognition; Impaired judgement;Obesity   Assessment Pt up in recliner on presentation- agreeable to PT session- reporting fatigue and "not sleeping well" but eager to participate in PT- PT skilled tx focus on standing tolerance; static and dynamic standing balance; repetitive training on sit<>stnadd from improved technique and safety  Pt  presents on 2Lo2 and reports "thinking my breathing is not great"  Spo2 at rest taken and noted to be 96%- no work of breathing noted- performed repetitive sit<>stands for technique and safety w/ minAand cues for hand placement  Pt w/ noted improvement and w/o need for V/C by completion of session  standing tolerance w/ UE support on RW 3mins 20 secs max  Marching and dynamic reaching in standing position w/ CGA for improved ADL independence  Spo2 throughout low 90's to mid 90's w/ activity  Pt continuing to progress w overall functional mobiltiy w/ improved cognition and safety techniques- pt reports aware that his "thinking is not the same as before"   PT did note delayed processing; however pt did ask appropriate questions and was able to improve w/ repetitive training  PT continues to recommend inpt rehab on d/c prior to d/c home w/ spouse  Pt up in chair w/a ll needs inr each and chair alarm on at conclusion of session  Goals   Patient Goals to get better and go home    STG Expiration Date 01/21/19   Treatment Day 4   Plan   Treatment/Interventions Functional transfer training;LE strengthening/ROM; Therapeutic exercise; Endurance training;Cognitive reorientation;Patient/family training;Equipment eval/education; Bed mobility;Gait training;Spoke to nursing;Spoke to case management;OT  (GONZALES)   Progress Progressing toward goals   PT Frequency (3-5x/wk )   Recommendation   Recommendation Post acute IP rehab   Equipment Recommended Walker   PT - OK to Discharge (to rehab when medically cleared )        Akira Mancilla, PT

## 2019-01-17 NOTE — OCCUPATIONAL THERAPY NOTE
Occupational Therapy Treatment Note:     01/17/19 1045   Restrictions/Precautions   Weight Bearing Precautions Per Order No   Other Precautions Chair Alarm;O2;Fall Risk   Pain Assessment   Pain Score No Pain   ADL   Where Assessed Chair   Grooming Assistance 5  Supervision/Setup   Grooming Deficit Teeth care   Grooming Comments in seated   UB Dressing Assistance 4  Minimal Assistance   UB Dressing Deficit Thread RUE; Thread LUE   UB Dressing Comments requires min A for doff/don gown, vc for same overall min A for coordination, Gown tied to simulate ovrehead shirt technique   LB Dressing Assistance 4  Minimal Assistance   LB Dressing Deficit Don/doff R sock; Don/doff L sock   LB Dressing Comments increased time, pt reports difficulty due to "all this extra water" assist for most distal portion of doff/don socks pt able to pull on the rest of the way and doff charly way, able to doff L sock all the way   Functional Standing Tolerance   Time ~ 5 mins   Activity static standing activity   Comments RW level, min A, slightly impulsive  Quick sit to stand without notice, overall min A for balance and stability in stance   Bed Mobility   Additional Comments Pt OOB in chiar upon presentation   Transfers   Sit to Stand 4  Minimal assistance   Additional items Assist x 1; Increased time required; Impulsive;Verbal cues   Stand to Sit 4  Minimal assistance   Additional items Assist x 1;Verbal cues   Additional Comments Static standing completes this date   Cognition   Overall Cognitive Status Impaired   Arousal/Participation Alert   Attention Attends with cues to redirect   Orientation Level Oriented to person;Oriented to place;Oriented to time;Disoriented to situation   Memory Decreased recall of recent events;Decreased recall of precautions;Decreased short term memory   Following Commands Follows one step commands with increased time or repetition   Comments slightly impulsive, increased time required for direction following at time, decreased problem solving   Activity Tolerance   Activity Tolerance Patient tolerated treatment well   Medical Staff Made Aware NSG aware   Assessment   Assessment Pt was seen this date for OT tx session focusign on self care tasks, functional sit to stand, standing tolerance and balance, overall activity tolerance  Pt presents seated in chair  Demosntrates STS from chair at [facility] with min A, slightly impulsive with quick stand, decreased insight  Toelrates approx 5 min in stance at RW able to compete weight shifting, requires cues for overall safety throguhout standing activities  Min A for stand to sit with vc for technique  Seated in chair pt compeltes grooming task Set up assist  Min A for UB dressing task, Min A for LB dressign tasks noted decreased problem solving throguhout dressing task requiring vc for completion of same as well as min physcial assist  Pt asks repetitive questions throghout and does not recgonize same when made aware of it  Chair alarm on, phone and call bell inreach, pt demosntrates understandng of use of call bell  Pt would benefit from continued OT tx to improve overall funcitonal abilities  Continue to follow with current POC     Plan   Treatment Interventions ADL retraining   Goal Expiration Date 01/23/19   Treatment Day 4   OT Frequency 3-5x/wk   Recommendation   OT Discharge Recommendation Short Term [address] [place] [address]

## 2019-01-17 NOTE — PROGRESS NOTES
Progress Note - General Surgery   Tommy Choudhury 60 y o  male MRN: [id number]  Unit/Bed#: [facility] 826-01 Encounter: [id number]    Assessment:  61 y/o M w/ biliary pancreatitis s/p ERCP and stent on 1/4, complicated hospital course including acute hypoxic respiratory failure, delirium, AFib w/ RVR    Plan:  --ADA diet  --Wean nasal cannula as tolerated, BiPAP overnight  --Continue Amiodarone, currently in normal sinus rhythm  --Continue Lasix 40mg BID for diuresis  --Continue Coumadin, f/u AM INR  --PT recommending STR placement  --Dispo planning per CM    Subjective/Objective     Subjective:     No acute events overnight  This morning, pt has mild SOB  Currently more comfortable after being placed on 2L NC  Objective:     Blood pressure 131/72, pulse 78, temperature (!) 97 3 °F (36 3 °C), temperature source Oral, resp  rate 20, height 6' 4" (1 93 m), weight 99 8 kg (220 lb), SpO2 95 %  ,Body mass index is 26 78 kg/m²  Intake/Output Summary (Last 24 hours) at 01/17/19 0439  Last data filed at 01/16/19 1914   Gross per 24 hour   Intake              300 ml   Output             1525 ml   Net            -1225 ml       Invasive Devices     Peripherally Inserted Central Catheter Line            PICC Line 59/55/16 Right Basilic 1 day                Physical Exam:    GEN: NAD  HEENT: MMM  CV: RRR  Lung: normal effort on 2L NC  Ab: Soft, NT/ND  Extrem: bilateral LE edema  Neuro: A+Ox3, motor and sensation grossly intact    Lab, Imaging and other studies:CBC: No results found for: WBC, HGB, HCT, MCV, PLT, ADJUSTEDWBC, MCH, MCHC, RDW, MPV, NRBC, CMP: No results found for: SODIUM, K, CL, CO2, ANIONGAP, BUN, CREATININE, GLUCOSE, CALCIUM, AST, ALT, ALKPHOS, PROT, BILITOT, EGFR, Coagulation: No results found for: PT, INR, APTT, Urinalysis: No results found for: COLORU, CLARITYU, SPECGRAV, PHUR, LEUKOCYTESUR, NITRITE, PROTEINUA, GLUCOSEU, KETONESU, BILIRUBINUR, BLOODU, Amylase: No results found for: AMYLASE, Lipase:  No results found for: LIPASE  VTE Pharmacologic Prophylaxis: Heparin and Warfarin (Coumadin)  VTE Mechanical Prophylaxis: sequential compression device

## 2019-01-17 NOTE — PLAN OF CARE
Problem: OCCUPATIONAL THERAPY ADULT  Goal: Performs self-care activities at highest level of function for planned discharge setting  See evaluation for individualized goals  Treatment Interventions: ADL retraining, Functional transfer training, UE strengthening/ROM, Endurance training, Cognitive reorientation, Patient/family training, Equipment evaluation/education, Neuromuscular reeducation, Fine motor coordination activities, Compensatory technique education, Continued evaluation, Energy conservation, Activityengagement          See flowsheet documentation for full assessment, interventions and recommendations  Outcome: Progressing  Limitation: Decreased ADL status, Decreased UE ROM, Decreased UE strength, Decreased Safe judgement during ADL, Decreased cognition, Decreased endurance, Decreased sensation, Visual deficit, Decreased fine motor control, Decreased self-care trans, Decreased high-level ADLs  Prognosis: Fair  Assessment: Pt was seen this date for OT tx session focusign on self care tasks, functional sit to stand, standing tolerance and balance, overall activity tolerance  Pt presents seated in chair  Ritu ST[place] [facility] [place] [facility] with min A, slightly impulsive with quick stand, decreased insight  Toelrates approx 5 min in stance at  able to compete weight shifting, requires cues for overall safety throguhout standing activities  Min A for stand to sit with vc for technique  Seated in chair pt compeltes grooming task Set up assist  Min A for UB dressing task, Min A for LB dressign tasks noted decreased problem solving throguhout dressing task requiring vc for completion of same as well as min physcial assist  Pt asks repetitive questions throghout and does not recgonize same when made aware of it  Chair alarm on, phone and call bell inreach, pt [place] understandng of use of call bell  Pt would benefit from continued OT tx to improve overall funcitonal abilities   Continue to follow with current POC       OT Discharge Recommendation: Short Term Rehab  OT - OK to Discharge: Yes (when medically stable)  JANET Palma

## 2019-01-17 NOTE — SOCIAL WORK
Cm spoke with Mariaa Willoughby from [facility] and they are folllowing and did not decline but if d/c tomorrow or Saturday no anticipated discharges  [facility] no [facility] but if they have discharges they will notify cm   Cm called pt wife and aware and Mallorie requested  [facility] to [facility] and [address]   Cm will follow , pt wife first choice [facility]

## 2019-01-17 NOTE — PLAN OF CARE
Problem: PHYSICAL THERAPY ADULT  Goal: Performs mobility at highest level of function for planned discharge setting  See evaluation for individualized goals  Treatment/Interventions: Bed mobility, Gait training, LE strengthening/ROM, Functional transfer training, Patient/family training, OT, Spoke to nursing  Equipment Recommended:  (TBD)       See flowsheet documentation for full assessment, interventions and recommendations  Outcome: Progressing  Prognosis: Good  Problem List: Decreased strength, Decreased endurance, Impaired balance, Decreased mobility, Decreased coordination, Decreased cognition, Impaired judgement, Obesity  Assessment: Pt up in recliner on presentation- agreeable to PT session- reporting fatigue and "not sleeping well" but eager to participate in PT- PT skilled tx focus on standing tolerance; static and dynamic standing balance; repetitive training on sit<>stnadd from improved technique and safety  Pt  presents on 2Lo2 and reports "thinking my breathing is not great"  Spo2 at rest taken and noted to be 96%- no work of breathing noted- performed repetitive sit<>stands for technique and safety w/ minAand cues for hand placement  Pt w/ noted improvement and w/o need for V/C by completion of session  standing tolerance w/ UE support on RW 3mins 20 secs max  Marching and dynamic reaching in standing position w/ CGA for improved ADL independence  Spo2 throughout low 90's to mid 90's w/ activity  Pt continuing to progress w overall functional mobiltiy w/ improved cognition and safety techniques- pt reports aware that his "thinking is not the same as before"   PT did note delayed processing; however pt did ask appropriate questions and was able to improve w/ repetitive training  PT continues to recommend inpt rehab on d/c prior to d/c home w/ spouse  Pt up in chair w/a ll needs inr each and chair alarm on at conclusion of session     Barriers to Discharge: Inaccessible home environment Recommendation: Post acute IP rehab     PT - OK to Discharge:  (to rehab when medically cleared )    See flowsheet documentation for full assessment

## 2019-01-18 ENCOUNTER — APPOINTMENT (INPATIENT)
Dept: RADIOLOGY | Facility: HOSPITAL | Age: 59
DRG: 438 | End: 2019-01-18
Payer: COMMERCIAL

## 2019-01-18 LAB
ANION GAP SERPL CALCULATED.3IONS-SCNC: 6 MMOL/L (ref 4–13)
BASOPHILS # BLD AUTO: 0.03 THOUSANDS/ΜL (ref 0–0.1)
BASOPHILS NFR BLD AUTO: 0 % (ref 0–1)
BUN SERPL-MCNC: 24 MG/DL (ref 5–25)
CALCIUM SERPL-MCNC: 7.6 MG/DL (ref 8.3–10.1)
CHLORIDE SERPL-SCNC: 104 MMOL/L (ref 100–108)
CO2 SERPL-SCNC: 31 MMOL/L (ref 21–32)
CREAT SERPL-MCNC: 1.05 MG/DL (ref 0.6–1.3)
EOSINOPHIL # BLD AUTO: 0.04 THOUSAND/ΜL (ref 0–0.61)
EOSINOPHIL NFR BLD AUTO: 0 % (ref 0–6)
ERYTHROCYTE [DISTWIDTH] IN BLOOD BY AUTOMATED COUNT: 13.9 % (ref 11.6–15.1)
EST. AVERAGE GLUCOSE BLD GHB EST-MCNC: 117 MG/DL
GFR SERPL CREATININE-BSD FRML MDRD: 77 ML/MIN/1.73SQ M
GLUCOSE SERPL-MCNC: 109 MG/DL (ref 65–140)
GLUCOSE SERPL-MCNC: 111 MG/DL (ref 65–140)
GLUCOSE SERPL-MCNC: 116 MG/DL (ref 65–140)
GLUCOSE SERPL-MCNC: 122 MG/DL (ref 65–140)
GLUCOSE SERPL-MCNC: 142 MG/DL (ref 65–140)
HBA1C MFR BLD: 5.7 % (ref 4.2–6.3)
HCT VFR BLD AUTO: 33.1 % (ref 36.5–49.3)
HGB BLD-MCNC: 11.2 G/DL (ref 12–17)
IMM GRANULOCYTES # BLD AUTO: 0.18 THOUSAND/UL (ref 0–0.2)
IMM GRANULOCYTES NFR BLD AUTO: 2 % (ref 0–2)
INR PPP: 3.51 (ref 0.86–1.17)
LYMPHOCYTES # BLD AUTO: 0.89 THOUSANDS/ΜL (ref 0.6–4.47)
LYMPHOCYTES NFR BLD AUTO: 10 % (ref 14–44)
MCH RBC QN AUTO: 30.3 PG (ref 26.8–34.3)
MCHC RBC AUTO-ENTMCNC: 33.8 G/DL (ref 31.4–37.4)
MCV RBC AUTO: 90 FL (ref 82–98)
MONOCYTES # BLD AUTO: 1.22 THOUSAND/ΜL (ref 0.17–1.22)
MONOCYTES NFR BLD AUTO: 14 % (ref 4–12)
NEUTROPHILS # BLD AUTO: 6.57 THOUSANDS/ΜL (ref 1.85–7.62)
NEUTS SEG NFR BLD AUTO: 74 % (ref 43–75)
NRBC BLD AUTO-RTO: 0 /100 WBCS
PLATELET # BLD AUTO: 402 THOUSANDS/UL (ref 149–390)
PMV BLD AUTO: 8.9 FL (ref 8.9–12.7)
POTASSIUM SERPL-SCNC: 3.5 MMOL/L (ref 3.5–5.3)
PROTHROMBIN TIME: 35.2 SECONDS (ref 11.8–14.2)
RBC # BLD AUTO: 3.7 MILLION/UL (ref 3.88–5.62)
SODIUM SERPL-SCNC: 141 MMOL/L (ref 136–145)
WBC # BLD AUTO: 8.93 THOUSAND/UL (ref 4.31–10.16)

## 2019-01-18 PROCEDURE — 85025 COMPLETE CBC W/AUTO DIFF WBC: CPT | Performed by: SURGERY

## 2019-01-18 PROCEDURE — 83036 HEMOGLOBIN GLYCOSYLATED A1C: CPT | Performed by: SURGERY

## 2019-01-18 PROCEDURE — 74230 X-RAY XM SWLNG FUNCJ C+: CPT

## 2019-01-18 PROCEDURE — 92611 MOTION FLUOROSCOPY/SWALLOW: CPT

## 2019-01-18 PROCEDURE — 80048 BASIC METABOLIC PNL TOTAL CA: CPT | Performed by: SURGERY

## 2019-01-18 PROCEDURE — 85610 PROTHROMBIN TIME: CPT | Performed by: SURGERY

## 2019-01-18 PROCEDURE — G8998 SWALLOW D/C STATUS: HCPCS

## 2019-01-18 PROCEDURE — 94664 DEMO&/EVAL PT USE INHALER: CPT

## 2019-01-18 PROCEDURE — 71046 X-RAY EXAM CHEST 2 VIEWS: CPT

## 2019-01-18 PROCEDURE — 99232 SBSQ HOSP IP/OBS MODERATE 35: CPT | Performed by: INTERNAL MEDICINE

## 2019-01-18 PROCEDURE — 82948 REAGENT STRIP/BLOOD GLUCOSE: CPT

## 2019-01-18 PROCEDURE — 94760 N-INVAS EAR/PLS OXIMETRY 1: CPT

## 2019-01-18 PROCEDURE — 94668 MNPJ CHEST WALL SBSQ: CPT

## 2019-01-18 PROCEDURE — 99255 IP/OBS CONSLTJ NEW/EST HI 80: CPT | Performed by: INTERNAL MEDICINE

## 2019-01-18 RX ORDER — GUAIFENESIN 600 MG
1200 TABLET, EXTENDED RELEASE 12 HR ORAL EVERY 12 HOURS SCHEDULED
Status: DISCONTINUED | OUTPATIENT
Start: 2019-01-18 | End: 2019-01-23 | Stop reason: HOSPADM

## 2019-01-18 RX ORDER — POTASSIUM CHLORIDE 20 MEQ/1
40 TABLET, EXTENDED RELEASE ORAL ONCE
Status: COMPLETED | OUTPATIENT
Start: 2019-01-18 | End: 2019-01-18

## 2019-01-18 RX ORDER — BENZONATATE 100 MG/1
200 CAPSULE ORAL 3 TIMES DAILY
Status: DISCONTINUED | OUTPATIENT
Start: 2019-01-18 | End: 2019-01-23 | Stop reason: HOSPADM

## 2019-01-18 RX ORDER — WARFARIN SODIUM 1 MG/1
1 TABLET ORAL
Status: DISCONTINUED | OUTPATIENT
Start: 2019-01-19 | End: 2019-01-19

## 2019-01-18 RX ADMIN — POTASSIUM CHLORIDE 40 MEQ: 1500 TABLET, EXTENDED RELEASE ORAL at 08:08

## 2019-01-18 RX ADMIN — BENZONATATE 200 MG: 100 CAPSULE ORAL at 22:06

## 2019-01-18 RX ADMIN — FUROSEMIDE 40 MG: 40 TABLET ORAL at 08:08

## 2019-01-18 RX ADMIN — HEPARIN SODIUM 5000 UNITS: 5000 INJECTION INTRAVENOUS; SUBCUTANEOUS at 05:38

## 2019-01-18 RX ADMIN — METOPROLOL TARTRATE 50 MG: 50 TABLET, FILM COATED ORAL at 08:08

## 2019-01-18 RX ADMIN — FUROSEMIDE 40 MG: 40 TABLET ORAL at 17:34

## 2019-01-18 RX ADMIN — METOPROLOL TARTRATE 50 MG: 50 TABLET, FILM COATED ORAL at 22:06

## 2019-01-18 RX ADMIN — GUAIFENESIN 1200 MG: 600 TABLET, EXTENDED RELEASE ORAL at 22:06

## 2019-01-18 RX ADMIN — GUAIFENESIN 1200 MG: 600 TABLET, EXTENDED RELEASE ORAL at 10:31

## 2019-01-18 RX ADMIN — TAMSULOSIN HYDROCHLORIDE 0.4 MG: 0.4 CAPSULE ORAL at 17:34

## 2019-01-18 RX ADMIN — BENZONATATE 200 MG: 100 CAPSULE ORAL at 17:34

## 2019-01-18 RX ADMIN — METOPROLOL TARTRATE 50 MG: 50 TABLET, FILM COATED ORAL at 17:34

## 2019-01-18 RX ADMIN — AMIODARONE HYDROCHLORIDE 200 MG: 200 TABLET ORAL at 08:08

## 2019-01-18 RX ADMIN — BENZONATATE 200 MG: 100 CAPSULE ORAL at 10:31

## 2019-01-18 RX ADMIN — POTASSIUM CHLORIDE 40 MEQ: 1500 TABLET, EXTENDED RELEASE ORAL at 13:16

## 2019-01-18 RX ADMIN — POTASSIUM CHLORIDE 40 MEQ: 1500 TABLET, EXTENDED RELEASE ORAL at 17:34

## 2019-01-18 NOTE — PROGRESS NOTES
Progress Note - General Surgery   [facility] 60 y o  male MRN: [id number]  Unit/Bed#: [facility] 826-01 Encounter: [id number]    Assessment:  61 y/o M w/ biliary pancreatitis s/p ERCP and stent on 1/4, complicated hospital course including acute hypoxic respiratory failure, delirium, AFib w/ RVR    - Patient still on BiPAP for an hour or so overnight    Plan:  ADA diet  We nasal cannula as tolerated  Due to BiPAP overnight, consult pulmonology  Patient is supratherapeutic on INR, appreciate cardiology recs for Coumadin control  Dispo planning with PT OT recs and okay from other teams    Subjective/Objective   Chief Complaint:     Subjective:  No acute events overnight  Patient resting comfortably in chair  Patient was on BiPAP for a couple hours overnight however feels much better  Objective:     Blood pressure 113/73, pulse 86, temperature 97 6 °F (36 4 °C), temperature source Oral, resp  rate 16, height 6' 4" (1 93 m), weight 108 kg (237 lb 10 5 oz), SpO2 97 %  ,Body mass index is 28 93 kg/m²  Intake/Output Summary (Last 24 hours) at 01/18/19 0830  Last data filed at 01/18/19 1947   Gross per 24 hour   Intake              540 ml   Output             1375 ml   Net             -835 ml       Invasive Devices     Peripherally Inserted Central Catheter Line            PICC Line 91/63/90 Right Basilic 2 days              Physical Exam: General: AAOx3  Head: normocephalic, atraumatic  Neck: supple, trachea midline   Respiratory: BS b/l  Abdomen: Soft, NT, no rebound/guarding  Heart: RRR, S1s2  Ext: Warm no cyanosis   Pulse:  2+ radial      Lab, Imaging and other studies:  I have personally reviewed pertinent lab results    , CBC:   Lab Results   Component Value Date    WBC 8 93 01/18/2019    HGB 11 2 (L) 01/18/2019    HCT 33 1 (L) 01/18/2019    MCV 90 01/18/2019     (H) 01/18/2019    MCH 30 3 01/18/2019    MCHC 33 8 01/18/2019    RDW 13 9 01/18/2019    MPV 8 9 01/18/2019    NRBC 0 01/18/2019   , CMP: Lab Results   Component Value Date    SODIUM 141 01/18/2019    K 3 5 01/18/2019     01/18/2019    CO2 31 01/18/2019    BUN 24 01/18/2019    CREATININE 1 05 01/18/2019    CALCIUM 7 6 (L) 01/18/2019    EGFR 77 01/18/2019     VTE Pharmacologic Prophylaxis: Warfarin (Coumadin)  VTE Mechanical Prophylaxis: sequential compression device

## 2019-01-18 NOTE — PHYSICAL THERAPY NOTE
Physical Therapy Cancellation Note    PT ATTEMPTED TO SEE PATIENT FOR TREATMENT SESSION HOWEVER PATIENT OFF FLOOR AT XRAY  PT WILL RE-ATTEMPT IF TIME AND CONTINUE TO FOLLOW ON CASELOAD AS APPROPRIATE      Tino Olivo, PT

## 2019-01-18 NOTE — PROGRESS NOTES
Cardiology Progress Note - Tor Rather 60 y o  male MRN: [id number]    Unit/Bed#: [facility] 826-01 Encounter: [id number]     Assessment and plan  1  Acute respiratory failure  2  Paroxysmal atrial fibrillation  3  Cholangitis/sepsis  4  Pancreatitis  5  Hypertension     Recommendations:  Maintaining sinus rhythm  Stable from a cardiac standpoint  Would plan amiodarone 200 mg daily for at least 3 months this will not be a long-term medication  Continue anticoagulation goal INR 2-3  Slightly elevated likely secondary to decreased amiodarone dose    Continue diuresis  Needs increased protein support most 3rd spaced     no signs of decompensated congestive heart failure  Continue to promote negative fluid balance  Elevate legs  Stable from my standpoint to consider transition to rehab I will follow up with him in the office  Call if any questions      Subjective:    No significant events overnight  Denies chest pain dyspnea palpitations no lightheadedness or dizziness    ROS    Objective:   Vitals: Blood pressure 113/73, pulse 86, temperature 97 6 °F (36 4 °C), temperature source Oral, resp  rate 16, height 6' 4" (1 93 m), weight 108 kg (237 lb 10 5 oz), SpO2 97 %  , Body mass index is 28 93 kg/m² , Orthostatic Blood Pressures      Most Recent Value   Blood Pressure  113/73 filed at 2019 0725   Patient Position - Orthostatic VS  Sitting filed at 2019 6548         Systolic (49XSH), SW , Min:113 , RUJ:157     Diastolic (34VTJ), QQT:43, Min:68, Max:73      Intake/Output Summary (Last 24 hours) at 19 0844  Last data filed at 19 0726   Gross per 24 hour   Intake              540 ml   Output             1375 ml   Net             -835 ml     Weight (last 2 days)     Date/Time   Weight    19 0535  108 (237 66)    19 0600  108 (238 1)                Telemetry Review: No significant arrhythmias seen on telemetry review     EKG personally reviewed by Alessandra Montenegro DO  Physical Exam   Constitutional: He is oriented to person, place, and time  He appears well-nourished  No distress  HENT:   Head: Atraumatic  Eyes: Pupils are equal, round, and reactive to light  Conjunctivae are normal    Neck: Neck supple  Cardiovascular: Normal rate, regular rhythm and normal heart sounds  Exam reveals no friction rub  No murmur heard  Pulmonary/Chest: Effort normal and breath sounds normal  No respiratory distress  He has no wheezes  He has no rales  Abdominal: Bowel sounds are normal  He exhibits no distension  There is no tenderness  There is no rebound  Musculoskeletal: He exhibits edema  He exhibits no deformity  Neurological: He is alert and oriented to person, place, and time  No cranial nerve deficit  Skin: Skin is warm and dry  No erythema  Nursing note and vitals reviewed          Laboratory Results:        CBC with diff:   Results from last 7 days  Lab Units 01/18/19  0540 01/17/19  0529 01/14/19  0609 01/13/19  0505 01/12/19  0500   WBC Thousand/uL 8 93 9 63 11 13* 14 26* 18 06*   HEMOGLOBIN g/dL 11 2* 11 6* 11 2* 11 7* 12 4   HEMATOCRIT % 33 1* 34 9* 33 0* 35 0* 37 7   MCV fL 90 89 89 90 90   PLATELETS Thousands/uL 402* 407* 248 205 159   MCH pg 30 3 29 7 30 2 29 9 29 5   MCHC g/dL 33 8 33 2 33 9 33 4 32 9   RDW % 13 9 14 0 14 6 14 6 14 8   MPV fL 8 9 9 0 9 4 10 0 10 0   NRBC AUTO /100 WBCs 0 0 0 0  --          CMP:  Results from last 7 days  Lab Units 01/18/19  0540 01/17/19  0529 01/15/19  0501 01/14/19  0609 01/14/19  0139 01/13/19  1644 01/13/19  0506  01/12/19  1136   POTASSIUM mmol/L 3 5 3 2* 3 3* 3 8 3 8 3 3* 3 9  < > 3 5   CHLORIDE mmol/L 104 102 107 111* 110* 109* 109*  < > 108   CO2 mmol/L 31 31 28 26 26 26 26  < > 28   BUN mg/dL 24 27* 34* 39* 38* 37* 39*  < > 41*   CREATININE mg/dL 1 05 1 14 1 31* 1 21 1 23 1 17 1 33*  < > 1 35*   CALCIUM mg/dL 7 6* 7 7* 7 6* 7 3* 7 5* 7 4* 7 4*  < > 7 8*   AST U/L  --   --   --  50*  --   --  59*  --  57*   ALT U/L --   --   --  49  --   --  51  --  63   ALK PHOS U/L  --   --   --  67  --   --  61  --  74   EGFR ml/min/1 73sq m 77 70 59 65 64 68 58  < > 57   < > = values in this interval not displayed  BMP:  Results from last 7 days  Lab Units 19  0540 19  0529 01/15/19  0501 19  0609 19  0139 19  1644 19  0506   POTASSIUM mmol/L 3 5 3 2* 3 3* 3 8 3 8 3 3* 3 9   CHLORIDE mmol/L 104 102 107 111* 110* 109* 109*   CO2 mmol/L 31 31 28 26 26 26 26   BUN mg/dL 24 27* 34* 39* 38* 37* 39*   CREATININE mg/dL 1 05 1 14 1 31* 1 21 1 23 1 17 1 33*   CALCIUM mg/dL 7 6* 7 7* 7 6* 7 3* 7 5* 7 4* 7 4*       BNP: No results for input(s): BNP in the last 72 hours      Magnesium:   Results from last 7 days  Lab Units 19  0609 19  0506 19  1136   MAGNESIUM mg/dL 2 3 2 1 2 1       Coags:   Results from last 7 days  Lab Units 19  0540 19  0535 01/15/19  0501 19  0609 19  0505   INR  3 51* 3 28* 1 91* 1 63* 1 28*       TSH:        Hemoglobin A1C   Results from last 7 days  Lab Units 19  0540   HEMOGLOBIN A1C % 5 7       Lipid Profile:       Cardiac testing:   Results for orders placed during the hospital encounter of 19   Echo complete with contrast if indicated    Narrative [address]  [phone]    Transthoracic Echocardiogram  2D, M-mode, Doppler, and Color Doppler    Study date:  2019    Patient: Lashwan Dave  MR number: [id number]  Account number: [de-identified]  : 1960  Age: 60 years  Gender: Male  Status: Inpatient  Location: Bedside  Height: 76 in  Weight: 220 lb  BP: 83/ 58 mmHg    Indications: Atrial fibrillation    Diagnoses: I48 0 - Atrial fibrillation    Sonographer:  Lus Hashimoto, BS, RDCS  Interpreting Physician:  Donzetta Dubin, MD  Primary Physician:  Ban Molina MD  Referring Physician:  John Atkinson MD  Group:  [facility]  Cardiology Fellow:  Kike Thompson DO    SUMMARY    LEFT VENTRICLE:  Size was normal   Systolic function was normal  Ejection fraction was estimated to be 60 %  Although no diagnostic regional wall motion abnormality was identified, this possibility cannot be completely excluded on the basis of this study  Wall thickness was mildly increased  Left ventricular diastolic function parameters were normal     RIGHT VENTRICLE:  The ventricle was mildly to moderately dilated  Systolic function was normal     LEFT ATRIUM:  The atrium was mildly dilated  RIGHT ATRIUM:  The atrium was mildly dilated  MITRAL VALVE:  There was mild regurgitation  TRICUSPID VALVE:  There was mild regurgitation  IVC, HEPATIC VEINS:  The inferior vena cava was moderately dilated  Respirophasic changes were blunted (less than 50% variation)  HISTORY: PRIOR HISTORY: Atrial fibrillation with RVR; Pancreatitis; GERD    PROCEDURE: The procedure was performed at the bedside  This was a routine study  The transthoracic approach was used  The study included complete 2D imaging, M-mode, complete spectral Doppler, and color Doppler  The heart rate was 62 bpm,  at the start of the study  Images were obtained from the parasternal, apical, subcostal, and suprasternal notch acoustic windows  Intravenous contrast ( 0 8mL/min Definity in NSS) was administered to opacify the left ventricle  Echocardiographic views were limited due to poor acoustic window availability, decreased penetration, lung interference, and patient on mechanical ventilator  This was a technically difficult study  LEFT VENTRICLE: Size was normal  Systolic function was normal  Ejection fraction was estimated to be 60 %  Although no diagnostic regional wall motion abnormality was identified, this possibility cannot be completely excluded on the basis  of this study  Wall thickness was mildly increased   The changes were consistent with concentric remodeling (increased wall thickness with normal wall mass)  DOPPLER: Left ventricular diastolic function parameters were normal     RIGHT VENTRICLE: The ventricle was mildly to moderately dilated  Systolic function was normal     LEFT ATRIUM: The atrium was mildly dilated  RIGHT ATRIUM: The atrium was mildly dilated  MITRAL VALVE: There was mild annular calcification  Valve structure was normal  There was normal leaflet separation  DOPPLER: The transmitral velocity was within the normal range  There was no evidence for stenosis  There was mild  regurgitation  AORTIC VALVE: The valve was trileaflet  Leaflets exhibited normal thickness and normal cuspal separation  DOPPLER: Transaortic velocity was within the normal range  There was no evidence for stenosis  There was no regurgitation  TRICUSPID VALVE: The valve structure was normal  There was normal leaflet separation  DOPPLER: The transtricuspid velocity was within the normal range  There was no evidence for stenosis  There was mild regurgitation  Pulmonary artery  systolic pressure was within the normal range  Estimated peak PA pressure was 35 mmHg  PULMONIC VALVE: Not well visualized  PERICARDIUM: There was no pericardial effusion  The pericardium was normal in appearance  AORTA: The root exhibited normal size  SYSTEMIC VEINS: IVC: The inferior vena cava was moderately dilated  Respirophasic changes were blunted (less than 50% variation)      SYSTEM MEASUREMENT TABLES    2D  %FS: 34 54 %  Ao Diam: 3 23 cm  EDV(Teich): 74 06 ml  EF(Teich): 64 15 %  ESV(Teich): 26 55 ml  IVSd: 1 24 cm  LA Area: 22 35 cm2  LA Diam: 4 24 cm  LVEDV MOD A4C: 119 98 ml  LVEF MOD A4C: 61 96 %  LVESV MOD A4C: 45 64 ml  LVIDd: 4 1 cm  LVIDs: 2 68 cm  LVLd A4C: 8 74 cm  LVLs A4C: 6 73 cm  LVPWd: 1 cm  RA Area: 21 32 cm2  RVIDd: 4 43 cm  SV MOD A4C: 74 34 ml  SV(Teich): 47 51 ml    CW  TR Vmax: 2 53 m/s  TR maxP 58 mmHg    MM  TAPSE: 2 02 cm    PW  E': 0 08 m/s  E/E': 5 71  MV A Aiden: 0 46 m/s  MV Dec [place]: 2 11 m/s2  MV DecT: 210 24 ms  MV E Aiden: 0 44 m/s  MV E/A Ratio: 0 97  MV PHT: 60 97 ms  MVA By PHT: 3 61 cm2    [employer] Accredited Echocardiography Laboratory    Prepared and electronically signed by    Donzetta Dubin, MD  Signed 07-Jan-2019 11:27:02       No results found for this or any previous visit  No results found for this or any previous visit  No results found for this or any previous visit  Meds/Allergies   all current active meds have been reviewed  No prescriptions prior to admission            Assessment:  Principal Problem:    Acute pancreatitis  Active Problems:    Pancreatitis    Delirium    Leukocytosis    Acute respiratory insufficiency    Pulmonary edema    Hypokalemia    Metabolic alkalosis    Dysphagia    Metabolic encephalopathy

## 2019-01-18 NOTE — PLAN OF CARE
DISCHARGE PLANNING     Discharge to home or other facility with appropriate resources Progressing        DISCHARGE PLANNING - CARE MANAGEMENT     Discharge to post-acute care or home with appropriate resources Progressing        GASTROINTESTINAL - ADULT     Maintains or returns to baseline bowel function Progressing     Maintains adequate nutritional intake Progressing        INFECTION - ADULT     Absence or prevention of progression during hospitalization Progressing     Absence of fever/infection during neutropenic period Progressing        Knowledge Deficit     Patient/family/caregiver demonstrates understanding of disease process, treatment plan, medications, and discharge instructions Progressing        METABOLIC, FLUID AND ELECTROLYTES - ADULT     Electrolytes maintained within normal limits Progressing     Fluid balance maintained Progressing        Nutrition/Hydration-ADULT     Nutrient/Hydration intake appropriate for improving, restoring or maintaining nutritional needs Progressing        PAIN - ADULT     Verbalizes/displays adequate comfort level or baseline comfort level Progressing        Potential for Falls     Patient will remain free of falls Progressing        Prexisting or High Potential for Compromised Skin Integrity     Skin integrity is maintained or improved Progressing        RESPIRATORY - ADULT     Achieves optimal ventilation and oxygenation Progressing        SAFETY ADULT     Patient will remain free of falls Progressing     Maintain or return to baseline ADL function Progressing     Maintain or return mobility status to optimal level Progressing

## 2019-01-18 NOTE — SOCIAL WORK
[facility]w [facility] whom stated pt not medically cleared for discharge today  Unsure when pt will be cleared  Cm to follow  Cm also received TC from pt's wife whom indicated they want [facility] or [facility]  She does not want [facility] unable to accept

## 2019-01-18 NOTE — CONSULTS
Consultation - Pulmonary Medicine   Lio Baez 60 y o  male MRN: [id number]  Unit/Bed#: [facility] 826-01 Encounter: [id number]      Assessment/Plan:    Acute hypoxic hypercapnic respiratory failure (resolved), multifactorial due to below   Required ventilator support as listed below  Was on 2 liters nasal cannula when I saw him this morning, but was able to be weaned to room air and was 97 percent at rest    Check ambulatory pulse ox today  Add flutter valve and incentive spirometer for pulmonary hygiene  Add Mucinex  Out of bed as tolerated  No indication for further BiPAP use  Will discontinue and evaluate off  Volume overload/pulmonary edema with left pleural effusion   Continue Lasix 40 milligrams b i d  per Cardiology  Recheck chest x-ray today to evaluate left pleural effusion and associated opacity  Sindhu Russell has a cough productive of occasional mucus, though has little other symptoms suggestive of underlying infectious process  Cough, suspect due to above   Check chest x-ray as listed  Add flutter valve, Mucinex, incentive spirometer as above  Add Tessalon  Speech evaluation to rule out dysphagia  Discussed with speech therapy and will also check VBS  Sepsis due to acute cholangitis/biliary pancreatitis status post ERCP and stent on 01/04   Management per primary surgical team     Paroxysmal atrial fibrillation with RVR   Continue amiodarone and anticoagulation per Cardiology  Status post cardioversion  Delirium due to above   Management per primary team     Discussed with primary team including physician assistant and Dr Deborah Larry  Discussed with speech therapy and respiratory therapy  History of Present Illness   Physician Requesting Consult: Inga Fisher MD  Reason for Consult / Principal Problem:  Hypoxia, acute respiratory insufficiency  Hx and PE limited by:  None  Chief Complaint:  I have a cough    HPI: Lio Baez is a 60 y o    male who presented to [facility]  with complaints of nausea and vomiting on January 3rd  He was found to have pancreatitis and underwent ERCP with sphincterotomy  Postprocedure, he was sent to the ICU intubated and sedated  His hospital course was reviewed  His stay in the ICU required vasopressor medications  He also had an episode of atrial fibrillation with RVR requiring antiarrhythmics and cardioversion  He was also extubated and re-intubated and diuresed after volume overload and left pleural effusion  After extubation, he was on BiPAP HS and p r n  He was transferred out of the ICU  Pulmonary consultation was placed today for hypoxia and acute respiratory insufficiency requiring nasal cannula oxygen and BiPAP at night  Today, Clent Mohs reports he feels he is doing well from a breathing standpoint  He reports that his largest complaint is a cough that is productive of occasional, light yellow mucus  He denies any hemoptysis  He reports the cough has been present for the last few days and worsens when he drinks cold liquids  He reports he has been out of bed walking in the halls at times and he denies any shortness of breath  He denies any fevers, chills, or night sweats, but does feel cold at present  He denies any chest pain or tightness  He denies any abdominal pain or nausea  He denies any leg pain or swelling  He does report he has had some difficulty swallowing large pills and has intermittent coughing during meals  He denies any sore throat or difficulty swallowing secretions  He denies any nasal congestion or postnasal drip  He denies any reflux symptoms  From pulmonary standpoint, Clent Mohs has never been diagnosed with any lung disease and is a never smoker    He has never required oxygen or inhaled therapies in the past     Inpatient consult to Pulmonology  Consult performed by: Alexandre Mason ordered by: Sofia Hernandes        Review of Systems   All other systems reviewed and are negative  A full 12-point review of systems was completed and is negative except for those outlined in the HPI  Historical Information   Past Medical History:   Diagnosis Date    Gastroesophageal reflux      Past Surgical History:   Procedure Laterality Date    CHOLECYSTECTOMY      COLONOSCOPY N/A 3/21/2016    Procedure: COLONOSCOPY;  Surgeon: Michelle Lezama DO;  Location: [facility]; Service:     ERCP N/A 1/4/2019    Procedure: ENDOSCOPIC RETROGRADE CHOLANGIOPANCREATOGRAPHY (ERCP); Surgeon: Nichole Tovar MD;  Location: [facility]; Service: Gastroenterology    [place] EGD TRANSORAL BIOPSY SINGLE/MULTIPLE N/A 3/21/2016    Procedure: ESOPHAGOGASTRODUODENOSCOPY (EGD); Surgeon: Michelle Lezama DO;  Location: [facility]; Service: General     Social History   History   Alcohol Use No     Comment: rarely     History   Drug Use No     History   Smoking Status    Never Smoker   Smokeless Tobacco    Never Used     Occupational History:  Works as a   Denies any occupational exposures  Lives with his wife      Family History:   Family History   Problem Relation Age of Onset    Lung disease Neg Hx        Meds/Allergies   all current active meds have been reviewed, pertinent pulmonary meds have been reviewed, current meds:   Current Facility-Administered Medications   Medication Dose Route Frequency    acetaminophen (TYLENOL) tablet 650 mg  650 mg Oral Q6H PRN    amiodarone tablet 200 mg  200 mg Oral Daily With Breakfast    benzonatate (TESSALON PERLES) capsule 200 mg  200 mg Oral TID    furosemide (LASIX) tablet 40 mg  40 mg Oral BID (diuretic)    guaiFENesin (MUCINEX) 12 hr tablet 1,200 mg  1,200 mg Oral Q12H SANA    heparin (porcine) subcutaneous injection 5,000 Units  5,000 Units Subcutaneous Q8H [facility]    HYDROmorphone (DILAUDID) injection 0 5 mg  0 5 mg Intravenous Q2H PRN    insulin lispro (HumaLOG) 100 units/mL subcutaneous injection 1-6 Units  1-6 Units Subcutaneous TID AC  metoprolol tartrate (LOPRESSOR) tablet 50 mg  50 mg Oral TID    ondansetron (ZOFRAN) injection 4 mg  4 mg Intravenous Q6H PRN    ondansetron (ZOFRAN-ODT) dispersible tablet 4 mg  4 mg Oral Q6H PRN    oxyCODONE (ROXICODONE) IR tablet 5 mg  5 mg Oral Q6H PRN    potassium chloride (K-DUR,KLOR-CON) CR tablet 40 mEq  40 mEq Oral BID    tamsulosin (FLOMAX) capsule 0 4 mg  0 4 mg Oral Daily With Dinner    warfarin (COUMADIN) tablet 2 mg  2 mg Oral Daily (warfarin)    and PTA meds:   None       No Known Allergies    Objective   Vitals: Blood pressure 113/73, pulse 86, temperature 97 6 °F (36 4 °C), temperature source Oral, resp  rate 16, height 6' 4" (1 93 m), weight 108 kg (237 lb 10 5 oz), SpO2 97 %  2 liters nasal cannula,Body mass index is 28 93 kg/m²  Intake/Output Summary (Last 24 hours) at 01/18/19 1014  Last data filed at 01/18/19 0726   Gross per 24 hour   Intake              420 ml   Output             1375 ml   Net             -955 ml     Invasive Devices     Peripherally Inserted Central Catheter Line            PICC Line 54/23/25 Right Basilic 2 days                Physical Exam   Constitutional: He is oriented to person, place, and time  He appears well-developed and well-nourished  He is cooperative  Non-toxic appearance  No distress  HENT:   Head: Normocephalic and atraumatic  Mouth/Throat: No oropharyngeal exudate  Eyes: EOM are normal  No scleral icterus  Neck: Neck supple  No JVD present  No tracheal deviation present  Cardiovascular: Normal rate, S1 normal and S2 normal   An irregular rhythm present  Exam reveals no gallop and no friction rub  No murmur heard  Pulmonary/Chest: Effort normal  No accessory muscle usage or stridor  No tachypnea  No respiratory distress  He has decreased breath sounds in the right lower field and the left lower field  He has no wheezes  He has no rhonchi  He has rales in the right lower field and the left lower field  He exhibits no tenderness  Abdominal: Soft  Bowel sounds are normal  He exhibits no distension  There is no tenderness  There is no rebound and no guarding  Musculoskeletal: He exhibits edema  He exhibits no tenderness  Lymphadenopathy:     He has no cervical adenopathy  Neurological: He is alert and oriented to person, place, and time  He has normal strength  GCS eye subscore is 4  GCS verbal subscore is 5  GCS motor subscore is 6  Skin: Skin is warm and dry  No abrasion, no ecchymosis, no lesion and no rash noted  He is not diaphoretic  No cyanosis or erythema  Nails show no clubbing  Psychiatric: He has a normal mood and affect  His speech is normal and behavior is normal    Vitals reviewed  Lab Results:   AB 37/41/105    CBC:   Lab Results   Component Value Date    WBC 8 93 2019    HGB 11 2 (L) 2019    HCT 33 1 (L) 2019    MCV 90 2019     (H) 2019    MCH 30 3 2019    MCHC 33 8 2019    RDW 13 9 2019    MPV 8 9 2019    NRBC 0 2019   , CMP:   Lab Results   Component Value Date    SODIUM 141 2019    K 3 5 2019     2019    CO2 31 2019    BUN 24 2019    CREATININE 1 05 2019    CALCIUM 7 6 (L) 2019    EGFR 77 2019       Imaging Studies: I have personally reviewed pertinent reports   , I have personally reviewed pertinent films in PACS and Chest x-ray on  shows left pleural effusion with pulmonary vascular congestion and low lung volumes  EKG, Pathology, and Other Studies: I have personally reviewed pertinent reports  and Echocardiogram shows EF 60 percent    Pulmonary Results (PFTs, PSG): None    VTE Prophylaxis: Sequential compression device (Venodyne)  and Warfarin (Coumadin)    Code Status: Level 1 - Full Code    None    Portions of the record may have been created with voice recognition software    Occasional wrong word or "sound a like" substitutions may have occurred due to the inherent limitations of voice recognition software  Read the chart carefully and recognize, using context, where substitutions have occurred

## 2019-01-18 NOTE — SPEECH THERAPY NOTE
Video Barium Swallow Study    Summary: The patient presents with adequate oral and pharyngeal stages of swallowing with consistencies assessed  Recommendations:  Diet: Regular   Liquids: Thin liquids   Meds: Patient takes them crushed in puree   Strategies: None   Upright position  F/u ST tx: No  Eval only     Results reviewed with: pt, physician    If a dedicated assessment of the esophagus is desired, consider esophagram/barium swallow  Pt is a 61 y/o male admitted with nausea and vomiting  Patient found to have pancreatitis and underwent ERCP with sphincterotomy  Patient seen by ST in ICU and upon transfer to floor  ST discharged on 1/16, but the patient began to have intermittent cough with thin liquids  VBS ordered to r/o aspiration  Previous VBS: None in record    Current Diet: Regular diet with thin liquids      Premorbid diet: Regular diet with thin liquids     Dentition: WFL    O2 requirement: None     Oral mech:  Strength and ROM: WFL    Vocal Quality/Speech: WFL    Cognitive status: WFL      Consistencies administered: Barium laden applesauce and Destiny Doone cookie with honey thick, nectar thick and thin liquids  Patient refused pill as he reports taking medication crushed in puree  Liquids were administered by cup and straw  Pt was seated laterally at 90 degrees  Oral stage:  WNL  Lip closure: WFL  Mastication: Min prolonged, but efficient   Bolus formation: WFL  Bolus control: WFL   Transfer: WFL   Residue: No    Pharyngeal stage: WNL  Swallow promptness: WFL  Spill to valleculae: No  Spill to pyriforms: No  Epiglottic inversion: WFL  Laryngeal rise: WFL  Pharyngeal constriction: WFL  Vallecular retention: No  Pyriform retention: No  PPW coating: No  Osteophytes: No  CP prominence: No  Epiglottic undercoat: No  Penetration: No  Aspiration: No    Screening of Esophageal stage:   WNL

## 2019-01-18 NOTE — RESTORATIVE TECHNICIAN NOTE
Restorative Specialist Mobility Note       Activity: Bathroom privileges, Stand at bedside, Turn     Assistive Device: Front wheel walker        Repositioned: Up in chair, Sitting

## 2019-01-18 NOTE — SOCIAL WORK
VM from pt's wife Gurmeet Patrick [phone] whom requested CM to send referral to [facility] indicates they do not want [facility] anymore

## 2019-01-19 LAB
ANION GAP SERPL CALCULATED.3IONS-SCNC: 7 MMOL/L (ref 4–13)
BASOPHILS # BLD MANUAL: 0 THOUSAND/UL (ref 0–0.1)
BASOPHILS NFR MAR MANUAL: 0 % (ref 0–1)
BUN SERPL-MCNC: 21 MG/DL (ref 5–25)
CALCIUM SERPL-MCNC: 7.9 MG/DL (ref 8.3–10.1)
CHLORIDE SERPL-SCNC: 104 MMOL/L (ref 100–108)
CO2 SERPL-SCNC: 30 MMOL/L (ref 21–32)
CREAT SERPL-MCNC: 1.07 MG/DL (ref 0.6–1.3)
EOSINOPHIL # BLD MANUAL: 0.18 THOUSAND/UL (ref 0–0.4)
EOSINOPHIL NFR BLD MANUAL: 2 % (ref 0–6)
ERYTHROCYTE [DISTWIDTH] IN BLOOD BY AUTOMATED COUNT: 13.6 % (ref 11.6–15.1)
GFR SERPL CREATININE-BSD FRML MDRD: 76 ML/MIN/1.73SQ M
GLUCOSE SERPL-MCNC: 107 MG/DL (ref 65–140)
GLUCOSE SERPL-MCNC: 107 MG/DL (ref 65–140)
GLUCOSE SERPL-MCNC: 116 MG/DL (ref 65–140)
GLUCOSE SERPL-MCNC: 120 MG/DL (ref 65–140)
GLUCOSE SERPL-MCNC: 122 MG/DL (ref 65–140)
HCT VFR BLD AUTO: 33.4 % (ref 36.5–49.3)
HGB BLD-MCNC: 11.1 G/DL (ref 12–17)
INR PPP: 3.46 (ref 0.86–1.17)
LG PLATELETS BLD QL SMEAR: PRESENT
LYMPHOCYTES # BLD AUTO: 0.55 THOUSAND/UL (ref 0.6–4.47)
LYMPHOCYTES # BLD AUTO: 6 % (ref 14–44)
MCH RBC QN AUTO: 29.8 PG (ref 26.8–34.3)
MCHC RBC AUTO-ENTMCNC: 33.2 G/DL (ref 31.4–37.4)
MCV RBC AUTO: 90 FL (ref 82–98)
METAMYELOCYTES NFR BLD MANUAL: 1 % (ref 0–1)
MONOCYTES # BLD AUTO: 0.64 THOUSAND/UL (ref 0–1.22)
MONOCYTES NFR BLD: 7 % (ref 4–12)
NEUTROPHILS # BLD MANUAL: 7.74 THOUSAND/UL (ref 1.85–7.62)
NEUTS SEG NFR BLD AUTO: 84 % (ref 43–75)
NRBC BLD AUTO-RTO: 0 /100 WBCS
PLATELET # BLD AUTO: 423 THOUSANDS/UL (ref 149–390)
PLATELET BLD QL SMEAR: ADEQUATE
PMV BLD AUTO: 9 FL (ref 8.9–12.7)
POLYCHROMASIA BLD QL SMEAR: PRESENT
POTASSIUM SERPL-SCNC: 3.9 MMOL/L (ref 3.5–5.3)
PROTHROMBIN TIME: 34.8 SECONDS (ref 11.8–14.2)
RBC # BLD AUTO: 3.73 MILLION/UL (ref 3.88–5.62)
RBC MORPH BLD: PRESENT
SODIUM SERPL-SCNC: 141 MMOL/L (ref 136–145)
WBC # BLD AUTO: 9.21 THOUSAND/UL (ref 4.31–10.16)

## 2019-01-19 PROCEDURE — 94668 MNPJ CHEST WALL SBSQ: CPT

## 2019-01-19 PROCEDURE — 85007 BL SMEAR W/DIFF WBC COUNT: CPT | Performed by: SURGERY

## 2019-01-19 PROCEDURE — 85610 PROTHROMBIN TIME: CPT | Performed by: STUDENT IN AN ORGANIZED HEALTH CARE EDUCATION/TRAINING PROGRAM

## 2019-01-19 PROCEDURE — 82948 REAGENT STRIP/BLOOD GLUCOSE: CPT

## 2019-01-19 PROCEDURE — 85027 COMPLETE CBC AUTOMATED: CPT | Performed by: SURGERY

## 2019-01-19 PROCEDURE — 80048 BASIC METABOLIC PNL TOTAL CA: CPT | Performed by: SURGERY

## 2019-01-19 RX ORDER — WARFARIN SODIUM 1 MG/1
1 TABLET ORAL
Status: DISCONTINUED | OUTPATIENT
Start: 2019-01-20 | End: 2019-01-21

## 2019-01-19 RX ADMIN — FUROSEMIDE 40 MG: 40 TABLET ORAL at 07:38

## 2019-01-19 RX ADMIN — BENZONATATE 200 MG: 100 CAPSULE ORAL at 09:26

## 2019-01-19 RX ADMIN — TAMSULOSIN HYDROCHLORIDE 0.4 MG: 0.4 CAPSULE ORAL at 17:00

## 2019-01-19 RX ADMIN — GUAIFENESIN 1200 MG: 600 TABLET, EXTENDED RELEASE ORAL at 09:26

## 2019-01-19 RX ADMIN — GUAIFENESIN 1200 MG: 600 TABLET, EXTENDED RELEASE ORAL at 21:03

## 2019-01-19 RX ADMIN — POTASSIUM CHLORIDE 40 MEQ: 1500 TABLET, EXTENDED RELEASE ORAL at 17:01

## 2019-01-19 RX ADMIN — BENZONATATE 200 MG: 100 CAPSULE ORAL at 17:00

## 2019-01-19 RX ADMIN — FUROSEMIDE 40 MG: 40 TABLET ORAL at 17:00

## 2019-01-19 RX ADMIN — METOPROLOL TARTRATE 50 MG: 50 TABLET, FILM COATED ORAL at 17:00

## 2019-01-19 RX ADMIN — METOPROLOL TARTRATE 50 MG: 50 TABLET, FILM COATED ORAL at 09:26

## 2019-01-19 RX ADMIN — METOPROLOL TARTRATE 50 MG: 50 TABLET, FILM COATED ORAL at 21:02

## 2019-01-19 RX ADMIN — POTASSIUM CHLORIDE 40 MEQ: 1500 TABLET, EXTENDED RELEASE ORAL at 09:26

## 2019-01-19 RX ADMIN — AMIODARONE HYDROCHLORIDE 200 MG: 200 TABLET ORAL at 07:38

## 2019-01-19 RX ADMIN — BENZONATATE 200 MG: 100 CAPSULE ORAL at 21:03

## 2019-01-19 NOTE — PROGRESS NOTES
Pt noted to have red but blanchable area on right hip  Pt refused repositioning from chair and pt refuses to go back to bed  Pt informed on weight shifting and how to relieve pressure  Pt verbalizes understanding  Will continue to monitor

## 2019-01-19 NOTE — PROGRESS NOTES
Progress Note - General Surgery   Wilfred Crouch 60 y o  male MRN: [id number]  Unit/Bed#: [facility] 826-01 Encounter: [id number]    Assessment:  59 y/o M w/ biliary pancreatitis s/p ERCP and stent on 1/4, complicated hospital course including acute hypoxic respiratory failure, delirium, AFib w/ RVR    Plan:  Diet as tolerated  Appreciate Card's rec's - amio/coumadin  Appreciate Pulm Rec's   - No BiPAP overnight   - No NC at rest   - O2 eval while walking   - Continue Lasix BID, transition to qD, none when D/C  Dispo planning - CM finding placement  Medically stable - pending fluid status    Subjective/Objective   Chief Complaint:     Subjective: No acute events  Resting comfortably without NC or BiPAP overnight  Objective:     Blood pressure 123/70, pulse 96, temperature 98 3 °F (36 8 °C), temperature source Oral, resp  rate 18, height 6' 4" (1 93 m), weight 109 kg (239 lb 10 2 oz), SpO2 97 %  ,Body mass index is 29 17 kg/m²  Intake/Output Summary (Last 24 hours) at 01/19/19 0920  Last data filed at 01/19/19 0538   Gross per 24 hour   Intake                0 ml   Output             1000 ml   Net            -1000 ml       Invasive Devices     Peripherally Inserted Central Catheter Line            PICC Line 34/47/63 Right Basilic 3 days                Physical Exam: General: AAOx3  Head: normocephalic, atraumatic  Neck: supple, trachea midline  Respiratory: BS b/l  Abdomen: Soft, non tender  Heart: RRR, S1s2  Ext: Warm no cyanosis   Pulse: 2+ radial      Lab, Imaging and other studies:I have personally reviewed pertinent lab results    , CBC:   Lab Results   Component Value Date    WBC 9 21 01/19/2019    HGB 11 1 (L) 01/19/2019    HCT 33 4 (L) 01/19/2019    MCV 90 01/19/2019     (H) 01/19/2019    MCH 29 8 01/19/2019    MCHC 33 2 01/19/2019    RDW 13 6 01/19/2019    MPV 9 0 01/19/2019    NRBC 0 01/19/2019   , CMP:   Lab Results   Component Value Date    SODIUM 141 01/19/2019    K 3 9 01/19/2019     01/19/2019    CO2 30 01/19/2019    BUN 21 01/19/2019    CREATININE 1 07 01/19/2019    CALCIUM 7 9 (L) 01/19/2019    EGFR 76 01/19/2019     VTE Pharmacologic Prophylaxis: Heparin  VTE Mechanical Prophylaxis: sequential compression device

## 2019-01-20 LAB
ANION GAP SERPL CALCULATED.3IONS-SCNC: 7 MMOL/L (ref 4–13)
ANISOCYTOSIS BLD QL SMEAR: PRESENT
BASOPHILS # BLD MANUAL: 0 THOUSAND/UL (ref 0–0.1)
BASOPHILS NFR MAR MANUAL: 0 % (ref 0–1)
BUN SERPL-MCNC: 22 MG/DL (ref 5–25)
CALCIUM SERPL-MCNC: 7.7 MG/DL (ref 8.3–10.1)
CHLORIDE SERPL-SCNC: 103 MMOL/L (ref 100–108)
CO2 SERPL-SCNC: 30 MMOL/L (ref 21–32)
CREAT SERPL-MCNC: 1.09 MG/DL (ref 0.6–1.3)
EOSINOPHIL # BLD MANUAL: 0 THOUSAND/UL (ref 0–0.4)
EOSINOPHIL NFR BLD MANUAL: 0 % (ref 0–6)
ERYTHROCYTE [DISTWIDTH] IN BLOOD BY AUTOMATED COUNT: 13.8 % (ref 11.6–15.1)
GFR SERPL CREATININE-BSD FRML MDRD: 74 ML/MIN/1.73SQ M
GLUCOSE SERPL-MCNC: 105 MG/DL (ref 65–140)
GLUCOSE SERPL-MCNC: 110 MG/DL (ref 65–140)
GLUCOSE SERPL-MCNC: 122 MG/DL (ref 65–140)
GLUCOSE SERPL-MCNC: 97 MG/DL (ref 65–140)
GLUCOSE SERPL-MCNC: 99 MG/DL (ref 65–140)
HCT VFR BLD AUTO: 34.1 % (ref 36.5–49.3)
HGB BLD-MCNC: 11 G/DL (ref 12–17)
INR PPP: 3.91 (ref 0.86–1.17)
LYMPHOCYTES # BLD AUTO: 0.47 THOUSAND/UL (ref 0.6–4.47)
LYMPHOCYTES # BLD AUTO: 4 % (ref 14–44)
MAGNESIUM SERPL-MCNC: 1.9 MG/DL (ref 1.6–2.6)
MCH RBC QN AUTO: 29.2 PG (ref 26.8–34.3)
MCHC RBC AUTO-ENTMCNC: 32.3 G/DL (ref 31.4–37.4)
MCV RBC AUTO: 91 FL (ref 82–98)
METAMYELOCYTES NFR BLD MANUAL: 1 % (ref 0–1)
MONOCYTES # BLD AUTO: 0.83 THOUSAND/UL (ref 0–1.22)
MONOCYTES NFR BLD: 7 % (ref 4–12)
MYELOCYTES NFR BLD MANUAL: 1 % (ref 0–1)
NEUTROPHILS # BLD MANUAL: 10.26 THOUSAND/UL (ref 1.85–7.62)
NEUTS BAND NFR BLD MANUAL: 1 % (ref 0–8)
NEUTS SEG NFR BLD AUTO: 86 % (ref 43–75)
NRBC BLD AUTO-RTO: 0 /100 WBCS
PLATELET # BLD AUTO: 400 THOUSANDS/UL (ref 149–390)
PLATELET BLD QL SMEAR: ABNORMAL
PMV BLD AUTO: 8.7 FL (ref 8.9–12.7)
POIKILOCYTOSIS BLD QL SMEAR: PRESENT
POLYCHROMASIA BLD QL SMEAR: PRESENT
POTASSIUM SERPL-SCNC: 3.9 MMOL/L (ref 3.5–5.3)
PROTHROMBIN TIME: 38.3 SECONDS (ref 11.8–14.2)
RBC # BLD AUTO: 3.77 MILLION/UL (ref 3.88–5.62)
RBC MORPH BLD: PRESENT
SODIUM SERPL-SCNC: 140 MMOL/L (ref 136–145)
WBC # BLD AUTO: 11.79 THOUSAND/UL (ref 4.31–10.16)

## 2019-01-20 PROCEDURE — 82948 REAGENT STRIP/BLOOD GLUCOSE: CPT

## 2019-01-20 PROCEDURE — 80048 BASIC METABOLIC PNL TOTAL CA: CPT | Performed by: STUDENT IN AN ORGANIZED HEALTH CARE EDUCATION/TRAINING PROGRAM

## 2019-01-20 PROCEDURE — 83735 ASSAY OF MAGNESIUM: CPT | Performed by: STUDENT IN AN ORGANIZED HEALTH CARE EDUCATION/TRAINING PROGRAM

## 2019-01-20 PROCEDURE — 85027 COMPLETE CBC AUTOMATED: CPT | Performed by: STUDENT IN AN ORGANIZED HEALTH CARE EDUCATION/TRAINING PROGRAM

## 2019-01-20 PROCEDURE — 85007 BL SMEAR W/DIFF WBC COUNT: CPT | Performed by: STUDENT IN AN ORGANIZED HEALTH CARE EDUCATION/TRAINING PROGRAM

## 2019-01-20 PROCEDURE — 99232 SBSQ HOSP IP/OBS MODERATE 35: CPT | Performed by: SURGERY

## 2019-01-20 PROCEDURE — 94668 MNPJ CHEST WALL SBSQ: CPT

## 2019-01-20 PROCEDURE — 85610 PROTHROMBIN TIME: CPT | Performed by: STUDENT IN AN ORGANIZED HEALTH CARE EDUCATION/TRAINING PROGRAM

## 2019-01-20 RX ADMIN — TAMSULOSIN HYDROCHLORIDE 0.4 MG: 0.4 CAPSULE ORAL at 17:30

## 2019-01-20 RX ADMIN — BENZONATATE 200 MG: 100 CAPSULE ORAL at 22:51

## 2019-01-20 RX ADMIN — GUAIFENESIN 1200 MG: 600 TABLET, EXTENDED RELEASE ORAL at 22:51

## 2019-01-20 RX ADMIN — POTASSIUM CHLORIDE 40 MEQ: 1500 TABLET, EXTENDED RELEASE ORAL at 08:42

## 2019-01-20 RX ADMIN — BENZONATATE 200 MG: 100 CAPSULE ORAL at 17:30

## 2019-01-20 RX ADMIN — AMIODARONE HYDROCHLORIDE 200 MG: 200 TABLET ORAL at 08:42

## 2019-01-20 RX ADMIN — METOPROLOL TARTRATE 50 MG: 50 TABLET, FILM COATED ORAL at 22:51

## 2019-01-20 RX ADMIN — FUROSEMIDE 40 MG: 40 TABLET ORAL at 17:30

## 2019-01-20 RX ADMIN — GUAIFENESIN 1200 MG: 600 TABLET, EXTENDED RELEASE ORAL at 08:42

## 2019-01-20 RX ADMIN — POTASSIUM CHLORIDE 40 MEQ: 1500 TABLET, EXTENDED RELEASE ORAL at 17:29

## 2019-01-20 RX ADMIN — WARFARIN SODIUM 1 MG: 1 TABLET ORAL at 17:31

## 2019-01-20 RX ADMIN — BENZONATATE 200 MG: 100 CAPSULE ORAL at 08:42

## 2019-01-20 RX ADMIN — METOPROLOL TARTRATE 50 MG: 50 TABLET, FILM COATED ORAL at 17:31

## 2019-01-20 NOTE — PROGRESS NOTES
Progress Note - General Surgery   [facility]N: [id number]  Unit/Bed#: [facility] 826-01 Encounter: [id number]    Assessment:  61 y/o M w/ biliary pancreatitis s/p ERCP and stent on 1/4, complicated hospital course including acute hypoxic respiratory failure, delirium, AFib w/ RVR    Plan:  Diet as tolerated  Appreciate Card's and Pulm rec's for discharge  Dispo planning - Monday vs Tuesday 1/21 or 1/22    Subjective/Objective   Chief Complaint:     Subjective: No acute events  Patient is ambulating and feeling much better  Objective:     Blood pressure 114/65, pulse 87, temperature 99 1 °F (37 3 °C), temperature source Oral, resp  rate 18, height 6' 4" (1 93 m), weight 109 kg (239 lb 10 2 oz), SpO2 94 %  ,Body mass index is 29 17 kg/m²  Intake/Output Summary (Last 24 hours) at 01/20/19 0657  Last data filed at 01/20/19 0401   Gross per 24 hour   Intake              120 ml   Output              780 ml   Net             -660 ml       Invasive Devices     Peripherally Inserted Central Catheter Line            PICC Line 61/34/78 Right Basilic 4 days                Physical Exam: General: AAOx3  Head: normocephalic, atraumatic  Neck: supple, trachea midline   Respiratory: BS b/l  Abdomen: Soft, NT, no rebound/guarding  Heart: RRR, S1s2  Ext: b/l 1+ lower extremity edema  Pulse: 2+ radial      Lab, Imaging and other studies:  I have personally reviewed pertinent lab results    , CBC:   Lab Results   Component Value Date    WBC 11 79 (H) 01/20/2019    HGB 11 0 (L) 01/20/2019    HCT 34 1 (L) 01/20/2019    MCV 91 01/20/2019     (H) 01/20/2019    MCH 29 2 01/20/2019    MCHC 32 3 01/20/2019    RDW 13 8 01/20/2019    MPV 8 7 (L) 01/20/2019   , CMP:   Lab Results   Component Value Date    SODIUM 140 01/20/2019    K 3 9 01/20/2019     01/20/2019    CO2 30 01/20/2019    BUN 22 01/20/2019    CREATININE 1 09 01/20/2019    CALCIUM 7 7 (L) 01/20/2019    EGFR 74 01/20/2019     VTE Pharmacologic Prophylaxis: Heparin  VTE Mechanical Prophylaxis: sequential compression device

## 2019-01-21 LAB
ANION GAP SERPL CALCULATED.3IONS-SCNC: 6 MMOL/L (ref 4–13)
BASOPHILS # BLD MANUAL: 0 THOUSAND/UL (ref 0–0.1)
BASOPHILS NFR MAR MANUAL: 0 % (ref 0–1)
BUN SERPL-MCNC: 21 MG/DL (ref 5–25)
CALCIUM SERPL-MCNC: 7.8 MG/DL (ref 8.3–10.1)
CHLORIDE SERPL-SCNC: 104 MMOL/L (ref 100–108)
CO2 SERPL-SCNC: 28 MMOL/L (ref 21–32)
CREAT SERPL-MCNC: 1.04 MG/DL (ref 0.6–1.3)
EOSINOPHIL # BLD MANUAL: 0 THOUSAND/UL (ref 0–0.4)
EOSINOPHIL NFR BLD MANUAL: 0 % (ref 0–6)
ERYTHROCYTE [DISTWIDTH] IN BLOOD BY AUTOMATED COUNT: 13.6 % (ref 11.6–15.1)
GFR SERPL CREATININE-BSD FRML MDRD: 78 ML/MIN/1.73SQ M
GLUCOSE SERPL-MCNC: 101 MG/DL (ref 65–140)
GLUCOSE SERPL-MCNC: 102 MG/DL (ref 65–140)
GLUCOSE SERPL-MCNC: 104 MG/DL (ref 65–140)
GLUCOSE SERPL-MCNC: 119 MG/DL (ref 65–140)
GLUCOSE SERPL-MCNC: 119 MG/DL (ref 65–140)
HCT VFR BLD AUTO: 32.7 % (ref 36.5–49.3)
HGB BLD-MCNC: 10.6 G/DL (ref 12–17)
INR PPP: 4.25 (ref 0.86–1.17)
LYMPHOCYTES # BLD AUTO: 1.72 THOUSAND/UL (ref 0.6–4.47)
LYMPHOCYTES # BLD AUTO: 14 % (ref 14–44)
MCH RBC QN AUTO: 29.2 PG (ref 26.8–34.3)
MCHC RBC AUTO-ENTMCNC: 32.4 G/DL (ref 31.4–37.4)
MCV RBC AUTO: 90 FL (ref 82–98)
MONOCYTES # BLD AUTO: 0.37 THOUSAND/UL (ref 0–1.22)
MONOCYTES NFR BLD: 3 % (ref 4–12)
MYELOCYTES NFR BLD MANUAL: 1 % (ref 0–1)
NEUTROPHILS # BLD MANUAL: 9.95 THOUSAND/UL (ref 1.85–7.62)
NEUTS BAND NFR BLD MANUAL: 2 % (ref 0–8)
NEUTS SEG NFR BLD AUTO: 79 % (ref 43–75)
NRBC BLD AUTO-RTO: 0 /100 WBCS
PLATELET # BLD AUTO: 386 THOUSANDS/UL (ref 149–390)
PLATELET BLD QL SMEAR: ADEQUATE
PMV BLD AUTO: 8.8 FL (ref 8.9–12.7)
POLYCHROMASIA BLD QL SMEAR: PRESENT
POTASSIUM SERPL-SCNC: 4.1 MMOL/L (ref 3.5–5.3)
PROMYELOCYTES NFR BLD MANUAL: 1 % (ref 0–0)
PROTHROMBIN TIME: 40.8 SECONDS (ref 11.8–14.2)
RBC # BLD AUTO: 3.63 MILLION/UL (ref 3.88–5.62)
RBC MORPH BLD: PRESENT
SODIUM SERPL-SCNC: 138 MMOL/L (ref 136–145)
WBC # BLD AUTO: 12.29 THOUSAND/UL (ref 4.31–10.16)

## 2019-01-21 PROCEDURE — 82948 REAGENT STRIP/BLOOD GLUCOSE: CPT

## 2019-01-21 PROCEDURE — 97535 SELF CARE MNGMENT TRAINING: CPT

## 2019-01-21 PROCEDURE — 80048 BASIC METABOLIC PNL TOTAL CA: CPT | Performed by: SURGERY

## 2019-01-21 PROCEDURE — 85007 BL SMEAR W/DIFF WBC COUNT: CPT | Performed by: SURGERY

## 2019-01-21 PROCEDURE — 85027 COMPLETE CBC AUTOMATED: CPT | Performed by: SURGERY

## 2019-01-21 PROCEDURE — 97530 THERAPEUTIC ACTIVITIES: CPT

## 2019-01-21 PROCEDURE — 85610 PROTHROMBIN TIME: CPT | Performed by: SURGERY

## 2019-01-21 PROCEDURE — 97110 THERAPEUTIC EXERCISES: CPT

## 2019-01-21 RX ORDER — FUROSEMIDE 40 MG/1
40 TABLET ORAL DAILY
Status: DISCONTINUED | OUTPATIENT
Start: 2019-01-22 | End: 2019-01-23 | Stop reason: HOSPADM

## 2019-01-21 RX ADMIN — GUAIFENESIN 1200 MG: 600 TABLET, EXTENDED RELEASE ORAL at 08:34

## 2019-01-21 RX ADMIN — POTASSIUM CHLORIDE 40 MEQ: 1500 TABLET, EXTENDED RELEASE ORAL at 08:34

## 2019-01-21 RX ADMIN — POTASSIUM CHLORIDE 40 MEQ: 1500 TABLET, EXTENDED RELEASE ORAL at 17:07

## 2019-01-21 RX ADMIN — BENZONATATE 200 MG: 100 CAPSULE ORAL at 16:48

## 2019-01-21 RX ADMIN — AMIODARONE HYDROCHLORIDE 200 MG: 200 TABLET ORAL at 08:33

## 2019-01-21 RX ADMIN — METOPROLOL TARTRATE 50 MG: 50 TABLET, FILM COATED ORAL at 21:27

## 2019-01-21 RX ADMIN — GUAIFENESIN 1200 MG: 600 TABLET, EXTENDED RELEASE ORAL at 21:27

## 2019-01-21 RX ADMIN — FUROSEMIDE 40 MG: 40 TABLET ORAL at 08:36

## 2019-01-21 RX ADMIN — BENZONATATE 200 MG: 100 CAPSULE ORAL at 21:27

## 2019-01-21 RX ADMIN — TAMSULOSIN HYDROCHLORIDE 0.4 MG: 0.4 CAPSULE ORAL at 16:48

## 2019-01-21 RX ADMIN — BENZONATATE 200 MG: 100 CAPSULE ORAL at 08:34

## 2019-01-21 RX ADMIN — METOPROLOL TARTRATE 50 MG: 50 TABLET, FILM COATED ORAL at 16:48

## 2019-01-21 RX ADMIN — METOPROLOL TARTRATE 50 MG: 50 TABLET, FILM COATED ORAL at 08:34

## 2019-01-21 NOTE — PLAN OF CARE
Problem: PHYSICAL THERAPY ADULT  Goal: Performs mobility at highest level of function for planned discharge setting  See evaluation for individualized goals  Treatment/Interventions: Bed mobility, Gait training, LE strengthening/ROM, Functional transfer training, Patient/family training, OT, Spoke to nursing  Equipment Recommended:  (TBD)       See flowsheet documentation for full assessment, interventions and recommendations  Outcome: Progressing  Prognosis: Good  Problem List: Decreased strength, Decreased endurance, Impaired balance, Decreased mobility, Decreased coordination  Assessment: Pt is making steady progress with transfers and ambulation  Improvement noted with transfers  Gait is limited by narrow base of support requiring min assist   Unsteady at times however without loss of balance  Completed seated exercises while sitting in recliner  Declined further activity today  Pt would benefit from continued physical therapy to maximize functional mobility and safety  Barriers to Discharge: Inaccessible home environment     Recommendation: Short-term skilled PT     PT - OK to Discharge:  (to rehab when medically cleared )    See flowsheet documentation for full assessment

## 2019-01-21 NOTE — UTILIZATION REVIEW
Continued Stay Review    Date: 1/20/19    Vital Signs:  01/20/19 0700  98 6 °F (37 °C)  97  18  106/68  96 %  None (Room air)  Sitting     Assessment/Plan:   1/20  Surgery Progress Note  59 y/o M w/ biliary pancreatitis s/p ERCP and stent on 1/4, complicated hospital course including acute hypoxic respiratory failure, delirium, AFib w/ RVR     Plan:  Diet as tolerated  Appreciate Card's and Pulm rec's for discharge  Dispo planning - Monday vs Tuesday 1/21 or 1/22    Medications:   Scheduled Meds:   Current Facility-Administered Medications:  acetaminophen 650 mg Oral Q6H PRN   amiodarone 200 mg Oral Daily With Breakfast   benzonatate 200 mg Oral TID   [START ON 1/22/2019] furosemide 40 mg Oral Daily   guaiFENesin 1,200 mg Oral Q12H SANA   HYDROmorphone 0 5 mg Intravenous Q2H PRN   insulin lispro 1-6 Units Subcutaneous TID AC   metoprolol tartrate 50 mg Oral TID   ondansetron 4 mg Intravenous Q6H PRN   ondansetron 4 mg Oral Q6H PRN   oxyCODONE 5 mg Oral Q6H PRN   potassium chloride 40 mEq Oral BID   tamsulosin 0 4 mg Oral Daily With Dinner     PRN Meds:   acetaminophen    HYDROmorphone    ondansetron    ondansetron    oxyCODONE     Pertinent Labs/Diagnostic Results:   Daquan 7 7  WBC 11 79  H/H 1 0/34 1  Platelets 807  PT/INR 38 3/3 91    Age/Sex: 60 y o  male     Discharge Plan:  [address] [place] [address] Dr wynn    [address] [place] [address] Review Department  Phone: [phone]; Fax [phone]  [email or web address]  ATTENTION: Please call with any questions or concerns to [phone]  and carefully listen to the prompts so that you are directed to the right person  Send all requests for admission clinical reviews, approved or denied determinations and any other requests to fax [phone]   All voicemails are confidential

## 2019-01-21 NOTE — PLAN OF CARE
Problem: OCCUPATIONAL THERAPY ADULT  Goal: Performs self-care activities at highest level of function for planned discharge setting  See evaluation for individualized goals  Treatment Interventions: ADL retraining, Functional transfer training, UE strengthening/ROM, Endurance training, Cognitive reorientation, Patient/family training, Equipment evaluation/education, Neuromuscular reeducation, Fine motor coordination activities, Compensatory technique education, Continued evaluation, Energy conservation, Activityengagement          See flowsheet documentation for full assessment, interventions and recommendations  Outcome: Progressing  Limitation: Decreased ADL status, Decreased UE ROM, Decreased UE strength, Decreased Safe judgement during ADL, Decreased cognition, Decreased endurance, Decreased sensation, Visual deficit, Decreased fine motor control, Decreased self-care trans, Decreased high-level ADLs  Prognosis: Fair  Assessment: Pt participated in occupational therapy with focus on activity tolerance, ALB self-care and functional transfers  Pt cleared by RN for pt participation in therapy  Pt received sitting out of bed to bedside chair and agreeable to therapy following pt Identifiers confirmed    Pt requires SBA for LB self-care/dressing 2* for limited activity tolerance and decreased overall strength  Pt will require in-pt rehab to continue to address pt noted deficits with decreased strength, coordination and balance and activity tolerance which currently impair pt ADL and functional mob        OT Discharge Recommendation: Short Term Rehab  OT - OK to Discharge: Yes (when medically stable)

## 2019-01-21 NOTE — SOCIAL WORK
Jones spoke with kristi in [facility] and pt will be priority for PT and OT in am and then [facility] will review     Pt ambulated 260 feet and pt wife may also request to take pt home with vna if [facility] denial   Cm will follow

## 2019-01-21 NOTE — SOCIAL WORK
Cm met with pt and pt wife at bedside and all aware [facility] followiing and waiting for updated PT and OT notes to make determination   Cm will follow

## 2019-01-21 NOTE — PHYSICAL THERAPY NOTE
Physical Therapy Progress Note     01/21/19 1528   Pain Assessment   Pain Assessment No/denies pain   Restrictions/Precautions   Weight Bearing Precautions Per Order No   Other Precautions Chair Alarm; Fall Risk   General   Family/Caregiver Present No   Cognition   Overall Cognitive Status WFL   Arousal/Participation Alert; Cooperative   Subjective   Subjective Pt denies pain and agrees to participate  Transfers   Sit to Stand 5  Supervision   Additional items Assist x 1   Stand to Sit 5  Supervision   Additional items Assist x 1   Ambulation/Elevation   Gait pattern Narrow YAO; Foward flexed; Inconsistent seng  (mild ataxia)   Gait Assistance 4  Minimal assist   Additional items Assist x 1;Verbal cues   Assistive Device Rolling walker   Distance 200 feet   Stair Management Assistance Not tested   Balance   Static Sitting Fair -   Static Standing Fair -   Dynamic Standing Poor +   Ambulatory Poor +   Activity Tolerance   Activity Tolerance Patient tolerated treatment well   Nurse [address] to see per RN   Exercises   Quad Sets Sitting;20 reps;AROM; Bilateral   Glute Sets Sitting;20 reps   Ankle Pumps Sitting;20 reps;AROM; Bilateral   Assessment   Prognosis Good   Problem List Decreased strength;Decreased endurance; Impaired balance;Decreased mobility; Decreased coordination   Assessment Pt is making steady progress with transfers and ambulation  Improvement noted with transfers  Gait is limited by narrow base of support requiring min assist   Unsteady at times however without loss of balance  Completed seated exercises while sitting in recliner  Declined further activity today  Pt would benefit from continued physical therapy to maximize functional mobility and safety  Goals   Patient Goals To get rid of this fluid in my legs   STG Expiration Date 01/21/19   Treatment Day 5   Plan   Treatment/Interventions Functional transfer training;LE strengthening/ROM; Therapeutic exercise; Endurance training;Patient/family training;Bed mobility;Gait training;Spoke to nursing   Progress Progressing toward goals   PT Frequency (3-5x/week)   Recommendation   Recommendation Short-term skilled PT   Equipment Recommended Walker  (RW)     Charley Cowden, PTA

## 2019-01-21 NOTE — PROGRESS NOTES
Pt ambulated in hallway with walker approx 260ft  Ambulating pulse ox range 90-93%, resting oxygen saturation 97%  Pt had no complaints while ambulating  Pt sitting up in chair  PT reminded to shift weight in chair, Pt verbalizes understanding  Will continue to monitor

## 2019-01-21 NOTE — QUICK NOTE
Nurse-Patient-Provider rounds were completed with the patient's nurse today, Danielle Mccall  We discussed the plan is to continue the sodium restricted cardiac diet  Continue the diuresis, but decrease the Lasix to daily and plan to discontinue it altogether for discharge if he continues to do well  Continue amiodarone per Cardiology recommendations  Coumadin to be held secondary to increasing supratherapeutic INR with repeat blood work on 01/22/2018  Based on resting and ambulatory oxygen saturations on room air, the patient does not require any further supplemental oxygen or oxygen for discharge  Continue to monitor labs her mildly increased leukocytosis and platelets in addition to the INR noted on this morning's blood work  We reviewed all of the invasive devices/lines/telemetry orders   - Maintain right-sided PICC line for IV access; anticipate discontinuation of line prior to discharge  Pain Assessment / Plan:  - Continue current analgesic regimen  Mobility Assessment / Plan:  - Activity as tolerated  - PT and OT evaluation and treatment as indicated  Goals / Barriers for discharge:  - Anticipate discharge in the next 24-48 hours pending continued improvement  - Case management following; case and discharge needs discussed  I spoke with the patient's wife, Balta Kam, via the phone to provide her an update and answer any questions and concerns she may have  All questions and concerns were addressed  I spent greater than 20 minutes reviewing the plan with the patient and the nurse, and coordinating care for the day      Debra Melendez PA-C  1/21/2019 01:38 PM

## 2019-01-21 NOTE — RESTORATIVE TECHNICIAN NOTE
Restorative Specialist Mobility Note       Activity: Ambulate in schmitt, Stand at bedside, Turn (Pt left sitting in chair at bedside with call bell, phone, and tray within reach   Chair alarm on )     Assistive Device: Front wheel walker        Repositioned: Up in chair, Sitting

## 2019-01-21 NOTE — OCCUPATIONAL THERAPY NOTE
Occupational Therapy Treatment Note     01/21/19 1320   Restrictions/Precautions   Weight Bearing Precautions Per Order No   Other Precautions Chair Alarm   Lifestyle   Autonomy I w/ ADLS/IADLS, transfers/functional mobility PTA   Reciprocal Relationships Pt lives w/ spouse per chart review   Service to Others Works full time; pt unable to report what he does   Intrinsic Gratification Pt unable to report   Pain Assessment   Pain Assessment No/denies pain   Pain Score No Pain   ADL   Where Assessed Chair   Transfers   Sit to Stand 4  Minimal assistance   Additional items Increased time required   Stand to Sit 5  Supervision   Cognition   Overall Cognitive Status Impaired   Arousal/Participation Alert   Attention Attends with cues to redirect   Orientation Level Oriented X4   Memory Decreased recall of recent events;Decreased recall of precautions;Decreased short term memory   Following Commands Follows one step commands with increased time or repetition   Additional Activities   Additional Activities Other (Comment)  (Home setup)   Activity Tolerance   Activity Tolerance Patient tolerated treatment well   Assessment   Assessment Pt participated in occupational therapy with focus on activity tolerance, ALB self-care and functional transfers  Pt cleared by RN for pt participation in therapy  Pt received sitting out of bed to bedside chair and agreeable to therapy following pt Identifiers confirmed    Pt requires SBA for LB self-care/dressing 2* for limited activity tolerance and decreased overall strength  Pt will require in-pt rehab to continue to address pt noted deficits with decreased strength, coordination and balance and activity tolerance which currently impair pt ADL and functional mob      Plan   Treatment Interventions ADL retraining   Goal Expiration Date 01/23/19   Treatment Day 5   OT Frequency 3-5x/wk   Recommendation   OT Discharge Recommendation Short Term Rehab   Barthel Index   Feeding 5   Bathing 0   Grooming Score 0   Dressing Score 5   Bladder Score 10   Bowels Score 10   Toilet Use Score 5   Transfers (Bed/Chair) Score 5   Mobility (Level Surface) Score 10   Stairs Score 0   Barthel Index Score 50   Modified Greenup Scale   Modified Greenup Scale 4       Quentin GONZALES/L

## 2019-01-21 NOTE — SOCIAL WORK
Cm met with pt and called his wife and left a voice message  Pt not medically clear , pt has  INR not therapeutic   Cm updated   [facility] , [facility] and all snf choices  Pt first choice is [facility] , cm will follow

## 2019-01-21 NOTE — PROGRESS NOTES
Progress Note - General Surgery   Everlean Holes 60 y o  male MRN: [id number]  Unit/Bed#: [facility] 826-01 Encounter: [id number]    Assessment:  59 y/o M w/ biliary pancreatitis s/p ERCP and stent on 1/4, complicated hospital course including acute hypoxic respiratory failure, delirium, AFib w/ RVR    Patient currently off BiPAP/nasal cannula  Net -1 L over last 24 hours  INR supratherapeutic  Plan:  Diet as tolerated  Follow-up INR, was 3 91 yesterday  Will hold Coumadin  Appreciate Card's and Pulm rec's for discharge  Dispo planning    Subjective/Objective   Chief Complaint:     Subjective: Patient states he slept well and he is tolerating his diet  Endorses bowel movement/flatus  Denies fever chills chest pain or shortness of breath  Has been doing well off BiPAP/nasal cannula  Objective:     Blood pressure 127/62, pulse 93, temperature 98 9 °F (37 2 °C), temperature source Oral, resp  rate 20, height 6' 4" (1 93 m), weight 109 kg (239 lb 10 2 oz), SpO2 98 %  ,Body mass index is 29 17 kg/m²  Intake/Output Summary (Last 24 hours) at 01/21/19 0615  Last data filed at 01/21/19 0519   Gross per 24 hour   Intake              465 ml   Output             1425 ml   Net             -960 ml       Invasive Devices     Peripherally Inserted Central Catheter Line            PICC Line 16/04/13 Right Basilic 5 days                Physical Exam:   General: AAOx3  Head: normocephalic, atraumatic  Neck: supple, trachea midline   Respiratory: BS b/l  Abdomen: Soft, NT, no rebound/guarding  Heart: RRR, S1s2  Ext: b/l 1+ lower extremity edema  Pulse: 2+ radial      Lab, Imaging and other studies:  I have personally reviewed pertinent lab results    , CBC:   No results found for: WBC, HGB, HCT, MCV, PLT, ADJUSTEDWBC, MCH, MCHC, RDW, MPV, NRBC, CMP:   No results found for: SODIUM, K, CL, CO2, ANIONGAP, BUN, CREATININE, GLUCOSE, CALCIUM, AST, ALT, ALKPHOS, PROT, BILITOT, EGFR   Lab Results   Component Value Date    INR 3 91 (H) 01/20/2019    INR 3 46 (H) 01/19/2019    INR 3 51 (H) 01/18/2019    PROTIME 38 3 (H) 01/20/2019    PROTIME 34 8 (H) 01/19/2019    PROTIME 35 2 (H) 01/18/2019       VTE Pharmacologic Prophylaxis: Heparin  VTE Mechanical Prophylaxis: sequential compression device

## 2019-01-22 LAB
ANION GAP SERPL CALCULATED.3IONS-SCNC: 4 MMOL/L (ref 4–13)
BUN SERPL-MCNC: 20 MG/DL (ref 5–25)
CALCIUM SERPL-MCNC: 7.7 MG/DL (ref 8.3–10.1)
CHLORIDE SERPL-SCNC: 104 MMOL/L (ref 100–108)
CO2 SERPL-SCNC: 29 MMOL/L (ref 21–32)
CREAT SERPL-MCNC: 1 MG/DL (ref 0.6–1.3)
GFR SERPL CREATININE-BSD FRML MDRD: 82 ML/MIN/1.73SQ M
GLUCOSE SERPL-MCNC: 108 MG/DL (ref 65–140)
GLUCOSE SERPL-MCNC: 109 MG/DL (ref 65–140)
GLUCOSE SERPL-MCNC: 115 MG/DL (ref 65–140)
GLUCOSE SERPL-MCNC: 116 MG/DL (ref 65–140)
GLUCOSE SERPL-MCNC: 120 MG/DL (ref 65–140)
INR PPP: 4.53 (ref 0.86–1.17)
POTASSIUM SERPL-SCNC: 4.2 MMOL/L (ref 3.5–5.3)
PROTHROMBIN TIME: 42.9 SECONDS (ref 11.8–14.2)
SODIUM SERPL-SCNC: 137 MMOL/L (ref 136–145)

## 2019-01-22 PROCEDURE — 97535 SELF CARE MNGMENT TRAINING: CPT | Performed by: STUDENT IN AN ORGANIZED HEALTH CARE EDUCATION/TRAINING PROGRAM

## 2019-01-22 PROCEDURE — 85610 PROTHROMBIN TIME: CPT | Performed by: SURGERY

## 2019-01-22 PROCEDURE — 97116 GAIT TRAINING THERAPY: CPT | Performed by: PHYSICAL THERAPIST

## 2019-01-22 PROCEDURE — 80048 BASIC METABOLIC PNL TOTAL CA: CPT | Performed by: SURGERY

## 2019-01-22 PROCEDURE — 97110 THERAPEUTIC EXERCISES: CPT | Performed by: PHYSICAL THERAPIST

## 2019-01-22 PROCEDURE — 82948 REAGENT STRIP/BLOOD GLUCOSE: CPT

## 2019-01-22 RX ORDER — OXYCODONE HYDROCHLORIDE 5 MG/1
5 TABLET ORAL EVERY 6 HOURS PRN
Status: DISCONTINUED | OUTPATIENT
Start: 2019-01-22 | End: 2019-01-23 | Stop reason: HOSPADM

## 2019-01-22 RX ORDER — OXYCODONE HYDROCHLORIDE 5 MG/1
2.5 TABLET ORAL EVERY 6 HOURS PRN
Status: DISCONTINUED | OUTPATIENT
Start: 2019-01-22 | End: 2019-01-23 | Stop reason: HOSPADM

## 2019-01-22 RX ADMIN — POTASSIUM CHLORIDE 40 MEQ: 1500 TABLET, EXTENDED RELEASE ORAL at 08:14

## 2019-01-22 RX ADMIN — AMIODARONE HYDROCHLORIDE 200 MG: 200 TABLET ORAL at 08:14

## 2019-01-22 RX ADMIN — METOPROLOL TARTRATE 50 MG: 50 TABLET, FILM COATED ORAL at 21:25

## 2019-01-22 RX ADMIN — BENZONATATE 200 MG: 100 CAPSULE ORAL at 21:25

## 2019-01-22 RX ADMIN — BENZONATATE 200 MG: 100 CAPSULE ORAL at 16:24

## 2019-01-22 RX ADMIN — POTASSIUM CHLORIDE 40 MEQ: 1500 TABLET, EXTENDED RELEASE ORAL at 17:33

## 2019-01-22 RX ADMIN — GUAIFENESIN 1200 MG: 600 TABLET, EXTENDED RELEASE ORAL at 08:14

## 2019-01-22 RX ADMIN — FUROSEMIDE 40 MG: 40 TABLET ORAL at 08:14

## 2019-01-22 RX ADMIN — METOPROLOL TARTRATE 50 MG: 50 TABLET, FILM COATED ORAL at 08:14

## 2019-01-22 RX ADMIN — BENZONATATE 200 MG: 100 CAPSULE ORAL at 08:14

## 2019-01-22 RX ADMIN — TAMSULOSIN HYDROCHLORIDE 0.4 MG: 0.4 CAPSULE ORAL at 16:24

## 2019-01-22 RX ADMIN — METOPROLOL TARTRATE 50 MG: 50 TABLET, FILM COATED ORAL at 16:26

## 2019-01-22 RX ADMIN — GUAIFENESIN 1200 MG: 600 TABLET, EXTENDED RELEASE ORAL at 21:25

## 2019-01-22 NOTE — PLAN OF CARE
Problem: PHYSICAL THERAPY ADULT  Goal: Performs mobility at highest level of function for planned discharge setting  See evaluation for individualized goals  Treatment/Interventions: Bed mobility, Gait training, LE strengthening/ROM, Functional transfer training, Patient/family training, OT, Spoke to nursing  Equipment Recommended:  Sushil Hussein       See flowsheet documentation for full assessment, interventions and recommendations  Outcome: Adequate for Discharge  Prognosis: Good  Problem List: Decreased strength, Impaired balance, Decreased mobility, Decreased coordination, Decreased safety awareness  Assessment: Pt is improving I w ambulation and functional mobility  He requires some motivating to participate in treatment session, but is willing to cooperate and completed seated exercise in recliner without difficulty  He ambulates 200ft mod I w RW, and navigated 7 steps using both railings w supervision  Pt will benefit from continued skilled care, but at this point demonstrates enough safety and functional mobility to DC home w HHPT and support from his wife  Barriers to Discharge: None  Barriers to Discharge Comments:  (Recommend RW for home use)  Recommendation: Home PT     PT - OK to Discharge: Yes    See flowsheet documentation for full assessment

## 2019-01-22 NOTE — PROGRESS NOTES
Progress Note - General Surgery   Kulpmont Fiscal 60 y o  male MRN: [id number]  Unit/Bed#: [facility] 826-01 Encounter: [id number]    Assessment:  59 y/o M w/ biliary pancreatitis s/p ERCP and stent on 1/4, complicated hospital course including acute hypoxic respiratory failure, delirium, AFib w/ RVR     Patient currently off BiPAP/nasal cannula  Net -1 L over last 24 hours  INR supratherapeutic  Plan:   diet as tolerated   Continue to hold Coumadin as patient is supratherapeutic with an INR of greater than 4    - Discussion with Cardiology is that patient will follow up as outpatient for Coumadin levels and INR draws  Continue Lasix at once per day with plan to discontinue upon discharge  Dispo planning -patient to go home with home PT   -  Other than supratherapeutic INR, patient medically stable, recommend remaining until INR less than 3    Subjective/Objective   Chief Complaint:     Subjective:  No acute events overnight  Patient resting comfortably  No longer using BiPAP for nasal cannula  Objective:     Blood pressure 102/58, pulse 75, temperature 98 5 °F (36 9 °C), temperature source Oral, resp  rate 16, height 6' 4" (1 93 m), weight 105 kg (231 lb 7 7 oz), SpO2 98 %  ,Body mass index is 28 18 kg/m²  Intake/Output Summary (Last 24 hours) at 01/22/19 0546  Last data filed at 01/22/19 0300   Gross per 24 hour   Intake              460 ml   Output              300 ml   Net              160 ml       Invasive Devices     Peripherally Inserted Central Catheter Line            PICC Line 16/05/78 Right Basilic 6 days                Physical Exam: General: AAOx3  Head: normocephalic, atraumatic  Neck: supple, trachea midline  Respiratory: BS b/l  Abdomen: Soft, NT, no rebound/guarding  Heart: RRR, S1s2  Ext: Warm no cyanosis   Pulse:   2+ radial      Lab, Imaging and other studies:  I have personally reviewed pertinent lab results    , CBC: No results found for: WBC, HGB, HCT, MCV, PLT, ADJUSTEDWBC, MCH, MCHC, RDW, MPV, NRBC, CMP:   Lab Results   Component Value Date    SODIUM 137 01/22/2019    K 4 2 01/22/2019     01/22/2019    CO2 29 01/22/2019    BUN 20 01/22/2019    CREATININE 1 00 01/22/2019    CALCIUM 7 7 (L) 01/22/2019    EGFR 82 01/22/2019     VTE Pharmacologic Prophylaxis: Reason for no pharmacologic prophylaxis Patient is supratherapeutic on Coumadin  VTE Mechanical Prophylaxis: sequential compression device

## 2019-01-22 NOTE — PHYSICAL THERAPY NOTE
PHYSICAL THERAPY NOTE    Patient Name: Tish Machuca  CWSEE'BRANDIN Date: 1/22/2019 01/22/19 0944   Pain Assessment   Pain Assessment No/denies pain   Hospital Pain Intervention(s) Ambulation/increased activity   Response to Interventions Tolerated well   Restrictions/Precautions   Weight Bearing Precautions Per Order No   Other Precautions Chair Alarm; Fall Risk   General   Chart Reviewed Yes   Response to Previous Treatment Patient reporting fatigue but able to participate  Family/Caregiver Present No   Cognition   Orientation Level Oriented X4   Subjective   Subjective Pt ready to go home and will work w PT but is waiting to speak w Doctors soon  Bed Mobility   Additional Comments Pt seated in reclining chair prior to treatment session   Transfers   Sit to Stand 6  Modified independent   Additional items Increased time required   Stand to Sit 7  Independent   Additional items Increased time required;Verbal cues   Ambulation/Elevation   Gait pattern Narrow YAO; Decreased foot clearance   Gait Assistance 6  Modified independent   Additional items Verbal cues   Assistive Device Rolling walker   Distance 200ft   Stair Management Assistance 5  Supervision   Additional items Verbal cues   Stair Management Technique Two rails; Alternating pattern   Number of Stairs 7   Balance   Static Sitting Fair +   Dynamic Sitting Fair   Static Standing Fair   Dynamic Standing Fair -   Ambulatory Fair -   Endurance Deficit   Endurance Deficit No   Endurance Deficit Description Pt tolerated treatment well, but complains of fluid in his feet while walking  Activity Tolerance   Activity Tolerance Patient tolerated treatment well   Nurse Made Aware Okay per RNGlory   Exercises   Knee AROM Long Arc Quad 10 reps; Sitting;Bilateral   Marching 10 reps; Sitting;Bilateral   Assessment   Prognosis Good   Problem List Decreased strength; Impaired balance;Decreased mobility; Decreased coordination;Decreased safety awareness   Assessment Pt is improving I w ambulation and functional mobility  He requires some motivating to participate in treatment session, but is willing to cooperate and completed seated exercise in recliner without difficulty  He ambulates 200ft mod I w RW, and navigated 7 steps using both railings w supervision  Pt will benefit from continued skilled care, but at this point demonstrates enough safety and functional mobility to DC home w HHPT and support from his wife  Barriers to Discharge None   Barriers to Discharge Comments (Recommend RW for home use)   Goals   Patient Goals No fluid in legs   STG Expiration Date 01/29/19   Short Term Goal #1 1) Pt will ambulate 300ft mod I w LRAD  2) Pt will improve ambulatory balance to Fair +  3)  Pt will improve LE strength to 4+/5 b/l   Treatment Day 6   Plan   Treatment/Interventions Functional transfer training;LE strengthening/ROM; Therapeutic exercise; Endurance training;Gait training;Spoke to nursing   Progress Progressing toward goals   PT Frequency (3-5x/wk)   Recommendation   Recommendation Home PT   Equipment Recommended Walker   PT - OK to Discharge Yes     Randy Lindsay, PT

## 2019-01-22 NOTE — PLAN OF CARE
Problem: OCCUPATIONAL THERAPY ADULT  Goal: Performs self-care activities at highest level of function for planned discharge setting  See evaluation for individualized goals  Treatment Interventions: ADL retraining, Functional transfer training, UE strengthening/ROM, Endurance training, Cognitive reorientation, Patient/family training, Equipment evaluation/education, Neuromuscular reeducation, Fine motor coordination activities, Compensatory technique education, Continued evaluation, Energy conservation, Activityengagement          See flowsheet documentation for full assessment, interventions and recommendations  Outcome: Progressing  Limitation: Decreased ADL status, Decreased UE ROM, Decreased UE strength, Decreased Safe judgement during ADL, Decreased cognition, Decreased endurance, Decreased sensation, Visual deficit, Decreased fine motor control, Decreased self-care trans, Decreased high-level ADLs  Prognosis: Fair  Assessment: Pt participates in OT session with focus on bathing, dressing, grooming, and transfers to increase I for d/c  Pt setup grooming to brush teeth and wash face  Pt CGA bathing with SB and requires steadying support during stance with RW  Pt setup UB dressing to don/doff gown  Pt setup LB dressing to don/doff socks and pt able to bring LE up onto chair to don/doff socks  Pt supervision sit to stand with RW support  Chair alarm turned on post session  Pt will continue to benefit from activity tolerance, adls, and transfers       OT Discharge Recommendation: Short Term Rehab  OT - OK to Discharge: Yes (when medically stable)

## 2019-01-22 NOTE — QUICK NOTE
Nurse-Patient-Provider rounds were completed with the patient's nurse today, Fred Guajardo  We discussed the plan is to continue the 4 g sodium restricted cardiac diet  Continue to hold Coumadin light of his persistently supratherapeutic INR  Repeat labs including CBC, BMP, LFTs, INR, and I on 01/23/2018  Otherwise, continue his current medication regimen  Anticipate discharge in the next 24-48 hours pending resolution of his supratherapeutic INR  We reviewed all of the invasive devices/lines/telemetry orders   - None  Pain Assessment / Plan:  - Continue current analgesic regimen  Mobility Assessment / Plan:  - Activity as tolerated  - Continue PT and OT evaluation and treatment as indicated  Goals / Barriers for discharge:  - Anticipate discharge in the next 24-48 hours pending resolution of his supratherapeutic INR   - Case management following; case and discharge needs discussed  The patient's wife, Ronna Brown, was present during rounds  She was updated and her questions were addressed  All questions and concerns were addressed  I spent greater than 23 minutes reviewing the plan with the patient and the nurse, and coordinating care for the day      Joaquin Acuna PA-C  1/22/2019 10:53 AM

## 2019-01-22 NOTE — SOCIAL WORK
CM met with pt and pt wife all aware PT recommendation is for home with vna , pt and wife agreeable   Cm updated ecin and vna referrals

## 2019-01-22 NOTE — OCCUPATIONAL THERAPY NOTE
01/22/19 0859   Restrictions/Precautions   Weight Bearing Precautions Per Order No   Other Precautions Chair Alarm; Fall Risk   Pain Assessment   Pain Assessment No/denies pain   Pain Score No Pain   ADL   Where Assessed Chair   Grooming Assistance 5  Supervision/Setup   Grooming Deficit Setup   Grooming Comments brush teeth and wash face   UB Bathing Assistance 5  Supervision/Setup   UB Bathing Deficit Setup   LB Bathing Assistance 4  Minimal Assistance   LB Bathing Deficit Steadying   UB Dressing Assistance 5  Supervision/Setup   UB Dressing Deficit Setup   LB Dressing Assistance 5  Supervision/Setup   LB Dressing Deficit Setup   LB Dressing Comments don/doff socks   Transfers   Sit to Stand 5  Supervision   Stand to Sit 5  Supervision   Cognition   Overall Cognitive Status [facility]   Arousal/Participation Alert   Attention Attends with cues to redirect   Orientation Level Oriented X4   Memory Decreased short term memory;Decreased recall of recent events   Following Commands Follows one step commands with increased time or repetition   Activity Tolerance   Activity Tolerance Patient tolerated treatment well   Assessment   Assessment Pt participates in OT session with focus on bathing, dressing, grooming, and transfers to increase I for d/c  Pt setup grooming to brush teeth and wash face  Pt CGA bathing with SB and requires steadying support during stance with RW  Pt setup UB dressing to don/doff gown  Pt setup LB dressing to don/doff socks and pt able to bring LE up onto chair to don/doff socks  Pt supervision sit to stand with RW support  Chair alarm turned on post session  Pt will continue to benefit from activity tolerance, adls, and transfers  Plan   Treatment Interventions ADL retraining;Functional transfer training; Activityengagement   Goal Expiration Date 01/23/19   Treatment Day 6   OT Frequency 3-5x/wk   Recommendation   OT Discharge Recommendation Short Term Rehab

## 2019-01-23 VITALS
BODY MASS INDEX: 28.19 KG/M2 | SYSTOLIC BLOOD PRESSURE: 121 MMHG | DIASTOLIC BLOOD PRESSURE: 74 MMHG | HEART RATE: 88 BPM | TEMPERATURE: 98.8 F | WEIGHT: 231.48 LBS | OXYGEN SATURATION: 98 % | RESPIRATION RATE: 18 BRPM | HEIGHT: 76 IN

## 2019-01-23 PROBLEM — I48.0 PAROXYSMAL ATRIAL FIBRILLATION (HCC): Status: ACTIVE | Noted: 2019-01-23

## 2019-01-23 PROBLEM — R79.1 SUPRATHERAPEUTIC INR: Status: ACTIVE | Noted: 2019-01-23

## 2019-01-23 PROBLEM — R06.89 ACUTE RESPIRATORY INSUFFICIENCY: Status: RESOLVED | Noted: 2019-01-12 | Resolved: 2019-01-23

## 2019-01-23 LAB
ALBUMIN SERPL BCP-MCNC: 1.6 G/DL (ref 3.5–5)
ALP SERPL-CCNC: 61 U/L (ref 46–116)
ALT SERPL W P-5'-P-CCNC: 32 U/L (ref 12–78)
ANION GAP SERPL CALCULATED.3IONS-SCNC: 6 MMOL/L (ref 4–13)
ANISOCYTOSIS BLD QL SMEAR: PRESENT
AST SERPL W P-5'-P-CCNC: 33 U/L (ref 5–45)
BASOPHILS # BLD MANUAL: 0 THOUSAND/UL (ref 0–0.1)
BASOPHILS NFR MAR MANUAL: 0 % (ref 0–1)
BILIRUB DIRECT SERPL-MCNC: 0.22 MG/DL (ref 0–0.2)
BILIRUB SERPL-MCNC: 0.63 MG/DL (ref 0.2–1)
BUN SERPL-MCNC: 17 MG/DL (ref 5–25)
CALCIUM SERPL-MCNC: 7.8 MG/DL (ref 8.3–10.1)
CHLORIDE SERPL-SCNC: 103 MMOL/L (ref 100–108)
CO2 SERPL-SCNC: 28 MMOL/L (ref 21–32)
CREAT SERPL-MCNC: 1 MG/DL (ref 0.6–1.3)
EOSINOPHIL # BLD MANUAL: 0.26 THOUSAND/UL (ref 0–0.4)
EOSINOPHIL NFR BLD MANUAL: 2 % (ref 0–6)
ERYTHROCYTE [DISTWIDTH] IN BLOOD BY AUTOMATED COUNT: 13.5 % (ref 11.6–15.1)
GFR SERPL CREATININE-BSD FRML MDRD: 82 ML/MIN/1.73SQ M
GLUCOSE SERPL-MCNC: 103 MG/DL (ref 65–140)
GLUCOSE SERPL-MCNC: 105 MG/DL (ref 65–140)
GLUCOSE SERPL-MCNC: 99 MG/DL (ref 65–140)
HCT VFR BLD AUTO: 32.5 % (ref 36.5–49.3)
HGB BLD-MCNC: 10.6 G/DL (ref 12–17)
INR PPP: 4.52 (ref 0.86–1.17)
LYMPHOCYTES # BLD AUTO: 0.13 THOUSAND/UL (ref 0.6–4.47)
LYMPHOCYTES # BLD AUTO: 1 % (ref 14–44)
MAGNESIUM SERPL-MCNC: 2 MG/DL (ref 1.6–2.6)
MCH RBC QN AUTO: 29.4 PG (ref 26.8–34.3)
MCHC RBC AUTO-ENTMCNC: 32.6 G/DL (ref 31.4–37.4)
MCV RBC AUTO: 90 FL (ref 82–98)
METAMYELOCYTES NFR BLD MANUAL: 1 % (ref 0–1)
MONOCYTES # BLD AUTO: 0.66 THOUSAND/UL (ref 0–1.22)
MONOCYTES NFR BLD: 5 % (ref 4–12)
NEUTROPHILS # BLD MANUAL: 12.04 THOUSAND/UL (ref 1.85–7.62)
NEUTS BAND NFR BLD MANUAL: 1 % (ref 0–8)
NEUTS SEG NFR BLD AUTO: 90 % (ref 43–75)
NRBC BLD AUTO-RTO: 0 /100 WBCS
PLATELET # BLD AUTO: 363 THOUSANDS/UL (ref 149–390)
PLATELET BLD QL SMEAR: ADEQUATE
PMV BLD AUTO: 8.7 FL (ref 8.9–12.7)
POLYCHROMASIA BLD QL SMEAR: PRESENT
POTASSIUM SERPL-SCNC: 4.1 MMOL/L (ref 3.5–5.3)
PROT SERPL-MCNC: 5.4 G/DL (ref 6.4–8.2)
PROTHROMBIN TIME: 42.8 SECONDS (ref 11.8–14.2)
RBC # BLD AUTO: 3.61 MILLION/UL (ref 3.88–5.62)
RBC MORPH BLD: PRESENT
SODIUM SERPL-SCNC: 137 MMOL/L (ref 136–145)
WBC # BLD AUTO: 13.23 THOUSAND/UL (ref 4.31–10.16)

## 2019-01-23 PROCEDURE — 85027 COMPLETE CBC AUTOMATED: CPT | Performed by: STUDENT IN AN ORGANIZED HEALTH CARE EDUCATION/TRAINING PROGRAM

## 2019-01-23 PROCEDURE — 83735 ASSAY OF MAGNESIUM: CPT | Performed by: STUDENT IN AN ORGANIZED HEALTH CARE EDUCATION/TRAINING PROGRAM

## 2019-01-23 PROCEDURE — 85007 BL SMEAR W/DIFF WBC COUNT: CPT | Performed by: STUDENT IN AN ORGANIZED HEALTH CARE EDUCATION/TRAINING PROGRAM

## 2019-01-23 PROCEDURE — 80076 HEPATIC FUNCTION PANEL: CPT | Performed by: STUDENT IN AN ORGANIZED HEALTH CARE EDUCATION/TRAINING PROGRAM

## 2019-01-23 PROCEDURE — 85610 PROTHROMBIN TIME: CPT | Performed by: PHYSICIAN ASSISTANT

## 2019-01-23 PROCEDURE — 80048 BASIC METABOLIC PNL TOTAL CA: CPT | Performed by: STUDENT IN AN ORGANIZED HEALTH CARE EDUCATION/TRAINING PROGRAM

## 2019-01-23 PROCEDURE — 99238 HOSP IP/OBS DSCHRG MGMT 30/<: CPT | Performed by: PHYSICIAN ASSISTANT

## 2019-01-23 PROCEDURE — 82948 REAGENT STRIP/BLOOD GLUCOSE: CPT

## 2019-01-23 RX ORDER — AMIODARONE HYDROCHLORIDE 200 MG/1
200 TABLET ORAL
Qty: 30 TABLET | Refills: 1 | Status: ON HOLD | OUTPATIENT
Start: 2019-01-24 | End: 2019-04-04 | Stop reason: SDUPTHER

## 2019-01-23 RX ORDER — TAMSULOSIN HYDROCHLORIDE 0.4 MG/1
0.4 CAPSULE ORAL
Qty: 30 CAPSULE | Refills: 0 | Status: SHIPPED | OUTPATIENT
Start: 2019-01-23 | End: 2019-03-01 | Stop reason: ALTCHOICE

## 2019-01-23 RX ORDER — ACETAMINOPHEN 325 MG/1
650 TABLET ORAL EVERY 6 HOURS PRN
Qty: 30 TABLET | Refills: 0
Start: 2019-01-23 | End: 2019-03-28

## 2019-01-23 RX ORDER — WARFARIN SODIUM 1 MG/1
1 TABLET ORAL
Qty: 30 TABLET | Refills: 0 | Status: SHIPPED | OUTPATIENT
Start: 2019-01-24 | End: 2019-02-21 | Stop reason: SDUPTHER

## 2019-01-23 RX ADMIN — AMIODARONE HYDROCHLORIDE 200 MG: 200 TABLET ORAL at 08:32

## 2019-01-23 RX ADMIN — POTASSIUM CHLORIDE 40 MEQ: 1500 TABLET, EXTENDED RELEASE ORAL at 08:32

## 2019-01-23 RX ADMIN — BENZONATATE 200 MG: 100 CAPSULE ORAL at 08:31

## 2019-01-23 RX ADMIN — METOPROLOL TARTRATE 50 MG: 50 TABLET, FILM COATED ORAL at 08:32

## 2019-01-23 RX ADMIN — FUROSEMIDE 40 MG: 40 TABLET ORAL at 08:32

## 2019-01-23 RX ADMIN — GUAIFENESIN 1200 MG: 600 TABLET, EXTENDED RELEASE ORAL at 08:32

## 2019-01-23 NOTE — ASSESSMENT & PLAN NOTE
- Paroxysmal atrial fibrillation status post cardioversion   - Appreciate cardiology evaluation and recommendations  - Continue amiodarone and anticoagulation per Cardiology recommendations   - Outpatient follow-up with cardiology

## 2019-01-23 NOTE — PROGRESS NOTES
Rounded at bedside with Katiana Diaz PA-C  Discussed that patient's INR remains elevated well over desired level  Patient very eager to go home and Mynor Lee said he would need to check with cardiology and Dr Lizzy Camara if this would be a possibility  Also discussed that patient's edema and breathing seems to be improving with the Lasix

## 2019-01-23 NOTE — ASSESSMENT & PLAN NOTE
- Supratherapeutic INR following initiation of Coumadin for paroxysmal atrial fibrillation in setting of cardioversion   - Continue to hold Coumadin and follow INR daily   - Monitor for signs/symptoms of bleeding   - Plan for discharge on anticoagulation with goal on INR of 2 0-3 0   - Outpatient follow-up with cardiology

## 2019-01-23 NOTE — DISCHARGE SUMMARY
Discharge- [facility] 1960, 60 y o  male MRN: [id number]    Unit/Bed#: [facility] 826-01 Encounter: [id number]    Primary Care Provider: Terri Steen DO   Date and time admitted to hospital: 1/3/2019  7:26 AM        * Acute pancreatitis   Assessment & Plan    - Acute biliary pancreatitis status post ERCP with sphincterotomy and biliary stent placement on 01/04/2019  Pancreatitis now resolved  - Diet as tolerated  - Continue daily Lasix 40 mg for volume overload secondary to fluid resuscitation in acute phase of pancreatitis  Monitor urine output and edema  - Activity as tolerated  - Continue multimodal analgesic regimen as needed  - Outpatient follow-up with gastroenterology  Surgical follow-up as needed  Paroxysmal atrial fibrillation (HCC)   Assessment & Plan    - Paroxysmal atrial fibrillation status post cardioversion   - Appreciate cardiology evaluation and recommendations  - Continue amiodarone and anticoagulation per Cardiology recommendations   - Outpatient follow-up with cardiology  Supratherapeutic INR   Assessment & Plan    - Supratherapeutic INR following initiation of Coumadin for paroxysmal atrial fibrillation in setting of cardioversion   - Continue to hold Coumadin and follow INR daily   - Monitor for signs/symptoms of bleeding   - Plan for discharge on anticoagulation with goal on INR of 2 0-3 0   - Outpatient follow-up with cardiology  Discharge Summary - General Surgery   [facility] 60 y o  male MRN: [id number]  Unit/Bed#: [facility] 826-01 Encounter: [id number]    Admission Date: 1/3/2019     Discharge Date: 1/23/2019    Admitting Diagnosis: Pancreatitis [K85 90]  Abdominal pain [R10 9]    Discharge Diagnosis: See above  Attending and Service: [facility]  Consulting Physician(s):     1  [facility]  2  [facility]  3  [facility]      Imaging and Procedures Performed:   Orders Placed This Encounter   Procedures   [facility]    Intubation     Xr Chest Portable    Result Date: 1/5/2019  Impression: Left IJ catheter tip in the SVC  No pneumothorax  Central vascular prominence with bibasilar opacities and moderate to large bilateral pleural effusions  Workstation performed: [id number]     Xr Chest Portable    Result Date: 1/5/2019  Impression: Central vascular prominence with bibasilar opacities and moderate to large bilateral pleural effusions  Workstation performed: [id number]     Xr Chest Portable    Result Date: 1/5/2019  Impression: Increasing large left pleural effusion  Pulmonary vascular congestive changes  Workstation performed: [id number]     Xr Chest Portable    Result Date: 1/4/2019  Impression: Left greater than right pleural effusions  Pulmonary vascular congestive failure  Satisfactory positioning of endotracheal and enteric tubes  Workstation performed: [id number]     Xr Chest 2 Views    Result Date: 1/3/2019  Impression: No active pulmonary disease on examination which is somewhat limited secondary to low lung volumes  Workstation performed: [id number]     Fl Ercp Biliary Only    Result Date: 1/4/2019  Impression: Fluoroscopic guidance provided for surgical procedure  Please refer to the separate procedure notes for additional details  Workstation performed: [id number]     Ct Abdomen Pelvis W Contrast    Result Date: 1/3/2019  Impression: Acute interstitial edematous pancreatitis with extensive pancreatic and peripancreatic inflammatory edema  Within the limitations imposed by respiratory motion throughout the upper abdomen there is no convincing evidence of pancreatic necrosis or underlying pancreatic mass    Nonetheless, when better pain control can be achieved and the patient is able to tolerate better breath-hold imaging, more accurate characterization utilizing pancreatic MRI with MRCP is recommended for further investigation of the possible etiology and complications of acute pancreatitis  The study was marked in [facility] for immediate notification  Workstation performed: [id number]     ERCP, common bile duct stone extraction with balloon, placement of plastic biliary stent on 01/04/2019  Pathology: N/A    Hospital Course: Araceli Hull is a 50-year-old male who presented with acute onset bandlike abdominal pain across his upper abdomen that began the morning of presentation  It was sudden onset, and the pain radiated to his back  He had associated nausea and vomiting as well as some loose stools  The patient's wife noted that she did have a recent GI virus  The patient denied any significant alcohol intake, and noted that he had a laparoscopic cholecystectomy approximately 30 years ago  He does admit to frequent episodes of acid reflux and mid upper abdominal discomfort associated with reflux, but has not had this type of pain prior  On his initial surgical evaluation, he was hypertension with a blood pressure in the 150s over 90s, but afebrile with normal vital signs otherwise; his abdomen was soft and nondistended, but tenderness in the mid and right upper abdomen without evidence of peritonitis; the remainder of his exam was unremarkable  His initial workup included the above-noted imaging studies  He was admitted to the acute care surgery service with acute pancreatitis  He was kept NPO and aggressive IV fluid resuscitation was initiated  He was placed on an IV analgesic regimen  Further workup included additional labs including triglycerides and trending of his LFTs as he did have hyperbilirubinemia and a dilated common bile duct on admission  As he had noted worsening in his LFTs, GI was consulted for evaluation for possible primary choledocholithiasis and/or cholangitis  The GI service ultimately proceeded to perform an ERCP with common bile duct stone extraction and placement of a plastic biliary stent on 01/04/2019    The patient was placed on broad-spectrum IV antibiotics over concern for cholangitis  A urinary catheter was placed for close monitoring of his I/Os  He was transferred to the intensive care unit following his ERCP for continued resuscitation and closer monitoring  Secondary to his severe acute pancreatitis and concern for cholangitis, the patient developed acute toxic/metabolic encephalopathy as well as acute hypoxic respiratory failure requiring intubation  Additionally during his ICU stay,, he developed atrial fibrillation with rapid ventricular response  Cardiology was consulted after multiple failed attempts at synchronized cardioversion and conversion with adenosine as the patient had become hypotensive  The patient also received a bolus of IV amiodarone per Cardiology recommendations  He did receive continued support with IV amiodarone and successfully converted to normal sinus rhythm  With continued resuscitation in the intensive care unit, the patient slowly recovered and was able to be extubated  Once stable from a critical care standpoint, the patient was able to be transferred out of the intensive care unit where he continued his recovery  His diet was slowly advanced during his hospitalization  Once tolerating an oral diet, his IV fluids were weaned off and he was eventually diuresed secondary to volume overload following his aggressive fluid resuscitation during the acute phase of his pancreatitis  He completed a course of IV antibiotics for presumed cholangitis during his hospitalization  Once tolerating an oral diet, he was transitioned to an oral analgesic regimen as well as oral anticoagulation and oral amiodarone per the cardiology service  He was able to be weaned off of oxygen during his hospitalization  PT and OT initially recommended rehab, but as the patient improved during the hospitalization, he was deemed safe for discharge home  By 01/23/2019, the patient is stable for discharge      On discharge, the patient is instructed to follow-up with the patient's primary care provider within the next 2 weeks to review the events of the recent hospitalization  The patient is instructed to follow-up with the [facility] as needed  The patient is instructed to follow up with [facility] within the next 4 weeks to review the events of the recent hospitalization and discuss possible repeat ERCP with common bile duct stent removal  The patient is instructed to follow up with [facility] to review the events of the recent hospitalization and for Coumadin and amiodarone management  The patient is instructed to follow the provided discharge instructions  Condition at Discharge: good     Discharge instructions/Information to patient and family:   See after visit summary for information provided to patient and family  Provisions for Follow-Up Care:  See after visit summary for information related to follow-up care and any pertinent home health orders  Disposition: See After Visit Summary for discharge disposition information  Planned Readmission: Yes  Patient will likely need repeat ERCP for biliary stent removal     Discharge Statement   I spent 25 minutes discharging the patient  This time was spent on the day of discharge  I had direct contact with the patient on the day of discharge  Additional documentation is required if more than 30 minutes were spent on discharge  Discharge Medications:  See after visit summary for reconciled discharge medications provided to patient and family      Savannah Tucker PA-C  1/23/2019  5:45 PM

## 2019-01-23 NOTE — PROGRESS NOTES
Progress Note - Kaylin Levin 1960, 60 y o  male MRN: [id number]    Unit/Bed#: [facility] 826-01 Encounter: [id number]    Primary Care Provider: Linda De La Torre DO   Date and time admitted to hospital: 1/3/2019  7:26 AM        * Acute pancreatitis   Assessment & Plan    - Acute biliary pancreatitis status post ERCP with sphincterotomy and biliary stent placement on 01/04/2019  Pancreatitis now resolved  - Diet as tolerated  - Continue daily Lasix 40 mg for volume overload secondary to fluid resuscitation in acute phase of pancreatitis  Monitor urine output and edema  - Activity as tolerated  - Continue multimodal analgesic regimen as needed  - Outpatient follow-up with gastroenterology  Surgical follow-up as needed  Paroxysmal atrial fibrillation (HCC)   Assessment & Plan    - Paroxysmal atrial fibrillation status post cardioversion   - Appreciate cardiology evaluation and recommendations  - Continue amiodarone and anticoagulation per Cardiology recommendations   - Outpatient follow-up with cardiology  Supratherapeutic INR   Assessment & Plan    - Supratherapeutic INR following initiation of Coumadin for paroxysmal atrial fibrillation in setting of cardioversion   - Continue to hold Coumadin and follow INR daily   - Monitor for signs/symptoms of bleeding   - Plan for discharge on anticoagulation with goal on INR of 2 0-3 0   - Outpatient follow-up with cardiology  Acute respiratory insufficiencyresolved as of 1/23/2019   Assessment & Plan    Extubated 1/10  Wean O2 as tolerated         Acute Care Surgery  Progress Note   Kaylin Levin 60 y o  male MRN: [id number]  Unit/Bed#: [facility] 826-01 Encounter: [id number]    Nurse-patient-provider rounds completed today with the patient's nurse, Margie Guzman  Subjective/Objective     Subjective:  Patient is feeling well today  He offers no complaints  He feels the swelling in his legs has been improving and he has been able to move more easily    He denies any abdominal pain and is tolerating a diet well without any nausea, vomiting, or constipation  He is very anxious to get home  Objective:     Blood pressure 121/74, pulse 88, temperature 98 8 °F (37 1 °C), temperature source Oral, resp  rate 18, height 6' 4" (1 93 m), weight 105 kg (231 lb 7 7 oz), SpO2 98 %  ,Body mass index is 28 18 kg/m²  Temp (24hrs), Av 6 °F (37 °C), Min:98 3 °F (36 8 °C), Max:98 8 °F (37 1 °C)  Current: Temperature: 98 8 °F (37 1 °C)      Intake/Output Summary (Last 24 hours) at 19 0858  Last data filed at 19 0801   Gross per 24 hour   Intake              460 ml   Output             1175 ml   Net             -715 ml       Invasive Devices     Peripherally Inserted Central Catheter Line            PICC Line  Right Basilic 7 days                Physical Exam:    GENERAL APPEARANCE: Patient in no acute distress  HEENT: NCAT; PERRL, EOMs intact; Mucous membranes moist  CV: Regular rate and rhythm; + S1, S2; no murmur/gallops/rubs appreciated  LUNGS: Clear to auscultation; no wheezes/rales/rhonci  ABD: NABS; soft; non-distended; minimal epigastric tenderness  EXT: +2 pulses bilaterally upper & lower extremities; no clubbing/cyanosis; 2+ edema of the bilateral lower extremities  NEURO: GCS 15; no focal neurologic deficits; neurovascularly intact  SKIN: Warm, dry and well perfused; no rash; no jaundice  Lab, Imaging and other studies:  I have personally reviewed pertinent lab results    , CBC:   Lab Results   Component Value Date    WBC 13 23 (H) 2019    HGB 10 6 (L) 2019    HCT 32 5 (L) 2019    MCV 90 2019     2019    MCH 29 4 2019    MCHC 32 6 2019    RDW 13 5 2019    MPV 8 7 (L) 2019    NRBC 0 2019   , CMP:   Lab Results   Component Value Date    SODIUM 137 2019    K 4 1 2019     2019    CO2 28 2019    BUN 17 2019    CREATININE 1 00 01/23/2019    CALCIUM 7 8 (L) 01/23/2019    AST 33 01/23/2019    ALT 32 01/23/2019    ALKPHOS 61 01/23/2019    EGFR 82 01/23/2019   , Coagulation:   Lab Results   Component Value Date    INR 4 52 (H) 01/23/2019     VTE Pharmacologic Prophylaxis: Sequential compression device (Venodyne)  and Warfarin (Coumadin)  VTE Mechanical Prophylaxis: sequential compression device    Selin Mccracken PA-C  1/23/2019 8:58 AM

## 2019-01-23 NOTE — DISCHARGE INSTRUCTIONS
Acute Care Surgery Discharge Instructions    Please follow-up as instructed or on an as needed basis  If you need a follow-up appointment, please call the office when you leave to schedule an appointment  Activity:  - You may resume activity as tolerated  Diet:    - You may resume your normal diet  Medications:  - Begin taking Coumadin 1mg on Thursday, 1/24/2019 or as [facility] by [facility]   - You may resume all of your regular medications, including blood thinners and aspirin, after going home unless otherwise instructed  Please refer to your discharge medication list for further details  - Please take the pain medications as directed  - You may become constipated, especially if taking pain medications  You may take any over the counter stool softeners or laxatives as needed  Examples: Milk of Magnesia, Colace, Senna  Additional Instructions:  - Please obtain PT/INR on Thursday, 1/24/2019 and then on Mondays, Wednesdays and Fridays beginning on 1/25/2019 or as otherwise instructed by [facility]  PT / INR results to [facility] [address] [facility] at Phone: [phone]; Fax: [phone]   - May shower daily and/or bathe normally  - If you have any questions or concerns after discharge please call the office   - Call office or return to ER if fever greater than 101, chills, persistent nausea/vomiting, and/or worsening/uncontrollable pain  A-fib (Atrial Fibrillation)   WHAT YOU NEED TO KNOW:   A-fib may come and go, or it may be a long-term condition  A-fib can cause blood clots, stroke, or heart failure  These conditions may become life-threatening  It is important to treat and manage a-fib to help prevent a blood clot, stroke, or heart failure          DISCHARGE INSTRUCTIONS:   Call 911 for any of the following:   · You have any of the following signs of a heart attack:      ¨ Squeezing, pressure, or pain in your chest that lasts longer than 5 minutes or returns    ¨ Discomfort or pain in your back, neck, jaw, stomach, or arm     ¨ Trouble breathing    ¨ Nausea or vomiting    ¨ Lightheadedness or a sudden cold sweat, especially with chest pain or trouble breathing    · You have any of the following signs of a stroke:      ¨ Numbness or drooping on one side of your face     ¨ Weakness in an arm or leg    ¨ Confusion or difficulty speaking    ¨ Dizziness, a severe headache, or vision loss  Return to the emergency department if:  You have any of the following signs of a blood clot:  · You feel lightheaded, are short of breath, and have chest pain  · You cough up blood  · You have swelling, redness, pain, or warmth in your arm or leg  Contact your cardiologist or healthcare provider if:   · Your target heart rate is not in the range it should be  · You have new or worsening swelling in your legs, feet, ankles, or abdomen  · You are short of breath, even at rest      · You have questions or concerns about your condition or care  Medicines: You may need any of the following:  · Heart medicines  help control your heart rate and rhythm  You may need more than one medicine to treat your symptoms  · Blood thinners    help prevent blood clots  Examples of blood thinners include heparin and warfarin  Clots can cause strokes, heart attacks, and death  The following are general safety guidelines to follow while you are taking a blood thinner:    ¨ Watch for bleeding and bruising while you take blood thinners  Watch for bleeding from your gums or nose  Watch for blood in your urine and bowel movements  Use a soft washcloth on your skin, and a soft toothbrush to brush your teeth  This can keep your skin and gums from bleeding  If you shave, use an electric shaver  Do not play contact sports  ¨ Tell your dentist and other healthcare providers that you take anticoagulants  Wear a bracelet or necklace that says you take this medicine       ¨ Do not start or stop any medicines unless your healthcare provider tells you to  Many medicines cannot be used with blood thinners  ¨ Tell your healthcare provider right away if you forget to take the medicine, or if you take too much  ¨ Warfarin  is a blood thinner that you may need to take  The following are things you should be aware of if you take warfarin  § Foods and medicines can affect the amount of warfarin in your blood  Do not make major changes to your diet while you take warfarin  Warfarin works best when you eat about the same amount of vitamin K every day  Vitamin K is found in green leafy vegetables and certain other foods  Ask for more information about what to eat when you are taking warfarin  § You will need to see your healthcare provider for follow-up visits when you are on warfarin  You will need regular blood tests  These tests are used to decide how much medicine you need  · Antiplatelets , such as aspirin, help prevent blood clots  Take your antiplatelet medicine exactly as directed  These medicines make it more likely for you to bleed or bruise  If you are told to take aspirin, do not take acetaminophen or ibuprofen instead  · Take your medicine as directed  Contact your healthcare provider if you think your medicine is not helping or if you have side effects  Tell him or her if you are allergic to any medicine  Keep a list of the medicines, vitamins, and herbs you take  Include the amounts, and when and why you take them  Bring the list or the pill bottles to follow-up visits  Carry your medicine list with you in case of an emergency  Follow up with your cardiologist as directed: You will need regular blood tests and monitoring  Write down your questions so you remember to ask them during your visits  Manage A-fib:   · Know your target heart rate  Learn how to take your pulse and monitor your heart rate  · Manage other health conditions    This includes high blood pressure, sleep apnea, thyroid disease, diabetes, and other heart conditions  Take medicine as directed and follow your treatment plan  · Limit or do not drink alcohol  Alcohol can make a-fib hard to manage  Ask your healthcare provider if it is safe for you to drink alcohol  A drink of alcohol is 12 ounces of beer, 5 ounces of wine, or 1½ ounces of liquor  · Do not smoke  Nicotine and other chemicals in cigarettes and cigars can cause heart and lung damage  Ask your healthcare provider for information if you currently smoke and need help to quit  E-cigarettes or smokeless tobacco still contain nicotine  Talk to your healthcare provider before you use these products  · Eat heart-healthy foods  Heart healthy foods will help keep your cholesterol low  These include fruits, vegetables, whole-grain breads, low-fat dairy products, beans, lean meats, and fish  Replace butter and margarine with heart-healthy oils such as olive oil and canola oil  · Maintain a healthy weight  Ask your healthcare provider how much you should weigh  Ask him to help you create a weight loss plan if you are overweight  · Exercise for 30 minutes  most days of the week  Ask your healthcare provider about the best exercise plan for you  © 2017 [employer]  Information is for End User's use only and may not be sold, redistributed or otherwise used for commercial purposes  All illustrations and images included in CareNotes® are the copyrighted property of [employer]  or Jimmie Franklin  The above information is an  only  It is not intended as medical advice for individual conditions or treatments  Talk to your doctor, nurse or pharmacist before following any medical regimen to see if it is safe and effective for you  Warfarin (By mouth)   Warfarin (WAR-far-in)  Prevents and treats blood clots  May lower the risk of serious complications after a heart attack  This medicine is a blood thinner  Brand Name(s): Coumadin, Jantoven   There may be other brand names for this medicine  When This Medicine Should Not Be Used: This medicine is not right for everyone  Do not use it if you had an allergic reaction to warfarin, if you are pregnant, or if you have health problems that could cause bleeding  How to Use This Medicine:   Tablet  · Take your medicine as directed  Your dose may need to be changed several times to find what works best for you  · This medicine should come with a Medication Guide  Ask your pharmacist for a copy if you do not have one  · Missed dose: Take a dose as soon as you remember  If it is almost time for your next dose, wait until then and take a regular dose  Do not take extra medicine to make up for a missed dose  · Store the medicine in a closed container at room temperature, away from heat, moisture, and direct light  Drugs and Foods to Avoid:   Ask your doctor or pharmacist before using any other medicine, including over-the-counter medicines, vitamins, and herbal products  · Many medicines and foods can affect how warfarin works and may affect the PT/INR test results  Tell your doctor before you start or stop any medicine, especially the following:   ¨ Ginkgo, echinacea, goldenseal, or Jessica's wort  ¨ Another blood thinner, including apixaban, argatroban, bivalirudin, cilostazol, clopidogrel, dabigatran, desirudin, dipyridamole, heparin, lepirudin, prasugrel, rivaroxaban, ticlopidine  ¨ Medicine to treat depression or anxiety, including citalopram, desvenlafaxine, duloxetine, escitalopram, fluoxetine, fluvoxamine, milnacipran, paroxetine, sertraline, venlafaxine, vilazodone  ¨ Medicine to treat an infection  ¨ NSAID pain or arthritis medicine, including aspirin, celecoxib, diclofenac, diflunisal, fenoprofen, ibuprofen, indomethacin, ketoprofen, ketorolac, mefenamic acid, naproxen, oxaprozin, piroxicam, sulindac   Check labels for over-the-counter medicines to find out if they contain an NSAID  ¨ Steroid medicine, including dexamethasone, hydrocortisone, methylprednisolone, prednisolone, prednisone  · Warfarin works best if you eat about the same amount of vitamin K every day  Foods high in vitamin K include asparagus, broccoli, brussels sprouts, cabbage, green leafy vegetables, plums, rhubarb, and canola oil  Talk to your doctor before you make changes to your normal diet  · Do not eat grapefruit or drink grapefruit juice while you are using this medicine  Warnings While Using This Medicine:   · It is not safe to take this medicine during pregnancy  It could harm an unborn baby  Tell your doctor right away if you become pregnant  Use an effective form of birth control to keep from getting pregnant during treatment and for at least 1 month after your last dose  · Tell your doctor if you are breastfeeding, or if you have kidney disease, liver disease, diabetes, heart disease, heart failure, high blood pressure, an infection, a stomach ulcer, or protein C deficiency  Also tell your doctor if you had recent surgery or an injury, or a history of stroke, anemia, severe bleeding or bruising, or problems caused by heparin use  · This medicine may cause the following problems:   ¨ Bleeding, which may be life-threatening  ¨ Gangrene (skin or tissue damage)  ¨ Calciphylaxis or calcium uremic arteriolopathy  ¨ Purple toes syndrome  · You must have a PT/INR blood test while you use this medicine to check how well your blood is clotting  Your doctor will use the test results to make sure the medicine is working properly  Keep all appointments for the PT/INR blood tests  · You may bleed or bruise more easily with warfarin  To prevent injury or cuts, do not play rough sports, be careful with sharp objects, and brush and floss your teeth gently  Pancho Heller your nose gently, and do not pick your nose    · Carry an ID card or wear a medical alert bracelet to let emergency caregivers know that you use warfarin  · Tell any doctor or dentist who treats you that you are using this medicine  You may need to stop using this medicine several days before you have surgery or medical tests  · Keep all medicine out of the reach of children  Never share your medicine with anyone  Possible Side Effects While Using This Medicine:   Call your doctor right away if you notice any of these side effects:  · Allergic reaction: Itching or hives, swelling in your face or hands, swelling or tingling in your mouth or throat, chest tightness, trouble breathing  · Bleeding from your gums or nose, coughing up blood, heavy monthly periods  · Bleeding that does not stop, bruising, or weakness  · Dizziness, fainting, lightheadedness  · Pain, brown or black skin, or skin that is cool to the touch  · Purple toes or feet, or new pain in your leg, foot, or toes  · Purplish red, net-like, blotchy spots on the skin  · Red or dark brown urine, or red or black, tarry stools  · Vomiting blood or material that looks like coffee grounds  If you notice other side effects that you think are caused by this medicine, tell your doctor  Call your doctor for medical advice about side effects  You may report side effects to FDA at 5-811-FDA-3524  © 2017 [employer] Information is for End User's use only and may not be sold, redistributed or otherwise used for commercial purposes  The above information is an  only  It is not intended as medical advice for individual conditions or treatments  Talk to your doctor, nurse or pharmacist before following any medical regimen to see if it is safe and effective for you

## 2019-01-24 ENCOUNTER — APPOINTMENT (OUTPATIENT)
Dept: LAB | Facility: CLINIC | Age: 59
End: 2019-01-24
Payer: COMMERCIAL

## 2019-01-24 ENCOUNTER — ANTICOAG VISIT (OUTPATIENT)
Dept: CARDIOLOGY CLINIC | Facility: CLINIC | Age: 59
End: 2019-01-24

## 2019-01-24 DIAGNOSIS — K85.10 ACUTE BILIARY PANCREATITIS, UNSPECIFIED COMPLICATION STATUS: ICD-10-CM

## 2019-01-24 DIAGNOSIS — I48.0 PAROXYSMAL ATRIAL FIBRILLATION (HCC): ICD-10-CM

## 2019-01-24 LAB
INR PPP: 3.29 (ref 0.86–1.17)
PROTHROMBIN TIME: 33.5 SECONDS (ref 11.8–14.2)

## 2019-01-24 PROCEDURE — 36415 COLL VENOUS BLD VENIPUNCTURE: CPT

## 2019-01-24 PROCEDURE — 85610 PROTHROMBIN TIME: CPT

## 2019-01-25 ENCOUNTER — ANTICOAG VISIT (OUTPATIENT)
Dept: CARDIOLOGY CLINIC | Facility: CLINIC | Age: 59
End: 2019-01-25

## 2019-01-25 DIAGNOSIS — I48.0 PAROXYSMAL ATRIAL FIBRILLATION (HCC): ICD-10-CM

## 2019-01-25 LAB — INR PPP: 3.2 (ref 0.86–1.17)

## 2019-01-28 ENCOUNTER — ANTICOAG VISIT (OUTPATIENT)
Dept: CARDIOLOGY CLINIC | Facility: CLINIC | Age: 59
End: 2019-01-28

## 2019-01-28 DIAGNOSIS — I48.0 PAROXYSMAL ATRIAL FIBRILLATION (HCC): ICD-10-CM

## 2019-01-28 LAB — INR PPP: 2.2 (ref 0.86–1.17)

## 2019-01-30 ENCOUNTER — TELEPHONE (OUTPATIENT)
Dept: GASTROENTEROLOGY | Facility: CLINIC | Age: 59
End: 2019-01-30

## 2019-01-30 ENCOUNTER — ANTICOAG VISIT (OUTPATIENT)
Dept: CARDIOLOGY CLINIC | Facility: CLINIC | Age: 59
End: 2019-01-30

## 2019-01-30 DIAGNOSIS — I48.0 PAROXYSMAL ATRIAL FIBRILLATION (HCC): ICD-10-CM

## 2019-01-30 LAB — INR PPP: 2.3 (ref 0.86–1.17)

## 2019-01-30 NOTE — TELEPHONE ENCOUNTER
Duane Sagastume Born    This patient had ERCP on 01/04/19, he was admitted with acute pancreatitis and cholangitis  He states that his abdominal pain has not resolved since discharge from the hospital   He feels that is worse with eating or movement  He denies any nausea, vomiting or fevers  I discussed with him the fastest and safest option was for him to go to the ER for prompt blood work and possibly imaging, he states he will consider this  He has a follow-up scheduled with us in February

## 2019-01-30 NOTE — TELEPHONE ENCOUNTER
Patient with hx of pancreatitis post ERCP  Patient states he has had a dull ache in the area above his pancreas since the day he was discharged on 1/23/19  He describes it as an ache that does not bother him when sitting, but is a pain level of 6-7 when he is standing  The pain is constant and radiates around his back  It is better with tylenol  Stools are normal and regular, he occasionally has no appetite, breathing is fine  He tries to eat small meals and he stays hydrated  He was told the pain would subside with time, but it has not improved at all  The pain is "annoying after this long "     Please advise

## 2019-01-31 DIAGNOSIS — K85.00 IDIOPATHIC ACUTE PANCREATITIS, UNSPECIFIED COMPLICATION STATUS: Primary | ICD-10-CM

## 2019-01-31 NOTE — TELEPHONE ENCOUNTER
Mayuri Pal spoke with him and his wife about Dr Zoila Montaño recommendations - he has an appt on Feb 13th so can you call to help him schedule the CT scan prior that? He and his wife state they do not know how to schedule it  Thanks!

## 2019-01-31 NOTE — PROGRESS NOTES
Patient is having abdominal pain after eating and nausea  Mild leukocytosis noted last week  Will get CBC, CMP and CT scan of abdomen and pelvis  Please schedule follow-up visit in 1-2 weeks with me or Dr Danette Arechiga

## 2019-01-31 NOTE — TELEPHONE ENCOUNTER
Tashi Neal,    Given his history of acute pancreatitis and ICU admission  I recommend checking his labs and CT scan  Will see him in the office in 1-2 weeks  I sent a message to one of the MA's to arrange that       Thanks

## 2019-01-31 NOTE — TELEPHONE ENCOUNTER
Called and spoke to patient's wife - I gave her the number for central scheduling, as well as answered a few of her questions  She demonstrated understanding and is going to call them to schedule the CT scan

## 2019-02-01 ENCOUNTER — ANTICOAG VISIT (OUTPATIENT)
Dept: CARDIOLOGY CLINIC | Facility: CLINIC | Age: 59
End: 2019-02-01

## 2019-02-01 ENCOUNTER — APPOINTMENT (OUTPATIENT)
Dept: LAB | Facility: CLINIC | Age: 59
End: 2019-02-01
Payer: COMMERCIAL

## 2019-02-01 DIAGNOSIS — K85.00 IDIOPATHIC ACUTE PANCREATITIS, UNSPECIFIED COMPLICATION STATUS: ICD-10-CM

## 2019-02-01 DIAGNOSIS — I48.0 PAROXYSMAL ATRIAL FIBRILLATION (HCC): ICD-10-CM

## 2019-02-01 LAB
BASOPHILS # BLD AUTO: 0.05 THOUSANDS/ΜL (ref 0–0.1)
BASOPHILS NFR BLD AUTO: 0 % (ref 0–1)
EOSINOPHIL # BLD AUTO: 0 THOUSAND/ΜL (ref 0–0.61)
EOSINOPHIL NFR BLD AUTO: 0 % (ref 0–6)
ERYTHROCYTE [DISTWIDTH] IN BLOOD BY AUTOMATED COUNT: 13.5 % (ref 11.6–15.1)
HCT VFR BLD AUTO: 34.2 % (ref 36.5–49.3)
HGB BLD-MCNC: 11.1 G/DL (ref 12–17)
IMM GRANULOCYTES # BLD AUTO: 0.13 THOUSAND/UL (ref 0–0.2)
IMM GRANULOCYTES NFR BLD AUTO: 1 % (ref 0–2)
INR PPP: 2 (ref 0.86–1.17)
LYMPHOCYTES # BLD AUTO: 1.26 THOUSANDS/ΜL (ref 0.6–4.47)
LYMPHOCYTES NFR BLD AUTO: 10 % (ref 14–44)
MCH RBC QN AUTO: 29 PG (ref 26.8–34.3)
MCHC RBC AUTO-ENTMCNC: 32.5 G/DL (ref 31.4–37.4)
MCV RBC AUTO: 89 FL (ref 82–98)
MONOCYTES # BLD AUTO: 1.2 THOUSAND/ΜL (ref 0.17–1.22)
MONOCYTES NFR BLD AUTO: 10 % (ref 4–12)
NEUTROPHILS # BLD AUTO: 9.45 THOUSANDS/ΜL (ref 1.85–7.62)
NEUTS SEG NFR BLD AUTO: 79 % (ref 43–75)
NRBC BLD AUTO-RTO: 0 /100 WBCS
PLATELET # BLD AUTO: 440 THOUSANDS/UL (ref 149–390)
PMV BLD AUTO: 8.4 FL (ref 8.9–12.7)
RBC # BLD AUTO: 3.83 MILLION/UL (ref 3.88–5.62)
WBC # BLD AUTO: 12.09 THOUSAND/UL (ref 4.31–10.16)

## 2019-02-01 PROCEDURE — 36415 COLL VENOUS BLD VENIPUNCTURE: CPT

## 2019-02-01 PROCEDURE — 85025 COMPLETE CBC W/AUTO DIFF WBC: CPT

## 2019-02-03 ENCOUNTER — APPOINTMENT (EMERGENCY)
Dept: CT IMAGING | Facility: HOSPITAL | Age: 59
DRG: 438 | End: 2019-02-03
Payer: COMMERCIAL

## 2019-02-03 ENCOUNTER — HOSPITAL ENCOUNTER (INPATIENT)
Facility: HOSPITAL | Age: 59
LOS: 6 days | Discharge: HOME HEALTH | DRG: 438 | End: 2019-02-09
Attending: EMERGENCY MEDICINE | Admitting: HOSPITALIST
Payer: COMMERCIAL

## 2019-02-03 DIAGNOSIS — J90 PLEURAL EFFUSION, LEFT: ICD-10-CM

## 2019-02-03 DIAGNOSIS — K86.3 PANCREATIC PSEUDOCYST: ICD-10-CM

## 2019-02-03 DIAGNOSIS — K20.90 ESOPHAGITIS: ICD-10-CM

## 2019-02-03 DIAGNOSIS — E46 PROTEIN-CALORIE MALNUTRITION, UNSPECIFIED SEVERITY (HCC): ICD-10-CM

## 2019-02-03 DIAGNOSIS — K85.00 IDIOPATHIC ACUTE PANCREATITIS WITHOUT INFECTION OR NECROSIS: Primary | ICD-10-CM

## 2019-02-03 DIAGNOSIS — R10.9 ABDOMINAL PAIN: Primary | ICD-10-CM

## 2019-02-03 PROBLEM — E87.5 HYPERKALEMIA: Status: ACTIVE | Noted: 2019-02-03

## 2019-02-03 LAB
ALBUMIN SERPL BCP-MCNC: 1.8 G/DL (ref 3.5–5)
ALP SERPL-CCNC: 58 U/L (ref 46–116)
ALT SERPL W P-5'-P-CCNC: 22 U/L (ref 12–78)
ANION GAP SERPL CALCULATED.3IONS-SCNC: 6 MMOL/L (ref 4–13)
APTT PPP: 44 SECONDS (ref 26–38)
AST SERPL W P-5'-P-CCNC: 33 U/L (ref 5–45)
BACTERIA UR QL AUTO: ABNORMAL /HPF
BASOPHILS # BLD AUTO: 0.02 THOUSANDS/ΜL (ref 0–0.1)
BASOPHILS NFR BLD AUTO: 0 % (ref 0–1)
BILIRUB SERPL-MCNC: 0.6 MG/DL (ref 0.2–1)
BILIRUB UR QL STRIP: NEGATIVE
BUN SERPL-MCNC: 11 MG/DL (ref 5–25)
CALCIUM SERPL-MCNC: 8.7 MG/DL (ref 8.3–10.1)
CHLORIDE SERPL-SCNC: 99 MMOL/L (ref 100–108)
CLARITY UR: CLEAR
CO2 SERPL-SCNC: 31 MMOL/L (ref 21–32)
COLOR UR: ABNORMAL
CREAT SERPL-MCNC: 1.04 MG/DL (ref 0.6–1.3)
EOSINOPHIL # BLD AUTO: 0.03 THOUSAND/ΜL (ref 0–0.61)
EOSINOPHIL NFR BLD AUTO: 0 % (ref 0–6)
ERYTHROCYTE [DISTWIDTH] IN BLOOD BY AUTOMATED COUNT: 13.5 % (ref 11.6–15.1)
GFR SERPL CREATININE-BSD FRML MDRD: 78 ML/MIN/1.73SQ M
GLUCOSE SERPL-MCNC: 95 MG/DL (ref 65–140)
GLUCOSE UR STRIP-MCNC: NEGATIVE MG/DL
HCT VFR BLD AUTO: 32.9 % (ref 36.5–49.3)
HGB BLD-MCNC: 10.9 G/DL (ref 12–17)
HGB UR QL STRIP.AUTO: NEGATIVE
IMM GRANULOCYTES # BLD AUTO: 0.1 THOUSAND/UL (ref 0–0.2)
IMM GRANULOCYTES NFR BLD AUTO: 1 % (ref 0–2)
INR PPP: 1.91 (ref 0.86–1.17)
KETONES UR STRIP-MCNC: NEGATIVE MG/DL
LEUKOCYTE ESTERASE UR QL STRIP: NEGATIVE
LIPASE SERPL-CCNC: 193 U/L (ref 73–393)
LYMPHOCYTES # BLD AUTO: 1.01 THOUSANDS/ΜL (ref 0.6–4.47)
LYMPHOCYTES NFR BLD AUTO: 11 % (ref 14–44)
MCH RBC QN AUTO: 28.9 PG (ref 26.8–34.3)
MCHC RBC AUTO-ENTMCNC: 33.1 G/DL (ref 31.4–37.4)
MCV RBC AUTO: 87 FL (ref 82–98)
MONOCYTES # BLD AUTO: 0.88 THOUSAND/ΜL (ref 0.17–1.22)
MONOCYTES NFR BLD AUTO: 9 % (ref 4–12)
MUCOUS THREADS UR QL AUTO: ABNORMAL
NEUTROPHILS # BLD AUTO: 7.35 THOUSANDS/ΜL (ref 1.85–7.62)
NEUTS SEG NFR BLD AUTO: 79 % (ref 43–75)
NITRITE UR QL STRIP: NEGATIVE
NON-SQ EPI CELLS URNS QL MICRO: ABNORMAL /HPF
NRBC BLD AUTO-RTO: 0 /100 WBCS
PH UR STRIP.AUTO: 5.5 [PH] (ref 4.5–8)
PLATELET # BLD AUTO: 410 THOUSANDS/UL (ref 149–390)
PMV BLD AUTO: 8 FL (ref 8.9–12.7)
POTASSIUM SERPL-SCNC: 5.6 MMOL/L (ref 3.5–5.3)
PROT SERPL-MCNC: 6.2 G/DL (ref 6.4–8.2)
PROT UR STRIP-MCNC: ABNORMAL MG/DL
PROTHROMBIN TIME: 21.3 SECONDS (ref 11.8–14.2)
RBC # BLD AUTO: 3.77 MILLION/UL (ref 3.88–5.62)
RBC #/AREA URNS AUTO: ABNORMAL /HPF
SODIUM SERPL-SCNC: 136 MMOL/L (ref 136–145)
SP GR UR STRIP.AUTO: >=1.03 (ref 1–1.03)
UROBILINOGEN UR QL STRIP.AUTO: 0.2 E.U./DL
WBC # BLD AUTO: 9.39 THOUSAND/UL (ref 4.31–10.16)
WBC #/AREA URNS AUTO: ABNORMAL /HPF

## 2019-02-03 PROCEDURE — 96361 HYDRATE IV INFUSION ADD-ON: CPT

## 2019-02-03 PROCEDURE — 99223 1ST HOSP IP/OBS HIGH 75: CPT | Performed by: HOSPITALIST

## 2019-02-03 PROCEDURE — 96375 TX/PRO/DX INJ NEW DRUG ADDON: CPT

## 2019-02-03 PROCEDURE — 96374 THER/PROPH/DIAG INJ IV PUSH: CPT

## 2019-02-03 PROCEDURE — 93005 ELECTROCARDIOGRAM TRACING: CPT

## 2019-02-03 PROCEDURE — 85610 PROTHROMBIN TIME: CPT | Performed by: EMERGENCY MEDICINE

## 2019-02-03 PROCEDURE — 80053 COMPREHEN METABOLIC PANEL: CPT | Performed by: EMERGENCY MEDICINE

## 2019-02-03 PROCEDURE — C9113 INJ PANTOPRAZOLE SODIUM, VIA: HCPCS | Performed by: HOSPITALIST

## 2019-02-03 PROCEDURE — 99254 IP/OBS CNSLTJ NEW/EST MOD 60: CPT | Performed by: INTERNAL MEDICINE

## 2019-02-03 PROCEDURE — 85730 THROMBOPLASTIN TIME PARTIAL: CPT | Performed by: EMERGENCY MEDICINE

## 2019-02-03 PROCEDURE — 81001 URINALYSIS AUTO W/SCOPE: CPT

## 2019-02-03 PROCEDURE — 99285 EMERGENCY DEPT VISIT HI MDM: CPT

## 2019-02-03 PROCEDURE — 85025 COMPLETE CBC W/AUTO DIFF WBC: CPT | Performed by: EMERGENCY MEDICINE

## 2019-02-03 PROCEDURE — 36415 COLL VENOUS BLD VENIPUNCTURE: CPT | Performed by: EMERGENCY MEDICINE

## 2019-02-03 PROCEDURE — 74176 CT ABD & PELVIS W/O CONTRAST: CPT

## 2019-02-03 PROCEDURE — 83690 ASSAY OF LIPASE: CPT | Performed by: EMERGENCY MEDICINE

## 2019-02-03 RX ORDER — ONDANSETRON 2 MG/ML
4 INJECTION INTRAMUSCULAR; INTRAVENOUS ONCE
Status: COMPLETED | OUTPATIENT
Start: 2019-02-03 | End: 2019-02-03

## 2019-02-03 RX ORDER — HYDROMORPHONE HCL/PF 1 MG/ML
0.2 SYRINGE (ML) INJECTION EVERY 4 HOURS PRN
Status: DISCONTINUED | OUTPATIENT
Start: 2019-02-03 | End: 2019-02-09 | Stop reason: HOSPADM

## 2019-02-03 RX ORDER — HYDROMORPHONE HCL/PF 1 MG/ML
1 SYRINGE (ML) INJECTION ONCE
Status: COMPLETED | OUTPATIENT
Start: 2019-02-03 | End: 2019-02-03

## 2019-02-03 RX ORDER — HYDROMORPHONE HCL/PF 1 MG/ML
0.5 SYRINGE (ML) INJECTION EVERY 4 HOURS PRN
Status: DISCONTINUED | OUTPATIENT
Start: 2019-02-03 | End: 2019-02-09 | Stop reason: HOSPADM

## 2019-02-03 RX ORDER — PANTOPRAZOLE SODIUM 40 MG/1
40 INJECTION, POWDER, FOR SOLUTION INTRAVENOUS EVERY 12 HOURS SCHEDULED
Status: DISCONTINUED | OUTPATIENT
Start: 2019-02-03 | End: 2019-02-09 | Stop reason: HOSPADM

## 2019-02-03 RX ORDER — SODIUM CHLORIDE 9 MG/ML
100 INJECTION, SOLUTION INTRAVENOUS CONTINUOUS
Status: DISCONTINUED | OUTPATIENT
Start: 2019-02-03 | End: 2019-02-06

## 2019-02-03 RX ORDER — AMIODARONE HYDROCHLORIDE 200 MG/1
200 TABLET ORAL
Status: DISCONTINUED | OUTPATIENT
Start: 2019-02-04 | End: 2019-02-09 | Stop reason: HOSPADM

## 2019-02-03 RX ORDER — ONDANSETRON 2 MG/ML
4 INJECTION INTRAMUSCULAR; INTRAVENOUS EVERY 6 HOURS PRN
Status: DISCONTINUED | OUTPATIENT
Start: 2019-02-03 | End: 2019-02-09 | Stop reason: HOSPADM

## 2019-02-03 RX ORDER — ACETAMINOPHEN 325 MG/1
650 TABLET ORAL EVERY 6 HOURS PRN
Status: DISCONTINUED | OUTPATIENT
Start: 2019-02-03 | End: 2019-02-09 | Stop reason: HOSPADM

## 2019-02-03 RX ORDER — HYDROMORPHONE HCL/PF 1 MG/ML
0.2 SYRINGE (ML) INJECTION EVERY 4 HOURS PRN
Status: DISCONTINUED | OUTPATIENT
Start: 2019-02-03 | End: 2019-02-07

## 2019-02-03 RX ADMIN — IOHEXOL 50 ML: 240 INJECTION, SOLUTION INTRATHECAL; INTRAVASCULAR; INTRAVENOUS; ORAL at 15:48

## 2019-02-03 RX ADMIN — ONDANSETRON 4 MG: 2 INJECTION INTRAMUSCULAR; INTRAVENOUS at 14:19

## 2019-02-03 RX ADMIN — SODIUM CHLORIDE 1000 ML: 0.9 INJECTION, SOLUTION INTRAVENOUS at 14:05

## 2019-02-03 RX ADMIN — ACETAMINOPHEN 650 MG: 325 TABLET, FILM COATED ORAL at 22:12

## 2019-02-03 RX ADMIN — PANTOPRAZOLE SODIUM 40 MG: 40 INJECTION, POWDER, FOR SOLUTION INTRAVENOUS at 20:28

## 2019-02-03 RX ADMIN — SODIUM CHLORIDE 100 ML/HR: 0.9 INJECTION, SOLUTION INTRAVENOUS at 20:00

## 2019-02-03 RX ADMIN — HYDROMORPHONE HYDROCHLORIDE 1 MG: 1 INJECTION, SOLUTION INTRAMUSCULAR; INTRAVENOUS; SUBCUTANEOUS at 14:19

## 2019-02-03 NOTE — ED PROVIDER NOTES
History  Chief Complaint   Patient presents with    Abdominal Pain     abd pain , pt has history of pancreatitis, had labs friday , left upper quadrant pain continues     76-year-old gentleman comes in for abdominal pain  Patient has a history of  Acute biliary pancreatitis status post ERCP with sphincterotomy and biliary stent placement on 01/04/2019  Patient's pancreatitis at that time resolved and he went home  Patient states that although his lipase got better his abdominal pain never fully resolved and is now getting much worse  Patient has some nausea no vomiting no diarrhea and has not been running any fevers  When asked for patient to point to where his pain is he pains to his left upper quadrant and his left flank  Patient denies any dysuria or hematuria        History provided by:  Patient and spouse   used: No    Abdominal Pain   Pain location:  LUQ  Pain quality: sharp and stabbing    Pain radiates to:  L flank  Pain severity:  Severe  Onset quality:  Gradual  Duration:  4 weeks  Timing:  Constant  Progression:  Worsening  Chronicity:  Recurrent  Context: previous surgery    Context: not suspicious food intake    Worsened by: Movement, palpation, position changes and eating  Ineffective treatments:  OTC medications  Associated symptoms: anorexia and nausea    Associated symptoms: no chest pain, no cough, no fever, no hematuria and no vomiting    Risk factors: recent hospitalization    Risk factors: no alcohol abuse and not obese        Prior to Admission Medications   Prescriptions Last Dose Informant Patient Reported?  Taking?   acetaminophen (TYLENOL) 325 mg tablet   No Yes   Sig: Take 2 tablets (650 mg total) by mouth every 6 (six) hours as needed for mild pain or fever   amiodarone 200 mg tablet   No Yes   Sig: Take 1 tablet (200 mg total) by mouth daily with breakfast   tamsulosin (FLOMAX) 0 4 mg   No Yes   Sig: Take 1 capsule (0 4 mg total) by mouth daily with dinner warfarin (COUMADIN) 1 mg tablet   No Yes   Sig: Take 1 tablet (1 mg total) by mouth daily Take as prescribed by Cardiology  Facility-Administered Medications: None       Past Medical History:   Diagnosis Date    Gastroesophageal reflux     Pancreatitis        Past Surgical History:   Procedure Laterality Date    CHOLECYSTECTOMY      COLONOSCOPY N/A 3/21/2016    Procedure: COLONOSCOPY;  Surgeon: Malgorzata Cruz DO;  Location: [facility]; Service:     ERCP N/A 1/4/2019    Procedure: ENDOSCOPIC RETROGRADE CHOLANGIOPANCREATOGRAPHY (ERCP); Surgeon: Araceli Lancaster MD;  Location: [facility]; Service: Gastroenterology    SD EGD TRANSORAL BIOPSY SINGLE/MULTIPLE N/A 3/21/2016    Procedure: ESOPHAGOGASTRODUODENOSCOPY (EGD); Surgeon: Malgorzata Cruz DO;  Location: [facility]; Service: General       Family History   Problem Relation Age of Onset    Lung disease Neg Hx      I have reviewed and agree with the history as documented  Social History   Substance Use Topics    Smoking status: Never Smoker    Smokeless tobacco: Never Used    Alcohol use No      Comment: rarely        Review of Systems   Constitutional: Negative for activity change, appetite change and fever  HENT: Negative for congestion and facial swelling  Eyes: Negative for discharge and redness  Respiratory: Negative for cough and wheezing  Cardiovascular: Negative for chest pain and leg swelling  Gastrointestinal: Positive for abdominal pain, anorexia and nausea  Negative for abdominal distention, blood in stool and vomiting  Endocrine: Negative for cold intolerance and polydipsia  Genitourinary: Negative for difficulty urinating and hematuria  Musculoskeletal: Negative for arthralgias and gait problem  Skin: Negative for color change and rash  Allergic/Immunologic: Negative for food allergies and immunocompromised state  Neurological: Negative for dizziness, seizures and headaches     Hematological: Negative for adenopathy  Does not bruise/bleed easily  Psychiatric/Behavioral: Negative for agitation, confusion and decreased concentration  All other systems reviewed and are negative  Physical Exam  Physical Exam   Constitutional: He is oriented to person, place, and time  He appears well-developed and well-nourished  Non-toxic appearance  He appears distressed  HENT:   Head: Normocephalic and atraumatic  Right Ear: Tympanic membrane normal    Left Ear: Tympanic membrane normal    Nose: Nose normal    Mouth/Throat: Oropharynx is clear and moist    Eyes: Pupils are equal, round, and reactive to light  Conjunctivae, EOM and lids are normal    Neck: Trachea normal and normal range of motion  Neck supple  No JVD present  Carotid bruit is not present  Cardiovascular: Normal rate, regular rhythm, normal heart sounds and intact distal pulses  No extrasystoles are present  Pulmonary/Chest: Effort normal  He has no decreased breath sounds  He has no wheezes  He has no rhonchi  He has no rales  He exhibits no tenderness and no deformity  Abdominal: Soft  Bowel sounds are normal  He exhibits distension  There is tenderness in the left upper quadrant  There is CVA tenderness (On the left)  There is no rebound and no guarding  Musculoskeletal:        Right shoulder: He exhibits normal range of motion, no tenderness, no swelling and no deformity  Cervical back: Normal  He exhibits normal range of motion, no tenderness, no bony tenderness and no deformity  Lymphadenopathy:     He has no cervical adenopathy  He has no axillary adenopathy  Neurological: He is alert and oriented to person, place, and time  He has normal strength and normal reflexes  No cranial nerve deficit or sensory deficit  He displays a negative Romberg sign  Skin: Skin is warm and dry  Psychiatric: He has a normal mood and affect   His speech is normal and behavior is normal  Judgment and thought content normal  Cognition and memory are normal    Nursing note and vitals reviewed        Vital Signs  ED Triage Vitals   Temperature Pulse Respirations Blood Pressure SpO2   02/03/19 1346 02/03/19 1330 02/03/19 1330 02/03/19 1330 02/03/19 1330   99 2 °F (37 3 °C) 98 20 145/98 97 %      Temp Source Heart Rate Source Patient Position - Orthostatic VS BP Location FiO2 (%)   02/03/19 1346 02/03/19 1518 02/03/19 1518 02/03/19 1518 --   Oral Monitor Lying Left arm       Pain Score       02/03/19 1330       6           Vitals:    02/03/19 1530 02/03/19 1600 02/03/19 1630 02/03/19 1732   BP: 131/73 132/66 141/73 132/81   Pulse: 80 86 88 82   Patient Position - Orthostatic VS:    Lying       Visual Acuity      ED Medications  Medications   ondansetron (ZOFRAN) injection 4 mg (4 mg Intravenous Given 2/3/19 1419)   HYDROmorphone (DILAUDID) injection 1 mg (1 mg Intravenous Given 2/3/19 1419)   sodium chloride 0 9 % bolus 1,000 mL (0 mL Intravenous Stopped 2/3/19 1600)   iohexol (OMNIPAQUE) 240 MG/ML solution 50 mL (50 mL Oral Given 2/3/19 1548)       Diagnostic Studies  Results Reviewed     Procedure Component Value Units Date/Time    Urine Microscopic [[id number]]  (Abnormal) Collected:  02/03/19 1547    Lab Status:  Final result Specimen:  Urine from Urine, Clean Catch Updated:  02/03/19 1646     RBC, UA None Seen /hpf      WBC, UA 4-10 (A) /hpf      Epithelial Cells Occasional /hpf      Bacteria, UA Moderate (A) /hpf      MUCUS THREADS Moderate (A)    ED Urine Macroscopic [[id number]]  (Abnormal) Collected:  02/03/19 1547    Lab Status:  Final result Specimen:  Urine Updated:  02/03/19 1545     Color, UA Marina     Clarity, UA Clear     pH, UA 5 5     Leukocytes, UA Negative     Nitrite, UA Negative     Protein, UA Trace (A) mg/dl      Glucose, UA Negative mg/dl      Ketones, UA Negative mg/dl      Urobilinogen, UA 0 2 E U /dl      Bilirubin, UA Negative     Blood, UA Negative     Specific Gravity, UA >=1 030    Narrative:       CLINITEK RESULT Comprehensive metabolic panel [[id number]]  (Abnormal) Collected:  02/03/19 1405    Lab Status:  Final result Specimen:  Blood from Arm, Right Updated:  02/03/19 1432     Sodium 136 mmol/L      Potassium 5 6 (H) mmol/L      Chloride 99 (L) mmol/L      CO2 31 mmol/L      ANION GAP 6 mmol/L      BUN 11 mg/dL      Creatinine 1 04 mg/dL      Glucose 95 mg/dL      Calcium 8 7 mg/dL      AST 33 U/L      ALT 22 U/L      Alkaline Phosphatase 58 U/L      Total Protein 6 2 (L) g/dL      Albumin 1 8 (L) g/dL      Total Bilirubin 0 60 mg/dL      eGFR 78 ml/min/1 73sq m     Narrative:         National Kidney Disease Education Program recommendations are as follows:  GFR calculation is accurate only with a steady state creatinine  Chronic Kidney disease less than 60 ml/min/1 73 sq  meters  Kidney failure less than 15 ml/min/1 73 sq  meters      Lipase [[id number]]  (Normal) Collected:  02/03/19 1405    Lab Status:  Final result Specimen:  Blood from Arm, Right Updated:  02/03/19 1428     Lipase 193 u/L     APTT [[id number]]  (Abnormal) Collected:  02/03/19 1405    Lab Status:  Final result Specimen:  Blood from Arm, Right Updated:  02/03/19 1425     PTT 44 (H) seconds     Protime-INR [[id number]]  (Abnormal) Collected:  02/03/19 1405    Lab Status:  Final result Specimen:  Blood from Arm, Right Updated:  02/03/19 1424     Protime 21 3 (H) seconds      INR 1 91 (H)    CBC and differential [[id number]]  (Abnormal) Collected:  02/03/19 1405    Lab Status:  Final result Specimen:  Blood from Arm, Right Updated:  02/03/19 1414     WBC 9 39 Thousand/uL      RBC 3 77 (L) Million/uL      Hemoglobin 10 9 (L) g/dL      Hematocrit 32 9 (L) %      MCV 87 fL      MCH 28 9 pg      MCHC 33 1 g/dL      RDW 13 5 %      MPV 8 0 (L) fL      Platelets 369 (H) Thousands/uL      nRBC 0 /100 WBCs      Neutrophils Relative 79 (H) %      Immat GRANS % 1 %      Lymphocytes Relative 11 (L) %      Monocytes Relative 9 %      Eosinophils Relative 0 % Basophils Relative 0 %      Neutrophils Absolute 7 35 Thousands/µL      Immature Grans Absolute 0 10 Thousand/uL      Lymphocytes Absolute 1 01 Thousands/µL      Monocytes Absolute 0 88 Thousand/µL      Eosinophils Absolute 0 03 Thousand/µL      Basophils Absolute 0 02 Thousands/µL                  CT abdomen pelvis wo contrast   Final Result by Panda Bullard MD (02/03 8013)      1  Moderate-sized left pleural effusion with compressive atelectasis in the subjacent portion of the left lower lobe  2   6 x 11 x 14 cm fluid-filled structure in the pancreatic bed, typical of a pseudocyst, extending into the mesenteric root  3   Several additional loculated fluid collections in the abdomen as described above, probably additional pseudocysts  These include a 12 x 14 cm perisplenic collection and a 2 x 5 x 10 cm left retroperitoneal collection  4   Small amount of ascites  5   Sigmoid diverticulosis without evidence of diverticulitis  6   CBD stent in place with no intrahepatic bile duct dilatation                 Workstation performed: [id number]                    Procedures  ECG 12 Lead Documentation  Date/Time: 2/3/2019 2:22 PM  Performed by: Roni Buchanan  Authorized by: Pierre MARIN     Rate:     ECG rate:  82    ECG rate assessment: normal    Rhythm:     Rhythm: sinus rhythm    Ectopy:     Ectopy: none             Phone Contacts  ED Phone Contact    ED Course                               MDM  Number of Diagnoses or Management Options  Abdominal pain: new and requires workup     Amount and/or Complexity of Data Reviewed  Clinical lab tests: ordered and reviewed  Tests in the radiology section of CPT®: ordered and reviewed  Tests in the medicine section of CPT®: ordered and reviewed  Review and summarize past medical records: yes  Discuss the patient with other providers: yes  Independent visualization of images, tracings, or specimens: yes    Patient Progress  Patient progress: stable      Disposition  Final diagnoses:   Abdominal pain     Time reflects when diagnosis was documented in both MDM as applicable and the Disposition within this note     Time User Action Codes Description Comment    2/3/2019  6:13 PM Sadie Rocha Add [R10 9] Abdominal pain       ED Disposition     ED Disposition Condition Date/Time Comment    Admit  Sun Feb 3, 2019  6:13 PM Case was discussed with dr Sadie Rocha and the patient's admission status was agreed to be Admission Status: inpatient status to the service of Dr Sadie Rocha   Follow-up Information    None         Patient's Medications   Discharge Prescriptions    No medications on file     No discharge procedures on file      ED Provider  Electronically Signed by           Jaqueline Montes DO  02/03/19 2298

## 2019-02-04 PROBLEM — E46 PROTEIN-CALORIE MALNUTRITION (HCC): Status: ACTIVE | Noted: 2019-02-03

## 2019-02-04 LAB
ALBUMIN SERPL BCP-MCNC: 1.6 G/DL (ref 3.5–5)
ALP SERPL-CCNC: 53 U/L (ref 46–116)
ALT SERPL W P-5'-P-CCNC: 15 U/L (ref 12–78)
ANION GAP SERPL CALCULATED.3IONS-SCNC: 8 MMOL/L (ref 4–13)
AST SERPL W P-5'-P-CCNC: 18 U/L (ref 5–45)
ATRIAL RATE: 86 BPM
BASOPHILS # BLD AUTO: 0.06 THOUSANDS/ΜL (ref 0–0.1)
BASOPHILS NFR BLD AUTO: 1 % (ref 0–1)
BILIRUB SERPL-MCNC: 0.5 MG/DL (ref 0.2–1)
BUN SERPL-MCNC: 13 MG/DL (ref 5–25)
CALCIUM SERPL-MCNC: 8.3 MG/DL (ref 8.3–10.1)
CHLORIDE SERPL-SCNC: 101 MMOL/L (ref 100–108)
CO2 SERPL-SCNC: 28 MMOL/L (ref 21–32)
CREAT SERPL-MCNC: 0.98 MG/DL (ref 0.6–1.3)
EOSINOPHIL # BLD AUTO: 0.05 THOUSAND/ΜL (ref 0–0.61)
EOSINOPHIL NFR BLD AUTO: 1 % (ref 0–6)
ERYTHROCYTE [DISTWIDTH] IN BLOOD BY AUTOMATED COUNT: 13.9 % (ref 11.6–15.1)
GFR SERPL CREATININE-BSD FRML MDRD: 84 ML/MIN/1.73SQ M
GLUCOSE SERPL-MCNC: 86 MG/DL (ref 65–140)
HCT VFR BLD AUTO: 30.6 % (ref 36.5–49.3)
HGB BLD-MCNC: 9.9 G/DL (ref 12–17)
IMM GRANULOCYTES # BLD AUTO: 0.13 THOUSAND/UL (ref 0–0.2)
IMM GRANULOCYTES NFR BLD AUTO: 1 % (ref 0–2)
INR PPP: 2.01 (ref 0.86–1.17)
LYMPHOCYTES # BLD AUTO: 0.96 THOUSANDS/ΜL (ref 0.6–4.47)
LYMPHOCYTES NFR BLD AUTO: 10 % (ref 14–44)
MCH RBC QN AUTO: 28.9 PG (ref 26.8–34.3)
MCHC RBC AUTO-ENTMCNC: 32.4 G/DL (ref 31.4–37.4)
MCV RBC AUTO: 90 FL (ref 82–98)
MONOCYTES # BLD AUTO: 1.04 THOUSAND/ΜL (ref 0.17–1.22)
MONOCYTES NFR BLD AUTO: 11 % (ref 4–12)
NEUTROPHILS # BLD AUTO: 7.11 THOUSANDS/ΜL (ref 1.85–7.62)
NEUTS SEG NFR BLD AUTO: 76 % (ref 43–75)
NRBC BLD AUTO-RTO: 0 /100 WBCS
P AXIS: 26 DEGREES
PLATELET # BLD AUTO: 373 THOUSANDS/UL (ref 149–390)
PMV BLD AUTO: 7.8 FL (ref 8.9–12.7)
POTASSIUM SERPL-SCNC: 4.2 MMOL/L (ref 3.5–5.3)
PR INTERVAL: 146 MS
PROT SERPL-MCNC: 5.6 G/DL (ref 6.4–8.2)
PROTHROMBIN TIME: 22.2 SECONDS (ref 11.8–14.2)
QRS AXIS: -6 DEGREES
QRSD INTERVAL: 98 MS
QT INTERVAL: 372 MS
QTC INTERVAL: 435 MS
RBC # BLD AUTO: 3.42 MILLION/UL (ref 3.88–5.62)
SODIUM SERPL-SCNC: 137 MMOL/L (ref 136–145)
T WAVE AXIS: 60 DEGREES
VENTRICULAR RATE: 82 BPM
WBC # BLD AUTO: 9.35 THOUSAND/UL (ref 4.31–10.16)

## 2019-02-04 PROCEDURE — 99232 SBSQ HOSP IP/OBS MODERATE 35: CPT | Performed by: INTERNAL MEDICINE

## 2019-02-04 PROCEDURE — 85025 COMPLETE CBC W/AUTO DIFF WBC: CPT | Performed by: HOSPITALIST

## 2019-02-04 PROCEDURE — 85610 PROTHROMBIN TIME: CPT | Performed by: HOSPITALIST

## 2019-02-04 PROCEDURE — 93010 ELECTROCARDIOGRAM REPORT: CPT | Performed by: INTERNAL MEDICINE

## 2019-02-04 PROCEDURE — C9113 INJ PANTOPRAZOLE SODIUM, VIA: HCPCS | Performed by: HOSPITALIST

## 2019-02-04 PROCEDURE — 80053 COMPREHEN METABOLIC PANEL: CPT | Performed by: HOSPITALIST

## 2019-02-04 PROCEDURE — 99232 SBSQ HOSP IP/OBS MODERATE 35: CPT | Performed by: HOSPITALIST

## 2019-02-04 RX ADMIN — SODIUM CHLORIDE 100 ML/HR: 0.9 INJECTION, SOLUTION INTRAVENOUS at 15:41

## 2019-02-04 RX ADMIN — HYDROMORPHONE HYDROCHLORIDE 0.2 MG: 1 INJECTION, SOLUTION INTRAMUSCULAR; INTRAVENOUS; SUBCUTANEOUS at 12:27

## 2019-02-04 RX ADMIN — HYDROMORPHONE HYDROCHLORIDE 0.2 MG: 1 INJECTION, SOLUTION INTRAMUSCULAR; INTRAVENOUS; SUBCUTANEOUS at 15:41

## 2019-02-04 RX ADMIN — PANTOPRAZOLE SODIUM 40 MG: 40 INJECTION, POWDER, FOR SOLUTION INTRAVENOUS at 08:21

## 2019-02-04 RX ADMIN — AMIODARONE HYDROCHLORIDE 200 MG: 200 TABLET ORAL at 08:21

## 2019-02-04 RX ADMIN — HYDROMORPHONE HYDROCHLORIDE 0.2 MG: 1 INJECTION, SOLUTION INTRAMUSCULAR; INTRAVENOUS; SUBCUTANEOUS at 18:45

## 2019-02-04 RX ADMIN — PANTOPRAZOLE SODIUM 40 MG: 40 INJECTION, POWDER, FOR SOLUTION INTRAVENOUS at 20:14

## 2019-02-04 RX ADMIN — HYDROMORPHONE HYDROCHLORIDE 0.2 MG: 1 INJECTION, SOLUTION INTRAMUSCULAR; INTRAVENOUS; SUBCUTANEOUS at 09:58

## 2019-02-04 RX ADMIN — SODIUM CHLORIDE 100 ML/HR: 0.9 INJECTION, SOLUTION INTRAVENOUS at 06:11

## 2019-02-04 RX ADMIN — HYDROMORPHONE HYDROCHLORIDE 0.5 MG: 1 INJECTION, SOLUTION INTRAMUSCULAR; INTRAVENOUS; SUBCUTANEOUS at 22:54

## 2019-02-04 RX ADMIN — SODIUM CHLORIDE 100 ML/HR: 0.9 INJECTION, SOLUTION INTRAVENOUS at 22:56

## 2019-02-04 NOTE — ASSESSMENT & PLAN NOTE
Noted on CT imaging; does reports some LUQ pain and SOB    Could consider thoracentesis, but would evaluate for PUD first

## 2019-02-04 NOTE — PROGRESS NOTES
Progress Note - Jose Carlos Chaney 60 y o  male MRN: [id number]    Unit/Bed#: -01 Encounter: [id number]        Subjective:   Patient says he still has some abdominal discomfort, no worse than yesterday, and he says has generally been fairly persistent over the last couple of weeks  He has not had any vomiting today, has not had any food or liquid this morning  No subjective fevers or chills  Denies any shortness of breath or increased pain with breathing  Objective:     Vitals: Blood pressure 130/71, pulse 93, temperature 99 5 °F (37 5 °C), temperature source Oral, resp  rate 18, height 6' 4" (1 93 m), weight 102 kg (224 lb 13 9 oz), SpO2 95 %  ,Body mass index is 27 37 kg/m²  Intake/Output Summary (Last 24 hours) at 02/04/19 1131  Last data filed at 02/04/19 1101   Gross per 24 hour   Intake          2018 33 ml   Output              775 ml   Net          1243 33 ml       Physical Exam:   General appearance: alert, appears stated age and cooperative  Lungs: clear to auscultation bilaterally, no labored breathing/accessory muscle use  Heart: regular rate and rhythm, S1, S2 normal, no murmur, click, rub or gallop  Abdomen:  Mildly distended and tight in the upper abdomen, mild tenderness in the epigastric and left upper quadrant regions with no guarding; bowel sounds normal; no masses,  no organomegaly  Extremities: no edema    Invasive Devices     Peripheral Intravenous Line            Peripheral IV 02/04/19 Right Hand less than 1 day                Lab, Imaging and other studies: I have personally reviewed pertinent reports      Admission on 02/03/2019   Component Date Value    WBC 02/03/2019 9 39     RBC 02/03/2019 3 77*    Hemoglobin 02/03/2019 10 9*    Hematocrit 02/03/2019 32 9*    MCV 02/03/2019 87     MCH 02/03/2019 28 9     MCHC 02/03/2019 33 1     RDW 02/03/2019 13 5     MPV 02/03/2019 8 0*    Platelets 80/47/6258 410*    nRBC 02/03/2019 0     Neutrophils Relative 02/03/2019 79*    Immat GRANS % 02/03/2019 1     Lymphocytes Relative 02/03/2019 11*    Monocytes Relative 02/03/2019 9     Eosinophils Relative 02/03/2019 0     Basophils Relative 02/03/2019 0     Neutrophils Absolute 02/03/2019 7 35     Immature Grans Absolute 02/03/2019 0 10     Lymphocytes Absolute 02/03/2019 1 01     Monocytes Absolute 02/03/2019 0 88     Eosinophils Absolute 02/03/2019 0 03     Basophils Absolute 02/03/2019 0 02     Sodium 02/03/2019 136     Potassium 02/03/2019 5 6*    Chloride 02/03/2019 99*    CO2 02/03/2019 31     ANION GAP 02/03/2019 6     BUN 02/03/2019 11     Creatinine 02/03/2019 1 04     Glucose 02/03/2019 95     Calcium 02/03/2019 8 7     AST 02/03/2019 33     ALT 02/03/2019 22     Alkaline Phosphatase 02/03/2019 58     Total Protein 02/03/2019 6 2*    Albumin 02/03/2019 1 8*    Total Bilirubin 02/03/2019 0 60     eGFR 02/03/2019 78     Lipase 02/03/2019 193     Protime 02/03/2019 21 3*    INR 02/03/2019 1 91*    PTT 02/03/2019 44*    Color, UA 02/03/2019 Marina     Clarity, UA 02/03/2019 Clear     pH, UA 02/03/2019 5 5     Leukocytes, UA 02/03/2019 Negative     Nitrite, UA 02/03/2019 Negative     Protein, UA 02/03/2019 Trace*    Glucose, UA 02/03/2019 Negative     Ketones, UA 02/03/2019 Negative     Urobilinogen, UA 02/03/2019 0 2     Bilirubin, UA 02/03/2019 Negative     Blood, UA 02/03/2019 Negative     Specific Gravity, UA 02/03/2019 >=1 030     RBC, UA 02/03/2019 None Seen     WBC, UA 02/03/2019 4-10*    Epithelial Cells 02/03/2019 Occasional     Bacteria, UA 02/03/2019 Moderate*    MUCUS THREADS 02/03/2019 Moderate*    WBC 02/04/2019 9 35     RBC 02/04/2019 3 42*    Hemoglobin 02/04/2019 9 9*    Hematocrit 02/04/2019 30 6*    MCV 02/04/2019 90     MCH 02/04/2019 28 9     MCHC 02/04/2019 32 4     RDW 02/04/2019 13 9     MPV 02/04/2019 7 8*    Platelets 84/21/6635 373     nRBC 02/04/2019 0     Neutrophils Relative 02/04/2019 76*    Immat GRANS % 02/04/2019 1     Lymphocytes Relative 02/04/2019 10*    Monocytes Relative 02/04/2019 11     Eosinophils Relative 02/04/2019 1     Basophils Relative 02/04/2019 1     Neutrophils Absolute 02/04/2019 7 11     Immature Grans Absolute 02/04/2019 0 13     Lymphocytes Absolute 02/04/2019 0 96     Monocytes Absolute 02/04/2019 1 04     Eosinophils Absolute 02/04/2019 0 05     Basophils Absolute 02/04/2019 0 06     Sodium 02/04/2019 137     Potassium 02/04/2019 4 2     Chloride 02/04/2019 101     CO2 02/04/2019 28     ANION GAP 02/04/2019 8     BUN 02/04/2019 13     Creatinine 02/04/2019 0 98     Glucose 02/04/2019 86     Calcium 02/04/2019 8 3     AST 02/04/2019 18     ALT 02/04/2019 15     Alkaline Phosphatase 02/04/2019 53     Total Protein 02/04/2019 5 6*    Albumin 02/04/2019 1 6*    Total Bilirubin 02/04/2019 0 50     eGFR 02/04/2019 84     Protime 02/04/2019 22 2*    INR 02/04/2019 2 01*     Results for Vickie Schneider (MRN [id number]) as of 2/4/2019 11:31   Ref   Range 2/1/2019 00:00 2/1/2019 14:56 2/3/2019 14:05 2/3/2019 15:47 2/4/2019 04:36   eGFR Latest Units: ml/min/1 73sq m   78  84   Sodium Latest Ref Range: 136 - 145 mmol/L   136  137   Potassium Latest Ref Range: 3 5 - 5 3 mmol/L   5 6 (H)  4 2   Chloride Latest Ref Range: 100 - 108 mmol/L   99 (L)  101   CO2 Latest Ref Range: 21 - 32 mmol/L   31  28   Anion Gap Latest Ref Range: 4 - 13 mmol/L   6  8   BUN Latest Ref Range: 5 - 25 mg/dL   11  13   Creatinine Latest Ref Range: 0 60 - 1 30 mg/dL   1 04  0 98   Glucose, Random Latest Ref Range: 65 - 140 mg/dL   95  86   Calcium Latest Ref Range: 8 3 - 10 1 mg/dL   8 7  8 3   AST Latest Ref Range: 5 - 45 U/L   33  18   ALT Latest Ref Range: 12 - 78 U/L   22  15   Alkaline Phosphatase Latest Ref Range: 46 - 116 U/L   58  53   Total Protein Latest Ref Range: 6 4 - 8 2 g/dL   6 2 (L)  5 6 (L)   Albumin Latest Ref Range: 3 5 - 5 0 g/dL   1 8 (L)  1 6 (L)   TOTAL BILIRUBIN Latest Ref Range: 0 20 - 1 00 mg/dL   0 60  0 50   Lipase Latest Ref Range: 73 - 393 u/L   193     WBC Latest Ref Range: 4 31 - 10 16 Thousand/uL  12 09 (H) 9 39  9 35   Red Blood Cell Count Latest Ref Range: 3 88 - 5 62 Million/uL  3 83 (L) 3 77 (L)  3 42 (L)   Hemoglobin Latest Ref Range: 12 0 - 17 0 g/dL  11 1 (L) 10 9 (L)  9 9 (L)   HCT Latest Ref Range: 36 5 - 49 3 %  34 2 (L) 32 9 (L)  30 6 (L)   MCV Latest Ref Range: 82 - 98 fL  89 87  90   MCH Latest Ref Range: 26 8 - 34 3 pg  29 0 28 9  28 9   MCHC Latest Ref Range: 31 4 - 37 4 g/dL  32 5 33 1  32 4   RDW Latest Ref Range: 11 6 - 15 1 %  13 5 13 5  13 9   Platelet Count Latest Ref Range: 149 - 390 Thousands/uL  440 (H) 410 (H)  373   MPV Latest Ref Range: 8 9 - 12 7 fL  8 4 (L) 8 0 (L)  7 8 (L)   nRBC Latest Units: /100 WBCs  0 0  0   Neutrophils % Latest Ref Range: 43 - 75 %  79 (H) 79 (H)  76 (H)   Immat GRANS % Latest Ref Range: 0 - 2 %  1 1  1   Lymphocytes Relative Latest Ref Range: 14 - 44 %  10 (L) 11 (L)  10 (L)   Monocytes Relative Latest Ref Range: 4 - 12 %  10 9  11   Eosinophils Latest Ref Range: 0 - 6 %  0 0  1   Basophils Relative Latest Ref Range: 0 - 1 %  0 0  1   Immature Grans Absolute Latest Ref Range: 0 00 - 0 20 Thousand/uL  0 13 0 10  0 13   Absolute Neutrophils Latest Ref Range: 1 85 - 7 62 Thousands/µL  9 45 (H) 7 35  7 11   Lymphocytes Absolute Latest Ref Range: 0 60 - 4 47 Thousands/µL  1 26 1 01  0 96   Absolute Monocytes Latest Ref Range: 0 17 - 1 22 Thousand/µL  1 20 0 88  1 04   Absolute Eosinophils Latest Ref Range: 0 00 - 0 61 Thousand/µL  0 00 0 03  0 05   Basophils Absolute Latest Ref Range: 0 00 - 0 10 Thousands/µL  0 05 0 02  0 06   Protime Latest Ref Range: 11 8 - 14 2 seconds   21 3 (H)  22 2 (H)   INR Latest Ref Range: 0 86 - 1 17  2 00 (A)  1 91 (H)  2 01 (H)   PTT Latest Ref Range: 26 - 38 seconds   44 (H)     Epithelial Cells Latest Ref Range: None Seen, Occasional /hpf    Occasional    Color, UA Unknown    Marina Clarity, UA Unknown    Clear    SL AMB SPECIFIC GRAVITY_URINE Latest Ref Range: 1 003 - 1 030     >=1 030    Glucose, UA Latest Ref Range: Negative mg/dl    Negative    Ketones, UA Latest Ref Range: Negative mg/dl    Negative    Blood, UA Latest Ref Range: Negative     Negative    Nitrite, UA Latest Ref Range: Negative     Negative    Leukocytes, UA Latest Ref Range: Negative     Negative    pH, UA Latest Ref Range: 4 5 - 8 0     5 5    POCT URINE PROTEIN Latest Ref Range: Negative mg/dl    Trace (A)    Bilirubin, UA Latest Ref Range: Negative     Negative    SL AMB POCT UROBILINOGEN Latest Ref Range: 0 2, 1 0 E U /dl E U /dl    0 2    RBC, UA Latest Ref Range: None Seen, 0-5 /hpf    None Seen    WBC, UA Latest Ref Range: None Seen, 0-5, 5-55, 5-65 /hpf    4-10 (A)    Bacteria, UA Latest Ref Range: None Seen, Occasional /hpf    Moderate (A)    MUCUS THREADS Latest Ref Range: None Seen     Moderate (A)        Assessment/Plan:    1  Persistent upper abdominal discomfort, poor appetite and occasional nausea; symptoms appear to be secondary to large pancreatic pseudocyst which resulted from severe pancreatitis 1 month ago (biliary etiology; patient was s/p cholecystectomy but underwent ERCP with plastic biliary stent placement)  He does not appear to be clinically worsening, and there is no evidence to suggest infection or necrosis at this time    Pseudocyst measures 14 cm in greatest dimension    - advance to clear liquid diet and gradually advance further thereafter as tolerated to low-fat diet  - continue with Protonix; possibly may have gastritis or peptic ulcer disease contributing to symptoms, but these can be treated empirically, and his pancreatic pseudocyst appears sufficient to explain his symptoms  - follow-up imaging in a few weeks to assess for wall an off of fluid collections, consider cystogastrostomy if these have not significantly decreased in size and patient is still symptomatic  - I discussed this patient's case and plan with Dr [facility]

## 2019-02-04 NOTE — UTILIZATION REVIEW
Initial Clinical Review    Admission: Date/Time/Statement: 2/3/19 @ 1814     Orders Placed This Encounter   Procedures    Inpatient Admission (expected length of stay for this patient Order details is greater than two midnights)     Standing Status:   Standing     Number of Occurrences:   1     Order Specific Question:   Admitting Physician     Answer:   Alexandrea Piña [[id number]]     Order Specific Question:   Level of Care     Answer:   Med Surg [[id number]]     Order Specific Question:   Estimated length of stay     Answer:   More than 2 Midnights     Order Specific Question:   Certification     Answer:   I certify that inpatient services are medically necessary for this patient for a duration of greater than two midnights  See H&P and MD Progress Notes for additional information about the patient's course of treatment  ED: Date/Time/Mode of Arrival:   ED Arrival Information     Expected Arrival Acuity Means of Arrival Escorted By Service Admission Type    2/3/2019  2/3/2019 13:16 Urgent Wheelchair Family Member General Medicine Urgent    Arrival Complaint    Idiopathic acute pancreatitis without infection or necrosis          Chief Complaint:   Chief Complaint   Patient presents with    Abdominal Pain     abd pain , pt has history of pancreatitis, had labs friday , left upper quadrant pain continues       History of Illness:  60 y o  male with recent history of severe pancreatitis required prolonged ICU stay who presents with abdominal pain  Patient states he has had low level abdominal pain since his discharge from the hospital   It is fairly consistent but worse with eating  He rates it around a 7 or an 8  Pain is mostly LUQ and epigastric  Some radiation into his back  Does report reflux-like symptoms  He has very low appetite and has also hesitating to eat because he feels like it worsens his pain  Wife reports he will be a small bowl of soup for both his lunch and dinner, if anything  He occasionally feels short of breath with exertion  No objective fevers, but wife reports "low grade" temps    ED Vital Signs:   ED Triage Vitals   Temperature Pulse Respirations Blood Pressure SpO2   02/03/19 1346 02/03/19 1330 02/03/19 1330 02/03/19 1330 02/03/19 1330   99 2 °F (37 3 °C) 98 20 145/98 97 %      Temp Source Heart Rate Source Patient Position - Orthostatic VS BP Location FiO2 (%)   02/03/19 1346 02/03/19 1518 02/03/19 1518 02/03/19 1518 --   Oral Monitor Lying Left arm       Pain Score       02/03/19 1330       6        Wt Readings from Last 1 Encounters:   02/03/19 102 kg (224 lb 13 9 oz)       Vital Signs (abnormal):   02/04/19 0849  99 5 °F (37 5 °C)  93  18  130/71  --  95 %  None (Room air)     Exam - abdominal distention  Tenderness LUQ, CVA tenderness  Abnormal Labs/Diagnostic Test Results:   K 5 6 slightly hemolyzed  Cl 99  Total protein 6 2  Albumin 1 8  INR 1 91  Wbc 9 39, hgb 10 9, hct 32 9  Lipase 193  UA trace protein  Ct abdomen - Moderate-sized left pleural effusion with compressive atelectasis in the subjacent portion of the left lower lobe  2   6 x 11 x 14 cm fluid-filled structure in the pancreatic bed, typical of a pseudocyst, extending into the mesenteric root  3   Several additional loculated fluid collections in the abdomen as described above, probably additional pseudocysts   These include a 12 x 14 cm perisplenic collection and a 2 x 5 x 10 cm left retroperitoneal collection  4   Small amount of ascites  5   Sigmoid diverticulosis without evidence of diverticulitis  6   CBD stent in place with no intrahepatic bile duct dilatation    2/4/2019-  Wbc 9 35   hgb 9 9, hct 30 6  Total protein 5 6  Albumin 1 6     INR 2 01    ED Treatment:   Medication Administration from 02/03/2019 1145 to 02/03/2019 1906       Date/Time Order Dose Route Action Comments     02/03/2019 1419 ondansetron (ZOFRAN) injection 4 mg 4 mg Intravenous Given      02/03/2019 1419 HYDROmorphone (DILAUDID) injection 1 mg 1 mg Intravenous Given      02/03/2019 1600 sodium chloride 0 9 % bolus 1,000 mL 0 mL Intravenous Stopped      02/03/2019 1405 sodium chloride 0 9 % bolus 1,000 mL 1,000 mL Intravenous New Bag      02/03/2019 1548 iohexol (OMNIPAQUE) 240 MG/ML solution 50 mL 50 mL Oral Given           Past Medical/Surgical History: admit 1/3/2019- 1/23/2019- acute pancreatitis   Active Ambulatory Problems     Diagnosis Date Noted    Acute pancreatitis 01/03/2019    Pancreatitis 01/03/2019    Delirium 01/12/2019    Leukocytosis 01/12/2019    Pulmonary edema 01/12/2019    Hypokalemia 53/18/0899    Metabolic alkalosis 40/90/8708    Dysphagia 74/69/5631    Metabolic encephalopathy 09/40/5910    Paroxysmal atrial fibrillation ([facility] [address] ) 01/23/2019    Supratherapeutic INR 01/23/2019     Past Medical History:   Diagnosis Date    Gastroesophageal reflux     Pancreatitis        Admitting Diagnosis: Abdominal pain [R10 9]    Age/Sex: 60 y o  male    Assessment/Plan:   Abdominal pain   Assessment & Plan     Reports epigastric and LUQ pain since discharge; ? PUD vs gastritis   GI consult; plan for EGD   Clear liquid diet; NPO at [place]   Pain control  PPI BID          Paroxysmal atrial fibrillation (HCC)   Assessment & Plan     Cont amiodarone  Hold warfarin tonight  Monitor INR daily       Pancreatic pseudocyst   Assessment & Plan      "6 x 11 x 14 cm fluid collection in the pancreatic bed"  Does not appear infected on imaging; pt without s/s of systemic illness   Also with 2 other fluid collections of unclear significance  "   probably additional pseudocysts   These include a 12 x 14 cm perisplenic collection and a 2 x 5 x 10 cm left retroperitoneal collection "                     Pleural effusion, left   Assessment & Plan     Noted on CT imaging; does reports some LUQ pain and SOB    Could consider thoracentesis, but would evaluate for PUD first       Hyperkalemia   Assessment & Plan     Mild hyperkalemia; will follow with daily BMP          Admission Orders:  2/3/2019  1815 INPATIENT   Scheduled Meds:   Current Facility-Administered Medications:  acetaminophen 650 mg Oral Q6H PRN    amiodarone 200 mg Oral Daily With Breakfast    HYDROmorphone 0 2 mg Intravenous Q4H PRN    HYDROmorphone 0 2 mg Intravenous Q4H PRN    HYDROmorphone 0 5 mg Intravenous Q4H PRN    ondansetron 4 mg Intravenous Q6H PRN    pantoprazole 40 mg Intravenous Q12H [facility]    sodium chloride 100 mL/hr Intravenous Continuous Last Rate: 100 mL/hr (02/04/19 0611)     Continuous Infusions:   sodium chloride 100 mL/hr Last Rate: 100 mL/hr (02/04/19 0611)     PRN Meds:   Acetaminophen - used x 1      HYDROmorphone 0 2 iv - used x 1  OTHER ORDERS:  Clears  Consult GI      Network Utilization Review Department  Phone: [phone]; Fax [phone]  [email or web address]  ATTENTION: Please call with any questions or concerns to [phone]  and carefully listen to the prompts so that you are directed to the right person  Send all requests for admission clinical reviews, approved or denied determinations and any other requests to fax [phone]   All voicemails are confidential

## 2019-02-04 NOTE — PROGRESS NOTES
Patient has an appointment on 2/13 with [facility]  There are no available appointments for either you or Dr Wallace Roa until May  Is this okay to keep with Dasia Mott or would you prefer me to overbook (I believe you both are already overbooked for the next two weeks  )  Please advise

## 2019-02-04 NOTE — ASSESSMENT & PLAN NOTE
Reports epigastric and LUQ pain since discharge; ? PUD vs gastritis vs pancreatic pseudocyst   GI consult; plan for EGD   NPO  IVF   Pain control  PPI daily

## 2019-02-04 NOTE — H&P
H&P- Clarence Holland 1960, 60 y o  male MRN: [id number]    Unit/Bed#: -01 Encounter: [id number]    Primary Care Provider: Jimena Jorgensen MD   Date and time admitted to hospital: 2/3/2019  1:24 PM    * Abdominal pain   Assessment & Plan    Reports epigastric and LUQ pain since discharge; ? PUD vs gastritis   GI consult; plan for EGD   Clear liquid diet; NPO at [facility]   Pain control  PPI BID        Paroxysmal atrial fibrillation (HCC)   Assessment & Plan    Cont amiodarone  Hold warfarin tonight  Monitor INR daily      Pancreatic pseudocyst   Assessment & Plan     "6 x 11 x 14 cm fluid collection in the pancreatic bed"  Does not appear infected on imaging; pt without s/s of systemic illness   Also with 2 other fluid collections of unclear significance  "   probably additional pseudocysts  These include a 12 x 14 cm perisplenic collection and a 2 x 5 x 10 cm left retroperitoneal collection "        Pleural effusion, left   Assessment & Plan    Noted on CT imaging; does reports some LUQ pain and SOB  Could consider thoracentesis, but would evaluate for PUD first      Hyperkalemia   Assessment & Plan    Mild hyperkalemia; will follow with daily BMP        VTE Prophylaxis: Enoxaparin (Lovenox)   Code Status: FULL   POLST: POLST form is not discussed and not completed at this time  Anticipated Length of Stay:  Patient will be admitted on an Inpatient basis with an anticipated length of stay of  > 2 midnights  Justification for Hospital Stay: abdominal pain with pancreatic pseudocyst     Total Time for Visit, including Counseling / Coordination of Care: 1 hour  Greater than 50% of this total time spent on direct patient counseling and coordination of care  Chief Complaint:   Abdominal pain    History of Present Illness:    Clarence Holland is a 60 y o  male with recent history of severe pancreatitis required prolonged ICU stay who presents with abdominal pain    Patient states he has had low level abdominal pain since his discharge from the hospital   It is fairly consistent but worse with eating  He rates it around a 7 or an 8  Pain is mostly LUQ and epigastric  Some radiation into his back  Does report reflux-like symptoms  He has very low appetite and has also hesitating to eat because he feels like it worsens his pain  Wife reports he will be a small bowl of soup for both his lunch and dinner, if anything  No dark or tarry stools no blood in his stool  Urine output has been adequate  He has been taking his medications as directed  No chest pain or pressure  He occasionally feels short of breath with exertion  No objective fevers, but wife reports "low grade" temps  Review of Systems:    Review of Systems   Constitutional: Negative for chills and fever  Respiratory: Positive for shortness of breath  Cardiovascular: Positive for leg swelling  Negative for chest pain  Gastrointestinal: Positive for abdominal pain and nausea  Negative for blood in stool, constipation, diarrhea and vomiting  Genitourinary: Negative  Neurological: Negative for dizziness and light-headedness  Past Medical and Surgical History:     Past Medical History:   Diagnosis Date    Gastroesophageal reflux     Pancreatitis        Past Surgical History:   Procedure Laterality Date    CHOLECYSTECTOMY      COLONOSCOPY N/A 3/21/2016    Procedure: COLONOSCOPY;  Surgeon: Roseanna Richmond DO;  Location: [facility]; Service:     ERCP N/A 1/4/2019    Procedure: ENDOSCOPIC RETROGRADE CHOLANGIOPANCREATOGRAPHY (ERCP); Surgeon: Parrish De La Paz MD;  Location: [facility]; Service: Gastroenterology    NV EGD TRANSORAL BIOPSY SINGLE/MULTIPLE N/A 3/21/2016    Procedure: ESOPHAGOGASTRODUODENOSCOPY (EGD); Surgeon: Roseanna Richmond DO;  Location: [facility]; Service: General       Meds/Allergies:    Prior to Admission medications    Medication Sig Start Date End Date Taking?  Authorizing Provider   acetaminophen (TYLENOL) 325 mg tablet Take 2 tablets (650 mg total) by mouth every 6 (six) hours as needed for mild pain or fever 1/23/19  Yes Governor Juan PA-C   amiodarone 200 mg tablet Take 1 tablet (200 mg total) by mouth daily with breakfast 1/24/19  Yes Jamey Klein PA-C   tamsulosin [facility]) 0 4 mg Take 1 capsule (0 4 mg total) by mouth daily with dinner 1/23/19  Yes Jamey Klein PA-C   warfarin (COUMADIN) 1 mg tablet Take 1 tablet (1 mg total) by mouth daily Take as prescribed by Cardiology  1/24/19  Yes Governor Juan PA-C     I have reviewed home medications with patient personally  Allergies: No Known Allergies    Social History:     Marital Status: /Civil Union   Occupation:   Patient Pre-hospital Living Situation: home with wife  Patient Pre-hospital Level of Mobility: independent  Patient Pre-hospital Diet Restrictions: none   Substance Use History:   History   Alcohol Use No     Comment: rarely     History   Smoking Status    Never Smoker   Smokeless Tobacco    Never Used     History   Drug Use No       Family History:    non-contributory    Physical Exam:     Vitals:   Blood Pressure: 170/82 (02/03/19 1909)  Pulse: 91 (02/03/19 1909)  Temperature: 98 9 °F (37 2 °C) (02/03/19 1909)  Temp Source: Oral (02/03/19 1909)  Respirations: 18 (02/03/19 1909)  Height: 6' 4" (193 cm) (02/03/19 1909)  Weight - Scale: 102 kg (224 lb 13 9 oz) (02/03/19 1909)  SpO2: 94 % (02/03/19 1909)    Physical Exam   Constitutional: No distress  HENT:   Head: Normocephalic and atraumatic  Mouth/Throat: No oropharyngeal exudate  Eyes: Conjunctivae are normal  No scleral icterus  Cardiovascular: Normal rate and regular rhythm  Exam reveals no friction rub  No murmur heard  Pulmonary/Chest: Effort normal  No respiratory distress  He has no wheezes  Absent at left base   Abdominal: Soft  Bowel sounds are normal  He exhibits no distension  There is tenderness (epigastric and LUQ )  There is no rebound     Musculoskeletal: He exhibits edema (3+ pitting edema )  He exhibits no tenderness  Neurological: He is alert  Skin: Skin is warm and dry  He is not diaphoretic  No erythema  Psychiatric: He has a normal mood and affect  His behavior is normal    Nursing note and vitals reviewed  Additional Data:     Lab Results: I have personally reviewed pertinent reports  Results from last 7 days  Lab Units 02/03/19  1405   WBC Thousand/uL 9 39   HEMOGLOBIN g/dL 10 9*   HEMATOCRIT % 32 9*   PLATELETS Thousands/uL 410*   NEUTROS PCT % 79*   LYMPHS PCT % 11*   MONOS PCT % 9   EOS PCT % 0       Results from last 7 days  Lab Units 02/03/19  1405   POTASSIUM mmol/L 5 6*   CHLORIDE mmol/L 99*   CO2 mmol/L 31   BUN mg/dL 11   CREATININE mg/dL 1 04   CALCIUM mg/dL 8 7   ALK PHOS U/L 58   ALT U/L 22   AST U/L 33       Results from last 7 days  Lab Units 02/03/19  1405   INR  1 91*       Imaging: I have personally reviewed pertinent reports  Ct Abdomen Pelvis Wo Contrast    Result Date: 2/3/2019  Narrative: CT ABDOMEN AND PELVIS WITHOUT IV CONTRAST INDICATION:   Abdominal pain, unspecified  Recent history of pancreatitis  COMPARISON:  Contrast-enhanced CT of the abdomen and pelvis from January 3, 2019  TECHNIQUE:  CT examination of the abdomen and pelvis was performed without intravenous contrast   Axial, sagittal, and coronal 2D reformatted images were created from the source data and submitted for interpretation  Radiation dose length product (DLP) for this visit:  789 mGy-cm   This examination, like all CT scans performed in the [facility], was performed utilizing techniques to minimize radiation dose exposure, including the use of iterative reconstruction and automated exposure control  Enteric contrast was administered  The absence of intravenous contrast limits evaluation of the abdominal and pelvic viscera  FINDINGS: ABDOMEN LOWER CHEST:  Moderate size left pleural effusion, developing since the last CT    Compressive atelectasis in the base of the left lower lobe  Subsegmental atelectasis in the base of the right lower lobe  LIVER/BILIARY TREE:  1 6 cm cyst in the dome of the right lobe  Otherwise, liver grossly normal   Stent in CBD  No intrahepatic bile duct dilatation  Tiny amount of pneumobilia in the left lobe GALLBLADDER:     Postcholecystectomy  SPLEEN:  Normal in size  Compressed by adjacent 12 x 14 cm discrete fluid collection, either loculated ascites or pseudocyst  PANCREAS:  Evaluation limited without intravenous contrast   6 x 11 x 14 cm fluid collection in the pancreatic bed, consistent with pseudocyst, extending into the mesenteric root  Pancreas appears to be grossly intact  ADRENAL GLANDS:  Unremarkable  KIDNEYS/URETERS:  Unremarkable  No hydronephrosis  2 x 5 x 10 cm fluid collection in the left posterior pararenal space, extending into the left subphrenic space  STOMACH AND BOWEL:  Stomach displaced by the large pancreatic pseudocyst   Small intestine unremarkable  Diverticulosis in the sigmoid without evidence of diverticulitis  APPENDIX:  No findings to suggest appendicitis  ABDOMINOPELVIC CAVITY: Several prominent mesenteric lymph nodes, the largest a 1 2 x 1 8 cm node at the root of the mesentery  Scattered areas of mesenteric edema  Small amount of pelvic and perihepatic ascites  No extraluminal gas  VESSELS:  Unremarkable for patient's age  PELVIS REPRODUCTIVE ORGANS:  Prostate unremarkable  URINARY BLADDER:  Unremarkable  ABDOMINAL WALL/INGUINAL REGIONS:  Unremarkable  OSSEOUS STRUCTURES:  No acute fracture or destructive osseous lesion  Impression: 1  Moderate-sized left pleural effusion with compressive atelectasis in the subjacent portion of the left lower lobe  2   6 x 11 x 14 cm fluid-filled structure in the pancreatic bed, typical of a pseudocyst, extending into the mesenteric root   3   Several additional loculated fluid collections in the abdomen as described above, probably additional pseudocysts  These include a 12 x 14 cm perisplenic collection and a 2 x 5 x 10 cm left retroperitoneal collection  4   Small amount of ascites  5   Sigmoid diverticulosis without evidence of diverticulitis  6   CBD stent in place with no intrahepatic bile duct dilatation  Workstation performed: [id number]     Xr Chest Portable    Result Date: 1/15/2019  Narrative: CHEST INDICATION:   fluid overload  COMPARISON:  January 14, 2019 EXAM PERFORMED/VIEWS:  XR CHEST PORTABLE FINDINGS:  Right upper extremity PICC line terminates overlying caval atrial junction  Cardiomediastinal silhouette appears unremarkable  Small to moderate size left pleural effusion  Moderate pulmonary vascular congestion  Osseous structures appear within normal limits for patient age  Impression: Pulmonary vascular congestion with small to moderate left pleural effusion similar to prior study    Workstation performed: [id number]     Xr Chest Portable    Result Date: 1/14/2019  Narrative: CHEST INDICATION:   evaluate lung fields  COMPARISON:  1 day prior EXAM PERFORMED/VIEWS:  XR CHEST PORTABLE FINDINGS: Cardiomediastinal silhouette appears unremarkable  Small to moderate sized left pleural effusion  Moderate pulmonary vascular congestion  Osseous structures appear within normal limits for patient age  Impression: Moderate pulmonary vascular congestion with small to moderate left pleural effusion  Workstation performed: [id number]     Xr Chest Portable    Result Date: 1/13/2019  Narrative: CHEST INDICATION:   pleural effusion  COMPARISON:  1/12/2019  EXAM PERFORMED/VIEWS:  XR CHEST PORTABLE FINDINGS: Stable cardiomegaly is present  Large left pleural effusion is again seen  Pulmonary vascular congestion is noted  Osseous structures appear within normal limits for patient age  Impression: Pulmonary vascular congestion and large left pleural effusion   Workstation performed: [id number]     Xr Chest Portable    Result Date: 1/12/2019  Narrative: CHEST INDICATION:   evaluate pulmonary edema/effusions  COMPARISON:  Chest x-ray performed the previous day  EXAM PERFORMED/VIEWS:  XR CHEST PORTABLE FINDINGS:  Stable left IJ central venous catheter  Heart shadow is enlarged but unchanged from prior exam  Persistent vascular congestion with bilateral pleural effusions left greater than right  Osseous structures appear within normal limits for patient age  Impression: Cardiomegaly with persistent vascular congestion and pleural effusions left greater than right  Workstation performed: [id number]     Xr Chest Portable    Result Date: 1/10/2019  Narrative: CHEST INDICATION:   effusions  COMPARISON:  1/9 EXAM PERFORMED/VIEWS:  XR CHEST PORTABLE FINDINGS:  ET tube projects appropriately  NG tube extends below the GE junction into the distal stomach, below the scope of this exam   There is a left internal jugular central line projecting just above the cavoatrial junction, unchanged  Stable cardiomegaly  Moderate pulmonary edema and basilar effusions, stable  No pneumothorax Osseous structures appear within normal limits for patient age  Impression: Moderate pulmonary edema and pleural effusions, stable  Workstation performed: [id number]     Xr Chest Portable    Result Date: 1/5/2019  Narrative: CHEST INDICATION:   LIJ TLC placement  COMPARISON:  1/5/2019 EXAM PERFORMED/VIEWS:  XR CHEST PORTABLE FINDINGS:  Left IJ catheter tip in the SVC  Stable ET and NG tubes  Cardiomediastinal silhouette appears unremarkable  Central vascular prominence with bibasilar opacities and moderate to large bilateral pleural effusions  No pneumothorax  Stable osseous structures  Impression: Left IJ catheter tip in the SVC  No pneumothorax  Central vascular prominence with bibasilar opacities and moderate to large bilateral pleural effusions   Workstation performed: [id number]     Xr Chest Portable    Result Date: 1/5/2019  Narrative: CHEST INDICATION: intubation  COMPARISON:  1/5/2019 EXAM PERFORMED/VIEWS:  XR CHEST PORTABLE FINDINGS:  ET tube tip is approximately 3 cm above the celia  NG tube tip below the diaphragm  Stable cardiac mediastinal silhouette  Central vascular prominence with bibasilar opacities and moderate to large bilateral pleural effusions  No pneumothorax  Osseous structures appear within normal limits for patient age  Impression: Central vascular prominence with bibasilar opacities and moderate to large bilateral pleural effusions  Workstation performed: [id number]     Xr Chest Portable    Result Date: 1/5/2019  Narrative: CHEST INDICATION:   hypoxia  COMPARISON:  January 4, 2019  EXAM PERFORMED/VIEWS:  XR CHEST PORTABLE FINDINGS:  Endotracheal tube has been removed  Enteric tube remains  Heart shadow appears enlarged though borders are somewhat obscured  A large left pleural effusion appears somewhat increased in size from previous examination  There is a small right pleural effusion which is similar from prior examination  Pulmonary vascular congestive changes and atelectatic changes are again noted  No pneumothorax  No acute osseous abnormality  Impression: Increasing large left pleural effusion  Pulmonary vascular congestive changes  Workstation performed: [id number]     Xr Chest Pa & Lateral    Result Date: 1/18/2019  Narrative: CHEST INDICATION:   pleural effusion  COMPARISON:  1/15/2019 EXAM PERFORMED/VIEWS:  XR CHEST PA & LATERAL  The frontal view was performed utilizing dual energy radiographic technique  FINDINGS: The cardiac silhouette is without change from the prior study  Moderate-sized left pleural effusion is of similar size to the previous examination given differences in technique  No significant right effusion  The left effusion obscures visualization of the underlying left lung base  There is mild atelectasis at the right lung base  There is a PICC line with its tip at or near the cavoatrial junction  Osseous structures appear within normal limits for patient age  Impression: Moderate left-sided pleural effusion without significant change in size from prior Workstation performed: [id number]     Fl Barium Swallow Video W Speech    Result Date: 1/18/2019  Narrative: A video barium swallow study was performed by the Department of Speech Pathology  Please refer to the report for the official interpretation  The images are stored in  PACS for archival purposes only  Study images were not formally reviewed by the  Radiology Department  Xr Chest Portable Icu    Result Date: 1/11/2019  Narrative: CHEST INDICATION:   Pleural effusions  COMPARISON:  Chest radiographs January 10, 2019 EXAM PERFORMED/VIEWS:  XR CHEST PORTABLE ICU  AP semierect FINDINGS: The heart is mildly enlarged  Atherosclerotic changes in the aorta  Left internal jugular central line with tip at cavoatrial junction  Lung volumes diminished  Endotracheal tube has been removed  Moderate-sized left pleural effusion, slightly improved from the prior study  Smaller right pleural effusion, improved from the prior study  Laurie and pulmonary vessels are prominent  Resolving bibasilar infiltrates  Osseous structures appear within normal limits for patient age  Impression: Slight improved appearance of the chest  Workstation performed: [id number]     Xr Chest Portable Icu    Result Date: 1/9/2019  Narrative: CHEST INDICATION:   mechanical ventilation  COMPARISON:  1/5/2019 EXAM PERFORMED/VIEWS:  XR CHEST PORTABLE ICU Images: 1 FINDINGS:  Lines and tubes are unchanged from prior exam  Cardiomediastinal silhouette appears unremarkable  Persistent bilateral pleural effusions, left greater the right with underlying interstitial edema and bibasilar atelectasis  The left effusion has enlarged  No pneumothorax  Osseous structures appear within normal limits for patient age       Impression: Persistent bilateral pleural effusions, left greater than right, enlarged at the left lung base  Underlying interstitial edema stable  Lines and tubes stable  Workstation performed: [id number]       EKG, Pathology, and Other Studies Reviewed on Admission:   · EKG: NSR 82     Allscripts / Epic Records Reviewed: Yes     ** Please Note: This note has been constructed using a voice recognition system   **

## 2019-02-04 NOTE — ASSESSMENT & PLAN NOTE
"6 x 11 x 14 cm fluid collection in the pancreatic bed"  Does not appear infected on imaging; pt without s/s of systemic illness   Also with 2 other fluid collections of unclear significance  "   probably additional pseudocysts    These include a 12 x 14 cm perisplenic collection and a 2 x 5 x 10 cm left retroperitoneal collection "

## 2019-02-04 NOTE — PROGRESS NOTES
[facility], can you help me find an appointment for this patient? Thank you!

## 2019-02-04 NOTE — PROGRESS NOTES
Progress Note - Hal Michelle 1960, 60 y o  male MRN: [id number]    Unit/Bed#: -01 Encounter: [id number]    Primary Care Provider: Levon Parkinson MD   Date and time admitted to hospital: 2/3/2019  1:24 PM      * Abdominal pain   Assessment & Plan    Reports epigastric and LUQ pain since discharge; ? PUD vs gastritis vs pancreatic pseudocyst   GI consult; plan for EGD   NPO  IVF   Pain control  PPI daily       Paroxysmal atrial fibrillation (HCC)   Assessment & Plan    Cont amiodarone  Hold warfarin tonight  Monitor INR daily      Pancreatic pseudocyst   Assessment & Plan     "6 x 11 x 14 cm fluid collection in the pancreatic bed"  Does not appear infected on imaging; pt without s/s of systemic illness   Also with 2 other fluid collections of unclear significance  "   probably additional pseudocysts  These include a 12 x 14 cm perisplenic collection and a 2 x 5 x 10 cm left retroperitoneal collection "        Pleural effusion, left   Assessment & Plan    Noted on CT imaging; does reports some LUQ pain and SOB  Could consider thoracentesis      Hyperkalemia   Assessment & Plan    Mild hyperkalemia; will follow with daily BMP          VTE Pharmacologic Prophylaxis:   Pharmacologic: held for therapeutic INR   Mechanical VTE Prophylaxis in Place: Yes    Patient Centered Rounds: I have performed bedside rounds with nursing staff today  Discussions with Specialists or Other Care Team Provider: GI     Education and Discussions with Family / Patient: patient     Time Spent for Care: 30 minutes  More than 50% of total time spent on counseling and coordination of care as described above      Current Length of Stay: 1 day(s)    Current Patient Status: Inpatient   Certification Statement: The patient will continue to require additional inpatient hospital stay due to abdominal pain after severe pancreatitis     Discharge Plan / Estimated Discharge Date: TBD based on clinical course; suspect at least 24 hours     Code Status: Level 1 - Full Code    Subjective:   Pt reports abd pain  No nausea or vomiting  Is moving his bowels  Objective:     Vitals:   Temp (24hrs), Av 1 °F (37 3 °C), Min:98 9 °F (37 2 °C), Max:99 5 °F (37 5 °C)    Temp:  [98 9 °F (37 2 °C)-99 5 °F (37 5 °C)] 99 5 °F (37 5 °C)  HR:  [80-98] 93  Resp:  [18-20] 18  BP: (123-170)/(64-98) 130/71  SpO2:  [90 %-97 %] 95 %  Body mass index is 27 37 kg/m²  Input and Output Summary (last 24 hours): Intake/Output Summary (Last 24 hours) at 19 1001  Last data filed at 19 0740   Gross per 24 hour   Intake           33 ml   Output              650 ml   Net          1368 33 ml       Physical Exam:     Physical Exam   Constitutional: He is oriented to person, place, and time  No distress  HENT:   Head: Normocephalic and atraumatic  Cardiovascular: Normal rate and regular rhythm  No murmur heard  Pulmonary/Chest: Effort normal and breath sounds normal  No respiratory distress  He has no wheezes  Abdominal: Soft  Bowel sounds are normal  He exhibits no distension  There is tenderness  There is no rebound and no guarding  Musculoskeletal: He exhibits no edema  Neurological: He is alert and oriented to person, place, and time  Skin: Skin is warm and dry  No rash noted  He is not diaphoretic  No erythema  No pallor  Nursing note and vitals reviewed      Additional Data:     Labs:      Results from last 7 days  Lab Units 19  0436   WBC Thousand/uL 9 35   HEMOGLOBIN g/dL 9 9*   HEMATOCRIT % 30 6*   PLATELETS Thousands/uL 373   NEUTROS PCT % 76*   LYMPHS PCT % 10*   MONOS PCT % 11   EOS PCT % 1       Results from last 7 days  Lab Units 19  0436   POTASSIUM mmol/L 4 2   CHLORIDE mmol/L 101   CO2 mmol/L 28   BUN mg/dL 13   CREATININE mg/dL 0 98   CALCIUM mg/dL 8 3   ALK PHOS U/L 53   ALT U/L 15   AST U/L 18       Results from last 7 days  Lab Units 19  0436   INR  2 01*       * I Have Reviewed All Lab Data Listed Above  * Additional Pertinent Lab Tests Reviewed: All Labs Within Last 24 Hours Reviewed    Imaging:    Imaging Reports Reviewed Today Include:   Imaging Personally Reviewed by Myself Includes:      Recent Cultures (last 7 days):           Last 24 Hours Medication List:     Current Facility-Administered Medications:  acetaminophen 650 mg Oral Q6H PRN Tony Looney MD    amiodarone 200 mg Oral Daily With Breakfast Tony Looney MD    HYDROmorphone 0 2 mg Intravenous Q4H PRN Tony Looney MD    HYDROmorphone 0 2 mg Intravenous Q4H PRN Tony Looney MD    HYDROmorphone 0 5 mg Intravenous Q4H PRN Tony Looney MD    ondansetron 4 mg Intravenous Q6H PRN Tony Looney MD    pantoprazole 40 mg Intravenous Q12H Camilo Choudhury MD    sodium chloride 100 mL/hr Intravenous Continuous Tony Looney MD Last Rate: 100 mL/hr (02/04/19 2126)        Today, Patient Was Seen By: Tony Looney MD    ** Please Note: This note has been constructed using a voice recognition system   **

## 2019-02-04 NOTE — ASSESSMENT & PLAN NOTE
Reports epigastric and LUQ pain since discharge; ? PUD vs gastritis   GI consult; plan for EGD   Clear liquid diet; NPO at [place]   Pain control  PPI BID

## 2019-02-04 NOTE — CONSULTS
Consultation - [facility]  Tommy Choudhury 60 y o  male MRN: [id number]  Unit/Bed#: -01 Encounter: [id number]        Consults    Reason for Consult / Principal Problem:     Abdominal pain and decreased p o  intake      ASSESSMENT AND PLAN:      1  Pancreatic pseudocyst - he had severe pancreatitis a month ago and now with fluid collection around the head of the pancreas which measured about 14 cm  There are additional perisplenic and retroperitoneal collections  No definite capsule noted around the fluid collection  This study is limited due to lack of intravenous contrast        These findings explain his abdominal pain and decreased p o  Intake  Fortunately he has no fever or leukocytosis  Continue pain management  Protonix 40 mg daily  Clear liquid diet and advance as she tolerates  Ensure supplement      2  Severe protein calorie malnutrition -hypoalbuminemia and weight loss  Encourage p o  intake  Nutrition consult  Ensure supplement  3   Atrial fibrillation -rate controlled  On anticoagulation  ______________________________________________________________________    HPI:   25-year-old male with history of acute pancreatitis admitted with abdominal pain and decreased p o  intake  Briefly -He was admitted a month ago with acute cholangitis and acute pancreatitis underwent ERCP with plastic biliary stent placement  His pancreatitis was told to be biliary in nature  He had prior cholecystectomy  His triglyceride and calcium were normal     Post ERCP he developed respiratory failure requiring intubation  While intubated he went into rapid AFib  He was evaluated by Cardiology and started on anticoagulation  Patient was discharged about 10 days ago  He reports persistent abdominal pain in the epigastric and left upper quadrant region radiating to his back  He feels full and was not able to eat much    She reports subjective fever and nausea but no vomiting  I spoke to him earlier today and told him to come to emergency room for further evaluation      REVIEW OF SYSTEMS:    CONSTITUTIONAL: Denies any fever, chills, rigors, and weight loss  HEENT: No earache or tinnitus  Denies hearing loss or visual disturbances  CARDIOVASCULAR: No chest pain or palpitations  RESPIRATORY: Denies any cough, hemoptysis, shortness of breath or dyspnea on exertion  GASTROINTESTINAL: As noted in the History of Present Illness  GENITOURINARY: No problems with urination  Denies any hematuria or dysuria  NEUROLOGIC: No dizziness or vertigo, denies headaches  MUSCULOSKELETAL: Denies any muscle or joint pain  SKIN: Denies skin rashes or itching  ENDOCRINE: Denies excessive thirst  Denies intolerance to heat or cold  PSYCHOSOCIAL: Denies depression or anxiety  Denies any recent memory loss  Historical Information   Past Medical History:   Diagnosis Date    Gastroesophageal reflux     Pancreatitis      Past Surgical History:   Procedure Laterality Date    CHOLECYSTECTOMY      COLONOSCOPY N/A 3/21/2016    Procedure: COLONOSCOPY;  Surgeon: Caron Sharma DO;  Location: [facility]; Service:     ERCP N/A 1/4/2019    Procedure: ENDOSCOPIC RETROGRADE CHOLANGIOPANCREATOGRAPHY (ERCP); Surgeon: Ximena Rosario MD;  Location: [facility]; Service: Gastroenterology    SD EGD TRANSORAL BIOPSY SINGLE/MULTIPLE N/A 3/21/2016    Procedure: ESOPHAGOGASTRODUODENOSCOPY (EGD); Surgeon: Caron Sharma DO;  Location: [facility];   Service: General     Social History   History   Alcohol Use No     Comment: rarely     History   Drug Use No     History   Smoking Status    Never Smoker   Smokeless Tobacco    Never Used     Family History   Problem Relation Age of Onset    Lung disease Neg Hx        Meds/Allergies     Prescriptions Prior to Admission   Medication    acetaminophen (TYLENOL) 325 mg tablet    amiodarone 200 mg tablet    tamsulosin (FLOMAX) 0 4 mg    warfarin (COUMADIN) 1 mg tablet     Current Facility-Administered Medications   Medication Dose Route Frequency    acetaminophen (TYLENOL) tablet 650 mg  650 mg Oral Q6H PRN    [START ON 2/4/2019] amiodarone tablet 200 mg  200 mg Oral Daily With Breakfast    [START ON 2/4/2019] enoxaparin (LOVENOX) subcutaneous injection 40 mg  40 mg Subcutaneous Daily    HYDROmorphone (DILAUDID) injection 0 2 mg  0 2 mg Intravenous Q4H PRN    HYDROmorphone (DILAUDID) injection 0 2 mg  0 2 mg Intravenous Q4H PRN    HYDROmorphone (DILAUDID) injection 0 5 mg  0 5 mg Intravenous Q4H PRN    ondansetron (ZOFRAN) injection 4 mg  4 mg Intravenous Q6H PRN    pantoprazole (PROTONIX) injection 40 mg  40 mg Intravenous Q12H [address]    sodium chloride 0 9 % infusion  100 mL/hr Intravenous Continuous       No Known Allergies        Objective     Blood pressure 170/82, pulse 91, temperature 98 9 °F (37 2 °C), temperature source Oral, resp  rate 18, height 6' 4" (1 93 m), weight 102 kg (224 lb 13 9 oz), SpO2 94 %  Body mass index is 27 37 kg/m²  Intake/Output Summary (Last 24 hours) at 02/03/19 2121  Last data filed at 02/03/19 1600   Gross per 24 hour   Intake             1000 ml   Output                0 ml   Net             1000 ml         PHYSICAL EXAM:      General Appearance:   Alert, cooperative, no distress   HEENT:   Normocephalic, atraumatic, anicteric      Neck:  Supple, symmetrical, trachea midline   Lungs:   Clear to auscultation bilaterally; no rales, rhonchi or wheezing; respirations unlabored    Heart[de-identified]   Regular rate and rhythm; no murmur, rub, or gallop     Abdomen:   Soft, non-tender, non-distended; normal bowel sounds; no masses, no organomegaly    Genitalia:   Deferred    Rectal:   Deferred    Extremities:  No cyanosis, clubbing or edema    Pulses:  2+ and symmetric all extremities    Skin:  No jaundice, rashes, or lesions    Lymph nodes:  No palpable cervical lymphadenopathy        Lab Results:   Admission on 02/03/2019 Component Date Value    WBC 02/03/2019 9 39     RBC 02/03/2019 3 77*    Hemoglobin 02/03/2019 10 9*    Hematocrit 02/03/2019 32 9*    MCV 02/03/2019 87     MCH 02/03/2019 28 9     MCHC 02/03/2019 33 1     RDW 02/03/2019 13 5     MPV 02/03/2019 8 0*    Platelets 48/57/6009 410*    nRBC 02/03/2019 0     Neutrophils Relative 02/03/2019 79*    Immat GRANS % 02/03/2019 1     Lymphocytes Relative 02/03/2019 11*    Monocytes Relative 02/03/2019 9     Eosinophils Relative 02/03/2019 0     Basophils Relative 02/03/2019 0     Neutrophils Absolute 02/03/2019 7 35     Immature Grans Absolute 02/03/2019 0 10     Lymphocytes Absolute 02/03/2019 1 01     Monocytes Absolute 02/03/2019 0 88     Eosinophils Absolute 02/03/2019 0 03     Basophils Absolute 02/03/2019 0 02     Sodium 02/03/2019 136     Potassium 02/03/2019 5 6*    Chloride 02/03/2019 99*    CO2 02/03/2019 31     ANION GAP 02/03/2019 6     BUN 02/03/2019 11     Creatinine 02/03/2019 1 04     Glucose 02/03/2019 95     Calcium 02/03/2019 8 7     AST 02/03/2019 33     ALT 02/03/2019 22     Alkaline Phosphatase 02/03/2019 58     Total Protein 02/03/2019 6 2*    Albumin 02/03/2019 1 8*    Total Bilirubin 02/03/2019 0 60     eGFR 02/03/2019 78     Lipase 02/03/2019 193     Protime 02/03/2019 21 3*    INR 02/03/2019 1 91*    PTT 02/03/2019 44*    Color, UA 02/03/2019 Marina     Clarity, UA 02/03/2019 Clear     pH, UA 02/03/2019 5 5     Leukocytes, UA 02/03/2019 Negative     Nitrite, UA 02/03/2019 Negative     Protein, UA 02/03/2019 Trace*    Glucose, UA 02/03/2019 Negative     Ketones, UA 02/03/2019 Negative     Urobilinogen, UA 02/03/2019 0 2     Bilirubin, UA 02/03/2019 Negative     Blood, UA 02/03/2019 Negative     Specific Gravity, UA 02/03/2019 >=1 030     RBC, UA 02/03/2019 None Seen     WBC, UA 02/03/2019 4-10*    Epithelial Cells 02/03/2019 Occasional     Bacteria, UA 02/03/2019 Moderate*    MUCUS THREADS 02/03/2019 Moderate*       Imaging Studies:  CT ABDOMEN AND PELVIS WITHOUT IV CONTRAST     INDICATION:   Abdominal pain, unspecified  Recent history of pancreatitis      COMPARISON:  Contrast-enhanced CT of the abdomen and pelvis from January 3, 2019      TECHNIQUE:  CT examination of the abdomen and pelvis was performed without intravenous contrast   Axial, sagittal, and coronal 2D reformatted images were created from the source data and submitted for interpretation       Radiation dose length product (DLP) for this visit:  789 mGy-cm   This examination, like all CT scans performed in the [facility], was performed utilizing techniques to minimize radiation dose exposure, including the use of iterative   reconstruction and automated exposure control       Enteric contrast was administered  The absence of intravenous contrast limits evaluation of the abdominal and pelvic viscera      FINDINGS:     ABDOMEN     LOWER CHEST:  Moderate size left pleural effusion, developing since the last CT  Compressive atelectasis in the base of the left lower lobe  Subsegmental atelectasis in the base of the right lower lobe      LIVER/BILIARY TREE:  1 6 cm cyst in the dome of the right lobe  Otherwise, liver grossly normal   Stent in CBD  No intrahepatic bile duct dilatation  Tiny amount of pneumobilia in the left lobe     GALLBLADDER:     Postcholecystectomy      SPLEEN:  Normal in size  Compressed by adjacent 12 x 14 cm discrete fluid collection, either loculated ascites or pseudocyst      PANCREAS:  Evaluation limited without intravenous contrast   6 x 11 x 14 cm fluid collection in the pancreatic bed, consistent with pseudocyst, extending into the mesenteric root  Pancreas appears to be grossly intact      ADRENAL GLANDS:  Unremarkable      KIDNEYS/URETERS:  Unremarkable  No hydronephrosis    2 x 5 x 10 cm fluid collection in the left posterior pararenal space, extending into the left subphrenic space      STOMACH AND BOWEL:  Stomach displaced by the large pancreatic pseudocyst   Small intestine unremarkable  Diverticulosis in the sigmoid without evidence of diverticulitis      APPENDIX:  No findings to suggest appendicitis      ABDOMINOPELVIC CAVITY: Several prominent mesenteric lymph nodes, the largest a 1 2 x 1 8 cm node at the root of the mesentery  Scattered areas of mesenteric edema  Small amount of pelvic and perihepatic ascites  No extraluminal gas      VESSELS:  Unremarkable for patient's age      PELVIS     REPRODUCTIVE ORGANS:  Prostate unremarkable      URINARY BLADDER:  Unremarkable      ABDOMINAL WALL/INGUINAL REGIONS:  Unremarkable      OSSEOUS STRUCTURES:  No acute fracture or destructive osseous lesion      IMPRESSION:     1  Moderate-sized left pleural effusion with compressive atelectasis in the subjacent portion of the left lower lobe  2   6 x 11 x 14 cm fluid-filled structure in the pancreatic bed, typical of a pseudocyst, extending into the mesenteric root  3   Several additional loculated fluid collections in the abdomen as described above, probably additional pseudocysts  These include a 12 x 14 cm perisplenic collection and a 2 x 5 x 10 cm left retroperitoneal collection  4   Small amount of ascites  5   Sigmoid diverticulosis without evidence of diverticulitis    6   CBD stent in place with no intrahepatic bile duct dilatation

## 2019-02-05 ENCOUNTER — ANESTHESIA EVENT (INPATIENT)
Dept: GASTROENTEROLOGY | Facility: HOSPITAL | Age: 59
DRG: 438 | End: 2019-02-05
Payer: COMMERCIAL

## 2019-02-05 ENCOUNTER — ANESTHESIA (INPATIENT)
Dept: GASTROENTEROLOGY | Facility: HOSPITAL | Age: 59
DRG: 438 | End: 2019-02-05
Payer: COMMERCIAL

## 2019-02-05 PROBLEM — R93.5 ABNORMAL CT OF THE ABDOMEN: Status: ACTIVE | Noted: 2019-02-05

## 2019-02-05 LAB
ALBUMIN SERPL BCP-MCNC: 1.6 G/DL (ref 3.5–5)
ALP SERPL-CCNC: 53 U/L (ref 46–116)
ALT SERPL W P-5'-P-CCNC: 17 U/L (ref 12–78)
ANION GAP SERPL CALCULATED.3IONS-SCNC: 7 MMOL/L (ref 4–13)
AST SERPL W P-5'-P-CCNC: 17 U/L (ref 5–45)
BASOPHILS # BLD AUTO: 0.05 THOUSANDS/ΜL (ref 0–0.1)
BASOPHILS NFR BLD AUTO: 1 % (ref 0–1)
BILIRUB SERPL-MCNC: 0.4 MG/DL (ref 0.2–1)
BUN SERPL-MCNC: 13 MG/DL (ref 5–25)
CALCIUM SERPL-MCNC: 8.1 MG/DL (ref 8.3–10.1)
CHLORIDE SERPL-SCNC: 102 MMOL/L (ref 100–108)
CO2 SERPL-SCNC: 29 MMOL/L (ref 21–32)
CREAT SERPL-MCNC: 0.98 MG/DL (ref 0.6–1.3)
EOSINOPHIL # BLD AUTO: 0.04 THOUSAND/ΜL (ref 0–0.61)
EOSINOPHIL NFR BLD AUTO: 0 % (ref 0–6)
ERYTHROCYTE [DISTWIDTH] IN BLOOD BY AUTOMATED COUNT: 13.8 % (ref 11.6–15.1)
GFR SERPL CREATININE-BSD FRML MDRD: 84 ML/MIN/1.73SQ M
GLUCOSE SERPL-MCNC: 100 MG/DL (ref 65–140)
HCT VFR BLD AUTO: 30.2 % (ref 36.5–49.3)
HGB BLD-MCNC: 9.6 G/DL (ref 12–17)
IMM GRANULOCYTES # BLD AUTO: 0.16 THOUSAND/UL (ref 0–0.2)
IMM GRANULOCYTES NFR BLD AUTO: 2 % (ref 0–2)
INR PPP: 2.13 (ref 0.86–1.17)
LYMPHOCYTES # BLD AUTO: 0.84 THOUSANDS/ΜL (ref 0.6–4.47)
LYMPHOCYTES NFR BLD AUTO: 9 % (ref 14–44)
MCH RBC QN AUTO: 27.9 PG (ref 26.8–34.3)
MCHC RBC AUTO-ENTMCNC: 31.8 G/DL (ref 31.4–37.4)
MCV RBC AUTO: 88 FL (ref 82–98)
MONOCYTES # BLD AUTO: 0.98 THOUSAND/ΜL (ref 0.17–1.22)
MONOCYTES NFR BLD AUTO: 11 % (ref 4–12)
NEUTROPHILS # BLD AUTO: 6.88 THOUSANDS/ΜL (ref 1.85–7.62)
NEUTS SEG NFR BLD AUTO: 77 % (ref 43–75)
NRBC BLD AUTO-RTO: 0 /100 WBCS
PLATELET # BLD AUTO: 391 THOUSANDS/UL (ref 149–390)
PMV BLD AUTO: 8.2 FL (ref 8.9–12.7)
POTASSIUM SERPL-SCNC: 4 MMOL/L (ref 3.5–5.3)
PROT SERPL-MCNC: 5.5 G/DL (ref 6.4–8.2)
PROTHROMBIN TIME: 23.2 SECONDS (ref 11.8–14.2)
RBC # BLD AUTO: 3.44 MILLION/UL (ref 3.88–5.62)
SODIUM SERPL-SCNC: 138 MMOL/L (ref 136–145)
WBC # BLD AUTO: 8.95 THOUSAND/UL (ref 4.31–10.16)

## 2019-02-05 PROCEDURE — 85610 PROTHROMBIN TIME: CPT | Performed by: HOSPITALIST

## 2019-02-05 PROCEDURE — 43241 EGD TUBE/CATH INSERTION: CPT | Performed by: INTERNAL MEDICINE

## 2019-02-05 PROCEDURE — C9113 INJ PANTOPRAZOLE SODIUM, VIA: HCPCS | Performed by: HOSPITALIST

## 2019-02-05 PROCEDURE — 80053 COMPREHEN METABOLIC PANEL: CPT | Performed by: HOSPITALIST

## 2019-02-05 PROCEDURE — 85025 COMPLETE CBC W/AUTO DIFF WBC: CPT | Performed by: HOSPITALIST

## 2019-02-05 PROCEDURE — 99232 SBSQ HOSP IP/OBS MODERATE 35: CPT | Performed by: INTERNAL MEDICINE

## 2019-02-05 PROCEDURE — 0DJ08ZZ INSPECTION OF UPPER INTESTINAL TRACT, VIA NATURAL OR ARTIFICIAL OPENING ENDOSCOPIC: ICD-10-PCS | Performed by: INTERNAL MEDICINE

## 2019-02-05 RX ORDER — PROPOFOL 10 MG/ML
INJECTION, EMULSION INTRAVENOUS AS NEEDED
Status: DISCONTINUED | OUTPATIENT
Start: 2019-02-05 | End: 2019-02-05 | Stop reason: SURG

## 2019-02-05 RX ORDER — SUCRALFATE ORAL 1 G/10ML
1000 SUSPENSION ORAL EVERY 6 HOURS SCHEDULED
Status: DISCONTINUED | OUTPATIENT
Start: 2019-02-05 | End: 2019-02-09 | Stop reason: HOSPADM

## 2019-02-05 RX ADMIN — HYDROMORPHONE HYDROCHLORIDE 0.5 MG: 1 INJECTION, SOLUTION INTRAMUSCULAR; INTRAVENOUS; SUBCUTANEOUS at 22:52

## 2019-02-05 RX ADMIN — PROPOFOL 30 MG: 10 INJECTION, EMULSION INTRAVENOUS at 13:13

## 2019-02-05 RX ADMIN — PROPOFOL 100 MG: 10 INJECTION, EMULSION INTRAVENOUS at 13:09

## 2019-02-05 RX ADMIN — PANTOPRAZOLE SODIUM 40 MG: 40 INJECTION, POWDER, FOR SOLUTION INTRAVENOUS at 08:45

## 2019-02-05 RX ADMIN — SUCRALFATE 1000 MG: 1 SUSPENSION ORAL at 17:42

## 2019-02-05 RX ADMIN — HYDROMORPHONE HYDROCHLORIDE 0.2 MG: 1 INJECTION, SOLUTION INTRAMUSCULAR; INTRAVENOUS; SUBCUTANEOUS at 17:42

## 2019-02-05 RX ADMIN — SODIUM CHLORIDE 100 ML/HR: 0.9 INJECTION, SOLUTION INTRAVENOUS at 08:44

## 2019-02-05 RX ADMIN — HYDROMORPHONE HYDROCHLORIDE 0.5 MG: 1 INJECTION, SOLUTION INTRAMUSCULAR; INTRAVENOUS; SUBCUTANEOUS at 04:47

## 2019-02-05 RX ADMIN — SUCRALFATE 1000 MG: 1 SUSPENSION ORAL at 23:11

## 2019-02-05 RX ADMIN — PROPOFOL 30 MG: 10 INJECTION, EMULSION INTRAVENOUS at 13:11

## 2019-02-05 RX ADMIN — AMIODARONE HYDROCHLORIDE 200 MG: 200 TABLET ORAL at 08:49

## 2019-02-05 RX ADMIN — SODIUM CHLORIDE 100 ML/HR: 0.9 INJECTION, SOLUTION INTRAVENOUS at 22:57

## 2019-02-05 RX ADMIN — PANTOPRAZOLE SODIUM 40 MG: 40 INJECTION, POWDER, FOR SOLUTION INTRAVENOUS at 20:42

## 2019-02-05 RX ADMIN — HYDROMORPHONE HYDROCHLORIDE 0.5 MG: 1 INJECTION, SOLUTION INTRAMUSCULAR; INTRAVENOUS; SUBCUTANEOUS at 08:41

## 2019-02-05 RX ADMIN — SODIUM CHLORIDE: 0.9 INJECTION, SOLUTION INTRAVENOUS at 12:59

## 2019-02-05 NOTE — ANESTHESIA POSTPROCEDURE EVALUATION
Post-Op Assessment Note      CV Status:  Stable    Mental Status:  Alert and awake    Hydration Status:  Euvolemic    PONV Controlled:  Controlled    Airway Patency:  Patent    Post Op Vitals Reviewed: Yes          Staff: CRNA           BP   132/73   Temp      Pulse  74   Resp   18   SpO2   98

## 2019-02-05 NOTE — PLAN OF CARE
DISCHARGE PLANNING     Discharge to home or other facility with appropriate resources Progressing        GASTROINTESTINAL - ADULT     Minimal or absence of nausea and/or vomiting Progressing     Maintains or returns to baseline bowel function Progressing     Maintains adequate nutritional intake Progressing        INFECTION - ADULT     Absence or prevention of progression during hospitalization Progressing     Absence of fever/infection during neutropenic period Progressing        Knowledge Deficit     Patient/family/caregiver demonstrates understanding of disease process, treatment plan, medications, and discharge instructions Progressing        PAIN - ADULT     Verbalizes/displays adequate comfort level or baseline comfort level Progressing        SAFETY ADULT     Patient will remain free of falls Progressing     Maintain or return to baseline ADL function Progressing

## 2019-02-05 NOTE — ANESTHESIA PREPROCEDURE EVALUATION
Review of Systems/Medical History          Cardiovascular  Dysrhythmias , atrial fibrillation,    Pulmonary  Shortness of breath, Pleural effusion : left,        GI/Hepatic  Dysphagia,   PUD, GERD poorly controlled, Esophageal disease , Pancreatic problem,             Endo/Other    Comment: pancreatitis    GYN       Hematology   Musculoskeletal       Neurology      Comment: Metabolic encephelopathy, delirium HX Psychology           Physical Exam    Airway    Mallampati score: II         Dental       Cardiovascular  Rhythm: regular, Rate: normal,     Pulmonary      Other Findings        Anesthesia Plan  ASA Score- 3     Anesthesia Type- IV sedation with anesthesia with ASA Monitors  Additional Monitors:   Airway Plan:         Plan Factors-    Induction- intravenous  Postoperative Plan-     Informed Consent- Anesthetic plan and risks discussed with patient and spouse  I personally reviewed this patient with the CRNA  Discussed and agreed on the Anesthesia Plan with the CRNA  Sabino Pond

## 2019-02-05 NOTE — OP NOTE
ESOPHAGOGASTRODUODENOSCOPY    PROCEDURE: EGD    SEDATION: Monitored anesthesia care, check anesthesia records    ASA Class: 3    INDICATIONS:  Abdominal pain    CONSENT:  Informed consent was obtained for the procedure, including sedation after explaining the risks and benefits of the procedure  Risks including but not limited to bleeding, perforation, infection, and missed lesion  PREPARATION:   Telemetry, pulse oximetry, blood pressure were monitored throughout the procedure  Patient was identified by myself both verbally and by visual inspection of ID band  DESCRIPTION:   Patient was placed in the left lateral decubitus position and was sedated with the above medication  The gastroscope was introduced in to the oropharynx and the esophagus was intubated under direct visualization  Scope was passed down the esophagus up to 2nd part of the duodenum  A careful inspection was made as the gastroscope was withdrawn, including a retroflexed view of the stomach; findings and interventions are described below  FINDINGS:    #1  Esophagus- a severe erosive circumferential grade D esophagitis    #2  Stomach- mild gastritis, some mild extrinsic compression however the stomach was easily inflatable  Normal retroflexion    #3   Duodenum- duodenitis in the bulb, it in the 2nd portion the duodenum biliary stent was seen abutting against the posterior wall with mild ulceration, attempted to reposition this         Twelve [employer] [place] Dobhoff tube was placed under direct v visualization to the stomach    IMPRESSIONS:      Duodenitis with CBD stent the 2nd portion the duodenum, posterior duodenal wall with some mild ulceration from the stent  Moderate duodenitis in the bulb  Gastritis  Some mild extrinsic compression from the pseudocyst noted in the stomach but overall easily inflatable  Normal retroflexion  Severe grade D erosive esophagitis  Twelve Japanese Dobhoff tube was placed under direct visualization into the stomach    RECOMMENDATIONS:     Return to floor  B i d  PPI therapy  Carafate for severe esophagitis  Continue a clear liquid diet and advance as tolerated  Start tube feeds through Dobhoff tube for nutritional supplementation  Pain control  Call with any abdominal pain, bleeding, fevers    COMPLICATIONS:  None; patient tolerated the procedure well      SPECIMENS:  * No specimens in log *    ESTIMATED BLOOD LOSS:  Minimal

## 2019-02-06 LAB
ALBUMIN SERPL BCP-MCNC: 1.6 G/DL (ref 3.5–5)
ALP SERPL-CCNC: 55 U/L (ref 46–116)
ALT SERPL W P-5'-P-CCNC: 12 U/L (ref 12–78)
ANION GAP SERPL CALCULATED.3IONS-SCNC: 7 MMOL/L (ref 4–13)
AST SERPL W P-5'-P-CCNC: 19 U/L (ref 5–45)
BASOPHILS # BLD AUTO: 0.04 THOUSANDS/ΜL (ref 0–0.1)
BASOPHILS NFR BLD AUTO: 0 % (ref 0–1)
BILIRUB SERPL-MCNC: 0.5 MG/DL (ref 0.2–1)
BUN SERPL-MCNC: 9 MG/DL (ref 5–25)
CALCIUM SERPL-MCNC: 8.2 MG/DL (ref 8.3–10.1)
CHLORIDE SERPL-SCNC: 103 MMOL/L (ref 100–108)
CO2 SERPL-SCNC: 28 MMOL/L (ref 21–32)
CREAT SERPL-MCNC: 0.96 MG/DL (ref 0.6–1.3)
EOSINOPHIL # BLD AUTO: 0.03 THOUSAND/ΜL (ref 0–0.61)
EOSINOPHIL NFR BLD AUTO: 0 % (ref 0–6)
ERYTHROCYTE [DISTWIDTH] IN BLOOD BY AUTOMATED COUNT: 13.6 % (ref 11.6–15.1)
GFR SERPL CREATININE-BSD FRML MDRD: 86 ML/MIN/1.73SQ M
GLUCOSE SERPL-MCNC: 110 MG/DL (ref 65–140)
HCT VFR BLD AUTO: 31.3 % (ref 36.5–49.3)
HGB BLD-MCNC: 10.1 G/DL (ref 12–17)
IMM GRANULOCYTES # BLD AUTO: 0.13 THOUSAND/UL (ref 0–0.2)
IMM GRANULOCYTES NFR BLD AUTO: 1 % (ref 0–2)
INR PPP: 2.11 (ref 0.86–1.17)
LYMPHOCYTES # BLD AUTO: 0.95 THOUSANDS/ΜL (ref 0.6–4.47)
LYMPHOCYTES NFR BLD AUTO: 10 % (ref 14–44)
MAGNESIUM SERPL-MCNC: 1.7 MG/DL (ref 1.6–2.6)
MCH RBC QN AUTO: 28.2 PG (ref 26.8–34.3)
MCHC RBC AUTO-ENTMCNC: 32.3 G/DL (ref 31.4–37.4)
MCV RBC AUTO: 87 FL (ref 82–98)
MONOCYTES # BLD AUTO: 0.94 THOUSAND/ΜL (ref 0.17–1.22)
MONOCYTES NFR BLD AUTO: 10 % (ref 4–12)
NEUTROPHILS # BLD AUTO: 7.57 THOUSANDS/ΜL (ref 1.85–7.62)
NEUTS SEG NFR BLD AUTO: 79 % (ref 43–75)
NRBC BLD AUTO-RTO: 0 /100 WBCS
PHOSPHATE SERPL-MCNC: 3.8 MG/DL (ref 2.7–4.5)
PLATELET # BLD AUTO: 418 THOUSANDS/UL (ref 149–390)
PMV BLD AUTO: 8.2 FL (ref 8.9–12.7)
POTASSIUM SERPL-SCNC: 4 MMOL/L (ref 3.5–5.3)
PROT SERPL-MCNC: 5.5 G/DL (ref 6.4–8.2)
PROTHROMBIN TIME: 23 SECONDS (ref 11.8–14.2)
RBC # BLD AUTO: 3.58 MILLION/UL (ref 3.88–5.62)
SODIUM SERPL-SCNC: 138 MMOL/L (ref 136–145)
WBC # BLD AUTO: 9.66 THOUSAND/UL (ref 4.31–10.16)

## 2019-02-06 PROCEDURE — 99232 SBSQ HOSP IP/OBS MODERATE 35: CPT | Performed by: INTERNAL MEDICINE

## 2019-02-06 PROCEDURE — 84100 ASSAY OF PHOSPHORUS: CPT | Performed by: INTERNAL MEDICINE

## 2019-02-06 PROCEDURE — 99253 IP/OBS CNSLTJ NEW/EST LOW 45: CPT | Performed by: NURSE PRACTITIONER

## 2019-02-06 PROCEDURE — C9113 INJ PANTOPRAZOLE SODIUM, VIA: HCPCS | Performed by: HOSPITALIST

## 2019-02-06 PROCEDURE — 85025 COMPLETE CBC W/AUTO DIFF WBC: CPT | Performed by: HOSPITALIST

## 2019-02-06 PROCEDURE — 85610 PROTHROMBIN TIME: CPT | Performed by: HOSPITALIST

## 2019-02-06 PROCEDURE — 83735 ASSAY OF MAGNESIUM: CPT | Performed by: INTERNAL MEDICINE

## 2019-02-06 PROCEDURE — 80053 COMPREHEN METABOLIC PANEL: CPT | Performed by: HOSPITALIST

## 2019-02-06 RX ORDER — SODIUM CHLORIDE 9 MG/ML
60 INJECTION, SOLUTION INTRAVENOUS CONTINUOUS
Status: DISCONTINUED | OUTPATIENT
Start: 2019-02-06 | End: 2019-02-07

## 2019-02-06 RX ADMIN — SUCRALFATE 1000 MG: 1 SUSPENSION ORAL at 05:49

## 2019-02-06 RX ADMIN — SUCRALFATE 1000 MG: 1 SUSPENSION ORAL at 17:20

## 2019-02-06 RX ADMIN — SUCRALFATE 1000 MG: 1 SUSPENSION ORAL at 23:42

## 2019-02-06 RX ADMIN — HYDROMORPHONE HYDROCHLORIDE 0.2 MG: 1 INJECTION, SOLUTION INTRAMUSCULAR; INTRAVENOUS; SUBCUTANEOUS at 11:52

## 2019-02-06 RX ADMIN — HYDROMORPHONE HYDROCHLORIDE 0.5 MG: 1 INJECTION, SOLUTION INTRAMUSCULAR; INTRAVENOUS; SUBCUTANEOUS at 23:06

## 2019-02-06 RX ADMIN — PANTOPRAZOLE SODIUM 40 MG: 40 INJECTION, POWDER, FOR SOLUTION INTRAVENOUS at 20:30

## 2019-02-06 RX ADMIN — AMIODARONE HYDROCHLORIDE 200 MG: 200 TABLET ORAL at 08:08

## 2019-02-06 RX ADMIN — HYDROMORPHONE HYDROCHLORIDE 0.2 MG: 1 INJECTION, SOLUTION INTRAMUSCULAR; INTRAVENOUS; SUBCUTANEOUS at 04:35

## 2019-02-06 RX ADMIN — SODIUM CHLORIDE 60 ML/HR: 0.9 INJECTION, SOLUTION INTRAVENOUS at 18:12

## 2019-02-06 RX ADMIN — SODIUM CHLORIDE 100 ML/HR: 0.9 INJECTION, SOLUTION INTRAVENOUS at 08:15

## 2019-02-06 RX ADMIN — HYDROMORPHONE HYDROCHLORIDE 0.2 MG: 1 INJECTION, SOLUTION INTRAMUSCULAR; INTRAVENOUS; SUBCUTANEOUS at 20:38

## 2019-02-06 RX ADMIN — PANTOPRAZOLE SODIUM 40 MG: 40 INJECTION, POWDER, FOR SOLUTION INTRAVENOUS at 08:09

## 2019-02-06 RX ADMIN — HYDROMORPHONE HYDROCHLORIDE 0.2 MG: 1 INJECTION, SOLUTION INTRAMUSCULAR; INTRAVENOUS; SUBCUTANEOUS at 01:59

## 2019-02-06 RX ADMIN — HYDROMORPHONE HYDROCHLORIDE 0.5 MG: 1 INJECTION, SOLUTION INTRAMUSCULAR; INTRAVENOUS; SUBCUTANEOUS at 18:15

## 2019-02-06 RX ADMIN — HYDROMORPHONE HYDROCHLORIDE 0.5 MG: 1 INJECTION, SOLUTION INTRAMUSCULAR; INTRAVENOUS; SUBCUTANEOUS at 09:43

## 2019-02-06 RX ADMIN — SUCRALFATE 1000 MG: 1 SUSPENSION ORAL at 11:49

## 2019-02-06 NOTE — NUTRITION
To meet 100% estimated nutritional needs, goal rate Jevity 1 2 is 85 ml/hr continuous  EN currently 20 ml/hr, suggest increase by 10 ml/hr as tolerated  Suggest continue IV fluids (likely  need to decrease IV fluid as EN rate increases) until closer to goal rate  At or close to goal rate d/c IV fluids and flush 150 ml q4       Goal rate will provide 2448 kcal, 113 g protein, 1652 ml free water

## 2019-02-06 NOTE — CONSULTS
Consultation - Pulmonary Medicine   Anil Howard 60 y o  male MRN: [id number]  Unit/Bed#: -01 Encounter: [id number]      Assessment/Plan:    1  Acute pulmonary insufficiency likely secondary to left pleural effusion      *  patient currently on room air- 95%, will continue to maintain O2 greater than 88%       *  encouraged pulmonary toileting, OOB as tolerated, IS     2  Left pleural effusion likely secondary to volume resuscitation on prior hospital admission      *  pleural fluid is likely from his ICU admission citing no pleural effusion on CXR 1/11/19, and fluid accumulation appearing on 1/14/19       *  fluids likely transudative- will send cytology upon thoracentesis    3  Supratherapeutic INR      *  current INR- 2 11      *  patient is on 1 mg Coumadin for approximately 5 days      *  Coumadin has been held for 2 days      *  will recheck coag panel tomorrow with anticipation of thoracentesis     *  If coagulation remains high, will consult IR for procedure     *  STOP TUBE FEEDINGS AT 5am          3  Abnormal CT      *  moderate-sized left pleural effusion with compressive atelectasis      *  encourage IS q 1 hour    4  Proximal AFib s/p cardiovesion       *  upon ICU admission the previous month, patient had an episode of AFib with RVR in which they were cardioverted in the ICU      *  patient placed on Coumadin 1mg, amiodarone 200 mg    5   LUQ pain in the setting acute pancreatitis s/p ERCP with sphincterectomy and biliary stent placement      *  possibly secondary to pancreatic pseudocyst      *  GI following      *  Carafate and PPI therapy, diet advanced to full liquid      History of Present Illness   Physician Requesting Consult: Jaun Su MD  Reason for Consult / Principal Problem:  Pleural effusion  Hx and PE limited by:  Nothing  Chief Complaint: " I am still having some LUQ pain"  HPI: Anil Howard is a 60 y o   male who presented to [address] [place] [address] with complaints of LUQ pain  Patient has a history of GERD, pancreatitis cholecystectomy, and colonoscopy  Patient was recently hospitalized on 01/03/2019 for acute pancreatitis status post ERCP with sphincterectomy and biliary stent placement  Postprocedure patient was intubated and sedated in the ICU  During his ICU stay he was extremely hypotensive and required vasopressors and extensive fluid resuscitation  Patient also had an episode of AFib with RVR which required cardioversion,AC, and antiarrhythmics  When patients extubation was attempted unfortunately he was to volume overloaded and had to be reintubated  Patient was diuresed for several more days and was able to be successfully extubated to BiPAP  Imaging revealed left lower lobe pleural effusion  Patient was discharged on 01/31/2019 and readmitted on 02/03/2019 for continued left upper quadrant pain  GI he began following him and is a quitting his left upper quadrant pain to a pancreatic pseudocyst   Upon GI evaluation patient reports some mild dyspnea on exertion and occasionally feeling short of breath  Patient currently denies fever, chills, nausea, vomiting, diarrhea, hemoptysis, night sweats, chest pain, or palpitations  From a pulmonary standpoint, patient does not follow with a pulmonary doctor  Patient has never been diagnosed with any lung disease or has any reported respiratory insufficiencies  He uses no inhaled medications or does not require oxygen therapy at home  Patient reports he has never smoked cigarettes, cigars illicit drugs, alcohol abuse, or vaping  Patient denies any sick contacts, and reports he is up-to-date with his flu and pneumonia vaccine  Patient currently denies any occupational, environmental exposures which may include PET dander, dust, mold or asbestos  Patient will likely undergo thoracentesis upon his hospital admission when INR becomes therapeutic      Inpatient consult to Pulmonology  Consult performed by: Zoie Fatima  Consult ordered by: Freddie Skelton          Review of Systems   Constitutional: Negative for activity change and appetite change  HENT: Negative for congestion and postnasal drip  Respiratory: Positive for shortness of breath  Negative for apnea, cough, choking, chest tightness, wheezing and stridor  Cardiovascular: Negative for chest pain and leg swelling  Gastrointestinal: Negative for abdominal distention and abdominal pain  Genitourinary: Negative for difficulty urinating and dysuria  Musculoskeletal: Negative for arthralgias and back pain  Neurological: Negative for dizziness and facial asymmetry  Psychiatric/Behavioral: Negative for agitation and behavioral problems  Historical Information   Past Medical History:   Diagnosis Date    Gastroesophageal reflux     Pancreatitis      Past Surgical History:   Procedure Laterality Date    CHOLECYSTECTOMY      COLONOSCOPY N/A 3/21/2016    Procedure: COLONOSCOPY;  Surgeon: Susan Aragon DO;  Location: [facility]; Service:     ERCP N/A 1/4/2019    Procedure: ENDOSCOPIC RETROGRADE CHOLANGIOPANCREATOGRAPHY (ERCP); Surgeon: Alec Johnson MD;  Location: [facility]; Service: Gastroenterology    ESOPHAGOGASTRODUODENOSCOPY N/A 2/5/2019    Procedure: ESOPHAGOGASTRODUODENOSCOPY (EGD), with possible nasojejunal Dobhoff tube placement;  Surgeon: Gabino Herrera MD;  Location: [facility]; Service: Gastroenterology    GA EGD TRANSORAL BIOPSY SINGLE/MULTIPLE N/A 3/21/2016    Procedure: ESOPHAGOGASTRODUODENOSCOPY (EGD); Surgeon: Susan Aragon DO;  Location: [facility];   Service: General     Social History   History   Alcohol Use No     Comment: rarely     History   Drug Use No     History   Smoking Status    Never Smoker   Smokeless Tobacco    Never Used     Occupational History:  Noncontributory    Family History:   Family History   Problem Relation Age of Onset    Lung disease Neg Hx        Meds/Allergies   pertinent pulmonary meds have been reviewed    No Known Allergies    Objective   Vitals: Blood pressure 128/94, pulse 85, temperature 98 4 °F (36 9 °C), temperature source Oral, resp  rate 20, height 6' 4" (1 93 m), weight 102 kg (224 lb 13 9 oz), SpO2 95 %  RA,Body mass index is 27 37 kg/m²  Intake/Output Summary (Last 24 hours) at 02/06/19 1625  Last data filed at 02/06/19 1432   Gross per 24 hour   Intake          1566 67 ml   Output              950 ml   Net           616 67 ml     Invasive Devices     Peripheral Intravenous Line            Peripheral IV 02/04/19 Right Hand 2 days          Drain            NG/OG/Enteral Tube Nasogastric 12 Fr Left nares 1 day                Physical Exam   Constitutional: He is oriented to person, place, and time  He appears well-developed and well-nourished  HENT:   Head: Normocephalic and atraumatic  Neck: Normal range of motion  Neck supple  No tracheal deviation present  No thyromegaly present  Cardiovascular: Normal rate, regular rhythm and normal heart sounds  Exam reveals no gallop and no friction rub  No murmur heard  Pulmonary/Chest: Effort normal  No accessory muscle usage  No tachypnea and no bradypnea  No respiratory distress  He has decreased breath sounds in the left middle field and the left lower field  He has no wheezes  He has no rhonchi  He has no rales  He exhibits no tenderness  Abdominal: Soft  Bowel sounds are normal  He exhibits no distension  There is no tenderness  Musculoskeletal: Normal range of motion  He exhibits no edema or deformity  Neurological: He is alert and oriented to person, place, and time  Skin: Skin is warm and dry  Psychiatric: He has a normal mood and affect  His behavior is normal        Lab Results:   I have personally reviewed pertinent lab results  , CBC:   Lab Results   Component Value Date    WBC 9 66 02/06/2019    HGB 10 1 (L) 02/06/2019    HCT 31 3 (L) 02/06/2019    MCV 87 02/06/2019     (H) 02/06/2019 MCH 28 2 02/06/2019    MCHC 32 3 02/06/2019    RDW 13 6 02/06/2019    MPV 8 2 (L) 02/06/2019    NRBC 0 02/06/2019   , CMP:   Lab Results   Component Value Date    SODIUM 138 02/06/2019    K 4 0 02/06/2019     02/06/2019    CO2 28 02/06/2019    BUN 9 02/06/2019    CREATININE 0 96 02/06/2019    CALCIUM 8 2 (L) 02/06/2019    AST 19 02/06/2019    ALT 12 02/06/2019    ALKPHOS 55 02/06/2019    EGFR 86 02/06/2019   , PT/INR:   Lab Results   Component Value Date    INR 2 11 (H) 02/06/2019     Imaging Studies: I have personally reviewed pertinent films in PACS     CT of the chest 02/03/2019-moderate-sized left pleural effusion with compressive atelectasis    EKG, Pathology, and Other Studies: I have personally reviewed pertinent films in PACS     Echo 01/06/2019-EF 60% RV, LA, RA-mildly dilated MV, TV mild regurg    Pulmonary Results (PFTs, PSG): I have personally reviewed pertinent films in PACS     No PFTs recorded    VTE Prophylaxis: Sequential compression device (Venodyne)     Code Status: Level 1 - Full Code    None    Portions of the record may have been created with voice recognition software  Occasional wrong word or "sound a like" substitutions may have occurred due to the inherent limitations of voice recognition software  Read the chart carefully and recognize, using context, where substitutions have occurred

## 2019-02-06 NOTE — PROGRESS NOTES
Progress Note - Ru Landry 60 y o  male MRN: [id number]    Unit/Bed#: -01 Encounter: [id number]        Subjective:   Patient reports feeling okay, having some mild discomfort with the Dobhoff tube, but no worsening of his abdominal pain since yesterday  Tolerating clear liquids, is currently receiving feeds through the tube at 20 cc/hour  Objective:     Vitals: Blood pressure 137/93, pulse 98, temperature 99 6 °F (37 6 °C), temperature source Oral, resp  rate 18, height 6' 4" (1 93 m), weight 102 kg (224 lb 13 9 oz), SpO2 95 %  ,Body mass index is 27 37 kg/m²  Intake/Output Summary (Last 24 hours) at 02/06/19 1426  Last data filed at 02/06/19 2390   Gross per 24 hour   Intake          1046 67 ml   Output              850 ml   Net           196 67 ml       Physical Exam:   General appearance: alert, appears stated age and cooperative  Lungs: clear to auscultation bilaterally, no labored breathing/accessory muscle use  Heart: regular rate and rhythm, S1, S2 normal, no murmur, click, rub or gallop  Abdomen: soft, some tenderness noted mostly in the epigastric and left upper quadrant regions with no guarding; bowel sounds normal; no masses,  no organomegaly  Extremities: no edema    Invasive Devices     Peripheral Intravenous Line            Peripheral IV 02/04/19 Right Hand 2 days          Drain            NG/OG/Enteral Tube Nasogastric 12 Fr Left nares 1 day                Lab, Imaging and other studies: I have personally reviewed pertinent reports      Admission on 02/03/2019   Component Date Value    WBC 02/03/2019 9 39     RBC 02/03/2019 3 77*    Hemoglobin 02/03/2019 10 9*    Hematocrit 02/03/2019 32 9*    MCV 02/03/2019 87     MCH 02/03/2019 28 9     MCHC 02/03/2019 33 1     RDW 02/03/2019 13 5     MPV 02/03/2019 8 0*    Platelets 45/89/8991 410*    nRBC 02/03/2019 0     Neutrophils Relative 02/03/2019 79*    Immat GRANS % 02/03/2019 1     Lymphocytes Relative 02/03/2019 11*    Monocytes Relative 02/03/2019 9     Eosinophils Relative 02/03/2019 0     Basophils Relative 02/03/2019 0     Neutrophils Absolute 02/03/2019 7 35     Immature Grans Absolute 02/03/2019 0 10     Lymphocytes Absolute 02/03/2019 1 01     Monocytes Absolute 02/03/2019 0 88     Eosinophils Absolute 02/03/2019 0 03     Basophils Absolute 02/03/2019 0 02     Sodium 02/03/2019 136     Potassium 02/03/2019 5 6*    Chloride 02/03/2019 99*    CO2 02/03/2019 31     ANION GAP 02/03/2019 6     BUN 02/03/2019 11     Creatinine 02/03/2019 1 04     Glucose 02/03/2019 95     Calcium 02/03/2019 8 7     AST 02/03/2019 33     ALT 02/03/2019 22     Alkaline Phosphatase 02/03/2019 58     Total Protein 02/03/2019 6 2*    Albumin 02/03/2019 1 8*    Total Bilirubin 02/03/2019 0 60     eGFR 02/03/2019 78     Lipase 02/03/2019 193     Protime 02/03/2019 21 3*    INR 02/03/2019 1 91*    PTT 02/03/2019 44*    Color, UA 02/03/2019 Marina     Clarity, UA 02/03/2019 Clear     pH, UA 02/03/2019 5 5     Leukocytes, UA 02/03/2019 Negative     Nitrite, UA 02/03/2019 Negative     Protein, UA 02/03/2019 Trace*    Glucose, UA 02/03/2019 Negative     Ketones, UA 02/03/2019 Negative     Urobilinogen, UA 02/03/2019 0 2     Bilirubin, UA 02/03/2019 Negative     Blood, UA 02/03/2019 Negative     Specific Gravity, UA 02/03/2019 >=1 030     RBC, UA 02/03/2019 None Seen     WBC, UA 02/03/2019 4-10*    Epithelial Cells 02/03/2019 Occasional     Bacteria, UA 02/03/2019 Moderate*    MUCUS THREADS 02/03/2019 Moderate*    WBC 02/04/2019 9 35     RBC 02/04/2019 3 42*    Hemoglobin 02/04/2019 9 9*    Hematocrit 02/04/2019 30 6*    MCV 02/04/2019 90     MCH 02/04/2019 28 9     MCHC 02/04/2019 32 4     RDW 02/04/2019 13 9     MPV 02/04/2019 7 8*    Platelets 22/53/1609 373     nRBC 02/04/2019 0     Neutrophils Relative 02/04/2019 76*    Immat GRANS % 02/04/2019 1     Lymphocytes Relative 02/04/2019 10*    Monocytes Relative 02/04/2019 11     Eosinophils Relative 02/04/2019 1     Basophils Relative 02/04/2019 1     Neutrophils Absolute 02/04/2019 7 11     Immature Grans Absolute 02/04/2019 0 13     Lymphocytes Absolute 02/04/2019 0 96     Monocytes Absolute 02/04/2019 1 04     Eosinophils Absolute 02/04/2019 0 05     Basophils Absolute 02/04/2019 0 06     Sodium 02/04/2019 137     Potassium 02/04/2019 4 2     Chloride 02/04/2019 101     CO2 02/04/2019 28     ANION GAP 02/04/2019 8     BUN 02/04/2019 13     Creatinine 02/04/2019 0 98     Glucose 02/04/2019 86     Calcium 02/04/2019 8 3     AST 02/04/2019 18     ALT 02/04/2019 15     Alkaline Phosphatase 02/04/2019 53     Total Protein 02/04/2019 5 6*    Albumin 02/04/2019 1 6*    Total Bilirubin 02/04/2019 0 50     eGFR 02/04/2019 84     Protime 02/04/2019 22 2*    INR 02/04/2019 2 01*    Ventricular Rate 02/03/2019 82     Atrial Rate 02/03/2019 86     [place] Interval 02/03/2019 146     QRSD Interval 02/03/2019 98     QT Interval 02/03/2019 372     QTC Interval 02/03/2019 435     P Axis 02/03/2019 26     QRS Axis 02/03/2019 -6     T Wave Axis 02/03/2019 60     Protime 02/05/2019 23 2*    INR 02/05/2019 2 13*    Sodium 02/05/2019 138     Potassium 02/05/2019 4 0     Chloride 02/05/2019 102     CO2 02/05/2019 29     ANION GAP 02/05/2019 7     BUN 02/05/2019 13     Creatinine 02/05/2019 0 98     Glucose 02/05/2019 100     Calcium 02/05/2019 8 1*    AST 02/05/2019 17     ALT 02/05/2019 17     Alkaline Phosphatase 02/05/2019 53     Total Protein 02/05/2019 5 5*    Albumin 02/05/2019 1 6*    Total Bilirubin 02/05/2019 0 40     eGFR 02/05/2019 84     WBC 02/05/2019 8 95     RBC 02/05/2019 3 44*    Hemoglobin 02/05/2019 9 6*    Hematocrit 02/05/2019 30 2*    MCV 02/05/2019 88     MCH 02/05/2019 27 9     MCHC 02/05/2019 31 8     RDW 02/05/2019 13 8     MPV 02/05/2019 8 2*    Platelets 77/74/6918 391*    nRBC 02/05/2019 0     Neutrophils Relative 02/05/2019 77*    Immat GRANS % 02/05/2019 2     Lymphocytes Relative 02/05/2019 9*    Monocytes Relative 02/05/2019 11     Eosinophils Relative 02/05/2019 0     Basophils Relative 02/05/2019 1     Neutrophils Absolute 02/05/2019 6 88     Immature Grans Absolute 02/05/2019 0 16     Lymphocytes Absolute 02/05/2019 0 84     Monocytes Absolute 02/05/2019 0 98     Eosinophils Absolute 02/05/2019 0 04     Basophils Absolute 02/05/2019 0 05     Protime 02/06/2019 23 0*    INR 02/06/2019 2 11*    Sodium 02/06/2019 138     Potassium 02/06/2019 4 0     Chloride 02/06/2019 103     CO2 02/06/2019 28     ANION GAP 02/06/2019 7     BUN 02/06/2019 9     Creatinine 02/06/2019 0 96     Glucose 02/06/2019 110     Calcium 02/06/2019 8 2*    AST 02/06/2019 19     ALT 02/06/2019 12     Alkaline Phosphatase 02/06/2019 55     Total Protein 02/06/2019 5 5*    Albumin 02/06/2019 1 6*    Total Bilirubin 02/06/2019 0 50     eGFR 02/06/2019 86     WBC 02/06/2019 9 66     RBC 02/06/2019 3 58*    Hemoglobin 02/06/2019 10 1*    Hematocrit 02/06/2019 31 3*    MCV 02/06/2019 87     MCH 02/06/2019 28 2     MCHC 02/06/2019 32 3     RDW 02/06/2019 13 6     MPV 02/06/2019 8 2*    Platelets 38/32/1062 418*    nRBC 02/06/2019 0     Neutrophils Relative 02/06/2019 79*    Immat GRANS % 02/06/2019 1     Lymphocytes Relative 02/06/2019 10*    Monocytes Relative 02/06/2019 10     Eosinophils Relative 02/06/2019 0     Basophils Relative 02/06/2019 0     Neutrophils Absolute 02/06/2019 7 57     Immature Grans Absolute 02/06/2019 0 13     Lymphocytes Absolute 02/06/2019 0 95     Monocytes Absolute 02/06/2019 0 94     Eosinophils Absolute 02/06/2019 0 03     Basophils Absolute 02/06/2019 0 04     Magnesium 02/06/2019 1 7     Phosphorus 02/06/2019 3 8      Results for Marciano Beck (MRN [id number]) as of 2/6/2019 14:26   Ref   Range 2/3/2019 14:05 2/3/2019 14:17 2/3/2019 15:47 2/4/2019 04:36 2/5/2019 04:31 2/6/2019 04:38   eGFR Latest Units: ml/min/1 73sq m 78   84 84 86   Sodium Latest Ref Range: 136 - 145 mmol/L 136   137 138 138   Potassium Latest Ref Range: 3 5 - 5 3 mmol/L 5 6 (H)   4 2 4 0 4 0   Chloride Latest Ref Range: 100 - 108 mmol/L 99 (L)   101 102 103   CO2 Latest Ref Range: 21 - 32 mmol/L 31   28 29 28   Anion Gap Latest Ref Range: 4 - 13 mmol/L 6   8 7 7   BUN Latest Ref Range: 5 - 25 mg/dL 11   13 13 9   Creatinine Latest Ref Range: 0 60 - 1 30 mg/dL 1 04   0 98 0 98 0 96   Glucose, Random Latest Ref Range: 65 - 140 mg/dL 95   86 100 110   Calcium Latest Ref Range: 8 3 - 10 1 mg/dL 8 7   8 3 8 1 (L) 8 2 (L)   AST Latest Ref Range: 5 - 45 U/L 33   18 17 19   ALT Latest Ref Range: 12 - 78 U/L 22   15 17 12   Alkaline Phosphatase Latest Ref Range: 46 - 116 U/L 58   53 53 55   Total Protein Latest Ref Range: 6 4 - 8 2 g/dL 6 2 (L)   5 6 (L) 5 5 (L) 5 5 (L)   Albumin Latest Ref Range: 3 5 - 5 0 g/dL 1 8 (L)   1 6 (L) 1 6 (L) 1 6 (L)   TOTAL BILIRUBIN Latest Ref Range: 0 20 - 1 00 mg/dL 0 60   0 50 0 40 0 50   Phosphorus Latest Ref Range: 2 7 - 4 5 mg/dL      3 8   Magnesium Latest Ref Range: 1 6 - 2 6 mg/dL      1 7   Lipase Latest Ref Range: 73 - 393 u/L 193        WBC Latest Ref Range: 4 31 - 10 16 Thousand/uL 9 39   9 35 8 95 9 66   Red Blood Cell Count Latest Ref Range: 3 88 - 5 62 Million/uL 3 77 (L)   3 42 (L) 3 44 (L) 3 58 (L)   Hemoglobin Latest Ref Range: 12 0 - 17 0 g/dL 10 9 (L)   9 9 (L) 9 6 (L) 10 1 (L)   HCT Latest Ref Range: 36 5 - 49 3 % 32 9 (L)   30 6 (L) 30 2 (L) 31 3 (L)   MCV Latest Ref Range: 82 - 98 fL 87   90 88 87   MCH Latest Ref Range: 26 8 - 34 3 pg 28 9   28 9 27 9 28 2   MCHC Latest Ref Range: 31 4 - 37 4 g/dL 33 1   32 4 31 8 32 3   RDW Latest Ref Range: 11 6 - 15 1 % 13 5   13 9 13 8 13 6   Platelet Count Latest Ref Range: 149 - 390 Thousands/uL 410 (H)   373 391 (H) 418 (H)   MPV Latest Ref Range: 8 9 - 12 7 fL 8 0 (L)   7 8 (L) 8 2 (L) 8 2 (L) nRBC Latest Units: /100 WBCs 0   0 0 0   Neutrophils % Latest Ref Range: 43 - 75 % 79 (H)   76 (H) 77 (H) 79 (H)   Immat GRANS % Latest Ref Range: 0 - 2 % 1   1 2 1   Lymphocytes Relative Latest Ref Range: 14 - 44 % 11 (L)   10 (L) 9 (L) 10 (L)   Monocytes Relative Latest Ref Range: 4 - 12 % 9   11 11 10   Eosinophils Latest Ref Range: 0 - 6 % 0   1 0 0   Basophils Relative Latest Ref Range: 0 - 1 % 0   1 1 0   Immature Grans Absolute Latest Ref Range: 0 00 - 0 20 Thousand/uL 0 10   0 13 0 16 0 13   Absolute Neutrophils Latest Ref Range: 1 85 - 7 62 Thousands/µL 7 35   7 11 6 88 7 57   Lymphocytes Absolute Latest Ref Range: 0 60 - 4 47 Thousands/µL 1 01   0 96 0 84 0 95   Absolute Monocytes Latest Ref Range: 0 17 - 1 22 Thousand/µL 0 88   1 04 0 98 0 94   Absolute Eosinophils Latest Ref Range: 0 00 - 0 61 Thousand/µL 0 03   0 05 0 04 0 03   Basophils Absolute Latest Ref Range: 0 00 - 0 10 Thousands/µL 0 02   0 06 0 05 0 04   Protime Latest Ref Range: 11 8 - 14 2 seconds 21 3 (H)   22 2 (H) 23 2 (H) 23 0 (H)   INR Latest Ref Range: 0 86 - 1 17  1 91 (H)   2 01 (H) 2 13 (H) 2 11 (H)   PTT Latest Ref Range: 26 - 38 seconds 44 (H)        Epithelial Cells Latest Ref Range: None Seen, Occasional /hpf   Occasional      Color, UA Unknown   [place]ina      Clarity, UA Unknown   Clear      SL AMB SPECIFIC GRAVITY_URINE Latest Ref Range: 1 003 - 1 030    >=1 030      Glucose, UA Latest Ref Range: Negative mg/dl   Negative      Ketones, UA Latest Ref Range: Negative mg/dl   Negative      Blood, UA Latest Ref Range: Negative    Negative      Nitrite, UA Latest Ref Range: Negative    Negative      Leukocytes, UA Latest Ref Range: Negative    Negative      pH, UA Latest Ref Range: 4 5 - 8 0    5 5      POCT URINE PROTEIN Latest Ref Range: Negative mg/dl   Trace (A)      Bilirubin, UA Latest Ref Range: Negative    Negative      SL AMB POCT UROBILINOGEN Latest Ref Range: 0 2, 1 0 E U /dl E U /dl   0 2      RBC, UA Latest Ref Range: None Seen, 0-5 /hpf   None Seen      WBC, UA Latest Ref Range: None Seen, 0-5, 5-55, 5-65 /hpf   4-10 (A)      Bacteria, UA Latest Ref Range: None Seen, Occasional /hpf   Moderate (A)      ECG 12-LEAD Unknown  Rpt       MUCUS THREADS Latest Ref Range: None Seen    Moderate (A)         EGD yesterday, Dr Enciso Alu:     #1  Esophagus- a severe erosive circumferential grade D esophagitis     #2  Stomach- mild gastritis, some mild extrinsic compression however the stomach was easily inflatable  Normal retroflexion     #3  Duodenum- duodenitis in the bulb, it in the 2nd portion the duodenum biliary stent was seen abutting against the posterior wall with mild ulceration, attempted to reposition this         Twelve [employer] Shila Dobhoff tube was placed under direct v visualization to the stomach     IMPRESSIONS:       Duodenitis with CBD stent the 2nd portion the duodenum, posterior duodenal wall with some mild ulceration from the stent  Moderate duodenitis in the bulb  Gastritis  Some mild extrinsic compression from the pseudocyst noted in the stomach but overall easily inflatable  Normal retroflexion  Severe grade D erosive esophagitis  Twelve [employer] Shila Dobhoff tube was placed under direct visualization into the stomach     RECOMMENDATIONS:      Return to floor  B i d  PPI therapy  Carafate for severe esophagitis  Continue a clear liquid diet and advance as tolerated  Start tube feeds through Dobhoff tube for nutritional supplementation  Pain control  Call with any abdominal pain, bleeding, fevers    Assessment/Plan:    1  Persistent abdominal pain with decreased appetite, appears to be at least in part secondary to pancreatic pseudocysts, which occurred after he developed biliary pancreatitis a month ago for which he underwent ERCP    He was also noted with significant esophagitis on EGD yesterday though    - twice daily PPI therapy  - Carafate 4 times daily  - will advance to full liquid diet at this time, given that he appears to be tolerating liquids  - advance Dobhoff tube feeds to target rate as determined by nutrition  - if patient not tolerant of diet, may require cystogastrostomy with decompression of pseudocyst, which would have to be done at [facility], [place]  - I discussed this patient's case and plan with Dr Santy Mathias ([facility])

## 2019-02-06 NOTE — ASSESSMENT & PLAN NOTE
6 x 11 x 14 cm fluid-filled structure in the pancreatic bed, typical of a pseudocyst, extending into the mesenteric root  Several additional loculated fluid collections in the abdomen probably additional pseudocysts  These include a 12 x 14 cm perisplenic collection and a 2 x 5 x 10 cm left retroperitoneal collection    GI following

## 2019-02-06 NOTE — ASSESSMENT & PLAN NOTE
EGD - Duodenitis with CBD stent the 2nd portion the duodenum, posterior duodenal wall with some mild ulceration from the stent  Moderate duodenitis in the bulb, gastritis, some mild extrinsic compression from the pseudocyst noted in the stomach but overall easily inflatable   Severe grade D erosive esophagitis  On Protonix 40 mg IV q 12 hours, Carafate 1 g q 6 hours  Dobhoff tube placed and begun on Jevity 1 2 at 20 mL per hour

## 2019-02-06 NOTE — PLAN OF CARE
Problem: DISCHARGE PLANNING - CARE MANAGEMENT  Goal: Discharge to post-acute care or home with appropriate resources  INTERVENTIONS:  - Conduct assessment to determine patient/family and health care team treatment goals, and need for post-acute services based on payer coverage, community resources, and patient preferences, and barriers to discharge  - Address psychosocial, clinical, and financial barriers to discharge as identified in assessment in conjunction with the patient/family and health care team  - Arrange appropriate level of post-acute services according to patients   needs and preference and payer coverage in collaboration with the physician and health care team  - Communicate with and update the patient/family, physician, and health care team regarding progress on the discharge plan  - Arrange appropriate transportation to post-acute venues  Outcome: Progressing  LOS 3 DAYS  PATIENT IS A GI RELATE ADMISSION  PATIENT HAD STENT PLACED AND WAS ADMITTED FOR ABDOMINAL PAIN  Patient reports living in [facility] in a bi level home with his spouse  Patient is active with [facility] for SN  Patient denied inpatient rehab Hx  Patient does not drive, has his spouse take him to all appointments, and does not drive  Patient reported his spouse is POA  Patient requested referral to [facility] for ANAI  Patient denies any other needs at this time  patient is currently on tube feed however, plan is not to discharge on this at this time  Patient is not medically stable at this moment  CM reviewed d/c planning process including the following: identifying help at home, patient preference for d/c planning needs, Discharge Lounge, [facility] Meds to Bed program, availability of treatment team to discuss questions or concerns patient and/or family may have regarding understanding medications and recognizing signs and symptoms once discharged    CM also encouraged patient to follow up with all recommended appointments after discharge  Patient advised of importance for patient and family to participate in managing patients medical well being  CM will place referral to [facility], Complete face to face, and place information in follow up section of chart  CM will follow through discharge

## 2019-02-06 NOTE — SOCIAL WORK
LOS 3 DAYS  PATIENT IS A GI RELATE ADMISSION  PATIENT HAD STENT PLACED AND WAS ADMITTED FOR ABDOMINAL PAIN  Patient reports living in [facility] in a bi level home with his spouse  Patient is active with [facility] for SN  Patient denied inpatient rehab Hx  Patient does not drive, has his spouse take him to all appointments, and does not drive  Patient reported his spouse is POA  Patient requested referral to [facility] for ANAI  Patient denies any other needs at this time  patient is currently on tube feed however, plan is not to discharge on this at this time  Patient is not medically stable at this moment  CM reviewed d/c planning process including the following: identifying help at home, patient preference for d/c planning needs, Discharge Lounge, [facility] Meds to Bed program, availability of treatment team to discuss questions or concerns patient and/or family may have regarding understanding medications and recognizing signs and symptoms once discharged  CM also encouraged patient to follow up with all recommended appointments after discharge  Patient advised of importance for patient and family to participate in managing patients medical well being  CM will place referral to [facility], Complete face to face, and place information in follow up section of chart  CM will follow through discharge

## 2019-02-06 NOTE — PROGRESS NOTES
Progress Note - Odette Chapman 1960, 60 y o  male MRN: [id number]    Unit/Bed#: -01 Encounter: [id number]    Primary Care Provider: Tray Hays MD   Date and time admitted to hospital: 2/3/2019  1:24 PM        * Abdominal pain   Assessment & Plan    EGD - Duodenitis with CBD stent the 2nd portion the duodenum, posterior duodenal wall with some mild ulceration from the stent  Moderate duodenitis in the bulb, gastritis, some mild extrinsic compression from the pseudocyst noted in the stomach but overall easily inflatable  Severe grade D erosive esophagitis  On Protonix 40 mg IV q 12 hours, Carafate 1 g q 6 hours  Dobhoff tube placed and begun on Jevity 1 2 at 20 mL per hour       Paroxysmal atrial fibrillation (HCC)   Assessment & Plan    Cont amiodarone  Restart warfarin when okay with GI  INR 2 13 today  Monitor INR daily      Abnormal CT of the abdomen   Assessment & Plan    6 x 11 x 14 cm fluid-filled structure in the pancreatic bed, typical of a pseudocyst, extending into the mesenteric root  Several additional loculated fluid collections in the abdomen probably additional pseudocysts  These include a 12 x 14 cm perisplenic collection and a 2 x 5 x 10 cm left retroperitoneal collection  GI following     Pleural effusion, left   Assessment & Plan    · With shortness of breath and hypoxia  · Pulmonary eval         VTE Pharmacologic Prophylaxis:   Pharmacologic: INR 2 13  Mechanical VTE Prophylaxis in Place: Yes    Patient Centered Rounds: I have performed bedside rounds with nursing staff today  Education and Discussions with Family / Patient:  Discussed with wife on the phone    Time Spent for Care: 20 minutes  More than 50% of total time spent on counseling and coordination of care as described above      Current Length of Stay: 2 day(s)    Current Patient Status: Inpatient   Certification Statement: The patient will continue to require additional inpatient hospital stay due to Abdominal pain    Code Status: Level 1 - Full Code    Subjective:   Mild epigastric discomfort and shortness of breath    Objective:     Vitals:   Temp (24hrs), Av 8 °F (37 1 °C), Min:98 3 °F (36 8 °C), Max:99 9 °F (37 7 °C)    Temp:  [98 3 °F (36 8 °C)-99 9 °F (37 7 °C)] 98 5 °F (36 9 °C)  HR:  [82-89] 88  Resp:  [17-18] 18  BP: (125-164)/(72-92) 164/92  SpO2:  [93 %-95 %] 93 %  Body mass index is 27 37 kg/m²  Physical Exam:     Physical Exam   Constitutional: He is oriented to person, place, and time  HENT:   Head: Atraumatic  Eyes: EOM are normal    Neck: Neck supple  No JVD present  No tracheal deviation present  No thyromegaly present  Cardiovascular: Normal rate, regular rhythm and normal heart sounds  Pulmonary/Chest: Effort normal    Decreased breath sounds in the left base   Abdominal: Soft  Bowel sounds are normal  He exhibits no distension  There is no tenderness  There is no rebound  Musculoskeletal: He exhibits no edema  Neurological: He is alert and oriented to person, place, and time  Skin: Skin is warm and dry  Psychiatric: He has a normal mood and affect  Additional Data:     Labs:      Results from last 7 days  Lab Units 19  0431   WBC Thousand/uL 8 95   HEMOGLOBIN g/dL 9 6*   HEMATOCRIT % 30 2*   PLATELETS Thousands/uL 391*   NEUTROS PCT % 77*   LYMPHS PCT % 9*   MONOS PCT % 11   EOS PCT % 0       Results from last 7 days  Lab Units 19  0431   SODIUM mmol/L 138   POTASSIUM mmol/L 4 0   CHLORIDE mmol/L 102   CO2 mmol/L 29   BUN mg/dL 13   CREATININE mg/dL 0 98   ANION GAP mmol/L 7   CALCIUM mg/dL 8 1*   ALBUMIN g/dL 1 6*   TOTAL BILIRUBIN mg/dL 0 40   ALK PHOS U/L 53   ALT U/L 17   AST U/L 17   GLUCOSE RANDOM mg/dL 100       Results from last 7 days  Lab Units 19  0431   INR  2 13*       * I Have Reviewed All Lab Data Listed Above  * Additional Pertinent Lab Tests Reviewed:  [facility]      Last 24 Hours Medication List:     Current Facility-Administered Medications:  acetaminophen 650 mg Oral Q6H PRN Josselin Kraus MD    amiodarone 200 mg Oral Daily With Breakfast Josselin Kraus MD    HYDROmorphone 0 2 mg Intravenous Q4H PRN Josselin Kraus MD    HYDROmorphone 0 2 mg Intravenous Q4H PRN Josselin Kraus MD    HYDROmorphone 0 5 mg Intravenous Q4H PRN Josselin Kraus MD    ondansetron 4 mg Intravenous Q6H PRN Josselin Kraus MD    pantoprazole 40 mg Intravenous Q12H Eli Ortiz MD    sodium chloride 100 mL/hr Intravenous Continuous Josselin Kraus MD Last Rate: Stopped (02/05/19 1317)   sucralfate 1,000 mg Oral Q6H Zack Olivares MD         Today, Patient Was Seen By: Latrell Agarwal MD    ** Please Note: Dictation voice to text software may have been used in the creation of this document   **

## 2019-02-06 NOTE — PLAN OF CARE
DISCHARGE PLANNING     Discharge to home or other facility with appropriate resources Progressing        DISCHARGE PLANNING - CARE MANAGEMENT     Discharge to post-acute care or home with appropriate resources Progressing        GASTROINTESTINAL - ADULT     Minimal or absence of nausea and/or vomiting Progressing     Maintains or returns to baseline bowel function Progressing     Maintains adequate nutritional intake Progressing        INFECTION - ADULT     Absence or prevention of progression during hospitalization Progressing     Absence of fever/infection during neutropenic period Progressing        Knowledge Deficit     Patient/family/caregiver demonstrates understanding of disease process, treatment plan, medications, and discharge instructions Progressing        Nutrition/Hydration-ADULT     Nutrient/Hydration intake appropriate for improving, restoring or maintaining nutritional needs Progressing        PAIN - ADULT     Verbalizes/displays adequate comfort level or baseline comfort level Progressing        Prexisting or High Potential for Compromised Skin Integrity     Skin integrity is maintained or improved Progressing        SAFETY ADULT     Patient will remain free of falls Progressing     Maintain or return to baseline ADL function Progressing

## 2019-02-07 ENCOUNTER — APPOINTMENT (INPATIENT)
Dept: RADIOLOGY | Facility: HOSPITAL | Age: 59
DRG: 438 | End: 2019-02-07
Payer: COMMERCIAL

## 2019-02-07 PROBLEM — Z87.19 HISTORY OF ACUTE PANCREATITIS: Status: ACTIVE | Noted: 2019-01-03

## 2019-02-07 LAB
ALBUMIN SERPL BCP-MCNC: 1.5 G/DL (ref 3.5–5)
ALP SERPL-CCNC: 54 U/L (ref 46–116)
ALT SERPL W P-5'-P-CCNC: 14 U/L (ref 12–78)
ANION GAP SERPL CALCULATED.3IONS-SCNC: 7 MMOL/L (ref 4–13)
APPEARANCE FLD: NORMAL
AST SERPL W P-5'-P-CCNC: 19 U/L (ref 5–45)
BASOPHILS # BLD AUTO: 0.03 THOUSANDS/ΜL (ref 0–0.1)
BASOPHILS NFR BLD AUTO: 0 % (ref 0–1)
BILIRUB SERPL-MCNC: 0.4 MG/DL (ref 0.2–1)
BUN SERPL-MCNC: 8 MG/DL (ref 5–25)
CALCIUM SERPL-MCNC: 7.9 MG/DL (ref 8.3–10.1)
CHLORIDE SERPL-SCNC: 102 MMOL/L (ref 100–108)
CO2 SERPL-SCNC: 28 MMOL/L (ref 21–32)
COLOR FLD: NORMAL
CREAT SERPL-MCNC: 0.98 MG/DL (ref 0.6–1.3)
EOSINOPHIL # BLD AUTO: 0.13 THOUSAND/ΜL (ref 0–0.61)
EOSINOPHIL NFR BLD AUTO: 1 % (ref 0–6)
EOSINOPHIL NFR FLD MANUAL: 1 %
ERYTHROCYTE [DISTWIDTH] IN BLOOD BY AUTOMATED COUNT: 13.7 % (ref 11.6–15.1)
GFR SERPL CREATININE-BSD FRML MDRD: 84 ML/MIN/1.73SQ M
GLUCOSE FLD-MCNC: 99 MG/DL
GLUCOSE SERPL-MCNC: 114 MG/DL (ref 65–140)
HCT VFR BLD AUTO: 30.2 % (ref 36.5–49.3)
HGB BLD-MCNC: 9.9 G/DL (ref 12–17)
IMM GRANULOCYTES # BLD AUTO: 0.15 THOUSAND/UL (ref 0–0.2)
IMM GRANULOCYTES NFR BLD AUTO: 2 % (ref 0–2)
INR PPP: 1.77 (ref 0.86–1.17)
LDH FLD L TO P-CCNC: 215 U/L
LYMPHOCYTES # BLD AUTO: 0.94 THOUSANDS/ΜL (ref 0.6–4.47)
LYMPHOCYTES NFR BLD AUTO: 10 % (ref 14–44)
LYMPHOCYTES NFR BLD AUTO: 4 %
MAGNESIUM SERPL-MCNC: 1.5 MG/DL (ref 1.6–2.6)
MCH RBC QN AUTO: 28.4 PG (ref 26.8–34.3)
MCHC RBC AUTO-ENTMCNC: 32.8 G/DL (ref 31.4–37.4)
MCV RBC AUTO: 87 FL (ref 82–98)
MONOCYTES # BLD AUTO: 0.95 THOUSAND/ΜL (ref 0.17–1.22)
MONOCYTES NFR BLD AUTO: 10 % (ref 4–12)
MONOCYTES NFR BLD AUTO: 3 %
NEUTROPHILS # BLD AUTO: 7.37 THOUSANDS/ΜL (ref 1.85–7.62)
NEUTS SEG NFR BLD AUTO: 77 % (ref 43–75)
NEUTS SEG NFR BLD AUTO: 92 %
NRBC BLD AUTO-RTO: 0 /100 WBCS
PH BODY FLUID: 7.7
PHOSPHATE SERPL-MCNC: 3.9 MG/DL (ref 2.7–4.5)
PLATELET # BLD AUTO: 476 THOUSANDS/UL (ref 149–390)
PMV BLD AUTO: 7.9 FL (ref 8.9–12.7)
POTASSIUM SERPL-SCNC: 3.9 MMOL/L (ref 3.5–5.3)
PROT FLD-MCNC: 3.2 G/DL
PROT SERPL-MCNC: 5.2 G/DL (ref 6.4–8.2)
PROTHROMBIN TIME: 20.1 SECONDS (ref 11.8–14.2)
RBC # BLD AUTO: 3.48 MILLION/UL (ref 3.88–5.62)
RBC # FLD MANUAL: NORMAL /UL
SITE: NORMAL
SODIUM SERPL-SCNC: 137 MMOL/L (ref 136–145)
TOTAL CELLS COUNTED SPEC: 100
TRIGL FLD-MCNC: 25 MG/DL
WBC # BLD AUTO: 9.57 THOUSAND/UL (ref 4.31–10.16)
WBC # FLD MANUAL: 1203 /UL

## 2019-02-07 PROCEDURE — 0W9B3ZX DRAINAGE OF LEFT PLEURAL CAVITY, PERCUTANEOUS APPROACH, DIAGNOSTIC: ICD-10-PCS | Performed by: INTERNAL MEDICINE

## 2019-02-07 PROCEDURE — 88305 TISSUE EXAM BY PATHOLOGIST: CPT | Performed by: PATHOLOGY

## 2019-02-07 PROCEDURE — 85610 PROTHROMBIN TIME: CPT | Performed by: HOSPITALIST

## 2019-02-07 PROCEDURE — 88112 CYTOPATH CELL ENHANCE TECH: CPT | Performed by: PATHOLOGY

## 2019-02-07 PROCEDURE — 83735 ASSAY OF MAGNESIUM: CPT | Performed by: INTERNAL MEDICINE

## 2019-02-07 PROCEDURE — 89050 BODY FLUID CELL COUNT: CPT | Performed by: NURSE PRACTITIONER

## 2019-02-07 PROCEDURE — 82945 GLUCOSE OTHER FLUID: CPT | Performed by: NURSE PRACTITIONER

## 2019-02-07 PROCEDURE — 87070 CULTURE OTHR SPECIMN AEROBIC: CPT | Performed by: NURSE PRACTITIONER

## 2019-02-07 PROCEDURE — 84478 ASSAY OF TRIGLYCERIDES: CPT | Performed by: NURSE PRACTITIONER

## 2019-02-07 PROCEDURE — 32555 ASPIRATE PLEURA W/ IMAGING: CPT

## 2019-02-07 PROCEDURE — 84100 ASSAY OF PHOSPHORUS: CPT | Performed by: INTERNAL MEDICINE

## 2019-02-07 PROCEDURE — 87205 SMEAR GRAM STAIN: CPT | Performed by: NURSE PRACTITIONER

## 2019-02-07 PROCEDURE — 85025 COMPLETE CBC W/AUTO DIFF WBC: CPT | Performed by: HOSPITALIST

## 2019-02-07 PROCEDURE — 83615 LACTATE (LD) (LDH) ENZYME: CPT | Performed by: NURSE PRACTITIONER

## 2019-02-07 PROCEDURE — 87116 MYCOBACTERIA CULTURE: CPT | Performed by: NURSE PRACTITIONER

## 2019-02-07 PROCEDURE — 84157 ASSAY OF PROTEIN OTHER: CPT | Performed by: NURSE PRACTITIONER

## 2019-02-07 PROCEDURE — 32555 ASPIRATE PLEURA W/ IMAGING: CPT | Performed by: RADIOLOGY

## 2019-02-07 PROCEDURE — 83986 ASSAY PH BODY FLUID NOS: CPT | Performed by: NURSE PRACTITIONER

## 2019-02-07 PROCEDURE — 89051 BODY FLUID CELL COUNT: CPT | Performed by: NURSE PRACTITIONER

## 2019-02-07 PROCEDURE — 87102 FUNGUS ISOLATION CULTURE: CPT | Performed by: NURSE PRACTITIONER

## 2019-02-07 PROCEDURE — 87206 SMEAR FLUORESCENT/ACID STAI: CPT | Performed by: NURSE PRACTITIONER

## 2019-02-07 PROCEDURE — 99232 SBSQ HOSP IP/OBS MODERATE 35: CPT | Performed by: INTERNAL MEDICINE

## 2019-02-07 PROCEDURE — C9113 INJ PANTOPRAZOLE SODIUM, VIA: HCPCS | Performed by: HOSPITALIST

## 2019-02-07 PROCEDURE — 99232 SBSQ HOSP IP/OBS MODERATE 35: CPT | Performed by: NURSE PRACTITIONER

## 2019-02-07 PROCEDURE — 80053 COMPREHEN METABOLIC PANEL: CPT | Performed by: HOSPITALIST

## 2019-02-07 RX ORDER — WARFARIN SODIUM 1 MG/1
1 TABLET ORAL
Status: DISCONTINUED | OUTPATIENT
Start: 2019-02-07 | End: 2019-02-09 | Stop reason: HOSPADM

## 2019-02-07 RX ORDER — OXYCODONE HYDROCHLORIDE AND ACETAMINOPHEN 5; 325 MG/1; MG/1
1 TABLET ORAL EVERY 4 HOURS PRN
Status: DISCONTINUED | OUTPATIENT
Start: 2019-02-07 | End: 2019-02-09 | Stop reason: HOSPADM

## 2019-02-07 RX ORDER — MAGNESIUM SULFATE HEPTAHYDRATE 40 MG/ML
2 INJECTION, SOLUTION INTRAVENOUS ONCE
Status: COMPLETED | OUTPATIENT
Start: 2019-02-07 | End: 2019-02-07

## 2019-02-07 RX ADMIN — SUCRALFATE 1000 MG: 1 SUSPENSION ORAL at 05:25

## 2019-02-07 RX ADMIN — SODIUM CHLORIDE 60 ML/HR: 0.9 INJECTION, SOLUTION INTRAVENOUS at 10:40

## 2019-02-07 RX ADMIN — HYDROMORPHONE HYDROCHLORIDE 0.5 MG: 1 INJECTION, SOLUTION INTRAMUSCULAR; INTRAVENOUS; SUBCUTANEOUS at 08:34

## 2019-02-07 RX ADMIN — OXYCODONE HYDROCHLORIDE AND ACETAMINOPHEN 1 TABLET: 5; 325 TABLET ORAL at 21:50

## 2019-02-07 RX ADMIN — SUCRALFATE 1000 MG: 1 SUSPENSION ORAL at 11:57

## 2019-02-07 RX ADMIN — HYDROMORPHONE HYDROCHLORIDE 0.5 MG: 1 INJECTION, SOLUTION INTRAMUSCULAR; INTRAVENOUS; SUBCUTANEOUS at 11:57

## 2019-02-07 RX ADMIN — HYDROMORPHONE HYDROCHLORIDE 0.2 MG: 1 INJECTION, SOLUTION INTRAMUSCULAR; INTRAVENOUS; SUBCUTANEOUS at 02:46

## 2019-02-07 RX ADMIN — HYDROMORPHONE HYDROCHLORIDE 0.5 MG: 1 INJECTION, SOLUTION INTRAMUSCULAR; INTRAVENOUS; SUBCUTANEOUS at 16:04

## 2019-02-07 RX ADMIN — SUCRALFATE 1000 MG: 1 SUSPENSION ORAL at 17:52

## 2019-02-07 RX ADMIN — WARFARIN SODIUM 1 MG: 1 TABLET ORAL at 17:52

## 2019-02-07 RX ADMIN — HYDROMORPHONE HYDROCHLORIDE 0.5 MG: 1 INJECTION, SOLUTION INTRAMUSCULAR; INTRAVENOUS; SUBCUTANEOUS at 04:39

## 2019-02-07 RX ADMIN — PANTOPRAZOLE SODIUM 40 MG: 40 INJECTION, POWDER, FOR SOLUTION INTRAVENOUS at 08:27

## 2019-02-07 RX ADMIN — PANTOPRAZOLE SODIUM 40 MG: 40 INJECTION, POWDER, FOR SOLUTION INTRAVENOUS at 20:35

## 2019-02-07 RX ADMIN — MAGNESIUM SULFATE HEPTAHYDRATE 2 G: 40 INJECTION, SOLUTION INTRAVENOUS at 13:11

## 2019-02-07 RX ADMIN — AMIODARONE HYDROCHLORIDE 200 MG: 200 TABLET ORAL at 08:26

## 2019-02-07 RX ADMIN — OXYCODONE HYDROCHLORIDE AND ACETAMINOPHEN 1 TABLET: 5; 325 TABLET ORAL at 17:47

## 2019-02-07 NOTE — ASSESSMENT & PLAN NOTE
· Likely from volume resuscitation during recent admission for pancreatitis  · Plan for thoracentesis on 02/07/2018

## 2019-02-07 NOTE — PROGRESS NOTES
Progress Note - Teressa Garcia 60 y o  male MRN: [id number]    Unit/Bed#: -01 Encounter: [id number]        Subjective:   Patient reports that he was tolerating his tube feeds well last night, they were on hold this morning in preparation for his thoracentesis  He did not have any nausea or vomiting  He says he does still have abdominal discomfort although it is improving  Objective:     Vitals: Blood pressure 136/70, pulse 92, temperature 99 1 °F (37 3 °C), temperature source Oral, resp  rate 18, height 6' 4" (1 93 m), weight 102 kg (224 lb 13 9 oz), SpO2 93 %  ,Body mass index is 27 37 kg/m²  Intake/Output Summary (Last 24 hours) at 02/07/19 1612  Last data filed at 02/07/19 1442   Gross per 24 hour   Intake             1138 ml   Output             2325 ml   Net            -1187 ml       Physical Exam:   General appearance: alert, appears stated age and cooperative  Lungs: clear to auscultation bilaterally, no labored breathing/accessory muscle use  Heart: regular rate and rhythm, S1, S2 normal, no murmur, click, rub or gallop  Abdomen: soft, non-tender; bowel sounds normal; no masses,  no organomegaly  Extremities: no edema    Invasive Devices     Peripheral Intravenous Line            Peripheral IV 02/04/19 Right Hand 3 days          Drain            NG/OG/Enteral Tube Nasogastric 12 Fr Left nares 2 days                Lab, Imaging and other studies: I have personally reviewed pertinent reports  Admission on 02/03/2019   No results displayed because visit has over 200 results  Results for Scott Castaneda (MRN [id number]) as of 2/7/2019 16:12   Ref   Range 2/6/2019 04:38 2/7/2019 04:53 2/7/2019 11:36 2/7/2019 11:37 2/7/2019 11:38 2/7/2019 11:39   Site Unknown     PLEURAL    eGFR Latest Units: ml/min/1 73sq m 86 84       Sodium Latest Ref Range: 136 - 145 mmol/L 138 137       Potassium Latest Ref Range: 3 5 - 5 3 mmol/L 4 0 3 9       Chloride Latest Ref Range: 100 - 108 mmol/L 103 102 CO2 Latest Ref Range: 21 - 32 mmol/L 28 28       Anion Gap Latest Ref Range: 4 - 13 mmol/L 7 7       BUN Latest Ref Range: 5 - 25 mg/dL 9 8       Creatinine Latest Ref Range: 0 60 - 1 30 mg/dL 0 96 0 98       Glucose, Random Latest Ref Range: 65 - 140 mg/dL 110 114       Calcium Latest Ref Range: 8 3 - 10 1 mg/dL 8 2 (L) 7 9 (L)       AST Latest Ref Range: 5 - 45 U/L 19 19       ALT Latest Ref Range: 12 - 78 U/L 12 14       Alkaline Phosphatase Latest Ref Range: 46 - 116 U/L 55 54       Total Protein Latest Ref Range: 6 4 - 8 2 g/dL 5 5 (L) 5 2 (L)       Albumin Latest Ref Range: 3 5 - 5 0 g/dL 1 6 (L) 1 5 (L)       TOTAL BILIRUBIN Latest Ref Range: 0 20 - 1 00 mg/dL 0 50 0 40       Phosphorus Latest Ref Range: 2 7 - 4 5 mg/dL 3 8 3 9       Magnesium Latest Ref Range: 1 6 - 2 6 mg/dL 1 7 1 5 (L)       WBC Latest Ref Range: 4 31 - 10 16 Thousand/uL 9 66 9 57       Red Blood Cell Count Latest Ref Range: 3 88 - 5 62 Million/uL 3 58 (L) 3 48 (L)       Hemoglobin Latest Ref Range: 12 0 - 17 0 g/dL 10 1 (L) 9 9 (L)       HCT Latest Ref Range: 36 5 - 49 3 % 31 3 (L) 30 2 (L)       MCV Latest Ref Range: 82 - 98 fL 87 87       MCH Latest Ref Range: 26 8 - 34 3 pg 28 2 28 4       MCHC Latest Ref Range: 31 4 - 37 4 g/dL 32 3 32 8       RDW Latest Ref Range: 11 6 - 15 1 % 13 6 13 7       Platelet Count Latest Ref Range: 149 - 390 Thousands/uL 418 (H) 476 (H)       MPV Latest Ref Range: 8 9 - 12 7 fL 8 2 (L) 7 9 (L)       nRBC Latest Units: /100 WBCs 0 0       Neutrophils % Latest Ref Range: 43 - 75 % 79 (H) 77 (H)       Immat GRANS % Latest Ref Range: 0 - 2 % 1 2       Lymphocytes Relative Latest Ref Range: 14 - 44 % 10 (L) 10 (L)       Monocytes Relative Latest Ref Range: 4 - 12 % 10 10       Eosinophils Latest Ref Range: 0 - 6 % 0 1       Basophils Relative Latest Ref Range: 0 - 1 % 0 0       Immature Grans Absolute Latest Ref Range: 0 00 - 0 20 Thousand/uL 0 13 0 15       Absolute Neutrophils Latest Ref Range: 1 85 - 7 62 Thousands/µL 7 57 7 37       Lymphocytes Absolute Latest Ref Range: 0 60 - 4 47 Thousands/µL 0 95 0 94       Absolute Monocytes Latest Ref Range: 0 17 - 1 22 Thousand/µL 0 94 0 95       Absolute Eosinophils Latest Ref Range: 0 00 - 0 61 Thousand/µL 0 03 0 13       Basophils Absolute Latest Ref Range: 0 00 - 0 10 Thousands/µL 0 04 0 03       Total Counted Unknown     100    Protime Latest Ref Range: 11 8 - 14 2 seconds 23 0 (H) 20 1 (H)       INR Latest Ref Range: 0 86 - 1 17  2 11 (H) 1 77 (H)       GLUCOSE FLUID Latest Units: mg/dL    99     LACTATE DEHYDROGENASE FLUID Latest Units: U/L    215     PH BODY FLUID Unknown     7 7    Protein, Fluid Latest Units: g/dL    3 2     TRIGLYCERIDES FLUID Latest Units: mg/dL    25     Color, Fluid Latest Ref Range: Clear, Colorless,Yellow      Marina    Clarity, Fluid Latest Ref Range: Clear      Slightly Cloudy    WBC, Fluid Latest Units: /ul     1,203    RBC FLUID Latest Units: /uL    11,000     Neutrophils % (Fluid) Latest Units: %     92    Lymphs % (Fluid) Latest Units: %     4    Monocytes % (Fluid) Latest Units: %     3    Eosinophils % (Fluid) Latest Units: %     1    AFB CULTURE WITH STAIN Unknown     Rpt ((NONE))    BODY FLUID CULTURE AND GRAM STAIN Unknown      Rpt   FUNGAL CULTURE Unknown      Rpt ((NONE))   NON-GYNECOLOGIC CYTOLOGY Unknown   Rpt ((NONE))          Assessment/Plan:    1  Abdominal pain and decreased appetite which appears to be at least partly secondary to pancreatic pseudocyst, which resulted from biliary pancreatitis which occurred a month ago  He also was noted with significant esophagitis on his EGD, which also may be related to his symptoms    - PPI twice daily  - Carafate 4 times daily  - patient appeared to be tolerating tube feeds at maximal rate last night  Will recommend holding tube feeds at this time, and advance diet    If patient tolerates nutrition by mouth with sufficient caloric intake, then may remove Dobbhoff tube in follow-up outpatient for monitoring of pancreatic pseudocysts  - if patient does not tolerate diet well, may then consider transfer to [facility] for cysto gastrostomy with decompression of pseudocyst

## 2019-02-07 NOTE — SEDATION DOCUMENTATION
Left thoracentesis completed for 1200ml serosanguinous fluid in IR by Dr Dwayne Andersen without complication  Tolerated well  Labs sent as ordered

## 2019-02-07 NOTE — DISCHARGE INSTRUCTIONS
Thoracentesis   WHAT YOU NEED TO KNOW:   A thoracentesis is a procedure to remove extra fluid or air from between your lungs and your inner chest wall  Air or fluid buildup may make it hard for you to breathe  A thoracentesis allows your lungs to expand fully so you can breathe more easily  DISCHARGE INSTRUCTIONS:     Small amount of shoulder pain and bloody sputum is normal after a Thoracentesis  Rest:  Rest when you feel it is needed  Slowly start to do more each day  Return to your daily activities as directed  Resume your normal diet  Small sips of flat soda will help mild nausea  Do not smoke: If you smoke, it is never too late to quit  Ask for information about how to stop smoking if you need help  Contact Interventional Radiology at [phone] [place] PATIENTS: Contact Interventional Radiology at [phone]) Tracy Adam PATIENTS: Contact Interventional Radiology at [phone]) if:   · You have a fever  · Your puncture site is red, warm, swollen, or draining pus  · You have questions or concerns about your procedure, medicine, or care  Seek care immediately or call 911 if:   · Severe chest pain with inspiration and shortness of breath    · Large amounts of blood in your sputum    Follow up with your healthcare provider as directed

## 2019-02-07 NOTE — ASSESSMENT & PLAN NOTE
EGD - "Duodenitis with CBD stent the 2nd portion the duodenum, posterior duodenal wall with some mild ulceration from the stent  Moderate duodenitis in the bulb, gastritis, some mild extrinsic compression from the pseudocyst noted in the stomach but overall easily inflatable   Severe grade D erosive esophagitis "  On Protonix 40 mg IV q 12 hours, Carafate 1 g q 6 hours  Diet advanced to full liquid diet  Dobhoff tube placed during EGD on 02/05 - tube feeds being advanced to goal

## 2019-02-07 NOTE — BRIEF OP NOTE (RAD/CATH)
INTERVENTIONAL RADIOLOGY PROCEDURE NOTE    Date: 2/7/2019    Preoperative diagnosis:  Left pleural effusion    Postoperative diagnosis: Same    Procedure:  Ultrasound-guided thoracentesis    Surgeon: Marc Hernandez MD     Assistant: None  No qualified resident was available      Blood loss:  Trace    Specimens:  Sent to lab as requested    Findings:  1200 mL serosanguineous pleural fluid removed from left chest     Complications:  None immediate    Anesthesia: local

## 2019-02-07 NOTE — PROGRESS NOTES
Progress Note - Pulmonary   Araceli Hull 60 y o  male MRN: [id number]  Unit/Bed#: -01 Encounter: [id number]    Assessment/Plan:    1  Acute pulmonary insufficiency likely secondary to left pleural effusion      *  patient currently on room air- 96%, will continue to maintain O2 greater than 88%      *  encourage pulmonary toileting, OOB as tolerated, IS      *  patient reports significant respiratory improvement    2  Left pleural effusion likely secondary to volume resuscitation on prior hospitalization vs malignancy s/p thoracentesis      *  1200 ml serosanguineous       *  pleural cytology and microbiology pending    3  Left pleuritic chest pain secondary to thoracentesis      *  analgesics managed by primary team    4  Abnormal CT      *  moderate-sized left pleural effusion with compressive atelectasis      *  please encourage IS q 1 hour    5  Proximal AFib s/p cardioversion      *  patient continues on Coumadin 1 mg, amiodarone 200 mg    6  LUQ pain in the setting acute pancreatitis s/p ERCP with sphincterectomy and biliary stent placement      *  possibly secondary to pancreatic pseudocyst      *  GI follow-up      *  continue Carafate and PPI therapy, diet advanced to holding tube feeding      Chief Complaint:    "I am having left shoulder pain"    Subjective:    [facility] was comfortably sitting in his bed  He reports that he has some left shoulder pain since thoracentesis  He reports that his breathing has significantly improved and even relieved some pressure on his abdomen  He reports no significant overnight events  He currently denies fever, chills, nausea, vomiting, hemoptysis, night sweats, headache, or chest pain  Objective:    Vitals: Blood pressure 136/70, pulse 92, temperature 99 1 °F (37 3 °C), temperature source Oral, resp  rate 18, height 6' 4" (1 93 m), weight 102 kg (224 lb 13 9 oz), SpO2 93 %  RA,Body mass index is 27 37 kg/m²        Intake/Output Summary (Last 24 hours) at 02/07/19 1653  Last data filed at 02/07/19 1442   Gross per 24 hour   Intake             1138 ml   Output             2325 ml   Net            -1187 ml       Invasive Devices     Peripheral Intravenous Line            Peripheral IV 02/04/19 Right Hand 3 days          Drain            NG/OG/Enteral Tube Nasogastric 12 Fr Left nares 2 days                Physical Exam:   Physical Exam   Constitutional: He is oriented to person, place, and time  He appears well-developed and well-nourished  HENT:   Head: Normocephalic and atraumatic  Neck: Normal range of motion  Neck supple  No JVD present  Cardiovascular: Normal rate, regular rhythm and normal heart sounds  Exam reveals no gallop and no friction rub  No murmur heard  Pulmonary/Chest: Effort normal  No accessory muscle usage or stridor  No tachypnea and no bradypnea  No respiratory distress  He has decreased breath sounds in the left lower field  He has no wheezes  He has no rhonchi  He has no rales  He exhibits no tenderness  Abdominal: Soft  Bowel sounds are normal  He exhibits no distension  There is no tenderness  Musculoskeletal: Normal range of motion  He exhibits no edema or deformity  Neurological: He is alert and oriented to person, place, and time  Skin: Skin is warm and dry  Psychiatric: He has a normal mood and affect  His behavior is normal        Labs:    I have personally reviewed pertinent lab results CBC:   Lab Results   Component Value Date    WBC 9 57 02/07/2019    HGB 9 9 (L) 02/07/2019    HCT 30 2 (L) 02/07/2019    MCV 87 02/07/2019     (H) 02/07/2019    MCH 28 4 02/07/2019    MCHC 32 8 02/07/2019    RDW 13 7 02/07/2019    MPV 7 9 (L) 02/07/2019    NRBC 0 02/07/2019   , CMP:   Lab Results   Component Value Date    SODIUM 137 02/07/2019    K 3 9 02/07/2019     02/07/2019    CO2 28 02/07/2019    BUN 8 02/07/2019    CREATININE 0 98 02/07/2019    CALCIUM 7 9 (L) 02/07/2019    AST 19 02/07/2019    ALT 14 02/07/2019 ALKPHOS 54 02/07/2019    EGFR 84 02/07/2019   , PT/INR:   Lab Results   Component Value Date    INR 1 77 (H) 02/07/2019     Imaging and other studies: I have personally reviewed pertinent films in PACS     CT of the chest 02/03/2019-moderate-sized left pleural effusion with compressive atelectasis

## 2019-02-07 NOTE — PROGRESS NOTES
Progress Note - Wardcristiane Dugan 1960, 60 y o  male MRN: [id number]    Unit/Bed#: -01 Encounter: [id number]    Primary Care Provider: Viviana Green MD   Date and time admitted to hospital: 2/3/2019  1:24 PM        * Abdominal pain   Assessment & Plan    EGD - "Duodenitis with CBD stent the 2nd portion the duodenum, posterior duodenal wall with some mild ulceration from the stent  Moderate duodenitis in the bulb, gastritis, some mild extrinsic compression from the pseudocyst noted in the stomach but overall easily inflatable  Severe grade D erosive esophagitis "  On Protonix 40 mg IV q 12 hours, Carafate 1 g q 6 hours  Diet advanced to full liquid diet  Dobhoff tube placed during EGD on 02/05 - tube feeds being advanced to goal     Paroxysmal atrial fibrillation (HCC)   Assessment & Plan    Cont amiodarone  Restart warfarin after thoracentesis if okay with GI       Abnormal CT of the abdomen   Assessment & Plan    6 x 11 x 14 cm fluid-filled structure in the pancreatic bed, typical of a pseudocyst, extending into the mesenteric root  Several additional loculated fluid collections in the abdomen probably additional pseudocysts  These include a 12 x 14 cm perisplenic collection and a 2 x 5 x 10 cm left retroperitoneal collection  GI following     Pleural effusion, left   Assessment & Plan    · Likely from volume resuscitation during recent admission for pancreatitis  · Plan for thoracentesis on 02/07/2018         VTE Pharmacologic Prophylaxis:   Pharmacologic: INR 2 1  Mechanical VTE Prophylaxis in Place: Yes    Patient Centered Rounds: I have performed bedside rounds with nursing staff today  Discussions with Specialists or Other Care Team Provider:  Discussed with Gastroenterology and pulmonology    Education and Discussions with Family / Patient:  Discussed with wife at the bedside    Time Spent for Care: 20 minutes    More than 50% of total time spent on counseling and coordination of care as described above  Current Length of Stay: 3 day(s)    Current Patient Status: Inpatient   Certification Statement: The patient will continue to require additional inpatient hospital stay due to Abdominal pain    Code Status: Level 1 - Full Code    Subjective:   Abdominal pain stable  Tolerated clear liquids  Objective:     Vitals:   Temp (24hrs), Av 9 °F (37 2 °C), Min:98 4 °F (36 9 °C), Max:99 6 °F (37 6 °C)    Temp:  [98 4 °F (36 9 °C)-99 6 °F (37 6 °C)] 98 4 °F (36 9 °C)  HR:  [85-98] 85  Resp:  [18-20] 20  BP: (128-161)/(79-94) 128/94  SpO2:  [92 %-95 %] 95 %  Body mass index is 27 37 kg/m²  Physical Exam:     Physical Exam   Constitutional: He is oriented to person, place, and time  HENT:   Head: Atraumatic  Eyes: EOM are normal    Neck: Neck supple  No tracheal deviation present  No thyromegaly present  Cardiovascular: Normal rate, regular rhythm and normal heart sounds  Pulmonary/Chest: Effort normal    Decreased breath sounds at the left base   Abdominal: Soft  Bowel sounds are normal    Mild epigastric and left upper quadrant tenderness   Musculoskeletal: He exhibits no edema  Neurological: He is alert and oriented to person, place, and time  Skin: Skin is warm and dry  Psychiatric: He has a normal mood and affect         Additional Data:     Labs:      Results from last 7 days  Lab Units 19  0438   WBC Thousand/uL 9 66   HEMOGLOBIN g/dL 10 1*   HEMATOCRIT % 31 3*   PLATELETS Thousands/uL 418*   NEUTROS PCT % 79*   LYMPHS PCT % 10*   MONOS PCT % 10   EOS PCT % 0       Results from last 7 days  Lab Units 19  0438   SODIUM mmol/L 138   POTASSIUM mmol/L 4 0   CHLORIDE mmol/L 103   CO2 mmol/L 28   BUN mg/dL 9   CREATININE mg/dL 0 96   ANION GAP mmol/L 7   CALCIUM mg/dL 8 2*   ALBUMIN g/dL 1 6*   TOTAL BILIRUBIN mg/dL 0 50   ALK PHOS U/L 55   ALT U/L 12   AST U/L 19   GLUCOSE RANDOM mg/dL 110       Results from last 7 days  Lab Units 19  0438   INR  2 11*       * I Have Reviewed All Lab Data Listed Above  * Additional Pertinent Lab Tests Reviewed: Daniel 65 Admission Reviewed    Last 24 Hours Medication List:     Current Facility-Administered Medications:  acetaminophen 650 mg Oral Q6H PRN Alexandre Mendes MD    amiodarone 200 mg Oral Daily With Breakfast Alexandre Mendes MD    HYDROmorphone 0 2 mg Intravenous Q4H PRN Alexandre Mendes MD    HYDROmorphone 0 2 mg Intravenous Q4H PRN Alexandre Mendes MD    HYDROmorphone 0 5 mg Intravenous Q4H PRN Alexandre Mendes MD    ondansetron 4 mg Intravenous Q6H PRN Alexandre Mendes MD    pantoprazole 40 mg Intravenous Q12H Baljeet Vaz MD    sodium chloride 60 mL/hr Intravenous Continuous Chico Obregon MD Last Rate: 60 mL/hr (02/06/19 1812)   sucralfate 1,000 mg Oral Q6H Edwin Gong MD         Today, Patient Was Seen By: Chico Obregon MD    ** Please Note: Dictation voice to text software may have been used in the creation of this document   **

## 2019-02-08 ENCOUNTER — APPOINTMENT (INPATIENT)
Dept: RADIOLOGY | Facility: HOSPITAL | Age: 59
DRG: 438 | End: 2019-02-08
Payer: COMMERCIAL

## 2019-02-08 LAB
HGB BLD-MCNC: 9.8 G/DL (ref 12–17)
INR PPP: 1.54 (ref 0.86–1.17)
LDH SERPL-CCNC: 163 U/L (ref 81–234)
PLATELET # BLD AUTO: 369 THOUSANDS/UL (ref 149–390)
PMV BLD AUTO: 8 FL (ref 8.9–12.7)
PROT SERPL-MCNC: 5.2 G/DL (ref 6.4–8.2)
PROTHROMBIN TIME: 18 SECONDS (ref 11.8–14.2)

## 2019-02-08 PROCEDURE — 85018 HEMOGLOBIN: CPT | Performed by: INTERNAL MEDICINE

## 2019-02-08 PROCEDURE — 99232 SBSQ HOSP IP/OBS MODERATE 35: CPT | Performed by: NURSE PRACTITIONER

## 2019-02-08 PROCEDURE — 84155 ASSAY OF PROTEIN SERUM: CPT | Performed by: NURSE PRACTITIONER

## 2019-02-08 PROCEDURE — 85610 PROTHROMBIN TIME: CPT | Performed by: INTERNAL MEDICINE

## 2019-02-08 PROCEDURE — 85049 AUTOMATED PLATELET COUNT: CPT | Performed by: HOSPITALIST

## 2019-02-08 PROCEDURE — C9113 INJ PANTOPRAZOLE SODIUM, VIA: HCPCS | Performed by: HOSPITALIST

## 2019-02-08 PROCEDURE — 99232 SBSQ HOSP IP/OBS MODERATE 35: CPT | Performed by: INTERNAL MEDICINE

## 2019-02-08 PROCEDURE — 71045 X-RAY EXAM CHEST 1 VIEW: CPT

## 2019-02-08 PROCEDURE — 83615 LACTATE (LD) (LDH) ENZYME: CPT | Performed by: NURSE PRACTITIONER

## 2019-02-08 RX ADMIN — PANTOPRAZOLE SODIUM 40 MG: 40 INJECTION, POWDER, FOR SOLUTION INTRAVENOUS at 21:27

## 2019-02-08 RX ADMIN — SUCRALFATE 1000 MG: 1 SUSPENSION ORAL at 11:55

## 2019-02-08 RX ADMIN — SUCRALFATE 1000 MG: 1 SUSPENSION ORAL at 01:00

## 2019-02-08 RX ADMIN — SUCRALFATE 1000 MG: 1 SUSPENSION ORAL at 23:29

## 2019-02-08 RX ADMIN — OXYCODONE HYDROCHLORIDE AND ACETAMINOPHEN 1 TABLET: 5; 325 TABLET ORAL at 09:36

## 2019-02-08 RX ADMIN — AMIODARONE HYDROCHLORIDE 200 MG: 200 TABLET ORAL at 09:28

## 2019-02-08 RX ADMIN — OXYCODONE HYDROCHLORIDE AND ACETAMINOPHEN 1 TABLET: 5; 325 TABLET ORAL at 02:21

## 2019-02-08 RX ADMIN — SUCRALFATE 1000 MG: 1 SUSPENSION ORAL at 18:16

## 2019-02-08 RX ADMIN — ACETAMINOPHEN 650 MG: 325 TABLET, FILM COATED ORAL at 23:29

## 2019-02-08 RX ADMIN — PANTOPRAZOLE SODIUM 40 MG: 40 INJECTION, POWDER, FOR SOLUTION INTRAVENOUS at 09:29

## 2019-02-08 RX ADMIN — WARFARIN SODIUM 1 MG: 1 TABLET ORAL at 18:16

## 2019-02-08 RX ADMIN — SUCRALFATE 1000 MG: 1 SUSPENSION ORAL at 06:02

## 2019-02-08 NOTE — ASSESSMENT & PLAN NOTE
· With decreased oral intake  · Due to large pseudocysts versus severe esophagitis  · On Protonix 40 mg IV q 12 hours, Carafate 1 g q 6 hours  · Pain control  · Diet advanced to low-fat diet and tube feeds discontinued today  · Discussed with Dr Zari Delvalle - if able to tolerate low-fat diet/ensure can be discharged with outpatient follow-up  · If unable to tolerated diet would need transfer To [facility] for inpatient cyst gastrostomy

## 2019-02-08 NOTE — ASSESSMENT & PLAN NOTE
· Recent admission with acute biliary pancreatitis  · Now with pancreatic pseudocyst and multiple abdominal fluid collections

## 2019-02-08 NOTE — PROGRESS NOTES
Progress Note - Albin Shay 60 y o  male MRN: [id number]    Unit/Bed#: -01 Encounter: [id number]        Subjective:   Patient reports feeling okay, he says he is not having significant abdominal pain associated with getting up and walking around anymore, thinks this improved after the thoracentesis  He says he has tolerated some food today, he had eggs and English muffin this morning, had a lighter lunch  Tolerated Ensure clear without difficulty, did not like the taste of regular Ensure  No vomiting episodes between yesterday and today  Objective:     Vitals: Blood pressure 120/69, pulse 80, temperature 98 7 °F (37 1 °C), temperature source Oral, resp  rate 18, height 6' 4" (1 93 m), weight 102 kg (224 lb 13 9 oz), SpO2 94 %  ,Body mass index is 27 37 kg/m²  Intake/Output Summary (Last 24 hours) at 02/08/19 1442  Last data filed at 02/08/19 1358   Gross per 24 hour   Intake             1080 ml   Output              475 ml   Net              605 ml       Physical Exam:   General appearance: alert, appears stated age and cooperative  Lungs: clear to auscultation bilaterally, no labored breathing/accessory muscle use  Heart: regular rate and rhythm, S1, S2 normal, no murmur, click, rub or gallop  Abdomen: soft, non-tender; bowel sounds normal; no masses,  no organomegaly  Extremities: no edema    Invasive Devices     Peripheral Intravenous Line            Peripheral IV 02/04/19 Right Hand 4 days          Drain            NG/OG/Enteral Tube Nasogastric 12 Fr Left nares 3 days                Lab, Imaging and other studies: I have personally reviewed pertinent reports  Admission on 02/03/2019   No results displayed because visit has over 200 results  Results for Alexis Ortiz (MRN [id number]) as of 2/8/2019 14:42   Ref   Range 2/1/2019 00:00 2/1/2019 14:56 2/3/2019 14:05 2/3/2019 14:17 2/3/2019 15:47 2/4/2019 04:36 2/5/2019 04:31 2/6/2019 04:38 2/7/2019 04:53 2/7/2019 11:36 2/7/2019 11:37 2/7/2019 11:38 2/7/2019 11:39 2/8/2019 05:29 2/8/2019 08:35 2/8/2019 11:45 2/8/2019 11:46   Site Unknown            PLEURAL        eGFR Latest Units: ml/min/1 73sq m   78   84 84 86 84           Sodium Latest Ref Range: 136 - 145 mmol/L   136   137 138 138 137           Potassium Latest Ref Range: 3 5 - 5 3 mmol/L   5 6 (H)   4 2 4 0 4 0 3 9           Chloride Latest Ref Range: 100 - 108 mmol/L   99 (L)   101 102 103 102           CO2 Latest Ref Range: 21 - 32 mmol/L   31   28 29 28 28           Anion Gap Latest Ref Range: 4 - 13 mmol/L   6   8 7 7 7           BUN Latest Ref Range: 5 - 25 mg/dL   11   13 13 9 8           Creatinine Latest Ref Range: 0 60 - 1 30 mg/dL   1 04   0 98 0 98 0 96 0 98           Glucose, Random Latest Ref Range: 65 - 140 mg/dL   95   86 100 110 114           Calcium Latest Ref Range: 8 3 - 10 1 mg/dL   8 7   8 3 8 1 (L) 8 2 (L) 7 9 (L)           AST Latest Ref Range: 5 - 45 U/L   33   18 17 19 19           ALT Latest Ref Range: 12 - 78 U/L   22   15 17 12 14           Alkaline Phosphatase Latest Ref Range: 46 - 116 U/L   58   53 53 55 54           Total Protein Latest Ref Range: 6 4 - 8 2 g/dL   6 2 (L)   5 6 (L) 5 5 (L) 5 5 (L) 5 2 (L)       5 2 (L)    Albumin Latest Ref Range: 3 5 - 5 0 g/dL   1 8 (L)   1 6 (L) 1 6 (L) 1 6 (L) 1 5 (L)           TOTAL BILIRUBIN Latest Ref Range: 0 20 - 1 00 mg/dL   0 60   0 50 0 40 0 50 0 40           Phosphorus Latest Ref Range: 2 7 - 4 5 mg/dL        3 8 3 9           Magnesium Latest Ref Range: 1 6 - 2 6 mg/dL        1 7 1 5 (L)           Lipase Latest Ref Range: 73 - 393 u/L   193                 LD (LDH) Latest Ref Range: 81 - 234 U/L                163    WBC Latest Ref Range: 4 31 - 10 16 Thousand/uL  12 09 (H) 9 39   9 35 8 95 9 66 9 57           Red Blood Cell Count Latest Ref Range: 3 88 - 5 62 Million/uL  3 83 (L) 3 77 (L)   3 42 (L) 3 44 (L) 3 58 (L) 3 48 (L)           Hemoglobin Latest Ref Range: 12 0 - 17 0 g/dL  11 1 (L) 10 9 (L) 9 9 (L) 9 6 (L) 10 1 (L) 9 9 (L)        9 8 (L)   HCT Latest Ref Range: 36 5 - 49 3 %  34 2 (L) 32 9 (L)   30 6 (L) 30 2 (L) 31 3 (L) 30 2 (L)           MCV Latest Ref Range: 82 - 98 fL  89 87   90 88 87 87           MCH Latest Ref Range: 26 8 - 34 3 pg  29 0 28 9   28 9 27 9 28 2 28 4           MCHC Latest Ref Range: 31 4 - 37 4 g/dL  32 5 33 1   32 4 31 8 32 3 32 8           RDW Latest Ref Range: 11 6 - 15 1 %  13 5 13 5   13 9 13 8 13 6 13 7           Platelet Count Latest Ref Range: 149 - 390 Thousands/uL  440 (H) 410 (H)   373 391 (H) 418 (H) 476 (H)        369   MPV Latest Ref Range: 8 9 - 12 7 fL  8 4 (L) 8 0 (L)   7 8 (L) 8 2 (L) 8 2 (L) 7 9 (L)        8 0 (L)   nRBC Latest Units: /100 WBCs  0 0   0 0 0 0           Neutrophils % Latest Ref Range: 43 - 75 %  79 (H) 79 (H)   76 (H) 77 (H) 79 (H) 77 (H)           Immat GRANS % Latest Ref Range: 0 - 2 %  1 1   1 2 1 2           Lymphocytes Relative Latest Ref Range: 14 - 44 %  10 (L) 11 (L)   10 (L) 9 (L) 10 (L) 10 (L)           Monocytes Relative Latest Ref Range: 4 - 12 %  10 9   11 11 10 10           Eosinophils Latest Ref Range: 0 - 6 %  0 0   1 0 0 1           Basophils Relative Latest Ref Range: 0 - 1 %  0 0   1 1 0 0           Immature Grans Absolute Latest Ref Range: 0 00 - 0 20 Thousand/uL  0 13 0 10   0 13 0 16 0 13 0 15           Absolute Neutrophils Latest Ref Range: 1 85 - 7 62 Thousands/µL  9 45 (H) 7 35   7 11 6 88 7 57 7 37           Lymphocytes Absolute Latest Ref Range: 0 60 - 4 47 Thousands/µL  1 26 1 01   0 96 0 84 0 95 0 94           Absolute Monocytes Latest Ref Range: 0 17 - 1 22 Thousand/µL  1 20 0 88   1 04 0 98 0 94 0 95           Absolute Eosinophils Latest Ref Range: 0 00 - 0 61 Thousand/µL  0 00 0 03   0 05 0 04 0 03 0 13           Basophils Absolute Latest Ref Range: 0 00 - 0 10 Thousands/µL  0 05 0 02   0 06 0 05 0 04 0 03           Total Counted Unknown            100        Protime Latest Ref Range: 11 8 - 14 2 seconds   21 3 (H) 22  2 (H) 23 2 (H) 23 0 (H) 20 1 (H)     18 0 (H)      INR Latest Ref Range: 0 86 - 1 17  2 00 (A)  1 91 (H)   2 01 (H) 2 13 (H) 2 11 (H) 1 77 (H)     1 54 (H)      PTT Latest Ref Range: 26 - 38 seconds   44 (H)                 Epithelial Cells Latest Ref Range: None Seen, Occasional /hpf     Occasional               Color, UA Unknown     Marina               Clarity, UA Unknown     Clear               SL AMB SPECIFIC GRAVITY_URINE Latest Ref Range: 1 003 - 1 030      >=1 030               Glucose, UA Latest Ref Range: Negative mg/dl     Negative               Ketones, UA Latest Ref Range: Negative mg/dl     Negative               Blood, UA Latest Ref Range: Negative      Negative               Nitrite, UA Latest Ref Range: Negative      Negative               Leukocytes, UA Latest Ref Range: Negative      Negative               pH, UA Latest Ref Range: 4 5 - 8 0      5 5               POCT URINE PROTEIN Latest Ref Range: Negative mg/dl     Trace (A)               Bilirubin, UA Latest Ref Range: Negative      Negative               SL AMB POCT UROBILINOGEN Latest Ref Range: 0 2, 1 0 E U /dl E U /dl     0 2               RBC, UA Latest Ref Range: None Seen, 0-5 /hpf     None Seen               WBC, UA Latest Ref Range: None Seen, 0-5, 5-55, 5-65 /hpf     4-10 (A)               Bacteria, UA Latest Ref Range: None Seen, Occasional /hpf     Moderate (A)               GLUCOSE FLUID Latest Units: mg/dL           99         LACTATE DEHYDROGENASE FLUID Latest Units: U/L           215         PH BODY FLUID Unknown            7 7        Protein, Fluid Latest Units: g/dL           3 2         TRIGLYCERIDES FLUID Latest Units: mg/dL           25         Color, Fluid Latest Ref Range: Clear, Colorless,Yellow             Marina        Clarity, Fluid Latest Ref Range: Clear             Slightly Cloudy        WBC, Fluid Latest Units: /ul            1,203        RBC FLUID Latest Units: /uL           11,000         Neutrophils % (Fluid) Latest Units: %            92        Lymphs % (Fluid) Latest Units: %            4        Monocytes % (Fluid) Latest Units: %            3        Eosinophils % (Fluid) Latest Units: %            1        AFB CULTURE WITH STAIN Unknown            Rpt        BODY FLUID CULTURE AND GRAM STAIN Unknown             Rpt       FUNGAL CULTURE Unknown             Rpt ((NONE))       ECG 12-LEAD Unknown    Rpt                NON-GYNECOLOGIC CYTOLOGY Unknown          Rpt ((NONE))          MUCUS THREADS Latest Ref Range: None Seen      Moderate (A)               XR CHEST PORTABLE Unknown               Rpt         Assessment/Plan:    1  Abdominal pain and poor oral intake, in the setting of multiple large pancreatic pseudocysts as well as significant esophagitis visualized on EGD during this admission  He does appear to be clinically improving, has tolerated a few meals of solid food without vomiting and is reporting decrease abdominal pain at this time      - continue hold on tube feeds, may consider removal of Dobbhoff tube  - continue with low residue diet as tolerated  - give nutritional shakes with meals  - if patient does not maintain adequate caloric intake by mouth, may require transfer for inpatient cystogastrostomy; otherwise can be followed up outpatient

## 2019-02-08 NOTE — PLAN OF CARE
DISCHARGE PLANNING     Discharge to home or other facility with appropriate resources Progressing        DISCHARGE PLANNING - CARE MANAGEMENT     Discharge to post-acute care or home with appropriate resources Progressing        GASTROINTESTINAL - ADULT     Minimal or absence of nausea and/or vomiting Progressing     Maintains or returns to baseline bowel function Progressing     Maintains adequate nutritional intake Progressing        INFECTION - ADULT     Absence or prevention of progression during hospitalization Progressing     Absence of fever/infection during neutropenic period Progressing        Knowledge Deficit     Patient/family/caregiver demonstrates understanding of disease process, treatment plan, medications, and discharge instructions Progressing        Nutrition/Hydration-ADULT     Nutrient/Hydration intake appropriate for improving, restoring or maintaining nutritional needs Progressing        PAIN - ADULT     Verbalizes/displays adequate comfort level or baseline comfort level Progressing        Potential for Falls     Patient will remain free of falls Progressing        Prexisting or High Potential for Compromised Skin Integrity     Skin integrity is maintained or improved Progressing        SAFETY ADULT     Patient will remain free of falls Progressing     Maintain or return to baseline ADL function Progressing

## 2019-02-08 NOTE — ASSESSMENT & PLAN NOTE
· Likely from volume resuscitation during recent admission for pancreatitis  · Status post 1200 mL thoracentesis today  · Follow-up fluid studies

## 2019-02-08 NOTE — ASSESSMENT & PLAN NOTE
"6 x 11 x 14 cm fluid collection in the pancreatic bed"  Does not appear infected on imaging; pt without s/s of systemic illness   Also with 2 other fluid collections of unclear significance  "   probably additional pseudocysts  These include a 12 x 14 cm perisplenic collection and a 2 x 5 x 10 cm left retroperitoneal collection "  GI following  Patient may or may not need cyst gastrotomy depend on his clinical course    Will follow GI recommendations

## 2019-02-08 NOTE — ASSESSMENT & PLAN NOTE
· With decreased oral intake  · Due to large pseudocysts versus severe esophagitis  · On Protonix 40 mg IV q 12 hours, Carafate 1 g q 6 hours  · Pain control  · Diet advanced to low-fat diet /tolerating   ? D/C Dubhoff tube waiting for GI input

## 2019-02-08 NOTE — ASSESSMENT & PLAN NOTE
· Likely from volume resuscitation during recent admission for pancreatitis  · Status post 1200 mL thoracentesis 2/7  · Likely pleuritic pain after thoracentesis     Appears improved  · Follow-up fluid studies

## 2019-02-08 NOTE — PROGRESS NOTES
Progress Note - Pulmonary   Chris Pretty 60 y o  male MRN: [id number]  Unit/Bed#: -01 Encounter: [id number]    Assessment/Plan:    1  Acute pulmonary insufficiency likely secondary to left pleural effusion      *  patient currently on room air-, 94% will continue to maintain O2 greater than 80%      *  continue to encourage pulmonary toileting, especially IS q 1 hour, OOB as tolerated, deep breathing cough      *  patient continues to report significant respiratory improvement s/p thoracentesis    2  Left pleural effusion suspected from volume resuscitation for prior hospitalization vs malignancy s/p thoracentesis (2/7/19)      *  1200 mL ricky colored slightly cloudy      *  preliminary Gram stain-negative for bacteria, neutrophil 92%, lymphocytes 4%, eosinophils 1%, pH 7 7, WBC 1203, glucose 99       *  pleural protein 3 2/LDH-215- serum pending       *  cytology pending    3  Abnormal CT      *  moderate-sized left pleural effusion with compressive atelectasis      *  please continue incentive spirometry    4  Proximal AFib status post cardioversion      *  can resume Coumadin 1 mg, amiodarone 200 mg    5  Significant Left pleuritic chest pain secondary to thoracentesis       *  patient reports he has a stabbing left-sided chest pain that is not relieved with analgesics       *  will repeat chest x-ray       *  analgesics managed by primary team    6  Hua Salazar in the setting acute pancreatitis s/p ERCP with sphincterectomy and biliary stent placement      *  possibly secondary to pancreatic pseudocyst      *  GI follow-up      *  continue Carafate and PPI therapy, diet advanced to holding tube feeding    7  Bilateral +3 lower extremity edema      *  patient reports edema from previous hospitalization      *  encouraged elevation and Ace wraps       Chief Complaint:    "My breathing is still improved"    Subjective:    Edie Ledesma was lying in bed comfortably    He reports that his left pleuritic chest pain secondary to the site of the thoracentesis is still extremely sore sore in the pain feels like it is stabbing  He reports no significant overnight event  He currently denies fever, chills, nausea, vomiting, hemoptysis, night sweats, or headache  Objective:    Vitals: Blood pressure 125/79, pulse 77, temperature 97 7 °F (36 5 °C), temperature source Oral, resp  rate 20, height 6' 4" (1 93 m), weight 102 kg (224 lb 13 9 oz), SpO2 94 %  RA,Body mass index is 27 37 kg/m²  Intake/Output Summary (Last 24 hours) at 02/08/19 0800  Last data filed at 02/08/19 0535   Gross per 24 hour   Intake              780 ml   Output             2025 ml   Net            -1245 ml       Invasive Devices     Peripheral Intravenous Line            Peripheral IV 02/04/19 Right Hand 4 days          Drain            NG/OG/Enteral Tube Nasogastric 12 Fr Left nares 2 days                Physical Exam:   Physical Exam   Constitutional: He is oriented to person, place, and time  He appears well-developed and well-nourished  HENT:   Head: Normocephalic and atraumatic  Neck: Normal range of motion  Neck supple  No JVD present  Cardiovascular: Normal rate, regular rhythm and normal heart sounds  Exam reveals no gallop and no friction rub  No murmur heard  Pulmonary/Chest: Effort normal  No accessory muscle usage or stridor  No tachypnea and no bradypnea  No respiratory distress  He has decreased breath sounds in the left middle field and the left lower field  He has no wheezes  He has no rhonchi  He has no rales  He exhibits tenderness  Mild tenderness upon site of thoracentesis   Abdominal: Soft  Bowel sounds are normal  He exhibits no distension  There is no tenderness  Musculoskeletal: Normal range of motion  He exhibits no edema or deformity  Neurological: He is alert and oriented to person, place, and time  Skin: Skin is warm and dry  Psychiatric: He has a normal mood and affect   His behavior is normal        Labs: I have personally reviewed pertinent lab results , PT/INR:   Lab Results   Component Value Date    INR 1 54 (H) 02/08/2019     Imaging and other studies: I have personally reviewed pertinent films in PACS     CT of the chest 02/03/2019-moderate-sized left pleural effusion with compressive atelectasis

## 2019-02-08 NOTE — UTILIZATION REVIEW
Continued Stay Review    Date: 2/8/2019  Vital Signs: /73 (BP Location: Left arm)   Pulse 84   Temp 98 6 °F (37 °C) (Oral)   Resp 18   Ht 6' 4" (1 93 m)   Wt 102 kg (224 lb 13 9 oz)   SpO2 95%   BMI 27 37 kg/m²      Assessment/Plan: this is a 60year old male admitted 2/3 with abdominal pain, poor oral intake, int the setting of multiple large pancreatic pseudocysts and significant esophagitis    Has Dobbhoff tube, placed 2/5 and tube feeds were started    EGD done on 2/5 showed Esophagus- a severe erosive circumferential grade D esophagitis  #2  Stomach- mild gastritis, some mild extrinsic compression however the stomach was easily inflatable  Normal retroflexion  #3  Duodenum- duodenitis in the bulb, it in the 2nd portion the duodenum biliary stent was seen abutting against the posterior wall with mild ulceration, attempted to reposition this      Twelve [place] Dobhoff tube was placed under direct v visualization to the stomach  Pulmonary saw the patient on 2/6 and patient had a new left sided pleural effusion which IR did thoracentesis on 2/7 for 1200 ml serosanguinous pleural fluid  Today patient has persistent abdominal pain which is lessening, Dobbhoff remains in place and diet is advancing, able to tolerate ensure clear  Remains on Protonix IV and Carafate    If patient does not maintain adequate caloric intake by mouth, may require cystogastrostomy         Medications:   Scheduled Meds:   Current Facility-Administered Medications:  acetaminophen 650 mg Oral Q6H PRN Theodora Caraballo MD   amiodarone 200 mg Oral Daily With Breakfast Theodora Caraballo MD   HYDROmorphone 0 2 mg Intravenous Q4H PRN Theodora Caraballo MD   HYDROmorphone 0 5 mg Intravenous Q4H PRN Theodora Caraballo MD   ondansetron 4 mg Intravenous Q6H PRN Theodora Caraballo MD   oxyCODONE-acetaminophen 1 tablet Oral Q4H PRN Libby Meier MD   pantoprazole 40 mg Intravenous Q12H Suzanne Feldman MD   sucralfate 1,000 mg Oral Q6H Nando Hernandez MD warfarin 1 mg Oral Daily (warfarin) Terrance Garcia MD     Continuous Infusions:    PRN Meds:   Acetaminophen - used x 1 2/3    HYDROmorphone 0 2 IV - used x  4 on  2/4; x 1 on 2/5;  X 4 2/6;  X 1 2/7    HYDROmorphone 0  IV - used  X 1 on 2/4;  X 3 on 2/5;  X 3 on 2/6;  X 4 on 2/7    ondansetron    oxyCODONE-acetaminophen  - used x 2 on 2/7 and 2/8    Pertinent Labs/Diagnostic Results:   Lab Units 02/08/19  1146 02/07/19  0453   WBC Thousand/uL  --  9 57   HEMOGLOBIN g/dL 9 8* 9 9*   HEMATOCRIT %  --  30 2*   PLATELETS Thousands/uL 369 476*   NEUTROS PCT %  --  77*   LYMPHS PCT %  --  10*   MONOS PCT %  --  10   EOS PCT %  --  1         Results from last 7 days  Lab Units 02/07/19  0453   POTASSIUM mmol/L 3 9   CHLORIDE mmol/L 102   CO2 mmol/L 28   BUN mg/dL 8   CREATININE mg/dL 0 98   CALCIUM mg/dL 7 9*   ALK PHOS U/L 54   ALT U/L 14   AST U/L 19      Lab Units 02/08/19  0529   INR   1 54*     Recent Cultures (last 7 days):     Results from last 7 days  Lab Units 02/07/19  1139   GRAM STAIN RESULT   2+ Polys  1+ Mononuclear Cells  No bacteria seen   BODY FLUID CULTURE, STERILE   No growth     2/3 ct abdomen - Moderate-sized left pleural effusion with compressive atelectasis in the subjacent portion of the left lower lobe  2   6 x 11 x 14 cm fluid-filled structure in the pancreatic bed, typical of a pseudocyst, extending into the mesenteric root  3   Several additional loculated fluid collections in the abdomen as described above, probably additional pseudocysts   These include a 12 x 14 cm perisplenic collection and a 2 x 5 x 10 cm left retroperitoneal collection  4   Small amount of ascites  5   Sigmoid diverticulosis without evidence of diverticulitis  6   CBD stent in place with no intrahepatic bile duct dilatation      2/7 IR thoracentesis - Successful ultrasound-guided thoracentesis yielding 1200 mL clear ricky pleural fluid    2/8 CxR-   No pneumothorax   Stable size small to moderate left pleural effusion      Age/Sex: 60 y o  male   Discharge Plan:  Referral to [facility] Utilization Review Department  Phone: [phone]; Fax [phone]  [email or web address]  ATTENTION: Please call with any questions or concerns to [phone]  and carefully listen to the prompts so that you are directed to the right person  Send all requests for admission clinical reviews, approved or denied determinations and any other requests to fax [phone]   All voicemails are confidential

## 2019-02-08 NOTE — PROGRESS NOTES
Progress Note - Hal Michelle 1960, 60 y o  male MRN: [id number]    Unit/Bed#: -01 Encounter: [id number]    Primary Care Provider: Levon Parkinson MD   Date and time admitted to hospital: 2/3/2019  1:24 PM        * Abdominal pain   Assessment & Plan    · With decreased oral intake  · Due to large pseudocysts versus severe esophagitis  · On Protonix 40 mg IV q 12 hours, Carafate 1 g q 6 hours  · Pain control  · Diet advanced to low-fat diet /tolerating  ? D/C Dubhoff tube waiting for GI input         History of acute pancreatitis   Assessment & Plan    · Recent admission with acute biliary pancreatitis  · Now with pancreatic pseudocyst and multiple abdominal fluid collections     Pleural effusion, left   Assessment & Plan    · Likely from volume resuscitation during recent admission for pancreatitis  · Status post 1200 mL thoracentesis 2/7  · Likely pleuritic pain after thoracentesis   Appears improved  · Follow-up fluid studies     Pancreatic pseudocyst   Assessment & Plan     "6 x 11 x 14 cm fluid collection in the pancreatic bed"  Does not appear infected on imaging; pt without s/s of systemic illness   Also with 2 other fluid collections of unclear significance  "   probably additional pseudocysts  These include a 12 x 14 cm perisplenic collection and a 2 x 5 x 10 cm left retroperitoneal collection "  GI following  Patient may or may not need cyst gastrotomy depend on his clinical course  Will follow GI recommendations        Protein-calorie malnutrition ([address] )   Assessment & Plan    Malnutrition Findings:           BMI Findings: Body mass index is 27 37 kg/m²  Paroxysmal atrial fibrillation (HCC)   Assessment & Plan    Cont amiodarone    Warfarin restarted  Monitor PT INR           VTE Pharmacologic Prophylaxis:   Pharmacologic: Warfarin (Coumadin)    Discussions with Specialists or Other Care Team Provider:     Education and Discussions with Family / Patient:     Time Spent for Care: 20 minutes  More than 50% of total time spent on counseling and coordination of care as described above  Current Length of Stay: 5 day(s)    Current Patient Status: Inpatient   Certification Statement: The patient will continue to require additional inpatient hospital stay due to waitign for GI clearance    Discharge Plan / Estimated Discharge Date: To be cleared by GI    Code Status: Level 1 - Full Code      Subjective: Tolerating diet  Denies abd pain    Objective:     Vitals:   Temp (24hrs), Av 3 °F (36 8 °C), Min:97 7 °F (36 5 °C), Max:98 7 °F (37 1 °C)    Temp:  [97 7 °F (36 5 °C)-98 7 °F (37 1 °C)] 98 7 °F (37 1 °C)  HR:  [77-88] 80  Resp:  [18-20] 18  BP: (120-125)/(69-79) 120/69  SpO2:  [94 %] 94 %  Body mass index is 27 37 kg/m²  Input and Output Summary (last 24 hours): Intake/Output Summary (Last 24 hours) at 19 1502  Last data filed at 19 1358   Gross per 24 hour   Intake             1080 ml   Output              475 ml   Net              605 ml       Physical Exam:     Physical Exam   Constitutional: He is oriented to person, place, and time  No distress  Cardiovascular: Normal rate and regular rhythm  Pulmonary/Chest: Effort normal and breath sounds normal  No respiratory distress  Abdominal: Soft  Bowel sounds are normal    Musculoskeletal: He exhibits no edema  Neurological: He is alert and oriented to person, place, and time  Skin: Skin is warm  He is not diaphoretic  Psychiatric: He has a normal mood and affect             Additional Data:     Labs:      Results from last 7 days  Lab Units 19  1146 19  0453   WBC Thousand/uL  --  9 57   HEMOGLOBIN g/dL 9 8* 9 9*   HEMATOCRIT %  --  30 2*   PLATELETS Thousands/uL 369 476*   NEUTROS PCT %  --  77*   LYMPHS PCT %  --  10*   MONOS PCT %  --  10   EOS PCT %  --  1       Results from last 7 days  Lab Units 19  0453   POTASSIUM mmol/L 3 9   CHLORIDE mmol/L 102   CO2 mmol/L 28   BUN mg/dL 8   CREATININE mg/dL 0 98   CALCIUM mg/dL 7 9*   ALK PHOS U/L 54   ALT U/L 14   AST U/L 19       Results from last 7 days  Lab Units 02/08/19  0529   INR  1 54*         Recent Cultures (last 7 days):       Results from last 7 days  Lab Units 02/07/19  1139   GRAM STAIN RESULT  2+ Polys  1+ Mononuclear Cells  No bacteria seen   BODY FLUID CULTURE, STERILE  No growth       Last 24 Hours Medication List:     Current Facility-Administered Medications:  acetaminophen 650 mg Oral Q6H PRN Elias Gramajo MD   amiodarone 200 mg Oral Daily With Breakfast Elias Gramajo MD   HYDROmorphone 0 2 mg Intravenous Q4H PRN Elias Gramajo MD   HYDROmorphone 0 5 mg Intravenous Q4H PRN Elias Gramajo MD   ondansetron 4 mg Intravenous Q6H PRN Elias Gramajo MD   oxyCODONE-acetaminophen 1 tablet Oral Q4H PRN Dominguez Vazquez MD   pantoprazole 40 mg Intravenous Q12H Carmelita Perez MD   sucralfate 1,000 mg Oral Q6H Melisa Bennett MD   warfarin 1 mg Oral Daily (warfarin) Dominguez Vazquez MD        Today, Patient Was Seen By: Bhavna Armstrong MD    ** Please Note: This note has been constructed using a voice recognition system   **

## 2019-02-08 NOTE — PROGRESS NOTES
Progress Note - Darcy Reusing 1960, 60 y o  male MRN: [id number]    Unit/Bed#: -01 Encounter: [id number]    Primary Care Provider: Chandrika Meyer MD   Date and time admitted to hospital: 2/3/2019  1:24 PM        * Abdominal pain   Assessment & Plan    · With decreased oral intake  · Due to large pseudocysts versus severe esophagitis  · On Protonix 40 mg IV q 12 hours, Carafate 1 g q 6 hours  · Pain control  · Diet advanced to low-fat diet and tube feeds discontinued today  · Discussed with Dr Marshal Staley - if able to tolerate low-fat diet/ensure can be discharged with outpatient follow-up  · If unable to tolerated diet would need transfer To [facility] for inpatient cyst gastrostomy       History of acute pancreatitis   Assessment & Plan    · Recent admission with acute biliary pancreatitis  · Now with pancreatic pseudocyst and multiple abdominal fluid collections     Pleural effusion, left   Assessment & Plan    · Likely from volume resuscitation during recent admission for pancreatitis  · Status post 1200 mL thoracentesis today  · Follow-up fluid studies     Abnormal CT of the abdomen   Assessment & Plan    6 x 11 x 14 cm fluid-filled structure in the pancreatic bed, typical of a pseudocyst, extending into the mesenteric root  Several additional loculated fluid collections in the abdomen probably additional pseudocysts  These include a 12 x 14 cm perisplenic collection and a 2 x 5 x 10 cm left retroperitoneal collection  GI following     Paroxysmal atrial fibrillation (HCC)   Assessment & Plan    Cont amiodarone  Restart warfarin today           VTE Pharmacologic Prophylaxis:   Pharmacologic: Warfarin (Coumadin)  Mechanical VTE Prophylaxis in Place: Yes    Patient Centered Rounds: I have performed bedside rounds with nursing staff today      Discussions with Specialists or Other Care Team Provider:  Discussed with Dr Marshal Staley    Education and Discussions with Family / Patient:  Called wife - left a message    Time Spent for Care: 20 minutes  More than 50% of total time spent on counseling and coordination of care as described above  Current Length of Stay: 4 day(s)    Current Patient Status: Inpatient   Certification Statement: The patient will continue to require additional inpatient hospital stay due to Abdominal pain and poor oral intake    Code Status: Level 1 - Full Code    Subjective:   Abdominal pain improved  No nausea or vomiting  Diet advanced to low-fat today    Objective:     Vitals:   Temp (24hrs), Av 1 °F (37 3 °C), Min:98 8 °F (37 1 °C), Max:99 3 °F (37 4 °C)    Temp:  [98 8 °F (37 1 °C)-99 3 °F (37 4 °C)] 99 1 °F (37 3 °C)  HR:  [86-92] 92  Resp:  [18-20] 18  BP: (136-156)/(70-89) 136/70  SpO2:  [93 %-95 %] 93 %  Body mass index is 27 37 kg/m²  Physical Exam:     Physical Exam   Constitutional: He is oriented to person, place, and time  HENT:   Head: Atraumatic  Eyes: EOM are normal    Neck: Neck supple  No JVD present  No tracheal deviation present  No thyromegaly present  Cardiovascular: Normal rate, regular rhythm and normal heart sounds  Pulmonary/Chest: Effort normal and breath sounds normal  No respiratory distress  He has no wheezes  He has no rales  Abdominal: Soft  Bowel sounds are normal  He exhibits no distension  There is no tenderness  There is no rebound  Musculoskeletal: He exhibits no edema  Neurological: He is alert and oriented to person, place, and time  Skin: Skin is warm and dry  Psychiatric: He has a normal mood and affect         Additional Data:     Labs:      Results from last 7 days  Lab Units 19  0453   WBC Thousand/uL 9 57   HEMOGLOBIN g/dL 9 9*   HEMATOCRIT % 30 2*   PLATELETS Thousands/uL 476*   NEUTROS PCT % 77*   LYMPHS PCT % 10*   MONOS PCT % 10   EOS PCT % 1       Results from last 7 days  Lab Units 19  0453   SODIUM mmol/L 137   POTASSIUM mmol/L 3 9   CHLORIDE mmol/L 102   CO2 mmol/L 28   BUN mg/dL 8   CREATININE mg/dL 0 98   ANION GAP mmol/L 7   CALCIUM mg/dL 7 9*   ALBUMIN g/dL 1 5*   TOTAL BILIRUBIN mg/dL 0 40   ALK PHOS U/L 54   ALT U/L 14   AST U/L 19   GLUCOSE RANDOM mg/dL 114       Results from last 7 days  Lab Units 02/07/19  0453   INR  1 77*       * I Have Reviewed All Lab Data Listed Above  * Additional Pertinent Lab Tests Reviewed: [address] [facility] Admission Reviewed    Recent Cultures (last 7 days):       Results from last 7 days  Lab Units 02/07/19  1139   GRAM STAIN RESULT  2+ Polys  1+ Mononuclear Cells  No bacteria seen       Last 24 Hours Medication List:     Current Facility-Administered Medications:  acetaminophen 650 mg Oral Q6H PRN Lexis Johansen MD   amiodarone 200 mg Oral Daily With Breakfast Lexis Johansen MD   HYDROmorphone 0 2 mg Intravenous Q4H PRN Lexis Johansen MD   HYDROmorphone 0 5 mg Intravenous Q4H PRN Lexis Johansen MD   ondansetron 4 mg Intravenous Q6H PRN Lexis Johansen MD   oxyCODONE-acetaminophen 1 tablet Oral Q4H PRN Joselito Butcher MD   pantoprazole 40 mg Intravenous Q12H Danial Mccann MD   sucralfate 1,000 mg Oral Q6H Deidra Ricks MD   warfarin 1 mg Oral Daily (warfarin) Joselito Butcher MD        Today, Patient Was Seen By: Joselito Butcher MD    ** Please Note: Dictation voice to text software may have been used in the creation of this document   **

## 2019-02-09 VITALS
DIASTOLIC BLOOD PRESSURE: 86 MMHG | HEART RATE: 87 BPM | RESPIRATION RATE: 18 BRPM | TEMPERATURE: 98.2 F | OXYGEN SATURATION: 94 % | BODY MASS INDEX: 27.38 KG/M2 | WEIGHT: 224.87 LBS | HEIGHT: 76 IN | SYSTOLIC BLOOD PRESSURE: 133 MMHG

## 2019-02-09 LAB
INR PPP: 1.53 (ref 0.86–1.17)
PROTHROMBIN TIME: 17.9 SECONDS (ref 11.8–14.2)

## 2019-02-09 PROCEDURE — C9113 INJ PANTOPRAZOLE SODIUM, VIA: HCPCS | Performed by: HOSPITALIST

## 2019-02-09 PROCEDURE — 85610 PROTHROMBIN TIME: CPT | Performed by: INTERNAL MEDICINE

## 2019-02-09 PROCEDURE — 99232 SBSQ HOSP IP/OBS MODERATE 35: CPT | Performed by: PHYSICIAN ASSISTANT

## 2019-02-09 PROCEDURE — 99239 HOSP IP/OBS DSCHRG MGMT >30: CPT | Performed by: INTERNAL MEDICINE

## 2019-02-09 RX ORDER — OXYCODONE HYDROCHLORIDE 5 MG/1
5 TABLET ORAL EVERY 6 HOURS PRN
Qty: 20 TABLET | Refills: 0 | Status: SHIPPED | OUTPATIENT
Start: 2019-02-09 | End: 2019-02-19

## 2019-02-09 RX ORDER — PANTOPRAZOLE SODIUM 40 MG/1
40 TABLET, DELAYED RELEASE ORAL DAILY
Qty: 60 TABLET | Refills: 0 | Status: SHIPPED | OUTPATIENT
Start: 2019-02-09 | End: 2019-02-13 | Stop reason: SDUPTHER

## 2019-02-09 RX ORDER — SUCRALFATE ORAL 1 G/10ML
1000 SUSPENSION ORAL EVERY 6 HOURS SCHEDULED
Qty: 420 ML | Refills: 0 | Status: SHIPPED | OUTPATIENT
Start: 2019-02-09 | End: 2019-03-01 | Stop reason: ALTCHOICE

## 2019-02-09 RX ADMIN — AMIODARONE HYDROCHLORIDE 200 MG: 200 TABLET ORAL at 08:06

## 2019-02-09 RX ADMIN — SUCRALFATE 1000 MG: 1 SUSPENSION ORAL at 13:00

## 2019-02-09 RX ADMIN — ACETAMINOPHEN 650 MG: 325 TABLET, FILM COATED ORAL at 09:20

## 2019-02-09 RX ADMIN — PANTOPRAZOLE SODIUM 40 MG: 40 INJECTION, POWDER, FOR SOLUTION INTRAVENOUS at 08:06

## 2019-02-09 RX ADMIN — SUCRALFATE 1000 MG: 1 SUSPENSION ORAL at 05:39

## 2019-02-09 NOTE — PROGRESS NOTES
[facility] 60 [facility] MRN: [id number]    Unit/Bed#: -01 Encounter: [id number]    Assessment and Plan:   Principal Problem:    Abdominal pain  Active Problems:    History of acute pancreatitis    Paroxysmal atrial fibrillation (HCC)    Pancreatic pseudocyst    Hyperkalemia    Pleural effusion, left    Protein-calorie malnutrition (HCC)    Abnormal CT of the abdomen    #1  Abdominal pain and poor oral intake in the setting of multiple large pancreatic pseudocysts and esophagitis:  Patient is doing very well today without any abdominal pain  Tolerated his diet  Had a bowel movement   -continue a low residue, low-fiber diet at home  -nutritional supplementation  -patient has follow-up in the office on 02/13   -a patient would continue to have significant complications from the large pancreatic pseudocyst, could consider cyst gastrostomy in the future  This is not emergent at this time as patient is tolerating diet and is clinically improved  -continue b i d  PPI and Carafate for esophagitis  -anti reflux measures and diet  -avoid alcohol intake    ----------------------------------------------------------------------------------------------------------------    Subjective:     Patient reports feeling good today  Denies any abdominal pain  Tolerated a good amount of breakfast   Has an increased appetite  Had a bowel movement today  Denies any nausea or vomiting  Objective:     Vitals: Blood pressure 133/86, pulse 87, temperature 98 2 °F (36 8 °C), temperature source Oral, resp  rate 18, height 6' 4" (1 93 m), weight 102 kg (224 lb 13 9 oz), SpO2 94 %  ,Body mass index is 27 37 kg/m²        Intake/Output Summary (Last 24 hours) at 02/09/19 1000  Last data filed at 02/09/19 0920   Gross per 24 hour   Intake              940 ml   Output              975 ml   Net              -35 ml       Physical Exam:     General Appearance: Alert, appears stated age and cooperative  Lungs: Clear to auscultation bilaterally, no rales or rhonchi, no labored breathing/accessory muscle use  Heart: Regular rate and rhythm, S1, S2 normal, no murmur, click, rub or gallop  Abdomen: Soft, non-tender, non-distended; bowel sounds normal; no masses or no organomegaly  Extremities: No cyanosis, clubbing, or edema    Invasive Devices     Peripheral Intravenous Line            Peripheral IV 02/04/19 Right Hand 5 days                Lab Results:    Results from last 7 days  Lab Units 02/08/19  1146 02/07/19  0453   WBC Thousand/uL  --  9 57   HEMOGLOBIN g/dL 9 8* 9 9*   HEMATOCRIT %  --  30 2*   PLATELETS Thousands/uL 369 476*   NEUTROS PCT %  --  77*   LYMPHS PCT %  --  10*   MONOS PCT %  --  10   EOS PCT %  --  1       Results from last 7 days  Lab Units 02/07/19  0453   POTASSIUM mmol/L 3 9   CHLORIDE mmol/L 102   CO2 mmol/L 28   BUN mg/dL 8   CREATININE mg/dL 0 98   CALCIUM mg/dL 7 9*   ALK PHOS U/L 54   ALT U/L 14   AST U/L 19       Results from last 7 days  Lab Units 02/09/19  0423   INR  1 53*       Results from last 7 days  Lab Units 02/03/19  1405   LIPASE u/L 193       Imaging Studies: I have personally reviewed pertinent imaging studies  Ct Abdomen Pelvis Wo Contrast    Result Date: 2/3/2019  Impression: 1  Moderate-sized left pleural effusion with compressive atelectasis in the subjacent portion of the left lower lobe  2   6 x 11 x 14 cm fluid-filled structure in the pancreatic bed, typical of a pseudocyst, extending into the mesenteric root  3   Several additional loculated fluid collections in the abdomen as described above, probably additional pseudocysts  These include a 12 x 14 cm perisplenic collection and a 2 x 5 x 10 cm left retroperitoneal collection  4   Small amount of ascites  5   Sigmoid diverticulosis without evidence of diverticulitis  6   CBD stent in place with no intrahepatic bile duct dilatation   Workstation performed: [id number]     Xr Chest Portable    Result Date: 2/8/2019  Impression: No pneumothorax  Stable size small to moderate left pleural effusion  Workstation performed: [id number]     Ir Thoracentesis    Result Date: 2/7/2019  Impression: Impression: 1  Successful ultrasound-guided thoracentesis yielding 1200 mL clear ricky pleural fluid   Workstation performed: [id number]

## 2019-02-09 NOTE — ASSESSMENT & PLAN NOTE
· Likely from volume resuscitation during recent admission for pancreatitis  · Status post 1200 mL thoracentesis 2/7  · Likely pleuritic pain after thoracentesis     Appears improved  · Follow-up fluid studies as outpatient  · Discussed with pulmonology - ok for discharge

## 2019-02-09 NOTE — DISCHARGE SUMMARY
Discharge Summary - [address] [facility]    Patient Information: Ward Dugan 60 y o  male MRN: [id number]  Unit/Bed#: -01 Encounter: [id number]    Discharging Physician / Practitioner: Terrance Garcia MD  PCP: Viviana Green MD  Admission Date: 2/3/2019  Discharge Date: 02/09/19    Principal discharge diagnoses:  1  Abdominal pain from pseudocysts and severe esophagitis  2  Severe esophagitis  3  Gastritis  4  Duodenitis   5  Left pleural effusion  6  Protein calorie malnutrition    Secondary diagnosis:  Paroxysmal atrial fibrillation    Consultations During Hospital Stay:  Gastroenterology  Pulmonology    Procedures Performed:   1  CT abdomen and pelvis without contrast - Moderate-sized left pleural effusion with compressive atelectasis in the subjacent portion of the left lower lobe  6 x 11 x 14 cm fluid-filled structure in the pancreatic bed, typical of a pseudocyst, extending into the mesenteric root  Several additional loculated fluid collections in the abdomen as described above, probably additional pseudocysts  These include a 12 x 14 cm perisplenic collection and a 2 x 5 x 10 cm left retroperitoneal collection  Small amount of ascites  igmoid diverticulosis without evidence of diverticulitis  CBD stent in place with no intrahepatic bile duct dilatation    2  EGD - Duodenitis with CBD stent in the 2nd portion of the duodenum, posterior duodenal wall with some mild ulceration from the stent  Moderate duodenitis in the bulb  Gastritis  Some mild extrinsic compression from the pseudocyst noted in the stomach but overall easily inflatable  Normal retroflexion  Severe grade D erosive esophagitis  3   Thoracentesis of 1200 mL serosanguineous pleural fluid from left chest    Test Results Pending at Discharge (will require follow up):   Pleural fluid studies    Hospital Course:     Ward Dugan is a 60 y o  male patient who originally presented to the hospital on 2/3/2019 due to abdominal pain and poor oral intake  He had a recent hospitalization from 1/3/19 to 1/23/19 with acute biliary pancreatitis for which he underwent a sphincterotomy and biliary stent placement on 01/04/2019  Results of investigations are as above  His abdominal pain was felt to be likely due to pseudocysts and severe esophagitis  He was begun on Protonix and Carafate  A Dobhoff tube was placed during EGD and he received transient tube feeds  He was initially on a clear liquid diet and his diet was advanced to low-fat diet  He was tolerating a low-fat diet at the time of discharge  His tube feeds were discontinued  His abdominal pain improved  He was cleared for discharge by gastroenterology  If his abdominal pain worsens or if he develops decreased oral intake cyst gastrostomy would be considered  He was noted to have a large left pleural effusion which was felt to be likely from volume resuscitation during recent admission for pancreatitis  He underwent thoracentesis of 1200 mL serosanguineous fluid on 02/07/2019  He will follow up with pulmonology as an outpatient for final pleural fluid results  Condition at Discharge: stable     Discharge Day Visit / Exam:     * Please refer to separate progress for these details *    Discharge instructions/Information to patient and family:   See after visit summary for information provided to patient and family  Provisions for Follow-Up Care:  See after visit summary for information related to follow-up care and any pertinent home health orders  Disposition: Home    Planned Readmission: No    Discharge Statement:  I spent 40 minutes discharging the patient  This time was spent on the day of discharge  I had direct contact with the patient on the day of discharge   Greater than 50% of the total time was spent examining patient, answering all patient questions, arranging and discussing plan of care with patient as well as directly providing post-discharge instructions  Additional time then spent on discharge activities  Discharge Medications:  See after visit summary for reconciled discharge medications provided to patient and family  ** Please Note: Dragon 360 Dictation voice to text software may have been used in the creation of this document   **

## 2019-02-09 NOTE — ASSESSMENT & PLAN NOTE
6 x 11 x 14 cm fluid-filled structure in the pancreatic bed, typical of a pseudocyst, extending into the mesenteric root  Several additional loculated fluid collections in the abdomen probably additional pseudocysts  These include a 12 x 14 cm perisplenic collection and a 2 x 5 x 10 cm left retroperitoneal collection    Outpatient follow-up per GI

## 2019-02-09 NOTE — PROGRESS NOTES
Progress Note - Ru Landry 1960, 60 y o  male MRN: [id number]    Unit/Bed#: -01 Encounter: [id number]    Primary Care Provider: Henrietta Maki MD   Date and time admitted to hospital: 2/3/2019  1:24 PM        * Abdominal pain   Assessment & Plan    · With decreased oral intake  · Due to large pseudocysts and severe esophagitis  · Tolerating low fat diet  · Pain much improved  · Discussed with gastroenterology - ok for discharge on Protonix 40 mg IV q 12 hours, Carafate 1 g q 6 hours  · Oxycodone prn       History of acute pancreatitis   Assessment & Plan    · Recent admission with acute biliary pancreatitis  · Now with pancreatic pseudocyst and multiple abdominal fluid collections     Pleural effusion, left   Assessment & Plan    · Likely from volume resuscitation during recent admission for pancreatitis  · Status post 1200 mL thoracentesis 2/7  · Likely pleuritic pain after thoracentesis   Appears improved  · Follow-up fluid studies as outpatient  · Discussed with pulmonology - ok for discharge     Abnormal CT of the abdomen   Assessment & Plan    6 x 11 x 14 cm fluid-filled structure in the pancreatic bed, typical of a pseudocyst, extending into the mesenteric root  Several additional loculated fluid collections in the abdomen probably additional pseudocysts  These include a 12 x 14 cm perisplenic collection and a 2 x 5 x 10 cm left retroperitoneal collection  Outpatient follow-up per GI      Paroxysmal atrial fibrillation (HCC)   Assessment & Plan    Cont Amiodarone, Warfarin  Patient Centered Rounds: I have performed bedside rounds with nursing staff today  Discussions with Specialists or Other Care Team Provider:  Discussed with Gastroenterology    Education and Discussions with Family / Patient:  Discussed with wife on the phone    Time Spent for Care: 20 minutes  More than 50% of total time spent on counseling and coordination of care as described above      Current Length of Stay: 6 day(s)    Current Patient Status: Inpatient     Discharge Plan:  Discharge home today    Subjective:   Minimal abdominal discomfort  No shortness of breath  Tolerating low-fat diet    Objective:     Vitals:   Temp (24hrs), Av 7 °F (37 1 °C), Min:98 2 °F (36 8 °C), Max:99 1 °F (37 3 °C)    Temp:  [98 2 °F (36 8 °C)-99 1 °F (37 3 °C)] 98 2 °F (36 8 °C)  HR:  [87-88] 87  Resp:  [18] 18  BP: (133-152)/(82-86) 133/86  SpO2:  [94 %] 94 %  Body mass index is 27 37 kg/m²  Physical Exam:     Physical Exam   Constitutional: He is oriented to person, place, and time  HENT:   Head: Atraumatic  Eyes: EOM are normal    Neck: Neck supple  No JVD present  No tracheal deviation present  No thyromegaly present  Cardiovascular: Normal rate, regular rhythm and normal heart sounds  Pulmonary/Chest: Effort normal and breath sounds normal  No respiratory distress  He has no wheezes  He has no rales  Abdominal: Soft  Bowel sounds are normal  He exhibits no distension  There is no tenderness  There is no rebound  Musculoskeletal: He exhibits no edema  Neurological: He is alert and oriented to person, place, and time  Skin: Skin is warm and dry  Psychiatric: He has a normal mood and affect         Additional Data:     Labs:      Results from last 7 days  Lab Units 19  1146 19  0453   WBC Thousand/uL  --  9 57   HEMOGLOBIN g/dL 9 8* 9 9*   HEMATOCRIT %  --  30 2*   PLATELETS Thousands/uL 369 476*   NEUTROS PCT %  --  77*   LYMPHS PCT %  --  10*   MONOS PCT %  --  10   EOS PCT %  --  1       Results from last 7 days  Lab Units 19  0453   SODIUM mmol/L 137   POTASSIUM mmol/L 3 9   CHLORIDE mmol/L 102   CO2 mmol/L 28   BUN mg/dL 8   CREATININE mg/dL 0 98   ANION GAP mmol/L 7   CALCIUM mg/dL 7 9*   ALBUMIN g/dL 1 5*   TOTAL BILIRUBIN mg/dL 0 40   ALK PHOS U/L 54   ALT U/L 14   AST U/L 19   GLUCOSE RANDOM mg/dL 114       Results from last 7 days  Lab Units 19  0423   INR  1 53* * I Have Reviewed All Lab Data Listed Above  * Additional Pertinent Lab Tests Reviewed: [facility]    Recent Cultures (last 7 days):       Results from last 7 days  Lab Units 02/07/19  1139   GRAM STAIN RESULT  2+ Polys  1+ Mononuclear Cells  No bacteria seen   BODY FLUID CULTURE, STERILE  No growth        Today, Patient Was Seen By: Mary Guevara MD    ** Please Note: Dictation voice to text software may have been used in the creation of this document   **

## 2019-02-09 NOTE — ASSESSMENT & PLAN NOTE
· With decreased oral intake  · Due to large pseudocysts and severe esophagitis  · Tolerating low fat diet  · Pain much improved  · Discussed with gastroenterology - ok for discharge on Protonix 40 mg IV q 12 hours, Carafate 1 g q 6 hours  · Oxycodone prn

## 2019-02-10 LAB
BACTERIA SPEC BFLD CULT: NO GROWTH
GRAM STN SPEC: NORMAL

## 2019-02-12 NOTE — UTILIZATION REVIEW
Notification of Discharge  This is a Notification of Discharge from our facility [address]  Please be advised that this patient has been discharge from our facility  Below you will find the admission and discharge date and time including the patients disposition  PRESENTATION DATE: 2/3/2019  1:24 PM  IP ADMISSION DATE: 2/3/19 1814  DISCHARGE DATE: 2/9/2019  1:30 PM  DISPOSITION: Home with [address] Utilization Review Department  Phone: [phone]; Fax [phone]  [email or web address]  ATTENTION: Please call with any questions or concerns to [phone]  and carefully listen to the prompts so that you are directed to the right person  Send all requests for admission clinical reviews, approved or denied determinations and any other requests to fax [phone]   All voicemails are confidential   Reference #[id number]

## 2019-02-13 ENCOUNTER — OFFICE VISIT (OUTPATIENT)
Dept: GASTROENTEROLOGY | Facility: CLINIC | Age: 59
End: 2019-02-13
Payer: COMMERCIAL

## 2019-02-13 ENCOUNTER — TELEPHONE (OUTPATIENT)
Dept: GASTROENTEROLOGY | Facility: CLINIC | Age: 59
End: 2019-02-13

## 2019-02-13 VITALS
SYSTOLIC BLOOD PRESSURE: 154 MMHG | WEIGHT: 212 LBS | HEART RATE: 88 BPM | TEMPERATURE: 97.9 F | HEIGHT: 76 IN | BODY MASS INDEX: 25.82 KG/M2 | DIASTOLIC BLOOD PRESSURE: 99 MMHG

## 2019-02-13 DIAGNOSIS — K20.90 ESOPHAGITIS: ICD-10-CM

## 2019-02-13 DIAGNOSIS — K86.3 PANCREATIC PSEUDOCYST: ICD-10-CM

## 2019-02-13 DIAGNOSIS — K85.10 ACUTE BILIARY PANCREATITIS, UNSPECIFIED COMPLICATION STATUS: Primary | ICD-10-CM

## 2019-02-13 PROCEDURE — 99214 OFFICE O/P EST MOD 30 MIN: CPT | Performed by: PHYSICIAN ASSISTANT

## 2019-02-13 RX ORDER — PANTOPRAZOLE SODIUM 40 MG/1
40 TABLET, DELAYED RELEASE ORAL
Qty: 60 TABLET | Refills: 2 | Status: SHIPPED | OUTPATIENT
Start: 2019-02-13 | End: 2019-04-04 | Stop reason: HOSPADM

## 2019-02-13 NOTE — Clinical Note
Patient is technically due now for EGD with biliary stent removal  However he will need repeat EGD in 8 weeks due to severe esophagitis  Could we wait 8 more weeks to remove the stent? I would like to save him a procedure if possible

## 2019-02-13 NOTE — PROGRESS NOTES
[facility] - Outpatient Follow-up Note  Constantin Briggs 60 y o  male MRN: [id number]  Encounter: [id number]          ASSESSMENT AND PLAN:      1  Acute biliary pancreatitis, unspecified complication status  Patient with recent prolonged hospital admissions related to acute biliary pancreatitis complicated by acute cholangitis status post ERCP with stone removal and stent placement  His symptoms improved as well as liver enzymes following the procedure  He is currently tolerating low-fat diet without nausea, vomiting, or abdominal pain  We will schedule EGD with stent removal in the next few weeks  2  Pancreatic pseudocyst  CT abdomen pelvis from 2/3/19 significant for 6 x 11 x 14 cm fluid-filled structure in the pancreatic bed consistent with pseudocyst as well as several additional loculated fluid collections, likely pseudocysts  No definite capsule was noted around the fluid collections  Patient is currently tolerating low-fat diet without nausea, vomiting, or abdominal pain  We will hold off on further imaging and cyst gastrostomy for now  He was instructed to call the office if he develops symptoms  I did explain that the pseudocysts must fully mature prior to considering cyst gastrostomy  3  Esophagitis  Patient and wife report GERD symptoms "forever "  He was found to have LA class D erosive reflux induced esophagitis on recent EGD  Increase dose of Protonix to 40 mg twice daily for 8 weeks  We will plan for repeat endoscopy at that time to confirm healing of esophagitis and assess for Choudhary's esophagus  Continue dietary and lifestyle modifications to prevent reflux  - pantoprazole (PROTONIX) 40 mg tablet; Take 1 tablet (40 mg total) by mouth 2 (two) times a day before meals for 30 days  Dispense: 60 tablet;  Refill: 2    Follow-up in 3 months   ______________________________________________________________________    SUBJECTIVE:  58-year-old male with history of cholecystectomy presenting for hospital follow-up of acute biliary pancreatitis  He is accompanied by his wife in the office today  Patient initially admitted 1/3/19 with acute abdominal pain found to have acute pancreatitis complicated by acute cholangitis  He was started on broad-spectrum antibiotics and underwent emergent ERCP on 1/4/19 with sphincterotomy, possible small stone removal, and biliary stent placement  LFTs gradually improved  Patient required ICU admission for acute hypoxic respiratory pleural effusion requiring intubation and atrial fibrillation with RVR status post cardioversion  He was subsequently discharged on 1/23/19  He was then readmitted 2/3/19 with worsening abdominal pain found to have development of large pancreatic pseudocysts and moderate size left pleural effusion  He underwent EGD on 02/04/2019 which showed LA class D erosive reflux induced esophagitis, gastritis, and duodenitis  The CBD stent was left in place  A dobhoff tube was placed for nutrition as patient was unable to tolerate PO intake  There was consideration for transfer to [facility] for cyst gastrostomy however he was eventually transitioned to PO diet  He was also started on PPI twice daily and Carafate for severe esophagitis  He reports feeling much better today  He still has significant weakness and fatigue  He was seen by a visiting nurse yesterday, who recommends physical therapy  He has been tolerating low-fat diet  He is eating 3 meals per day  He denies any nausea, vomiting, heartburn, abdominal pain  He does have longstanding history of acid reflux  Patient's wife reports reflux symptoms "forever " He has been taking Protonix 40 mg once daily and Carafate 4 times daily  He is having regular bowel movements  He rates his stool 4 on the [place] stool chart  He denies any melena or hematochezia  REVIEW OF SYSTEMS IS OTHERWISE NEGATIVE        Historical Information   Past Medical History: Diagnosis Date    Gastroesophageal reflux     Pancreatitis      Past Surgical History:   Procedure Laterality Date    CHOLECYSTECTOMY      COLONOSCOPY N/A 3/21/2016    Procedure: COLONOSCOPY;  Surgeon: Heaven Brennan DO;  Location: [facility]; Service:     ERCP N/A 1/4/2019    Procedure: ENDOSCOPIC RETROGRADE CHOLANGIOPANCREATOGRAPHY (ERCP); Surgeon: Kirsten Bui MD;  Location: [facility]; Service: Gastroenterology    ESOPHAGOGASTRODUODENOSCOPY N/A 2/5/2019    Procedure: ESOPHAGOGASTRODUODENOSCOPY (EGD), with possible nasojejunal Dobhoff tube placement;  Surgeon: Sterling Ratliff MD;  [facility]; Service: Gastroenterology    IR THORACENTESIS  2/7/2019    DC EGD TRANSORAL BIOPSY SINGLE/MULTIPLE N/A 3/21/2016    Procedure: ESOPHAGOGASTRODUODENOSCOPY (EGD); Surgeon: Heaven Brennan DO;  Location[facility]; Service: General     Social History   Social History     Substance and Sexual Activity   Alcohol Use No    Comment: rarely     Social History     Substance and Sexual Activity   Drug Use No     Social History     Tobacco Use   Smoking Status Never Smoker   Smokeless Tobacco Never Used     Family History   Problem Relation Age of Onset    Lung disease Neg Hx        Meds/Allergies       Current Outpatient Medications:     acetaminophen (TYLENOL) 325 mg tablet    amiodarone 200 mg tablet    pantoprazole (PROTONIX) 40 mg tablet    sucralfate (CARAFATE) 1 g/10 mL suspension    tamsulosin (FLOMAX) 0 4 mg    warfarin (COUMADIN) 1 mg tablet    oxyCODONE (ROXICODONE) 5 mg immediate release tablet    No Known Allergies        Objective     Blood pressure 154/99, pulse 88, temperature 97 9 °F (36 6 °C), temperature source Tympanic, height 6' 4" (1 93 m), weight 96 2 kg (212 lb)  Body mass index is 25 81 kg/m²        PHYSICAL EXAM:      General Appearance:   Alert, cooperative, no distress   HEENT:   Normocephalic, atraumatic, anicteric      Neck:  Supple, symmetrical, trachea midline   Lungs:   Clear to auscultation bilaterally    Heart[de-identified]   Regular rate and rhythm   Abdomen:   Non-distended  Normal bowel sounds  Soft  Mild epigastric tenderness to palpation  No rebound or guarding      Genitalia:   Deferred    Rectal:   Deferred    Extremities:  No cyanosis, clubbing or edema    Pulses:  2+ and symmetric    Skin:  No jaundice, rashes, or lesions    Lymph nodes:  No palpable cervical lymphadenopathy        Lab Results:   No visits with results within 1 Day(s) from this visit  Latest known visit with results is:   Admission on 02/03/2019, Discharged on 02/09/2019   No results displayed because visit has over 200 results  Radiology Results:   Ct Abdomen Pelvis Wo Contrast    Result Date: 2/3/2019  Narrative: CT ABDOMEN AND PELVIS WITHOUT IV CONTRAST INDICATION:   Abdominal pain, unspecified  Recent history of pancreatitis  COMPARISON:  Contrast-enhanced CT of the abdomen and pelvis from January 3, 2019  TECHNIQUE:  CT examination of the abdomen and pelvis was performed without intravenous contrast   Axial, sagittal, and coronal 2D reformatted images were created from the source data and submitted for interpretation  Radiation dose length product (DLP) for this visit:  789 mGy-cm   This examination, like all CT scans performed in the [facility], was performed utilizing techniques to minimize radiation dose exposure, including the use of iterative reconstruction and automated exposure control  Enteric contrast was administered  The absence of intravenous contrast limits evaluation of the abdominal and pelvic viscera  FINDINGS: ABDOMEN LOWER CHEST:  Moderate size left pleural effusion, developing since the last CT  Compressive atelectasis in the base of the left lower lobe  Subsegmental atelectasis in the base of the right lower lobe  LIVER/BILIARY TREE:  1 6 cm cyst in the dome of the right lobe  Otherwise, liver grossly normal   Stent in CBD  No intrahepatic bile duct dilatation  Tiny amount of pneumobilia in the left lobe GALLBLADDER:     Postcholecystectomy  SPLEEN:  Normal in size  Compressed by adjacent 12 x 14 cm discrete fluid collection, either loculated ascites or pseudocyst  PANCREAS:  Evaluation limited without intravenous contrast   6 x 11 x 14 cm fluid collection in the pancreatic bed, consistent with pseudocyst, extending into the mesenteric root  Pancreas appears to be grossly intact  ADRENAL GLANDS:  Unremarkable  KIDNEYS/URETERS:  Unremarkable  No hydronephrosis  2 x 5 x 10 cm fluid collection in the left posterior pararenal space, extending into the left subphrenic space  STOMACH AND BOWEL:  Stomach displaced by the large pancreatic pseudocyst   Small intestine unremarkable  Diverticulosis in the sigmoid without evidence of diverticulitis  APPENDIX:  No findings to suggest appendicitis  ABDOMINOPELVIC CAVITY: Several prominent mesenteric lymph nodes, the largest a 1 2 x 1 8 cm node at the root of the mesentery  Scattered areas of mesenteric edema  Small amount of pelvic and perihepatic ascites  No extraluminal gas  VESSELS:  Unremarkable for patient's age  PELVIS REPRODUCTIVE ORGANS:  Prostate unremarkable  URINARY BLADDER:  Unremarkable  ABDOMINAL WALL/INGUINAL REGIONS:  Unremarkable  OSSEOUS STRUCTURES:  No acute fracture or destructive osseous lesion  Impression: 1  Moderate-sized left pleural effusion with compressive atelectasis in the subjacent portion of the left lower lobe  2   6 x 11 x 14 cm fluid-filled structure in the pancreatic bed, typical of a pseudocyst, extending into the mesenteric root  3   Several additional loculated fluid collections in the abdomen as described above, probably additional pseudocysts  These include a 12 x 14 cm perisplenic collection and a 2 x 5 x 10 cm left retroperitoneal collection  4   Small amount of ascites  5   Sigmoid diverticulosis without evidence of diverticulitis   6   CBD stent in place with no intrahepatic bile duct dilatation  Workstation performed: [id number]     Xr Chest Portable    Result Date: 2/8/2019  Narrative: CHEST INDICATION:   Significant pleuritic chest pain s/p thoracentesis  COMPARISON:  January 18, 2019 EXAM PERFORMED/VIEWS:  XR CHEST PORTABLE FINDINGS:  Enteric tube terminates below the diaphragms  Right upper extremity PICC line has been removed in the interval  Cardiomediastinal silhouette appears unremarkable  Small left pleural effusion slightly similar in size from prior study  No pneumothorax  Osseous structures appear within normal limits for patient age  Impression: No pneumothorax  Stable size small to moderate left pleural effusion  Workstation performed: [id number]     Xr Chest Portable    Result Date: 1/15/2019  Narrative: CHEST INDICATION:   fluid overload  COMPARISON:  January 14, 2019 EXAM PERFORMED/VIEWS:  XR CHEST PORTABLE FINDINGS:  Right upper extremity PICC line terminates overlying caval atrial junction  Cardiomediastinal silhouette appears unremarkable  Small to moderate size left pleural effusion  Moderate pulmonary vascular congestion  Osseous structures appear within normal limits for patient age  Impression: Pulmonary vascular congestion with small to moderate left pleural effusion similar to prior study    Workstation performed: [id number]     Xr Chest Pa & Lateral    Result Date: 1/18/2019  Narrative: CHEST INDICATION:   pleural effusion  COMPARISON:  1/15/2019 EXAM PERFORMED/VIEWS:  XR CHEST PA & LATERAL  The frontal view was performed utilizing dual energy radiographic technique  FINDINGS: The cardiac silhouette is without change from the prior study  Moderate-sized left pleural effusion is of similar size to the previous examination given differences in technique  No significant right effusion  The left effusion obscures visualization of the underlying left lung base  There is mild atelectasis at the right lung base    There is a PICC line with its tip at or near the cavoatrial junction  Osseous structures appear within normal limits for patient age  Impression: Moderate left-sided pleural effusion without significant change in size from prior Workstation performed: [id number]     Ir Thoracentesis    Result Date: 2/7/2019  Narrative: Ultrasound-guided thoracentesis Clinical History: Left pleural effusion   Technique: The patient was brought to the interventional radiology area and placed in the sitting position on the side of a stretcher  The left chest was then examined with ultrasound and the skin was marked  The skin was then prepped, and draped in usual sterile fashion  Lidocaine was administered to the skin and a small skin incision was made  Under direct ultrasound guidance, a 5 Western Shila Yueh multisidehole catheter was advanced into the pleural space  1200 mL clear ricky pleural fluid was aspirated  Specimens were sent to lab as requested   The patient tolerated the procedure well and suffered no complications  Impression: Impression: 1  Successful ultrasound-guided thoracentesis yielding 1200 mL clear ricky pleural fluid  Workstation performed: [id number]     Fl Barium Swallow Video W Speech    Result Date: 1/18/2019  Narrative: A video barium swallow study was performed by the Department of Speech Pathology  Please refer to the report for the official interpretation  The images are stored in  PACS for archival purposes only  Study images were not formally reviewed by the  Radiology Department

## 2019-02-13 NOTE — LETTER
February 13, 2019     Brandon Carvalho [address]  [address]    Patient: Casey Bryant   YOB: 1960   Date of Visit: 2/13/2019       Dear Dr Jorge Hannah: Thank you for referring Viola Gamboa to me for evaluation  Below are my notes for this consultation  If you have questions, please do not hesitate to call me  I look forward to following your patient along with you  Sincerely,        Kirby Brody PA-C        CC: Referral Self  Kirby Brody PA-C  2/13/2019  5:31 PM  Sign at close encounter  [facility] - Outpatient Follow-up Note  Casey Bryant 60 y o  male MRN: [id number]  Encounter: [id number]          ASSESSMENT AND PLAN:      1  Acute biliary pancreatitis, unspecified complication status  Patient with recent prolonged hospital admissions related to acute biliary pancreatitis complicated by acute cholangitis status post ERCP with stone removal and stent placement  His symptoms improved as well as liver enzymes following the procedure  He is currently tolerating low-fat diet without nausea, vomiting, or abdominal pain  We will schedule EGD with stent removal in the next few weeks  2  Pancreatic pseudocyst  CT abdomen pelvis from 2/3/19 significant for 6 x 11 x 14 cm fluid-filled structure in the pancreatic bed consistent with pseudocyst as well as several additional loculated fluid collections, likely pseudocysts  No definite capsule was noted around the fluid collections  Patient is currently tolerating low-fat diet without nausea, vomiting, or abdominal pain  We will hold off on further imaging and cyst gastrostomy for now  He was instructed to call the office if he develops symptoms  I did explain that the pseudocysts must fully mature prior to considering cyst gastrostomy      3  Esophagitis  Patient and wife report GERD symptoms "forever "  He was found to have LA class D erosive reflux induced esophagitis on recent EGD  Increase dose of Protonix to 40 mg twice daily for 8 weeks  We will plan for repeat endoscopy at that time to confirm healing of esophagitis and assess for Choudhary's esophagus  Continue dietary and lifestyle modifications to prevent reflux  - pantoprazole (PROTONIX) 40 mg tablet; Take 1 tablet (40 mg total) by mouth 2 (two) times a day before meals for 30 days  Dispense: 60 tablet; Refill: 2    Follow-up in 3 months   ______________________________________________________________________    SUBJECTIVE:  48-year-old male with history of cholecystectomy presenting for hospital follow-up of acute biliary pancreatitis  He is accompanied by his wife in the office today  Patient initially admitted 1/3/19 with acute abdominal pain found to have acute pancreatitis complicated by acute cholangitis  He was started on broad-spectrum antibiotics and underwent emergent ERCP on 1/4/19 with sphincterotomy, possible small stone removal, and biliary stent placement  LFTs gradually improved  Patient required ICU admission for acute hypoxic respiratory pleural effusion requiring intubation and atrial fibrillation with RVR status post cardioversion  He was subsequently discharged on 1/23/19  He was then readmitted 2/3/19 with worsening abdominal pain found to have development of large pancreatic pseudocysts and moderate size left pleural effusion  He underwent EGD on 02/04/2019 which showed LA class D erosive reflux induced esophagitis, gastritis, and duodenitis  The CBD stent was left in place  A dobhoff tube was placed for nutrition as patient was unable to tolerate PO intake  There was consideration for transfer to [facility] for cyst gastrostomy however he was eventually transitioned to PO diet  He was also started on PPI twice daily and Carafate for severe esophagitis  He reports feeling much better today  He still has significant weakness and fatigue   He was seen by a visiting nurse yesterday, who recommends physical therapy  He has been tolerating low-fat diet  He is eating 3 meals per day  He denies any nausea, vomiting, heartburn, abdominal pain  He does have longstanding history of acid reflux  Patient's wife reports reflux symptoms "forever " He has been taking Protonix 40 mg once daily and Carafate 4 times daily  He is having regular bowel movements  He rates his stool 4 on the [place] stool chart  He denies any melena or hematochezia  REVIEW OF SYSTEMS IS OTHERWISE NEGATIVE  Historical Information   Past Medical History:   Diagnosis Date    Gastroesophageal reflux     Pancreatitis      Past Surgical History:   Procedure Laterality Date    CHOLECYSTECTOMY      COLONOSCOPY N/A 3/21/2016    Procedure: COLONOSCOPY;  Surgeon: Muriel Rothman DO;  Location: [facility]; Service:     ERCP N/A 1/4/2019    Procedure: ENDOSCOPIC RETROGRADE CHOLANGIOPANCREATOGRAPHY (ERCP); Surgeon: Ashutosh Chauhan MD;  Location: [facility]; Service: Gastroenterology    ESOPHAGOGASTRODUODENOSCOPY N/A 2/5/2019    Procedure: ESOPHAGOGASTRODUODENOSCOPY (EGD), with possible nasojejunal Dobhoff tube placement;  Surgeon: Grecia Kuo MD;  [facility]; Service: Gastroenterology    IR THORACENTESIS  2/7/2019    GA EGD TRANSORAL BIOPSY SINGLE/MULTIPLE N/A 3/21/2016    Procedure: ESOPHAGOGASTRODUODENOSCOPY (EGD); Surgeon: Muriel Rothman DO;  Location[facility];   Service: General     Social History   Social History     Substance and Sexual Activity   Alcohol Use No    Comment: rarely     Social History     Substance and Sexual Activity   Drug Use No     Social History     Tobacco Use   Smoking Status Never Smoker   Smokeless Tobacco Never Used     Family History   Problem Relation Age of Onset    Lung disease Neg Hx        Meds/Allergies       Current Outpatient Medications:     acetaminophen (TYLENOL) 325 mg tablet    amiodarone 200 mg tablet    pantoprazole (PROTONIX) 40 mg tablet    sucralfate (CARAFATE) 1 g/10 mL suspension    tamsulosin (FLOMAX) 0 4 mg    warfarin (COUMADIN) 1 mg tablet    oxyCODONE (ROXICODONE) 5 mg immediate release tablet    No Known Allergies        Objective     Blood pressure 154/99, pulse 88, temperature 97 9 °F (36 6 °C), temperature source Tympanic, height 6' 4" (1 93 m), weight 96 2 kg (212 lb)  Body mass index is 25 81 kg/m²  PHYSICAL EXAM:      General Appearance:   Alert, cooperative, no distress   HEENT:   Normocephalic, atraumatic, anicteric      Neck:  Supple, symmetrical, trachea midline   Lungs:   Clear to auscultation bilaterally    Heart[de-identified]   Regular rate and rhythm   Abdomen:   Non-distended  Normal bowel sounds  Soft  Mild epigastric tenderness to palpation  No rebound or guarding      Genitalia:   Deferred    Rectal:   Deferred    Extremities:  No cyanosis, clubbing or edema    Pulses:  2+ and symmetric    Skin:  No jaundice, rashes, or lesions    Lymph nodes:  No palpable cervical lymphadenopathy        Lab Results:   No visits with results within 1 Day(s) from this visit  Latest known visit with results is:   Admission on 02/03/2019, Discharged on 02/09/2019   No results displayed because visit has over 200 results  Radiology Results:   Ct Abdomen Pelvis Wo Contrast    Result Date: 2/3/2019  Narrative: CT ABDOMEN AND PELVIS WITHOUT IV CONTRAST INDICATION:   Abdominal pain, unspecified  Recent history of pancreatitis  COMPARISON:  Contrast-enhanced CT of the abdomen and pelvis from January 3, 2019  TECHNIQUE:  CT examination of the abdomen and pelvis was performed without intravenous contrast   Axial, sagittal, and coronal 2D reformatted images were created from the source data and submitted for interpretation  Radiation dose length product (DLP) for this visit:  789 mGy-cm     This examination, like all CT scans performed in the [facility], was performed utilizing techniques to minimize radiation dose exposure, including the use of iterative reconstruction and automated exposure control  Enteric contrast was administered  The absence of intravenous contrast limits evaluation of the abdominal and pelvic viscera  FINDINGS: ABDOMEN LOWER CHEST:  Moderate size left pleural effusion, developing since the last CT  Compressive atelectasis in the base of the left lower lobe  Subsegmental atelectasis in the base of the right lower lobe  LIVER/BILIARY TREE:  1 6 cm cyst in the dome of the right lobe  Otherwise, liver grossly normal   Stent in CBD  No intrahepatic bile duct dilatation  Tiny amount of pneumobilia in the left lobe GALLBLADDER:     Postcholecystectomy  SPLEEN:  Normal in size  Compressed by adjacent 12 x 14 cm discrete fluid collection, either loculated ascites or pseudocyst  PANCREAS:  Evaluation limited without intravenous contrast   6 x 11 x 14 cm fluid collection in the pancreatic bed, consistent with pseudocyst, extending into the mesenteric root  Pancreas appears to be grossly intact  ADRENAL GLANDS:  Unremarkable  KIDNEYS/URETERS:  Unremarkable  No hydronephrosis  2 x 5 x 10 cm fluid collection in the left posterior pararenal space, extending into the left subphrenic space  STOMACH AND BOWEL:  Stomach displaced by the large pancreatic pseudocyst   Small intestine unremarkable  Diverticulosis in the sigmoid without evidence of diverticulitis  APPENDIX:  No findings to suggest appendicitis  ABDOMINOPELVIC CAVITY: Several prominent mesenteric lymph nodes, the largest a 1 2 x 1 8 cm node at the root of the mesentery  Scattered areas of mesenteric edema  Small amount of pelvic and perihepatic ascites  No extraluminal gas  VESSELS:  Unremarkable for patient's age  PELVIS REPRODUCTIVE ORGANS:  Prostate unremarkable  URINARY BLADDER:  Unremarkable  ABDOMINAL WALL/INGUINAL REGIONS:  Unremarkable  OSSEOUS STRUCTURES:  No acute fracture or destructive osseous lesion  Impression: 1    Moderate-sized left pleural effusion with compressive atelectasis in the subjacent portion of the left lower lobe  2   6 x 11 x 14 cm fluid-filled structure in the pancreatic bed, typical of a pseudocyst, extending into the mesenteric root  3   Several additional loculated fluid collections in the abdomen as described above, probably additional pseudocysts  These include a 12 x 14 cm perisplenic collection and a 2 x 5 x 10 cm left retroperitoneal collection  4   Small amount of ascites  5   Sigmoid diverticulosis without evidence of diverticulitis  6   CBD stent in place with no intrahepatic bile duct dilatation  Workstation performed: [id number]     Xr Chest Portable    Result Date: 2/8/2019  Narrative: CHEST INDICATION:   Significant pleuritic chest pain s/p thoracentesis  COMPARISON:  January 18, 2019 EXAM PERFORMED/VIEWS:  XR CHEST PORTABLE FINDINGS:  Enteric tube terminates below the diaphragms  Right upper extremity PICC line has been removed in the interval  Cardiomediastinal silhouette appears unremarkable  Small left pleural effusion slightly similar in size from prior study  No pneumothorax  Osseous structures appear within normal limits for patient age  Impression: No pneumothorax  Stable size small to moderate left pleural effusion  Workstation performed: [id number]     Xr Chest Portable    Result Date: 1/15/2019  Narrative: CHEST INDICATION:   fluid overload  COMPARISON:  January 14, 2019 EXAM PERFORMED/VIEWS:  XR CHEST PORTABLE FINDINGS:  Right upper extremity PICC line terminates overlying caval atrial junction  Cardiomediastinal silhouette appears unremarkable  Small to moderate size left pleural effusion  Moderate pulmonary vascular congestion  Osseous structures appear within normal limits for patient age  Impression: Pulmonary vascular congestion with small to moderate left pleural effusion similar to prior study    Workstation performed: [id number]     Xr Chest Pa & Lateral    Result Date: 1/18/2019  Narrative: CHEST INDICATION:   pleural effusion  COMPARISON:  1/15/2019 EXAM PERFORMED/VIEWS:  XR CHEST PA & LATERAL  The frontal view was performed utilizing dual energy radiographic technique  FINDINGS: The cardiac silhouette is without change from the prior study  Moderate-sized left pleural effusion is of similar size to the previous examination given differences in technique  No significant right effusion  The left effusion obscures visualization of the underlying left lung base  There is mild atelectasis at the right lung base  There is a PICC line with its tip at or near the cavoatrial junction  Osseous structures appear within normal limits for patient age  Impression: Moderate left-sided pleural effusion without significant change in size from prior Workstation performed: [id number]     Ir Thoracentesis    Result Date: 2/7/2019  Narrative: Ultrasound-guided thoracentesis Clinical History: Left pleural effusion   Technique: The patient was brought to the interventional radiology area and placed in the sitting position on the side of a stretcher  The left chest was then examined with ultrasound and the skin was marked  The skin was then prepped, and draped in usual sterile fashion  Lidocaine was administered to the skin and a small skin incision was made  Under direct ultrasound guidance, a 5 Western Shila Yueh multisidehole catheter was advanced into the pleural space  1200 mL clear ricky pleural fluid was aspirated  Specimens were sent to lab as requested   The patient tolerated the procedure well and suffered no complications  Impression: Impression: 1  Successful ultrasound-guided thoracentesis yielding 1200 mL clear ricky pleural fluid  Workstation performed: [id number]     Fl Barium Swallow Video W Speech    Result Date: 1/18/2019  Narrative: A video barium swallow study was performed by the Department of Speech Pathology  Please refer to the report for the official interpretation   The images are stored in  PACS for archival purposes only  Study images were not formally reviewed by the  Radiology Department

## 2019-02-14 ENCOUNTER — TRANSCRIBE ORDERS (OUTPATIENT)
Dept: GASTROENTEROLOGY | Facility: CLINIC | Age: 59
End: 2019-02-14

## 2019-02-14 ENCOUNTER — ANTICOAG VISIT (OUTPATIENT)
Dept: CARDIOLOGY CLINIC | Facility: CLINIC | Age: 59
End: 2019-02-14

## 2019-02-14 DIAGNOSIS — I48.0 PAROXYSMAL ATRIAL FIBRILLATION (HCC): ICD-10-CM

## 2019-02-14 LAB — INR PPP: 1.2 (ref 0.86–1.17)

## 2019-02-18 ENCOUNTER — ANTICOAG VISIT (OUTPATIENT)
Dept: CARDIOLOGY CLINIC | Facility: CLINIC | Age: 59
End: 2019-02-18

## 2019-02-18 ENCOUNTER — TELEPHONE (OUTPATIENT)
Dept: PULMONOLOGY | Facility: HOSPITAL | Age: 59
End: 2019-02-18

## 2019-02-18 ENCOUNTER — TELEPHONE (OUTPATIENT)
Dept: PULMONOLOGY | Facility: CLINIC | Age: 59
End: 2019-02-18

## 2019-02-18 ENCOUNTER — TELEPHONE (OUTPATIENT)
Dept: CARDIOLOGY CLINIC | Facility: CLINIC | Age: 59
End: 2019-02-18

## 2019-02-18 DIAGNOSIS — J90 PLEURAL EFFUSION, LEFT: Primary | ICD-10-CM

## 2019-02-18 DIAGNOSIS — I48.0 PAROXYSMAL ATRIAL FIBRILLATION (HCC): ICD-10-CM

## 2019-02-18 LAB — INR PPP: 1.3 (ref 0.86–1.17)

## 2019-02-18 NOTE — TELEPHONE ENCOUNTER
Leonard from the [facility]  Called stating that there are new right lower lobe crackling and left side is absent   Please call leonard at [phone]

## 2019-02-18 NOTE — TELEPHONE ENCOUNTER
I called Eden Brunner about his final pleural fluid results  Explained that there was no concern for malignancy or bacterial infection in the fluid was most likely secondary to to volume resuscitation he received while in the ICU  Patient stated that they have a follow-up appointment on 3/1/19 with Dr Junie Vang  Patient reported that he had no questions or concerns

## 2019-02-18 NOTE — TELEPHONE ENCOUNTER
I called left a message for patient that I will be putting in an order for chest x-ray prior to his visit with Dr Kavitha Grande on March 1st   Explained in detail to please obtain the chest x-ray prior to the visit    Explained if there are any questions or concerns to call the office

## 2019-02-18 NOTE — TELEPHONE ENCOUNTER
Pt has a hospital f/u with you on 2/26  Received call from Olya London, a nurse with  [facility], who reports pt has developed crackles in RLL  Has plus 4 noah LE edema, which is not new  Does not do daily weights  Not on a diuretic  Has appt tomorrow with PCP, pulmonology on 3/1  Also said pt c/o feeling dizzy after taking amiodarone  /78, HR 81  Nurse also gave an update to PCP who said he will evaluate tomorrow at [facility]  TING

## 2019-02-19 ENCOUNTER — TELEPHONE (OUTPATIENT)
Dept: GASTROENTEROLOGY | Facility: AMBULARY SURGERY CENTER | Age: 59
End: 2019-02-19

## 2019-02-19 NOTE — TELEPHONE ENCOUNTER
DR Tony Hartmann PT    Pt spouse called stating the disability company didn't received for disability form which nino barker  Please advise

## 2019-02-21 ENCOUNTER — ANTICOAG VISIT (OUTPATIENT)
Dept: CARDIOLOGY CLINIC | Facility: CLINIC | Age: 59
End: 2019-02-21

## 2019-02-21 DIAGNOSIS — I48.0 PAROXYSMAL ATRIAL FIBRILLATION (HCC): ICD-10-CM

## 2019-02-21 DIAGNOSIS — I48.0 PAROXYSMAL ATRIAL FIBRILLATION (HCC): Primary | ICD-10-CM

## 2019-02-21 DIAGNOSIS — K85.10 ACUTE BILIARY PANCREATITIS, UNSPECIFIED COMPLICATION STATUS: ICD-10-CM

## 2019-02-21 LAB — INR PPP: 1.7 (ref 0.86–1.17)

## 2019-02-21 RX ORDER — WARFARIN SODIUM 2 MG/1
TABLET ORAL
Qty: 135 TABLET | Refills: 3 | Status: SHIPPED | OUTPATIENT
Start: 2019-02-21 | End: 2019-04-04 | Stop reason: HOSPADM

## 2019-02-21 NOTE — TELEPHONE ENCOUNTER
I spoke to Page Mondragon wife over the phone  I explained that we will plan for EGD/ERCP in 8 weeks to assess for healing of esophagitis and Choudhary's esophagus as well as remove biliary stent  Dr Abbie Nice would like to repeat CT scan in 4-6 weeks to re-evaluate the pancreatic cyst to see if drainage is necessary  Our office staff will reach out to schedule the procedure and CT scan  Patient's wife expressed understanding and all questions were answered

## 2019-02-21 NOTE — TELEPHONE ENCOUNTER
Rylie Calhoun, patient's wife returning your call  Did this patient need anything else or was the call in regards to the form?

## 2019-02-25 ENCOUNTER — ANTICOAG VISIT (OUTPATIENT)
Dept: CARDIOLOGY CLINIC | Facility: CLINIC | Age: 59
End: 2019-02-25

## 2019-02-25 DIAGNOSIS — I48.0 PAROXYSMAL ATRIAL FIBRILLATION (HCC): ICD-10-CM

## 2019-02-25 LAB — INR PPP: 2 (ref 0.86–1.17)

## 2019-02-26 ENCOUNTER — OFFICE VISIT (OUTPATIENT)
Dept: CARDIOLOGY CLINIC | Facility: CLINIC | Age: 59
End: 2019-02-26
Payer: COMMERCIAL

## 2019-02-26 VITALS
DIASTOLIC BLOOD PRESSURE: 70 MMHG | HEART RATE: 94 BPM | HEIGHT: 76 IN | SYSTOLIC BLOOD PRESSURE: 124 MMHG | BODY MASS INDEX: 23.7 KG/M2 | WEIGHT: 194.6 LBS

## 2019-02-26 DIAGNOSIS — I48.0 PAROXYSMAL ATRIAL FIBRILLATION (HCC): Primary | ICD-10-CM

## 2019-02-26 DIAGNOSIS — E46 PROTEIN-CALORIE MALNUTRITION (HCC): ICD-10-CM

## 2019-02-26 PROCEDURE — 93000 ELECTROCARDIOGRAM COMPLETE: CPT | Performed by: INTERNAL MEDICINE

## 2019-02-26 PROCEDURE — 99214 OFFICE O/P EST MOD 30 MIN: CPT | Performed by: INTERNAL MEDICINE

## 2019-02-26 NOTE — PROGRESS NOTES
Cardiology Follow Up    Zay Ward  1960  [id number]  [facility] [phone]    1  Paroxysmal atrial fibrillation (HCC)  POCT ECG   2  Protein-calorie malnutrition ([address] )         Discussion/Summary:  He is currently maintaining normal sinus rhythm  Amiodarone is a short-term medication for him  I have recommended he decrease to 100 mg daily for 4 weeks and then stop the medication  He will call me at that time and I will transition him to metoprolol 25 q  12  Continue anticoagulation for the time being  Blood pressures been well controlled  He needs to continue nutritional support    Interval History:  50-year-old gentleman I met in the hospital recently with episodes severe pancreatitis and volume overload secondary to low protein levels  He had no signs of heart failure normal ventricle  He was shocked several times to get out of atrial fibrillation and eventually placed on IV amiodarone to maintain sinus rhythm during acute illness  He was started on anticoagulation  Since leaving the hospital he has been recovering nicely  He has been losing the extra fluid and is now quite euvolemic on exam   He denies any chest pain, shortness of breath, lightheadedness, dizziness, or syncope  There is no lower extremity edema, PND, orthopnea  No adverse bleeding events on anticoagulation      Problem List     History of acute pancreatitis    Pancreatitis    Overview Signed 1/4/2019 10:19 AM by Leopoldo Hameed PA-C     Added automatically from request for surgery [id number]         Delirium    Leukocytosis    Pulmonary edema    Hypokalemia    Metabolic alkalosis    Dysphagia    Metabolic encephalopathy    Paroxysmal atrial fibrillation (HCC)    Supratherapeutic INR    Abdominal pain    Pancreatic pseudocyst    Pleural effusion, left    Protein-calorie malnutrition ([facility] [address] )    Overview Signed 2/4/2019  3:51 PM by Danitza Tate PA-C Added automatically from request for surgery [id number]         Malnutrition Findings:           BMI Findings: Body mass index is 23 69 kg/m²  Abnormal CT of the abdomen        Past Medical History:   Diagnosis Date    Gastroesophageal reflux     Pancreatitis      Social History     Socioeconomic History    Marital status: /Civil Union     Spouse name: Not on file    Number of children: Not on file    Years of education: Not on file    Highest education level: Not on file   Occupational History    Not on file   Social Needs    Financial resource strain: Not on file    Food insecurity:     Worry: Not on file     Inability: Not on file    Transportation needs:     Medical: Not on file     Non-medical: Not on file   Tobacco Use    Smoking status: Never Smoker    Smokeless tobacco: Never Used   Substance and Sexual Activity    Alcohol use: No     Comment: rarely    Drug use: No    Sexual activity: Not on file   Lifestyle    Physical activity:     Days per week: Not on file     Minutes per session: Not on file    Stress: Not on file   Relationships    Social connections:     Talks on phone: Not on file     Gets together: Not on file     Attends Buddhism service: Not on file     Active member of club or organization: Not on file     Attends meetings of clubs or organizations: Not on file     Relationship status: Not on file    Intimate partner violence:     Fear of current or ex partner: Not on file     Emotionally abused: Not on file     Physically abused: Not on file     Forced sexual activity: Not on file   Other Topics Concern    Not on file   Social History Narrative    Not on file      Family History   Problem Relation Age of Onset    Lung disease Neg Hx      Past Surgical History:   Procedure Laterality Date    CHOLECYSTECTOMY      COLONOSCOPY N/A 3/21/2016    Procedure: COLONOSCOPY;  Surgeon: Teresa Horn DO;  Location: [facility];   Service:     ERCP N/A 1/4/2019 Procedure: ENDOSCOPIC RETROGRADE CHOLANGIOPANCREATOGRAPHY (ERCP); Surgeon: Antwan Lewis MD;  Location: [facility]; Service: Gastroenterology    ESOPHAGOGASTRODUODENOSCOPY N/A 2/5/2019    Procedure: ESOPHAGOGASTRODUODENOSCOPY (EGD), with possible nasojejunal Dobhoff tube placement;  Surgeon: Yogi Medina MD;  [facility]; Service: Gastroenterology    IR THORACENTESIS  2/7/2019    KS EGD TRANSORAL BIOPSY SINGLE/MULTIPLE N/A 3/21/2016    Procedure: ESOPHAGOGASTRODUODENOSCOPY (EGD); Surgeon: Solo Freeman DO;  [facility];   Service: General       Current Outpatient Medications:     acetaminophen (TYLENOL) 325 mg tablet, Take 2 tablets (650 mg total) by mouth every 6 (six) hours as needed for mild pain or fever, Disp: 30 tablet, Rfl: 0    amiodarone 200 mg tablet, Take 1 tablet (200 mg total) by mouth daily with breakfast, Disp: 30 tablet, Rfl: 1    pantoprazole (PROTONIX) 40 mg tablet, Take 1 tablet (40 mg total) by mouth 2 (two) times a day before meals for 30 days, Disp: 60 tablet, Rfl: 2    tamsulosin (FLOMAX) 0 4 mg, Take 1 capsule (0 4 mg total) by mouth daily with dinner, Disp: 30 capsule, Rfl: 0    warfarin (COUMADIN) 2 mg tablet, TAKE 1 TO 1 1/2 TABS BY MOUTH DAILY OR AS DIRECTED BY PHYSICIAN , Disp: 135 tablet, Rfl: 3    sucralfate (CARAFATE) 1 g/10 mL suspension, Take 10 mL (1,000 mg total) by mouth every 6 (six) hours (Patient not taking: Reported on 2/26/2019), Disp: 420 mL, Rfl: 0  No Known Allergies    Labs:     Chemistry        Component Value Date/Time     01/05/2015 1131    K 3 9 02/07/2019 0453    K 3 9 01/05/2015 1131     02/07/2019 0453     01/05/2015 1131    CO2 28 02/07/2019 0453    CO2 24 01/05/2019 0314    BUN 8 02/07/2019 0453    BUN 19 01/05/2015 1131    CREATININE 0 98 02/07/2019 0453    CREATININE 1 01 01/05/2015 1131        Component Value Date/Time    CALCIUM 7 9 (L) 02/07/2019 0453    CALCIUM 9 2 01/05/2015 1131    ALKPHOS 54 02/07/2019 0453 ALKPHOS 102 01/05/2015 1131    AST 19 02/07/2019 0453    AST 22 01/05/2015 1131    ALT 14 02/07/2019 0453    ALT 43 01/05/2015 1131    BILITOT 1 00 01/05/2015 1131            No results found for: CHOL  Lab Results   Component Value Date    HDL 59 01/03/2019     Lab Results   Component Value Date    LDLCALC 77 01/03/2019     Lab Results   Component Value Date    TRIG 52 01/03/2019     No results found for: CHOLHDL    Imaging: Ct Abdomen Pelvis Wo Contrast    Result Date: 2/3/2019  Narrative: CT ABDOMEN AND PELVIS WITHOUT IV CONTRAST INDICATION:   Abdominal pain, unspecified  Recent history of pancreatitis  COMPARISON:  Contrast-enhanced CT of the abdomen and pelvis from January 3, 2019  TECHNIQUE:  CT examination of the abdomen and pelvis was performed without intravenous contrast   Axial, sagittal, and coronal 2D reformatted images were created from the source data and submitted for interpretation  Radiation dose length product (DLP) for this visit:  789 mGy-cm   This examination, like all CT scans performed in the [facility], was performed utilizing techniques to minimize radiation dose exposure, including the use of iterative reconstruction and automated exposure control  Enteric contrast was administered  The absence of intravenous contrast limits evaluation of the abdominal and pelvic viscera  FINDINGS: ABDOMEN LOWER CHEST:  Moderate size left pleural effusion, developing since the last CT  Compressive atelectasis in the base of the left lower lobe  Subsegmental atelectasis in the base of the right lower lobe  LIVER/BILIARY TREE:  1 6 cm cyst in the dome of the right lobe  Otherwise, liver grossly normal   Stent in CBD  No intrahepatic bile duct dilatation  Tiny amount of pneumobilia in the left lobe GALLBLADDER:     Postcholecystectomy  SPLEEN:  Normal in size    Compressed by adjacent 12 x 14 cm discrete fluid collection, either loculated ascites or pseudocyst  PANCREAS:  Evaluation limited without intravenous contrast   6 x 11 x 14 cm fluid collection in the pancreatic bed, consistent with pseudocyst, extending into the mesenteric root  Pancreas appears to be grossly intact  ADRENAL GLANDS:  Unremarkable  KIDNEYS/URETERS:  Unremarkable  No hydronephrosis  2 x 5 x 10 cm fluid collection in the left posterior pararenal space, extending into the left subphrenic space  STOMACH AND BOWEL:  Stomach displaced by the large pancreatic pseudocyst   Small intestine unremarkable  Diverticulosis in the sigmoid without evidence of diverticulitis  APPENDIX:  No findings to suggest appendicitis  ABDOMINOPELVIC CAVITY: Several prominent mesenteric lymph nodes, the largest a 1 2 x 1 8 cm node at the root of the mesentery  Scattered areas of mesenteric edema  Small amount of pelvic and perihepatic ascites  No extraluminal gas  VESSELS:  Unremarkable for patient's age  PELVIS REPRODUCTIVE ORGANS:  Prostate unremarkable  URINARY BLADDER:  Unremarkable  ABDOMINAL WALL/INGUINAL REGIONS:  Unremarkable  OSSEOUS STRUCTURES:  No acute fracture or destructive osseous lesion  Impression: 1  Moderate-sized left pleural effusion with compressive atelectasis in the subjacent portion of the left lower lobe  2   6 x 11 x 14 cm fluid-filled structure in the pancreatic bed, typical of a pseudocyst, extending into the mesenteric root  3   Several additional loculated fluid collections in the abdomen as described above, probably additional pseudocysts  These include a 12 x 14 cm perisplenic collection and a 2 x 5 x 10 cm left retroperitoneal collection  4   Small amount of ascites  5   Sigmoid diverticulosis without evidence of diverticulitis  6   CBD stent in place with no intrahepatic bile duct dilatation  Workstation performed: [id number]     Xr Chest Portable    Result Date: 2/8/2019  Narrative: CHEST INDICATION:   Significant pleuritic chest pain s/p thoracentesis   COMPARISON:  January 18, 2019 EXAM PERFORMED/VIEWS:  XR CHEST PORTABLE FINDINGS:  Enteric tube terminates below the diaphragms  Right upper extremity PICC line has been removed in the interval  Cardiomediastinal silhouette appears unremarkable  Small left pleural effusion slightly similar in size from prior study  No pneumothorax  Osseous structures appear within normal limits for patient age  Impression: No pneumothorax  Stable size small to moderate left pleural effusion  Workstation performed: [id number]     Ir Thoracentesis    Result Date: 2/7/2019  Narrative: Ultrasound-guided thoracentesis Clinical History: Left pleural effusion   Technique: The patient was brought to the interventional radiology area and placed in the sitting position on the side of a stretcher  The left chest was then examined with ultrasound and the skin was marked  The skin was then prepped, and draped in usual sterile fashion  Lidocaine was administered to the skin and a small skin incision was made  Under direct ultrasound guidance, a 5 Western Shila Yueh multisidehole catheter was advanced into the pleural space  1200 mL clear ricky pleural fluid was aspirated  Specimens were sent to lab as requested   The patient tolerated the procedure well and suffered no complications  Impression: Impression: 1  Successful ultrasound-guided thoracentesis yielding 1200 mL clear ricky pleural fluid  Workstation performed: [id number]       ECG:  Normal sinus rhythm unremarkable tracing      ROS    Vitals:    02/26/19 0932   BP: 124/70   Pulse: 94     Vitals:    02/26/19 0932   Weight: 88 3 kg (194 lb 9 6 oz)     Height: 6' 4" (193 cm)   Body mass index is 23 69 kg/m²      Physical Exam:  Vital signs reviewed  General appearance:  Appears stated age, alert, well appearing and in no distress  HEENT:  PERRLA, EOMI, no scleral icterus, no conjunctival pallor  NECK:  Supple, No elevated JVP, no thyromegaly, no carotid bruits  HEART:  Regular rate and rhythm positive S1/S2 no S3 no S4    LUNGS:  Clear to auscultation bilaterally no wheezes rales or rhonchi  ABDOMEN:  Soft, non-tender, positive bowel sounds, no rebound or guarding, no organomegaly   EXTREMITIES:  No clubbing cyanosis or edema  VASCULAR:  Normal pedal pulses, good pulse volume   SKIN: No lesions or rashes on exposed skin  NEURO:  CN II-XII intact, no focal deficits

## 2019-02-28 ENCOUNTER — ANTICOAG VISIT (OUTPATIENT)
Dept: CARDIOLOGY CLINIC | Facility: CLINIC | Age: 59
End: 2019-02-28

## 2019-02-28 DIAGNOSIS — I48.0 PAROXYSMAL ATRIAL FIBRILLATION (HCC): ICD-10-CM

## 2019-02-28 LAB — INR PPP: 2.6 (ref 0.86–1.17)

## 2019-03-01 ENCOUNTER — OFFICE VISIT (OUTPATIENT)
Dept: PULMONOLOGY | Facility: CLINIC | Age: 59
End: 2019-03-01
Payer: COMMERCIAL

## 2019-03-01 ENCOUNTER — APPOINTMENT (OUTPATIENT)
Dept: RADIOLOGY | Facility: CLINIC | Age: 59
End: 2019-03-01
Payer: COMMERCIAL

## 2019-03-01 VITALS
TEMPERATURE: 96.4 F | OXYGEN SATURATION: 97 % | SYSTOLIC BLOOD PRESSURE: 118 MMHG | HEIGHT: 76 IN | HEART RATE: 93 BPM | DIASTOLIC BLOOD PRESSURE: 80 MMHG | WEIGHT: 192.4 LBS | BODY MASS INDEX: 23.43 KG/M2

## 2019-03-01 DIAGNOSIS — J90 PLEURAL EFFUSION, LEFT: Primary | ICD-10-CM

## 2019-03-01 DIAGNOSIS — J90 PLEURAL EFFUSION, LEFT: ICD-10-CM

## 2019-03-01 PROBLEM — J81.1 PULMONARY EDEMA: Status: RESOLVED | Noted: 2019-01-12 | Resolved: 2019-03-01

## 2019-03-01 PROBLEM — G93.41 METABOLIC ENCEPHALOPATHY: Status: RESOLVED | Noted: 2019-01-14 | Resolved: 2019-03-01

## 2019-03-01 PROBLEM — R41.0 DELIRIUM: Status: RESOLVED | Noted: 2019-01-12 | Resolved: 2019-03-01

## 2019-03-01 PROCEDURE — 71046 X-RAY EXAM CHEST 2 VIEWS: CPT

## 2019-03-01 PROCEDURE — 99214 OFFICE O/P EST MOD 30 MIN: CPT | Performed by: INTERNAL MEDICINE

## 2019-03-01 NOTE — PROGRESS NOTES
Progress Note - Pulmonary   [facility] 60 [facility]  [facility] MRN: [id number]   Encounter: [id number]      Assessment/Plan:  Patient is a 58-year-old male with past medical history significant pancreatitis with persistent left pleural effusion who presents for follow-up  The left pleural effusion had an elevated neutrophil count but no evidence of bacteria or malignancy  It is likely this is related to his previous fluid resuscitation setting of pancreatitis  The patient reports shortness of breath is slowly improving and appears the dyspnea is more related to deconditioning than the chronic confusion  If the patient were to need further evaluation, would recommend possible decortication for fluid removal   This may be discussed at the next visit  In the interim he is to increase his exercise as tolerated  He may return in 3 weeks for follow up  Plan:  -  increase calories as tolerated  -  increased exercise as tolerated  -  will provide work note for patient    Subjective:   Patient reports overall he is doing relatively well since last visit  He does become short of breath with some activity but this is improved since his discharge  He denies fevers, chills, nausea or vomiting  He reports his lower extremity swelling has significantly improved  Patient reports he was encouraged to eat/drink more supplements but has not been doing so  He may be inches needing more high-protein high-calorie foods  The patient is wife reports he is overall doing well compared to his initial hospitalization  He reports his home nurses are concerned for decreased breath sounds in the left base  Inhaler Regimen:  None    Remainder of 12 point review of systems negative except as described in HPI        The following portions of the patient's history were reviewed and updated as appropriate: allergies, current medications, past family history, past medical history, past social history, past surgical history and problem list      Objective:   Vitals: Blood pressure 118/80, pulse 93, temperature (!) 96 4 °F (35 8 °C), temperature source Tympanic, height 6' 4" (1 93 m), weight 87 3 kg (192 lb 6 4 oz), SpO2 97 % , RA, Body mass index is 23 42 kg/m²  Physical Exam  Gen: Awake, alert, oriented x 3, no acute distress  HEENT: Mucous membranes moist, no oral lesions, no thrush  NECK: No accessory muscle use, JVP not elevated  Cardiac: Regular, single S1, single S2, no murmurs, no rubs, no gallops  Lungs:  Decreased breath sounds at left base  Abdomen: normoactive bowel sounds, soft slight abdominal tenderness, nondistended, no rebound or rigidity, no guarding  Extremities: no cyanosis, no clubbing, no edema  MSK:  Strength equal in all extremities  Derm:  No rashes/lesions noted  Neuro:  Appropriate mood/affect    Labs: I have personally reviewed pertinent lab results  Lab Results   Component Value Date    WBC 9 57 02/07/2019    WBC 5 48 01/05/2015    HGB 9 8 (L) 02/08/2019    HGB 15 4 01/05/2015     02/08/2019     01/05/2015     Lab Results   Component Value Date    CREATININE 0 98 02/07/2019    CREATININE 1 01 01/05/2015     No evidence of malignancy on pleural fluid     Imaging and other studies: I have personally reviewed pertinent reports  and I have personally reviewed pertinent films in PACS  CXR 03/01/2019:  Persistent left effusion-stable    Pulmonary Function Testing:   No pulmonary function testing available for review  Baby Jennifer   [place], [address]

## 2019-03-04 ENCOUNTER — TELEPHONE (OUTPATIENT)
Dept: GASTROENTEROLOGY | Facility: CLINIC | Age: 59
End: 2019-03-04

## 2019-03-05 ENCOUNTER — ANTICOAG VISIT (OUTPATIENT)
Dept: CARDIOLOGY CLINIC | Facility: CLINIC | Age: 59
End: 2019-03-05

## 2019-03-05 DIAGNOSIS — I48.0 PAROXYSMAL ATRIAL FIBRILLATION (HCC): ICD-10-CM

## 2019-03-05 LAB — INR PPP: 2.6 (ref 0.86–1.17)

## 2019-03-07 ENCOUNTER — TELEPHONE (OUTPATIENT)
Dept: GASTROENTEROLOGY | Facility: CLINIC | Age: 59
End: 2019-03-07

## 2019-03-07 NOTE — TELEPHONE ENCOUNTER
ERCP scheduled with Dr Leyva in [place] on 4/29/2019  I gave pt verbal instructions/mailed   Medication Clearance faxed to Dr Robert Tejada

## 2019-03-07 NOTE — TELEPHONE ENCOUNTER
----- Message from Markie Doherty sent at 2/28/2019  3:19 PM EST -----  Patient needs ERCP  ----- Message -----  From: Leyla Kay PA-C  Sent: 2/21/2019   5:43 PM  To: ABI Doherty,    Can you please schedule EGD/ERCP in 8 weeks for esophagitis and biliary stent removal? Can be scheduled with Dr Abbie Nice or Dr Arvind Wu  Patient takes Coumadin TING       Thanks,  [facility] [employer]

## 2019-03-11 ENCOUNTER — ANTICOAG VISIT (OUTPATIENT)
Dept: CARDIOLOGY CLINIC | Facility: CLINIC | Age: 59
End: 2019-03-11

## 2019-03-11 DIAGNOSIS — I48.0 PAROXYSMAL ATRIAL FIBRILLATION (HCC): ICD-10-CM

## 2019-03-11 LAB
FUNGUS SPEC CULT: NORMAL
INR PPP: 2.6 (ref 0.86–1.17)

## 2019-03-12 ENCOUNTER — TELEPHONE (OUTPATIENT)
Dept: CARDIOLOGY CLINIC | Facility: CLINIC | Age: 59
End: 2019-03-12

## 2019-03-12 NOTE — TELEPHONE ENCOUNTER
Pt has EGD/ ERCP scheduled on 4/29  [facility] requesting a hold on coumadin prior to procedures  Please advise if ok to hold and duration

## 2019-03-18 ENCOUNTER — ANTICOAG VISIT (OUTPATIENT)
Dept: CARDIOLOGY CLINIC | Facility: CLINIC | Age: 59
End: 2019-03-18

## 2019-03-18 DIAGNOSIS — I48.0 PAROXYSMAL ATRIAL FIBRILLATION (HCC): ICD-10-CM

## 2019-03-18 DIAGNOSIS — I48.0 PAROXYSMAL ATRIAL FIBRILLATION (HCC): Primary | ICD-10-CM

## 2019-03-18 LAB — INR PPP: 2.4 (ref 0.86–1.17)

## 2019-03-20 ENCOUNTER — HOSPITAL ENCOUNTER (OUTPATIENT)
Dept: RADIOLOGY | Facility: HOSPITAL | Age: 59
Discharge: HOME | End: 2019-03-20
Attending: INTERNAL MEDICINE
Payer: COMMERCIAL

## 2019-03-20 ENCOUNTER — TRANSCRIBE ORDERS (OUTPATIENT)
Dept: RADIOLOGY | Facility: HOSPITAL | Age: 59
End: 2019-03-20

## 2019-03-20 ENCOUNTER — OFFICE VISIT (OUTPATIENT)
Dept: PULMONOLOGY | Facility: CLINIC | Age: 59
End: 2019-03-20
Payer: COMMERCIAL

## 2019-03-20 VITALS
WEIGHT: 188.6 LBS | SYSTOLIC BLOOD PRESSURE: 124 MMHG | TEMPERATURE: 97.2 F | HEIGHT: 76 IN | RESPIRATION RATE: 18 BRPM | DIASTOLIC BLOOD PRESSURE: 80 MMHG | HEART RATE: 88 BPM | OXYGEN SATURATION: 97 % | BODY MASS INDEX: 22.97 KG/M2

## 2019-03-20 DIAGNOSIS — K85.00 IDIOPATHIC ACUTE PANCREATITIS, UNSPECIFIED COMPLICATION STATUS: ICD-10-CM

## 2019-03-20 DIAGNOSIS — J90 PLEURAL EFFUSION, LEFT: ICD-10-CM

## 2019-03-20 DIAGNOSIS — J90 PLEURAL EFFUSION, LEFT: Primary | ICD-10-CM

## 2019-03-20 PROCEDURE — 71250 CT THORAX DX C-: CPT

## 2019-03-20 PROCEDURE — 99214 OFFICE O/P EST MOD 30 MIN: CPT | Performed by: INTERNAL MEDICINE

## 2019-03-20 PROCEDURE — 74177 CT ABD & PELVIS W/CONTRAST: CPT

## 2019-03-20 RX ADMIN — IOHEXOL 100 ML: 350 INJECTION, SOLUTION INTRAVENOUS at 18:42

## 2019-03-20 NOTE — LETTER
March 20, 2019     Patient: Kenny Montalvo   YOB: 1960   Date of Visit: 3/20/2019       To Whom it May Concern:    Waldo Wilson is under my professional care  He was seen in my office on 3/20/2019  He should remain out of work given his current medical condition for a minimum of 4 weeks  He will follow-up in my office in 4 weeks at which time repeat evaluation will be completed to determine when the patient may safely return to work       If you have any questions or concerns, please don't hesitate to call           Sincerely,          Cassandra Delvalle MD        CC: No Recipients

## 2019-03-20 NOTE — PROGRESS NOTES
Progress Note - Pulmonary   Constantin Briggs 60 y o  male MRN: [id number]   Encounter: [id number]      Assessment/Plan:  Patient is a 58-year-old male with past medical history significant for pancreatitis requiring ICU admission who presents for follow-up of pleural effusion  The patient reports he is still experiencing dyspnea but his primary complaint is related to abdominal pain  He has tried to increase his activity but notes some difficulty related to his abdominal pain more so than his breathing  Patient is undergoing a CT of the abdomen pelvis this evening to further evaluate his abdominal pain therefore we will add a CT scan of the chest to further evaluate the pleural effusion  The patient was encouraged to increase his activity as tolerated  There is no acute indication for repeat thoracentesis at this time  I expect the pleural effusion to be present on the repeat imaging  I had extensive discussion with the patient his wife about further management which may include repeat thoracentesis versus Pleurx catheter versus thoracic surgery referral   There is no acute indication for intervention  Patient may weight several months for intervention if he feels this would be beneficial     A letter was provided for the patient's work to remain off duty for 4 weeks until next follow-up  Follow-up in 4 weeks or sooner as necessary  Plan:  Orders Placed This Encounter   Procedures    CT chest without contrast     Standing Status:   Future     Number of Occurrences:   1     Standing Expiration Date:   3/20/2023     Scheduling Instructions: There is no prep for this study  Please bring your insurance cards, a form of photo ID and a list of your medications with you  Arrive 15 minutes prior to your appointment time to register  On the day of your test, please bring any prior CT or MRI studies of this area with you that were not performed at a [facility]              To schedule this appointment, please contact Central Scheduling at [phone]  Order Specific Question:   What is the patient's sedation requirement? Answer:   No Sedation       Subjective:   Pt reports abdominal pain  Pt reports continued weight loss  He has lost about 4lbs since last visit  Inhaler Regimen:  None    Remainder of 12 point review of systems negative except as described in HPI  The following portions of the patient's history were reviewed and updated as appropriate: allergies, current medications, past family history, past medical history, past social history, past surgical history and problem list      Objective:   Vitals: Blood pressure 124/80, pulse 88, temperature (!) 97 2 °F (36 2 °C), resp  rate 18, height 6' 4" (1 93 m), weight 85 5 kg (188 lb 9 6 oz), SpO2 97 % , RA, Body mass index is 22 96 kg/m²  Physical Exam  Gen: Awake, alert, oriented x 3, no acute distress  HEENT: Mucous membranes moist, no oral lesions, no thrush  NECK: No accessory muscle use, JVP not elevated  Cardiac: Regular, single S1, single S2, no murmurs, no rubs, no gallops  Lungs: diminished breath sounds at left base; good movement in remaining lung fields  Abdomen: normoactive bowel sounds, soft nontender, nondistended, no rebound or rigidity, no guarding  Extremities: no cyanosis, no clubbing, no edema  MSK:  Strength equal in all extremities  Derm:  No rashes/lesions noted  Neuro:  Appropriate mood/affect    Labs: I have personally reviewed pertinent lab results  Lab Results   Component Value Date    WBC 9 57 02/07/2019    WBC 5 48 01/05/2015    HGB 9 8 (L) 02/08/2019    HGB 15 4 01/05/2015     02/08/2019     01/05/2015     Lab Results   Component Value Date    CREATININE 0 98 02/07/2019    CREATININE 1 01 01/05/2015     Imaging and other studies: I have personally reviewed pertinent reports     and I have personally reviewed pertinent films in PACS  CXR - stable left pleural effusion    Pulmonary Function Testing:   No pulmonary function available for review  [place]

## 2019-03-25 ENCOUNTER — ANTICOAG VISIT (OUTPATIENT)
Dept: CARDIOLOGY CLINIC | Facility: CLINIC | Age: 59
End: 2019-03-25

## 2019-03-25 ENCOUNTER — TELEPHONE (OUTPATIENT)
Dept: PULMONOLOGY | Facility: CLINIC | Age: 59
End: 2019-03-25

## 2019-03-25 DIAGNOSIS — I48.0 PAROXYSMAL ATRIAL FIBRILLATION (HCC): ICD-10-CM

## 2019-03-25 LAB — INR PPP: 2.7 (ref 0.86–1.17)

## 2019-03-26 LAB
MYCOBACTERIUM SPEC CULT: NORMAL
RHODAMINE-AURAMINE STN SPEC: NORMAL

## 2019-03-27 ENCOUNTER — TELEPHONE (OUTPATIENT)
Dept: GASTROENTEROLOGY | Facility: MEDICAL CENTER | Age: 59
End: 2019-03-27

## 2019-03-27 NOTE — TELEPHONE ENCOUNTER
Dr Yunier Guo patient called stating he is in a lot pain  He is having stomach issue  He would like a call back to let him know what he should do   He can be reached at [phone]

## 2019-03-27 NOTE — TELEPHONE ENCOUNTER
I called him and recommended that he go to the ER given his history of acute complicated pancreatitis, he politely refuses but would be willing to come into the office  I let him know that an office visit will not provide him with the same prompt evaluation that he would receive in the ER  I again urged him to go to the ER  Are there any cancellations with Dr John Fontenot or Dr Yesenia Trivedi that we can get him into the office?

## 2019-03-27 NOTE — TELEPHONE ENCOUNTER
Dr Martinez Sides patient Hx Cholecystectomy, Pancreatitis    Patient called for increase in abdominal pain x 1 week  Constant, sharp abdominal pain 7/10  Denies n/v or fever/chills  Bowels are regular  Occasional light headed -states from his Amiodarone  Patient aware of alarm symptoms and verbalized understanding to report to ER for evaluation if symptoms increase/sustain

## 2019-03-28 ENCOUNTER — HOSPITAL ENCOUNTER (INPATIENT)
Facility: HOSPITAL | Age: 59
LOS: 7 days | Discharge: HOME HEALTH | DRG: 853 | End: 2019-04-04
Attending: EMERGENCY MEDICINE | Admitting: INTERNAL MEDICINE
Payer: COMMERCIAL

## 2019-03-28 ENCOUNTER — TELEPHONE (OUTPATIENT)
Dept: PULMONOLOGY | Facility: CLINIC | Age: 59
End: 2019-03-28

## 2019-03-28 ENCOUNTER — APPOINTMENT (EMERGENCY)
Dept: RADIOLOGY | Facility: HOSPITAL | Age: 59
DRG: 853 | End: 2019-03-28
Payer: COMMERCIAL

## 2019-03-28 DIAGNOSIS — Q45.3 PANCREATIC ABNORMALITY: ICD-10-CM

## 2019-03-28 DIAGNOSIS — R10.9 ABDOMINAL PAIN: Primary | ICD-10-CM

## 2019-03-28 DIAGNOSIS — K86.3 PANCREATIC PSEUDOCYST: ICD-10-CM

## 2019-03-28 DIAGNOSIS — R93.5 ABNORMAL CT OF THE ABDOMEN: ICD-10-CM

## 2019-03-28 DIAGNOSIS — K85.10 ACUTE BILIARY PANCREATITIS, UNSPECIFIED COMPLICATION STATUS: ICD-10-CM

## 2019-03-28 DIAGNOSIS — J90 PLEURAL EFFUSION, LEFT: ICD-10-CM

## 2019-03-28 PROBLEM — R13.10 DYSPHAGIA: Status: RESOLVED | Noted: 2019-01-13 | Resolved: 2019-03-28

## 2019-03-28 PROBLEM — E87.3 METABOLIC ALKALOSIS: Status: RESOLVED | Noted: 2019-01-12 | Resolved: 2019-03-28

## 2019-03-28 PROBLEM — D72.829 LEUKOCYTOSIS: Status: RESOLVED | Noted: 2019-01-12 | Resolved: 2019-03-28

## 2019-03-28 PROBLEM — K85.90 PANCREATITIS: Status: RESOLVED | Noted: 2019-01-03 | Resolved: 2019-03-28

## 2019-03-28 PROBLEM — E87.6 HYPOKALEMIA: Status: RESOLVED | Noted: 2019-01-12 | Resolved: 2019-03-28

## 2019-03-28 LAB
ALBUMIN SERPL BCP-MCNC: 2.4 G/DL (ref 3.5–5)
ALP SERPL-CCNC: 54 U/L (ref 46–116)
ALT SERPL W P-5'-P-CCNC: 14 U/L (ref 12–78)
ANION GAP SERPL CALCULATED.3IONS-SCNC: 5 MMOL/L (ref 4–13)
APTT PPP: 44 SECONDS (ref 26–38)
AST SERPL W P-5'-P-CCNC: 19 U/L (ref 5–45)
ATRIAL RATE: 110 BPM
BACTERIA UR QL AUTO: ABNORMAL /HPF
BASOPHILS # BLD AUTO: 0.02 THOUSANDS/ΜL (ref 0–0.1)
BASOPHILS NFR BLD AUTO: 0 % (ref 0–1)
BILIRUB SERPL-MCNC: 1.24 MG/DL (ref 0.2–1)
BILIRUB UR QL STRIP: NEGATIVE
BUN SERPL-MCNC: 16 MG/DL (ref 5–25)
CALCIUM SERPL-MCNC: 8.1 MG/DL (ref 8.3–10.1)
CHLORIDE SERPL-SCNC: 103 MMOL/L (ref 100–108)
CLARITY UR: ABNORMAL
CO2 SERPL-SCNC: 24 MMOL/L (ref 21–32)
COLOR UR: ABNORMAL
CREAT SERPL-MCNC: 1.13 MG/DL (ref 0.6–1.3)
DEPRECATED D DIMER PPP: 2377 NG/ML (FEU)
EOSINOPHIL # BLD AUTO: 0.01 THOUSAND/ΜL (ref 0–0.61)
EOSINOPHIL NFR BLD AUTO: 0 % (ref 0–6)
ERYTHROCYTE [DISTWIDTH] IN BLOOD BY AUTOMATED COUNT: 14.8 % (ref 11.6–15.1)
GFR SERPL CREATININE-BSD FRML MDRD: 71 ML/MIN/1.73SQ M
GLUCOSE SERPL-MCNC: 111 MG/DL (ref 65–140)
GLUCOSE SERPL-MCNC: 130 MG/DL (ref 65–140)
GLUCOSE UR STRIP-MCNC: NEGATIVE MG/DL
HCT VFR BLD AUTO: 40.3 % (ref 36.5–49.3)
HGB BLD-MCNC: 12.8 G/DL (ref 12–17)
HGB UR QL STRIP.AUTO: NEGATIVE
HYALINE CASTS #/AREA URNS LPF: ABNORMAL /LPF
IMM GRANULOCYTES # BLD AUTO: 0.04 THOUSAND/UL (ref 0–0.2)
IMM GRANULOCYTES NFR BLD AUTO: 0 % (ref 0–2)
INR PPP: 2.02 (ref 0.86–1.17)
KETONES UR STRIP-MCNC: NEGATIVE MG/DL
LACTATE SERPL-SCNC: 1.1 MMOL/L (ref 0.5–2)
LACTATE SERPL-SCNC: 1.4 MMOL/L (ref 0.5–2)
LEUKOCYTE ESTERASE UR QL STRIP: NEGATIVE
LIPASE SERPL-CCNC: 107 U/L (ref 73–393)
LYMPHOCYTES # BLD AUTO: 0.55 THOUSANDS/ΜL (ref 0.6–4.47)
LYMPHOCYTES NFR BLD AUTO: 4 % (ref 14–44)
MCH RBC QN AUTO: 26.1 PG (ref 26.8–34.3)
MCHC RBC AUTO-ENTMCNC: 31.8 G/DL (ref 31.4–37.4)
MCV RBC AUTO: 82 FL (ref 82–98)
MONOCYTES # BLD AUTO: 1.42 THOUSAND/ΜL (ref 0.17–1.22)
MONOCYTES NFR BLD AUTO: 11 % (ref 4–12)
NEUTROPHILS # BLD AUTO: 10.69 THOUSANDS/ΜL (ref 1.85–7.62)
NEUTS SEG NFR BLD AUTO: 85 % (ref 43–75)
NITRITE UR QL STRIP: NEGATIVE
NON-SQ EPI CELLS URNS QL MICRO: ABNORMAL /HPF
NRBC BLD AUTO-RTO: 0 /100 WBCS
P AXIS: 33 DEGREES
PH UR STRIP.AUTO: 5.5 [PH]
PLATELET # BLD AUTO: 410 THOUSANDS/UL (ref 149–390)
PMV BLD AUTO: 9 FL (ref 8.9–12.7)
POTASSIUM SERPL-SCNC: 4.1 MMOL/L (ref 3.5–5.3)
PR INTERVAL: 136 MS
PROCALCITONIN SERPL-MCNC: 0.15 NG/ML
PROCALCITONIN SERPL-MCNC: 0.17 NG/ML
PROT SERPL-MCNC: 6.8 G/DL (ref 6.4–8.2)
PROT UR STRIP-MCNC: ABNORMAL MG/DL
PROTHROMBIN TIME: 22.9 SECONDS (ref 11.8–14.2)
QRS AXIS: -6 DEGREES
QRSD INTERVAL: 88 MS
QT INTERVAL: 300 MS
QTC INTERVAL: 406 MS
RBC # BLD AUTO: 4.9 MILLION/UL (ref 3.88–5.62)
RBC #/AREA URNS AUTO: ABNORMAL /HPF
SODIUM SERPL-SCNC: 132 MMOL/L (ref 136–145)
SP GR UR STRIP.AUTO: 1.03 (ref 1–1.03)
T WAVE AXIS: 97 DEGREES
TROPONIN I SERPL-MCNC: <0.02 NG/ML
UROBILINOGEN UR QL STRIP.AUTO: 1 E.U./DL
VENTRICULAR RATE: 110 BPM
WBC # BLD AUTO: 12.73 THOUSAND/UL (ref 4.31–10.16)
WBC #/AREA URNS AUTO: ABNORMAL /HPF

## 2019-03-28 PROCEDURE — 80053 COMPREHEN METABOLIC PANEL: CPT | Performed by: EMERGENCY MEDICINE

## 2019-03-28 PROCEDURE — 99223 1ST HOSP IP/OBS HIGH 75: CPT | Performed by: INTERNAL MEDICINE

## 2019-03-28 PROCEDURE — 81001 URINALYSIS AUTO W/SCOPE: CPT | Performed by: EMERGENCY MEDICINE

## 2019-03-28 PROCEDURE — 36415 COLL VENOUS BLD VENIPUNCTURE: CPT | Performed by: EMERGENCY MEDICINE

## 2019-03-28 PROCEDURE — 93010 ELECTROCARDIOGRAM REPORT: CPT | Performed by: INTERNAL MEDICINE

## 2019-03-28 PROCEDURE — 99254 IP/OBS CNSLTJ NEW/EST MOD 60: CPT | Performed by: INTERNAL MEDICINE

## 2019-03-28 PROCEDURE — 93005 ELECTROCARDIOGRAM TRACING: CPT

## 2019-03-28 PROCEDURE — 85379 FIBRIN DEGRADATION QUANT: CPT | Performed by: EMERGENCY MEDICINE

## 2019-03-28 PROCEDURE — 85025 COMPLETE CBC W/AUTO DIFF WBC: CPT | Performed by: EMERGENCY MEDICINE

## 2019-03-28 PROCEDURE — 83605 ASSAY OF LACTIC ACID: CPT | Performed by: INTERNAL MEDICINE

## 2019-03-28 PROCEDURE — 87040 BLOOD CULTURE FOR BACTERIA: CPT | Performed by: EMERGENCY MEDICINE

## 2019-03-28 PROCEDURE — 96365 THER/PROPH/DIAG IV INF INIT: CPT

## 2019-03-28 PROCEDURE — 82948 REAGENT STRIP/BLOOD GLUCOSE: CPT

## 2019-03-28 PROCEDURE — 87076 CULTURE ANAEROBE IDENT EACH: CPT | Performed by: EMERGENCY MEDICINE

## 2019-03-28 PROCEDURE — 83690 ASSAY OF LIPASE: CPT | Performed by: INTERNAL MEDICINE

## 2019-03-28 PROCEDURE — 99285 EMERGENCY DEPT VISIT HI MDM: CPT

## 2019-03-28 PROCEDURE — 84484 ASSAY OF TROPONIN QUANT: CPT | Performed by: EMERGENCY MEDICINE

## 2019-03-28 PROCEDURE — 87185 SC STD ENZYME DETCJ PER NZM: CPT | Performed by: EMERGENCY MEDICINE

## 2019-03-28 PROCEDURE — 83605 ASSAY OF LACTIC ACID: CPT | Performed by: EMERGENCY MEDICINE

## 2019-03-28 PROCEDURE — 84145 PROCALCITONIN (PCT): CPT | Performed by: INTERNAL MEDICINE

## 2019-03-28 PROCEDURE — 85610 PROTHROMBIN TIME: CPT | Performed by: EMERGENCY MEDICINE

## 2019-03-28 PROCEDURE — 71045 X-RAY EXAM CHEST 1 VIEW: CPT

## 2019-03-28 PROCEDURE — 87186 SC STD MICRODIL/AGAR DIL: CPT | Performed by: EMERGENCY MEDICINE

## 2019-03-28 PROCEDURE — 96361 HYDRATE IV INFUSION ADD-ON: CPT

## 2019-03-28 PROCEDURE — 84145 PROCALCITONIN (PCT): CPT | Performed by: EMERGENCY MEDICINE

## 2019-03-28 PROCEDURE — 71275 CT ANGIOGRAPHY CHEST: CPT

## 2019-03-28 PROCEDURE — 74177 CT ABD & PELVIS W/CONTRAST: CPT

## 2019-03-28 PROCEDURE — 96375 TX/PRO/DX INJ NEW DRUG ADDON: CPT

## 2019-03-28 PROCEDURE — 85730 THROMBOPLASTIN TIME PARTIAL: CPT | Performed by: EMERGENCY MEDICINE

## 2019-03-28 RX ORDER — ACETAMINOPHEN 325 MG/1
650 TABLET ORAL ONCE
Status: COMPLETED | OUTPATIENT
Start: 2019-03-28 | End: 2019-03-28

## 2019-03-28 RX ORDER — PHYTONADIONE 5 MG/1
5 TABLET ORAL ONCE
Status: COMPLETED | OUTPATIENT
Start: 2019-03-28 | End: 2019-03-29

## 2019-03-28 RX ORDER — AMIODARONE HYDROCHLORIDE 200 MG/1
100 TABLET ORAL
Status: DISCONTINUED | OUTPATIENT
Start: 2019-03-29 | End: 2019-04-04 | Stop reason: HOSPADM

## 2019-03-28 RX ORDER — CLINDAMYCIN PHOSPHATE 600 MG/50ML
600 INJECTION INTRAVENOUS ONCE
Status: COMPLETED | OUTPATIENT
Start: 2019-03-28 | End: 2019-03-28

## 2019-03-28 RX ORDER — ACETAMINOPHEN 325 MG/1
650 TABLET ORAL EVERY 6 HOURS PRN
Status: DISCONTINUED | OUTPATIENT
Start: 2019-03-28 | End: 2019-03-30

## 2019-03-28 RX ORDER — SODIUM CHLORIDE 9 MG/ML
50 INJECTION, SOLUTION INTRAVENOUS CONTINUOUS
Status: DISCONTINUED | OUTPATIENT
Start: 2019-03-28 | End: 2019-04-04 | Stop reason: HOSPADM

## 2019-03-28 RX ORDER — DOCUSATE SODIUM 100 MG/1
100 CAPSULE, LIQUID FILLED ORAL 2 TIMES DAILY
Status: DISCONTINUED | OUTPATIENT
Start: 2019-03-28 | End: 2019-04-01

## 2019-03-28 RX ORDER — ONDANSETRON 2 MG/ML
4 INJECTION INTRAMUSCULAR; INTRAVENOUS EVERY 6 HOURS PRN
Status: DISCONTINUED | OUTPATIENT
Start: 2019-03-28 | End: 2019-04-04 | Stop reason: HOSPADM

## 2019-03-28 RX ORDER — PANTOPRAZOLE SODIUM 40 MG/1
40 TABLET, DELAYED RELEASE ORAL
Status: DISCONTINUED | OUTPATIENT
Start: 2019-03-28 | End: 2019-03-29

## 2019-03-28 RX ORDER — HYDROMORPHONE HCL/PF 1 MG/ML
0.5 SYRINGE (ML) INJECTION ONCE
Status: COMPLETED | OUTPATIENT
Start: 2019-03-28 | End: 2019-03-28

## 2019-03-28 RX ADMIN — PIPERACILLIN SODIUM AND TAZOBACTAM SODIUM 3.38 G: 36; 4.5 INJECTION, POWDER, FOR SOLUTION INTRAVENOUS at 17:04

## 2019-03-28 RX ADMIN — ACETAMINOPHEN 650 MG: 325 TABLET ORAL at 09:06

## 2019-03-28 RX ADMIN — HYDROMORPHONE HYDROCHLORIDE 0.5 MG: 1 INJECTION, SOLUTION INTRAMUSCULAR; INTRAVENOUS; SUBCUTANEOUS at 13:16

## 2019-03-28 RX ADMIN — SODIUM CHLORIDE 1000 ML: 0.9 INJECTION, SOLUTION INTRAVENOUS at 08:54

## 2019-03-28 RX ADMIN — ACETAMINOPHEN 650 MG: 325 TABLET ORAL at 19:51

## 2019-03-28 RX ADMIN — PIPERACILLIN SODIUM AND TAZOBACTAM SODIUM 3.38 G: 36; 4.5 INJECTION, POWDER, FOR SOLUTION INTRAVENOUS at 23:58

## 2019-03-28 RX ADMIN — DOCUSATE SODIUM 100 MG: 100 CAPSULE, LIQUID FILLED ORAL at 19:51

## 2019-03-28 RX ADMIN — CLINDAMYCIN PHOSPHATE 600 MG: 600 INJECTION, SOLUTION INTRAVENOUS at 14:58

## 2019-03-28 RX ADMIN — CEFEPIME HYDROCHLORIDE 2000 MG: 1 INJECTION, POWDER, FOR SOLUTION INTRAMUSCULAR; INTRAVENOUS at 13:03

## 2019-03-28 RX ADMIN — IOHEXOL 100 ML: 350 INJECTION, SOLUTION INTRAVENOUS at 11:12

## 2019-03-28 RX ADMIN — SODIUM CHLORIDE 100 ML/HR: 0.9 INJECTION, SOLUTION INTRAVENOUS at 17:05

## 2019-03-28 RX ADMIN — PANTOPRAZOLE SODIUM 40 MG: 40 TABLET, DELAYED RELEASE ORAL at 19:51

## 2019-03-28 NOTE — TELEPHONE ENCOUNTER
LVM too see if pt can come in at 1330 instead of 2 pm on 4/16 with Dr Victoria Young  Asked if they can return phone call

## 2019-03-29 ENCOUNTER — ANESTHESIA EVENT (INPATIENT)
Dept: GASTROENTEROLOGY | Facility: HOSPITAL | Age: 59
DRG: 853 | End: 2019-03-29
Payer: COMMERCIAL

## 2019-03-29 ENCOUNTER — ANESTHESIA (INPATIENT)
Dept: GASTROENTEROLOGY | Facility: HOSPITAL | Age: 59
DRG: 853 | End: 2019-03-29
Payer: COMMERCIAL

## 2019-03-29 ENCOUNTER — APPOINTMENT (INPATIENT)
Dept: RADIOLOGY | Facility: HOSPITAL | Age: 59
DRG: 853 | End: 2019-03-29
Attending: INTERNAL MEDICINE
Payer: COMMERCIAL

## 2019-03-29 LAB
ABO GROUP BLD: NORMAL
ALBUMIN SERPL BCP-MCNC: 2.2 G/DL (ref 3.5–5)
ALP SERPL-CCNC: 50 U/L (ref 46–116)
ALT SERPL W P-5'-P-CCNC: 17 U/L (ref 12–78)
ANION GAP SERPL CALCULATED.3IONS-SCNC: 7 MMOL/L (ref 4–13)
AST SERPL W P-5'-P-CCNC: 19 U/L (ref 5–45)
BILIRUB SERPL-MCNC: 0.9 MG/DL (ref 0.2–1)
BLD GP AB SCN SERPL QL: NEGATIVE
BUN SERPL-MCNC: 13 MG/DL (ref 5–25)
CALCIUM SERPL-MCNC: 8.1 MG/DL (ref 8.3–10.1)
CHLORIDE SERPL-SCNC: 105 MMOL/L (ref 100–108)
CO2 SERPL-SCNC: 23 MMOL/L (ref 21–32)
CREAT SERPL-MCNC: 1.02 MG/DL (ref 0.6–1.3)
ERYTHROCYTE [DISTWIDTH] IN BLOOD BY AUTOMATED COUNT: 15.1 % (ref 11.6–15.1)
GFR SERPL CREATININE-BSD FRML MDRD: 80 ML/MIN/1.73SQ M
GLUCOSE FLD-MCNC: 106 MG/DL
GLUCOSE SERPL-MCNC: 103 MG/DL (ref 65–140)
GLUCOSE SERPL-MCNC: 105 MG/DL (ref 65–140)
GLUCOSE SERPL-MCNC: 113 MG/DL (ref 65–140)
GLUCOSE SERPL-MCNC: 118 MG/DL (ref 65–140)
HCT VFR BLD AUTO: 33.3 % (ref 36.5–49.3)
HGB BLD-MCNC: 10.6 G/DL (ref 12–17)
INR PPP: 1.93 (ref 0.86–1.17)
INR PPP: 2.7 (ref 0.86–1.17)
LDH FLD L TO P-CCNC: 269 U/L
MAGNESIUM SERPL-MCNC: 1.7 MG/DL (ref 1.6–2.6)
MCH RBC QN AUTO: 26.4 PG (ref 26.8–34.3)
MCHC RBC AUTO-ENTMCNC: 31.8 G/DL (ref 31.4–37.4)
MCV RBC AUTO: 83 FL (ref 82–98)
PH BODY FLUID: 7.4
PHOSPHATE SERPL-MCNC: 3 MG/DL (ref 2.7–4.5)
PLATELET # BLD AUTO: 319 THOUSANDS/UL (ref 149–390)
PMV BLD AUTO: 8.9 FL (ref 8.9–12.7)
POTASSIUM SERPL-SCNC: 3.7 MMOL/L (ref 3.5–5.3)
PROT FLD-MCNC: 4.9 G/DL
PROT SERPL-MCNC: 6.3 G/DL (ref 6.4–8.2)
PROTHROMBIN TIME: 22.1 SECONDS (ref 11.8–14.2)
PROTHROMBIN TIME: 28.7 SECONDS (ref 11.8–14.2)
RBC # BLD AUTO: 4.02 MILLION/UL (ref 3.88–5.62)
RH BLD: POSITIVE
SODIUM SERPL-SCNC: 135 MMOL/L (ref 136–145)
SPECIMEN EXPIRATION DATE: NORMAL
WBC # BLD AUTO: 10.39 THOUSAND/UL (ref 4.31–10.16)

## 2019-03-29 PROCEDURE — 49083 ABD PARACENTESIS W/IMAGING: CPT

## 2019-03-29 PROCEDURE — 82945 GLUCOSE OTHER FLUID: CPT | Performed by: INTERNAL MEDICINE

## 2019-03-29 PROCEDURE — 82948 REAGENT STRIP/BLOOD GLUCOSE: CPT

## 2019-03-29 PROCEDURE — 85027 COMPLETE CBC AUTOMATED: CPT | Performed by: INTERNAL MEDICINE

## 2019-03-29 PROCEDURE — 88112 CYTOPATH CELL ENHANCE TECH: CPT | Performed by: PATHOLOGY

## 2019-03-29 PROCEDURE — 0W9B3ZX DRAINAGE OF LEFT PLEURAL CAVITY, PERCUTANEOUS APPROACH, DIAGNOSTIC: ICD-10-PCS | Performed by: RADIOLOGY

## 2019-03-29 PROCEDURE — 83986 ASSAY PH BODY FLUID NOS: CPT | Performed by: INTERNAL MEDICINE

## 2019-03-29 PROCEDURE — 0F798ZZ DILATION OF COMMON BILE DUCT, VIA NATURAL OR ARTIFICIAL OPENING ENDOSCOPIC: ICD-10-PCS | Performed by: INTERNAL MEDICINE

## 2019-03-29 PROCEDURE — 83735 ASSAY OF MAGNESIUM: CPT | Performed by: INTERNAL MEDICINE

## 2019-03-29 PROCEDURE — 87205 SMEAR GRAM STAIN: CPT | Performed by: INTERNAL MEDICINE

## 2019-03-29 PROCEDURE — 88305 TISSUE EXAM BY PATHOLOGIST: CPT | Performed by: PATHOLOGY

## 2019-03-29 PROCEDURE — 80053 COMPREHEN METABOLIC PANEL: CPT | Performed by: INTERNAL MEDICINE

## 2019-03-29 PROCEDURE — 99222 1ST HOSP IP/OBS MODERATE 55: CPT | Performed by: INTERNAL MEDICINE

## 2019-03-29 PROCEDURE — C9132 KCENTRA, PER I.U.: HCPCS | Performed by: INTERNAL MEDICINE

## 2019-03-29 PROCEDURE — 83615 LACTATE (LD) (LDH) ENZYME: CPT | Performed by: INTERNAL MEDICINE

## 2019-03-29 PROCEDURE — 99233 SBSQ HOSP IP/OBS HIGH 50: CPT | Performed by: INTERNAL MEDICINE

## 2019-03-29 PROCEDURE — 99232 SBSQ HOSP IP/OBS MODERATE 35: CPT | Performed by: SURGERY

## 2019-03-29 PROCEDURE — 43241 EGD TUBE/CATH INSERTION: CPT | Performed by: INTERNAL MEDICINE

## 2019-03-29 PROCEDURE — 84157 ASSAY OF PROTEIN OTHER: CPT | Performed by: INTERNAL MEDICINE

## 2019-03-29 PROCEDURE — 86901 BLOOD TYPING SEROLOGIC RH(D): CPT | Performed by: SURGERY

## 2019-03-29 PROCEDURE — 86900 BLOOD TYPING SEROLOGIC ABO: CPT | Performed by: SURGERY

## 2019-03-29 PROCEDURE — 32555 ASPIRATE PLEURA W/ IMAGING: CPT | Performed by: RADIOLOGY

## 2019-03-29 PROCEDURE — 84100 ASSAY OF PHOSPHORUS: CPT | Performed by: INTERNAL MEDICINE

## 2019-03-29 PROCEDURE — 86850 RBC ANTIBODY SCREEN: CPT | Performed by: SURGERY

## 2019-03-29 PROCEDURE — 99254 IP/OBS CNSLTJ NEW/EST MOD 60: CPT | Performed by: INTERNAL MEDICINE

## 2019-03-29 PROCEDURE — 30233L1 TRANSFUSION OF NONAUTOLOGOUS FRESH PLASMA INTO PERIPHERAL VEIN, PERCUTANEOUS APPROACH: ICD-10-PCS | Performed by: INTERNAL MEDICINE

## 2019-03-29 PROCEDURE — P9017 PLASMA 1 DONOR FRZ W/IN 8 HR: HCPCS

## 2019-03-29 PROCEDURE — C1874 STENT, COATED/COV W/DEL SYS: HCPCS | Performed by: INTERNAL MEDICINE

## 2019-03-29 PROCEDURE — 43237 ENDOSCOPIC US EXAM ESOPH: CPT | Performed by: INTERNAL MEDICINE

## 2019-03-29 PROCEDURE — 85610 PROTHROMBIN TIME: CPT | Performed by: INTERNAL MEDICINE

## 2019-03-29 PROCEDURE — 0F9G40Z DRAINAGE OF PANCREAS WITH DRAINAGE DEVICE, PERCUTANEOUS ENDOSCOPIC APPROACH: ICD-10-PCS | Performed by: INTERNAL MEDICINE

## 2019-03-29 PROCEDURE — 87070 CULTURE OTHR SPECIMN AEROBIC: CPT | Performed by: INTERNAL MEDICINE

## 2019-03-29 DEVICE — STENT AND ELECTROCAUTERY - ENHANCED DELIVERY SYSTEM
Type: IMPLANTABLE DEVICE | Status: NON-FUNCTIONAL
Brand: AXIOS™
Removed: 2019-04-09

## 2019-03-29 RX ORDER — FENTANYL CITRATE 50 UG/ML
INJECTION, SOLUTION INTRAMUSCULAR; INTRAVENOUS AS NEEDED
Status: DISCONTINUED | OUTPATIENT
Start: 2019-03-29 | End: 2019-03-29 | Stop reason: SURG

## 2019-03-29 RX ORDER — ONDANSETRON 2 MG/ML
INJECTION INTRAMUSCULAR; INTRAVENOUS AS NEEDED
Status: DISCONTINUED | OUTPATIENT
Start: 2019-03-29 | End: 2019-03-29 | Stop reason: SURG

## 2019-03-29 RX ORDER — PROPOFOL 10 MG/ML
INJECTION, EMULSION INTRAVENOUS AS NEEDED
Status: DISCONTINUED | OUTPATIENT
Start: 2019-03-29 | End: 2019-03-29 | Stop reason: SURG

## 2019-03-29 RX ORDER — SUCCINYLCHOLINE/SOD CL,ISO/PF 100 MG/5ML
SYRINGE (ML) INTRAVENOUS AS NEEDED
Status: DISCONTINUED | OUTPATIENT
Start: 2019-03-29 | End: 2019-03-29 | Stop reason: SURG

## 2019-03-29 RX ORDER — EPHEDRINE SULFATE 50 MG/ML
INJECTION INTRAVENOUS AS NEEDED
Status: DISCONTINUED | OUTPATIENT
Start: 2019-03-29 | End: 2019-03-29 | Stop reason: SURG

## 2019-03-29 RX ORDER — HYDROMORPHONE HCL/PF 1 MG/ML
0.5 SYRINGE (ML) INJECTION EVERY 6 HOURS PRN
Status: DISCONTINUED | OUTPATIENT
Start: 2019-03-29 | End: 2019-04-04 | Stop reason: HOSPADM

## 2019-03-29 RX ORDER — SODIUM CHLORIDE, SODIUM LACTATE, POTASSIUM CHLORIDE, CALCIUM CHLORIDE 600; 310; 30; 20 MG/100ML; MG/100ML; MG/100ML; MG/100ML
INJECTION, SOLUTION INTRAVENOUS CONTINUOUS PRN
Status: DISCONTINUED | OUTPATIENT
Start: 2019-03-29 | End: 2019-03-29 | Stop reason: SURG

## 2019-03-29 RX ORDER — DEXAMETHASONE SODIUM PHOSPHATE 10 MG/ML
INJECTION, SOLUTION INTRAMUSCULAR; INTRAVENOUS AS NEEDED
Status: DISCONTINUED | OUTPATIENT
Start: 2019-03-29 | End: 2019-03-29 | Stop reason: SURG

## 2019-03-29 RX ADMIN — PIPERACILLIN SODIUM AND TAZOBACTAM SODIUM 3.38 G: 36; 4.5 INJECTION, POWDER, FOR SOLUTION INTRAVENOUS at 10:20

## 2019-03-29 RX ADMIN — ACETAMINOPHEN 650 MG: 325 TABLET ORAL at 17:09

## 2019-03-29 RX ADMIN — PIPERACILLIN SODIUM AND TAZOBACTAM SODIUM 3.38 G: 36; 4.5 INJECTION, POWDER, FOR SOLUTION INTRAVENOUS at 05:32

## 2019-03-29 RX ADMIN — PANTOPRAZOLE SODIUM 40 MG: 40 TABLET, DELAYED RELEASE ORAL at 07:13

## 2019-03-29 RX ADMIN — ACETAMINOPHEN 650 MG: 325 TABLET ORAL at 03:15

## 2019-03-29 RX ADMIN — PHYTONADIONE 5 MG: 5 TABLET ORAL at 03:07

## 2019-03-29 RX ADMIN — SODIUM CHLORIDE 100 ML/HR: 0.9 INJECTION, SOLUTION INTRAVENOUS at 03:22

## 2019-03-29 RX ADMIN — PHENYLEPHRINE HYDROCHLORIDE 200 MCG: 10 INJECTION INTRAVENOUS at 13:15

## 2019-03-29 RX ADMIN — ONDANSETRON 4 MG: 2 INJECTION INTRAMUSCULAR; INTRAVENOUS at 13:16

## 2019-03-29 RX ADMIN — PROPOFOL 200 MG: 10 INJECTION, EMULSION INTRAVENOUS at 13:05

## 2019-03-29 RX ADMIN — PIPERACILLIN SODIUM AND TAZOBACTAM SODIUM 3.38 G: 36; 4.5 INJECTION, POWDER, FOR SOLUTION INTRAVENOUS at 17:00

## 2019-03-29 RX ADMIN — FENTANYL CITRATE 50 MCG: 50 INJECTION, SOLUTION INTRAMUSCULAR; INTRAVENOUS at 13:05

## 2019-03-29 RX ADMIN — Medication 100 MG: at 13:05

## 2019-03-29 RX ADMIN — EPHEDRINE SULFATE 10 MG: 50 INJECTION, SOLUTION INTRAVENOUS at 13:21

## 2019-03-29 RX ADMIN — PHENYLEPHRINE HYDROCHLORIDE 200 MCG: 10 INJECTION INTRAVENOUS at 13:19

## 2019-03-29 RX ADMIN — Medication 2000 UNITS: at 12:04

## 2019-03-29 RX ADMIN — PHENYLEPHRINE HYDROCHLORIDE 100 MCG: 10 INJECTION INTRAVENOUS at 13:11

## 2019-03-29 RX ADMIN — DOCUSATE SODIUM 100 MG: 100 CAPSULE, LIQUID FILLED ORAL at 08:45

## 2019-03-29 RX ADMIN — AMIODARONE HYDROCHLORIDE 100 MG: 100 TABLET ORAL at 08:45

## 2019-03-29 RX ADMIN — SODIUM CHLORIDE, SODIUM LACTATE, POTASSIUM CHLORIDE, AND CALCIUM CHLORIDE: .6; .31; .03; .02 INJECTION, SOLUTION INTRAVENOUS at 13:01

## 2019-03-29 RX ADMIN — DOCUSATE SODIUM 100 MG: 100 CAPSULE, LIQUID FILLED ORAL at 17:05

## 2019-03-29 RX ADMIN — PIPERACILLIN SODIUM,TAZOBACTAM SODIUM 4.5 G: 4; .5 INJECTION, POWDER, FOR SOLUTION INTRAVENOUS at 22:31

## 2019-03-29 RX ADMIN — HYDROMORPHONE HYDROCHLORIDE 0.5 MG: 1 INJECTION, SOLUTION INTRAMUSCULAR; INTRAVENOUS; SUBCUTANEOUS at 09:09

## 2019-03-29 RX ADMIN — DEXAMETHASONE SODIUM PHOSPHATE 10 MG: 10 INJECTION, SOLUTION INTRAMUSCULAR; INTRAVENOUS at 13:16

## 2019-03-29 NOTE — ANESTHESIA POSTPROCEDURE EVALUATION
Post-Op Assessment Note    CV Status:  Stable  Pain Score: 0    Pain management: adequate     Mental Status:  Alert and awake   Hydration Status:  Euvolemic   PONV Controlled:  Controlled   Airway Patency:  Patent   Post Op Vitals Reviewed: Yes      Staff: CRNA, Anesthesiologist           BP      Temp     Pulse    Resp      SpO2

## 2019-03-29 NOTE — ANESTHESIA PREPROCEDURE EVALUATION
Review of Systems/Medical History  Patient summary reviewed  Chart reviewed  No history of anesthetic complications     Cardiovascular  Exercise tolerance (METS): <4,  Dysrhythmias , atrial fibrillation,    Pulmonary  Pleural effusion : left,        GI/Hepatic    GERD , Pancreatic problem,             Endo/Other  Negative endo/other ROS      GYN  Negative gynecology ROS          Hematology    Coagulation disorder currently taking oral anticoagulants,    Musculoskeletal  Negative musculoskeletal ROS        Neurology  Negative neurology ROS      Psychology   Negative psychology ROS              Physical Exam    Airway    Mallampati score: II  TM Distance: >3 FB  Neck ROM: full     Dental   No notable dental hx     Cardiovascular      Pulmonary      Other Findings        Anesthesia Plan  ASA Score- 3     Anesthesia Type- general with ASA Monitors  Additional Monitors:   Airway Plan: ETT  Comment: Patient has elevated INR related to warfarin use and sepsis  He has received vitamin K, multiple units of FFP, and a dose of [facility] (K-centra)        Plan Factors-    Induction- intravenous  Postoperative Plan- Plan for postoperative opioid use  Planned trial extubation    Informed Consent- Anesthetic plan and risks discussed with patient  I personally reviewed this patient with the CRNA  Discussed and agreed on the Anesthesia Plan with the TRACE Nelson

## 2019-03-30 PROBLEM — T68.XXXA HYPOTHERMIA: Status: ACTIVE | Noted: 2019-03-30

## 2019-03-30 LAB
ABO GROUP BLD BPU: NORMAL
ALBUMIN SERPL BCP-MCNC: 2.1 G/DL (ref 3.5–5)
ALP SERPL-CCNC: 46 U/L (ref 46–116)
ALT SERPL W P-5'-P-CCNC: 15 U/L (ref 12–78)
ANION GAP SERPL CALCULATED.3IONS-SCNC: 4 MMOL/L (ref 4–13)
AST SERPL W P-5'-P-CCNC: 10 U/L (ref 5–45)
BASOPHILS # BLD AUTO: 0.01 THOUSANDS/ΜL (ref 0–0.1)
BASOPHILS NFR BLD AUTO: 0 % (ref 0–1)
BILIRUB DIRECT SERPL-MCNC: 0.21 MG/DL (ref 0–0.2)
BILIRUB SERPL-MCNC: 0.53 MG/DL (ref 0.2–1)
BPU ID: NORMAL
BUN SERPL-MCNC: 15 MG/DL (ref 5–25)
CALCIUM SERPL-MCNC: 8.1 MG/DL (ref 8.3–10.1)
CHLORIDE SERPL-SCNC: 108 MMOL/L (ref 100–108)
CO2 SERPL-SCNC: 26 MMOL/L (ref 21–32)
CREAT SERPL-MCNC: 0.78 MG/DL (ref 0.6–1.3)
EOSINOPHIL # BLD AUTO: 0 THOUSAND/ΜL (ref 0–0.61)
EOSINOPHIL NFR BLD AUTO: 0 % (ref 0–6)
ERYTHROCYTE [DISTWIDTH] IN BLOOD BY AUTOMATED COUNT: 14.8 % (ref 11.6–15.1)
GFR SERPL CREATININE-BSD FRML MDRD: 99 ML/MIN/1.73SQ M
GLUCOSE SERPL-MCNC: 114 MG/DL (ref 65–140)
GLUCOSE SERPL-MCNC: 132 MG/DL (ref 65–140)
GLUCOSE SERPL-MCNC: 133 MG/DL (ref 65–140)
HCT VFR BLD AUTO: 31.4 % (ref 36.5–49.3)
HGB BLD-MCNC: 9.9 G/DL (ref 12–17)
IMM GRANULOCYTES # BLD AUTO: 0.05 THOUSAND/UL (ref 0–0.2)
IMM GRANULOCYTES NFR BLD AUTO: 1 % (ref 0–2)
LIPASE SERPL-CCNC: 54 U/L (ref 73–393)
LYMPHOCYTES # BLD AUTO: 0.43 THOUSANDS/ΜL (ref 0.6–4.47)
LYMPHOCYTES NFR BLD AUTO: 6 % (ref 14–44)
MCH RBC QN AUTO: 26.3 PG (ref 26.8–34.3)
MCHC RBC AUTO-ENTMCNC: 31.5 G/DL (ref 31.4–37.4)
MCV RBC AUTO: 84 FL (ref 82–98)
MONOCYTES # BLD AUTO: 0.33 THOUSAND/ΜL (ref 0.17–1.22)
MONOCYTES NFR BLD AUTO: 4 % (ref 4–12)
NEUTROPHILS # BLD AUTO: 6.79 THOUSANDS/ΜL (ref 1.85–7.62)
NEUTS SEG NFR BLD AUTO: 89 % (ref 43–75)
NRBC BLD AUTO-RTO: 0 /100 WBCS
PLATELET # BLD AUTO: 328 THOUSANDS/UL (ref 149–390)
PMV BLD AUTO: 9 FL (ref 8.9–12.7)
POTASSIUM SERPL-SCNC: 4 MMOL/L (ref 3.5–5.3)
PROT SERPL-MCNC: 6.2 G/DL (ref 6.4–8.2)
RBC # BLD AUTO: 3.76 MILLION/UL (ref 3.88–5.62)
SODIUM SERPL-SCNC: 138 MMOL/L (ref 136–145)
UNIT DISPENSE STATUS: NORMAL
UNIT PRODUCT CODE: NORMAL
UNIT RH: NORMAL
WBC # BLD AUTO: 7.61 THOUSAND/UL (ref 4.31–10.16)

## 2019-03-30 PROCEDURE — 80076 HEPATIC FUNCTION PANEL: CPT | Performed by: INTERNAL MEDICINE

## 2019-03-30 PROCEDURE — 99233 SBSQ HOSP IP/OBS HIGH 50: CPT | Performed by: INTERNAL MEDICINE

## 2019-03-30 PROCEDURE — 85025 COMPLETE CBC W/AUTO DIFF WBC: CPT | Performed by: INTERNAL MEDICINE

## 2019-03-30 PROCEDURE — 99232 SBSQ HOSP IP/OBS MODERATE 35: CPT | Performed by: INTERNAL MEDICINE

## 2019-03-30 PROCEDURE — 83690 ASSAY OF LIPASE: CPT | Performed by: INTERNAL MEDICINE

## 2019-03-30 PROCEDURE — 82948 REAGENT STRIP/BLOOD GLUCOSE: CPT

## 2019-03-30 PROCEDURE — 80048 BASIC METABOLIC PNL TOTAL CA: CPT | Performed by: INTERNAL MEDICINE

## 2019-03-30 PROCEDURE — 99232 SBSQ HOSP IP/OBS MODERATE 35: CPT | Performed by: SURGERY

## 2019-03-30 RX ORDER — LANOLIN ALCOHOL/MO/W.PET/CERES
6 CREAM (GRAM) TOPICAL
Status: DISCONTINUED | OUTPATIENT
Start: 2019-03-30 | End: 2019-04-04 | Stop reason: HOSPADM

## 2019-03-30 RX ORDER — IBUPROFEN 600 MG/1
600 TABLET ORAL EVERY 6 HOURS PRN
Status: DISCONTINUED | OUTPATIENT
Start: 2019-03-30 | End: 2019-04-04 | Stop reason: HOSPADM

## 2019-03-30 RX ORDER — HEPARIN SODIUM 5000 [USP'U]/ML
5000 INJECTION, SOLUTION INTRAVENOUS; SUBCUTANEOUS EVERY 8 HOURS SCHEDULED
Status: DISCONTINUED | OUTPATIENT
Start: 2019-03-30 | End: 2019-04-04 | Stop reason: HOSPADM

## 2019-03-30 RX ADMIN — PIPERACILLIN SODIUM,TAZOBACTAM SODIUM 4.5 G: 4; .5 INJECTION, POWDER, FOR SOLUTION INTRAVENOUS at 21:51

## 2019-03-30 RX ADMIN — PIPERACILLIN SODIUM,TAZOBACTAM SODIUM 4.5 G: 4; .5 INJECTION, POWDER, FOR SOLUTION INTRAVENOUS at 16:41

## 2019-03-30 RX ADMIN — DOCUSATE SODIUM 100 MG: 100 CAPSULE, LIQUID FILLED ORAL at 08:11

## 2019-03-30 RX ADMIN — MELATONIN 6 MG: at 21:51

## 2019-03-30 RX ADMIN — PIPERACILLIN SODIUM,TAZOBACTAM SODIUM 4.5 G: 4; .5 INJECTION, POWDER, FOR SOLUTION INTRAVENOUS at 09:43

## 2019-03-30 RX ADMIN — HEPARIN SODIUM 5000 UNITS: 5000 INJECTION INTRAVENOUS; SUBCUTANEOUS at 21:51

## 2019-03-30 RX ADMIN — SODIUM CHLORIDE 250 ML: 0.9 INJECTION, SOLUTION INTRAVENOUS at 03:34

## 2019-03-30 RX ADMIN — IBUPROFEN 600 MG: 600 TABLET ORAL at 21:51

## 2019-03-30 RX ADMIN — PIPERACILLIN SODIUM,TAZOBACTAM SODIUM 4.5 G: 4; .5 INJECTION, POWDER, FOR SOLUTION INTRAVENOUS at 04:35

## 2019-03-30 RX ADMIN — HEPARIN SODIUM 5000 UNITS: 5000 INJECTION INTRAVENOUS; SUBCUTANEOUS at 15:00

## 2019-03-30 RX ADMIN — AMIODARONE HYDROCHLORIDE 100 MG: 100 TABLET ORAL at 08:11

## 2019-03-31 LAB
ANION GAP SERPL CALCULATED.3IONS-SCNC: 4 MMOL/L (ref 4–13)
BASOPHILS # BLD AUTO: 0.02 THOUSANDS/ΜL (ref 0–0.1)
BASOPHILS NFR BLD AUTO: 0 % (ref 0–1)
BUN SERPL-MCNC: 13 MG/DL (ref 5–25)
CALCIUM SERPL-MCNC: 8.2 MG/DL (ref 8.3–10.1)
CHLORIDE SERPL-SCNC: 109 MMOL/L (ref 100–108)
CO2 SERPL-SCNC: 26 MMOL/L (ref 21–32)
CREAT SERPL-MCNC: 0.8 MG/DL (ref 0.6–1.3)
EOSINOPHIL # BLD AUTO: 0.01 THOUSAND/ΜL (ref 0–0.61)
EOSINOPHIL NFR BLD AUTO: 0 % (ref 0–6)
ERYTHROCYTE [DISTWIDTH] IN BLOOD BY AUTOMATED COUNT: 14.8 % (ref 11.6–15.1)
GFR SERPL CREATININE-BSD FRML MDRD: 98 ML/MIN/1.73SQ M
GLUCOSE SERPL-MCNC: 113 MG/DL (ref 65–140)
GLUCOSE SERPL-MCNC: 88 MG/DL (ref 65–140)
GLUCOSE SERPL-MCNC: 99 MG/DL (ref 65–140)
GLUCOSE SERPL-MCNC: 99 MG/DL (ref 65–140)
HCT VFR BLD AUTO: 32.3 % (ref 36.5–49.3)
HGB BLD-MCNC: 10.2 G/DL (ref 12–17)
IMM GRANULOCYTES # BLD AUTO: 0.04 THOUSAND/UL (ref 0–0.2)
IMM GRANULOCYTES NFR BLD AUTO: 1 % (ref 0–2)
LYMPHOCYTES # BLD AUTO: 1.08 THOUSANDS/ΜL (ref 0.6–4.47)
LYMPHOCYTES NFR BLD AUTO: 13 % (ref 14–44)
MAGNESIUM SERPL-MCNC: 1.9 MG/DL (ref 1.6–2.6)
MCH RBC QN AUTO: 26.4 PG (ref 26.8–34.3)
MCHC RBC AUTO-ENTMCNC: 31.6 G/DL (ref 31.4–37.4)
MCV RBC AUTO: 84 FL (ref 82–98)
MONOCYTES # BLD AUTO: 0.69 THOUSAND/ΜL (ref 0.17–1.22)
MONOCYTES NFR BLD AUTO: 8 % (ref 4–12)
NEUTROPHILS # BLD AUTO: 6.55 THOUSANDS/ΜL (ref 1.85–7.62)
NEUTS SEG NFR BLD AUTO: 78 % (ref 43–75)
NRBC BLD AUTO-RTO: 0 /100 WBCS
PHOSPHATE SERPL-MCNC: 2.6 MG/DL (ref 2.7–4.5)
PLATELET # BLD AUTO: 366 THOUSANDS/UL (ref 149–390)
PMV BLD AUTO: 9 FL (ref 8.9–12.7)
POTASSIUM SERPL-SCNC: 3.4 MMOL/L (ref 3.5–5.3)
RBC # BLD AUTO: 3.87 MILLION/UL (ref 3.88–5.62)
SODIUM SERPL-SCNC: 139 MMOL/L (ref 136–145)
WBC # BLD AUTO: 8.39 THOUSAND/UL (ref 4.31–10.16)

## 2019-03-31 PROCEDURE — 99232 SBSQ HOSP IP/OBS MODERATE 35: CPT | Performed by: INTERNAL MEDICINE

## 2019-03-31 PROCEDURE — 85025 COMPLETE CBC W/AUTO DIFF WBC: CPT | Performed by: INTERNAL MEDICINE

## 2019-03-31 PROCEDURE — ERR1 ERRONEOUS ENCOUNTER-DISREGARD: Performed by: SURGERY

## 2019-03-31 PROCEDURE — 84100 ASSAY OF PHOSPHORUS: CPT | Performed by: INTERNAL MEDICINE

## 2019-03-31 PROCEDURE — 83735 ASSAY OF MAGNESIUM: CPT | Performed by: INTERNAL MEDICINE

## 2019-03-31 PROCEDURE — 82948 REAGENT STRIP/BLOOD GLUCOSE: CPT

## 2019-03-31 PROCEDURE — 80048 BASIC METABOLIC PNL TOTAL CA: CPT | Performed by: INTERNAL MEDICINE

## 2019-03-31 PROCEDURE — 99233 SBSQ HOSP IP/OBS HIGH 50: CPT | Performed by: INTERNAL MEDICINE

## 2019-03-31 RX ORDER — POTASSIUM CHLORIDE 20 MEQ/1
40 TABLET, EXTENDED RELEASE ORAL ONCE
Status: COMPLETED | OUTPATIENT
Start: 2019-03-31 | End: 2019-03-31

## 2019-03-31 RX ADMIN — HEPARIN SODIUM 5000 UNITS: 5000 INJECTION INTRAVENOUS; SUBCUTANEOUS at 13:35

## 2019-03-31 RX ADMIN — POTASSIUM CHLORIDE 40 MEQ: 1500 TABLET, EXTENDED RELEASE ORAL at 16:23

## 2019-03-31 RX ADMIN — IBUPROFEN 600 MG: 600 TABLET ORAL at 22:05

## 2019-03-31 RX ADMIN — PIPERACILLIN SODIUM,TAZOBACTAM SODIUM 4.5 G: 4; .5 INJECTION, POWDER, FOR SOLUTION INTRAVENOUS at 09:51

## 2019-03-31 RX ADMIN — HEPARIN SODIUM 5000 UNITS: 5000 INJECTION INTRAVENOUS; SUBCUTANEOUS at 21:01

## 2019-03-31 RX ADMIN — HEPARIN SODIUM 5000 UNITS: 5000 INJECTION INTRAVENOUS; SUBCUTANEOUS at 05:00

## 2019-03-31 RX ADMIN — DOCUSATE SODIUM 100 MG: 100 CAPSULE, LIQUID FILLED ORAL at 08:09

## 2019-03-31 RX ADMIN — IBUPROFEN 600 MG: 600 TABLET ORAL at 04:40

## 2019-03-31 RX ADMIN — AMIODARONE HYDROCHLORIDE 100 MG: 100 TABLET ORAL at 08:12

## 2019-03-31 RX ADMIN — DOCUSATE SODIUM 100 MG: 100 CAPSULE, LIQUID FILLED ORAL at 16:23

## 2019-03-31 RX ADMIN — PIPERACILLIN SODIUM,TAZOBACTAM SODIUM 4.5 G: 4; .5 INJECTION, POWDER, FOR SOLUTION INTRAVENOUS at 03:53

## 2019-03-31 RX ADMIN — IBUPROFEN 600 MG: 600 TABLET ORAL at 16:28

## 2019-03-31 RX ADMIN — PIPERACILLIN SODIUM,TAZOBACTAM SODIUM 4.5 G: 4; .5 INJECTION, POWDER, FOR SOLUTION INTRAVENOUS at 21:02

## 2019-03-31 RX ADMIN — MELATONIN 6 MG: at 21:00

## 2019-03-31 RX ADMIN — PIPERACILLIN SODIUM,TAZOBACTAM SODIUM 4.5 G: 4; .5 INJECTION, POWDER, FOR SOLUTION INTRAVENOUS at 16:23

## 2019-03-31 RX ADMIN — SODIUM CHLORIDE 50 ML/HR: 0.9 INJECTION, SOLUTION INTRAVENOUS at 08:09

## 2019-04-01 PROBLEM — T68.XXXA HYPOTHERMIA: Status: RESOLVED | Noted: 2019-03-30 | Resolved: 2019-04-01

## 2019-04-01 LAB
ABO GROUP BLD BPU: NORMAL
ABO GROUP BLD BPU: NORMAL
ANION GAP SERPL CALCULATED.3IONS-SCNC: 6 MMOL/L (ref 4–13)
BACTERIA BLD CULT: ABNORMAL
BACTERIA BLD CULT: ABNORMAL
BASOPHILS # BLD AUTO: 0.04 THOUSANDS/ΜL (ref 0–0.1)
BASOPHILS NFR BLD AUTO: 1 % (ref 0–1)
BPU ID: NORMAL
BPU ID: NORMAL
BUN SERPL-MCNC: 10 MG/DL (ref 5–25)
CALCIUM SERPL-MCNC: 8.1 MG/DL (ref 8.3–10.1)
CHLORIDE SERPL-SCNC: 110 MMOL/L (ref 100–108)
CO2 SERPL-SCNC: 25 MMOL/L (ref 21–32)
CREAT SERPL-MCNC: 0.79 MG/DL (ref 0.6–1.3)
EOSINOPHIL # BLD AUTO: 0.15 THOUSAND/ΜL (ref 0–0.61)
EOSINOPHIL NFR BLD AUTO: 2 % (ref 0–6)
ERYTHROCYTE [DISTWIDTH] IN BLOOD BY AUTOMATED COUNT: 14.9 % (ref 11.6–15.1)
GFR SERPL CREATININE-BSD FRML MDRD: 98 ML/MIN/1.73SQ M
GLUCOSE SERPL-MCNC: 80 MG/DL (ref 65–140)
GLUCOSE SERPL-MCNC: 80 MG/DL (ref 65–140)
GLUCOSE SERPL-MCNC: 84 MG/DL (ref 65–140)
GRAM STN SPEC: ABNORMAL
GRAM STN SPEC: ABNORMAL
HCT VFR BLD AUTO: 32.7 % (ref 36.5–49.3)
HGB BLD-MCNC: 10.3 G/DL (ref 12–17)
IMM GRANULOCYTES # BLD AUTO: 0.06 THOUSAND/UL (ref 0–0.2)
IMM GRANULOCYTES NFR BLD AUTO: 1 % (ref 0–2)
LYMPHOCYTES # BLD AUTO: 1.3 THOUSANDS/ΜL (ref 0.6–4.47)
LYMPHOCYTES NFR BLD AUTO: 16 % (ref 14–44)
MCH RBC QN AUTO: 26.5 PG (ref 26.8–34.3)
MCHC RBC AUTO-ENTMCNC: 31.5 G/DL (ref 31.4–37.4)
MCV RBC AUTO: 84 FL (ref 82–98)
MONOCYTES # BLD AUTO: 0.9 THOUSAND/ΜL (ref 0.17–1.22)
MONOCYTES NFR BLD AUTO: 11 % (ref 4–12)
NEUTROPHILS # BLD AUTO: 5.63 THOUSANDS/ΜL (ref 1.85–7.62)
NEUTS SEG NFR BLD AUTO: 69 % (ref 43–75)
NRBC BLD AUTO-RTO: 0 /100 WBCS
PLATELET # BLD AUTO: 381 THOUSANDS/UL (ref 149–390)
PMV BLD AUTO: 9.3 FL (ref 8.9–12.7)
POTASSIUM SERPL-SCNC: 3.5 MMOL/L (ref 3.5–5.3)
RBC # BLD AUTO: 3.88 MILLION/UL (ref 3.88–5.62)
SODIUM SERPL-SCNC: 141 MMOL/L (ref 136–145)
UNIT DISPENSE STATUS: NORMAL
UNIT DISPENSE STATUS: NORMAL
UNIT PRODUCT CODE: NORMAL
UNIT PRODUCT CODE: NORMAL
UNIT RH: NORMAL
UNIT RH: NORMAL
WBC # BLD AUTO: 8.08 THOUSAND/UL (ref 4.31–10.16)

## 2019-04-01 PROCEDURE — 99233 SBSQ HOSP IP/OBS HIGH 50: CPT | Performed by: INTERNAL MEDICINE

## 2019-04-01 PROCEDURE — 85025 COMPLETE CBC W/AUTO DIFF WBC: CPT | Performed by: INTERNAL MEDICINE

## 2019-04-01 PROCEDURE — 82948 REAGENT STRIP/BLOOD GLUCOSE: CPT

## 2019-04-01 PROCEDURE — 80048 BASIC METABOLIC PNL TOTAL CA: CPT | Performed by: INTERNAL MEDICINE

## 2019-04-01 PROCEDURE — 99232 SBSQ HOSP IP/OBS MODERATE 35: CPT | Performed by: INTERNAL MEDICINE

## 2019-04-01 RX ORDER — POLYETHYLENE GLYCOL 3350 17 G/17G
17 POWDER, FOR SOLUTION ORAL 2 TIMES DAILY
Status: DISCONTINUED | OUTPATIENT
Start: 2019-04-01 | End: 2019-04-01

## 2019-04-01 RX ORDER — DOCOSANOL 100 MG/G
CREAM TOPICAL
Status: DISCONTINUED | OUTPATIENT
Start: 2019-04-01 | End: 2019-04-04 | Stop reason: HOSPADM

## 2019-04-01 RX ORDER — METRONIDAZOLE 500 MG/1
500 TABLET ORAL EVERY 8 HOURS SCHEDULED
Status: DISCONTINUED | OUTPATIENT
Start: 2019-04-01 | End: 2019-04-04 | Stop reason: HOSPADM

## 2019-04-01 RX ORDER — DOCUSATE SODIUM 100 MG/1
100 CAPSULE, LIQUID FILLED ORAL 2 TIMES DAILY PRN
Status: DISCONTINUED | OUTPATIENT
Start: 2019-04-01 | End: 2019-04-04 | Stop reason: HOSPADM

## 2019-04-01 RX ORDER — AMOXICILLIN 250 MG
1 CAPSULE ORAL
Status: DISCONTINUED | OUTPATIENT
Start: 2019-04-01 | End: 2019-04-01

## 2019-04-01 RX ADMIN — AMIODARONE HYDROCHLORIDE 100 MG: 100 TABLET ORAL at 08:21

## 2019-04-01 RX ADMIN — HEPARIN SODIUM 5000 UNITS: 5000 INJECTION INTRAVENOUS; SUBCUTANEOUS at 14:44

## 2019-04-01 RX ADMIN — Medication: at 14:44

## 2019-04-01 RX ADMIN — CEFTRIAXONE 2000 MG: 2 INJECTION, POWDER, FOR SOLUTION INTRAMUSCULAR; INTRAVENOUS at 16:02

## 2019-04-01 RX ADMIN — DOCUSATE SODIUM 100 MG: 100 CAPSULE, LIQUID FILLED ORAL at 08:21

## 2019-04-01 RX ADMIN — HEPARIN SODIUM 5000 UNITS: 5000 INJECTION INTRAVENOUS; SUBCUTANEOUS at 21:51

## 2019-04-01 RX ADMIN — METRONIDAZOLE 500 MG: 500 TABLET, FILM COATED ORAL at 21:51

## 2019-04-01 RX ADMIN — METRONIDAZOLE 500 MG: 500 TABLET, FILM COATED ORAL at 16:02

## 2019-04-01 RX ADMIN — PIPERACILLIN SODIUM,TAZOBACTAM SODIUM 4.5 G: 4; .5 INJECTION, POWDER, FOR SOLUTION INTRAVENOUS at 10:59

## 2019-04-01 RX ADMIN — IBUPROFEN 600 MG: 600 TABLET ORAL at 08:33

## 2019-04-01 RX ADMIN — MELATONIN 6 MG: at 21:51

## 2019-04-01 RX ADMIN — Medication: at 21:54

## 2019-04-01 RX ADMIN — Medication: at 18:04

## 2019-04-01 RX ADMIN — PIPERACILLIN SODIUM,TAZOBACTAM SODIUM 4.5 G: 4; .5 INJECTION, POWDER, FOR SOLUTION INTRAVENOUS at 04:15

## 2019-04-01 RX ADMIN — SODIUM CHLORIDE 50 ML/HR: 0.9 INJECTION, SOLUTION INTRAVENOUS at 05:29

## 2019-04-01 RX ADMIN — SODIUM CHLORIDE 50 ML/HR: 0.9 INJECTION, SOLUTION INTRAVENOUS at 08:32

## 2019-04-01 RX ADMIN — HEPARIN SODIUM 5000 UNITS: 5000 INJECTION INTRAVENOUS; SUBCUTANEOUS at 05:28

## 2019-04-02 PROBLEM — K86.3 INFECTED PANCREATIC PSEUDOCYST: Status: ACTIVE | Noted: 2019-03-28

## 2019-04-02 PROBLEM — R78.81 GRAM-NEGATIVE BACTEREMIA: Status: ACTIVE | Noted: 2019-04-02

## 2019-04-02 PROBLEM — A41.9 SEPSIS (HCC): Status: ACTIVE | Noted: 2019-04-02

## 2019-04-02 LAB
ALBUMIN SERPL BCP-MCNC: 2 G/DL (ref 3.5–5)
ALP SERPL-CCNC: 41 U/L (ref 46–116)
ALT SERPL W P-5'-P-CCNC: 14 U/L (ref 12–78)
ANION GAP SERPL CALCULATED.3IONS-SCNC: 5 MMOL/L (ref 4–13)
AST SERPL W P-5'-P-CCNC: 14 U/L (ref 5–45)
BASOPHILS # BLD AUTO: 0.02 THOUSANDS/ΜL (ref 0–0.1)
BASOPHILS NFR BLD AUTO: 0 % (ref 0–1)
BILIRUB SERPL-MCNC: 0.31 MG/DL (ref 0.2–1)
BUN SERPL-MCNC: 7 MG/DL (ref 5–25)
CALCIUM SERPL-MCNC: 8.3 MG/DL (ref 8.3–10.1)
CHLORIDE SERPL-SCNC: 107 MMOL/L (ref 100–108)
CO2 SERPL-SCNC: 27 MMOL/L (ref 21–32)
CREAT SERPL-MCNC: 0.75 MG/DL (ref 0.6–1.3)
EOSINOPHIL # BLD AUTO: 0.18 THOUSAND/ΜL (ref 0–0.61)
EOSINOPHIL NFR BLD AUTO: 2 % (ref 0–6)
ERYTHROCYTE [DISTWIDTH] IN BLOOD BY AUTOMATED COUNT: 15.1 % (ref 11.6–15.1)
GFR SERPL CREATININE-BSD FRML MDRD: 100 ML/MIN/1.73SQ M
GLUCOSE SERPL-MCNC: 68 MG/DL (ref 65–140)
GLUCOSE SERPL-MCNC: 77 MG/DL (ref 65–140)
GLUCOSE SERPL-MCNC: 88 MG/DL (ref 65–140)
GLUCOSE SERPL-MCNC: 89 MG/DL (ref 65–140)
GLUCOSE SERPL-MCNC: 91 MG/DL (ref 65–140)
HCT VFR BLD AUTO: 31.8 % (ref 36.5–49.3)
HGB BLD-MCNC: 10.2 G/DL (ref 12–17)
IMM GRANULOCYTES # BLD AUTO: 0.11 THOUSAND/UL (ref 0–0.2)
IMM GRANULOCYTES NFR BLD AUTO: 1 % (ref 0–2)
INR PPP: 1.74 (ref 0.86–1.17)
LYMPHOCYTES # BLD AUTO: 1.18 THOUSANDS/ΜL (ref 0.6–4.47)
LYMPHOCYTES NFR BLD AUTO: 12 % (ref 14–44)
MCH RBC QN AUTO: 26.5 PG (ref 26.8–34.3)
MCHC RBC AUTO-ENTMCNC: 32.1 G/DL (ref 31.4–37.4)
MCV RBC AUTO: 83 FL (ref 82–98)
MONOCYTES # BLD AUTO: 0.85 THOUSAND/ΜL (ref 0.17–1.22)
MONOCYTES NFR BLD AUTO: 9 % (ref 4–12)
NEUTROPHILS # BLD AUTO: 7.49 THOUSANDS/ΜL (ref 1.85–7.62)
NEUTS SEG NFR BLD AUTO: 76 % (ref 43–75)
NRBC BLD AUTO-RTO: 0 /100 WBCS
PLATELET # BLD AUTO: 393 THOUSANDS/UL (ref 149–390)
PMV BLD AUTO: 9 FL (ref 8.9–12.7)
POTASSIUM SERPL-SCNC: 3.3 MMOL/L (ref 3.5–5.3)
PROT SERPL-MCNC: 5.5 G/DL (ref 6.4–8.2)
PROTHROMBIN TIME: 20.4 SECONDS (ref 11.8–14.2)
RBC # BLD AUTO: 3.85 MILLION/UL (ref 3.88–5.62)
SODIUM SERPL-SCNC: 139 MMOL/L (ref 136–145)
WBC # BLD AUTO: 9.83 THOUSAND/UL (ref 4.31–10.16)

## 2019-04-02 PROCEDURE — 85025 COMPLETE CBC W/AUTO DIFF WBC: CPT | Performed by: INTERNAL MEDICINE

## 2019-04-02 PROCEDURE — 99232 SBSQ HOSP IP/OBS MODERATE 35: CPT | Performed by: INTERNAL MEDICINE

## 2019-04-02 PROCEDURE — 82948 REAGENT STRIP/BLOOD GLUCOSE: CPT

## 2019-04-02 PROCEDURE — 85610 PROTHROMBIN TIME: CPT | Performed by: INTERNAL MEDICINE

## 2019-04-02 PROCEDURE — 80053 COMPREHEN METABOLIC PANEL: CPT | Performed by: INTERNAL MEDICINE

## 2019-04-02 RX ADMIN — IBUPROFEN 600 MG: 600 TABLET ORAL at 21:58

## 2019-04-02 RX ADMIN — Medication: at 11:30

## 2019-04-02 RX ADMIN — HEPARIN SODIUM 5000 UNITS: 5000 INJECTION INTRAVENOUS; SUBCUTANEOUS at 21:52

## 2019-04-02 RX ADMIN — Medication: at 08:21

## 2019-04-02 RX ADMIN — Medication: at 17:27

## 2019-04-02 RX ADMIN — HEPARIN SODIUM 5000 UNITS: 5000 INJECTION INTRAVENOUS; SUBCUTANEOUS at 14:12

## 2019-04-02 RX ADMIN — AMIODARONE HYDROCHLORIDE 100 MG: 100 TABLET ORAL at 08:21

## 2019-04-02 RX ADMIN — CEFTRIAXONE 2000 MG: 2 INJECTION, POWDER, FOR SOLUTION INTRAMUSCULAR; INTRAVENOUS at 15:21

## 2019-04-02 RX ADMIN — METRONIDAZOLE 500 MG: 500 TABLET, FILM COATED ORAL at 21:52

## 2019-04-02 RX ADMIN — SODIUM CHLORIDE 50 ML/HR: 0.9 INJECTION, SOLUTION INTRAVENOUS at 05:43

## 2019-04-02 RX ADMIN — Medication: at 21:56

## 2019-04-02 RX ADMIN — MELATONIN 6 MG: at 21:52

## 2019-04-02 RX ADMIN — IBUPROFEN 600 MG: 600 TABLET ORAL at 08:21

## 2019-04-02 RX ADMIN — METRONIDAZOLE 500 MG: 500 TABLET, FILM COATED ORAL at 14:12

## 2019-04-02 RX ADMIN — HEPARIN SODIUM 5000 UNITS: 5000 INJECTION INTRAVENOUS; SUBCUTANEOUS at 05:43

## 2019-04-02 RX ADMIN — METRONIDAZOLE 500 MG: 500 TABLET, FILM COATED ORAL at 05:42

## 2019-04-02 RX ADMIN — Medication: at 14:13

## 2019-04-03 ENCOUNTER — ANESTHESIA EVENT (INPATIENT)
Dept: GASTROENTEROLOGY | Facility: HOSPITAL | Age: 59
DRG: 853 | End: 2019-04-03
Payer: COMMERCIAL

## 2019-04-03 ENCOUNTER — TELEPHONE (OUTPATIENT)
Dept: GASTROENTEROLOGY | Facility: CLINIC | Age: 59
End: 2019-04-03

## 2019-04-03 ENCOUNTER — ANESTHESIA (INPATIENT)
Dept: GASTROENTEROLOGY | Facility: HOSPITAL | Age: 59
DRG: 853 | End: 2019-04-03
Payer: COMMERCIAL

## 2019-04-03 PROBLEM — R19.7 DIARRHEA: Status: ACTIVE | Noted: 2019-04-03

## 2019-04-03 LAB
ALBUMIN SERPL BCP-MCNC: 2.1 G/DL (ref 3.5–5)
ALP SERPL-CCNC: 41 U/L (ref 46–116)
ALT SERPL W P-5'-P-CCNC: 15 U/L (ref 12–78)
ANION GAP SERPL CALCULATED.3IONS-SCNC: 3 MMOL/L (ref 4–13)
AST SERPL W P-5'-P-CCNC: 15 U/L (ref 5–45)
BACTERIA SPEC BFLD CULT: NO GROWTH
BASOPHILS # BLD AUTO: 0.03 THOUSANDS/ΜL (ref 0–0.1)
BASOPHILS NFR BLD AUTO: 0 % (ref 0–1)
BILIRUB SERPL-MCNC: 0.32 MG/DL (ref 0.2–1)
BUN SERPL-MCNC: 6 MG/DL (ref 5–25)
CALCIUM SERPL-MCNC: 8.4 MG/DL (ref 8.3–10.1)
CHLORIDE SERPL-SCNC: 108 MMOL/L (ref 100–108)
CO2 SERPL-SCNC: 28 MMOL/L (ref 21–32)
CREAT SERPL-MCNC: 0.76 MG/DL (ref 0.6–1.3)
EOSINOPHIL # BLD AUTO: 0.27 THOUSAND/ΜL (ref 0–0.61)
EOSINOPHIL NFR BLD AUTO: 3 % (ref 0–6)
ERYTHROCYTE [DISTWIDTH] IN BLOOD BY AUTOMATED COUNT: 14.9 % (ref 11.6–15.1)
GFR SERPL CREATININE-BSD FRML MDRD: 100 ML/MIN/1.73SQ M
GLUCOSE SERPL-MCNC: 139 MG/DL (ref 65–140)
GLUCOSE SERPL-MCNC: 151 MG/DL (ref 65–140)
GLUCOSE SERPL-MCNC: 75 MG/DL (ref 65–140)
GLUCOSE SERPL-MCNC: 87 MG/DL (ref 65–140)
GLUCOSE SERPL-MCNC: 91 MG/DL (ref 65–140)
GRAM STN SPEC: NORMAL
HCT VFR BLD AUTO: 32 % (ref 36.5–49.3)
HGB BLD-MCNC: 10 G/DL (ref 12–17)
IMM GRANULOCYTES # BLD AUTO: 0.17 THOUSAND/UL (ref 0–0.2)
IMM GRANULOCYTES NFR BLD AUTO: 2 % (ref 0–2)
LYMPHOCYTES # BLD AUTO: 1.39 THOUSANDS/ΜL (ref 0.6–4.47)
LYMPHOCYTES NFR BLD AUTO: 18 % (ref 14–44)
MCH RBC QN AUTO: 25.8 PG (ref 26.8–34.3)
MCHC RBC AUTO-ENTMCNC: 31.3 G/DL (ref 31.4–37.4)
MCV RBC AUTO: 83 FL (ref 82–98)
MONOCYTES # BLD AUTO: 0.73 THOUSAND/ΜL (ref 0.17–1.22)
MONOCYTES NFR BLD AUTO: 9 % (ref 4–12)
NEUTROPHILS # BLD AUTO: 5.34 THOUSANDS/ΜL (ref 1.85–7.62)
NEUTS SEG NFR BLD AUTO: 68 % (ref 43–75)
NRBC BLD AUTO-RTO: 0 /100 WBCS
PLATELET # BLD AUTO: 387 THOUSANDS/UL (ref 149–390)
PMV BLD AUTO: 8.9 FL (ref 8.9–12.7)
POTASSIUM SERPL-SCNC: 3.4 MMOL/L (ref 3.5–5.3)
PROT SERPL-MCNC: 5.5 G/DL (ref 6.4–8.2)
RBC # BLD AUTO: 3.87 MILLION/UL (ref 3.88–5.62)
SODIUM SERPL-SCNC: 139 MMOL/L (ref 136–145)
WBC # BLD AUTO: 7.93 THOUSAND/UL (ref 4.31–10.16)

## 2019-04-03 PROCEDURE — 80053 COMPREHEN METABOLIC PANEL: CPT | Performed by: INTERNAL MEDICINE

## 2019-04-03 PROCEDURE — 82948 REAGENT STRIP/BLOOD GLUCOSE: CPT

## 2019-04-03 PROCEDURE — 0FCG8ZZ EXTIRPATION OF MATTER FROM PANCREAS, VIA NATURAL OR ARTIFICIAL OPENING ENDOSCOPIC: ICD-10-PCS | Performed by: INTERNAL MEDICINE

## 2019-04-03 PROCEDURE — 85025 COMPLETE CBC W/AUTO DIFF WBC: CPT | Performed by: INTERNAL MEDICINE

## 2019-04-03 PROCEDURE — 0DC68ZZ EXTIRPATION OF MATTER FROM STOMACH, VIA NATURAL OR ARTIFICIAL OPENING ENDOSCOPIC: ICD-10-PCS | Performed by: INTERNAL MEDICINE

## 2019-04-03 PROCEDURE — 99232 SBSQ HOSP IP/OBS MODERATE 35: CPT | Performed by: INTERNAL MEDICINE

## 2019-04-03 PROCEDURE — 43251 EGD REMOVE LESION SNARE: CPT | Performed by: INTERNAL MEDICINE

## 2019-04-03 RX ORDER — EPHEDRINE SULFATE 50 MG/ML
INJECTION INTRAVENOUS AS NEEDED
Status: DISCONTINUED | OUTPATIENT
Start: 2019-04-03 | End: 2019-04-03 | Stop reason: SURG

## 2019-04-03 RX ORDER — PROPOFOL 10 MG/ML
INJECTION, EMULSION INTRAVENOUS AS NEEDED
Status: DISCONTINUED | OUTPATIENT
Start: 2019-04-03 | End: 2019-04-03 | Stop reason: SURG

## 2019-04-03 RX ORDER — POTASSIUM CHLORIDE 20 MEQ/1
20 TABLET, EXTENDED RELEASE ORAL ONCE
Status: COMPLETED | OUTPATIENT
Start: 2019-04-03 | End: 2019-04-03

## 2019-04-03 RX ORDER — FENTANYL CITRATE 50 UG/ML
INJECTION, SOLUTION INTRAMUSCULAR; INTRAVENOUS AS NEEDED
Status: DISCONTINUED | OUTPATIENT
Start: 2019-04-03 | End: 2019-04-03 | Stop reason: SURG

## 2019-04-03 RX ORDER — POTASSIUM CHLORIDE 14.9 MG/ML
20 INJECTION INTRAVENOUS
Status: DISCONTINUED | OUTPATIENT
Start: 2019-04-03 | End: 2019-04-03

## 2019-04-03 RX ORDER — DEXAMETHASONE SODIUM PHOSPHATE 4 MG/ML
INJECTION, SOLUTION INTRA-ARTICULAR; INTRALESIONAL; INTRAMUSCULAR; INTRAVENOUS; SOFT TISSUE AS NEEDED
Status: DISCONTINUED | OUTPATIENT
Start: 2019-04-03 | End: 2019-04-03 | Stop reason: SURG

## 2019-04-03 RX ORDER — LIDOCAINE HYDROCHLORIDE 10 MG/ML
INJECTION, SOLUTION INFILTRATION; PERINEURAL AS NEEDED
Status: DISCONTINUED | OUTPATIENT
Start: 2019-04-03 | End: 2019-04-03 | Stop reason: SURG

## 2019-04-03 RX ORDER — ONDANSETRON 2 MG/ML
INJECTION INTRAMUSCULAR; INTRAVENOUS AS NEEDED
Status: DISCONTINUED | OUTPATIENT
Start: 2019-04-03 | End: 2019-04-03 | Stop reason: SURG

## 2019-04-03 RX ORDER — PROPOFOL 10 MG/ML
INJECTION, EMULSION INTRAVENOUS CONTINUOUS PRN
Status: DISCONTINUED | OUTPATIENT
Start: 2019-04-03 | End: 2019-04-03 | Stop reason: SURG

## 2019-04-03 RX ADMIN — EPHEDRINE SULFATE 10 MG: 50 INJECTION, SOLUTION INTRAVENOUS at 12:40

## 2019-04-03 RX ADMIN — POTASSIUM CHLORIDE 20 MEQ: 1500 TABLET, EXTENDED RELEASE ORAL at 18:14

## 2019-04-03 RX ADMIN — HEPARIN SODIUM 5000 UNITS: 5000 INJECTION INTRAVENOUS; SUBCUTANEOUS at 14:45

## 2019-04-03 RX ADMIN — FENTANYL CITRATE 25 MCG: 50 INJECTION, SOLUTION INTRAMUSCULAR; INTRAVENOUS at 12:23

## 2019-04-03 RX ADMIN — POTASSIUM CHLORIDE 20 MEQ: 200 INJECTION, SOLUTION INTRAVENOUS at 10:14

## 2019-04-03 RX ADMIN — MELATONIN 6 MG: at 23:04

## 2019-04-03 RX ADMIN — METRONIDAZOLE 500 MG: 500 TABLET, FILM COATED ORAL at 23:04

## 2019-04-03 RX ADMIN — CEFTRIAXONE 2000 MG: 2 INJECTION, POWDER, FOR SOLUTION INTRAMUSCULAR; INTRAVENOUS at 15:48

## 2019-04-03 RX ADMIN — Medication: at 06:44

## 2019-04-03 RX ADMIN — PROPOFOL 150 MG: 10 INJECTION, EMULSION INTRAVENOUS at 12:21

## 2019-04-03 RX ADMIN — PHENYLEPHRINE HYDROCHLORIDE 50 MCG: 10 INJECTION INTRAVENOUS at 13:02

## 2019-04-03 RX ADMIN — AMIODARONE HYDROCHLORIDE 100 MG: 100 TABLET ORAL at 14:44

## 2019-04-03 RX ADMIN — PHENYLEPHRINE HYDROCHLORIDE 100 MCG: 10 INJECTION INTRAVENOUS at 12:53

## 2019-04-03 RX ADMIN — LIDOCAINE HYDROCHLORIDE 100 MG: 10 INJECTION, SOLUTION INFILTRATION; PERINEURAL at 12:21

## 2019-04-03 RX ADMIN — SODIUM CHLORIDE 50 ML/HR: 0.9 INJECTION, SOLUTION INTRAVENOUS at 15:47

## 2019-04-03 RX ADMIN — PROPOFOL 150 MCG/KG/MIN: 10 INJECTION, EMULSION INTRAVENOUS at 12:21

## 2019-04-03 RX ADMIN — ONDANSETRON 4 MG: 2 INJECTION INTRAMUSCULAR; INTRAVENOUS at 12:21

## 2019-04-03 RX ADMIN — METRONIDAZOLE 500 MG: 500 TABLET, FILM COATED ORAL at 14:44

## 2019-04-03 RX ADMIN — Medication: at 14:46

## 2019-04-03 RX ADMIN — SODIUM CHLORIDE 50 ML/HR: 0.9 INJECTION, SOLUTION INTRAVENOUS at 03:24

## 2019-04-03 RX ADMIN — Medication: at 23:05

## 2019-04-03 RX ADMIN — FENTANYL CITRATE 25 MCG: 50 INJECTION, SOLUTION INTRAMUSCULAR; INTRAVENOUS at 12:18

## 2019-04-03 RX ADMIN — Medication: at 18:14

## 2019-04-03 RX ADMIN — HEPARIN SODIUM 5000 UNITS: 5000 INJECTION INTRAVENOUS; SUBCUTANEOUS at 23:05

## 2019-04-03 RX ADMIN — METRONIDAZOLE 500 MG: 500 TABLET, FILM COATED ORAL at 06:40

## 2019-04-03 RX ADMIN — PHENYLEPHRINE HYDROCHLORIDE 100 MCG: 10 INJECTION INTRAVENOUS at 12:42

## 2019-04-03 RX ADMIN — DEXAMETHASONE SODIUM PHOSPHATE 4 MG: 4 INJECTION, SOLUTION INTRAMUSCULAR; INTRAVENOUS at 12:21

## 2019-04-04 VITALS
BODY MASS INDEX: 23.26 KG/M2 | HEART RATE: 90 BPM | SYSTOLIC BLOOD PRESSURE: 121 MMHG | HEIGHT: 76 IN | OXYGEN SATURATION: 98 % | DIASTOLIC BLOOD PRESSURE: 75 MMHG | WEIGHT: 191 LBS | RESPIRATION RATE: 16 BRPM | TEMPERATURE: 97.9 F

## 2019-04-04 LAB
ANION GAP SERPL CALCULATED.3IONS-SCNC: 6 MMOL/L (ref 4–13)
BASOPHILS # BLD AUTO: 0.01 THOUSANDS/ΜL (ref 0–0.1)
BASOPHILS NFR BLD AUTO: 0 % (ref 0–1)
BUN SERPL-MCNC: 12 MG/DL (ref 5–25)
C DIFF TOX GENS STL QL NAA+PROBE: NORMAL
CALCIUM SERPL-MCNC: 8.3 MG/DL (ref 8.3–10.1)
CHLORIDE SERPL-SCNC: 106 MMOL/L (ref 100–108)
CO2 SERPL-SCNC: 26 MMOL/L (ref 21–32)
CREAT SERPL-MCNC: 0.77 MG/DL (ref 0.6–1.3)
EOSINOPHIL # BLD AUTO: 0 THOUSAND/ΜL (ref 0–0.61)
EOSINOPHIL NFR BLD AUTO: 0 % (ref 0–6)
ERYTHROCYTE [DISTWIDTH] IN BLOOD BY AUTOMATED COUNT: 15 % (ref 11.6–15.1)
GFR SERPL CREATININE-BSD FRML MDRD: 99 ML/MIN/1.73SQ M
GLUCOSE SERPL-MCNC: 108 MG/DL (ref 65–140)
GLUCOSE SERPL-MCNC: 93 MG/DL (ref 65–140)
GLUCOSE SERPL-MCNC: 98 MG/DL (ref 65–140)
HCT VFR BLD AUTO: 32.3 % (ref 36.5–49.3)
HGB BLD-MCNC: 10.2 G/DL (ref 12–17)
IMM GRANULOCYTES # BLD AUTO: 0.2 THOUSAND/UL (ref 0–0.2)
IMM GRANULOCYTES NFR BLD AUTO: 2 % (ref 0–2)
LYMPHOCYTES # BLD AUTO: 0.88 THOUSANDS/ΜL (ref 0.6–4.47)
LYMPHOCYTES NFR BLD AUTO: 8 % (ref 14–44)
MCH RBC QN AUTO: 26.3 PG (ref 26.8–34.3)
MCHC RBC AUTO-ENTMCNC: 31.6 G/DL (ref 31.4–37.4)
MCV RBC AUTO: 83 FL (ref 82–98)
MONOCYTES # BLD AUTO: 0.44 THOUSAND/ΜL (ref 0.17–1.22)
MONOCYTES NFR BLD AUTO: 4 % (ref 4–12)
NEUTROPHILS # BLD AUTO: 9.26 THOUSANDS/ΜL (ref 1.85–7.62)
NEUTS SEG NFR BLD AUTO: 86 % (ref 43–75)
NRBC BLD AUTO-RTO: 0 /100 WBCS
PLATELET # BLD AUTO: 452 THOUSANDS/UL (ref 149–390)
PMV BLD AUTO: 9.1 FL (ref 8.9–12.7)
POTASSIUM SERPL-SCNC: 4 MMOL/L (ref 3.5–5.3)
RBC # BLD AUTO: 3.88 MILLION/UL (ref 3.88–5.62)
SODIUM SERPL-SCNC: 138 MMOL/L (ref 136–145)
WBC # BLD AUTO: 10.79 THOUSAND/UL (ref 4.31–10.16)

## 2019-04-04 PROCEDURE — 99232 SBSQ HOSP IP/OBS MODERATE 35: CPT | Performed by: INTERNAL MEDICINE

## 2019-04-04 PROCEDURE — 99239 HOSP IP/OBS DSCHRG MGMT >30: CPT | Performed by: INTERNAL MEDICINE

## 2019-04-04 PROCEDURE — 80048 BASIC METABOLIC PNL TOTAL CA: CPT | Performed by: INTERNAL MEDICINE

## 2019-04-04 PROCEDURE — 87493 C DIFF AMPLIFIED PROBE: CPT | Performed by: INTERNAL MEDICINE

## 2019-04-04 PROCEDURE — 85025 COMPLETE CBC W/AUTO DIFF WBC: CPT | Performed by: INTERNAL MEDICINE

## 2019-04-04 PROCEDURE — 82948 REAGENT STRIP/BLOOD GLUCOSE: CPT

## 2019-04-04 RX ORDER — METRONIDAZOLE 500 MG/1
500 TABLET ORAL EVERY 8 HOURS SCHEDULED
Qty: 18 TABLET | Refills: 0 | Status: SHIPPED | OUTPATIENT
Start: 2019-04-04 | End: 2019-04-10

## 2019-04-04 RX ORDER — DOCOSANOL 100 MG/G
CREAM TOPICAL
Qty: 1 TUBE | Refills: 0 | Status: ON HOLD | OUTPATIENT
Start: 2019-04-04 | End: 2019-04-26

## 2019-04-04 RX ORDER — AMIODARONE HYDROCHLORIDE 200 MG/1
100 TABLET ORAL
Status: ON HOLD
Start: 2019-04-04 | End: 2019-04-09 | Stop reason: ALTCHOICE

## 2019-04-04 RX ORDER — CEFDINIR 300 MG/1
300 CAPSULE ORAL EVERY 12 HOURS SCHEDULED
Qty: 12 CAPSULE | Refills: 0 | Status: SHIPPED | OUTPATIENT
Start: 2019-04-04 | End: 2019-04-10

## 2019-04-04 RX ORDER — SACCHAROMYCES BOULARDII 250 MG
250 CAPSULE ORAL 2 TIMES DAILY
Qty: 16 CAPSULE | Refills: 0 | Status: SHIPPED | OUTPATIENT
Start: 2019-04-04 | End: 2019-04-12

## 2019-04-04 RX ADMIN — Medication: at 11:00

## 2019-04-04 RX ADMIN — METRONIDAZOLE 500 MG: 500 TABLET, FILM COATED ORAL at 06:06

## 2019-04-04 RX ADMIN — Medication: at 14:34

## 2019-04-04 RX ADMIN — CEFTRIAXONE 2000 MG: 2 INJECTION, POWDER, FOR SOLUTION INTRAMUSCULAR; INTRAVENOUS at 14:30

## 2019-04-04 RX ADMIN — HEPARIN SODIUM 5000 UNITS: 5000 INJECTION INTRAVENOUS; SUBCUTANEOUS at 06:06

## 2019-04-04 RX ADMIN — HEPARIN SODIUM 5000 UNITS: 5000 INJECTION INTRAVENOUS; SUBCUTANEOUS at 14:29

## 2019-04-04 RX ADMIN — Medication: at 06:06

## 2019-04-04 RX ADMIN — METRONIDAZOLE 500 MG: 500 TABLET, FILM COATED ORAL at 14:30

## 2019-04-04 RX ADMIN — AMIODARONE HYDROCHLORIDE 100 MG: 100 TABLET ORAL at 08:31

## 2019-04-04 RX ADMIN — ONDANSETRON 4 MG: 2 INJECTION INTRAMUSCULAR; INTRAVENOUS at 14:30

## 2019-04-04 RX ADMIN — SODIUM CHLORIDE 50 ML/HR: 0.9 INJECTION, SOLUTION INTRAVENOUS at 14:39

## 2019-04-08 ENCOUNTER — ANESTHESIA EVENT (OUTPATIENT)
Dept: GASTROENTEROLOGY | Facility: HOSPITAL | Age: 59
End: 2019-04-08
Payer: COMMERCIAL

## 2019-04-08 ENCOUNTER — TELEPHONE (OUTPATIENT)
Dept: CARDIOLOGY CLINIC | Facility: CLINIC | Age: 59
End: 2019-04-08

## 2019-04-09 ENCOUNTER — ANESTHESIA (OUTPATIENT)
Dept: GASTROENTEROLOGY | Facility: HOSPITAL | Age: 59
End: 2019-04-09
Payer: COMMERCIAL

## 2019-04-09 ENCOUNTER — HOSPITAL ENCOUNTER (OUTPATIENT)
Facility: HOSPITAL | Age: 59
Setting detail: OUTPATIENT SURGERY
Discharge: HOME | End: 2019-04-09
Attending: INTERNAL MEDICINE | Admitting: INTERNAL MEDICINE
Payer: COMMERCIAL

## 2019-04-09 VITALS
TEMPERATURE: 97.5 F | RESPIRATION RATE: 18 BRPM | WEIGHT: 191 LBS | HEIGHT: 76 IN | HEART RATE: 73 BPM | OXYGEN SATURATION: 99 % | SYSTOLIC BLOOD PRESSURE: 112 MMHG | DIASTOLIC BLOOD PRESSURE: 91 MMHG | BODY MASS INDEX: 23.26 KG/M2

## 2019-04-09 DIAGNOSIS — K85.10 ACUTE BILIARY PANCREATITIS, UNSPECIFIED COMPLICATION STATUS: ICD-10-CM

## 2019-04-09 DIAGNOSIS — I48.0 PAROXYSMAL ATRIAL FIBRILLATION (HCC): Primary | ICD-10-CM

## 2019-04-09 DIAGNOSIS — K86.3 PANCREATIC PSEUDOCYST: Primary | ICD-10-CM

## 2019-04-09 PROCEDURE — ERR1 ERRONEOUS ENCOUNTER-DISREGARD: Performed by: INTERNAL MEDICINE

## 2019-04-09 PROCEDURE — C1769 GUIDE WIRE: HCPCS | Performed by: INTERNAL MEDICINE

## 2019-04-09 PROCEDURE — 48999 UNLISTED PROCEDURE PANCREAS: CPT | Performed by: INTERNAL MEDICINE

## 2019-04-09 PROCEDURE — 43762 RPLC GTUBE NO REVJ TRC: CPT | Performed by: INTERNAL MEDICINE

## 2019-04-09 PROCEDURE — C2617 STENT, NON-COR, TEM W/O DEL: HCPCS | Performed by: INTERNAL MEDICINE

## 2019-04-09 DEVICE — BILIARY STENT
Type: IMPLANTABLE DEVICE | Status: NON-FUNCTIONAL
Brand: ADVANIX™ BILIARY
Removed: 2019-09-19

## 2019-04-09 RX ORDER — SODIUM CHLORIDE 9 MG/ML
125 INJECTION, SOLUTION INTRAVENOUS CONTINUOUS
Status: DISCONTINUED | OUTPATIENT
Start: 2019-04-09 | End: 2019-04-09 | Stop reason: HOSPADM

## 2019-04-09 RX ORDER — PROPOFOL 10 MG/ML
INJECTION, EMULSION INTRAVENOUS CONTINUOUS PRN
Status: DISCONTINUED | OUTPATIENT
Start: 2019-04-09 | End: 2019-04-09 | Stop reason: SURG

## 2019-04-09 RX ORDER — PROPOFOL 10 MG/ML
INJECTION, EMULSION INTRAVENOUS AS NEEDED
Status: DISCONTINUED | OUTPATIENT
Start: 2019-04-09 | End: 2019-04-09 | Stop reason: SURG

## 2019-04-09 RX ORDER — PANTOPRAZOLE SODIUM 40 MG/1
40 TABLET, DELAYED RELEASE ORAL DAILY
Qty: 90 TABLET | Refills: 2 | Status: SHIPPED | OUTPATIENT
Start: 2019-04-09 | End: 2019-07-16 | Stop reason: SDUPTHER

## 2019-04-09 RX ADMIN — PHENYLEPHRINE HYDROCHLORIDE 100 MCG: 10 INJECTION INTRAVENOUS at 14:51

## 2019-04-09 RX ADMIN — SODIUM CHLORIDE 125 ML/HR: 0.9 INJECTION, SOLUTION INTRAVENOUS at 13:43

## 2019-04-09 RX ADMIN — PROPOFOL 80 MG: 10 INJECTION, EMULSION INTRAVENOUS at 14:38

## 2019-04-09 RX ADMIN — PROPOFOL 30 MG: 10 INJECTION, EMULSION INTRAVENOUS at 14:41

## 2019-04-09 RX ADMIN — PHENYLEPHRINE HYDROCHLORIDE 100 MCG: 10 INJECTION INTRAVENOUS at 15:00

## 2019-04-09 RX ADMIN — SODIUM CHLORIDE: 0.9 INJECTION, SOLUTION INTRAVENOUS at 14:33

## 2019-04-09 RX ADMIN — PROPOFOL 80 MCG/KG/MIN: 10 INJECTION, EMULSION INTRAVENOUS at 14:38

## 2019-04-09 RX ADMIN — PROPOFOL 40 MG: 10 INJECTION, EMULSION INTRAVENOUS at 14:51

## 2019-04-10 ENCOUNTER — TELEPHONE (OUTPATIENT)
Dept: GASTROENTEROLOGY | Facility: CLINIC | Age: 59
End: 2019-04-10

## 2019-04-16 ENCOUNTER — OFFICE VISIT (OUTPATIENT)
Dept: PULMONOLOGY | Facility: CLINIC | Age: 59
End: 2019-04-16
Payer: COMMERCIAL

## 2019-04-16 VITALS
WEIGHT: 184 LBS | OXYGEN SATURATION: 96 % | DIASTOLIC BLOOD PRESSURE: 70 MMHG | BODY MASS INDEX: 22.41 KG/M2 | SYSTOLIC BLOOD PRESSURE: 110 MMHG | HEART RATE: 63 BPM | RESPIRATION RATE: 18 BRPM | TEMPERATURE: 96.2 F | HEIGHT: 76 IN

## 2019-04-16 DIAGNOSIS — J90 PLEURAL EFFUSION: Primary | ICD-10-CM

## 2019-04-16 PROBLEM — K85.90 PANCREATITIS: Status: ACTIVE | Noted: 2019-02-28

## 2019-04-16 PROCEDURE — 99214 OFFICE O/P EST MOD 30 MIN: CPT | Performed by: INTERNAL MEDICINE

## 2019-04-16 RX ORDER — WARFARIN SODIUM 3 MG/1
3 TABLET ORAL
Status: ON HOLD | COMMUNITY
End: 2019-05-23 | Stop reason: SDUPTHER

## 2019-04-16 RX ORDER — AMIODARONE HYDROCHLORIDE 200 MG/1
TABLET ORAL
Qty: 30 TABLET | Refills: 1 | OUTPATIENT
Start: 2019-04-16

## 2019-04-17 ENCOUNTER — DOCUMENTATION (OUTPATIENT)
Dept: PULMONOLOGY | Facility: CLINIC | Age: 59
End: 2019-04-17

## 2019-04-23 ENCOUNTER — APPOINTMENT (OUTPATIENT)
Dept: LAB | Facility: CLINIC | Age: 59
End: 2019-04-23
Payer: COMMERCIAL

## 2019-04-23 DIAGNOSIS — J90 PLEURAL EFFUSION, LEFT: Primary | ICD-10-CM

## 2019-04-23 DIAGNOSIS — K85.10 ACUTE BILIARY PANCREATITIS, UNSPECIFIED COMPLICATION STATUS: ICD-10-CM

## 2019-04-23 LAB
INR PPP: 1.49 (ref 0.86–1.17)
PROTHROMBIN TIME: 18.1 SECONDS (ref 11.8–14.2)

## 2019-04-23 PROCEDURE — 85610 PROTHROMBIN TIME: CPT

## 2019-04-23 PROCEDURE — 36415 COLL VENOUS BLD VENIPUNCTURE: CPT

## 2019-04-24 ENCOUNTER — TRANSCRIBE ORDERS (OUTPATIENT)
Dept: RADIOLOGY | Facility: HOSPITAL | Age: 59
End: 2019-04-24

## 2019-04-24 ENCOUNTER — HOSPITAL ENCOUNTER (OUTPATIENT)
Dept: RADIOLOGY | Facility: HOSPITAL | Age: 59
Discharge: HOME | End: 2019-04-24
Attending: THORACIC SURGERY (CARDIOTHORACIC VASCULAR SURGERY)
Payer: COMMERCIAL

## 2019-04-24 ENCOUNTER — ANTICOAG VISIT (OUTPATIENT)
Dept: CARDIOLOGY CLINIC | Facility: CLINIC | Age: 59
End: 2019-04-24

## 2019-04-24 ENCOUNTER — HOSPITAL ENCOUNTER (OUTPATIENT)
Dept: RADIOLOGY | Facility: HOSPITAL | Age: 59
Discharge: HOME | End: 2019-04-24
Attending: INTERNAL MEDICINE
Payer: COMMERCIAL

## 2019-04-24 ENCOUNTER — OFFICE VISIT (OUTPATIENT)
Dept: CARDIAC SURGERY | Facility: CLINIC | Age: 59
End: 2019-04-24
Payer: COMMERCIAL

## 2019-04-24 VITALS
OXYGEN SATURATION: 98 % | BODY MASS INDEX: 22.31 KG/M2 | WEIGHT: 183.2 LBS | TEMPERATURE: 96.5 F | SYSTOLIC BLOOD PRESSURE: 115 MMHG | HEIGHT: 76 IN | HEART RATE: 69 BPM | DIASTOLIC BLOOD PRESSURE: 66 MMHG

## 2019-04-24 DIAGNOSIS — K86.3 PANCREATIC PSEUDOCYST: ICD-10-CM

## 2019-04-24 DIAGNOSIS — J90 PLEURAL EFFUSION, LEFT: ICD-10-CM

## 2019-04-24 DIAGNOSIS — J90 PLEURAL EFFUSION, LEFT: Primary | ICD-10-CM

## 2019-04-24 DIAGNOSIS — I48.0 PAROXYSMAL ATRIAL FIBRILLATION (HCC): ICD-10-CM

## 2019-04-24 PROCEDURE — 74177 CT ABD & PELVIS W/CONTRAST: CPT

## 2019-04-24 PROCEDURE — 99245 OFF/OP CONSLTJ NEW/EST HI 55: CPT | Performed by: THORACIC SURGERY (CARDIOTHORACIC VASCULAR SURGERY)

## 2019-04-24 PROCEDURE — 71046 X-RAY EXAM CHEST 2 VIEWS: CPT

## 2019-04-24 RX ORDER — ACETAMINOPHEN 325 MG/1
975 TABLET ORAL ONCE
Status: CANCELLED | OUTPATIENT
Start: 2019-04-24 | End: 2019-04-24

## 2019-04-24 RX ORDER — CEFAZOLIN SODIUM 2 G/50ML
2000 SOLUTION INTRAVENOUS ONCE
Status: CANCELLED | OUTPATIENT
Start: 2019-04-24 | End: 2019-04-24

## 2019-04-24 RX ORDER — GABAPENTIN 300 MG/1
600 CAPSULE ORAL ONCE
Status: CANCELLED | OUTPATIENT
Start: 2019-04-24 | End: 2019-04-24

## 2019-04-24 RX ADMIN — IOHEXOL 100 ML: 350 INJECTION, SOLUTION INTRAVENOUS at 18:24

## 2019-04-26 ENCOUNTER — ANESTHESIA EVENT (OUTPATIENT)
Dept: GASTROENTEROLOGY | Facility: HOSPITAL | Age: 59
End: 2019-04-26
Payer: COMMERCIAL

## 2019-04-26 ENCOUNTER — APPOINTMENT (OUTPATIENT)
Dept: RADIOLOGY | Facility: HOSPITAL | Age: 59
End: 2019-04-26
Attending: INTERNAL MEDICINE
Payer: COMMERCIAL

## 2019-04-26 ENCOUNTER — ANESTHESIA (OUTPATIENT)
Dept: GASTROENTEROLOGY | Facility: HOSPITAL | Age: 59
End: 2019-04-26
Payer: COMMERCIAL

## 2019-04-26 ENCOUNTER — HOSPITAL ENCOUNTER (OUTPATIENT)
Facility: HOSPITAL | Age: 59
Setting detail: OUTPATIENT SURGERY
Discharge: HOME | End: 2019-04-26
Attending: INTERNAL MEDICINE | Admitting: INTERNAL MEDICINE
Payer: COMMERCIAL

## 2019-04-26 VITALS
RESPIRATION RATE: 18 BRPM | WEIGHT: 183 LBS | DIASTOLIC BLOOD PRESSURE: 65 MMHG | OXYGEN SATURATION: 96 % | TEMPERATURE: 97.6 F | HEART RATE: 84 BPM | BODY MASS INDEX: 22.29 KG/M2 | SYSTOLIC BLOOD PRESSURE: 117 MMHG | HEIGHT: 76 IN

## 2019-04-26 DIAGNOSIS — K85.90 PANCREATITIS: ICD-10-CM

## 2019-04-26 PROCEDURE — 43275 ERCP REMOVE FORGN BODY DUCT: CPT | Performed by: INTERNAL MEDICINE

## 2019-04-26 PROCEDURE — 74328 X-RAY BILE DUCT ENDOSCOPY: CPT

## 2019-04-26 PROCEDURE — 43273 ENDOSCOPIC PANCREATOSCOPY: CPT | Performed by: INTERNAL MEDICINE

## 2019-04-26 PROCEDURE — 88305 TISSUE EXAM BY PATHOLOGIST: CPT | Performed by: PATHOLOGY

## 2019-04-26 PROCEDURE — NC001 PR NO CHARGE: Performed by: INTERNAL MEDICINE

## 2019-04-26 PROCEDURE — C1769 GUIDE WIRE: HCPCS | Performed by: INTERNAL MEDICINE

## 2019-04-26 RX ORDER — CIPROFLOXACIN 2 MG/ML
400 INJECTION, SOLUTION INTRAVENOUS ONCE
Status: CANCELLED | OUTPATIENT
Start: 2019-04-26 | End: 2019-04-26

## 2019-04-26 RX ORDER — SODIUM CHLORIDE 9 MG/ML
INJECTION, SOLUTION INTRAVENOUS CONTINUOUS PRN
Status: DISCONTINUED | OUTPATIENT
Start: 2019-04-26 | End: 2019-04-26 | Stop reason: SURG

## 2019-04-26 RX ORDER — SODIUM CHLORIDE 9 MG/ML
125 INJECTION, SOLUTION INTRAVENOUS CONTINUOUS
Status: CANCELLED | OUTPATIENT
Start: 2019-04-26

## 2019-04-26 RX ORDER — SUCCINYLCHOLINE/SOD CL,ISO/PF 100 MG/5ML
SYRINGE (ML) INTRAVENOUS AS NEEDED
Status: DISCONTINUED | OUTPATIENT
Start: 2019-04-26 | End: 2019-04-26 | Stop reason: SURG

## 2019-04-26 RX ORDER — ONDANSETRON 2 MG/ML
INJECTION INTRAMUSCULAR; INTRAVENOUS AS NEEDED
Status: DISCONTINUED | OUTPATIENT
Start: 2019-04-26 | End: 2019-04-26 | Stop reason: SURG

## 2019-04-26 RX ORDER — FENTANYL CITRATE 50 UG/ML
INJECTION, SOLUTION INTRAMUSCULAR; INTRAVENOUS AS NEEDED
Status: DISCONTINUED | OUTPATIENT
Start: 2019-04-26 | End: 2019-04-26 | Stop reason: SURG

## 2019-04-26 RX ORDER — PROPOFOL 10 MG/ML
INJECTION, EMULSION INTRAVENOUS AS NEEDED
Status: DISCONTINUED | OUTPATIENT
Start: 2019-04-26 | End: 2019-04-26 | Stop reason: SURG

## 2019-04-26 RX ORDER — DEXAMETHASONE SODIUM PHOSPHATE 4 MG/ML
INJECTION, SOLUTION INTRA-ARTICULAR; INTRALESIONAL; INTRAMUSCULAR; INTRAVENOUS; SOFT TISSUE AS NEEDED
Status: DISCONTINUED | OUTPATIENT
Start: 2019-04-26 | End: 2019-04-26 | Stop reason: SURG

## 2019-04-26 RX ORDER — EPHEDRINE SULFATE 50 MG/ML
INJECTION INTRAVENOUS AS NEEDED
Status: DISCONTINUED | OUTPATIENT
Start: 2019-04-26 | End: 2019-04-26 | Stop reason: SURG

## 2019-04-26 RX ORDER — DIPHENHYDRAMINE HCL 25 MG
50 TABLET ORAL ONCE
Status: COMPLETED | OUTPATIENT
Start: 2019-04-26 | End: 2019-04-26

## 2019-04-26 RX ADMIN — PHENYLEPHRINE HYDROCHLORIDE 100 MCG: 10 INJECTION INTRAVENOUS at 14:51

## 2019-04-26 RX ADMIN — PHENYLEPHRINE HYDROCHLORIDE 100 MCG: 10 INJECTION INTRAVENOUS at 15:09

## 2019-04-26 RX ADMIN — FENTANYL CITRATE 50 MCG: 50 INJECTION, SOLUTION INTRAMUSCULAR; INTRAVENOUS at 14:34

## 2019-04-26 RX ADMIN — EPHEDRINE SULFATE 10 MG: 50 INJECTION, SOLUTION INTRAVENOUS at 14:49

## 2019-04-26 RX ADMIN — PHENYLEPHRINE HYDROCHLORIDE 100 MCG: 10 INJECTION INTRAVENOUS at 14:58

## 2019-04-26 RX ADMIN — EPHEDRINE SULFATE 5 MG: 50 INJECTION, SOLUTION INTRAVENOUS at 15:09

## 2019-04-26 RX ADMIN — EPHEDRINE SULFATE 5 MG: 50 INJECTION, SOLUTION INTRAVENOUS at 14:58

## 2019-04-26 RX ADMIN — PROPOFOL 200 MG: 10 INJECTION, EMULSION INTRAVENOUS at 14:34

## 2019-04-26 RX ADMIN — EPHEDRINE SULFATE 10 MG: 50 INJECTION, SOLUTION INTRAVENOUS at 14:45

## 2019-04-26 RX ADMIN — FENTANYL CITRATE 50 MCG: 50 INJECTION, SOLUTION INTRAMUSCULAR; INTRAVENOUS at 14:39

## 2019-04-26 RX ADMIN — ONDANSETRON 4 MG: 2 INJECTION INTRAMUSCULAR; INTRAVENOUS at 14:43

## 2019-04-26 RX ADMIN — LIDOCAINE HYDROCHLORIDE 100 MG: 20 INJECTION, SOLUTION INTRAVENOUS at 14:34

## 2019-04-26 RX ADMIN — DIPHENHYDRAMINE HCL 25 MG: 25 TABLET, FILM COATED ORAL at 16:00

## 2019-04-26 RX ADMIN — SODIUM CHLORIDE: 0.9 INJECTION, SOLUTION INTRAVENOUS at 14:14

## 2019-04-26 RX ADMIN — DEXAMETHASONE SODIUM PHOSPHATE 10 MG: 4 INJECTION, SOLUTION INTRAMUSCULAR; INTRAVENOUS at 14:43

## 2019-04-26 RX ADMIN — IOHEXOL 15 ML: 240 INJECTION, SOLUTION INTRATHECAL; INTRAVASCULAR; INTRAVENOUS; ORAL at 15:17

## 2019-04-26 RX ADMIN — Medication 100 MG: at 14:34

## 2019-04-26 RX ADMIN — CIPROFLOXACIN 400 MG: 2 INJECTION, SOLUTION INTRAVENOUS at 15:12

## 2019-05-02 ENCOUNTER — APPOINTMENT (OUTPATIENT)
Dept: LAB | Facility: CLINIC | Age: 59
DRG: 186 | End: 2019-05-02
Payer: COMMERCIAL

## 2019-05-03 ENCOUNTER — ANTICOAG VISIT (OUTPATIENT)
Dept: CARDIOLOGY CLINIC | Facility: CLINIC | Age: 59
End: 2019-05-03

## 2019-05-03 DIAGNOSIS — I48.0 PAROXYSMAL ATRIAL FIBRILLATION (HCC): ICD-10-CM

## 2019-05-08 ENCOUNTER — DOCUMENTATION (OUTPATIENT)
Dept: GASTROENTEROLOGY | Facility: MEDICAL CENTER | Age: 59
End: 2019-05-08

## 2019-05-09 ENCOUNTER — ANTICOAG VISIT (OUTPATIENT)
Dept: CARDIOLOGY CLINIC | Facility: CLINIC | Age: 59
End: 2019-05-09

## 2019-05-09 DIAGNOSIS — I48.0 PAROXYSMAL ATRIAL FIBRILLATION (HCC): ICD-10-CM

## 2019-05-09 LAB — INR PPP: 1.3 (ref 0.86–1.17)

## 2019-05-10 DIAGNOSIS — I48.0 PAROXYSMAL ATRIAL FIBRILLATION (HCC): Primary | ICD-10-CM

## 2019-05-13 ENCOUNTER — APPOINTMENT (OUTPATIENT)
Dept: LAB | Facility: CLINIC | Age: 59
DRG: 186 | End: 2019-05-13
Payer: COMMERCIAL

## 2019-05-13 LAB
INR PPP: 1.38 (ref 0.86–1.17)
PROTHROMBIN TIME: 17.1 SECONDS (ref 11.8–14.2)

## 2019-05-13 PROCEDURE — 85610 PROTHROMBIN TIME: CPT

## 2019-05-13 PROCEDURE — 36415 COLL VENOUS BLD VENIPUNCTURE: CPT

## 2019-05-14 ENCOUNTER — OFFICE VISIT (OUTPATIENT)
Dept: GASTROENTEROLOGY | Facility: CLINIC | Age: 59
End: 2019-05-14
Payer: COMMERCIAL

## 2019-05-14 ENCOUNTER — ANTICOAG VISIT (OUTPATIENT)
Dept: CARDIOLOGY CLINIC | Facility: CLINIC | Age: 59
End: 2019-05-14

## 2019-05-14 ENCOUNTER — TELEPHONE (OUTPATIENT)
Dept: CARDIOLOGY CLINIC | Facility: CLINIC | Age: 59
End: 2019-05-14

## 2019-05-14 ENCOUNTER — ANESTHESIA EVENT (OUTPATIENT)
Dept: PERIOP | Facility: HOSPITAL | Age: 59
DRG: 186 | End: 2019-05-14
Payer: COMMERCIAL

## 2019-05-14 VITALS
HEART RATE: 71 BPM | TEMPERATURE: 96.8 F | HEIGHT: 76 IN | WEIGHT: 186.8 LBS | BODY MASS INDEX: 22.75 KG/M2 | SYSTOLIC BLOOD PRESSURE: 135 MMHG | DIASTOLIC BLOOD PRESSURE: 86 MMHG

## 2019-05-14 DIAGNOSIS — K86.3 PANCREATIC PSEUDOCYST: Primary | ICD-10-CM

## 2019-05-14 DIAGNOSIS — D13.5 AMPULLARY ADENOMA: ICD-10-CM

## 2019-05-14 DIAGNOSIS — I48.0 PAROXYSMAL ATRIAL FIBRILLATION (HCC): ICD-10-CM

## 2019-05-14 PROBLEM — Q45.3 PANCREATIC ABNORMALITY: Status: RESOLVED | Noted: 2019-03-28 | Resolved: 2019-05-14

## 2019-05-14 PROBLEM — K85.90 PANCREATITIS: Status: RESOLVED | Noted: 2019-02-28 | Resolved: 2019-05-14

## 2019-05-14 PROCEDURE — 99214 OFFICE O/P EST MOD 30 MIN: CPT | Performed by: INTERNAL MEDICINE

## 2019-05-16 ENCOUNTER — OFFICE VISIT (OUTPATIENT)
Dept: CARDIOLOGY CLINIC | Facility: CLINIC | Age: 59
End: 2019-05-16
Payer: COMMERCIAL

## 2019-05-16 VITALS
SYSTOLIC BLOOD PRESSURE: 108 MMHG | OXYGEN SATURATION: 98 % | WEIGHT: 190.6 LBS | HEIGHT: 76 IN | BODY MASS INDEX: 23.21 KG/M2 | DIASTOLIC BLOOD PRESSURE: 64 MMHG | HEART RATE: 73 BPM

## 2019-05-16 DIAGNOSIS — I48.0 PAROXYSMAL ATRIAL FIBRILLATION (HCC): Primary | ICD-10-CM

## 2019-05-16 DIAGNOSIS — J90 PLEURAL EFFUSION ON LEFT: ICD-10-CM

## 2019-05-16 PROCEDURE — 99214 OFFICE O/P EST MOD 30 MIN: CPT | Performed by: NURSE PRACTITIONER

## 2019-05-20 ENCOUNTER — APPOINTMENT (INPATIENT)
Dept: RADIOLOGY | Facility: HOSPITAL | Age: 59
DRG: 186 | End: 2019-05-20
Payer: COMMERCIAL

## 2019-05-20 ENCOUNTER — HOSPITAL ENCOUNTER (INPATIENT)
Facility: HOSPITAL | Age: 59
LOS: 3 days | Discharge: HOME | DRG: 186 | End: 2019-05-23
Attending: THORACIC SURGERY (CARDIOTHORACIC VASCULAR SURGERY) | Admitting: THORACIC SURGERY (CARDIOTHORACIC VASCULAR SURGERY)
Payer: COMMERCIAL

## 2019-05-20 ENCOUNTER — ANESTHESIA (OUTPATIENT)
Dept: PERIOP | Facility: HOSPITAL | Age: 59
DRG: 186 | End: 2019-05-20
Payer: COMMERCIAL

## 2019-05-20 DIAGNOSIS — J90 PLEURAL EFFUSION, LEFT: ICD-10-CM

## 2019-05-20 LAB
ABO GROUP BLD: NORMAL
ANION GAP SERPL CALCULATED.3IONS-SCNC: 7 MMOL/L (ref 4–13)
APTT PPP: 37 SECONDS (ref 26–38)
BLD GP AB SCN SERPL QL: NEGATIVE
BUN SERPL-MCNC: 16 MG/DL (ref 5–25)
CALCIUM SERPL-MCNC: 8.4 MG/DL (ref 8.3–10.1)
CHLORIDE SERPL-SCNC: 108 MMOL/L (ref 100–108)
CO2 SERPL-SCNC: 25 MMOL/L (ref 21–32)
CREAT SERPL-MCNC: 0.98 MG/DL (ref 0.6–1.3)
ERYTHROCYTE [DISTWIDTH] IN BLOOD BY AUTOMATED COUNT: 17.8 % (ref 11.6–15.1)
GFR SERPL CREATININE-BSD FRML MDRD: 84 ML/MIN/1.73SQ M
GLUCOSE SERPL-MCNC: 94 MG/DL (ref 65–140)
HCT VFR BLD AUTO: 44.2 % (ref 36.5–49.3)
HGB BLD-MCNC: 14 G/DL (ref 12–17)
INR PPP: 1.31 (ref 0.86–1.17)
MAGNESIUM SERPL-MCNC: 2 MG/DL (ref 1.6–2.6)
MCH RBC QN AUTO: 27.1 PG (ref 26.8–34.3)
MCHC RBC AUTO-ENTMCNC: 31.7 G/DL (ref 31.4–37.4)
MCV RBC AUTO: 86 FL (ref 82–98)
PLATELET # BLD AUTO: 220 THOUSANDS/UL (ref 149–390)
PMV BLD AUTO: 9.3 FL (ref 8.9–12.7)
POTASSIUM SERPL-SCNC: 4.1 MMOL/L (ref 3.5–5.3)
PROTHROMBIN TIME: 16.4 SECONDS (ref 11.8–14.2)
RBC # BLD AUTO: 5.17 MILLION/UL (ref 3.88–5.62)
RH BLD: POSITIVE
SODIUM SERPL-SCNC: 140 MMOL/L (ref 136–145)
SPECIMEN EXPIRATION DATE: NORMAL
WBC # BLD AUTO: 8.3 THOUSAND/UL (ref 4.31–10.16)

## 2019-05-20 PROCEDURE — 88305 TISSUE EXAM BY PATHOLOGIST: CPT | Performed by: PATHOLOGY

## 2019-05-20 PROCEDURE — 87116 MYCOBACTERIA CULTURE: CPT | Performed by: THORACIC SURGERY (CARDIOTHORACIC VASCULAR SURGERY)

## 2019-05-20 PROCEDURE — 0BJ08ZZ INSPECTION OF TRACHEOBRONCHIAL TREE, VIA NATURAL OR ARTIFICIAL OPENING ENDOSCOPIC: ICD-10-PCS | Performed by: THORACIC SURGERY (CARDIOTHORACIC VASCULAR SURGERY)

## 2019-05-20 PROCEDURE — 0BCP4ZZ EXTIRPATION OF MATTER FROM LEFT PLEURA, PERCUTANEOUS ENDOSCOPIC APPROACH: ICD-10-PCS | Performed by: THORACIC SURGERY (CARDIOTHORACIC VASCULAR SURGERY)

## 2019-05-20 PROCEDURE — 86901 BLOOD TYPING SEROLOGIC RH(D): CPT | Performed by: THORACIC SURGERY (CARDIOTHORACIC VASCULAR SURGERY)

## 2019-05-20 PROCEDURE — 32650 THORACOSCOPY W/PLEURODESIS: CPT | Performed by: THORACIC SURGERY (CARDIOTHORACIC VASCULAR SURGERY)

## 2019-05-20 PROCEDURE — 32652 THORACOSCOPY REM TOTL CORTEX: CPT | Performed by: THORACIC SURGERY (CARDIOTHORACIC VASCULAR SURGERY)

## 2019-05-20 PROCEDURE — 87075 CULTR BACTERIA EXCEPT BLOOD: CPT | Performed by: THORACIC SURGERY (CARDIOTHORACIC VASCULAR SURGERY)

## 2019-05-20 PROCEDURE — C9290 INJ, BUPIVACAINE LIPOSOME: HCPCS | Performed by: THORACIC SURGERY (CARDIOTHORACIC VASCULAR SURGERY)

## 2019-05-20 PROCEDURE — 80048 BASIC METABOLIC PNL TOTAL CA: CPT | Performed by: PHYSICIAN ASSISTANT

## 2019-05-20 PROCEDURE — 86850 RBC ANTIBODY SCREEN: CPT | Performed by: THORACIC SURGERY (CARDIOTHORACIC VASCULAR SURGERY)

## 2019-05-20 PROCEDURE — 85027 COMPLETE CBC AUTOMATED: CPT | Performed by: PHYSICIAN ASSISTANT

## 2019-05-20 PROCEDURE — 87205 SMEAR GRAM STAIN: CPT | Performed by: THORACIC SURGERY (CARDIOTHORACIC VASCULAR SURGERY)

## 2019-05-20 PROCEDURE — 87102 FUNGUS ISOLATION CULTURE: CPT | Performed by: THORACIC SURGERY (CARDIOTHORACIC VASCULAR SURGERY)

## 2019-05-20 PROCEDURE — 85610 PROTHROMBIN TIME: CPT | Performed by: THORACIC SURGERY (CARDIOTHORACIC VASCULAR SURGERY)

## 2019-05-20 PROCEDURE — 83735 ASSAY OF MAGNESIUM: CPT | Performed by: PHYSICIAN ASSISTANT

## 2019-05-20 PROCEDURE — 86900 BLOOD TYPING SEROLOGIC ABO: CPT | Performed by: THORACIC SURGERY (CARDIOTHORACIC VASCULAR SURGERY)

## 2019-05-20 PROCEDURE — 87070 CULTURE OTHR SPECIMN AEROBIC: CPT | Performed by: THORACIC SURGERY (CARDIOTHORACIC VASCULAR SURGERY)

## 2019-05-20 PROCEDURE — 85730 THROMBOPLASTIN TIME PARTIAL: CPT | Performed by: THORACIC SURGERY (CARDIOTHORACIC VASCULAR SURGERY)

## 2019-05-20 PROCEDURE — 88112 CYTOPATH CELL ENHANCE TECH: CPT | Performed by: PATHOLOGY

## 2019-05-20 PROCEDURE — 87176 TISSUE HOMOGENIZATION CULTR: CPT | Performed by: THORACIC SURGERY (CARDIOTHORACIC VASCULAR SURGERY)

## 2019-05-20 PROCEDURE — 71045 X-RAY EXAM CHEST 1 VIEW: CPT

## 2019-05-20 PROCEDURE — 3E0L4GC INTRODUCTION OF OTHER THERAPEUTIC SUBSTANCE INTO PLEURAL CAVITY, PERCUTANEOUS ENDOSCOPIC APPROACH: ICD-10-PCS | Performed by: THORACIC SURGERY (CARDIOTHORACIC VASCULAR SURGERY)

## 2019-05-20 PROCEDURE — 87206 SMEAR FLUORESCENT/ACID STAI: CPT | Performed by: THORACIC SURGERY (CARDIOTHORACIC VASCULAR SURGERY)

## 2019-05-20 RX ORDER — DEXAMETHASONE SODIUM PHOSPHATE 10 MG/ML
INJECTION, SOLUTION INTRAMUSCULAR; INTRAVENOUS AS NEEDED
Status: DISCONTINUED | OUTPATIENT
Start: 2019-05-20 | End: 2019-05-20 | Stop reason: SURG

## 2019-05-20 RX ORDER — SODIUM CHLORIDE, SODIUM LACTATE, POTASSIUM CHLORIDE, CALCIUM CHLORIDE 600; 310; 30; 20 MG/100ML; MG/100ML; MG/100ML; MG/100ML
INJECTION, SOLUTION INTRAVENOUS CONTINUOUS PRN
Status: DISCONTINUED | OUTPATIENT
Start: 2019-05-20 | End: 2019-05-20

## 2019-05-20 RX ORDER — PROPOFOL 10 MG/ML
INJECTION, EMULSION INTRAVENOUS AS NEEDED
Status: DISCONTINUED | OUTPATIENT
Start: 2019-05-20 | End: 2019-05-20 | Stop reason: SURG

## 2019-05-20 RX ORDER — SODIUM CHLORIDE, SODIUM LACTATE, POTASSIUM CHLORIDE, CALCIUM CHLORIDE 600; 310; 30; 20 MG/100ML; MG/100ML; MG/100ML; MG/100ML
75 INJECTION, SOLUTION INTRAVENOUS CONTINUOUS
Status: DISCONTINUED | OUTPATIENT
Start: 2019-05-20 | End: 2019-05-20

## 2019-05-20 RX ORDER — HYDROMORPHONE HCL/PF 1 MG/ML
0.5 SYRINGE (ML) INJECTION EVERY 2 HOUR PRN
Status: DISCONTINUED | OUTPATIENT
Start: 2019-05-20 | End: 2019-05-23 | Stop reason: HOSPADM

## 2019-05-20 RX ORDER — ACETAMINOPHEN 325 MG/1
975 TABLET ORAL ONCE
Status: COMPLETED | OUTPATIENT
Start: 2019-05-20 | End: 2019-05-20

## 2019-05-20 RX ORDER — PANTOPRAZOLE SODIUM 40 MG/1
40 TABLET, DELAYED RELEASE ORAL DAILY
Status: DISCONTINUED | OUTPATIENT
Start: 2019-05-20 | End: 2019-05-23 | Stop reason: HOSPADM

## 2019-05-20 RX ORDER — ONDANSETRON 2 MG/ML
4 INJECTION INTRAMUSCULAR; INTRAVENOUS EVERY 6 HOURS PRN
Status: DISCONTINUED | OUTPATIENT
Start: 2019-05-20 | End: 2019-05-23 | Stop reason: HOSPADM

## 2019-05-20 RX ORDER — ONDANSETRON 2 MG/ML
INJECTION INTRAMUSCULAR; INTRAVENOUS AS NEEDED
Status: DISCONTINUED | OUTPATIENT
Start: 2019-05-20 | End: 2019-05-20 | Stop reason: SURG

## 2019-05-20 RX ORDER — DIPHENHYDRAMINE HYDROCHLORIDE 50 MG/ML
12.5 INJECTION INTRAMUSCULAR; INTRAVENOUS ONCE AS NEEDED
Status: DISCONTINUED | OUTPATIENT
Start: 2019-05-20 | End: 2019-05-20 | Stop reason: HOSPADM

## 2019-05-20 RX ORDER — LIDOCAINE HYDROCHLORIDE 10 MG/ML
INJECTION, SOLUTION INFILTRATION; PERINEURAL AS NEEDED
Status: DISCONTINUED | OUTPATIENT
Start: 2019-05-20 | End: 2019-05-20 | Stop reason: SURG

## 2019-05-20 RX ORDER — NEOSTIGMINE METHYLSULFATE 1 MG/ML
INJECTION INTRAVENOUS AS NEEDED
Status: DISCONTINUED | OUTPATIENT
Start: 2019-05-20 | End: 2019-05-20 | Stop reason: SURG

## 2019-05-20 RX ORDER — GABAPENTIN 300 MG/1
600 CAPSULE ORAL ONCE
Status: COMPLETED | OUTPATIENT
Start: 2019-05-20 | End: 2019-05-20

## 2019-05-20 RX ORDER — GLYCOPYRROLATE 0.2 MG/ML
INJECTION INTRAMUSCULAR; INTRAVENOUS AS NEEDED
Status: DISCONTINUED | OUTPATIENT
Start: 2019-05-20 | End: 2019-05-20 | Stop reason: SURG

## 2019-05-20 RX ORDER — MIDAZOLAM HYDROCHLORIDE 1 MG/ML
INJECTION INTRAMUSCULAR; INTRAVENOUS AS NEEDED
Status: DISCONTINUED | OUTPATIENT
Start: 2019-05-20 | End: 2019-05-20 | Stop reason: SURG

## 2019-05-20 RX ORDER — FENTANYL CITRATE 50 UG/ML
INJECTION, SOLUTION INTRAMUSCULAR; INTRAVENOUS AS NEEDED
Status: DISCONTINUED | OUTPATIENT
Start: 2019-05-20 | End: 2019-05-20 | Stop reason: SURG

## 2019-05-20 RX ORDER — OXYCODONE HYDROCHLORIDE 5 MG/1
5 TABLET ORAL EVERY 4 HOURS PRN
Status: DISCONTINUED | OUTPATIENT
Start: 2019-05-20 | End: 2019-05-23 | Stop reason: HOSPADM

## 2019-05-20 RX ORDER — MEPERIDINE HYDROCHLORIDE 25 MG/ML
12.5 INJECTION INTRAMUSCULAR; INTRAVENOUS; SUBCUTANEOUS ONCE AS NEEDED
Status: DISCONTINUED | OUTPATIENT
Start: 2019-05-20 | End: 2019-05-20 | Stop reason: HOSPADM

## 2019-05-20 RX ORDER — HYDROMORPHONE HCL/PF 1 MG/ML
0.5 SYRINGE (ML) INJECTION
Status: DISCONTINUED | OUTPATIENT
Start: 2019-05-20 | End: 2019-05-20 | Stop reason: HOSPADM

## 2019-05-20 RX ORDER — ACETAMINOPHEN 325 MG/1
975 TABLET ORAL EVERY 6 HOURS
Status: DISCONTINUED | OUTPATIENT
Start: 2019-05-20 | End: 2019-05-23 | Stop reason: HOSPADM

## 2019-05-20 RX ORDER — OXYCODONE HYDROCHLORIDE 5 MG/1
10 TABLET ORAL EVERY 4 HOURS PRN
Status: DISCONTINUED | OUTPATIENT
Start: 2019-05-20 | End: 2019-05-23 | Stop reason: HOSPADM

## 2019-05-20 RX ORDER — METOCLOPRAMIDE HYDROCHLORIDE 5 MG/ML
10 INJECTION INTRAMUSCULAR; INTRAVENOUS ONCE AS NEEDED
Status: DISCONTINUED | OUTPATIENT
Start: 2019-05-20 | End: 2019-05-20 | Stop reason: HOSPADM

## 2019-05-20 RX ORDER — KETOROLAC TROMETHAMINE 30 MG/ML
15 INJECTION, SOLUTION INTRAMUSCULAR; INTRAVENOUS EVERY 6 HOURS SCHEDULED
Status: DISCONTINUED | OUTPATIENT
Start: 2019-05-20 | End: 2019-05-21

## 2019-05-20 RX ORDER — 0.9 % SODIUM CHLORIDE 0.9 %
VIAL (ML) INJECTION AS NEEDED
Status: DISCONTINUED | OUTPATIENT
Start: 2019-05-20 | End: 2019-05-20 | Stop reason: HOSPADM

## 2019-05-20 RX ORDER — FENTANYL CITRATE/PF 50 MCG/ML
50 SYRINGE (ML) INJECTION
Status: DISCONTINUED | OUTPATIENT
Start: 2019-05-20 | End: 2019-05-20 | Stop reason: HOSPADM

## 2019-05-20 RX ORDER — ROCURONIUM BROMIDE 10 MG/ML
INJECTION, SOLUTION INTRAVENOUS AS NEEDED
Status: DISCONTINUED | OUTPATIENT
Start: 2019-05-20 | End: 2019-05-20 | Stop reason: SURG

## 2019-05-20 RX ORDER — ACETAMINOPHEN 325 MG/1
650 TABLET ORAL EVERY 6 HOURS PRN
COMMUNITY
End: 2019-05-23 | Stop reason: HOSPADM

## 2019-05-20 RX ORDER — CEFAZOLIN SODIUM 2 G/50ML
2000 SOLUTION INTRAVENOUS ONCE
Status: COMPLETED | OUTPATIENT
Start: 2019-05-20 | End: 2019-05-20

## 2019-05-20 RX ORDER — BUPIVACAINE HYDROCHLORIDE 2.5 MG/ML
INJECTION, SOLUTION INFILTRATION; PERINEURAL AS NEEDED
Status: DISCONTINUED | OUTPATIENT
Start: 2019-05-20 | End: 2019-05-20 | Stop reason: HOSPADM

## 2019-05-20 RX ORDER — PROMETHAZINE HYDROCHLORIDE 25 MG/ML
25 INJECTION, SOLUTION INTRAMUSCULAR; INTRAVENOUS ONCE AS NEEDED
Status: DISCONTINUED | OUTPATIENT
Start: 2019-05-20 | End: 2019-05-20 | Stop reason: HOSPADM

## 2019-05-20 RX ADMIN — OXYCODONE HYDROCHLORIDE 10 MG: 5 TABLET ORAL at 13:32

## 2019-05-20 RX ADMIN — NEOSTIGMINE METHYLSULFATE 3 MG: 1 INJECTION, SOLUTION INTRAVENOUS at 10:20

## 2019-05-20 RX ADMIN — PHENYLEPHRINE HYDROCHLORIDE 200 MCG: 10 INJECTION INTRAVENOUS at 08:19

## 2019-05-20 RX ADMIN — LIDOCAINE HYDROCHLORIDE 50 MG: 10 INJECTION, SOLUTION INFILTRATION; PERINEURAL at 07:58

## 2019-05-20 RX ADMIN — KETOROLAC TROMETHAMINE 15 MG: 30 INJECTION, SOLUTION INTRAMUSCULAR at 18:47

## 2019-05-20 RX ADMIN — PHENYLEPHRINE HYDROCHLORIDE 100 MCG: 10 INJECTION INTRAVENOUS at 09:20

## 2019-05-20 RX ADMIN — PANTOPRAZOLE SODIUM 40 MG: 40 TABLET, DELAYED RELEASE ORAL at 13:38

## 2019-05-20 RX ADMIN — PHENYLEPHRINE HYDROCHLORIDE 100 MCG: 10 INJECTION INTRAVENOUS at 09:58

## 2019-05-20 RX ADMIN — PHENYLEPHRINE HYDROCHLORIDE 100 MCG: 10 INJECTION INTRAVENOUS at 08:54

## 2019-05-20 RX ADMIN — GLYCOPYRROLATE 0.2 MG: 0.2 INJECTION, SOLUTION INTRAMUSCULAR; INTRAVENOUS at 07:58

## 2019-05-20 RX ADMIN — PHENYLEPHRINE HYDROCHLORIDE 100 MCG: 10 INJECTION INTRAVENOUS at 09:12

## 2019-05-20 RX ADMIN — ACETAMINOPHEN 975 MG: 325 TABLET ORAL at 13:38

## 2019-05-20 RX ADMIN — PHENYLEPHRINE HYDROCHLORIDE 100 MCG: 10 INJECTION INTRAVENOUS at 08:34

## 2019-05-20 RX ADMIN — ENOXAPARIN SODIUM 40 MG: 40 INJECTION SUBCUTANEOUS at 13:39

## 2019-05-20 RX ADMIN — PHENYLEPHRINE HYDROCHLORIDE 200 MCG: 10 INJECTION INTRAVENOUS at 09:01

## 2019-05-20 RX ADMIN — ROCURONIUM BROMIDE 50 MG: 10 INJECTION, SOLUTION INTRAVENOUS at 07:58

## 2019-05-20 RX ADMIN — PHENYLEPHRINE HYDROCHLORIDE 200 MCG: 10 INJECTION INTRAVENOUS at 09:32

## 2019-05-20 RX ADMIN — ONDANSETRON 4 MG: 2 INJECTION INTRAMUSCULAR; INTRAVENOUS at 10:16

## 2019-05-20 RX ADMIN — PHENYLEPHRINE HYDROCHLORIDE 100 MCG: 10 INJECTION INTRAVENOUS at 10:03

## 2019-05-20 RX ADMIN — MIDAZOLAM 2 MG: 1 INJECTION INTRAMUSCULAR; INTRAVENOUS at 07:47

## 2019-05-20 RX ADMIN — SODIUM CHLORIDE, SODIUM LACTATE, POTASSIUM CHLORIDE, AND CALCIUM CHLORIDE 75 ML/HR: .6; .31; .03; .02 INJECTION, SOLUTION INTRAVENOUS at 12:36

## 2019-05-20 RX ADMIN — PHENYLEPHRINE HYDROCHLORIDE 100 MCG: 10 INJECTION INTRAVENOUS at 08:43

## 2019-05-20 RX ADMIN — GABAPENTIN 600 MG: 300 CAPSULE ORAL at 05:52

## 2019-05-20 RX ADMIN — FENTANYL CITRATE 100 MCG: 50 INJECTION, SOLUTION INTRAMUSCULAR; INTRAVENOUS at 07:58

## 2019-05-20 RX ADMIN — DEXAMETHASONE SODIUM PHOSPHATE 10 MG: 10 INJECTION, SOLUTION INTRAMUSCULAR; INTRAVENOUS at 08:54

## 2019-05-20 RX ADMIN — CEFAZOLIN SODIUM 2000 MG: 2 SOLUTION INTRAVENOUS at 08:15

## 2019-05-20 RX ADMIN — ACETAMINOPHEN 975 MG: 325 TABLET ORAL at 05:52

## 2019-05-20 RX ADMIN — ACETAMINOPHEN 975 MG: 325 TABLET ORAL at 19:36

## 2019-05-20 RX ADMIN — SODIUM CHLORIDE, SODIUM LACTATE, POTASSIUM CHLORIDE, AND CALCIUM CHLORIDE: .6; .31; .03; .02 INJECTION, SOLUTION INTRAVENOUS at 07:15

## 2019-05-20 RX ADMIN — METOPROLOL TARTRATE 25 MG: 25 TABLET ORAL at 21:33

## 2019-05-20 RX ADMIN — ROCURONIUM BROMIDE 20 MG: 10 INJECTION, SOLUTION INTRAVENOUS at 09:06

## 2019-05-20 RX ADMIN — HYDROMORPHONE HYDROCHLORIDE 0.5 MG: 1 INJECTION, SOLUTION INTRAMUSCULAR; INTRAVENOUS; SUBCUTANEOUS at 18:06

## 2019-05-20 RX ADMIN — GLYCOPYRROLATE 0.6 MG: 0.2 INJECTION, SOLUTION INTRAMUSCULAR; INTRAVENOUS at 10:20

## 2019-05-20 RX ADMIN — PHENYLEPHRINE HYDROCHLORIDE 30 MCG: 10 INJECTION INTRAVENOUS at 09:22

## 2019-05-20 RX ADMIN — PHENYLEPHRINE HYDROCHLORIDE 100 MCG: 10 INJECTION INTRAVENOUS at 08:51

## 2019-05-20 RX ADMIN — OXYCODONE HYDROCHLORIDE 10 MG: 5 TABLET ORAL at 17:42

## 2019-05-20 RX ADMIN — PHENYLEPHRINE HYDROCHLORIDE 100 MCG: 10 INJECTION INTRAVENOUS at 09:52

## 2019-05-20 RX ADMIN — PROPOFOL 150 MG: 10 INJECTION, EMULSION INTRAVENOUS at 07:58

## 2019-05-20 RX ADMIN — SODIUM CHLORIDE, SODIUM LACTATE, POTASSIUM CHLORIDE, AND CALCIUM CHLORIDE 75 ML/HR: .6; .31; .03; .02 INJECTION, SOLUTION INTRAVENOUS at 18:23

## 2019-05-21 ENCOUNTER — TELEPHONE (OUTPATIENT)
Dept: GASTROENTEROLOGY | Facility: CLINIC | Age: 59
End: 2019-05-21

## 2019-05-21 LAB
ANION GAP SERPL CALCULATED.3IONS-SCNC: 5 MMOL/L (ref 4–13)
BUN SERPL-MCNC: 27 MG/DL (ref 5–25)
CALCIUM SERPL-MCNC: 8.3 MG/DL (ref 8.3–10.1)
CHLORIDE SERPL-SCNC: 104 MMOL/L (ref 100–108)
CO2 SERPL-SCNC: 26 MMOL/L (ref 21–32)
CREAT SERPL-MCNC: 1.25 MG/DL (ref 0.6–1.3)
ERYTHROCYTE [DISTWIDTH] IN BLOOD BY AUTOMATED COUNT: 17.7 % (ref 11.6–15.1)
GFR SERPL CREATININE-BSD FRML MDRD: 63 ML/MIN/1.73SQ M
GLUCOSE SERPL-MCNC: 175 MG/DL (ref 65–140)
HCT VFR BLD AUTO: 37.6 % (ref 36.5–49.3)
HGB BLD-MCNC: 11.9 G/DL (ref 12–17)
MCH RBC QN AUTO: 27.2 PG (ref 26.8–34.3)
MCHC RBC AUTO-ENTMCNC: 31.6 G/DL (ref 31.4–37.4)
MCV RBC AUTO: 86 FL (ref 82–98)
PLATELET # BLD AUTO: 234 THOUSANDS/UL (ref 149–390)
PMV BLD AUTO: 9.4 FL (ref 8.9–12.7)
POTASSIUM SERPL-SCNC: 4.5 MMOL/L (ref 3.5–5.3)
RBC # BLD AUTO: 4.37 MILLION/UL (ref 3.88–5.62)
SODIUM SERPL-SCNC: 135 MMOL/L (ref 136–145)
WBC # BLD AUTO: 12.18 THOUSAND/UL (ref 4.31–10.16)

## 2019-05-21 PROCEDURE — 85027 COMPLETE CBC AUTOMATED: CPT | Performed by: SURGERY

## 2019-05-21 PROCEDURE — 99024 POSTOP FOLLOW-UP VISIT: CPT | Performed by: THORACIC SURGERY (CARDIOTHORACIC VASCULAR SURGERY)

## 2019-05-21 PROCEDURE — 80048 BASIC METABOLIC PNL TOTAL CA: CPT | Performed by: SURGERY

## 2019-05-21 PROCEDURE — 94760 N-INVAS EAR/PLS OXIMETRY 1: CPT

## 2019-05-21 RX ADMIN — ENOXAPARIN SODIUM 40 MG: 40 INJECTION SUBCUTANEOUS at 09:00

## 2019-05-21 RX ADMIN — METOPROLOL TARTRATE 12.5 MG: 25 TABLET ORAL at 20:32

## 2019-05-21 RX ADMIN — KETOROLAC TROMETHAMINE 15 MG: 30 INJECTION, SOLUTION INTRAMUSCULAR at 06:23

## 2019-05-21 RX ADMIN — SODIUM CHLORIDE 1000 ML: 0.9 INJECTION, SOLUTION INTRAVENOUS at 06:23

## 2019-05-21 RX ADMIN — OXYCODONE HYDROCHLORIDE 10 MG: 5 TABLET ORAL at 22:11

## 2019-05-21 RX ADMIN — KETOROLAC TROMETHAMINE 15 MG: 30 INJECTION, SOLUTION INTRAMUSCULAR at 00:49

## 2019-05-21 RX ADMIN — PANTOPRAZOLE SODIUM 40 MG: 40 TABLET, DELAYED RELEASE ORAL at 08:59

## 2019-05-21 RX ADMIN — OXYCODONE HYDROCHLORIDE 10 MG: 5 TABLET ORAL at 17:53

## 2019-05-21 RX ADMIN — ACETAMINOPHEN 975 MG: 325 TABLET ORAL at 15:51

## 2019-05-21 RX ADMIN — OXYCODONE HYDROCHLORIDE 10 MG: 5 TABLET ORAL at 13:07

## 2019-05-21 RX ADMIN — ACETAMINOPHEN 975 MG: 325 TABLET ORAL at 08:56

## 2019-05-21 RX ADMIN — ACETAMINOPHEN 975 MG: 325 TABLET ORAL at 20:32

## 2019-05-21 RX ADMIN — ACETAMINOPHEN 975 MG: 325 TABLET ORAL at 02:39

## 2019-05-21 RX ADMIN — OXYCODONE HYDROCHLORIDE 10 MG: 5 TABLET ORAL at 09:04

## 2019-05-21 RX ADMIN — OXYCODONE HYDROCHLORIDE 10 MG: 5 TABLET ORAL at 03:45

## 2019-05-22 ENCOUNTER — APPOINTMENT (INPATIENT)
Dept: RADIOLOGY | Facility: HOSPITAL | Age: 59
DRG: 186 | End: 2019-05-22
Payer: COMMERCIAL

## 2019-05-22 PROCEDURE — 99024 POSTOP FOLLOW-UP VISIT: CPT | Performed by: THORACIC SURGERY (CARDIOTHORACIC VASCULAR SURGERY)

## 2019-05-22 PROCEDURE — 71045 X-RAY EXAM CHEST 1 VIEW: CPT

## 2019-05-22 RX ORDER — GABAPENTIN 300 MG/1
300 CAPSULE ORAL 3 TIMES DAILY
Status: DISCONTINUED | OUTPATIENT
Start: 2019-05-22 | End: 2019-05-23 | Stop reason: HOSPADM

## 2019-05-22 RX ADMIN — PANTOPRAZOLE SODIUM 40 MG: 40 TABLET, DELAYED RELEASE ORAL at 08:35

## 2019-05-22 RX ADMIN — METOPROLOL TARTRATE 12.5 MG: 25 TABLET ORAL at 08:35

## 2019-05-22 RX ADMIN — GABAPENTIN 300 MG: 300 CAPSULE ORAL at 16:47

## 2019-05-22 RX ADMIN — GABAPENTIN 300 MG: 300 CAPSULE ORAL at 20:11

## 2019-05-22 RX ADMIN — ACETAMINOPHEN 975 MG: 325 TABLET ORAL at 20:14

## 2019-05-22 RX ADMIN — ACETAMINOPHEN 975 MG: 325 TABLET ORAL at 10:00

## 2019-05-22 RX ADMIN — ACETAMINOPHEN 975 MG: 325 TABLET ORAL at 15:00

## 2019-05-22 RX ADMIN — GABAPENTIN 300 MG: 300 CAPSULE ORAL at 08:35

## 2019-05-22 RX ADMIN — ENOXAPARIN SODIUM 40 MG: 40 INJECTION SUBCUTANEOUS at 08:35

## 2019-05-22 RX ADMIN — ACETAMINOPHEN 975 MG: 325 TABLET ORAL at 02:40

## 2019-05-22 RX ADMIN — OXYCODONE HYDROCHLORIDE 10 MG: 5 TABLET ORAL at 06:36

## 2019-05-22 RX ADMIN — METOPROLOL TARTRATE 12.5 MG: 25 TABLET ORAL at 20:11

## 2019-05-22 RX ADMIN — ONDANSETRON 4 MG: 2 INJECTION INTRAMUSCULAR; INTRAVENOUS at 08:29

## 2019-05-23 ENCOUNTER — APPOINTMENT (INPATIENT)
Dept: RADIOLOGY | Facility: HOSPITAL | Age: 59
DRG: 186 | End: 2019-05-23
Payer: COMMERCIAL

## 2019-05-23 VITALS
SYSTOLIC BLOOD PRESSURE: 129 MMHG | OXYGEN SATURATION: 97 % | DIASTOLIC BLOOD PRESSURE: 76 MMHG | BODY MASS INDEX: 22.29 KG/M2 | HEIGHT: 76 IN | HEART RATE: 68 BPM | WEIGHT: 183 LBS | RESPIRATION RATE: 18 BRPM | TEMPERATURE: 98.7 F

## 2019-05-23 LAB
ANION GAP SERPL CALCULATED.3IONS-SCNC: 4 MMOL/L (ref 4–13)
BACTERIA SPEC ANAEROBE CULT: NO GROWTH
BACTERIA SPEC ANAEROBE CULT: NO GROWTH
BACTERIA SPEC BFLD CULT: NO GROWTH
BACTERIA TISS AEROBE CULT: NO GROWTH
BUN SERPL-MCNC: 13 MG/DL (ref 5–25)
CALCIUM SERPL-MCNC: 8.4 MG/DL (ref 8.3–10.1)
CHLORIDE SERPL-SCNC: 111 MMOL/L (ref 100–108)
CO2 SERPL-SCNC: 29 MMOL/L (ref 21–32)
CREAT SERPL-MCNC: 0.83 MG/DL (ref 0.6–1.3)
ERYTHROCYTE [DISTWIDTH] IN BLOOD BY AUTOMATED COUNT: 18.1 % (ref 11.6–15.1)
GFR SERPL CREATININE-BSD FRML MDRD: 96 ML/MIN/1.73SQ M
GLUCOSE SERPL-MCNC: 83 MG/DL (ref 65–140)
GRAM STN SPEC: NORMAL
HCT VFR BLD AUTO: 34.8 % (ref 36.5–49.3)
HGB BLD-MCNC: 10.9 G/DL (ref 12–17)
MCH RBC QN AUTO: 27 PG (ref 26.8–34.3)
MCHC RBC AUTO-ENTMCNC: 31.3 G/DL (ref 31.4–37.4)
MCV RBC AUTO: 86 FL (ref 82–98)
PLATELET # BLD AUTO: 225 THOUSANDS/UL (ref 149–390)
PMV BLD AUTO: 9.4 FL (ref 8.9–12.7)
POTASSIUM SERPL-SCNC: 4.1 MMOL/L (ref 3.5–5.3)
RBC # BLD AUTO: 4.03 MILLION/UL (ref 3.88–5.62)
SODIUM SERPL-SCNC: 144 MMOL/L (ref 136–145)
WBC # BLD AUTO: 5.79 THOUSAND/UL (ref 4.31–10.16)

## 2019-05-23 PROCEDURE — 80048 BASIC METABOLIC PNL TOTAL CA: CPT | Performed by: SURGERY

## 2019-05-23 PROCEDURE — 71045 X-RAY EXAM CHEST 1 VIEW: CPT

## 2019-05-23 PROCEDURE — 71046 X-RAY EXAM CHEST 2 VIEWS: CPT

## 2019-05-23 PROCEDURE — 99024 POSTOP FOLLOW-UP VISIT: CPT | Performed by: THORACIC SURGERY (CARDIOTHORACIC VASCULAR SURGERY)

## 2019-05-23 PROCEDURE — 0WP8X0Z REMOVAL OF DRAINAGE DEVICE FROM CHEST WALL, EXTERNAL APPROACH: ICD-10-PCS | Performed by: THORACIC SURGERY (CARDIOTHORACIC VASCULAR SURGERY)

## 2019-05-23 PROCEDURE — 85027 COMPLETE CBC AUTOMATED: CPT | Performed by: SURGERY

## 2019-05-23 RX ORDER — GABAPENTIN 300 MG/1
300 CAPSULE ORAL 3 TIMES DAILY
Qty: 63 CAPSULE | Refills: 0 | Status: SHIPPED | OUTPATIENT
Start: 2019-05-23 | End: 2019-06-28 | Stop reason: ALTCHOICE

## 2019-05-23 RX ORDER — ACETAMINOPHEN 325 MG/1
650 TABLET ORAL EVERY 6 HOURS SCHEDULED
Qty: 50 TABLET | Refills: 0 | Status: CANCELLED | OUTPATIENT
Start: 2019-05-23 | End: 2019-05-30

## 2019-05-23 RX ORDER — OXYCODONE HYDROCHLORIDE 5 MG/1
5 TABLET ORAL EVERY 4 HOURS PRN
Qty: 30 TABLET | Refills: 0 | Status: SHIPPED | OUTPATIENT
Start: 2019-05-23 | End: 2019-06-02

## 2019-05-23 RX ORDER — ACETAMINOPHEN 325 MG/1
975 TABLET ORAL EVERY 6 HOURS
Qty: 84 TABLET | Refills: 0 | Status: SHIPPED | OUTPATIENT
Start: 2019-05-23 | End: 2019-05-30

## 2019-05-23 RX ORDER — GABAPENTIN 300 MG/1
300 CAPSULE ORAL 3 TIMES DAILY
Qty: 63 CAPSULE | Refills: 0 | Status: CANCELLED | OUTPATIENT
Start: 2019-05-23

## 2019-05-23 RX ORDER — WARFARIN SODIUM 3 MG/1
3 TABLET ORAL
Refills: 0 | Status: SHIPPED | OUTPATIENT
Start: 2019-05-25 | End: 2021-04-26

## 2019-05-23 RX ORDER — OXYCODONE HYDROCHLORIDE 5 MG/1
5 TABLET ORAL EVERY 4 HOURS PRN
Qty: 30 TABLET | Refills: 0 | Status: CANCELLED | OUTPATIENT
Start: 2019-05-23 | End: 2019-06-02

## 2019-05-23 RX ADMIN — PANTOPRAZOLE SODIUM 40 MG: 40 TABLET, DELAYED RELEASE ORAL at 08:25

## 2019-05-23 RX ADMIN — METOPROLOL TARTRATE 12.5 MG: 25 TABLET ORAL at 08:26

## 2019-05-23 RX ADMIN — ACETAMINOPHEN 975 MG: 325 TABLET ORAL at 08:26

## 2019-05-23 RX ADMIN — GABAPENTIN 300 MG: 300 CAPSULE ORAL at 08:25

## 2019-05-23 RX ADMIN — ENOXAPARIN SODIUM 40 MG: 40 INJECTION SUBCUTANEOUS at 08:26

## 2019-05-23 RX ADMIN — ACETAMINOPHEN 975 MG: 325 TABLET ORAL at 03:00

## 2019-06-03 ENCOUNTER — APPOINTMENT (OUTPATIENT)
Dept: LAB | Facility: CLINIC | Age: 59
End: 2019-06-03
Payer: COMMERCIAL

## 2019-06-03 ENCOUNTER — HOSPITAL ENCOUNTER (OUTPATIENT)
Dept: RADIOLOGY | Facility: HOSPITAL | Age: 59
Discharge: HOME | End: 2019-06-03
Attending: INTERNAL MEDICINE
Payer: COMMERCIAL

## 2019-06-03 DIAGNOSIS — K86.3 PANCREATIC PSEUDOCYST: ICD-10-CM

## 2019-06-03 PROCEDURE — 74177 CT ABD & PELVIS W/CONTRAST: CPT

## 2019-06-03 RX ADMIN — IOHEXOL 100 ML: 350 INJECTION, SOLUTION INTRAVENOUS at 19:34

## 2019-06-04 ENCOUNTER — ANTICOAG VISIT (OUTPATIENT)
Dept: CARDIOLOGY CLINIC | Facility: CLINIC | Age: 59
End: 2019-06-04

## 2019-06-04 DIAGNOSIS — I48.0 PAROXYSMAL ATRIAL FIBRILLATION (HCC): ICD-10-CM

## 2019-06-05 ENCOUNTER — DOCUMENTATION (OUTPATIENT)
Dept: CARDIOLOGY CLINIC | Facility: CLINIC | Age: 59
End: 2019-06-05

## 2019-06-05 ENCOUNTER — APPOINTMENT (OUTPATIENT)
Dept: LAB | Facility: CLINIC | Age: 59
End: 2019-06-05
Payer: COMMERCIAL

## 2019-06-05 ENCOUNTER — APPOINTMENT (OUTPATIENT)
Dept: RADIOLOGY | Facility: CLINIC | Age: 59
End: 2019-06-05
Payer: COMMERCIAL

## 2019-06-05 DIAGNOSIS — J90 PLEURAL EFFUSION, LEFT: ICD-10-CM

## 2019-06-05 DIAGNOSIS — K86.3 PANCREATIC PSEUDOCYST: ICD-10-CM

## 2019-06-05 PROCEDURE — 71046 X-RAY EXAM CHEST 2 VIEWS: CPT

## 2019-06-07 ENCOUNTER — ANTICOAG VISIT (OUTPATIENT)
Dept: CARDIOLOGY CLINIC | Facility: CLINIC | Age: 59
End: 2019-06-07

## 2019-06-07 ENCOUNTER — APPOINTMENT (OUTPATIENT)
Dept: LAB | Facility: CLINIC | Age: 59
End: 2019-06-07
Payer: COMMERCIAL

## 2019-06-07 DIAGNOSIS — I48.0 PAROXYSMAL ATRIAL FIBRILLATION (HCC): ICD-10-CM

## 2019-06-07 DIAGNOSIS — K85.00 IDIOPATHIC ACUTE PANCREATITIS, UNSPECIFIED COMPLICATION STATUS: ICD-10-CM

## 2019-06-07 LAB
ALBUMIN SERPL BCP-MCNC: 3.2 G/DL (ref 3.5–5)
ALP SERPL-CCNC: 70 U/L (ref 46–116)
ALT SERPL W P-5'-P-CCNC: 12 U/L (ref 12–78)
ANION GAP SERPL CALCULATED.3IONS-SCNC: 8 MMOL/L (ref 4–13)
AST SERPL W P-5'-P-CCNC: 10 U/L (ref 5–45)
BILIRUB SERPL-MCNC: 0.76 MG/DL (ref 0.2–1)
BUN SERPL-MCNC: 17 MG/DL (ref 5–25)
CALCIUM SERPL-MCNC: 9 MG/DL (ref 8.3–10.1)
CHLORIDE SERPL-SCNC: 109 MMOL/L (ref 100–108)
CO2 SERPL-SCNC: 27 MMOL/L (ref 21–32)
CREAT SERPL-MCNC: 0.99 MG/DL (ref 0.6–1.3)
GFR SERPL CREATININE-BSD FRML MDRD: 83 ML/MIN/1.73SQ M
GLUCOSE P FAST SERPL-MCNC: 98 MG/DL (ref 65–99)
INR PPP: 1.33 (ref 0.86–1.17)
POTASSIUM SERPL-SCNC: 4.1 MMOL/L (ref 3.5–5.3)
PROT SERPL-MCNC: 6.8 G/DL (ref 6.4–8.2)
PROTHROMBIN TIME: 16.6 SECONDS (ref 11.8–14.2)
SODIUM SERPL-SCNC: 144 MMOL/L (ref 136–145)

## 2019-06-07 PROCEDURE — 80053 COMPREHEN METABOLIC PANEL: CPT

## 2019-06-07 PROCEDURE — 85610 PROTHROMBIN TIME: CPT

## 2019-06-07 PROCEDURE — 36415 COLL VENOUS BLD VENIPUNCTURE: CPT

## 2019-06-12 ENCOUNTER — OFFICE VISIT (OUTPATIENT)
Dept: CARDIAC SURGERY | Facility: CLINIC | Age: 59
End: 2019-06-12

## 2019-06-12 VITALS
DIASTOLIC BLOOD PRESSURE: 86 MMHG | TEMPERATURE: 95.9 F | WEIGHT: 192 LBS | OXYGEN SATURATION: 97 % | BODY MASS INDEX: 23.38 KG/M2 | HEIGHT: 76 IN | SYSTOLIC BLOOD PRESSURE: 137 MMHG | HEART RATE: 60 BPM

## 2019-06-12 DIAGNOSIS — J90 PLEURAL EFFUSION, LEFT: Primary | ICD-10-CM

## 2019-06-12 PROCEDURE — 99024 POSTOP FOLLOW-UP VISIT: CPT | Performed by: THORACIC SURGERY (CARDIOTHORACIC VASCULAR SURGERY)

## 2019-06-13 ENCOUNTER — APPOINTMENT (OUTPATIENT)
Dept: LAB | Facility: CLINIC | Age: 59
End: 2019-06-13
Payer: COMMERCIAL

## 2019-06-13 ENCOUNTER — TELEPHONE (OUTPATIENT)
Dept: GASTROENTEROLOGY | Facility: CLINIC | Age: 59
End: 2019-06-13

## 2019-06-13 DIAGNOSIS — I48.0 PAROXYSMAL ATRIAL FIBRILLATION (HCC): ICD-10-CM

## 2019-06-13 LAB
INR PPP: 1.73 (ref 0.86–1.17)
PROTHROMBIN TIME: 20.3 SECONDS (ref 11.8–14.2)

## 2019-06-13 PROCEDURE — 85610 PROTHROMBIN TIME: CPT

## 2019-06-13 PROCEDURE — 36415 COLL VENOUS BLD VENIPUNCTURE: CPT

## 2019-06-14 ENCOUNTER — ANTICOAG VISIT (OUTPATIENT)
Dept: CARDIOLOGY CLINIC | Facility: CLINIC | Age: 59
End: 2019-06-14

## 2019-06-14 DIAGNOSIS — I48.0 PAROXYSMAL ATRIAL FIBRILLATION (HCC): ICD-10-CM

## 2019-06-15 ENCOUNTER — APPOINTMENT (EMERGENCY)
Dept: RADIOLOGY | Facility: HOSPITAL | Age: 59
DRG: 439 | End: 2019-06-15
Payer: COMMERCIAL

## 2019-06-15 ENCOUNTER — HOSPITAL ENCOUNTER (INPATIENT)
Facility: HOSPITAL | Age: 59
LOS: 3 days | Discharge: HOME HEALTH | DRG: 439 | End: 2019-06-20
Attending: EMERGENCY MEDICINE | Admitting: SURGERY
Payer: COMMERCIAL

## 2019-06-15 DIAGNOSIS — L02.91 ABSCESS: ICD-10-CM

## 2019-06-15 DIAGNOSIS — K65.1 SUBPHRENIC ABSCESS (HCC): Primary | ICD-10-CM

## 2019-06-15 DIAGNOSIS — J90 RECURRENT LEFT PLEURAL EFFUSION: ICD-10-CM

## 2019-06-15 DIAGNOSIS — K86.3 PANCREATIC PSEUDOCYST: ICD-10-CM

## 2019-06-15 LAB
ALBUMIN SERPL BCP-MCNC: 3 G/DL (ref 3.5–5)
ALP SERPL-CCNC: 80 U/L (ref 46–116)
ALT SERPL W P-5'-P-CCNC: 15 U/L (ref 12–78)
ANION GAP SERPL CALCULATED.3IONS-SCNC: 7 MMOL/L (ref 4–13)
AST SERPL W P-5'-P-CCNC: 10 U/L (ref 5–45)
BASOPHILS # BLD AUTO: 0.04 THOUSANDS/ΜL (ref 0–0.1)
BASOPHILS NFR BLD AUTO: 0 % (ref 0–1)
BILIRUB SERPL-MCNC: 0.7 MG/DL (ref 0.2–1)
BUN SERPL-MCNC: 25 MG/DL (ref 5–25)
CALCIUM SERPL-MCNC: 8.8 MG/DL (ref 8.3–10.1)
CHLORIDE SERPL-SCNC: 103 MMOL/L (ref 100–108)
CO2 SERPL-SCNC: 29 MMOL/L (ref 21–32)
CREAT SERPL-MCNC: 1.18 MG/DL (ref 0.6–1.3)
EOSINOPHIL # BLD AUTO: 0.13 THOUSAND/ΜL (ref 0–0.61)
EOSINOPHIL NFR BLD AUTO: 1 % (ref 0–6)
ERYTHROCYTE [DISTWIDTH] IN BLOOD BY AUTOMATED COUNT: 17.2 % (ref 11.6–15.1)
GFR SERPL CREATININE-BSD FRML MDRD: 67 ML/MIN/1.73SQ M
GLUCOSE SERPL-MCNC: 122 MG/DL (ref 65–140)
HCT VFR BLD AUTO: 39.5 % (ref 36.5–49.3)
HGB BLD-MCNC: 13.2 G/DL (ref 12–17)
IMM GRANULOCYTES # BLD AUTO: 0.04 THOUSAND/UL (ref 0–0.2)
IMM GRANULOCYTES NFR BLD AUTO: 0 % (ref 0–2)
LACTATE SERPL-SCNC: 2 MMOL/L (ref 0.5–2)
LIPASE SERPL-CCNC: 63 U/L (ref 73–393)
LYMPHOCYTES # BLD AUTO: 1.17 THOUSANDS/ΜL (ref 0.6–4.47)
LYMPHOCYTES NFR BLD AUTO: 11 % (ref 14–44)
MCH RBC QN AUTO: 27.6 PG (ref 26.8–34.3)
MCHC RBC AUTO-ENTMCNC: 33.4 G/DL (ref 31.4–37.4)
MCV RBC AUTO: 83 FL (ref 82–98)
MONOCYTES # BLD AUTO: 1.15 THOUSAND/ΜL (ref 0.17–1.22)
MONOCYTES NFR BLD AUTO: 10 % (ref 4–12)
NEUTROPHILS # BLD AUTO: 8.6 THOUSANDS/ΜL (ref 1.85–7.62)
NEUTS SEG NFR BLD AUTO: 78 % (ref 43–75)
NRBC BLD AUTO-RTO: 0 /100 WBCS
PLATELET # BLD AUTO: 283 THOUSANDS/UL (ref 149–390)
PMV BLD AUTO: 9 FL (ref 8.9–12.7)
POTASSIUM SERPL-SCNC: 4.1 MMOL/L (ref 3.5–5.3)
PROCALCITONIN SERPL-MCNC: <0.05 NG/ML
PROT SERPL-MCNC: 6.8 G/DL (ref 6.4–8.2)
RBC # BLD AUTO: 4.79 MILLION/UL (ref 3.88–5.62)
SODIUM SERPL-SCNC: 139 MMOL/L (ref 136–145)
WBC # BLD AUTO: 11.13 THOUSAND/UL (ref 4.31–10.16)

## 2019-06-15 PROCEDURE — 99285 EMERGENCY DEPT VISIT HI MDM: CPT

## 2019-06-15 PROCEDURE — 87040 BLOOD CULTURE FOR BACTERIA: CPT | Performed by: EMERGENCY MEDICINE

## 2019-06-15 PROCEDURE — 85025 COMPLETE CBC W/AUTO DIFF WBC: CPT | Performed by: EMERGENCY MEDICINE

## 2019-06-15 PROCEDURE — 96361 HYDRATE IV INFUSION ADD-ON: CPT

## 2019-06-15 PROCEDURE — 83690 ASSAY OF LIPASE: CPT | Performed by: EMERGENCY MEDICINE

## 2019-06-15 PROCEDURE — 99285 EMERGENCY DEPT VISIT HI MDM: CPT | Performed by: EMERGENCY MEDICINE

## 2019-06-15 PROCEDURE — 99214 OFFICE O/P EST MOD 30 MIN: CPT | Performed by: SURGERY

## 2019-06-15 PROCEDURE — 96367 TX/PROPH/DG ADDL SEQ IV INF: CPT

## 2019-06-15 PROCEDURE — 96365 THER/PROPH/DIAG IV INF INIT: CPT

## 2019-06-15 PROCEDURE — 84145 PROCALCITONIN (PCT): CPT | Performed by: EMERGENCY MEDICINE

## 2019-06-15 PROCEDURE — 71260 CT THORAX DX C+: CPT

## 2019-06-15 PROCEDURE — 80053 COMPREHEN METABOLIC PANEL: CPT | Performed by: EMERGENCY MEDICINE

## 2019-06-15 PROCEDURE — 83605 ASSAY OF LACTIC ACID: CPT | Performed by: EMERGENCY MEDICINE

## 2019-06-15 PROCEDURE — 36415 COLL VENOUS BLD VENIPUNCTURE: CPT | Performed by: EMERGENCY MEDICINE

## 2019-06-15 PROCEDURE — 74177 CT ABD & PELVIS W/CONTRAST: CPT

## 2019-06-15 RX ORDER — ACETAMINOPHEN 325 MG/1
650 TABLET ORAL EVERY 6 HOURS PRN
COMMUNITY

## 2019-06-15 RX ADMIN — METRONIDAZOLE 500 MG: 500 INJECTION, SOLUTION INTRAVENOUS at 22:24

## 2019-06-15 RX ADMIN — IOHEXOL 100 ML: 350 INJECTION, SOLUTION INTRAVENOUS at 20:19

## 2019-06-15 RX ADMIN — SODIUM CHLORIDE 1000 ML: 0.9 INJECTION, SOLUTION INTRAVENOUS at 19:30

## 2019-06-15 RX ADMIN — CEFEPIME HYDROCHLORIDE 1000 MG: 1 INJECTION, POWDER, FOR SOLUTION INTRAMUSCULAR; INTRAVENOUS at 23:39

## 2019-06-16 LAB
ABO GROUP BLD: NORMAL
ANION GAP SERPL CALCULATED.3IONS-SCNC: 8 MMOL/L (ref 4–13)
BASOPHILS # BLD AUTO: 0.03 THOUSANDS/ΜL (ref 0–0.1)
BASOPHILS NFR BLD AUTO: 0 % (ref 0–1)
BLD GP AB SCN SERPL QL: NEGATIVE
BUN SERPL-MCNC: 17 MG/DL (ref 5–25)
CALCIUM SERPL-MCNC: 8.5 MG/DL (ref 8.3–10.1)
CHLORIDE SERPL-SCNC: 107 MMOL/L (ref 100–108)
CO2 SERPL-SCNC: 24 MMOL/L (ref 21–32)
CREAT SERPL-MCNC: 0.9 MG/DL (ref 0.6–1.3)
EOSINOPHIL # BLD AUTO: 0.12 THOUSAND/ΜL (ref 0–0.61)
EOSINOPHIL NFR BLD AUTO: 1 % (ref 0–6)
ERYTHROCYTE [DISTWIDTH] IN BLOOD BY AUTOMATED COUNT: 17.4 % (ref 11.6–15.1)
GFR SERPL CREATININE-BSD FRML MDRD: 93 ML/MIN/1.73SQ M
GLUCOSE SERPL-MCNC: 92 MG/DL (ref 65–140)
HCT VFR BLD AUTO: 36.2 % (ref 36.5–49.3)
HGB BLD-MCNC: 11.8 G/DL (ref 12–17)
IMM GRANULOCYTES # BLD AUTO: 0.05 THOUSAND/UL (ref 0–0.2)
IMM GRANULOCYTES NFR BLD AUTO: 1 % (ref 0–2)
INR PPP: 2.15 (ref 0.86–1.17)
LACTATE SERPL-SCNC: 0.5 MMOL/L (ref 0.5–2)
LYMPHOCYTES # BLD AUTO: 1.2 THOUSANDS/ΜL (ref 0.6–4.47)
LYMPHOCYTES NFR BLD AUTO: 13 % (ref 14–44)
MCH RBC QN AUTO: 27.1 PG (ref 26.8–34.3)
MCHC RBC AUTO-ENTMCNC: 32.6 G/DL (ref 31.4–37.4)
MCV RBC AUTO: 83 FL (ref 82–98)
MONOCYTES # BLD AUTO: 1.35 THOUSAND/ΜL (ref 0.17–1.22)
MONOCYTES NFR BLD AUTO: 14 % (ref 4–12)
NEUTROPHILS # BLD AUTO: 6.84 THOUSANDS/ΜL (ref 1.85–7.62)
NEUTS SEG NFR BLD AUTO: 71 % (ref 43–75)
NRBC BLD AUTO-RTO: 0 /100 WBCS
PLATELET # BLD AUTO: 231 THOUSANDS/UL (ref 149–390)
PMV BLD AUTO: 9.2 FL (ref 8.9–12.7)
POTASSIUM SERPL-SCNC: 3.9 MMOL/L (ref 3.5–5.3)
PROTHROMBIN TIME: 24.1 SECONDS (ref 11.8–14.2)
RBC # BLD AUTO: 4.35 MILLION/UL (ref 3.88–5.62)
RH BLD: POSITIVE
SODIUM SERPL-SCNC: 139 MMOL/L (ref 136–145)
SPECIMEN EXPIRATION DATE: NORMAL
WBC # BLD AUTO: 9.59 THOUSAND/UL (ref 4.31–10.16)

## 2019-06-16 PROCEDURE — 80048 BASIC METABOLIC PNL TOTAL CA: CPT | Performed by: SURGERY

## 2019-06-16 PROCEDURE — 86901 BLOOD TYPING SEROLOGIC RH(D): CPT | Performed by: SURGERY

## 2019-06-16 PROCEDURE — 86900 BLOOD TYPING SEROLOGIC ABO: CPT | Performed by: SURGERY

## 2019-06-16 PROCEDURE — 30233K1 TRANSFUSION OF NONAUTOLOGOUS FROZEN PLASMA INTO PERIPHERAL VEIN, PERCUTANEOUS APPROACH: ICD-10-PCS | Performed by: SURGERY

## 2019-06-16 PROCEDURE — 99024 POSTOP FOLLOW-UP VISIT: CPT | Performed by: THORACIC SURGERY (CARDIOTHORACIC VASCULAR SURGERY)

## 2019-06-16 PROCEDURE — 83605 ASSAY OF LACTIC ACID: CPT | Performed by: EMERGENCY MEDICINE

## 2019-06-16 PROCEDURE — 85610 PROTHROMBIN TIME: CPT | Performed by: SURGERY

## 2019-06-16 PROCEDURE — 86850 RBC ANTIBODY SCREEN: CPT | Performed by: SURGERY

## 2019-06-16 PROCEDURE — 85025 COMPLETE CBC W/AUTO DIFF WBC: CPT | Performed by: SURGERY

## 2019-06-16 PROCEDURE — P9017 PLASMA 1 DONOR FRZ W/IN 8 HR: HCPCS

## 2019-06-16 RX ORDER — WARFARIN SODIUM 1 MG/1
3 TABLET ORAL
Status: DISCONTINUED | OUTPATIENT
Start: 2019-06-16 | End: 2019-06-16

## 2019-06-16 RX ORDER — POLYETHYLENE GLYCOL 3350 17 G/17G
17 POWDER, FOR SOLUTION ORAL DAILY PRN
Status: DISCONTINUED | OUTPATIENT
Start: 2019-06-16 | End: 2019-06-20 | Stop reason: HOSPADM

## 2019-06-16 RX ORDER — HEPARIN SODIUM 5000 [USP'U]/ML
5000 INJECTION, SOLUTION INTRAVENOUS; SUBCUTANEOUS EVERY 8 HOURS SCHEDULED
Status: DISCONTINUED | OUTPATIENT
Start: 2019-06-16 | End: 2019-06-16

## 2019-06-16 RX ORDER — AMOXICILLIN 250 MG
1 CAPSULE ORAL
Status: DISCONTINUED | OUTPATIENT
Start: 2019-06-16 | End: 2019-06-20 | Stop reason: HOSPADM

## 2019-06-16 RX ORDER — SODIUM CHLORIDE 9 MG/ML
75 INJECTION, SOLUTION INTRAVENOUS CONTINUOUS
Status: DISCONTINUED | OUTPATIENT
Start: 2019-06-16 | End: 2019-06-19

## 2019-06-16 RX ORDER — WARFARIN SODIUM 1 MG/1
4 TABLET ORAL
Status: DISCONTINUED | OUTPATIENT
Start: 2019-06-16 | End: 2019-06-16

## 2019-06-16 RX ORDER — METRONIDAZOLE 500 MG/1
500 TABLET ORAL EVERY 8 HOURS SCHEDULED
Status: DISCONTINUED | OUTPATIENT
Start: 2019-06-16 | End: 2019-06-20 | Stop reason: HOSPADM

## 2019-06-16 RX ORDER — ACETAMINOPHEN 325 MG/1
975 TABLET ORAL EVERY 6 HOURS PRN
Status: DISCONTINUED | OUTPATIENT
Start: 2019-06-16 | End: 2019-06-20 | Stop reason: HOSPADM

## 2019-06-16 RX ORDER — IBUPROFEN 400 MG/1
800 TABLET ORAL EVERY 6 HOURS PRN
Status: DISCONTINUED | OUTPATIENT
Start: 2019-06-16 | End: 2019-06-20 | Stop reason: HOSPADM

## 2019-06-16 RX ORDER — ACETAMINOPHEN 325 MG/1
650 TABLET ORAL EVERY 6 HOURS PRN
Status: DISCONTINUED | OUTPATIENT
Start: 2019-06-16 | End: 2019-06-20 | Stop reason: HOSPADM

## 2019-06-16 RX ORDER — PANTOPRAZOLE SODIUM 40 MG/1
40 TABLET, DELAYED RELEASE ORAL DAILY
Status: DISCONTINUED | OUTPATIENT
Start: 2019-06-16 | End: 2019-06-20 | Stop reason: HOSPADM

## 2019-06-16 RX ADMIN — PANTOPRAZOLE SODIUM 40 MG: 40 TABLET, DELAYED RELEASE ORAL at 08:54

## 2019-06-16 RX ADMIN — METRONIDAZOLE 500 MG: 500 TABLET ORAL at 15:10

## 2019-06-16 RX ADMIN — METRONIDAZOLE 500 MG: 500 TABLET ORAL at 21:21

## 2019-06-16 RX ADMIN — CEFEPIME HYDROCHLORIDE 2000 MG: 2 INJECTION, POWDER, FOR SOLUTION INTRAVENOUS at 23:11

## 2019-06-16 RX ADMIN — VANCOMYCIN HYDROCHLORIDE 1250 MG: 10 INJECTION, POWDER, LYOPHILIZED, FOR SOLUTION INTRAVENOUS at 13:34

## 2019-06-16 RX ADMIN — METOPROLOL TARTRATE 25 MG: 25 TABLET ORAL at 08:54

## 2019-06-16 RX ADMIN — PHYTONADIONE 10 MG: 10 INJECTION, EMULSION INTRAMUSCULAR; INTRAVENOUS; SUBCUTANEOUS at 18:55

## 2019-06-16 RX ADMIN — SODIUM CHLORIDE 75 ML/HR: 0.9 INJECTION, SOLUTION INTRAVENOUS at 04:19

## 2019-06-16 RX ADMIN — VANCOMYCIN HYDROCHLORIDE 1250 MG: 10 INJECTION, POWDER, LYOPHILIZED, FOR SOLUTION INTRAVENOUS at 00:53

## 2019-06-16 RX ADMIN — ACETAMINOPHEN 650 MG: 325 TABLET ORAL at 15:10

## 2019-06-16 RX ADMIN — METRONIDAZOLE 500 MG: 500 INJECTION, SOLUTION INTRAVENOUS at 06:46

## 2019-06-16 RX ADMIN — SENNOSIDES AND DOCUSATE SODIUM 1 TABLET: 8.6; 5 TABLET ORAL at 23:10

## 2019-06-16 RX ADMIN — SODIUM CHLORIDE 75 ML/HR: 0.9 INJECTION, SOLUTION INTRAVENOUS at 18:58

## 2019-06-16 RX ADMIN — METOPROLOL TARTRATE 25 MG: 25 TABLET ORAL at 21:21

## 2019-06-16 RX ADMIN — ACETAMINOPHEN 650 MG: 325 TABLET ORAL at 08:54

## 2019-06-16 RX ADMIN — CEFEPIME HYDROCHLORIDE 2000 MG: 2 INJECTION, POWDER, FOR SOLUTION INTRAVENOUS at 11:52

## 2019-06-17 ENCOUNTER — APPOINTMENT (OUTPATIENT)
Dept: RADIOLOGY | Facility: HOSPITAL | Age: 59
DRG: 439 | End: 2019-06-17
Payer: COMMERCIAL

## 2019-06-17 LAB
ANION GAP SERPL CALCULATED.3IONS-SCNC: 6 MMOL/L (ref 4–13)
BASOPHILS # BLD AUTO: 0.04 THOUSANDS/ΜL (ref 0–0.1)
BASOPHILS NFR BLD AUTO: 1 % (ref 0–1)
BUN SERPL-MCNC: 15 MG/DL (ref 5–25)
CALCIUM SERPL-MCNC: 8.6 MG/DL (ref 8.3–10.1)
CHLORIDE SERPL-SCNC: 109 MMOL/L (ref 100–108)
CO2 SERPL-SCNC: 26 MMOL/L (ref 21–32)
CREAT SERPL-MCNC: 0.88 MG/DL (ref 0.6–1.3)
EOSINOPHIL # BLD AUTO: 0.22 THOUSAND/ΜL (ref 0–0.61)
EOSINOPHIL NFR BLD AUTO: 3 % (ref 0–6)
ERYTHROCYTE [DISTWIDTH] IN BLOOD BY AUTOMATED COUNT: 17.2 % (ref 11.6–15.1)
GFR SERPL CREATININE-BSD FRML MDRD: 94 ML/MIN/1.73SQ M
GLUCOSE SERPL-MCNC: 91 MG/DL (ref 65–140)
HCT VFR BLD AUTO: 35.4 % (ref 36.5–49.3)
HGB BLD-MCNC: 11.3 G/DL (ref 12–17)
IMM GRANULOCYTES # BLD AUTO: 0.04 THOUSAND/UL (ref 0–0.2)
IMM GRANULOCYTES NFR BLD AUTO: 1 % (ref 0–2)
INR PPP: 1.58 (ref 0.86–1.17)
LYMPHOCYTES # BLD AUTO: 1.31 THOUSANDS/ΜL (ref 0.6–4.47)
LYMPHOCYTES NFR BLD AUTO: 15 % (ref 14–44)
MCH RBC QN AUTO: 27 PG (ref 26.8–34.3)
MCHC RBC AUTO-ENTMCNC: 31.9 G/DL (ref 31.4–37.4)
MCV RBC AUTO: 85 FL (ref 82–98)
MONOCYTES # BLD AUTO: 1.07 THOUSAND/ΜL (ref 0.17–1.22)
MONOCYTES NFR BLD AUTO: 13 % (ref 4–12)
NEUTROPHILS # BLD AUTO: 5.8 THOUSANDS/ΜL (ref 1.85–7.62)
NEUTS SEG NFR BLD AUTO: 67 % (ref 43–75)
NRBC BLD AUTO-RTO: 0 /100 WBCS
PLATELET # BLD AUTO: 226 THOUSANDS/UL (ref 149–390)
PMV BLD AUTO: 9.2 FL (ref 8.9–12.7)
POTASSIUM SERPL-SCNC: 3.9 MMOL/L (ref 3.5–5.3)
PROTHROMBIN TIME: 19 SECONDS (ref 11.8–14.2)
RBC # BLD AUTO: 4.18 MILLION/UL (ref 3.88–5.62)
SODIUM SERPL-SCNC: 141 MMOL/L (ref 136–145)
WBC # BLD AUTO: 8.48 THOUSAND/UL (ref 4.31–10.16)

## 2019-06-17 PROCEDURE — 99152 MOD SED SAME PHYS/QHP 5/>YRS: CPT

## 2019-06-17 PROCEDURE — 0W9G30Z DRAINAGE OF PERITONEAL CAVITY WITH DRAINAGE DEVICE, PERCUTANEOUS APPROACH: ICD-10-PCS | Performed by: RADIOLOGY

## 2019-06-17 PROCEDURE — 99232 SBSQ HOSP IP/OBS MODERATE 35: CPT | Performed by: SURGERY

## 2019-06-17 PROCEDURE — 49406 IMAGE CATH FLUID PERI/RETRO: CPT

## 2019-06-17 PROCEDURE — 99024 POSTOP FOLLOW-UP VISIT: CPT | Performed by: THORACIC SURGERY (CARDIOTHORACIC VASCULAR SURGERY)

## 2019-06-17 PROCEDURE — 87070 CULTURE OTHR SPECIMN AEROBIC: CPT | Performed by: SURGERY

## 2019-06-17 PROCEDURE — 87077 CULTURE AEROBIC IDENTIFY: CPT | Performed by: SURGERY

## 2019-06-17 PROCEDURE — 49406 IMAGE CATH FLUID PERI/RETRO: CPT | Performed by: RADIOLOGY

## 2019-06-17 PROCEDURE — 85025 COMPLETE CBC W/AUTO DIFF WBC: CPT | Performed by: SURGERY

## 2019-06-17 PROCEDURE — C1769 GUIDE WIRE: HCPCS

## 2019-06-17 PROCEDURE — C1729 CATH, DRAINAGE: HCPCS

## 2019-06-17 PROCEDURE — C1894 INTRO/SHEATH, NON-LASER: HCPCS

## 2019-06-17 PROCEDURE — 99152 MOD SED SAME PHYS/QHP 5/>YRS: CPT | Performed by: RADIOLOGY

## 2019-06-17 PROCEDURE — 87181 SC STD AGAR DILUTION PER AGT: CPT | Performed by: SURGERY

## 2019-06-17 PROCEDURE — 85610 PROTHROMBIN TIME: CPT | Performed by: SURGERY

## 2019-06-17 PROCEDURE — 80048 BASIC METABOLIC PNL TOTAL CA: CPT | Performed by: SURGERY

## 2019-06-17 PROCEDURE — 87205 SMEAR GRAM STAIN: CPT | Performed by: SURGERY

## 2019-06-17 PROCEDURE — NC001 PR NO CHARGE: Performed by: RADIOLOGY

## 2019-06-17 PROCEDURE — 99153 MOD SED SAME PHYS/QHP EA: CPT

## 2019-06-17 RX ORDER — OXYCODONE HYDROCHLORIDE 5 MG/1
5 TABLET ORAL ONCE
Status: COMPLETED | OUTPATIENT
Start: 2019-06-17 | End: 2019-06-17

## 2019-06-17 RX ORDER — MIDAZOLAM HYDROCHLORIDE 1 MG/ML
INJECTION INTRAMUSCULAR; INTRAVENOUS CODE/TRAUMA/SEDATION MEDICATION
Status: COMPLETED | OUTPATIENT
Start: 2019-06-17 | End: 2019-06-17

## 2019-06-17 RX ORDER — FENTANYL CITRATE 50 UG/ML
INJECTION, SOLUTION INTRAMUSCULAR; INTRAVENOUS CODE/TRAUMA/SEDATION MEDICATION
Status: COMPLETED | OUTPATIENT
Start: 2019-06-17 | End: 2019-06-17

## 2019-06-17 RX ORDER — 0.9 % SODIUM CHLORIDE 0.9 %
10 VIAL (ML) INJECTION DAILY
Qty: 300 ML | Refills: 3 | Status: SHIPPED | OUTPATIENT
Start: 2019-06-17 | End: 2019-06-21 | Stop reason: SDUPTHER

## 2019-06-17 RX ORDER — DIPHENHYDRAMINE HYDROCHLORIDE 50 MG/ML
INJECTION INTRAMUSCULAR; INTRAVENOUS CODE/TRAUMA/SEDATION MEDICATION
Status: COMPLETED | OUTPATIENT
Start: 2019-06-17 | End: 2019-06-17

## 2019-06-17 RX ADMIN — METRONIDAZOLE 500 MG: 500 TABLET ORAL at 21:35

## 2019-06-17 RX ADMIN — MIDAZOLAM 0.5 MG: 1 INJECTION INTRAMUSCULAR; INTRAVENOUS at 16:08

## 2019-06-17 RX ADMIN — FENTANYL CITRATE 25 MCG: 50 INJECTION, SOLUTION INTRAMUSCULAR; INTRAVENOUS at 16:14

## 2019-06-17 RX ADMIN — CEFEPIME HYDROCHLORIDE 2000 MG: 2 INJECTION, POWDER, FOR SOLUTION INTRAVENOUS at 12:15

## 2019-06-17 RX ADMIN — IBUPROFEN 800 MG: 400 TABLET ORAL at 20:38

## 2019-06-17 RX ADMIN — OXYCODONE HYDROCHLORIDE 5 MG: 5 TABLET ORAL at 18:29

## 2019-06-17 RX ADMIN — CEFEPIME HYDROCHLORIDE 2000 MG: 2 INJECTION, POWDER, FOR SOLUTION INTRAVENOUS at 23:35

## 2019-06-17 RX ADMIN — METRONIDAZOLE 500 MG: 500 TABLET ORAL at 18:29

## 2019-06-17 RX ADMIN — PANTOPRAZOLE SODIUM 40 MG: 40 TABLET, DELAYED RELEASE ORAL at 08:39

## 2019-06-17 RX ADMIN — VANCOMYCIN HYDROCHLORIDE 1250 MG: 10 INJECTION, POWDER, LYOPHILIZED, FOR SOLUTION INTRAVENOUS at 00:52

## 2019-06-17 RX ADMIN — METRONIDAZOLE 500 MG: 500 TABLET ORAL at 05:14

## 2019-06-17 RX ADMIN — FENTANYL CITRATE 50 MCG: 50 INJECTION, SOLUTION INTRAMUSCULAR; INTRAVENOUS at 15:39

## 2019-06-17 RX ADMIN — METOPROLOL TARTRATE 25 MG: 25 TABLET ORAL at 08:39

## 2019-06-17 RX ADMIN — ACETAMINOPHEN 650 MG: 325 TABLET ORAL at 08:40

## 2019-06-17 RX ADMIN — MIDAZOLAM 1 MG: 1 INJECTION INTRAMUSCULAR; INTRAVENOUS at 15:38

## 2019-06-17 RX ADMIN — DIPHENHYDRAMINE HYDROCHLORIDE 50 MG: 50 INJECTION, SOLUTION INTRAMUSCULAR; INTRAVENOUS at 16:00

## 2019-06-17 RX ADMIN — VANCOMYCIN HYDROCHLORIDE 1250 MG: 10 INJECTION, POWDER, LYOPHILIZED, FOR SOLUTION INTRAVENOUS at 12:56

## 2019-06-17 RX ADMIN — FENTANYL CITRATE 50 MCG: 50 INJECTION, SOLUTION INTRAMUSCULAR; INTRAVENOUS at 15:50

## 2019-06-17 RX ADMIN — SODIUM CHLORIDE 75 ML/HR: 0.9 INJECTION, SOLUTION INTRAVENOUS at 10:43

## 2019-06-17 RX ADMIN — MIDAZOLAM 1 MG: 1 INJECTION INTRAMUSCULAR; INTRAVENOUS at 15:50

## 2019-06-17 RX ADMIN — SENNOSIDES AND DOCUSATE SODIUM 1 TABLET: 8.6; 5 TABLET ORAL at 21:35

## 2019-06-17 RX ADMIN — ACETAMINOPHEN 975 MG: 325 TABLET ORAL at 22:50

## 2019-06-18 LAB
ABO GROUP BLD BPU: NORMAL
ABO GROUP BLD BPU: NORMAL
ANION GAP SERPL CALCULATED.3IONS-SCNC: 3 MMOL/L (ref 4–13)
BASOPHILS # BLD AUTO: 0.02 THOUSANDS/ΜL (ref 0–0.1)
BASOPHILS NFR BLD AUTO: 0 % (ref 0–1)
BPU ID: NORMAL
BPU ID: NORMAL
BUN SERPL-MCNC: 18 MG/DL (ref 5–25)
CALCIUM SERPL-MCNC: 8.3 MG/DL (ref 8.3–10.1)
CHLORIDE SERPL-SCNC: 111 MMOL/L (ref 100–108)
CO2 SERPL-SCNC: 26 MMOL/L (ref 21–32)
CREAT SERPL-MCNC: 0.84 MG/DL (ref 0.6–1.3)
EOSINOPHIL # BLD AUTO: 0.24 THOUSAND/ΜL (ref 0–0.61)
EOSINOPHIL NFR BLD AUTO: 4 % (ref 0–6)
ERYTHROCYTE [DISTWIDTH] IN BLOOD BY AUTOMATED COUNT: 16.9 % (ref 11.6–15.1)
GFR SERPL CREATININE-BSD FRML MDRD: 96 ML/MIN/1.73SQ M
GLUCOSE SERPL-MCNC: 94 MG/DL (ref 65–140)
HCT VFR BLD AUTO: 31.8 % (ref 36.5–49.3)
HGB BLD-MCNC: 10.2 G/DL (ref 12–17)
IMM GRANULOCYTES # BLD AUTO: 0.05 THOUSAND/UL (ref 0–0.2)
IMM GRANULOCYTES NFR BLD AUTO: 1 % (ref 0–2)
LYMPHOCYTES # BLD AUTO: 1.19 THOUSANDS/ΜL (ref 0.6–4.47)
LYMPHOCYTES NFR BLD AUTO: 19 % (ref 14–44)
MCH RBC QN AUTO: 27.1 PG (ref 26.8–34.3)
MCHC RBC AUTO-ENTMCNC: 32.1 G/DL (ref 31.4–37.4)
MCV RBC AUTO: 84 FL (ref 82–98)
MONOCYTES # BLD AUTO: 0.66 THOUSAND/ΜL (ref 0.17–1.22)
MONOCYTES NFR BLD AUTO: 11 % (ref 4–12)
NEUTROPHILS # BLD AUTO: 4.02 THOUSANDS/ΜL (ref 1.85–7.62)
NEUTS SEG NFR BLD AUTO: 65 % (ref 43–75)
NRBC BLD AUTO-RTO: 0 /100 WBCS
PLATELET # BLD AUTO: 219 THOUSANDS/UL (ref 149–390)
PMV BLD AUTO: 8.9 FL (ref 8.9–12.7)
POTASSIUM SERPL-SCNC: 3.8 MMOL/L (ref 3.5–5.3)
RBC # BLD AUTO: 3.77 MILLION/UL (ref 3.88–5.62)
SODIUM SERPL-SCNC: 140 MMOL/L (ref 136–145)
UNIT DISPENSE STATUS: NORMAL
UNIT DISPENSE STATUS: NORMAL
UNIT PRODUCT CODE: NORMAL
UNIT PRODUCT CODE: NORMAL
UNIT RH: NORMAL
UNIT RH: NORMAL
WBC # BLD AUTO: 6.18 THOUSAND/UL (ref 4.31–10.16)

## 2019-06-18 PROCEDURE — 85025 COMPLETE CBC W/AUTO DIFF WBC: CPT | Performed by: PHYSICIAN ASSISTANT

## 2019-06-18 PROCEDURE — 80048 BASIC METABOLIC PNL TOTAL CA: CPT | Performed by: PHYSICIAN ASSISTANT

## 2019-06-18 PROCEDURE — 99232 SBSQ HOSP IP/OBS MODERATE 35: CPT | Performed by: SURGERY

## 2019-06-18 RX ORDER — HEPARIN SODIUM 5000 [USP'U]/ML
5000 INJECTION, SOLUTION INTRAVENOUS; SUBCUTANEOUS EVERY 8 HOURS SCHEDULED
Status: DISCONTINUED | OUTPATIENT
Start: 2019-06-18 | End: 2019-06-20 | Stop reason: HOSPADM

## 2019-06-18 RX ADMIN — ACETAMINOPHEN 975 MG: 325 TABLET ORAL at 05:08

## 2019-06-18 RX ADMIN — SODIUM CHLORIDE 75 ML/HR: 0.9 INJECTION, SOLUTION INTRAVENOUS at 05:09

## 2019-06-18 RX ADMIN — HEPARIN SODIUM 5000 UNITS: 5000 INJECTION INTRAVENOUS; SUBCUTANEOUS at 14:07

## 2019-06-18 RX ADMIN — CEFEPIME HYDROCHLORIDE 2000 MG: 2 INJECTION, POWDER, FOR SOLUTION INTRAVENOUS at 12:26

## 2019-06-18 RX ADMIN — HEPARIN SODIUM 5000 UNITS: 5000 INJECTION INTRAVENOUS; SUBCUTANEOUS at 07:22

## 2019-06-18 RX ADMIN — ACETAMINOPHEN 975 MG: 325 TABLET ORAL at 22:37

## 2019-06-18 RX ADMIN — METRONIDAZOLE 500 MG: 500 TABLET ORAL at 05:09

## 2019-06-18 RX ADMIN — METRONIDAZOLE 500 MG: 500 TABLET ORAL at 21:36

## 2019-06-18 RX ADMIN — METRONIDAZOLE 500 MG: 500 TABLET ORAL at 14:07

## 2019-06-18 RX ADMIN — VANCOMYCIN HYDROCHLORIDE 1250 MG: 10 INJECTION, POWDER, LYOPHILIZED, FOR SOLUTION INTRAVENOUS at 01:24

## 2019-06-18 RX ADMIN — PANTOPRAZOLE SODIUM 40 MG: 40 TABLET, DELAYED RELEASE ORAL at 08:45

## 2019-06-18 RX ADMIN — POLYETHYLENE GLYCOL 3350 17 G: 17 POWDER, FOR SOLUTION ORAL at 15:33

## 2019-06-18 RX ADMIN — METOPROLOL TARTRATE 25 MG: 25 TABLET ORAL at 08:44

## 2019-06-18 RX ADMIN — METOPROLOL TARTRATE 25 MG: 25 TABLET ORAL at 21:36

## 2019-06-18 RX ADMIN — SODIUM CHLORIDE 75 ML/HR: 0.9 INJECTION, SOLUTION INTRAVENOUS at 18:26

## 2019-06-19 LAB
ANION GAP SERPL CALCULATED.3IONS-SCNC: 5 MMOL/L (ref 4–13)
BASOPHILS # BLD AUTO: 0.04 THOUSANDS/ΜL (ref 0–0.1)
BASOPHILS NFR BLD AUTO: 1 % (ref 0–1)
BUN SERPL-MCNC: 11 MG/DL (ref 5–25)
CALCIUM SERPL-MCNC: 8.8 MG/DL (ref 8.3–10.1)
CHLORIDE SERPL-SCNC: 107 MMOL/L (ref 100–108)
CO2 SERPL-SCNC: 26 MMOL/L (ref 21–32)
CREAT SERPL-MCNC: 0.83 MG/DL (ref 0.6–1.3)
EOSINOPHIL # BLD AUTO: 0.25 THOUSAND/ΜL (ref 0–0.61)
EOSINOPHIL NFR BLD AUTO: 5 % (ref 0–6)
ERYTHROCYTE [DISTWIDTH] IN BLOOD BY AUTOMATED COUNT: 16.4 % (ref 11.6–15.1)
GFR SERPL CREATININE-BSD FRML MDRD: 96 ML/MIN/1.73SQ M
GLUCOSE SERPL-MCNC: 91 MG/DL (ref 65–140)
HCT VFR BLD AUTO: 38.4 % (ref 36.5–49.3)
HGB BLD-MCNC: 12.3 G/DL (ref 12–17)
IMM GRANULOCYTES # BLD AUTO: 0.05 THOUSAND/UL (ref 0–0.2)
IMM GRANULOCYTES NFR BLD AUTO: 1 % (ref 0–2)
INR PPP: 1.07 (ref 0.86–1.17)
LYMPHOCYTES # BLD AUTO: 1.38 THOUSANDS/ΜL (ref 0.6–4.47)
LYMPHOCYTES NFR BLD AUTO: 25 % (ref 14–44)
MCH RBC QN AUTO: 26.6 PG (ref 26.8–34.3)
MCHC RBC AUTO-ENTMCNC: 32 G/DL (ref 31.4–37.4)
MCV RBC AUTO: 83 FL (ref 82–98)
MONOCYTES # BLD AUTO: 0.43 THOUSAND/ΜL (ref 0.17–1.22)
MONOCYTES NFR BLD AUTO: 8 % (ref 4–12)
NEUTROPHILS # BLD AUTO: 3.29 THOUSANDS/ΜL (ref 1.85–7.62)
NEUTS SEG NFR BLD AUTO: 60 % (ref 43–75)
NRBC BLD AUTO-RTO: 0 /100 WBCS
PLATELET # BLD AUTO: 259 THOUSANDS/UL (ref 149–390)
PMV BLD AUTO: 8.7 FL (ref 8.9–12.7)
POTASSIUM SERPL-SCNC: 3.9 MMOL/L (ref 3.5–5.3)
PROTHROMBIN TIME: 14 SECONDS (ref 11.8–14.2)
RBC # BLD AUTO: 4.63 MILLION/UL (ref 3.88–5.62)
SODIUM SERPL-SCNC: 138 MMOL/L (ref 136–145)
WBC # BLD AUTO: 5.44 THOUSAND/UL (ref 4.31–10.16)

## 2019-06-19 PROCEDURE — 99232 SBSQ HOSP IP/OBS MODERATE 35: CPT | Performed by: SURGERY

## 2019-06-19 PROCEDURE — 80048 BASIC METABOLIC PNL TOTAL CA: CPT | Performed by: SURGERY

## 2019-06-19 PROCEDURE — 85610 PROTHROMBIN TIME: CPT | Performed by: SURGERY

## 2019-06-19 PROCEDURE — 85025 COMPLETE CBC W/AUTO DIFF WBC: CPT | Performed by: SURGERY

## 2019-06-19 RX ADMIN — HEPARIN SODIUM 5000 UNITS: 5000 INJECTION INTRAVENOUS; SUBCUTANEOUS at 16:30

## 2019-06-19 RX ADMIN — CEFEPIME HYDROCHLORIDE 2000 MG: 2 INJECTION, POWDER, FOR SOLUTION INTRAVENOUS at 11:47

## 2019-06-19 RX ADMIN — HEPARIN SODIUM 5000 UNITS: 5000 INJECTION INTRAVENOUS; SUBCUTANEOUS at 21:31

## 2019-06-19 RX ADMIN — PANTOPRAZOLE SODIUM 40 MG: 40 TABLET, DELAYED RELEASE ORAL at 08:59

## 2019-06-19 RX ADMIN — CEFEPIME HYDROCHLORIDE 2000 MG: 2 INJECTION, POWDER, FOR SOLUTION INTRAVENOUS at 00:49

## 2019-06-19 RX ADMIN — IBUPROFEN 800 MG: 400 TABLET ORAL at 20:24

## 2019-06-19 RX ADMIN — METRONIDAZOLE 500 MG: 500 TABLET ORAL at 21:32

## 2019-06-19 RX ADMIN — METRONIDAZOLE 500 MG: 500 TABLET ORAL at 16:31

## 2019-06-19 RX ADMIN — METRONIDAZOLE 500 MG: 500 TABLET ORAL at 06:06

## 2019-06-19 RX ADMIN — METOPROLOL TARTRATE 25 MG: 25 TABLET ORAL at 08:59

## 2019-06-19 RX ADMIN — SENNOSIDES AND DOCUSATE SODIUM 1 TABLET: 8.6; 5 TABLET ORAL at 21:32

## 2019-06-19 RX ADMIN — METOPROLOL TARTRATE 25 MG: 25 TABLET ORAL at 21:31

## 2019-06-20 VITALS
SYSTOLIC BLOOD PRESSURE: 132 MMHG | OXYGEN SATURATION: 97 % | TEMPERATURE: 97.4 F | DIASTOLIC BLOOD PRESSURE: 85 MMHG | RESPIRATION RATE: 16 BRPM | BODY MASS INDEX: 23.21 KG/M2 | HEART RATE: 67 BPM | WEIGHT: 190.6 LBS | HEIGHT: 76 IN

## 2019-06-20 LAB
BACTERIA SPEC BFLD CULT: ABNORMAL
BASOPHILS # BLD AUTO: 0.02 THOUSANDS/ΜL (ref 0–0.1)
BASOPHILS NFR BLD AUTO: 0 % (ref 0–1)
EOSINOPHIL # BLD AUTO: 0.17 THOUSAND/ΜL (ref 0–0.61)
EOSINOPHIL NFR BLD AUTO: 4 % (ref 0–6)
ERYTHROCYTE [DISTWIDTH] IN BLOOD BY AUTOMATED COUNT: 16.4 % (ref 11.6–15.1)
GRAM STN SPEC: ABNORMAL
GRAM STN SPEC: ABNORMAL
HCT VFR BLD AUTO: 34.5 % (ref 36.5–49.3)
HGB BLD-MCNC: 11.3 G/DL (ref 12–17)
IMM GRANULOCYTES # BLD AUTO: 0.05 THOUSAND/UL (ref 0–0.2)
IMM GRANULOCYTES NFR BLD AUTO: 1 % (ref 0–2)
LYMPHOCYTES # BLD AUTO: 1.1 THOUSANDS/ΜL (ref 0.6–4.47)
LYMPHOCYTES NFR BLD AUTO: 22 % (ref 14–44)
MCH RBC QN AUTO: 27.4 PG (ref 26.8–34.3)
MCHC RBC AUTO-ENTMCNC: 32.8 G/DL (ref 31.4–37.4)
MCV RBC AUTO: 84 FL (ref 82–98)
MONOCYTES # BLD AUTO: 0.41 THOUSAND/ΜL (ref 0.17–1.22)
MONOCYTES NFR BLD AUTO: 8 % (ref 4–12)
NEUTROPHILS # BLD AUTO: 3.15 THOUSANDS/ΜL (ref 1.85–7.62)
NEUTS SEG NFR BLD AUTO: 65 % (ref 43–75)
NRBC BLD AUTO-RTO: 0 /100 WBCS
PLATELET # BLD AUTO: 247 THOUSANDS/UL (ref 149–390)
PMV BLD AUTO: 9.1 FL (ref 8.9–12.7)
RBC # BLD AUTO: 4.13 MILLION/UL (ref 3.88–5.62)
WBC # BLD AUTO: 4.9 THOUSAND/UL (ref 4.31–10.16)

## 2019-06-20 PROCEDURE — 85025 COMPLETE CBC W/AUTO DIFF WBC: CPT | Performed by: SURGERY

## 2019-06-20 PROCEDURE — NC001 PR NO CHARGE: Performed by: SURGERY

## 2019-06-20 PROCEDURE — 99238 HOSP IP/OBS DSCHRG MGMT 30/<: CPT | Performed by: SURGERY

## 2019-06-20 RX ORDER — AMOXICILLIN AND CLAVULANATE POTASSIUM 875; 125 MG/1; MG/1
1 TABLET, FILM COATED ORAL EVERY 12 HOURS SCHEDULED
Qty: 28 TABLET | Refills: 0 | Status: SHIPPED | OUTPATIENT
Start: 2019-06-20 | End: 2019-07-04

## 2019-06-20 RX ADMIN — PANTOPRAZOLE SODIUM 40 MG: 40 TABLET, DELAYED RELEASE ORAL at 08:25

## 2019-06-20 RX ADMIN — METOPROLOL TARTRATE 25 MG: 25 TABLET ORAL at 08:25

## 2019-06-20 RX ADMIN — METRONIDAZOLE 500 MG: 500 TABLET ORAL at 05:15

## 2019-06-20 RX ADMIN — CEFEPIME HYDROCHLORIDE 2000 MG: 2 INJECTION, POWDER, FOR SOLUTION INTRAVENOUS at 01:00

## 2019-06-21 ENCOUNTER — TELEPHONE (OUTPATIENT)
Dept: SURGERY | Facility: CLINIC | Age: 59
End: 2019-06-21

## 2019-06-21 DIAGNOSIS — K65.1 SUBPHRENIC ABSCESS (HCC): ICD-10-CM

## 2019-06-21 DIAGNOSIS — K65.1 INTRA-ABDOMINAL ABSCESS (HCC): Primary | ICD-10-CM

## 2019-06-21 LAB
BACTERIA BLD CULT: NORMAL
BACTERIA BLD CULT: NORMAL

## 2019-06-21 RX ORDER — 0.9 % SODIUM CHLORIDE 0.9 %
10 VIAL (ML) INJECTION DAILY
Qty: 300 ML | Refills: 3 | Status: SHIPPED | OUTPATIENT
Start: 2019-06-21 | End: 2019-07-29

## 2019-06-24 ENCOUNTER — ANTICOAG VISIT (OUTPATIENT)
Dept: CARDIOLOGY CLINIC | Facility: CLINIC | Age: 59
End: 2019-06-24

## 2019-06-24 DIAGNOSIS — I48.0 PAROXYSMAL ATRIAL FIBRILLATION (HCC): ICD-10-CM

## 2019-06-24 LAB
FUNGUS SPEC CULT: NORMAL
FUNGUS SPEC CULT: NORMAL
INR PPP: 1.6 (ref 0.84–1.19)

## 2019-06-25 ENCOUNTER — HOSPITAL ENCOUNTER (OUTPATIENT)
Dept: RADIOLOGY | Facility: HOSPITAL | Age: 59
Discharge: HOME | End: 2019-06-25
Admitting: RADIOLOGY
Payer: COMMERCIAL

## 2019-06-25 PROCEDURE — 49424 ASSESS CYST CONTRAST INJECT: CPT

## 2019-06-25 PROCEDURE — 49424 ASSESS CYST CONTRAST INJECT: CPT | Performed by: RADIOLOGY

## 2019-06-25 PROCEDURE — 76080 X-RAY EXAM OF FISTULA: CPT

## 2019-06-25 PROCEDURE — 76080 X-RAY EXAM OF FISTULA: CPT | Performed by: RADIOLOGY

## 2019-06-25 RX ADMIN — IOHEXOL 2.5 ML: 300 INJECTION, SOLUTION INTRAVENOUS at 13:56

## 2019-06-27 ENCOUNTER — TELEPHONE (OUTPATIENT)
Dept: GASTROENTEROLOGY | Facility: MEDICAL CENTER | Age: 59
End: 2019-06-27

## 2019-06-27 ENCOUNTER — APPOINTMENT (OUTPATIENT)
Dept: LAB | Facility: CLINIC | Age: 59
End: 2019-06-27
Payer: COMMERCIAL

## 2019-06-27 ENCOUNTER — TELEPHONE (OUTPATIENT)
Dept: GASTROENTEROLOGY | Facility: AMBULARY SURGERY CENTER | Age: 59
End: 2019-06-27

## 2019-06-27 NOTE — TELEPHONE ENCOUNTER
Patient's wife Marcella Maier returned a missed call from [facility]   Marcella Maier can be reached at [phone]

## 2019-06-27 NOTE — TELEPHONE ENCOUNTER
Patient's wife is calling and asking for information regarding long term disability paperwork that we were supposed to complete and submit to [place]  Do you remember completing this and if so, is there an update we can provide her with?

## 2019-06-27 NOTE — TELEPHONE ENCOUNTER
marie patient        pts wife called to get cat scan results from 6-3-19        Call back 3  [phone]

## 2019-06-27 NOTE — TELEPHONE ENCOUNTER
Patient's wife is requesting an appointment with Dr Anibal Choudhury earlier than 8/28  I'm not sure if this is a possibility  If not, she can keep end of august appointment     Thank you

## 2019-06-27 NOTE — TELEPHONE ENCOUNTER
Called and spoke to patient's wife to answer her questions regarding paperwork sent to insurance  I let her know I sent all of his clinical notes (office notes, operative notes, imaging, and labs) from the past year  She also wants to know what is recommended as he is not physically able to work, and has procedures coming up, and if there are any restrictions (I e  Lifting weight, etc ) as his job is physically demanding  Please call her to answer her questions  Thank you! Clerical - Is there anything sooner available? Please call patient/patient's wife to schedule  Thank you!

## 2019-06-28 ENCOUNTER — OFFICE VISIT (OUTPATIENT)
Dept: SURGERY | Facility: CLINIC | Age: 59
End: 2019-06-28
Payer: COMMERCIAL

## 2019-06-28 ENCOUNTER — TELEPHONE (OUTPATIENT)
Dept: GASTROENTEROLOGY | Facility: CLINIC | Age: 59
End: 2019-06-28

## 2019-06-28 ENCOUNTER — ANTICOAG VISIT (OUTPATIENT)
Dept: CARDIOLOGY CLINIC | Facility: CLINIC | Age: 59
End: 2019-06-28

## 2019-06-28 VITALS
WEIGHT: 190.2 LBS | SYSTOLIC BLOOD PRESSURE: 128 MMHG | DIASTOLIC BLOOD PRESSURE: 82 MMHG | HEIGHT: 76 IN | HEART RATE: 67 BPM | BODY MASS INDEX: 23.16 KG/M2 | TEMPERATURE: 97.4 F

## 2019-06-28 DIAGNOSIS — K65.1 INTRA-ABDOMINAL ABSCESS (HCC): Primary | ICD-10-CM

## 2019-06-28 DIAGNOSIS — I48.0 PAROXYSMAL ATRIAL FIBRILLATION (HCC): ICD-10-CM

## 2019-06-28 PROCEDURE — 99212 OFFICE O/P EST SF 10 MIN: CPT | Performed by: SURGERY

## 2019-07-01 ENCOUNTER — ANTICOAG VISIT (OUTPATIENT)
Dept: CARDIOLOGY CLINIC | Facility: CLINIC | Age: 59
End: 2019-07-01

## 2019-07-01 DIAGNOSIS — I48.0 PAROXYSMAL ATRIAL FIBRILLATION (HCC): ICD-10-CM

## 2019-07-01 LAB — INR PPP: 2.3 (ref 0.84–1.19)

## 2019-07-02 ENCOUNTER — HOSPITAL ENCOUNTER (OUTPATIENT)
Dept: RADIOLOGY | Facility: HOSPITAL | Age: 59
Discharge: HOME | End: 2019-07-02
Admitting: RADIOLOGY
Payer: COMMERCIAL

## 2019-07-02 DIAGNOSIS — L02.91 ABSCESS: ICD-10-CM

## 2019-07-02 PROCEDURE — 49424 ASSESS CYST CONTRAST INJECT: CPT

## 2019-07-02 PROCEDURE — 49424 ASSESS CYST CONTRAST INJECT: CPT | Performed by: RADIOLOGY

## 2019-07-02 PROCEDURE — 76080 X-RAY EXAM OF FISTULA: CPT | Performed by: RADIOLOGY

## 2019-07-02 PROCEDURE — 76080 X-RAY EXAM OF FISTULA: CPT

## 2019-07-02 RX ADMIN — IOHEXOL 1.5 ML: 300 INJECTION, SOLUTION INTRAVENOUS at 13:29

## 2019-07-02 NOTE — DISCHARGE INSTRUCTIONS
Drainage Tube Removal    Your drainage tube was removed today  What you need know at home:   Keep a clean dry dressing at the tube site until the small opening closes  It will take twenty four to forty eight hours  Keep the site dry until it heals  A small amount of drainage on your dressing is normal  Resume your normal diet  Small sips of flat soda will help with any nausea  Contact Interventional Radiology for any of the following: You have pain, fever greater than 101, shaking chills  If you have increased redness or swelling at the site  Drainage Tube Removal    Your drainage tube was removed today  What you need know at home:   Keep a clean dry dressing at the tube site until the small opening closes  It will take twenty four to forty eight hours  Keep the site dry until it heals  A small amount of drainage on your dressing is normal  Resume your normal diet  Small sips of flat soda will help with any nausea  Contact Interventional Radiology for any of the following: You have pain, fever greater than 101, shaking chills  If you have increased redness or swelling at the site  I the drainage from your site does not stop  If the site drains pus or has a bad odor  Contact Interventional Radiology at [phone]   [place] PATIENTS: Contact Interventional Radiology at [phone]) [place]i [place] PATIENTS: Contact Interventional Radiology at [phone]) if:      I the drainage from your site does not stop  If the site drains pus or has a bad odor       Contact Interventional Radiology at [phone]   [place] PATIENTS: Contact Interventional Radiology at [phone]) [place]i [place] PATIENTS: Contact Interventional Radiology at [phone]) if:

## 2019-07-02 NOTE — H&P
IR H&P    HPI:  60year old male with history of pancreatitis s/p left subdiaphragmatic fluid collection drain placement returns for evaluation of the drain      PMH:  Pancreatitis  Afib  GERD    PSH:  Cholecystectomy    Physical exam:  Gen: NAD  Abd: Left upper quadrant drain in place    A/P:  60year old male with left subdiaphragmatic drain     - Drain check

## 2019-07-02 NOTE — BRIEF OP NOTE (RAD/CATH)
IR TUBE CHECK  Procedure Note    PATIENT NAME: Nanda Grady  : 1960  MRN: [id number]     Pre-op Diagnosis:   1  Abscess      Post-op Diagnosis:   1  Abscess        Surgeon:   Perez Hopkins MD    Estimated Blood Loss: None    Findings: No fluid cavity surrounding the left subdiaphragmatic drain  Drain was removed      Specimens: None    Complications:  None    Anesthesia: None    Perez Hopkins MD     Date: 2019  Time: 2:02 PM

## 2019-07-08 ENCOUNTER — ANTICOAG VISIT (OUTPATIENT)
Dept: CARDIOLOGY CLINIC | Facility: CLINIC | Age: 59
End: 2019-07-08

## 2019-07-08 DIAGNOSIS — I48.0 PAROXYSMAL ATRIAL FIBRILLATION (HCC): ICD-10-CM

## 2019-07-08 LAB — INR PPP: 2.8 (ref 0.84–1.19)

## 2019-07-09 LAB
MYCOBACTERIUM SPEC CULT: NORMAL
MYCOBACTERIUM SPEC CULT: NORMAL
RHODAMINE-AURAMINE STN SPEC: NORMAL
RHODAMINE-AURAMINE STN SPEC: NORMAL

## 2019-07-16 ENCOUNTER — TELEPHONE (OUTPATIENT)
Dept: GASTROENTEROLOGY | Facility: MEDICAL CENTER | Age: 59
End: 2019-07-16

## 2019-07-16 DIAGNOSIS — K86.3 PANCREATIC PSEUDOCYST: ICD-10-CM

## 2019-07-16 RX ORDER — PANTOPRAZOLE SODIUM 40 MG/1
40 TABLET, DELAYED RELEASE ORAL DAILY
Qty: 90 TABLET | Refills: 3 | Status: SHIPPED | OUTPATIENT
Start: 2019-07-16 | End: 2020-07-20

## 2019-07-16 NOTE — TELEPHONE ENCOUNTER
Refill Request for Medication: protononix    Dose: 40 mg    Quantity: 90    Ph[facility]

## 2019-07-17 DIAGNOSIS — I48.0 PAROXYSMAL ATRIAL FIBRILLATION (HCC): ICD-10-CM

## 2019-07-17 DIAGNOSIS — I48.0 PAROXYSMAL ATRIAL FIBRILLATION (HCC): Primary | ICD-10-CM

## 2019-07-17 RX ORDER — WARFARIN SODIUM 2 MG/1
TABLET ORAL
Qty: 225 TABLET | Refills: 3 | Status: SHIPPED | OUTPATIENT
Start: 2019-07-17 | End: 2019-09-12 | Stop reason: ALTCHOICE

## 2019-07-22 ENCOUNTER — ANTICOAG VISIT (OUTPATIENT)
Dept: CARDIOLOGY CLINIC | Facility: CLINIC | Age: 59
End: 2019-07-22

## 2019-07-22 ENCOUNTER — APPOINTMENT (OUTPATIENT)
Dept: LAB | Facility: CLINIC | Age: 59
End: 2019-07-22
Payer: COMMERCIAL

## 2019-07-22 DIAGNOSIS — I48.0 PAROXYSMAL ATRIAL FIBRILLATION (HCC): ICD-10-CM

## 2019-07-25 ENCOUNTER — TELEPHONE (OUTPATIENT)
Dept: GASTROENTEROLOGY | Facility: CLINIC | Age: 59
End: 2019-07-25

## 2019-07-25 NOTE — TELEPHONE ENCOUNTER
I do not think he needs a repeat before his appointment, they can discuss timing of a repeat with Dr Carmine Mckeon at their appointment   Thank you

## 2019-07-25 NOTE — TELEPHONE ENCOUNTER
pranav pt    pts wife, osman, would like to know if Kitty Forbes needs to have a CT done before his next ov   Please advise [phone]

## 2019-07-26 ENCOUNTER — OFFICE VISIT (OUTPATIENT)
Dept: SURGERY | Facility: CLINIC | Age: 59
End: 2019-07-26
Payer: COMMERCIAL

## 2019-07-26 ENCOUNTER — TELEPHONE (OUTPATIENT)
Dept: GASTROENTEROLOGY | Facility: CLINIC | Age: 59
End: 2019-07-26

## 2019-07-26 VITALS
DIASTOLIC BLOOD PRESSURE: 82 MMHG | HEIGHT: 76 IN | HEART RATE: 62 BPM | TEMPERATURE: 98.2 F | WEIGHT: 194.8 LBS | SYSTOLIC BLOOD PRESSURE: 124 MMHG | BODY MASS INDEX: 23.72 KG/M2

## 2019-07-26 DIAGNOSIS — K86.3 PANCREATIC PSEUDOCYST: Primary | ICD-10-CM

## 2019-07-26 DIAGNOSIS — Z87.19 HISTORY OF ACUTE PANCREATITIS: ICD-10-CM

## 2019-07-26 DIAGNOSIS — K65.1 INTRA-ABDOMINAL ABSCESS (HCC): ICD-10-CM

## 2019-07-26 PROCEDURE — 99212 OFFICE O/P EST SF 10 MIN: CPT | Performed by: SURGERY

## 2019-07-26 NOTE — PROGRESS NOTES
Office Visit - General Surgery  Jesi Dill MRN: [id number]  Encounter: [id number]    Assessment and Plan    Problem List Items Addressed This Visit        Digestive    RESOLVED: Pancreatic pseudocyst - Primary       Other    RESOLVED: History of acute pancreatitis    RESOLVED: Intra-abdominal abscess ([address] )     Doing well  Follow-up with Dr Susu Perry as scheduled  Follow-up PRN               Chief Complaint:  Jesi Dill is a 60 y o  male who presents for Follow-up    Subjective  Jesi Dill is a 58M with hx of severe pancreatitis back in January with pancreatic pseudocyst s/p endoscopic necrosectomies, recurrent pleural effusion s/p L VATS with decortication, subphrenic fluid collection s/p IR drain placement  Ir drains removed on 7/2  He is doing well  He continues to recover  He is scheduled to follow-up with Dr Susu Perry for endoscopic removal of the ampullary adenoma  Past Medical History  Past Medical History:   Diagnosis Date    Atrial fibrillation (HCC)     Gastroesophageal reflux     GERD (gastroesophageal reflux disease)     Pancreatitis     Pleural effusion     Second hand smoke exposure        Past Surgical History  Past Surgical History:   Procedure Laterality Date    CHOLECYSTECTOMY      COLONOSCOPY N/A 3/21/2016    Procedure: COLONOSCOPY;  Surgeon: Maurice Jha DO;  [facility]; Service:     ERCP N/A 1/4/2019    Procedure: ENDOSCOPIC RETROGRADE CHOLANGIOPANCREATOGRAPHY (ERCP); Surgeon: Thu Chambers MD;  [facility]; Service: Gastroenterology    ESOPHAGOGASTRODUODENOSCOPY N/A 2/5/2019    Procedure: ESOPHAGOGASTRODUODENOSCOPY (EGD), with possible nasojejunal Dobhoff tube placement;  Surgeon: Krunal Alvarado MD;  [facility]; Service: Gastroenterology    ESOPHAGOGASTRODUODENOSCOPY N/A 4/3/2019    Procedure: ESOPHAGOGASTRODUODENOSCOPY (EGD)- endoscopic necrosectomy;  Surgeon: Thu Chambers MD;  [facility];   Service: Gastroenterology    IR IMAGE GUIDED ASPIRATION / DRAINAGE W TUBE  6/17/2019    IR THORACENTESIS  2/7/2019    IR THORACENTESIS  3/29/2019    LINEAR ENDOSCOPIC U/S N/A 3/29/2019    Procedure: LINEAR ENDOSCOPIC U/S cyst gastro;  Surgeon: Fidel Humphreys MD;  [facility]; Service: Gastroenterology    KS [address] N/A 5/20/2019    Procedure: Dorsie Dense;  Surgeon: Horace Man MD;  [facility];  Service: Thoracic    KS EGD TRANSORAL BIOPSY SINGLE/MULTIPLE N/A 3/21/2016    Procedure: ESOPHAGOGASTRODUODENOSCOPY (EGD); Surgeon: Kiara Bello DO;  [facility]; Service: General    KS ERCP DX COLLECTION SPECIMEN BRUSHING/WASHING N/A 4/26/2019    Procedure: ENDOSCOPIC RETROGRADE CHOLANGIOPANCREATOGRAPHY (ERCP); Surgeon: Fidel Humphreys MD;  [facility]; Service: Gastroenterology    KS ESOPHAGOGASTRODUODENOSCOPY TRANSORAL DIAGNOSTIC N/A 4/9/2019    Procedure: ESOPHAGOGASTRODUODENOSCOPY (EGD) endoscopic necrosectomy;  Surgeon: Fidel Humphreys MD;  [facility]; Service: Gastroenterology    KS ESOPHAGOGASTRODUODENOSCOPY TRANSORAL DIAGNOSTIC N/A 4/26/2019    Procedure: ESOPHAGOGASTRODUODENOSCOPY (EGD); Surgeon: Fidel Humphreys MD;  [facility]; Service: Gastroenterology    KS THORACOSCOPY SURG PART PULM DECORT Left 5/20/2019    Procedure: DECORTICATION LUNG;  Surgeon: Horace Man MD;  [facility];  Service: Thoracic    KS THORACOSCOPY SURG PART PULM DECORT Left 5/20/2019    Procedure: THORACOSCOPY VIDEO ASSISTED SURGERY (VATS);   Surgeon: Horace Man MD;  [facility];  Service: Thoracic    KS THORACOSCOPY SURG W/PLEURODESIS N/A 5/20/2019    Procedure: PLEURODESIS mechanical;  Surgeon: Horace Man MD;  [facility];  Service: Thoracic       Family History  Family History   Problem Relation Age of Onset    Lung cancer Mother 79        Smoker     Transient ischemic attack Father 61    Lung disease Neg Hx        Social History  Social History     Socioeconomic History    Marital status: /Civil Union     Spouse name: None    Number of children: None    Years of education: None    Highest education level: None   Occupational History    None   Social Needs    Financial resource strain: None    Food insecurity:     Worry: None     Inability: None    Transportation needs:     Medical: None     Non-medical: None   Tobacco Use    Smoking status: Never Smoker    Smokeless tobacco: Never Used   Substance and Sexual Activity    Alcohol use: Not Currently    Drug use: No    Sexual activity: None   Lifestyle    Physical activity:     Days per week: None     Minutes per session: None    Stress: None   Relationships    Social connections:     Talks on phone: None     Gets together: None     Attends Anabaptism service: None     Active member of club or organization: None     Attends meetings of clubs or organizations: None     Relationship status: None    Intimate partner violence:     Fear of current or ex partner: None     Emotionally abused: None     Physically abused: None     Forced sexual activity: None   Other Topics Concern    None   Social History Narrative    None        Medications  Current Outpatient Medications on File Prior to Visit   Medication Sig Dispense Refill    metoprolol tartrate (LOPRESSOR) 25 mg tablet Take 1 tablet (25 mg total) by mouth every 12 (twelve) hours 180 tablet 2    pantoprazole (PROTONIX) 40 mg tablet Take 1 tablet (40 mg total) by mouth daily for 90 days 90 tablet 3    warfarin (COUMADIN) 2 mg tablet TAKE 2 1/2 TABS BY MOUTH DAILY OR AS DIRECTED BY PHYSICIAN 225 tablet 3    warfarin (COUMADIN) 3 mg tablet Take 1 tablet (3 mg total) by mouth daily Start 5/25/19 (Patient taking differently: Take 5 mg by mouth daily Start 5/25/19)  0    acetaminophen (TYLENOL) 325 mg tablet Take 650 mg by mouth every 6 (six) hours as needed for mild pain      sodium chloride, PF, 0 9 % 10 mL by Intracatheter route daily for 120 days (Patient not taking: Reported on 7/26/2019) 300 mL 3     No current facility-administered medications on file prior to visit  Allergies  No Known Allergies    Review of Systems   Constitutional: Negative for activity change, chills, fever and unexpected weight change  HENT: Negative  Negative for congestion, sneezing and sore throat  Eyes: Negative  Negative for pain and redness  Respiratory: Negative  Negative for chest tightness, shortness of breath and wheezing  Cardiovascular: Negative  Negative for chest pain and palpitations  Gastrointestinal: Negative for abdominal distention, abdominal pain, constipation, diarrhea, nausea and vomiting  Endocrine: Negative  Negative for cold intolerance and heat intolerance  Genitourinary: Negative  Negative for dysuria, hematuria and urgency  Musculoskeletal: Negative  Negative for arthralgias and back pain  Skin: Negative for color change and rash  Allergic/Immunologic: Negative  Negative for environmental allergies and food allergies  Neurological: Negative  Negative for dizziness and light-headedness  Hematological: Negative  Negative for adenopathy  Does not bruise/bleed easily  Psychiatric/Behavioral: Negative  Negative for agitation and sleep disturbance  The patient is not nervous/anxious  Objective  Vitals:    07/26/19 1035   BP: 124/82   Pulse: 62   Temp: 98 2 °F (36 8 °C)       Physical Exam   Constitutional: He is oriented to person, place, and time  He appears well-developed and well-nourished  No distress  HENT:   Head: Normocephalic and atraumatic  Eyes: Pupils are equal, round, and reactive to light  Conjunctivae are normal    Cardiovascular: Normal rate and regular rhythm  Pulmonary/Chest: Effort normal and breath sounds normal  No respiratory distress  He has no wheezes  Abdominal: Soft  He exhibits no distension  There is no tenderness     Neurological: He is alert and oriented to person, place, and time  Skin: Skin is warm and dry  He is not diaphoretic  Psychiatric: He has a normal mood and affect   His behavior is normal  Judgment and thought content normal

## 2019-07-26 NOTE — TELEPHONE ENCOUNTER
Patient came into the office to drop off long term disability paperwork to be sent to [employer]  I have filled out what I can, but I need help filling this out/signature  Can someone help me with this so that I can send it to insurance, as Dr Edwige Pringle is out until 8/7  Thank you!

## 2019-07-26 NOTE — TELEPHONE ENCOUNTER
Ginny Jacobo - I'm inpatient at [facility], covering [place] tasks  Is there any provider in the [place] office today who can help out?

## 2019-07-29 ENCOUNTER — OFFICE VISIT (OUTPATIENT)
Dept: CARDIOLOGY CLINIC | Facility: CLINIC | Age: 59
End: 2019-07-29
Payer: COMMERCIAL

## 2019-07-29 VITALS
SYSTOLIC BLOOD PRESSURE: 106 MMHG | DIASTOLIC BLOOD PRESSURE: 70 MMHG | WEIGHT: 196.6 LBS | HEIGHT: 76 IN | HEART RATE: 58 BPM | BODY MASS INDEX: 23.94 KG/M2

## 2019-07-29 DIAGNOSIS — I48.0 PAROXYSMAL ATRIAL FIBRILLATION (HCC): Primary | ICD-10-CM

## 2019-07-29 DIAGNOSIS — J90 PLEURAL EFFUSION, LEFT: ICD-10-CM

## 2019-07-29 PROCEDURE — 93000 ELECTROCARDIOGRAM COMPLETE: CPT | Performed by: INTERNAL MEDICINE

## 2019-07-29 PROCEDURE — 99214 OFFICE O/P EST MOD 30 MIN: CPT | Performed by: INTERNAL MEDICINE

## 2019-07-29 NOTE — PROGRESS NOTES
Cardiology Follow Up    Mati Carmen  1960  [id number]  [facility] [phone]    1  Paroxysmal atrial fibrillation (HCC)  POCT ECG   2  Pleural effusion, left         Discussion/Summary:  Overall he has been doing well from a cardiac standpoint  He does underwent a lung decortication and maintain sinus rhythm  He has been off amiodarone  He is currently in sinus rhythm and doing well  No further testing  I will have him come back in 6-8 months for follow-up  Continue anticoagulation  Interval History:  50-year-old gentleman I met in the hospital recently with episodes severe pancreatitis and volume overload secondary to low protein levels  He had no signs of heart failure normal ventricle  He was shocked several times to get out of atrial fibrillation and eventually placed on IV amiodarone to maintain sinus rhythm during acute illness  He was started on anticoagulation  Since her last visit he has been doing well  He had needed to undergo a lung decortication procedure secondary to infection  He has been maintaining sinus rhythm in doing well from cardiovascular standpoint  He denies any chest pain, shortness of breath, palpitations he is working out now  Problem List     History of acute pancreatitis    Pancreatitis    Overview Signed 1/4/2019 10:19 AM by Heber Gallagher PA-C     Added automatically from request for surgery [id number]         Delirium    Leukocytosis    Pulmonary edema    Hypokalemia    Metabolic alkalosis    Dysphagia    Metabolic encephalopathy    Paroxysmal atrial fibrillation (HCC)    Supratherapeutic INR    Abdominal pain    Pancreatic pseudocyst    Pleural effusion, left    Protein-calorie malnutrition ([address] )    Overview Signed 2/4/2019  3:51 PM by Kenna Munoz PA-C     Added automatically from request for surgery [id number]         Malnutrition Findings:           BMI Findings:         Body mass index is 23 93 kg/m²           Abnormal CT of the abdomen        Past Medical History:   Diagnosis Date    Atrial fibrillation (HCC)     Gastroesophageal reflux     GERD (gastroesophageal reflux disease)     Pancreatitis     Pleural effusion     Second hand smoke exposure      Social History     Socioeconomic History    Marital status: /Civil Union     Spouse name: Not on file    Number of children: Not on file    Years of education: Not on file    Highest education level: Not on file   Occupational History    Not on file   Social Needs    Financial resource strain: Not on file    Food insecurity:     Worry: Not on file     Inability: Not on file    Transportation needs:     Medical: Not on file     Non-medical: Not on file   Tobacco Use    Smoking status: Never Smoker    Smokeless tobacco: Never Used   Substance and Sexual Activity    Alcohol use: Not Currently    Drug use: No    Sexual activity: Not on file   Lifestyle    Physical activity:     Days per week: Not on file     Minutes per session: Not on file    Stress: Not on file   Relationships    Social connections:     Talks on phone: Not on file     Gets together: Not on file     Attends Faith service: Not on file     Active member of club or organization: Not on file     Attends meetings of clubs or organizations: Not on file     Relationship status: Not on file    Intimate partner violence:     Fear of current or ex partner: Not on file     Emotionally abused: Not on file     Physically abused: Not on file     Forced sexual activity: Not on file   Other Topics Concern    Not on file   Social History Narrative    Not on file      Family History   Problem Relation Age of Onset    Lung cancer Mother 79        Smoker     Transient ischemic attack Father 61    Lung disease Neg Hx      Past Surgical History:   Procedure Laterality Date    CHOLECYSTECTOMY      COLONOSCOPY N/A 3/21/2016    Procedure: COLONOSCOPY; Surgeon: Ramon Opitz, DO;  [facility]; Service:     ERCP N/A 1/4/2019    Procedure: ENDOSCOPIC RETROGRADE CHOLANGIOPANCREATOGRAPHY (ERCP); Surgeon: Kimberlee Kate MD;  [facility]; Service: Gastroenterology    ESOPHAGOGASTRODUODENOSCOPY N/A 2/5/2019    Procedure: ESOPHAGOGASTRODUODENOSCOPY (EGD), with possible nasojejunal Dobhoff tube placement;  Surgeon: Saskia Fairchild MD;  [facility]; Service: Gastroenterology    ESOPHAGOGASTRODUODENOSCOPY N/A 4/3/2019    Procedure: ESOPHAGOGASTRODUODENOSCOPY (EGD)- endoscopic necrosectomy;  Surgeon: Kimberlee Kate MD;  [facility]; Service: Gastroenterology    IR IMAGE GUIDED ASPIRATION / DRAINAGE W TUBE  6/17/2019    IR THORACENTESIS  2/7/2019    IR THORACENTESIS  3/29/2019    LINEAR ENDOSCOPIC U/S N/A 3/29/2019    Procedure: LINEAR ENDOSCOPIC U/S cyst gastro;  Surgeon: Kimberlee Kate MD;  [facility]; Service: Gastroenterology    [place] [address] N/A 5/20/2019    Procedure: Louisa Chaney;  Surgeon: Kirsten Staples MD;  [facility];  Service: Thoracic    [place] EGD TRANSORAL BIOPSY SINGLE/MULTIPLE N/A 3/21/2016    Procedure: ESOPHAGOGASTRODUODENOSCOPY (EGD); Surgeon: Ramon Opitz, DO;  [facility]; Service: General    [place] ERCP DX COLLECTION SPECIMEN BRUSHING/WASHING N/A 4/26/2019    Procedure: ENDOSCOPIC RETROGRADE CHOLANGIOPANCREATOGRAPHY (ERCP); Surgeon: Kimberlee Kate MD;  [facility]; Service: Gastroenterology    [place] ESOPHAGOGASTRODUODENOSCOPY TRANSORAL DIAGNOSTIC N/A 4/9/2019    Procedure: ESOPHAGOGASTRODUODENOSCOPY (EGD) endoscopic necrosectomy;  Surgeon: Kimberlee Kate MD;  [facility]; Service: Gastroenterology    [place] ESOPHAGOGASTRODUODENOSCOPY TRANSORAL DIAGNOSTIC N/A 4/26/2019    Procedure: ESOPHAGOGASTRODUODENOSCOPY (EGD); Surgeon: Kimberlee Kate MD;  [facility];   Service: Gastroenterology    [place] THORACOSCOPY SURG PART PULM DECORT Left 5/20/2019    Procedure: DECORTICATION LUNG;  Surgeon: Andrés Del Rio MD;  [facility];  Service: Thoracic    IN THORACOSCOPY SURG PART PULM DECORT Left 5/20/2019    Procedure: THORACOSCOPY VIDEO ASSISTED SURGERY (VATS);   Surgeon: Andrés Del Rio MD;  [facility];  Service: Thoracic    IN THORACOSCOPY SURG W/PLEURODESIS N/A 5/20/2019    Procedure: PLEURODESIS mechanical;  Surgeon: Andrés Del Rio MD;  [facility];  Service: Thoracic       Current Outpatient Medications:     metoprolol tartrate (LOPRESSOR) 25 mg tablet, Take 1 tablet (25 mg total) by mouth every 12 (twelve) hours, Disp: 180 tablet, Rfl: 2    pantoprazole (PROTONIX) 40 mg tablet, Take 1 tablet (40 mg total) by mouth daily for 90 days, Disp: 90 tablet, Rfl: 3    warfarin (COUMADIN) 2 mg tablet, TAKE 2 1/2 TABS BY MOUTH DAILY OR AS DIRECTED BY PHYSICIAN, Disp: 225 tablet, Rfl: 3    warfarin (COUMADIN) 3 mg tablet, Take 1 tablet (3 mg total) by mouth daily Start 5/25/19 (Patient taking differently: Take 5 mg by mouth daily Start 5/25/19), Disp: , Rfl: 0    acetaminophen (TYLENOL) 325 mg tablet, Take 650 mg by mouth every 6 (six) hours as needed for mild pain, Disp: , Rfl:   No Known Allergies    Labs:     Chemistry        Component Value Date/Time     01/05/2015 1131    K 3 9 06/19/2019 0505    K 3 9 01/05/2015 1131     06/19/2019 0505     01/05/2015 1131    CO2 26 06/19/2019 0505    CO2 24 01/05/2019 0314    BUN 11 06/19/2019 0505    BUN 19 01/05/2015 1131    CREATININE 0 83 06/19/2019 0505    CREATININE 1 01 01/05/2015 1131        Component Value Date/Time    CALCIUM 8 8 06/19/2019 0505    CALCIUM 9 2 01/05/2015 1131    ALKPHOS 80 06/15/2019 1932    ALKPHOS 102 01/05/2015 1131    AST 10 06/15/2019 1932    AST 22 01/05/2015 1131    ALT 15 06/15/2019 1932    ALT 43 01/05/2015 1131    BILITOT 1 00 01/05/2015 1131            No results found for: CHOL  Lab Results   Component Value Date    HDL 59 01/03/2019     Lab Results   Component Value Date    LDLCALC 77 01/03/2019     Lab Results   Component Value Date    TRIG 52 01/03/2019     No results found for: CHOLHDL    Imaging: Ct Abdomen Pelvis Wo Contrast    Result Date: 2/3/2019  Narrative: CT ABDOMEN AND PELVIS WITHOUT IV CONTRAST INDICATION:   Abdominal pain, unspecified  Recent history of pancreatitis  COMPARISON:  Contrast-enhanced CT of the abdomen and pelvis from January 3, 2019  TECHNIQUE:  CT examination of the abdomen and pelvis was performed without intravenous contrast   Axial, sagittal, and coronal 2D reformatted images were created from the source data and submitted for interpretation  Radiation dose length product (DLP) for this visit:  789 mGy-cm   This examination, like all CT scans performed in the [facility], was performed utilizing techniques to minimize radiation dose exposure, including the use of iterative reconstruction and automated exposure control  Enteric contrast was administered  The absence of intravenous contrast limits evaluation of the abdominal and pelvic viscera  FINDINGS: ABDOMEN LOWER CHEST:  Moderate size left pleural effusion, developing since the last CT  Compressive atelectasis in the base of the left lower lobe  Subsegmental atelectasis in the base of the right lower lobe  LIVER/BILIARY TREE:  1 6 cm cyst in the dome of the right lobe  Otherwise, liver grossly normal   Stent in CBD  No intrahepatic bile duct dilatation  Tiny amount of pneumobilia in the left lobe GALLBLADDER:     Postcholecystectomy  SPLEEN:  Normal in size  Compressed by adjacent 12 x 14 cm discrete fluid collection, either loculated ascites or pseudocyst  PANCREAS:  Evaluation limited without intravenous contrast   6 x 11 x 14 cm fluid collection in the pancreatic bed, consistent with pseudocyst, extending into the mesenteric root  Pancreas appears to be grossly intact  ADRENAL GLANDS:  Unremarkable  KIDNEYS/URETERS:  Unremarkable   No hydronephrosis  2 x 5 x 10 cm fluid collection in the left posterior pararenal space, extending into the left subphrenic space  STOMACH AND BOWEL:  Stomach displaced by the large pancreatic pseudocyst   Small intestine unremarkable  Diverticulosis in the sigmoid without evidence of diverticulitis  APPENDIX:  No findings to suggest appendicitis  ABDOMINOPELVIC CAVITY: Several prominent mesenteric lymph nodes, the largest a 1 2 x 1 8 cm node at the root of the mesentery  Scattered areas of mesenteric edema  Small amount of pelvic and perihepatic ascites  No extraluminal gas  VESSELS:  Unremarkable for patient's age  PELVIS REPRODUCTIVE ORGANS:  Prostate unremarkable  URINARY BLADDER:  Unremarkable  ABDOMINAL WALL/INGUINAL REGIONS:  Unremarkable  OSSEOUS STRUCTURES:  No acute fracture or destructive osseous lesion  Impression: 1  Moderate-sized left pleural effusion with compressive atelectasis in the subjacent portion of the left lower lobe  2   6 x 11 x 14 cm fluid-filled structure in the pancreatic bed, typical of a pseudocyst, extending into the mesenteric root  3   Several additional loculated fluid collections in the abdomen as described above, probably additional pseudocysts  These include a 12 x 14 cm perisplenic collection and a 2 x 5 x 10 cm left retroperitoneal collection  4   Small amount of ascites  5   Sigmoid diverticulosis without evidence of diverticulitis  6   CBD stent in place with no intrahepatic bile duct dilatation  Workstation performed: [id number]     Xr Chest Portable    Result Date: 2/8/2019  Narrative: CHEST INDICATION:   Significant pleuritic chest pain s/p thoracentesis  COMPARISON:  January 18, 2019 EXAM PERFORMED/VIEWS:  XR CHEST PORTABLE FINDINGS:  Enteric tube terminates below the diaphragms  Right upper extremity PICC line has been removed in the interval  Cardiomediastinal silhouette appears unremarkable   Small left pleural effusion slightly similar in size from prior study  No pneumothorax  Osseous structures appear within normal limits for patient age  Impression: No pneumothorax  Stable size small to moderate left pleural effusion  Workstation performed: [id number]     Ir Thoracentesis    Result Date: 2/7/2019  Narrative: Ultrasound-guided thoracentesis Clinical History: Left pleural effusion   Technique: The patient was brought to the interventional radiology area and placed in the sitting position on the side of a stretcher  The left chest was then examined with ultrasound and the skin was marked  The skin was then prepped, and draped in usual sterile fashion  Lidocaine was administered to the skin and a small skin incision was made  Under direct ultrasound guidance, a 5 Western Shila Yueh multisidehole catheter was advanced into the pleural space  1200 mL clear ricky pleural fluid was aspirated  Specimens were sent to lab as requested   The patient tolerated the procedure well and suffered no complications  Impression: Impression: 1  Successful ultrasound-guided thoracentesis yielding 1200 mL clear ricky pleural fluid  Workstation performed: [id number]       ECG:  Sinus bradycardia otherwise unremarkable      ROS    Vitals:    07/29/19 1051   BP: 106/70   Pulse: 58     Vitals:    07/29/19 1051   Weight: 89 2 kg (196 lb 9 6 oz)     Height: 6' 4" (193 cm)   Body mass index is 23 93 kg/m²      Physical Exam:  General:  Alert and cooperative, appears stated age  HEENT:  PERRLA, EOMI, no scleral icterus, no conjunctival pallor  Neck:  No lymphadenopathy, no thyromegaly, no carotid bruits, no elevated JVP  Heart:  Regular rate and rhythm, normal S1/S2, no S3/S4, no murmur  Lungs:  Clear to auscultation bilaterally   Abdomen:  Soft, non-tender, positive bowel sounds, no rebound or guarding,   no organomegaly   Extremities:  No clubbing, cyanosis or edema   Vascular:  2+ pedal pulses  Skin:  No rashes or lesions on exposed skin  Neurologic:  Cranial nerves II-XII grossly intact without focal deficits

## 2019-08-07 ENCOUNTER — OFFICE VISIT (OUTPATIENT)
Dept: GASTROENTEROLOGY | Facility: MEDICAL CENTER | Age: 59
End: 2019-08-07
Payer: COMMERCIAL

## 2019-08-07 VITALS
HEIGHT: 76 IN | SYSTOLIC BLOOD PRESSURE: 118 MMHG | BODY MASS INDEX: 23.75 KG/M2 | HEART RATE: 78 BPM | DIASTOLIC BLOOD PRESSURE: 70 MMHG | TEMPERATURE: 97.6 F | WEIGHT: 195 LBS

## 2019-08-07 DIAGNOSIS — D13.5 AMPULLARY ADENOMA: Primary | ICD-10-CM

## 2019-08-07 DIAGNOSIS — K86.3 PANCREATIC PSEUDOCYST: ICD-10-CM

## 2019-08-07 DIAGNOSIS — K21.00 GASTROESOPHAGEAL REFLUX DISEASE WITH ESOPHAGITIS: ICD-10-CM

## 2019-08-07 DIAGNOSIS — K85.00 IDIOPATHIC ACUTE PANCREATITIS, UNSPECIFIED COMPLICATION STATUS: ICD-10-CM

## 2019-08-07 PROBLEM — K21.9 GERD (GASTROESOPHAGEAL REFLUX DISEASE): Status: RESOLVED | Noted: 2019-08-07 | Resolved: 2019-08-07

## 2019-08-07 PROBLEM — K21.9 GASTROESOPHAGEAL REFLUX: Status: ACTIVE | Noted: 2019-08-07

## 2019-08-07 PROCEDURE — 99214 OFFICE O/P EST MOD 30 MIN: CPT | Performed by: INTERNAL MEDICINE

## 2019-08-07 NOTE — PROGRESS NOTES
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  This was based on biopsies  Since this is very small I would recommend repeating the endoscopy with repeat biopsies and monitoring the size  He has had a very complicated episode of pancreatitis and I would be hesitant to proceed with doing an ampullectomy  I discussed this with him  He is agreeable  If the size does increase then we can certainly plan at that time  I discussed extensively the risk of pancreatitis with him with doing an ampullectomy  Pancreatic pseudocyst  He does have a pancreatic pseudocyst with necrosis status post cyst gastrostomy and necrosectomy  The stents are in place  The collection has resolved  I would recommend repeating the imaging in 1 month and then repeating the endoscopy for removing the plastic stents  I did discuss with him that there is a small risk that the collection may recur if there is a pancreatic duct leak  In that case he may require repeat intervention including a pancreatic duct stent  Diagnoses and all orders for this visit:    Ampullary adenoma  -     EGD;  Future    Gastroesophageal reflux disease with esophagitis    Pancreatic pseudocyst  -     CT abdomen pelvis w contrast; Future  -     Basic metabolic panel; Future  -     EGD; Future    Idiopathic acute pancreatitis, unspecified complication status  -     IgG 1, 2, 3, and 4; Future  -     Celiac Disease Antibody Profile; Future  -     IRENE Screen w/ Reflex to Titer/Pattern; Future  -     Lipid panel; Future    Other orders  -     Diet NPO; Sips with meds; Standing  -     Void on call to OR; Standing  -     Insert peripheral IV; Standing      ______________________________________________________________________    SUBJECTIVE:  Mima Hussein is a 71-year-old gentleman with a history of pancreatitis with pancreatic pseudocyst and a crow cyst requiring ERCP, endoscopic cyst gastrostomy and necrosectomy  He is doing well at this time  He has plastic stents from the stomach into the residual pancreatic cyst which has completely decompressed  His biliary stent has been removed  He did have a ampullary adenoma which was very small  He is concerned about that  At this time he is not having any abdominal pain, nausea, vomiting, fevers or chills  No diarrhea constipation or rectal bleeding  His appetite is well  He is active  He may not have the same and she has he had before the episode but is still doing better  He did have a subphrenic abscess which was drained by IR in June  I personally reviewed his imaging and reviewed with him as well  No symptoms of reflux at this time  Continue on Protonix  REVIEW OF SYSTEMS IS OTHERWISE NEGATIVE  Historical Information   Past Medical History:   Diagnosis Date    Atrial fibrillation ([address] )     Gastroesophageal reflux     GERD (gastroesophageal reflux disease)     Pancreatitis     Pleural effusion     Second hand smoke exposure      Past Surgical History:   Procedure Laterality Date    CHOLECYSTECTOMY      COLONOSCOPY N/A 3/21/2016    Procedure: COLONOSCOPY;  Surgeon: Toribio Curling, DO;  [facility];   Service:     ERCP N/A 1/4/2019    Procedure: ENDOSCOPIC RETROGRADE CHOLANGIOPANCREATOGRAPHY (ERCP); Surgeon: Jori Madsen MD;  [facility]; Service: Gastroenterology    ESOPHAGOGASTRODUODENOSCOPY N/A 2/5/2019    Procedure: ESOPHAGOGASTRODUODENOSCOPY (EGD), with possible nasojejunal Dobhoff tube placement;  Surgeon: Tone Pandey MD;  [facility]; Service: Gastroenterology    ESOPHAGOGASTRODUODENOSCOPY N/A 4/3/2019    Procedure: ESOPHAGOGASTRODUODENOSCOPY (EGD)- endoscopic necrosectomy;  Surgeon: Jori Madsen MD;  [facility]; Service: Gastroenterology    IR IMAGE GUIDED ASPIRATION / DRAINAGE W TUBE  6/17/2019    IR THORACENTESIS  2/7/2019    IR THORACENTESIS  3/29/2019    LINEAR ENDOSCOPIC U/S N/A 3/29/2019    Procedure: LINEAR ENDOSCOPIC U/S cyst gastro;  Surgeon: Jori Madsen MD;  [facility]; Service: Gastroenterology    [place] [address] N/A 5/20/2019    Procedure: Joann Matters;  Surgeon: Uvaldo Burt MD;  [facility];  Service: Thoracic    [place] EGD TRANSORAL BIOPSY SINGLE/MULTIPLE N/A 3/21/2016    Procedure: ESOPHAGOGASTRODUODENOSCOPY (EGD); Surgeon: Letitia Miranda DO;  [facility]; Service: General    [place] ERCP DX COLLECTION SPECIMEN BRUSHING/WASHING N/A 4/26/2019    Procedure: ENDOSCOPIC RETROGRADE CHOLANGIOPANCREATOGRAPHY (ERCP); Surgeon: Jori Madsen MD;  [facility]; Service: Gastroenterology    [place] ESOPHAGOGASTRODUODENOSCOPY TRANSORAL DIAGNOSTIC N/A 4/9/2019    Procedure: ESOPHAGOGASTRODUODENOSCOPY (EGD) endoscopic necrosectomy;  Surgeon: Jori Madsen MD;  [facility]; Service: Gastroenterology    [place] ESOPHAGOGASTRODUODENOSCOPY TRANSORAL DIAGNOSTIC N/A 4/26/2019    Procedure: ESOPHAGOGASTRODUODENOSCOPY (EGD); Surgeon: Jori Madsen MD;  [facility];   Service: Gastroenterology    [place] THORACOSCOPY SURG PART PULM DECORT Left 5/20/2019    Procedure: DECORTICATION LUNG;  Surgeon: Uvaldo Burt MD;  [facility];  Service: Thoracic    [place] THORACOSCOPY SURG PART PULM DECORT Left 5/20/2019 Procedure: THORACOSCOPY VIDEO ASSISTED SURGERY (VATS); Surgeon: Horace Man MD;  [facility];  Service: Thoracic    [place] THORACOSCOPY SURG W/PLEURODESIS N/A 5/20/2019    Procedure: PLEURODESIS mechanical;  Surgeon: Horace Man MD;  [facility];  Service: Thoracic     Social History   Social History     Substance and Sexual Activity   Alcohol Use Not Currently     Social History     Substance and Sexual Activity   Drug Use No     Social History     Tobacco Use   Smoking Status Never Smoker   Smokeless Tobacco Never Used     Family History   Problem Relation Age of Onset    Lung cancer Mother 79        Smoker     Transient ischemic attack Father 61    Lung disease Neg Hx        Meds/Allergies       Current Outpatient Medications:     acetaminophen (TYLENOL) 325 mg tablet    metoprolol tartrate (LOPRESSOR) 25 mg tablet    pantoprazole (PROTONIX) 40 mg tablet    warfarin (COUMADIN) 2 mg tablet    warfarin (COUMADIN) 3 mg tablet    No Known Allergies        Objective     Blood pressure 118/70, pulse 78, temperature 97 6 °F (36 4 °C), temperature source Tympanic, height 6' 4" (1 93 m), weight 88 5 kg (195 lb)  Body mass index is 23 74 kg/m²  PHYSICAL EXAM:      Physical Exam   Constitutional: He is oriented to person, place, and time  Vital signs are normal  He appears well-developed and well-nourished  HENT:   Head: Normocephalic and atraumatic  Eyes: Pupils are equal, round, and reactive to light  Conjunctivae are normal  No scleral icterus  Neck: Normal range of motion  Cardiovascular: Normal rate, regular rhythm and normal heart sounds  Pulmonary/Chest: Effort normal and breath sounds normal  No respiratory distress  Abdominal: Soft  Normal appearance and bowel sounds are normal  He exhibits no distension, no ascites and no mass  There is no hepatosplenomegaly  There is no tenderness  No hernia  Musculoskeletal: Normal range of motion     Lymphadenopathy: He has no cervical adenopathy  Neurological: He is alert and oriented to person, place, and time  Skin: Skin is warm  Psychiatric: He has a normal mood and affect  His behavior is normal  Thought content normal        Lab Results:   No visits with results within 1 Day(s) from this visit  Latest known visit with results is: Ancillary Orders on 07/19/2019   Component Date Value    Protime 07/22/2019 24 6*    INR 07/22/2019 2 28*         Radiology Results:   No results found

## 2019-08-07 NOTE — ASSESSMENT & PLAN NOTE
He does have a pancreatic pseudocyst with necrosis status post cyst gastrostomy and necrosectomy  The stents are in place  The collection has resolved  I would recommend repeating the imaging in 1 month and then repeating the endoscopy for removing the plastic stents  I did discuss with him that there is a small risk that the collection may recur if there is a pancreatic duct leak  In that case he may require repeat intervention including a pancreatic duct stent

## 2019-08-07 NOTE — ASSESSMENT & PLAN NOTE
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

## 2019-08-07 NOTE — PATIENT INSTRUCTIONS
EGD sheduled on 9/19 with Dr Zaid Angel at the [facility]  Instructions given to patient  It is a special and 45 mins allotted

## 2019-08-09 ENCOUNTER — ANTICOAG VISIT (OUTPATIENT)
Dept: CARDIOLOGY CLINIC | Facility: CLINIC | Age: 59
End: 2019-08-09

## 2019-08-09 ENCOUNTER — APPOINTMENT (OUTPATIENT)
Dept: LAB | Facility: CLINIC | Age: 59
End: 2019-08-09
Payer: COMMERCIAL

## 2019-08-09 DIAGNOSIS — K85.00 IDIOPATHIC ACUTE PANCREATITIS, UNSPECIFIED COMPLICATION STATUS: ICD-10-CM

## 2019-08-09 DIAGNOSIS — K86.3 PANCREATIC PSEUDOCYST: ICD-10-CM

## 2019-08-09 DIAGNOSIS — I48.0 PAROXYSMAL ATRIAL FIBRILLATION (HCC): ICD-10-CM

## 2019-08-09 LAB
ANION GAP SERPL CALCULATED.3IONS-SCNC: 7 MMOL/L (ref 4–13)
BUN SERPL-MCNC: 20 MG/DL (ref 5–25)
CALCIUM SERPL-MCNC: 9 MG/DL (ref 8.3–10.1)
CHLORIDE SERPL-SCNC: 113 MMOL/L (ref 100–108)
CHOLEST SERPL-MCNC: 160 MG/DL (ref 50–200)
CO2 SERPL-SCNC: 26 MMOL/L (ref 21–32)
CREAT SERPL-MCNC: 0.9 MG/DL (ref 0.6–1.3)
GFR SERPL CREATININE-BSD FRML MDRD: 93 ML/MIN/1.73SQ M
GLUCOSE P FAST SERPL-MCNC: 83 MG/DL (ref 65–99)
HDLC SERPL-MCNC: 51 MG/DL (ref 40–60)
LDLC SERPL CALC-MCNC: 87 MG/DL (ref 0–100)
NONHDLC SERPL-MCNC: 109 MG/DL
POTASSIUM SERPL-SCNC: 4.4 MMOL/L (ref 3.5–5.3)
SODIUM SERPL-SCNC: 146 MMOL/L (ref 136–145)
TRIGL SERPL-MCNC: 108 MG/DL

## 2019-08-09 PROCEDURE — 86038 ANTINUCLEAR ANTIBODIES: CPT

## 2019-08-09 PROCEDURE — 80061 LIPID PANEL: CPT

## 2019-08-09 PROCEDURE — 86255 FLUORESCENT ANTIBODY SCREEN: CPT

## 2019-08-09 PROCEDURE — 82784 ASSAY IGA/IGD/IGG/IGM EACH: CPT

## 2019-08-09 PROCEDURE — 80048 BASIC METABOLIC PNL TOTAL CA: CPT

## 2019-08-09 PROCEDURE — 83516 IMMUNOASSAY NONANTIBODY: CPT

## 2019-08-09 PROCEDURE — 82787 IGG 1 2 3 OR 4 EACH: CPT

## 2019-08-12 LAB
ENDOMYSIUM IGA SER QL: NEGATIVE
GLIADIN PEPTIDE IGA SER-ACNC: 4 UNITS (ref 0–19)
GLIADIN PEPTIDE IGG SER-ACNC: 10 UNITS (ref 0–19)
IGA SERPL-MCNC: 140 MG/DL (ref 90–386)
TTG IGA SER-ACNC: <2 U/ML (ref 0–3)
TTG IGG SER-ACNC: 7 U/ML (ref 0–5)

## 2019-08-13 ENCOUNTER — TELEPHONE (OUTPATIENT)
Dept: GASTROENTEROLOGY | Facility: CLINIC | Age: 59
End: 2019-08-13

## 2019-08-13 LAB
IGG SERPL-MCNC: 1064 MG/DL (ref 700–1600)
IGG1 SER-MCNC: 401 MG/DL (ref 248–810)
IGG2 SER-MCNC: 513 MG/DL (ref 130–555)
IGG3 SER-MCNC: 125 MG/DL (ref 15–102)
IGG4 SER-MCNC: 27 MG/DL (ref 2–96)
RYE IGE QN: NEGATIVE

## 2019-08-13 NOTE — TELEPHONE ENCOUNTER
Patient's long term disability paperwork and clinical notes were faxed to Felix Espana at [place], per patient request, at [phone]  I also sent all of the completed paperwork and clinical notes to the address provided on the paperwork  Patient's wife, June Vila, was made aware

## 2019-08-20 ENCOUNTER — DOCUMENTATION (OUTPATIENT)
Dept: GASTROENTEROLOGY | Facility: MEDICAL CENTER | Age: 59
End: 2019-08-20

## 2019-08-20 NOTE — PROGRESS NOTES
Blood work was reviewed showed a slight suggestion of celiac disease (TT G IgG) but the main test (TT G IgA) was negative  I would recommend doing biopsies of the small intestine at the time of the endoscopy

## 2019-09-06 ENCOUNTER — APPOINTMENT (OUTPATIENT)
Dept: LAB | Facility: CLINIC | Age: 59
End: 2019-09-06
Payer: COMMERCIAL

## 2019-09-06 ENCOUNTER — ANTICOAG VISIT (OUTPATIENT)
Dept: CARDIOLOGY CLINIC | Facility: CLINIC | Age: 59
End: 2019-09-06

## 2019-09-06 DIAGNOSIS — I48.0 PAROXYSMAL ATRIAL FIBRILLATION (HCC): ICD-10-CM

## 2019-09-09 ENCOUNTER — HOSPITAL ENCOUNTER (OUTPATIENT)
Dept: RADIOLOGY | Facility: HOSPITAL | Age: 59
Discharge: HOME | End: 2019-09-09
Attending: INTERNAL MEDICINE
Payer: COMMERCIAL

## 2019-09-09 ENCOUNTER — TRANSCRIBE ORDERS (OUTPATIENT)
Dept: RADIOLOGY | Facility: HOSPITAL | Age: 59
End: 2019-09-09

## 2019-09-09 DIAGNOSIS — K86.3 PANCREATIC PSEUDOCYST: ICD-10-CM

## 2019-09-09 PROCEDURE — 74177 CT ABD & PELVIS W/CONTRAST: CPT

## 2019-09-09 RX ADMIN — IOHEXOL 100 ML: 350 INJECTION, SOLUTION INTRAVENOUS at 16:48

## 2019-09-12 ENCOUNTER — OFFICE VISIT (OUTPATIENT)
Dept: PULMONOLOGY | Facility: CLINIC | Age: 59
End: 2019-09-12
Payer: COMMERCIAL

## 2019-09-12 ENCOUNTER — DOCUMENTATION (OUTPATIENT)
Dept: PULMONOLOGY | Facility: CLINIC | Age: 59
End: 2019-09-12

## 2019-09-12 ENCOUNTER — TELEPHONE (OUTPATIENT)
Dept: GASTROENTEROLOGY | Facility: CLINIC | Age: 59
End: 2019-09-12

## 2019-09-12 VITALS
BODY MASS INDEX: 24.6 KG/M2 | RESPIRATION RATE: 18 BRPM | DIASTOLIC BLOOD PRESSURE: 68 MMHG | WEIGHT: 202 LBS | OXYGEN SATURATION: 100 % | HEIGHT: 76 IN | SYSTOLIC BLOOD PRESSURE: 122 MMHG | HEART RATE: 56 BPM | TEMPERATURE: 95.6 F

## 2019-09-12 DIAGNOSIS — J90 PLEURAL EFFUSION, LEFT: Primary | ICD-10-CM

## 2019-09-12 PROBLEM — R06.00 DYSPNEA ON EXERTION: Status: ACTIVE | Noted: 2019-09-12

## 2019-09-12 PROBLEM — R06.09 DYSPNEA ON EXERTION: Status: ACTIVE | Noted: 2019-09-12

## 2019-09-12 PROCEDURE — 99214 OFFICE O/P EST MOD 30 MIN: CPT | Performed by: INTERNAL MEDICINE

## 2019-09-12 NOTE — PROGRESS NOTES
Faxed pt's office note from 9/12 with Dr Yamila West, per the pt's request to [phone] Attn: Curtis Pederson at [facility]

## 2019-09-12 NOTE — PROGRESS NOTES
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  At this time, the patient does not appear to the functional capacity to return to work  Encouraged the patient to continue his physical conditioning  The patient may follow up in 3 months or sooner as necessary  Plan:  -  Continue exercise/weight lifing    Subjective: The patient notes overall his breathing is doing well  He will notice some shortness of breath with significant exertion but relates this to deconditioning  The patient is a never smoker  He denies cough and wheezing  He reports he has been walking and lifting weights  The patients job requires him to life 150lbs as well as climbing towers  Inhaler Regimen:  None    Remainder of 12 point review of systems negative except as described in HPI  The following portions of the patient's history were reviewed and updated as appropriate: allergies, current medications, past family history, past medical history, past social history, past surgical history and problem list      Objective:   Vitals: Blood pressure 122/68, pulse 56, temperature (!) 95 6 °F (35 3 °C), temperature source Tympanic, resp   rate 18, height 6' 4" (1 93 m), weight 91 6 kg (202 lb), SpO2 100 % , RA, Body mass index is 24 59 kg/m²  Physical Exam  Gen: Awake, alert, oriented x 3, no acute distress  HEENT: Mucous membranes moist, no oral lesions, no thrush  NECK: No accessory muscle use, JVP not elevated  Cardiac: Regular, single S1, single S2, no murmurs, no rubs, no gallops  Lungs: CTA b/l  Abdomen: normoactive bowel sounds, soft nontender, nondistended, no rebound or rigidity, no guarding  Extremities: no cyanosis, no clubbing, no edema  MSK:  Strength equal in all extremities  Derm:  No rashes/lesions noted  Neuro:  Appropriate mood/affect    Labs: I have personally reviewed pertinent lab results  Lab Results   Component Value Date    WBC 4 90 06/20/2019    WBC 5 48 01/05/2015    HGB 11 3 (L) 06/20/2019    HGB 15 4 01/05/2015     06/20/2019     01/05/2015     Lab Results   Component Value Date    CREATININE 0 90 08/09/2019    CREATININE 1 01 01/05/2015      Imaging and other studies: I have personally reviewed pertinent reports  and I have personally reviewed pertinent films in PACS  CT A/P:  No pleural effusion noted  Pulmonary Function Testing:   No pulmonary function testing available for review  Kalia Aiken

## 2019-09-18 ENCOUNTER — ANESTHESIA EVENT (OUTPATIENT)
Dept: GASTROENTEROLOGY | Facility: HOSPITAL | Age: 59
End: 2019-09-18

## 2019-09-19 ENCOUNTER — ANESTHESIA (OUTPATIENT)
Dept: GASTROENTEROLOGY | Facility: HOSPITAL | Age: 59
End: 2019-09-19

## 2019-09-19 ENCOUNTER — HOSPITAL ENCOUNTER (OUTPATIENT)
Dept: GASTROENTEROLOGY | Facility: HOSPITAL | Age: 59
Setting detail: OUTPATIENT SURGERY
Discharge: HOME | End: 2019-09-19
Attending: INTERNAL MEDICINE | Admitting: INTERNAL MEDICINE
Payer: COMMERCIAL

## 2019-09-19 VITALS
SYSTOLIC BLOOD PRESSURE: 122 MMHG | RESPIRATION RATE: 18 BRPM | HEART RATE: 69 BPM | BODY MASS INDEX: 24.59 KG/M2 | WEIGHT: 202 LBS | TEMPERATURE: 97.4 F | DIASTOLIC BLOOD PRESSURE: 64 MMHG | OXYGEN SATURATION: 99 %

## 2019-09-19 DIAGNOSIS — K86.3 PANCREATIC PSEUDOCYST: ICD-10-CM

## 2019-09-19 DIAGNOSIS — D13.5 AMPULLARY ADENOMA: ICD-10-CM

## 2019-09-19 PROCEDURE — 88305 TISSUE EXAM BY PATHOLOGIST: CPT | Performed by: PATHOLOGY

## 2019-09-19 PROCEDURE — 43247 EGD REMOVE FOREIGN BODY: CPT | Performed by: INTERNAL MEDICINE

## 2019-09-19 PROCEDURE — 43239 EGD BIOPSY SINGLE/MULTIPLE: CPT | Performed by: INTERNAL MEDICINE

## 2019-09-19 RX ORDER — PROPOFOL 10 MG/ML
INJECTION, EMULSION INTRAVENOUS AS NEEDED
Status: DISCONTINUED | OUTPATIENT
Start: 2019-09-19 | End: 2019-09-19 | Stop reason: SURG

## 2019-09-19 RX ORDER — SODIUM CHLORIDE 9 MG/ML
INJECTION, SOLUTION INTRAVENOUS CONTINUOUS PRN
Status: DISCONTINUED | OUTPATIENT
Start: 2019-09-19 | End: 2019-09-19 | Stop reason: SURG

## 2019-09-19 RX ORDER — PROPOFOL 10 MG/ML
INJECTION, EMULSION INTRAVENOUS CONTINUOUS PRN
Status: DISCONTINUED | OUTPATIENT
Start: 2019-09-19 | End: 2019-09-19 | Stop reason: SURG

## 2019-09-19 RX ADMIN — PROPOFOL 100 MCG/KG/MIN: 10 INJECTION, EMULSION INTRAVENOUS at 14:24

## 2019-09-19 RX ADMIN — SODIUM CHLORIDE: 0.9 INJECTION, SOLUTION INTRAVENOUS at 14:20

## 2019-09-19 RX ADMIN — PROPOFOL 20 MG: 10 INJECTION, EMULSION INTRAVENOUS at 14:26

## 2019-09-19 RX ADMIN — PROPOFOL 60 MG: 10 INJECTION, EMULSION INTRAVENOUS at 14:24

## 2019-09-19 NOTE — ANESTHESIA POSTPROCEDURE EVALUATION
Post-Op Assessment Note    CV Status:  Stable  Pain Score: 0    Pain management: adequate     Mental Status:  Alert and awake   PONV Controlled:  None   Airway Patency:  Patent   Post Op Vitals Reviewed: Yes      Staff: CRNA           /58 (09/19/19 1453)    Temp     Pulse 65 (09/19/19 1453)   Resp 16 (09/19/19 1453)    SpO2 97 % (09/19/19 1453)

## 2019-09-19 NOTE — H&P
History and Physical - [facility]  Viki Otero 60 y o  male MRN: [id number]    HPI: Viki Otero is a 60y o  year old male who presents with history of pancreatic pseudocyst status post stent placement  Previously the [facility] stent was removed and plastic stent was placed  CT scan shows complete resolution of the pancreatic pseudocyst   We are planning to remove the stents  He also has an ampullary adenoma  This will be evaluated  Review of Systems    Historical Information   Past Medical History:   Diagnosis Date    Atrial fibrillation ([address] )     Gastroesophageal reflux     GERD (gastroesophageal reflux disease)     Pancreatitis     Pleural effusion     Second hand smoke exposure      Past Surgical History:   Procedure Laterality Date    CHOLECYSTECTOMY      COLONOSCOPY N/A 3/21/2016    Procedure: COLONOSCOPY;  Surgeon: Christopher Bautista DO;  [facility]; Service:     ERCP N/A 1/4/2019    Procedure: ENDOSCOPIC RETROGRADE CHOLANGIOPANCREATOGRAPHY (ERCP); Surgeon: Khloe Rubio MD;  [facility]; Service: Gastroenterology    ESOPHAGOGASTRODUODENOSCOPY N/A 2/5/2019    Procedure: ESOPHAGOGASTRODUODENOSCOPY (EGD), with possible nasojejunal Dobhoff tube placement;  Surgeon: Krista Dominguez MD;  [facility]; Service: Gastroenterology    ESOPHAGOGASTRODUODENOSCOPY N/A 4/3/2019    Procedure: ESOPHAGOGASTRODUODENOSCOPY (EGD)- endoscopic necrosectomy;  Surgeon: Khloe Rubio MD;  [facility]; Service: Gastroenterology    IR IMAGE GUIDED ASPIRATION / DRAINAGE W TUBE  6/17/2019    IR THORACENTESIS  2/7/2019    IR THORACENTESIS  3/29/2019    LINEAR ENDOSCOPIC U/S N/A 3/29/2019    Procedure: LINEAR ENDOSCOPIC U/S cyst gastro;  Surgeon: Khloe Rubio MD;  [facility];   Service: Gastroenterology    [place] [address] N/A 5/20/2019    Procedure: Phyllistine Amis;  Surgeon: Sampson Valentine MD;  [facility];  Service: Thoracic    NM EGD TRANSORAL BIOPSY SINGLE/MULTIPLE N/A 3/21/2016    Procedure: ESOPHAGOGASTRODUODENOSCOPY (EGD); Surgeon: Mayela Turner DO;  [facility]; Service: General    NM ERCP DX COLLECTION SPECIMEN BRUSHING/WASHING N/A 4/26/2019    Procedure: ENDOSCOPIC RETROGRADE CHOLANGIOPANCREATOGRAPHY (ERCP); Surgeon: Aris Landin MD;  [facility]; Service: Gastroenterology    NM ESOPHAGOGASTRODUODENOSCOPY TRANSORAL DIAGNOSTIC N/A 4/9/2019    Procedure: ESOPHAGOGASTRODUODENOSCOPY (EGD) endoscopic necrosectomy;  Surgeon: Aris Landin MD;  [facility]; Service: Gastroenterology    NM ESOPHAGOGASTRODUODENOSCOPY TRANSORAL DIAGNOSTIC N/A 4/26/2019    Procedure: ESOPHAGOGASTRODUODENOSCOPY (EGD); Surgeon: Aris Landin MD;  [facility]; Service: Gastroenterology    NM THORACOSCOPY SURG PART PULM DECORT Left 5/20/2019    Procedure: DECORTICATION LUNG;  Surgeon: New Sandoval MD;  [facility];  Service: Thoracic    NM THORACOSCOPY SURG PART PULM DECORT Left 5/20/2019    Procedure: THORACOSCOPY VIDEO ASSISTED SURGERY (VATS);   Surgeon: New Sandoval MD;  [facility];  Service: Thoracic    NM THORACOSCOPY SURG W/PLEURODESIS N/A 5/20/2019    Procedure: PLEURODESIS mechanical;  Surgeon: New Sandoval MD;  [facility];  Service: Thoracic     Social History   Social History     Substance and Sexual Activity   Alcohol Use Not Currently     Social History     Substance and Sexual Activity   Drug Use No     Social History     Tobacco Use   Smoking Status Never Smoker   Smokeless Tobacco Never Used     Family History   Problem Relation Age of Onset    Lung cancer Mother 79        Smoker     Transient ischemic attack Father 61    Lung disease Neg Hx        Meds/Allergies       (Not in a hospital admission)    No Known Allergies    Objective     /77   Pulse (!) 54   Temp (!) 97 4 °F (36 3 °C) (Tympanic)   Resp 18   Wt 91 6 kg (202 lb)   SpO2 97%   BMI 24 59 kg/m²       PHYSICAL EXAM    Gen: NAD  CV: RRR  CHEST: Clear  ABD: soft, NT/ND  EXT: no edema  Neuro: AAO      ASSESSMENT/PLAN:  This is a 60y o  year old male here for EGD for removal of pancreatic pseudocyst stents  Evaluation of ampullary adenoma      PLAN:   Procedure:  EGD

## 2019-09-19 NOTE — ANESTHESIA PREPROCEDURE EVALUATION
Review of Systems/Medical History  Patient summary reviewed  Chart reviewed  No history of anesthetic complications     Cardiovascular  Exercise tolerance (METS): >4,  Dysrhythmias (pAfib, on warfarin, continued per GI) ,    Pulmonary       GI/Hepatic    GERD , Pancreatic problem (h/o pancreatitc pseudocyst c/b pleural effusions ),             Endo/Other  Negative endo/other ROS      GYN       Hematology  Negative hematology ROS      Musculoskeletal  Negative musculoskeletal ROS        Neurology  Negative neurology ROS      Psychology       ECHO 1/2019  SUMMARY     LEFT VENTRICLE:  Size was normal   Systolic function was normal  Ejection fraction was estimated to be 60 %  Although no diagnostic regional wall motion abnormality was identified, this possibility cannot be completely excluded on the basis of this study  Wall thickness was mildly increased  Left ventricular diastolic function parameters were normal      RIGHT VENTRICLE:  The ventricle was mildly to moderately dilated  Systolic function was normal      LEFT ATRIUM:  The atrium was mildly dilated      RIGHT ATRIUM:  The atrium was mildly dilated      MITRAL VALVE:  There was mild regurgitation      TRICUSPID VALVE:  There was mild regurgitation      Lab Results   Component Value Date    WBC 4 90 06/20/2019    HGB 11 3 (L) 06/20/2019    HCT 34 5 (L) 06/20/2019    MCV 84 06/20/2019     06/20/2019     Lab Results   Component Value Date     01/05/2015    K 4 4 08/09/2019    CO2 26 08/09/2019     (H) 08/09/2019    BUN 20 08/09/2019    CREATININE 0 90 08/09/2019     Lab Results   Component Value Date    INR 2 49 (H) 09/06/2019    INR 2 50 (H) 08/09/2019    INR 2 28 (H) 07/22/2019    PROTIME 26 4 (H) 09/06/2019    PROTIME 26 5 (H) 08/09/2019    PROTIME 24 6 (H) 07/22/2019     Lab Results   Component Value Date    PTT 37 05/20/2019       Lab Results   Component Value Date    GLUCOSE 146 (H) 01/05/2019    GLUCOSE 79 01/05/2015       Lab Results   Component Value Date    HGBA1C 5 7 01/18/2019       Type and Screen:  O      Physical Exam    Airway    Mallampati score: II  TM Distance: >3 FB  Neck ROM: full     Dental       Cardiovascular  Rhythm: regular, Rate: normal,     Pulmonary      Other Findings        Anesthesia Plan  ASA Score- 3     Anesthesia Type- IV sedation with anesthesia with ASA Monitors  Additional Monitors:   Airway Plan:         Plan Factors-    Induction- intravenous  Postoperative Plan-     Informed Consent- Anesthetic plan and risks discussed with patient  I personally reviewed this patient with the CRNA  Discussed and agreed on the Anesthesia Plan with the CRNA  Gino Palmer

## 2019-10-07 ENCOUNTER — APPOINTMENT (OUTPATIENT)
Dept: LAB | Facility: CLINIC | Age: 59
End: 2019-10-07
Payer: COMMERCIAL

## 2019-10-08 ENCOUNTER — ANTICOAG VISIT (OUTPATIENT)
Dept: CARDIOLOGY CLINIC | Facility: CLINIC | Age: 59
End: 2019-10-08

## 2019-10-08 DIAGNOSIS — I48.0 PAROXYSMAL ATRIAL FIBRILLATION (HCC): ICD-10-CM

## 2019-11-04 ENCOUNTER — APPOINTMENT (OUTPATIENT)
Dept: LAB | Facility: CLINIC | Age: 59
End: 2019-11-04
Payer: COMMERCIAL

## 2019-11-05 ENCOUNTER — ANTICOAG VISIT (OUTPATIENT)
Dept: CARDIOLOGY CLINIC | Facility: CLINIC | Age: 59
End: 2019-11-05

## 2019-11-05 DIAGNOSIS — I48.0 PAROXYSMAL ATRIAL FIBRILLATION (HCC): ICD-10-CM

## 2019-11-15 ENCOUNTER — TELEPHONE (OUTPATIENT)
Dept: GASTROENTEROLOGY | Facility: MEDICAL CENTER | Age: 59
End: 2019-11-15

## 2019-11-15 ENCOUNTER — OFFICE VISIT (OUTPATIENT)
Dept: GASTROENTEROLOGY | Facility: MEDICAL CENTER | Age: 59
End: 2019-11-15
Payer: COMMERCIAL

## 2019-11-15 VITALS
SYSTOLIC BLOOD PRESSURE: 120 MMHG | BODY MASS INDEX: 25.45 KG/M2 | HEART RATE: 70 BPM | TEMPERATURE: 97.8 F | WEIGHT: 209 LBS | HEIGHT: 76 IN | DIASTOLIC BLOOD PRESSURE: 70 MMHG

## 2019-11-15 DIAGNOSIS — K21.9 GASTROESOPHAGEAL REFLUX DISEASE WITHOUT ESOPHAGITIS: ICD-10-CM

## 2019-11-15 DIAGNOSIS — Z12.11 COLON CANCER SCREENING: ICD-10-CM

## 2019-11-15 DIAGNOSIS — K40.91 UNILATERAL RECURRENT INGUINAL HERNIA WITHOUT OBSTRUCTION OR GANGRENE: ICD-10-CM

## 2019-11-15 DIAGNOSIS — K86.3 PANCREATIC PSEUDOCYST: Primary | ICD-10-CM

## 2019-11-15 DIAGNOSIS — D13.5 AMPULLARY ADENOMA: ICD-10-CM

## 2019-11-15 PROBLEM — K40.90 UNILATERAL INGUINAL HERNIA: Status: ACTIVE | Noted: 2019-11-15

## 2019-11-15 PROBLEM — E46 PROTEIN-CALORIE MALNUTRITION (HCC): Status: RESOLVED | Noted: 2019-02-03 | Resolved: 2019-11-15

## 2019-11-15 PROBLEM — R19.7 DIARRHEA: Status: RESOLVED | Noted: 2019-04-03 | Resolved: 2019-11-15

## 2019-11-15 PROCEDURE — 99214 OFFICE O/P EST MOD 30 MIN: CPT | Performed by: INTERNAL MEDICINE

## 2019-11-15 NOTE — PROGRESS NOTES
Outpatient Follow up  [address] [facility]  Osman Hoyos MD  Ph : [phone]  Fax : [phone]  Mobile : [phone]  Email : [email or web address]  Also available on [employer]    Delorise Carrel 60 y o  male MRN: [id number]    PCP: Rahul Dupont MD  Referring: No referring provider defined for this encounter  Delorise Carrel presented for a follow up visit  My recommendations are included  Please do not hesitate to contact me with any questions you may have  ASSESSMENT AND PLAN:      1  Pancreatic pseudocyst   I would recommend follow-up with the repeat imaging study in January  This would be to confirm that the pseudocyst has not recurred  2  Ampullary adenoma  We discussed extensively about ampullectomy  I discussed that there is a risk of bleeding, perforation and pancreatitis  He acknowledges the risks and would like to proceed  We will schedule him for the ERCP/ampullectomy in January after his next CT scan  3  Colon cancer screening  We will schedule for colonoscopy after his next appointment  4  Refer to surgery for right inguinal hernia  He has been referred to see Dr Yuly Bush    Diagnoses and all orders for this visit:    Pancreatic pseudocyst  -     CT abdomen pelvis w contrast; Future  -     Basic metabolic panel; Future    Ampullary adenoma  -     ERCP; Future    Gastroesophageal reflux disease without esophagitis    Unilateral recurrent inguinal hernia without obstruction or gangrene  -     Ambulatory referral to General Surgery; Future    Colon cancer screening    Other orders  -     Diet NPO; Sips with meds; Standing  -     Void on call to OR; Standing  -     Insert peripheral IV;  Standing      ______________________________________________________________________    SUBJECTIVE:  Mark Mcduffie is a 79-year-old gentleman who presents to us for follow-up of his pancreatitis and pancreatic pseudocyst which have been drained and are resolved  Doing well  Weight is stable  He had his endoscopy with cyst gastrostomy stent removal in September  He has had no symptoms since then  He is having no abdominal pain  No nausea vomiting  No fevers or chills  He has active  He is eating well  No weight loss  At the time of the endoscopy a ampullary polyp was also seen which was biopsied and showed to be a tubular adenoma  This has remained stable over the past year  He had a colonoscopy done several year ago (>5 years)  He is not having any abdominal pain, diarrhea constipation  He does complain of an inguinal hernia on the right side  REVIEW OF SYSTEMS IS OTHERWISE NEGATIVE  Historical Information   Past Medical History:   Diagnosis Date    Atrial fibrillation [address] [address]     Gastroesophageal reflux     GERD (gastroesophageal reflux disease)     Pancreatitis     Pleural effusion     Second hand smoke exposure      Past Surgical History:   Procedure Laterality Date    CHOLECYSTECTOMY      COLONOSCOPY N/A 3/21/2016    Procedure: COLONOSCOPY;  Surgeon: Chaz Mae DO;  [facility]; Service:     ERCP N/A 1/4/2019    Procedure: ENDOSCOPIC RETROGRADE CHOLANGIOPANCREATOGRAPHY (ERCP); Surgeon: Lionel Delong MD;  [facility]; Service: Gastroenterology    ESOPHAGOGASTRODUODENOSCOPY N/A 2/5/2019    Procedure: ESOPHAGOGASTRODUODENOSCOPY (EGD), with possible nasojejunal Dobhoff tube placement;  Surgeon: Day Kelly MD;  [facility]; Service: Gastroenterology    ESOPHAGOGASTRODUODENOSCOPY N/A 4/3/2019    Procedure: ESOPHAGOGASTRODUODENOSCOPY (EGD)- endoscopic necrosectomy;  Surgeon: Lionel Delong MD;  [facility];   Service: Gastroenterology    IR IMAGE GUIDED ASPIRATION / DRAINAGE W TUBE  6/17/2019    IR THORACENTESIS  2/7/2019    IR THORACENTESIS  3/29/2019    LINEAR ENDOSCOPIC U/S N/A 3/29/2019    Procedure: LINEAR ENDOSCOPIC U/S cyst gastro; Surgeon: Sonu Bocanegra MD;  [facility]; Service: Gastroenterology    [place] [address] N/A 5/20/2019    Procedure: Katherine Gaffney;  Surgeon: MANDEEP BRANNON MD;  [facility];  Service: Thoracic    NH EGD TRANSORAL BIOPSY SINGLE/MULTIPLE N/A 3/21/2016    Procedure: ESOPHAGOGASTRODUODENOSCOPY (EGD); Surgeon: Jeff Weston DO;  [facility]; Service: General    NH ERCP DX COLLECTION SPECIMEN BRUSHING/WASHING N/A 4/26/2019    Procedure: ENDOSCOPIC RETROGRADE CHOLANGIOPANCREATOGRAPHY (ERCP); Surgeon: Sonu Bocanegra MD;  [facility]; Service: Gastroenterology    NH ESOPHAGOGASTRODUODENOSCOPY TRANSORAL DIAGNOSTIC N/A 4/9/2019    Procedure: ESOPHAGOGASTRODUODENOSCOPY (EGD) endoscopic necrosectomy;  Surgeon: Sonu Bocanegra MD;  [facility]; Service: Gastroenterology    NH ESOPHAGOGASTRODUODENOSCOPY TRANSORAL DIAGNOSTIC N/A 4/26/2019    Procedure: ESOPHAGOGASTRODUODENOSCOPY (EGD); Surgeon: Sonu Bocanegra MD;  [facility]; Service: Gastroenterology    NH THORACOSCOPY SURG PART PULM DECORT Left 5/20/2019    Procedure: DECORTICATION LUNG;  Surgeon: MANDEEP BRANNON MD;  [facility];  Service: Thoracic    NH THORACOSCOPY SURG PART PULM DECORT Left 5/20/2019    Procedure: THORACOSCOPY VIDEO ASSISTED SURGERY (VATS);   Surgeon: MANDEEP BRANNON MD;  [facility];  Service: Thoracic    NH THORACOSCOPY SURG W/PLEURODESIS N/A 5/20/2019    Procedure: PLEURODESIS mechanical;  Surgeon: MANDEEP BRANNON MD;  [facility];  Service: Thoracic     Social History   Social History     Substance and Sexual Activity   Alcohol Use Not Currently     Social History     Substance and Sexual Activity   Drug Use No     Social History     Tobacco Use   Smoking Status Never Smoker   Smokeless Tobacco Never Used     Family History   Problem Relation Age of Onset    Lung cancer Mother 79        Smoker     Transient ischemic attack Father 61    Lung disease Neg Hx        Meds/Allergies       Current Outpatient Medications:     acetaminophen (TYLENOL) 325 mg tablet    metoprolol tartrate (LOPRESSOR) 25 mg tablet    pantoprazole (PROTONIX) 40 mg tablet    warfarin (COUMADIN) 3 mg tablet    No Known Allergies        Objective     Blood pressure 120/70, pulse 70, temperature 97 8 °F (36 6 °C), temperature source Tympanic, height 6' 4" (1 93 m), weight 94 8 kg (209 lb)  Body mass index is 25 44 kg/m²  PHYSICAL EXAM:      Physical Exam   Constitutional: He is oriented to person, place, and time  Vital signs are normal  He appears well-developed and well-nourished  HENT:   Head: Normocephalic and atraumatic  Eyes: Pupils are equal, round, and reactive to light  Conjunctivae are normal  No scleral icterus  Neck: Normal range of motion  Cardiovascular: Normal rate, regular rhythm and normal heart sounds  Pulmonary/Chest: Effort normal and breath sounds normal  No respiratory distress  Abdominal: Soft  Normal appearance and bowel sounds are normal  He exhibits no distension, no ascites and no mass  There is no hepatosplenomegaly  There is no tenderness  A hernia (Right inguinal) is present  Musculoskeletal: Normal range of motion  Lymphadenopathy:     He has no cervical adenopathy  Neurological: He is alert and oriented to person, place, and time  Skin: Skin is warm  Psychiatric: He has a normal mood and affect  His behavior is normal  Thought content normal        Lab Results:   No visits with results within 1 Day(s) from this visit  Latest known visit with results is: Ancillary Orders on 11/08/2019   Component Date Value    Protime 11/18/2019 22 1*    INR 11/18/2019 1 99*         Radiology Results:   No results found

## 2019-11-15 NOTE — LETTER
November 21, 2019     [facility] [address] [facility] [address]    Patient: Prema Chadwick   YOB: 1960   Date of Visit: 11/15/2019       Dear Dr Maria Eugenia Petit: Thank you for referring Karmen Gibbs to me for evaluation  Below are my notes for this consultation  If you have questions, please do not hesitate to call me  I look forward to following your patient along with you  Sincerely,        Rd Villegas MD        CC: No Recipients  Rd Villegas MD  11/21/2019 11:50 AM  Incomplete  Outpatient Follow up  [address]  Rd Villegas MD  Ph : [phone]  Fax : [phone]  Mobile : [phone]  Email : [email or web address]  Also available on Tiger Text    Prema Chadwick 60 y o  male MRN: [id number]    PCP: Yojana Muhammad MD  Referring: No referring provider defined for this encounter  Prema Chadwick presented for a follow up visit  My recommendations are included  Please do not hesitate to contact me with any questions you may have  ASSESSMENT AND PLAN:      1  Pancreatic pseudocyst   I would recommend follow-up with the repeat imaging study in January  This would be to confirm that the pseudocyst has not recurred  2  Ampullary adenoma  We discussed extensively about ampullectomy  I discussed that there is a risk of bleeding, perforation and pancreatitis  He acknowledges the risks and would like to proceed  We will schedule him for the ERCP/ampullectomy in January after his next CT scan  3  Colon cancer screening  We will schedule for colonoscopy after his next appointment  4  Refer to surgery for right inguinal hernia  He has been referred to see Dr Maria Eugenia Petit    Diagnoses and all orders for this visit:    Pancreatic pseudocyst  -     CT abdomen pelvis w contrast; Future  -     Basic metabolic panel;  Future    Ampullary adenoma  - ERCP; Future    Gastroesophageal reflux disease without esophagitis    Unilateral recurrent inguinal hernia without obstruction or gangrene  -     Ambulatory referral to General Surgery; Future    Colon cancer screening    Other orders  -     Diet NPO; Sips with meds; Standing  -     Void on call to OR; Standing  -     Insert peripheral IV; Standing      ______________________________________________________________________    SUBJECTIVE:  Billy De La Cruz is a 59-year-old gentleman who presents to us for follow-up of his pancreatitis and pancreatic pseudocyst which have been drained and are resolved  Doing well  Weight is stable  He had his endoscopy with cyst gastrostomy stent removal in September  He has had no symptoms since then  He is having no abdominal pain  No nausea vomiting  No fevers or chills  He has active  He is eating well  No weight loss  At the time of the endoscopy a ampullary polyp was also seen which was biopsied and showed to be a tubular adenoma  This has remained stable over the past year  He had a colonoscopy done several year ago (>5 years)  He is not having any abdominal pain, diarrhea constipation  He does complain of an inguinal hernia on the right side  REVIEW OF SYSTEMS IS OTHERWISE NEGATIVE  Historical Information   Past Medical History:   Diagnosis Date    Atrial fibrillation ([address] )     Gastroesophageal reflux     GERD (gastroesophageal reflux disease)     Pancreatitis     Pleural effusion     Second hand smoke exposure      Past Surgical History:   Procedure Laterality Date    CHOLECYSTECTOMY      COLONOSCOPY N/A 3/21/2016    Procedure: COLONOSCOPY;  Surgeon: Nida Gtz DO;  [facility]; Service:     ERCP N/A 1/4/2019    Procedure: ENDOSCOPIC RETROGRADE CHOLANGIOPANCREATOGRAPHY (ERCP); Surgeon: Zaria Leong MD;  [facility];   Service: Gastroenterology    ESOPHAGOGASTRODUODENOSCOPY N/A 2/5/2019    Procedure: ESOPHAGOGASTRODUODENOSCOPY (EGD), with possible nasojejunal Dobhoff tube placement;  Surgeon: Tori Gonzalez MD;  [facility]; Service: Gastroenterology    ESOPHAGOGASTRODUODENOSCOPY N/A 4/3/2019    Procedure: ESOPHAGOGASTRODUODENOSCOPY (EGD)- endoscopic necrosectomy;  Surgeon: Seng Caicedo MD;  [facility]; Service: Gastroenterology    IR IMAGE GUIDED ASPIRATION / DRAINAGE W TUBE  6/17/2019    IR THORACENTESIS  2/7/2019    IR THORACENTESIS  3/29/2019    LINEAR ENDOSCOPIC U/S N/A 3/29/2019    Procedure: LINEAR ENDOSCOPIC U/S cyst gastro;  Surgeon: Seng Caicedo MD;  [facility]; Service: Gastroenterology    [place] [address] N/A 5/20/2019    Procedure: Lige Ing;  Surgeon: Edgardo Vo MD;  [facility];  Service: Thoracic    MN EGD TRANSORAL BIOPSY SINGLE/MULTIPLE N/A 3/21/2016    Procedure: ESOPHAGOGASTRODUODENOSCOPY (EGD); Surgeon: Smita Pritchard DO;  [facility]; Service: General    MN ERCP DX COLLECTION SPECIMEN BRUSHING/WASHING N/A 4/26/2019    Procedure: ENDOSCOPIC RETROGRADE CHOLANGIOPANCREATOGRAPHY (ERCP); Surgeon: Seng Caicedo MD;  [facility]; Service: Gastroenterology    MN ESOPHAGOGASTRODUODENOSCOPY TRANSORAL DIAGNOSTIC N/A 4/9/2019    Procedure: ESOPHAGOGASTRODUODENOSCOPY (EGD) endoscopic necrosectomy;  Surgeon: Seng Caicedo MD;  [facility]; Service: Gastroenterology    MN ESOPHAGOGASTRODUODENOSCOPY TRANSORAL DIAGNOSTIC N/A 4/26/2019    Procedure: ESOPHAGOGASTRODUODENOSCOPY (EGD); Surgeon: Seng Caicedo MD;  [facility]; Service: Gastroenterology    MN THORACOSCOPY SURG PART PULM DECORT Left 5/20/2019    Procedure: DECORTICATION LUNG;  Surgeon: Edgardo Vo MD;  [facility];  Service: Thoracic    MN THORACOSCOPY SURG PART PULM DECORT Left 5/20/2019    Procedure: THORACOSCOPY VIDEO ASSISTED SURGERY (VATS);   Surgeon: Egdardo Vo MD;  [facility];  Service: Thoracic    MN THORACOSCOPY SURG W/PLEURODESIS N/A 5/20/2019    Procedure: PLEURODESIS mechanical;  Surgeon: Ambar Shell MD;  [facility];  Service: Thoracic     Social History   Social History     Substance and Sexual Activity   Alcohol Use Not Currently     Social History     Substance and Sexual Activity   Drug Use No     Social History     Tobacco Use   Smoking Status Never Smoker   Smokeless Tobacco Never Used     Family History   Problem Relation Age of Onset    Lung cancer Mother 79        Smoker     Transient ischemic attack Father 61    Lung disease Neg Hx        Meds/Allergies       Current Outpatient Medications:     acetaminophen (TYLENOL) 325 mg tablet    metoprolol tartrate (LOPRESSOR) 25 mg tablet    pantoprazole (PROTONIX) 40 mg tablet    warfarin (COUMADIN) 3 mg tablet    No Known Allergies        Objective     Blood pressure 120/70, pulse 70, temperature 97 8 °F (36 6 °C), temperature source Tympanic, height 6' 4" (1 93 m), weight 94 8 kg (209 lb)  Body mass index is 25 44 kg/m²  PHYSICAL EXAM:      Physical Exam   Constitutional: He is oriented to person, place, and time  Vital signs are normal  He appears well-developed and well-nourished  HENT:   Head: Normocephalic and atraumatic  Eyes: Pupils are equal, round, and reactive to light  Conjunctivae are normal  No scleral icterus  Neck: Normal range of motion  Cardiovascular: Normal rate, regular rhythm and normal heart sounds  Pulmonary/Chest: Effort normal and breath sounds normal  No respiratory distress  Abdominal: Soft  Normal appearance and bowel sounds are normal  He exhibits no distension, no ascites and no mass  There is no hepatosplenomegaly  There is no tenderness  A hernia (Right inguinal) is present  Musculoskeletal: Normal range of motion  Lymphadenopathy:     He has no cervical adenopathy  Neurological: He is alert and oriented to person, place, and time  Skin: Skin is warm     Psychiatric: He has a normal mood and affect  His behavior is normal  Thought content normal        Lab Results:   No visits with results within 1 Day(s) from this visit  Latest known visit with results is: Ancillary Orders on 11/08/2019   Component Date Value    Protime 11/18/2019 22 1*    INR 11/18/2019 1 99*         Radiology Results:   No results found  Lilian Osorio MD  11/15/2019 11:39 AM  Incomplete  Outpatient Follow up  [address] [facility]  Lilian Osorio MD  Ph : [phone]  Fax : [phone]  Mobile : [phone]  Email : [email or web address]  Also available on [employer] Text    Yogi Márquez 60 y o  male MRN: [id number]    PCP: Martin Cash MD  Referring: No referring provider defined for this encounter  Yogi Márquez presented for a follow up visit  My recommendations are included  Please do not hesitate to contact me with any questions you may have  ASSESSMENT AND PLAN:      No problem-specific Assessment & Plan notes found for this encounter  Diagnoses and all orders for this visit:    Pancreatic pseudocyst  -     CT abdomen pelvis w contrast; Future  -     Basic metabolic panel; Future    Ampullary adenoma  -     ERCP; Future    Gastroesophageal reflux disease without esophagitis    Unilateral recurrent inguinal hernia without obstruction or gangrene  -     Ambulatory referral to General Surgery; Future    Other orders  -     Diet NPO; Sips with meds; Standing  -     Void on call to OR; Standing  -     Insert peripheral IV; Standing      ______________________________________________________________________    SUBJECTIVE:  Doing well  Weight is stable  He had a colonoscopy done several year ago (>5 years)  REVIEW OF SYSTEMS IS OTHERWISE NEGATIVE        Historical Information   Past Medical History:   Diagnosis Date    Atrial fibrillation ([address] )     Gastroesophageal reflux     GERD (gastroesophageal reflux disease)     Pancreatitis     Pleural effusion     Second hand smoke exposure      Past Surgical History:   Procedure Laterality Date    CHOLECYSTECTOMY      COLONOSCOPY N/A 3/21/2016    Procedure: COLONOSCOPY;  Surgeon: Norman Chaidez DO;  [facility]; Service:     ERCP N/A 1/4/2019    Procedure: ENDOSCOPIC RETROGRADE CHOLANGIOPANCREATOGRAPHY (ERCP); Surgeon: Osman Hoyos MD;  [facility]; Service: Gastroenterology    ESOPHAGOGASTRODUODENOSCOPY N/A 2/5/2019    Procedure: ESOPHAGOGASTRODUODENOSCOPY (EGD), with possible nasojejunal Dobhoff tube placement;  Surgeon: Ronald Montana MD;  [facility]; Service: Gastroenterology    ESOPHAGOGASTRODUODENOSCOPY N/A 4/3/2019    Procedure: ESOPHAGOGASTRODUODENOSCOPY (EGD)- endoscopic necrosectomy;  Surgeon: Osman Hoyos MD;  [facility]; Service: Gastroenterology    IR IMAGE GUIDED ASPIRATION / DRAINAGE W TUBE  6/17/2019    IR THORACENTESIS  2/7/2019    IR THORACENTESIS  3/29/2019    LINEAR ENDOSCOPIC U/S N/A 3/29/2019    Procedure: LINEAR ENDOSCOPIC U/S cyst gastro;  Surgeon: Osman Hoyos MD;  [facility]; Service: Gastroenterology    [place] [address] N/A 5/20/2019    Procedure: Giulia Vital;  Surgeon: Larisa Victoria MD;  [facility];  Service: Thoracic    [place] EGD TRANSORAL BIOPSY SINGLE/MULTIPLE N/A 3/21/2016    Procedure: ESOPHAGOGASTRODUODENOSCOPY (EGD); Surgeon: Norman Chaidez DO;  [facility]; Service: General    [place] ERCP DX COLLECTION SPECIMEN BRUSHING/WASHING N/A 4/26/2019    Procedure: ENDOSCOPIC RETROGRADE CHOLANGIOPANCREATOGRAPHY (ERCP); Surgeon: Osman Hoyos MD;  [facility]; Service: Gastroenterology    [place] ESOPHAGOGASTRODUODENOSCOPY TRANSORAL DIAGNOSTIC N/A 4/9/2019    Procedure: ESOPHAGOGASTRODUODENOSCOPY (EGD) endoscopic necrosectomy;  Surgeon: Osman Hoyos MD;  [facility];   Service: Gastroenterology    [place] ESOPHAGOGASTRODUODENOSCOPY TRANSORAL DIAGNOSTIC N/A 4/26/2019    Procedure: ESOPHAGOGASTRODUODENOSCOPY (EGD); Surgeon: Jesica Tipton MD;  [facility]; Service: Gastroenterology    DE THORACOSCOPY SURG PART PULM DECORT Left 5/20/2019    Procedure: DECORTICATION LUNG;  Surgeon: Rolando Gloria MD;  [facility];  Service: Thoracic    DE THORACOSCOPY SURG PART PULM DECORT Left 5/20/2019    Procedure: THORACOSCOPY VIDEO ASSISTED SURGERY (VATS); Surgeon: Rolando Gloria MD;  [facility];  Service: Thoracic    DE THORACOSCOPY SURG W/PLEURODESIS N/A 5/20/2019    Procedure: PLEURODESIS mechanical;  Surgeon: Rolando Gloria MD;  [facility];  Service: Thoracic     Social History   Social History     Substance and Sexual Activity   Alcohol Use Not Currently     Social History     Substance and Sexual Activity   Drug Use No     Social History     Tobacco Use   Smoking Status Never Smoker   Smokeless Tobacco Never Used     Family History   Problem Relation Age of Onset    Lung cancer Mother 79        Smoker     Transient ischemic attack Father 61    Lung disease Neg Hx        Meds/Allergies       Current Outpatient Medications:     acetaminophen (TYLENOL) 325 mg tablet    metoprolol tartrate (LOPRESSOR) 25 mg tablet    pantoprazole (PROTONIX) 40 mg tablet    warfarin (COUMADIN) 3 mg tablet    No Known Allergies        Objective     Blood pressure 120/70, pulse 70, temperature 97 8 °F (36 6 °C), temperature source Tympanic, height 6' 4" (1 93 m), weight 94 8 kg (209 lb)  Body mass index is 25 44 kg/m²  PHYSICAL EXAM:      Physical Exam    Lab Results:   No visits with results within 1 Day(s) from this visit  Latest known visit with results is: Ancillary Orders on 10/11/2019   Component Date Value    Protime 11/04/2019 15 7*    INR 11/04/2019 1 29*         Radiology Results:   No results found

## 2019-11-15 NOTE — TELEPHONE ENCOUNTER
Pancho Pollard; per Dr Michelle Spears, the patient needs an ERCP Mid to End of January 2020  He is on Coumadin  He has a CT scheduled on 1/6/20 at [facility]   Thank you so much

## 2019-11-18 ENCOUNTER — APPOINTMENT (OUTPATIENT)
Dept: LAB | Facility: CLINIC | Age: 59
End: 2019-11-18
Payer: COMMERCIAL

## 2019-11-18 NOTE — TELEPHONE ENCOUNTER
ERCP scheduled with Dr Miranda in [place] on 1/15/2020  I gave patient verbal instructions/mailed  Medication clearance faxed to Dr Bae

## 2019-11-19 ENCOUNTER — ANTICOAG VISIT (OUTPATIENT)
Dept: CARDIOLOGY CLINIC | Facility: CLINIC | Age: 59
End: 2019-11-19

## 2019-11-19 DIAGNOSIS — I48.0 PAROXYSMAL ATRIAL FIBRILLATION (HCC): ICD-10-CM

## 2019-12-02 ENCOUNTER — ANTICOAG VISIT (OUTPATIENT)
Dept: CARDIOLOGY CLINIC | Facility: CLINIC | Age: 59
End: 2019-12-02

## 2019-12-02 ENCOUNTER — APPOINTMENT (OUTPATIENT)
Dept: LAB | Facility: CLINIC | Age: 59
End: 2019-12-02
Payer: COMMERCIAL

## 2019-12-02 DIAGNOSIS — I48.0 PAROXYSMAL ATRIAL FIBRILLATION (HCC): ICD-10-CM

## 2019-12-06 ENCOUNTER — OFFICE VISIT (OUTPATIENT)
Dept: PULMONOLOGY | Facility: CLINIC | Age: 59
End: 2019-12-06
Payer: COMMERCIAL

## 2019-12-06 VITALS
SYSTOLIC BLOOD PRESSURE: 120 MMHG | DIASTOLIC BLOOD PRESSURE: 84 MMHG | HEART RATE: 74 BPM | WEIGHT: 214.6 LBS | HEIGHT: 76 IN | TEMPERATURE: 96.3 F | OXYGEN SATURATION: 98 % | BODY MASS INDEX: 26.13 KG/M2

## 2019-12-06 DIAGNOSIS — J91.8 PLEURAL EFFUSION ASSOCIATED WITH PANCREATITIS: ICD-10-CM

## 2019-12-06 DIAGNOSIS — R06.00 DYSPNEA ON EXERTION: Primary | ICD-10-CM

## 2019-12-06 DIAGNOSIS — K85.90 PLEURAL EFFUSION ASSOCIATED WITH PANCREATITIS: ICD-10-CM

## 2019-12-06 PROBLEM — A41.9 SEPSIS (HCC): Status: RESOLVED | Noted: 2019-04-02 | Resolved: 2019-12-06

## 2019-12-06 PROBLEM — R78.81 GRAM-NEGATIVE BACTEREMIA: Status: RESOLVED | Noted: 2019-04-02 | Resolved: 2019-12-06

## 2019-12-06 PROBLEM — R06.09 DYSPNEA ON EXERTION: Status: RESOLVED | Noted: 2019-09-12 | Resolved: 2019-12-06

## 2019-12-06 PROCEDURE — 99214 OFFICE O/P EST MOD 30 MIN: CPT | Performed by: INTERNAL MEDICINE

## 2019-12-06 NOTE — PROGRESS NOTES
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, moving valves and climbing high ladders  Recommend the patient continue his current conditioning at this time and remain out of work for minimum of 1 more month given the significant exertion responsibilities of his job  For his history of pleural effusion, the patient notes slight pain at his left lower back region near the site of insertion of the thoracentesis  Is unclear if this is related to the thoracentesis versus chronic scarring related to the prolonged effusion  No indication for repeat chest CT scan at this time but will follow-up on previously ordered abdomen pelvis scan  The patient has plans for repeat GI procedures in approximately 1 month  He may follow up in 3 months or sooner as necessary  Subjective: The patient reports his breathing is doing well  He will note some slight/sharp pain on his right side  The pain is near the site of insertion  He will notice dyspnea with moving wood around such as in the wheelbarrow  Compared to 2 months ago it is improving but he continues to be symptomatic  The patient has plans to have an ERCP with cyst at head of the pancreas  This procedure is scheduled for 1/15/20 with planned admission time of approximately 1 week per GI recs  He denies fevers, chills, nausea or vomiting        Inhaler Regimen:  None    Remainder of review of systems negative except as described in HPI  The following portions of the patient's history were reviewed and updated as appropriate: allergies, current medications, past family history, past medical history, past social history, past surgical history and problem list      Objective:   Vitals: Blood pressure 120/84, pulse 74, temperature (!) 96 3 °F (35 7 °C), temperature source Tympanic, height 6' 4" (1 93 m), weight 97 3 kg (214 lb 9 6 oz), SpO2 98 % , RA, Body mass index is 26 12 kg/m²  Physical Exam  Gen: Pleasant, awake, alert, oriented x 3, no acute distress  HEENT: Mucous membranes moist, no oral lesions, no thrush  NECK: No accessory muscle use, JVP not elevated  Cardiac: Regular, single S1, single S2, no murmurs, no rubs, no gallops  Lungs: CTA b/l  Abdomen: normoactive bowel sounds, soft nontender, nondistended, no rebound or rigidity, no guarding  Extremities: no cyanosis, no clubbing, no edema  MSK:  Strength equal in all extremities  Derm:  No rashes/lesions noted  Neuro:  Appropriate mood/affect    Labs: I have personally reviewed pertinent lab results  Lab Results   Component Value Date    WBC 4 90 06/20/2019    WBC 5 48 01/05/2015    HGB 11 3 (L) 06/20/2019    HGB 15 4 01/05/2015     06/20/2019     01/05/2015     Lab Results   Component Value Date    CREATININE 0 90 08/09/2019    CREATININE 1 01 01/05/2015      Imaging and other studies: I have personally reviewed pertinent reports  and I have personally reviewed pertinent films in PACS  CT A/P 9/2019:  My interpretation:  Slight pleural thickening at left lung base with associated atelectasis  Pulmonary Function Testing:   No pulmonary function testing available for review  Kalia Garces

## 2019-12-13 ENCOUNTER — CONSULT (OUTPATIENT)
Dept: SURGERY | Facility: CLINIC | Age: 59
End: 2019-12-13
Payer: COMMERCIAL

## 2019-12-13 ENCOUNTER — OFFICE VISIT (OUTPATIENT)
Dept: LAB | Facility: HOSPITAL | Age: 59
End: 2019-12-13
Attending: SURGERY
Payer: COMMERCIAL

## 2019-12-13 VITALS
BODY MASS INDEX: 25.69 KG/M2 | DIASTOLIC BLOOD PRESSURE: 84 MMHG | HEIGHT: 76 IN | TEMPERATURE: 97.9 F | HEART RATE: 83 BPM | SYSTOLIC BLOOD PRESSURE: 122 MMHG | WEIGHT: 211 LBS

## 2019-12-13 DIAGNOSIS — K40.90 INGUINAL HERNIA OF RIGHT SIDE WITHOUT OBSTRUCTION OR GANGRENE: Primary | ICD-10-CM

## 2019-12-13 DIAGNOSIS — K40.90 RIGHT INGUINAL HERNIA: ICD-10-CM

## 2019-12-13 LAB
ATRIAL RATE: 58 BPM
P AXIS: 43 DEGREES
PR INTERVAL: 166 MS
QRS AXIS: -25 DEGREES
QRSD INTERVAL: 86 MS
QT INTERVAL: 432 MS
QTC INTERVAL: 424 MS
T WAVE AXIS: 32 DEGREES
VENTRICULAR RATE: 58 BPM

## 2019-12-13 PROCEDURE — 99213 OFFICE O/P EST LOW 20 MIN: CPT | Performed by: SURGERY

## 2019-12-13 PROCEDURE — 93005 ELECTROCARDIOGRAM TRACING: CPT

## 2019-12-13 PROCEDURE — 93010 ELECTROCARDIOGRAM REPORT: CPT | Performed by: INTERNAL MEDICINE

## 2019-12-13 NOTE — PROGRESS NOTES
Office Visit - General Surgery  Ronald Marc MRN: [id number]  Encounter: [id number]    Assessment and Plan    Problem List Items Addressed This Visit        Other    Inguinal hernia of right side without obstruction or gangrene - Primary     Symptomatic right inguinal hernia  No evidence of left inguinal hernia on examination    I have recommended repair - although it is small, it is currently symptomatic  Furthermore, his work requires a lot of heavy lifting  Will schedule open right inguinal hernia repair  The risks, benefits and alternatives were discussed with the patient, including bleeding, infection, injury to nearby structures and risk of recurrence  All questions answered  Consent signed and placed in chart  Pt on coumadin; will need to be held for surgery  Pt understands, as he is also planning to hold it for his endoscopic procedure with Dr Angus Matos in January                 Chief Complaint:  Ronald Marc is a 60 y o  male who presents for Inguinal Hernia (right inguinal hernia consult)    Subjective  Ronald Marc is a 59M with hx severe pancreatitis in January with pseudocyst s/p endoscopic necrosectomies, recurrent effusion s/p L VATS with decortication  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 and is scheduled for endoscopic resection 1/15/19  He presents today for evaluation of R inguinal hernia  Pt states he first noticed it a few months ago after one of his hospitalizations  He states it is an "annoyance" and occasionally has to push it in  He notes it becomes more bothersome when he has an increase in gas  No significant constipation and no changes in bowel/bladder habits  Denies left groin issues      Past Medical History  Past Medical History:   Diagnosis Date    Atrial fibrillation (HCC)     Gastroesophageal reflux     GERD (gastroesophageal reflux disease)     Pancreatitis     Pleural effusion     Second hand smoke exposure        Past Surgical History  Past Surgical History:   Procedure Laterality Date    CHOLECYSTECTOMY      COLONOSCOPY N/A 3/21/2016    Procedure: COLONOSCOPY;  Surgeon: Vick Milligan DO;  [facility]; Service:     ERCP N/A 1/4/2019    Procedure: ENDOSCOPIC RETROGRADE CHOLANGIOPANCREATOGRAPHY (ERCP); Surgeon: Monalisa Land MD;  [facility]; Service: Gastroenterology    ESOPHAGOGASTRODUODENOSCOPY N/A 2/5/2019    Procedure: ESOPHAGOGASTRODUODENOSCOPY (EGD), with possible nasojejunal Dobhoff tube placement;  Surgeon: Shanna Tavares MD;  [facility]; Service: Gastroenterology    ESOPHAGOGASTRODUODENOSCOPY N/A 4/3/2019    Procedure: ESOPHAGOGASTRODUODENOSCOPY (EGD)- endoscopic necrosectomy;  Surgeon: Monalisa Land MD;  [facility]; Service: Gastroenterology    IR IMAGE GUIDED ASPIRATION / DRAINAGE W TUBE  6/17/2019    IR THORACENTESIS  2/7/2019    IR THORACENTESIS  3/29/2019    LINEAR ENDOSCOPIC U/S N/A 3/29/2019    Procedure: LINEAR ENDOSCOPIC U/S cyst gastro;  Surgeon: Monalisa Land MD;  [facility]; Service: Gastroenterology    [place] [address] N/A 5/20/2019    Procedure: Evans McMullen;  Surgeon: Yojana Cooper MD;  [facility];  Service: Thoracic    [place] EGD TRANSORAL BIOPSY SINGLE/MULTIPLE N/A 3/21/2016    Procedure: ESOPHAGOGASTRODUODENOSCOPY (EGD); Surgeon: Vick Milligan DO;  [facility]; Service: General    [place] ERCP DX COLLECTION SPECIMEN BRUSHING/WASHING N/A 4/26/2019    Procedure: ENDOSCOPIC RETROGRADE CHOLANGIOPANCREATOGRAPHY (ERCP); Surgeon: Monalisa Land MD;  [facility]; Service: Gastroenterology    [place] ESOPHAGOGASTRODUODENOSCOPY TRANSORAL DIAGNOSTIC N/A 4/9/2019    Procedure: ESOPHAGOGASTRODUODENOSCOPY (EGD) endoscopic necrosectomy;  Surgeon: Monalisa Land MD;  [facility]; Service: Gastroenterology    [place] ESOPHAGOGASTRODUODENOSCOPY TRANSORAL DIAGNOSTIC N/A 4/26/2019    Procedure: ESOPHAGOGASTRODUODENOSCOPY (EGD);   Surgeon: Monalisa Land MD;  [facility]; Service: Gastroenterology    [place] THORACOSCOPY SURG PART PULM DECORT Left 5/20/2019    Procedure: DECORTICATION LUNG;  Surgeon: Minerva Tucker MD;  [facility];  Service: Thoracic    [place] THORACOSCOPY SURG PART PULM DECORT Left 5/20/2019    Procedure: THORACOSCOPY VIDEO ASSISTED SURGERY (VATS);   Surgeon: Minerva Tucker MD;  [facility];  Service: Thoracic    [place] THORACOSCOPY SURG W/PLEURODESIS N/A 5/20/2019    Procedure: PLEURODESIS mechanical;  Surgeon: Minerva Tucker MD;  [facility];  Service: Thoracic       Family History  Family History   Problem Relation Age of Onset    Lung cancer Mother 79        Smoker     Transient ischemic attack Father 61    Lung disease Neg Hx        Social History  Social History     Socioeconomic History    Marital status: /Civil Union     Spouse name: None    Number of children: None    Years of education: None    Highest education level: None   Occupational History    None   Social Needs    Financial resource strain: None    Food insecurity:     Worry: None     Inability: None    Transportation needs:     Medical: None     Non-medical: None   Tobacco Use    Smoking status: Never Smoker    Smokeless tobacco: Never Used   Substance and Sexual Activity    Alcohol use: Not Currently    Drug use: No    Sexual activity: None   Lifestyle    Physical activity:     Days per week: None     Minutes per session: None    Stress: None   Relationships    Social connections:     Talks on phone: None     Gets together: None     Attends [employer] service: None     Active member of club or organization: None     Attends meetings of clubs or organizations: None     Relationship status: None    Intimate partner violence:     Fear of current or ex partner: None     Emotionally abused: None     Physically abused: None     Forced sexual activity: None   Other Topics Concern    None   Social History Narrative    None        Medications  Current Outpatient Medications on File Prior to Visit   Medication Sig Dispense Refill    acetaminophen (TYLENOL) 325 mg tablet Take 650 mg by mouth every 6 (six) hours as needed for mild pain      metoprolol tartrate (LOPRESSOR) 25 mg tablet Take 1 tablet (25 mg total) by mouth every 12 (twelve) hours 180 tablet 2    pantoprazole (PROTONIX) 40 mg tablet Take 1 tablet (40 mg total) by mouth daily for 90 days 90 tablet 3    warfarin (COUMADIN) 3 mg tablet Take 1 tablet (3 mg total) by mouth daily Start 5/25/19 (Patient taking differently: Take 5 mg by mouth daily Start 5/25/19)  0     No current facility-administered medications on file prior to visit  Allergies  No Known Allergies    Review of Systems   Constitutional: Negative for activity change, chills, fever and unexpected weight change  HENT: Negative  Negative for congestion, sneezing and sore throat  Eyes: Negative  Negative for pain and redness  Respiratory: Negative  Negative for chest tightness, shortness of breath and wheezing  Cardiovascular: Negative  Negative for chest pain and palpitations  Gastrointestinal: Negative for abdominal distention, abdominal pain, constipation, diarrhea, nausea and vomiting  Endocrine: Negative  Negative for cold intolerance and heat intolerance  Genitourinary: Negative for dysuria, hematuria and urgency  R groin soreness, intermittent  Denies L groin pain/discomfort   Musculoskeletal: Negative  Negative for arthralgias and back pain  Skin: Negative for color change and rash  Allergic/Immunologic: Negative  Negative for environmental allergies and food allergies  Neurological: Negative  Negative for dizziness and light-headedness  Hematological: Negative  Negative for adenopathy  Does not bruise/bleed easily  Psychiatric/Behavioral: Negative  Negative for agitation and sleep disturbance  The patient is not nervous/anxious  Objective  Vitals:    12/13/19 0854   BP: 122/84   Pulse: 83   Temp: 97 9 °F (36 6 °C)       Physical Exam   Constitutional: He is oriented to person, place, and time  He appears well-developed and well-nourished  No distress  HENT:   Head: Normocephalic and atraumatic  Mouth/Throat: Oropharynx is clear and moist    Eyes: Pupils are equal, round, and reactive to light  Conjunctivae and EOM are normal  No scleral icterus  Neck: Normal range of motion  Neck supple  No tracheal deviation present  No thyromegaly present  Cardiovascular: Normal rate, regular rhythm, normal heart sounds and intact distal pulses  Exam reveals no gallop and no friction rub  No murmur heard  Pulmonary/Chest: Effort normal and breath sounds normal  No stridor  No respiratory distress  He has no wheezes  He has no rales  Abdominal: Soft  He exhibits no distension  There is no rebound and no guarding  Genitourinary:   Genitourinary Comments: Small right inguinal hernia, reducible  No left inguinal hernia   Musculoskeletal: Normal range of motion  Neurological: He is alert and oriented to person, place, and time  Skin: Skin is warm and dry  He is not diaphoretic  Psychiatric: He has a normal mood and affect  His behavior is normal  Judgment and thought content normal    Nursing note and vitals reviewed

## 2019-12-13 NOTE — ASSESSMENT & PLAN NOTE
Symptomatic right inguinal hernia  No evidence of left inguinal hernia on examination    I have recommended repair - although it is small, it is currently symptomatic  Furthermore, his work requires a lot of heavy lifting  Will schedule open right inguinal hernia repair  The risks, benefits and alternatives were discussed with the patient, including bleeding, infection, injury to nearby structures and risk of recurrence  All questions answered  Consent signed and placed in chart  Pt on coumadin; will need to be held for surgery   Pt understands, as he is also planning to hold it for his endoscopic procedure with Dr Vita Aguirre in January

## 2019-12-16 ENCOUNTER — APPOINTMENT (OUTPATIENT)
Dept: LAB | Facility: CLINIC | Age: 59
End: 2019-12-16
Payer: COMMERCIAL

## 2019-12-17 ENCOUNTER — ANTICOAG VISIT (OUTPATIENT)
Dept: CARDIOLOGY CLINIC | Facility: CLINIC | Age: 59
End: 2019-12-17

## 2019-12-17 DIAGNOSIS — I48.0 PAROXYSMAL ATRIAL FIBRILLATION (HCC): ICD-10-CM

## 2019-12-19 ENCOUNTER — TELEPHONE (OUTPATIENT)
Dept: CARDIOLOGY CLINIC | Facility: CLINIC | Age: 59
End: 2019-12-19

## 2019-12-30 ENCOUNTER — TELEPHONE (OUTPATIENT)
Dept: GASTROENTEROLOGY | Facility: MEDICAL CENTER | Age: 59
End: 2019-12-30

## 2019-12-30 ENCOUNTER — APPOINTMENT (OUTPATIENT)
Dept: LAB | Facility: CLINIC | Age: 59
End: 2019-12-30
Payer: COMMERCIAL

## 2019-12-30 ENCOUNTER — ANESTHESIA EVENT (OUTPATIENT)
Dept: PERIOP | Facility: HOSPITAL | Age: 59
End: 2019-12-30
Payer: COMMERCIAL

## 2019-12-30 ENCOUNTER — ANTICOAG VISIT (OUTPATIENT)
Dept: CARDIOLOGY CLINIC | Facility: CLINIC | Age: 59
End: 2019-12-30

## 2019-12-30 DIAGNOSIS — Z91.89 RISK FACTORS FOR OBSTRUCTIVE SLEEP APNEA: Primary | ICD-10-CM

## 2019-12-30 DIAGNOSIS — K40.90 RIGHT INGUINAL HERNIA: ICD-10-CM

## 2019-12-30 DIAGNOSIS — K86.3 PANCREATIC PSEUDOCYST: ICD-10-CM

## 2019-12-30 DIAGNOSIS — I48.0 PAROXYSMAL ATRIAL FIBRILLATION (HCC): ICD-10-CM

## 2019-12-30 LAB
ALBUMIN SERPL BCP-MCNC: 3.7 G/DL (ref 3.5–5)
ALP SERPL-CCNC: 78 U/L (ref 46–116)
ALT SERPL W P-5'-P-CCNC: 23 U/L (ref 12–78)
ANION GAP SERPL CALCULATED.3IONS-SCNC: 6 MMOL/L (ref 4–13)
AST SERPL W P-5'-P-CCNC: 19 U/L (ref 5–45)
BASOPHILS # BLD AUTO: 0.02 THOUSANDS/ΜL (ref 0–0.1)
BASOPHILS NFR BLD AUTO: 0 % (ref 0–1)
BILIRUB SERPL-MCNC: 1.66 MG/DL (ref 0.2–1)
BUN SERPL-MCNC: 20 MG/DL (ref 5–25)
CALCIUM SERPL-MCNC: 9.1 MG/DL (ref 8.3–10.1)
CHLORIDE SERPL-SCNC: 110 MMOL/L (ref 100–108)
CO2 SERPL-SCNC: 25 MMOL/L (ref 21–32)
CREAT SERPL-MCNC: 1.04 MG/DL (ref 0.6–1.3)
EOSINOPHIL # BLD AUTO: 0.08 THOUSAND/ΜL (ref 0–0.61)
EOSINOPHIL NFR BLD AUTO: 2 % (ref 0–6)
ERYTHROCYTE [DISTWIDTH] IN BLOOD BY AUTOMATED COUNT: 13.2 % (ref 11.6–15.1)
GFR SERPL CREATININE-BSD FRML MDRD: 78 ML/MIN/1.73SQ M
GLUCOSE P FAST SERPL-MCNC: 91 MG/DL (ref 65–99)
HCT VFR BLD AUTO: 45.6 % (ref 36.5–49.3)
HGB BLD-MCNC: 15.2 G/DL (ref 12–17)
IMM GRANULOCYTES # BLD AUTO: 0.02 THOUSAND/UL (ref 0–0.2)
IMM GRANULOCYTES NFR BLD AUTO: 0 % (ref 0–2)
LYMPHOCYTES # BLD AUTO: 1.4 THOUSANDS/ΜL (ref 0.6–4.47)
LYMPHOCYTES NFR BLD AUTO: 28 % (ref 14–44)
MCH RBC QN AUTO: 30.1 PG (ref 26.8–34.3)
MCHC RBC AUTO-ENTMCNC: 33.3 G/DL (ref 31.4–37.4)
MCV RBC AUTO: 90 FL (ref 82–98)
MONOCYTES # BLD AUTO: 0.47 THOUSAND/ΜL (ref 0.17–1.22)
MONOCYTES NFR BLD AUTO: 10 % (ref 4–12)
NEUTROPHILS # BLD AUTO: 2.97 THOUSANDS/ΜL (ref 1.85–7.62)
NEUTS SEG NFR BLD AUTO: 60 % (ref 43–75)
NRBC BLD AUTO-RTO: 0 /100 WBCS
PLATELET # BLD AUTO: 152 THOUSANDS/UL (ref 149–390)
PMV BLD AUTO: 9.3 FL (ref 8.9–12.7)
POTASSIUM SERPL-SCNC: 4.5 MMOL/L (ref 3.5–5.3)
PROT SERPL-MCNC: 6.9 G/DL (ref 6.4–8.2)
RBC # BLD AUTO: 5.05 MILLION/UL (ref 3.88–5.62)
SODIUM SERPL-SCNC: 141 MMOL/L (ref 136–145)
WBC # BLD AUTO: 4.96 THOUSAND/UL (ref 4.31–10.16)

## 2019-12-30 PROCEDURE — 85025 COMPLETE CBC W/AUTO DIFF WBC: CPT

## 2019-12-30 PROCEDURE — 80053 COMPREHEN METABOLIC PANEL: CPT

## 2020-01-03 ENCOUNTER — TELEPHONE (OUTPATIENT)
Dept: CARDIOLOGY CLINIC | Facility: CLINIC | Age: 60
End: 2020-01-03

## 2020-01-03 NOTE — TELEPHONE ENCOUNTER
Pt scheduled for an ERCP with [facility] on 1/15  GI requesting a hold on warfarin prior to procedure  Please advise if ok to hold and for how long

## 2020-01-06 ENCOUNTER — TELEPHONE (OUTPATIENT)
Dept: GASTROENTEROLOGY | Facility: CLINIC | Age: 60
End: 2020-01-06

## 2020-01-06 ENCOUNTER — HOSPITAL ENCOUNTER (OUTPATIENT)
Dept: RADIOLOGY | Facility: HOSPITAL | Age: 60
Discharge: HOME | End: 2020-01-06
Attending: INTERNAL MEDICINE
Payer: COMMERCIAL

## 2020-01-06 DIAGNOSIS — K86.3 PANCREATIC PSEUDOCYST: ICD-10-CM

## 2020-01-06 PROCEDURE — 74177 CT ABD & PELVIS W/CONTRAST: CPT

## 2020-01-06 RX ADMIN — IOHEXOL 100 ML: 350 INJECTION, SOLUTION INTRAVENOUS at 12:51

## 2020-01-06 NOTE — TELEPHONE ENCOUNTER
----- Message from Selene Garduno MD sent at 1/3/2020  5:41 PM EST -----  Please call patient :  Bilirubin is slightly elevated but otherwise unremarkable  We will follow up with him on the day of the procedure

## 2020-01-13 ENCOUNTER — ANTICOAG VISIT (OUTPATIENT)
Dept: CARDIOLOGY CLINIC | Facility: CLINIC | Age: 60
End: 2020-01-13

## 2020-01-13 ENCOUNTER — APPOINTMENT (OUTPATIENT)
Dept: LAB | Facility: CLINIC | Age: 60
End: 2020-01-13
Payer: COMMERCIAL

## 2020-01-13 DIAGNOSIS — I48.0 PAROXYSMAL ATRIAL FIBRILLATION (HCC): ICD-10-CM

## 2020-01-14 ENCOUNTER — ANESTHESIA EVENT (OUTPATIENT)
Dept: GASTROENTEROLOGY | Facility: HOSPITAL | Age: 60
End: 2020-01-14

## 2020-01-15 ENCOUNTER — HOSPITAL ENCOUNTER (OUTPATIENT)
Facility: HOSPITAL | Age: 60
Discharge: HOME | End: 2020-01-16
Attending: INTERNAL MEDICINE | Admitting: INTERNAL MEDICINE
Payer: COMMERCIAL

## 2020-01-15 ENCOUNTER — ANESTHESIA (OUTPATIENT)
Dept: GASTROENTEROLOGY | Facility: HOSPITAL | Age: 60
End: 2020-01-15

## 2020-01-15 ENCOUNTER — HOSPITAL ENCOUNTER (OUTPATIENT)
Dept: GASTROENTEROLOGY | Facility: HOSPITAL | Age: 60
Setting detail: OUTPATIENT SURGERY
Discharge: HOME | End: 2020-01-15
Attending: INTERNAL MEDICINE | Admitting: INTERNAL MEDICINE
Payer: COMMERCIAL

## 2020-01-15 ENCOUNTER — HOSPITAL ENCOUNTER (OUTPATIENT)
Dept: RADIOLOGY | Facility: HOSPITAL | Age: 60
Discharge: HOME | End: 2020-01-15
Payer: COMMERCIAL

## 2020-01-15 VITALS
TEMPERATURE: 97.9 F | BODY MASS INDEX: 25.82 KG/M2 | HEART RATE: 74 BPM | RESPIRATION RATE: 18 BRPM | DIASTOLIC BLOOD PRESSURE: 75 MMHG | HEIGHT: 76 IN | WEIGHT: 212 LBS | OXYGEN SATURATION: 99 % | SYSTOLIC BLOOD PRESSURE: 132 MMHG

## 2020-01-15 DIAGNOSIS — R10.9 ABDOMINAL PAIN: ICD-10-CM

## 2020-01-15 DIAGNOSIS — D13.5 AMPULLARY ADENOMA: ICD-10-CM

## 2020-01-15 DIAGNOSIS — D13.5 AMPULLARY ADENOMA: Primary | ICD-10-CM

## 2020-01-15 PROBLEM — N17.9 AKI (ACUTE KIDNEY INJURY) (HCC): Status: ACTIVE | Noted: 2020-01-15

## 2020-01-15 LAB
ALBUMIN SERPL BCP-MCNC: 3.5 G/DL (ref 3.5–5)
ALP SERPL-CCNC: 82 U/L (ref 46–116)
ALT SERPL W P-5'-P-CCNC: 27 U/L (ref 12–78)
ANION GAP SERPL CALCULATED.3IONS-SCNC: 4 MMOL/L (ref 4–13)
AST SERPL W P-5'-P-CCNC: 16 U/L (ref 5–45)
BILIRUB SERPL-MCNC: 1.48 MG/DL (ref 0.2–1)
BUN SERPL-MCNC: 21 MG/DL (ref 5–25)
CALCIUM SERPL-MCNC: 8.8 MG/DL (ref 8.3–10.1)
CHLORIDE SERPL-SCNC: 108 MMOL/L (ref 100–108)
CO2 SERPL-SCNC: 26 MMOL/L (ref 21–32)
CREAT SERPL-MCNC: 1.31 MG/DL (ref 0.6–1.3)
ERYTHROCYTE [DISTWIDTH] IN BLOOD BY AUTOMATED COUNT: 13 % (ref 11.6–15.1)
GFR SERPL CREATININE-BSD FRML MDRD: 59 ML/MIN/1.73SQ M
GLUCOSE SERPL-MCNC: 198 MG/DL (ref 65–140)
HCT VFR BLD AUTO: 44 % (ref 36.5–49.3)
HGB BLD-MCNC: 14.9 G/DL (ref 12–17)
INR PPP: 1.34 (ref 0.84–1.19)
MCH RBC QN AUTO: 29.9 PG (ref 26.8–34.3)
MCHC RBC AUTO-ENTMCNC: 33.9 G/DL (ref 31.4–37.4)
MCV RBC AUTO: 88 FL (ref 82–98)
PLATELET # BLD AUTO: 153 THOUSANDS/UL (ref 149–390)
PMV BLD AUTO: 9.2 FL (ref 8.9–12.7)
POTASSIUM SERPL-SCNC: 4 MMOL/L (ref 3.5–5.3)
PROT SERPL-MCNC: 6.6 G/DL (ref 6.4–8.2)
PROTHROMBIN TIME: 16.1 SECONDS (ref 11.6–14.5)
RBC # BLD AUTO: 4.99 MILLION/UL (ref 3.88–5.62)
SODIUM SERPL-SCNC: 138 MMOL/L (ref 136–145)
WBC # BLD AUTO: 8.37 THOUSAND/UL (ref 4.31–10.16)

## 2020-01-15 PROCEDURE — 99220 PR INITIAL OBSERVATION CARE/DAY 70 MINUTES: CPT | Performed by: INTERNAL MEDICINE

## 2020-01-15 PROCEDURE — 85027 COMPLETE CBC AUTOMATED: CPT | Performed by: INTERNAL MEDICINE

## 2020-01-15 PROCEDURE — 88305 TISSUE EXAM BY PATHOLOGIST: CPT | Performed by: PATHOLOGY

## 2020-01-15 PROCEDURE — 43261 ENDO CHOLANGIOPANCREATOGRAPH: CPT | Performed by: INTERNAL MEDICINE

## 2020-01-15 PROCEDURE — C1769 GUIDE WIRE: HCPCS

## 2020-01-15 PROCEDURE — 43274 ERCP DUCT STENT PLACEMENT: CPT | Performed by: INTERNAL MEDICINE

## 2020-01-15 PROCEDURE — 74330 X-RAY BILE/PANC ENDOSCOPY: CPT

## 2020-01-15 PROCEDURE — C2617 STENT, NON-COR, TEM W/O DEL: HCPCS

## 2020-01-15 PROCEDURE — 43237 ENDOSCOPIC US EXAM ESOPH: CPT | Performed by: INTERNAL MEDICINE

## 2020-01-15 PROCEDURE — 80053 COMPREHEN METABOLIC PANEL: CPT | Performed by: INTERNAL MEDICINE

## 2020-01-15 PROCEDURE — 85610 PROTHROMBIN TIME: CPT | Performed by: INTERNAL MEDICINE

## 2020-01-15 PROCEDURE — 43251 EGD REMOVE LESION SNARE: CPT | Performed by: INTERNAL MEDICINE

## 2020-01-15 RX ORDER — PANTOPRAZOLE SODIUM 40 MG/1
40 TABLET, DELAYED RELEASE ORAL DAILY
Status: CANCELLED | OUTPATIENT
Start: 2020-01-15

## 2020-01-15 RX ORDER — DEXAMETHASONE SODIUM PHOSPHATE 10 MG/ML
INJECTION, SOLUTION INTRAMUSCULAR; INTRAVENOUS AS NEEDED
Status: DISCONTINUED | OUTPATIENT
Start: 2020-01-15 | End: 2020-01-15 | Stop reason: SURG

## 2020-01-15 RX ORDER — ONDANSETRON 2 MG/ML
INJECTION INTRAMUSCULAR; INTRAVENOUS AS NEEDED
Status: DISCONTINUED | OUTPATIENT
Start: 2020-01-15 | End: 2020-01-15 | Stop reason: SURG

## 2020-01-15 RX ORDER — ACETAMINOPHEN 325 MG/1
650 TABLET ORAL EVERY 6 HOURS PRN
Status: DISCONTINUED | OUTPATIENT
Start: 2020-01-15 | End: 2020-01-16 | Stop reason: HOSPADM

## 2020-01-15 RX ORDER — PROPOFOL 10 MG/ML
INJECTION, EMULSION INTRAVENOUS AS NEEDED
Status: DISCONTINUED | OUTPATIENT
Start: 2020-01-15 | End: 2020-01-15 | Stop reason: SURG

## 2020-01-15 RX ORDER — FENTANYL CITRATE/PF 50 MCG/ML
50 SYRINGE (ML) INJECTION
Status: DISCONTINUED | OUTPATIENT
Start: 2020-01-15 | End: 2020-01-19 | Stop reason: HOSPADM

## 2020-01-15 RX ORDER — FENTANYL CITRATE 50 UG/ML
INJECTION, SOLUTION INTRAMUSCULAR; INTRAVENOUS AS NEEDED
Status: DISCONTINUED | OUTPATIENT
Start: 2020-01-15 | End: 2020-01-15 | Stop reason: SURG

## 2020-01-15 RX ORDER — SODIUM CHLORIDE, SODIUM LACTATE, POTASSIUM CHLORIDE, CALCIUM CHLORIDE 600; 310; 30; 20 MG/100ML; MG/100ML; MG/100ML; MG/100ML
100 INJECTION, SOLUTION INTRAVENOUS CONTINUOUS
Status: DISCONTINUED | OUTPATIENT
Start: 2020-01-15 | End: 2020-01-19 | Stop reason: HOSPADM

## 2020-01-15 RX ORDER — PANTOPRAZOLE SODIUM 40 MG/1
40 TABLET, DELAYED RELEASE ORAL
Status: DISCONTINUED | OUTPATIENT
Start: 2020-01-16 | End: 2020-01-16 | Stop reason: HOSPADM

## 2020-01-15 RX ORDER — LIDOCAINE HYDROCHLORIDE 10 MG/ML
INJECTION, SOLUTION EPIDURAL; INFILTRATION; INTRACAUDAL; PERINEURAL AS NEEDED
Status: DISCONTINUED | OUTPATIENT
Start: 2020-01-15 | End: 2020-01-15 | Stop reason: SURG

## 2020-01-15 RX ORDER — SODIUM CHLORIDE 9 MG/ML
75 INJECTION, SOLUTION INTRAVENOUS CONTINUOUS
Status: DISPENSED | OUTPATIENT
Start: 2020-01-15 | End: 2020-01-16

## 2020-01-15 RX ORDER — SODIUM CHLORIDE, SODIUM LACTATE, POTASSIUM CHLORIDE, CALCIUM CHLORIDE 600; 310; 30; 20 MG/100ML; MG/100ML; MG/100ML; MG/100ML
125 INJECTION, SOLUTION INTRAVENOUS CONTINUOUS
Status: DISCONTINUED | OUTPATIENT
Start: 2020-01-15 | End: 2020-01-19 | Stop reason: HOSPADM

## 2020-01-15 RX ORDER — ONDANSETRON 2 MG/ML
4 INJECTION INTRAMUSCULAR; INTRAVENOUS ONCE AS NEEDED
Status: DISCONTINUED | OUTPATIENT
Start: 2020-01-15 | End: 2020-01-19 | Stop reason: HOSPADM

## 2020-01-15 RX ORDER — EPHEDRINE SULFATE 50 MG/ML
INJECTION INTRAVENOUS AS NEEDED
Status: DISCONTINUED | OUTPATIENT
Start: 2020-01-15 | End: 2020-01-15 | Stop reason: SURG

## 2020-01-15 RX ORDER — ACETAMINOPHEN 325 MG/1
650 TABLET ORAL EVERY 6 HOURS PRN
Status: CANCELLED | OUTPATIENT
Start: 2020-01-15

## 2020-01-15 RX ORDER — SODIUM CHLORIDE, SODIUM LACTATE, POTASSIUM CHLORIDE, CALCIUM CHLORIDE 600; 310; 30; 20 MG/100ML; MG/100ML; MG/100ML; MG/100ML
INJECTION, SOLUTION INTRAVENOUS CONTINUOUS PRN
Status: DISCONTINUED | OUTPATIENT
Start: 2020-01-15 | End: 2020-01-15 | Stop reason: SURG

## 2020-01-15 RX ORDER — SUCCINYLCHOLINE/SOD CL,ISO/PF 100 MG/5ML
SYRINGE (ML) INTRAVENOUS AS NEEDED
Status: DISCONTINUED | OUTPATIENT
Start: 2020-01-15 | End: 2020-01-15 | Stop reason: SURG

## 2020-01-15 RX ORDER — MIDAZOLAM HYDROCHLORIDE 2 MG/2ML
INJECTION, SOLUTION INTRAMUSCULAR; INTRAVENOUS AS NEEDED
Status: DISCONTINUED | OUTPATIENT
Start: 2020-01-15 | End: 2020-01-15 | Stop reason: SURG

## 2020-01-15 RX ADMIN — ENOXAPARIN SODIUM 40 MG: 40 INJECTION SUBCUTANEOUS at 17:17

## 2020-01-15 RX ADMIN — MIDAZOLAM 1 MG: 1 INJECTION INTRAMUSCULAR; INTRAVENOUS at 07:52

## 2020-01-15 RX ADMIN — Medication 100 MG: at 07:55

## 2020-01-15 RX ADMIN — FENTANYL CITRATE 25 MCG: 50 INJECTION, SOLUTION INTRAMUSCULAR; INTRAVENOUS at 08:04

## 2020-01-15 RX ADMIN — PROPOFOL 200 MG: 10 INJECTION, EMULSION INTRAVENOUS at 07:55

## 2020-01-15 RX ADMIN — PHENYLEPHRINE HYDROCHLORIDE 200 MCG: 10 INJECTION INTRAVENOUS at 08:19

## 2020-01-15 RX ADMIN — FENTANYL CITRATE 25 MCG: 50 INJECTION, SOLUTION INTRAMUSCULAR; INTRAVENOUS at 08:24

## 2020-01-15 RX ADMIN — FENTANYL CITRATE 50 MCG: 50 INJECTION, SOLUTION INTRAMUSCULAR; INTRAVENOUS at 07:55

## 2020-01-15 RX ADMIN — SODIUM CHLORIDE, SODIUM LACTATE, POTASSIUM CHLORIDE, AND CALCIUM CHLORIDE 125 ML/HR: .6; .31; .03; .02 INJECTION, SOLUTION INTRAVENOUS at 07:37

## 2020-01-15 RX ADMIN — SODIUM CHLORIDE, SODIUM LACTATE, POTASSIUM CHLORIDE, AND CALCIUM CHLORIDE: .6; .31; .03; .02 INJECTION, SOLUTION INTRAVENOUS at 07:51

## 2020-01-15 RX ADMIN — INDOMETHACIN 100 MG: 50 SUPPOSITORY RECTAL at 08:07

## 2020-01-15 RX ADMIN — ONDANSETRON 4 MG: 2 INJECTION INTRAMUSCULAR; INTRAVENOUS at 08:51

## 2020-01-15 RX ADMIN — PHENYLEPHRINE HYDROCHLORIDE 200 MCG: 10 INJECTION INTRAVENOUS at 08:11

## 2020-01-15 RX ADMIN — PHENYLEPHRINE HYDROCHLORIDE 200 MCG: 10 INJECTION INTRAVENOUS at 08:33

## 2020-01-15 RX ADMIN — METOPROLOL TARTRATE 25 MG: 25 TABLET ORAL at 17:17

## 2020-01-15 RX ADMIN — DEXAMETHASONE SODIUM PHOSPHATE 10 MG: 10 INJECTION, SOLUTION INTRAMUSCULAR; INTRAVENOUS at 08:09

## 2020-01-15 RX ADMIN — LIDOCAINE HYDROCHLORIDE 50 MG: 10 INJECTION, SOLUTION EPIDURAL; INFILTRATION; INTRACAUDAL; PERINEURAL at 07:55

## 2020-01-15 RX ADMIN — IOHEXOL 10 ML: 240 INJECTION, SOLUTION INTRATHECAL; INTRAVASCULAR; INTRAVENOUS; ORAL at 09:18

## 2020-01-15 RX ADMIN — GLUCAGON HYDROCHLORIDE 0.5 MG: KIT at 08:21

## 2020-01-15 RX ADMIN — EPHEDRINE SULFATE 5 MG: 50 INJECTION, SOLUTION INTRAVENOUS at 08:45

## 2020-01-15 RX ADMIN — SODIUM CHLORIDE 75 ML/HR: 0.9 INJECTION, SOLUTION INTRAVENOUS at 20:51

## 2020-01-15 NOTE — ASSESSMENT & PLAN NOTE
Patient reports upcoming surgery on Wedsday, 1/22 for hernia repair  Chart reveals preop orders for that date placed  Will need to hold warfarin approximately 3 days prior to surgery  Otherwise patient denies current symptoms

## 2020-01-15 NOTE — NURSING NOTE
javris Sanchez for admission orders, med orders, and diet order  Awaiting these orders  Will continue to monitor

## 2020-01-15 NOTE — ASSESSMENT & PLAN NOTE
Patient with history of atrial fibrillation currently rate controlled  Anticoagulated with Coumadin  6 mg Tuesdays Thursdays, 5 mg all other days  Could not currently on hold secondary to endoscopy today  Will defer to primary team to restart Coumadin has patient has inguinal hernia repair in 1 week

## 2020-01-15 NOTE — H&P
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  We are planned for a ampullectomy today  Review of Systems    Historical Information   Past Medical History:   Diagnosis Date    Atrial fibrillation ([address] )     Gastroesophageal reflux     GERD (gastroesophageal reflux disease)     Pancreatitis     Pleural effusion     Second hand smoke exposure      Past Surgical History:   Procedure Laterality Date    CHOLECYSTECTOMY      COLONOSCOPY N/A 3/21/2016    Procedure: COLONOSCOPY;  Surgeon: Arian Venegas DO;  [facility]; Service:     ERCP N/A 1/4/2019    Procedure: ENDOSCOPIC RETROGRADE CHOLANGIOPANCREATOGRAPHY (ERCP); Surgeon: Gay Andrea MD;  [facility]; Service: Gastroenterology    ESOPHAGOGASTRODUODENOSCOPY N/A 2/5/2019    Procedure: ESOPHAGOGASTRODUODENOSCOPY (EGD), with possible nasojejunal Dobhoff tube placement;  Surgeon: Elaine Alarcon MD;  [facility]; Service: Gastroenterology    ESOPHAGOGASTRODUODENOSCOPY N/A 4/3/2019    Procedure: ESOPHAGOGASTRODUODENOSCOPY (EGD)- endoscopic necrosectomy;  Surgeon: Gay Andrea MD;  [facility]; Service: Gastroenterology    IR IMAGE GUIDED ASPIRATION / DRAINAGE W TUBE  6/17/2019    IR THORACENTESIS  2/7/2019    IR THORACENTESIS  3/29/2019    LINEAR ENDOSCOPIC U/S N/A 3/29/2019    Procedure: LINEAR ENDOSCOPIC U/S cyst gastro;  Surgeon: Gay Andrea MD;  [facility]; Service: Gastroenterology    [place] [address] N/A 5/20/2019    Procedure: Max Eddy;  Surgeon: Dario Galdamez MD;  [facility];  Service: Thoracic    [place] EGD TRANSORAL BIOPSY SINGLE/MULTIPLE N/A 3/21/2016    Procedure: ESOPHAGOGASTRODUODENOSCOPY (EGD);   Surgeon: Amaris Stinson DO F[place];  [facility]; Service: General    [place] ERCP DX COLLECTION SPECIMEN BRUSHING/WASHING N/A 4/26/2019    Procedure: ENDOSCOPIC RETROGRADE CHOLANGIOPANCREATOGRAPHY (ERCP); Surgeon: Pop Gustafson MD;  [facility]; Service: Gastroenterology    [place] ESOPHAGOGASTRODUODENOSCOPY TRANSORAL DIAGNOSTIC N/A 4/9/2019    Procedure: ESOPHAGOGASTRODUODENOSCOPY (EGD) endoscopic necrosectomy;  Surgeon: Pop Gustafson MD;  [facility]; Service: Gastroenterology    [place] ESOPHAGOGASTRODUODENOSCOPY TRANSORAL DIAGNOSTIC N/A 4/26/2019    Procedure: ESOPHAGOGASTRODUODENOSCOPY (EGD); Surgeon: Pop Gustafson MD;  [facility]; Service: Gastroenterology    [place] THORACOSCOPY SURG PART PULM DECORT Left 5/20/2019    Procedure: DECORTICATION LUNG;  Surgeon: Shira Hurtado MD;  [facility];  Service: Thoracic    [place] THORACOSCOPY SURG PART PULM DECORT Left 5/20/2019    Procedure: THORACOSCOPY VIDEO ASSISTED SURGERY (VATS);   Surgeon: Shira Hurtado MD;  [facility];  Service: Thoracic    [place] THORACOSCOPY SURG W/PLEURODESIS N/A 5/20/2019    Procedure: PLEURODESIS mechanical;  Surgeon: Shira Hurtado MD;  [facility];  Service: Thoracic     Social History   Social History     Substance and Sexual Activity   Alcohol Use Not Currently     Social History     Substance and Sexual Activity   Drug Use No     Social History     Tobacco Use   Smoking Status Never Smoker   Smokeless Tobacco Never Used     Family History   Problem Relation Age of Onset    Lung cancer Mother 79        Smoker     Transient ischemic attack Father 61    Lung disease Neg Hx        Meds/Allergies       (Not in a hospital admission)    No Known Allergies    Objective     /79   Pulse 69   Temp 97 9 °F (36 6 °C) (Oral)   Resp 18   Ht 6' 4" (1 93 m)   Wt 96 2 kg (212 lb)   SpO2 96%   BMI 25 81 kg/m²       PHYSICAL EXAM    Gen: NAD  CV: RRR  CHEST: Clear  ABD: soft, NT/ND  EXT: no edema  Neuro: AAO      ASSESSMENT/PLAN:  This is a 60y o  year old male here for EUS and ERCP for ampullectomy  The procedure discussed the patient and his wife at length  There is a risk of having bleeding, perforation and pancreatitis  We will proceed with the procedure since he is agreeable  PLAN:   Procedure:  EUS/ERCP/ampullectomy

## 2020-01-15 NOTE — ANESTHESIA PREPROCEDURE EVALUATION
Review of Systems/Medical History  Patient summary reviewed  Chart reviewed  No history of anesthetic complications     Cardiovascular  EKG reviewed, Exercise tolerance (METS): >4,  Dysrhythmias (pAfib) ,    Pulmonary  Negative pulmonary ROS        GI/Hepatic    GERD well controlled, Pancreatic problem (h/o pancreatitc pseudocyst c/b pleural effusions ),        Negative  ROS        Endo/Other  Negative endo/other ROS      GYN       Hematology    Coagulation disorder (Last warfarin 1/11/2020) currently taking oral anticoagulants,    Musculoskeletal  Negative musculoskeletal ROS        Neurology  Negative neurology ROS      Psychology   Negative psychology ROS          ECHO 1/2019  SUMMARY     LEFT VENTRICLE:  Size was normal   Systolic function was normal  Ejection fraction was estimated to be 60 %  Although no diagnostic regional wall motion abnormality was identified, this possibility cannot be completely excluded on the basis of this study  Wall thickness was mildly increased  Left ventricular diastolic function parameters were normal      RIGHT VENTRICLE:  The ventricle was mildly to moderately dilated  Systolic function was normal      LEFT ATRIUM:  The atrium was mildly dilated      RIGHT ATRIUM:  The atrium was mildly dilated      MITRAL VALVE:  There was mild regurgitation      TRICUSPID VALVE:  There was mild regurgitation  Physical Exam    Airway    Mallampati score: II  TM Distance: >3 FB  Neck ROM: full     Dental   No notable dental hx     Cardiovascular  Rhythm: regular, Rate: normal,     Pulmonary      Other Findings        Anesthesia Plan  ASA Score- 2     Anesthesia Type- general with ASA Monitors  Additional Monitors:   Airway Plan: ETT  Plan Factors-    Induction- intravenous  Postoperative Plan-   Planned trial extubation    Informed Consent- Anesthetic plan and risks discussed with patient  I personally reviewed this patient with the CRNA   Discussed and agreed on the Anesthesia Plan with the CRNA  Rayna Christine

## 2020-01-15 NOTE — ASSESSMENT & PLAN NOTE
Patient with history of 12 mm adenomatous-appearing polyp in the ampullary region  Underwent outpatient resection of mass today with stent placement  GI asking to observe patient overnight  VSS; labs ordered  Patient denies any symptoms  Lovenox for DVT prophy; start home meds  GI consulted

## 2020-01-15 NOTE — ANESTHESIA POSTPROCEDURE EVALUATION
Post-Op Assessment Note    CV Status:  Stable    Pain management: adequate     Mental Status:  Awake and sleepy   Hydration Status:  Euvolemic   PONV Controlled:  Controlled   Airway Patency:  Patent   Post Op Vitals Reviewed: Yes      Staff: AnesthesiologistTRACE           /76 (01/15/20 0904)    Temp      Pulse 59 (01/15/20 0904)   Resp 16 (01/15/20 0904)    SpO2 98 % (01/15/20 0904)

## 2020-01-15 NOTE — H&P
H&P- Oneil Amaral 1960, 60 y o  male MRN: [id number]    Unit/Bed#:  Encounter: [id number]    Primary Care Provider: Daniel Oro MD   Date and time admitted to hospital: No admission date for patient encounter  Inguinal hernia of right side without obstruction or gangrene  Assessment & Plan  Patient reports upcoming surgery on Wedsday, 1/22 for hernia repair  Chart reveals preop orders for that date placed  Will need to hold warfarin approximately 3 days prior to surgery  Otherwise patient denies current symptoms    GERD (gastroesophageal reflux disease)  Assessment & Plan  PPI    Paroxysmal atrial fibrillation [facility])  Assessment & Plan  Patient with history of atrial fibrillation currently rate controlled  Anticoagulated with Coumadin  6 mg Tuesdays Thursdays, 5 mg all other days  Could not currently on hold secondary to endoscopy today  Will defer to primary team to restart Coumadin has patient has inguinal hernia repair in 1 week    * Ampullary adenoma  Assessment & Plan  Patient with history of 12 mm adenomatous-appearing polyp in the ampullary region  Underwent outpatient resection of mass today with stent placement  GI asking to observe patient overnight  VSS; labs ordered  Patient denies any symptoms  Lovenox for DVT prophy; start home meds  GI consulted       VTE Prophylaxis: Enoxaparin (Lovenox)  / sequential compression device   Code Status:  Full  POLST: POLST form is not discussed and not completed at this time  Discussion with family: Discussed treatment plan with family and patient who agree with current plan; encouraged to ask questions and participate  Anticipated Length of Stay:  Patient will be admitted on an Outpatient Surgery basis with an anticipated length of stay of  less than 2 midnights  Justification for Hospital Stay:  Observation status post to GI procedure    Total Time for Visit, including Counseling / Coordination of Care: 30 minutes    Greater than 50% of this total time spent on direct patient counseling and coordination of care  Chief Complaint:   Ampullary polyp resection    History of Present Illness:    Faizan Domínguez is a 60 y o  male     Review of Systems:    Review of Systems   Constitutional: Negative for activity change, appetite change, chills, diaphoresis and fever  HENT: Negative for congestion, drooling, facial swelling, nosebleeds, sore throat and trouble swallowing  Eyes: Negative for pain, discharge, itching and visual disturbance  Respiratory: Negative for apnea, cough, chest tightness and shortness of breath  Cardiovascular: Negative for chest pain, palpitations and leg swelling  Gastrointestinal: Negative for abdominal pain, anal bleeding, blood in stool, constipation, diarrhea, nausea and vomiting  Endocrine: Negative  Genitourinary: Negative for difficulty urinating, dysuria, flank pain, frequency and hematuria  Musculoskeletal: Negative for arthralgias, back pain and gait problem  Skin: Negative  Allergic/Immunologic: Negative  Neurological: Negative for dizziness, seizures, syncope, facial asymmetry, weakness and headaches  Psychiatric/Behavioral: Negative for agitation, confusion and hallucinations  Past Medical and Surgical History:     Past Medical History:   Diagnosis Date    Atrial fibrillation ([facility] [address] )     Gastroesophageal reflux     GERD (gastroesophageal reflux disease)     Pancreatitis     Pleural effusion     Second hand smoke exposure        Past Surgical History:   Procedure Laterality Date    CHOLECYSTECTOMY      COLONOSCOPY N/A 3/21/2016    Procedure: COLONOSCOPY;  Surgeon: Kae Kirkland DO;  [facility]; Service:     ERCP N/A 1/4/2019    Procedure: ENDOSCOPIC RETROGRADE CHOLANGIOPANCREATOGRAPHY (ERCP); Surgeon: Harrison Van MD;  [facility];   Service: Gastroenterology    ESOPHAGOGASTRODUODENOSCOPY N/A 2/5/2019    Procedure: ESOPHAGOGASTRODUODENOSCOPY (EGD), with possible nasojejunal Dobhoff tube placement;  Surgeon: Ronald Montana MD;  [facility]; Service: Gastroenterology    ESOPHAGOGASTRODUODENOSCOPY N/A 4/3/2019    Procedure: ESOPHAGOGASTRODUODENOSCOPY (EGD)- endoscopic necrosectomy;  Surgeon: Osman Hoyos MD;  [facility]; Service: Gastroenterology    IR IMAGE GUIDED ASPIRATION / DRAINAGE W TUBE  6/17/2019    IR THORACENTESIS  2/7/2019    IR THORACENTESIS  3/29/2019    LINEAR ENDOSCOPIC U/S N/A 3/29/2019    Procedure: LINEAR ENDOSCOPIC U/S cyst gastro;  Surgeon: Osman Hoyos MD;  [facility]; Service: Gastroenterology    [place] [address] N/A 5/20/2019    Procedure: Giulia Vital;  Surgeon: Larisa Victoria MD;  [facility];  Service: Thoracic    [place] EGD TRANSORAL BIOPSY SINGLE/MULTIPLE N/A 3/21/2016    Procedure: ESOPHAGOGASTRODUODENOSCOPY (EGD); Surgeon: Norman Chaidez DO;  [facility]; Service: General    [place] ERCP DX COLLECTION SPECIMEN BRUSHING/WASHING N/A 4/26/2019    Procedure: ENDOSCOPIC RETROGRADE CHOLANGIOPANCREATOGRAPHY (ERCP); Surgeon: Osman Hoyos MD;  [facility]; Service: Gastroenterology    [place] ESOPHAGOGASTRODUODENOSCOPY TRANSORAL DIAGNOSTIC N/A 4/9/2019    Procedure: ESOPHAGOGASTRODUODENOSCOPY (EGD) endoscopic necrosectomy;  Surgeon: Osman Hoyos MD;  [facility]; Service: Gastroenterology    [place] ESOPHAGOGASTRODUODENOSCOPY TRANSORAL DIAGNOSTIC N/A 4/26/2019    Procedure: ESOPHAGOGASTRODUODENOSCOPY (EGD); Surgeon: Osman Hoyos MD;  [facility]; Service: Gastroenterology    [place] THORACOSCOPY SURG PART PULM DECORT Left 5/20/2019    Procedure: DECORTICATION LUNG;  Surgeon: Larisa Victoria MD;  [facility];  Service: Thoracic    [place] THORACOSCOPY SURG PART PULM DECORT Left 5/20/2019    Procedure: THORACOSCOPY VIDEO ASSISTED SURGERY (VATS);   Surgeon: Larisa Victoria MD;  [facility];  Service: Thoracic    [place] THORACOSCOPY SURG W/PLEURODESIS N/A 5/20/2019    Procedure: PLEURODESIS mechanical;  Surgeon: Paresh Dejesus MD;  [facility];  Service: Thoracic       Meds/Allergies:    Prior to Admission medications    Medication Sig Start Date End Date Taking? Authorizing Provider   acetaminophen (TYLENOL) 325 mg tablet Take 650 mg by mouth every 6 (six) hours as needed for mild pain    Historical Provider, MD   metoprolol tartrate (LOPRESSOR) 25 mg tablet Take 1 tablet (25 mg total) by mouth every 12 (twelve) hours 7/17/19   Ward Bae DO   pantoprazole (PROTONIX) 40 mg tablet Take 1 tablet (40 mg total) by mouth daily for 90 days 7/16/19 1/15/20  Griselda Howell PA-C   warfarin (COUMADIN) 3 mg tablet Take 1 tablet (3 mg total) by mouth daily Start 5/25/19  Patient taking differently: Take 5 mg by mouth daily Start 5/25/19 5/25/19   Benson Olivo PA-C     I have reviewed home medications with patient personally  Allergies: No Known Allergies    Social History:     Marital Status: /Civil Union   Occupation:    Patient Pre-hospital Living Situation:  At home with wife  Patient Pre-hospital Level of Mobility:  Full  Patient Pre-hospital Diet Restrictions:  None  Substance Use History:   Social History     Substance and Sexual Activity   Alcohol Use Not Currently     Social History     Tobacco Use   Smoking Status Never Smoker   Smokeless Tobacco Never Used     Social History     Substance and Sexual Activity   Drug Use No       Family History:    Family History   Problem Relation Age of Onset    Lung cancer Mother 79        Smoker     Transient ischemic attack Father 61    Lung disease Neg Hx        Physical Exam:     Vitals:        Physical Exam   Constitutional: He is oriented to person, place, and time  He appears well-developed and well-nourished  No distress  HENT:   Head: Normocephalic and atraumatic  Nose: Nose normal    Mouth/Throat: No oropharyngeal exudate     Eyes: Pupils are equal, round, and reactive to light  EOM are normal  Right eye exhibits no discharge  Left eye exhibits no discharge  No scleral icterus  Neck: Normal range of motion  Neck supple  No JVD present  No tracheal deviation present  No thyromegaly present  Cardiovascular: Normal rate, regular rhythm and normal heart sounds  Exam reveals no gallop and no friction rub  No murmur heard  Pulmonary/Chest: Effort normal and breath sounds normal  No stridor  No respiratory distress  He has no wheezes  He has no rales  Abdominal: Soft  Bowel sounds are normal  He exhibits no distension and no mass  There is no rebound and no guarding  Genitourinary:   Genitourinary Comments: Inguinal hernia   Musculoskeletal: Normal range of motion  He exhibits no deformity  Neurological: He is alert and oriented to person, place, and time  Skin: Skin is warm and dry  He is not diaphoretic  Psychiatric: He has a normal mood and affect  His behavior is normal    Nursing note and vitals reviewed  Additional Data:     Lab Results: I have personally reviewed pertinent reports  Results from last 7 days   Lab Units 01/13/20  0932   INR  1 83*                   Imaging: I have personally reviewed pertinent reports  No orders to display       EKG, Pathology, and Other Studies Reviewed on Admission:       [facility] Records Reviewed: Yes     ** Please Note: This note has been constructed using a voice recognition system   **

## 2020-01-15 NOTE — PLAN OF CARE
Problem: PAIN - ADULT  Goal: Verbalizes/displays adequate comfort level or baseline comfort level  Description  Interventions:  - Encourage patient to monitor pain and request assistance  - Assess pain using appropriate pain scale  - Administer analgesics based on type and severity of pain and evaluate response  - Implement non-pharmacological measures as appropriate and evaluate response  - Consider cultural and social influences on pain and pain management  - Notify physician/advanced practitioner if interventions unsuccessful or patient reports new pain  Outcome: Progressing     Problem: INFECTION - ADULT  Goal: Absence or prevention of progression during hospitalization  Description  INTERVENTIONS:  - Assess and monitor for signs and symptoms of infection  - Monitor lab/diagnostic results  - Monitor all insertion sites, i e  indwelling lines, tubes, and drains  - Monitor endotracheal if appropriate and nasal secretions for changes in amount and color  - [place] appropriate cooling/warming therapies per order  - Administer medications as ordered  - Instruct and encourage patient and family to use good hand hygiene technique  - Identify and instruct in appropriate isolation precautions for identified infection/condition  Outcome: Progressing  Goal: Absence of fever/infection during neutropenic period  Description  INTERVENTIONS:  - Monitor WBC    Outcome: Progressing     Problem: SAFETY ADULT  Goal: Patient will remain free of falls  Description  INTERVENTIONS:  - Assess patient frequently for physical needs  -  Identify cognitive and physical deficits and behaviors that affect risk of falls    -  [place] fall precautions as indicated by assessment   - Educate patient/family on patient safety including physical limitations  - Instruct patient to call for assistance with activity based on assessment  - Modify environment to reduce risk of injury  - Consider OT/PT consult to assist with strengthening/mobility  Outcome: Progressing  Goal: Maintain or return to baseline ADL function  Description  INTERVENTIONS:  -  Assess patient's ability to carry out ADLs; assess patient's baseline for ADL function and identify physical deficits which impact ability to perform ADLs (bathing, care of mouth/teeth, toileting, grooming, dressing, etc )  - Assess/evaluate cause of self-care deficits   - Assess range of motion  - Assess patient's mobility; develop plan if impaired  - Assess patient's need for assistive devices and provide as appropriate  - Encourage maximum independence but intervene and supervise when necessary  - Involve family in performance of ADLs  - Assess for home care needs following discharge   - Consider OT consult to assist with ADL evaluation and planning for discharge  - Provide patient education as appropriate  Outcome: Progressing  Goal: Maintain or return mobility status to optimal level  Description  INTERVENTIONS:  - Assess patient's baseline mobility status (ambulation, transfers, stairs, etc )    - Identify cognitive and physical deficits and behaviors that affect mobility  - Identify mobility aids required to assist with transfers and/or ambulation (gait belt, sit-to-stand, lift, walker, cane, etc )  - [place] fall precautions as indicated by assessment  - Record patient progress and toleration of activity level on Mobility SBAR; progress patient to next Phase/Stage  - Instruct patient to call for assistance with activity based on assessment  - Consider rehabilitation consult to assist with strengthening/weightbearing, etc   Outcome: Progressing     Problem: DISCHARGE PLANNING  Goal: Discharge to home or other facility with appropriate resources  Description  INTERVENTIONS:  - Identify barriers to discharge w/patient and caregiver  - Arrange for needed discharge resources and transportation as appropriate  - Identify discharge learning needs (meds, wound care, etc )  - Arrange for interpretive services to assist at discharge as needed  - Refer to Case Management Department for coordinating discharge planning if the patient needs post-hospital services based on physician/advanced practitioner order or complex needs related to functional status, cognitive ability, or social support system  Outcome: Progressing     Problem: Knowledge Deficit  Goal: Patient/family/caregiver demonstrates understanding of disease process, treatment plan, medications, and discharge instructions  Description  Complete learning assessment and assess knowledge base    Interventions:  - Provide teaching at level of understanding  - Provide teaching via preferred learning methods  Outcome: Progressing

## 2020-01-16 ENCOUNTER — HOSPITAL ENCOUNTER (EMERGENCY)
Facility: HOSPITAL | Age: 60
Discharge: HOME | End: 2020-01-16
Attending: EMERGENCY MEDICINE
Payer: COMMERCIAL

## 2020-01-16 VITALS
HEIGHT: 76 IN | TEMPERATURE: 97.8 F | WEIGHT: 212.74 LBS | DIASTOLIC BLOOD PRESSURE: 75 MMHG | HEART RATE: 62 BPM | OXYGEN SATURATION: 99 % | SYSTOLIC BLOOD PRESSURE: 122 MMHG | BODY MASS INDEX: 25.91 KG/M2 | RESPIRATION RATE: 18 BRPM

## 2020-01-16 VITALS
DIASTOLIC BLOOD PRESSURE: 70 MMHG | SYSTOLIC BLOOD PRESSURE: 116 MMHG | TEMPERATURE: 97.6 F | OXYGEN SATURATION: 98 % | RESPIRATION RATE: 18 BRPM | HEART RATE: 56 BPM

## 2020-01-16 DIAGNOSIS — T81.9XXA POSTOPERATIVE COMPLICATION: Primary | ICD-10-CM

## 2020-01-16 PROBLEM — N17.9 AKI (ACUTE KIDNEY INJURY) (HCC): Status: RESOLVED | Noted: 2020-01-15 | Resolved: 2020-01-16

## 2020-01-16 LAB
ALBUMIN SERPL BCP-MCNC: 3.1 G/DL (ref 3.5–5)
ALP SERPL-CCNC: 71 U/L (ref 46–116)
ALT SERPL W P-5'-P-CCNC: 23 U/L (ref 12–78)
ANION GAP SERPL CALCULATED.3IONS-SCNC: 5 MMOL/L (ref 4–13)
AST SERPL W P-5'-P-CCNC: 17 U/L (ref 5–45)
BASOPHILS # BLD AUTO: 0.02 THOUSANDS/ΜL (ref 0–0.1)
BASOPHILS # BLD AUTO: 0.02 THOUSANDS/ΜL (ref 0–0.1)
BASOPHILS NFR BLD AUTO: 0 % (ref 0–1)
BASOPHILS NFR BLD AUTO: 0 % (ref 0–1)
BILIRUB SERPL-MCNC: 1.97 MG/DL (ref 0.2–1)
BUN SERPL-MCNC: 19 MG/DL (ref 5–25)
CALCIUM SERPL-MCNC: 8.5 MG/DL (ref 8.3–10.1)
CHLORIDE SERPL-SCNC: 110 MMOL/L (ref 100–108)
CO2 SERPL-SCNC: 25 MMOL/L (ref 21–32)
CREAT SERPL-MCNC: 0.97 MG/DL (ref 0.6–1.3)
EOSINOPHIL # BLD AUTO: 0 THOUSAND/ΜL (ref 0–0.61)
EOSINOPHIL # BLD AUTO: 0.04 THOUSAND/ΜL (ref 0–0.61)
EOSINOPHIL NFR BLD AUTO: 0 % (ref 0–6)
EOSINOPHIL NFR BLD AUTO: 0 % (ref 0–6)
ERYTHROCYTE [DISTWIDTH] IN BLOOD BY AUTOMATED COUNT: 13 % (ref 11.6–15.1)
ERYTHROCYTE [DISTWIDTH] IN BLOOD BY AUTOMATED COUNT: 13.2 % (ref 11.6–15.1)
GFR SERPL CREATININE-BSD FRML MDRD: 84 ML/MIN/1.73SQ M
GLUCOSE SERPL-MCNC: 102 MG/DL (ref 65–140)
HCT VFR BLD AUTO: 40.3 % (ref 36.5–49.3)
HCT VFR BLD AUTO: 41.1 % (ref 36.5–49.3)
HGB BLD-MCNC: 13.7 G/DL (ref 12–17)
HGB BLD-MCNC: 13.9 G/DL (ref 12–17)
IMM GRANULOCYTES # BLD AUTO: 0.05 THOUSAND/UL (ref 0–0.2)
IMM GRANULOCYTES # BLD AUTO: 0.09 THOUSAND/UL (ref 0–0.2)
IMM GRANULOCYTES NFR BLD AUTO: 1 % (ref 0–2)
IMM GRANULOCYTES NFR BLD AUTO: 1 % (ref 0–2)
LYMPHOCYTES # BLD AUTO: 0.61 THOUSANDS/ΜL (ref 0.6–4.47)
LYMPHOCYTES # BLD AUTO: 1.24 THOUSANDS/ΜL (ref 0.6–4.47)
LYMPHOCYTES NFR BLD AUTO: 13 % (ref 14–44)
LYMPHOCYTES NFR BLD AUTO: 5 % (ref 14–44)
MCH RBC QN AUTO: 29.9 PG (ref 26.8–34.3)
MCH RBC QN AUTO: 30.3 PG (ref 26.8–34.3)
MCHC RBC AUTO-ENTMCNC: 33.8 G/DL (ref 31.4–37.4)
MCHC RBC AUTO-ENTMCNC: 34 G/DL (ref 31.4–37.4)
MCV RBC AUTO: 88 FL (ref 82–98)
MCV RBC AUTO: 89 FL (ref 82–98)
MONOCYTES # BLD AUTO: 0.75 THOUSAND/ΜL (ref 0.17–1.22)
MONOCYTES # BLD AUTO: 1.01 THOUSAND/ΜL (ref 0.17–1.22)
MONOCYTES NFR BLD AUTO: 8 % (ref 4–12)
MONOCYTES NFR BLD AUTO: 8 % (ref 4–12)
NEUTROPHILS # BLD AUTO: 11.25 THOUSANDS/ΜL (ref 1.85–7.62)
NEUTROPHILS # BLD AUTO: 7.53 THOUSANDS/ΜL (ref 1.85–7.62)
NEUTS SEG NFR BLD AUTO: 78 % (ref 43–75)
NEUTS SEG NFR BLD AUTO: 86 % (ref 43–75)
NRBC BLD AUTO-RTO: 0 /100 WBCS
NRBC BLD AUTO-RTO: 0 /100 WBCS
PLATELET # BLD AUTO: 149 THOUSANDS/UL (ref 149–390)
PLATELET # BLD AUTO: 168 THOUSANDS/UL (ref 149–390)
PMV BLD AUTO: 9.2 FL (ref 8.9–12.7)
PMV BLD AUTO: 9.3 FL (ref 8.9–12.7)
POTASSIUM SERPL-SCNC: 4 MMOL/L (ref 3.5–5.3)
PROT SERPL-MCNC: 6 G/DL (ref 6.4–8.2)
RBC # BLD AUTO: 4.52 MILLION/UL (ref 3.88–5.62)
RBC # BLD AUTO: 4.65 MILLION/UL (ref 3.88–5.62)
SODIUM SERPL-SCNC: 140 MMOL/L (ref 136–145)
WBC # BLD AUTO: 12.98 THOUSAND/UL (ref 4.31–10.16)
WBC # BLD AUTO: 9.63 THOUSAND/UL (ref 4.31–10.16)

## 2020-01-16 PROCEDURE — 99283 EMERGENCY DEPT VISIT LOW MDM: CPT

## 2020-01-16 PROCEDURE — 85025 COMPLETE CBC W/AUTO DIFF WBC: CPT | Performed by: INTERNAL MEDICINE

## 2020-01-16 PROCEDURE — 85025 COMPLETE CBC W/AUTO DIFF WBC: CPT | Performed by: EMERGENCY MEDICINE

## 2020-01-16 PROCEDURE — NS001 PR NO SIGNATURE OR ATTESTATION: Performed by: INTERNAL MEDICINE

## 2020-01-16 PROCEDURE — 36415 COLL VENOUS BLD VENIPUNCTURE: CPT | Performed by: EMERGENCY MEDICINE

## 2020-01-16 PROCEDURE — 99282 EMERGENCY DEPT VISIT SF MDM: CPT | Performed by: EMERGENCY MEDICINE

## 2020-01-16 PROCEDURE — 99217 PR OBSERVATION CARE DISCHARGE MANAGEMENT: CPT | Performed by: PHYSICIAN ASSISTANT

## 2020-01-16 PROCEDURE — 80053 COMPREHEN METABOLIC PANEL: CPT | Performed by: INTERNAL MEDICINE

## 2020-01-16 RX ADMIN — METOPROLOL TARTRATE 25 MG: 25 TABLET ORAL at 05:40

## 2020-01-16 RX ADMIN — PANTOPRAZOLE SODIUM 40 MG: 40 TABLET, DELAYED RELEASE ORAL at 05:40

## 2020-01-16 NOTE — ASSESSMENT & PLAN NOTE
Patient noted to have right-sided inguinal hernia; to undergo surgery on 01/22  Defer to primary team need for warfarin as patient has surgery pending on 01/22

## 2020-01-16 NOTE — PRE-PROCEDURE INSTRUCTIONS
Pre-Surgery Instructions:   Medication Instructions    acetaminophen (TYLENOL) 325 mg tablet Instructed patient per Anesthesia Guidelines   metoprolol tartrate (LOPRESSOR) 25 mg tablet Instructed patient per Anesthesia Guidelines   pantoprazole (PROTONIX) 40 mg tablet Instructed patient per Anesthesia Guidelines   warfarin (COUMADIN) 3 mg tablet Patient was instructed by Physician and understands

## 2020-01-16 NOTE — ASSESSMENT & PLAN NOTE
Patient with history of atrial fibrillation currently anticoagulated with warfarin; 6 mg Tuesday Thursday; 5 mg all other days  Warfarin held in anticipation of polypectomy  At this point, will defer to primary team to restart warfarin as patient has inguinal hernia surgery scheduled on 01/22 and warfarin will need to be held 3 days in advance  Lovenox for DVT prophylaxis

## 2020-01-16 NOTE — PRE-PROCEDURE INSTRUCTIONS
Pre-Surgery Instructions:   Medication Instructions    acetaminophen (TYLENOL) 325 mg tablet Instructed patient per Anesthesia Guidelines   metoprolol tartrate (LOPRESSOR) 25 mg tablet Instructed patient per Anesthesia Guidelines   pantoprazole (PROTONIX) 40 mg tablet Instructed patient per Anesthesia Guidelines   warfarin (COUMADIN) 3 mg tablet Patient was instructed by Physician and understands  Pt instructed to stop nsaids and supplements prior to surgery  Pt verbalized understanding of shower instructions  Pt last dose of coumadin is 1/18

## 2020-01-16 NOTE — PLAN OF CARE
Problem: PAIN - ADULT  Goal: Verbalizes/displays adequate comfort level or baseline comfort level  Description  Interventions:  - Encourage patient to monitor pain and request assistance  - Assess pain using appropriate pain scale  - Administer analgesics based on type and severity of pain and evaluate response  - Implement non-pharmacological measures as appropriate and evaluate response  - Consider cultural and social influences on pain and pain management  - Notify physician/advanced practitioner if interventions unsuccessful or patient reports new pain  Outcome: Progressing     Problem: INFECTION - ADULT  Goal: Absence or prevention of progression during hospitalization  Description  INTERVENTIONS:  - Assess and monitor for signs and symptoms of infection  - Monitor lab/diagnostic results  - Monitor all insertion sites, i e  indwelling lines, tubes, and drains  - Administer medications as ordered  - Instruct and encourage patient and family to use good hand hygiene technique  - Identify and instruct in appropriate isolation precautions for identified infection/condition   Outcome: Progressing  Goal: Absence of fever/infection during neutropenic period  Description  INTERVENTIONS:  - Monitor WBC    Outcome: Progressing     Problem: SAFETY ADULT  Goal: Patient will remain free of falls  Description  INTERVENTIONS:  - Assess patient frequently for physical needs  -  Identify cognitive and physical deficits and behaviors that affect risk of falls    -  [place] fall precautions as indicated by assessment   - Educate patient/family on patient safety including physical limitations  - Instruct patient to call for assistance with activity based on assessment  - Modify environment to reduce risk of injury  - Consider OT/PT consult to assist with strengthening/mobility  Outcome: Progressing  Goal: Maintain or return to baseline ADL function  Description  INTERVENTIONS:  -  Assess patient's ability to carry out ADLs; assess patient's baseline for ADL function and identify physical deficits which impact ability to perform ADLs (bathing, care of mouth/teeth, toileting, grooming, dressing, etc )  - Assess/evaluate cause of self-care deficits   - Assess range of motion  - Assess patient's mobility; develop plan if impaired  - Assess patient's need for assistive devices and provide as appropriate  - Encourage maximum independence but intervene and supervise when necessary  - Involve family in performance of ADLs  - Assess for home care needs following discharge   - Consider OT consult to assist with ADL evaluation and planning for discharge  - Provide patient education as appropriate  Outcome: Progressing  Goal: Maintain or return mobility status to optimal level  Description  INTERVENTIONS:  - Assess patient's baseline mobility status (ambulation, transfers, stairs, etc )    - Identify cognitive and physical deficits and behaviors that affect mobility  - Identify mobility aids required to assist with transfers and/or ambulation (gait belt, sit-to-stand, lift, walker, cane, etc )  - [place] fall precautions as indicated by assessment  - Record patient progress and toleration of activity level on Mobility SBAR; progress patient to next Phase/Stage  - Instruct patient to call for assistance with activity based on assessment  - Consider rehabilitation consult to assist with strengthening/weightbearing, etc   Outcome: Progressing     Problem: DISCHARGE PLANNING  Goal: Discharge to home or other facility with appropriate resources  Description  INTERVENTIONS:  - Identify barriers to discharge w/patient and caregiver  - Arrange for needed discharge resources and transportation as appropriate  - Identify discharge learning needs (meds, wound care, etc )  - Arrange for interpretive services to assist at discharge as needed  - Refer to Case Management Department for coordinating discharge planning if the patient needs post-hospital services based on physician/advanced practitioner order or complex needs related to functional status, cognitive ability, or social support system  Outcome: Progressing     Problem: Knowledge Deficit  Goal: Patient/family/caregiver demonstrates understanding of disease process, treatment plan, medications, and discharge instructions  Description  Complete learning assessment and assess knowledge base    Interventions:  - Provide teaching at level of understanding  - Provide teaching via preferred learning methods  Outcome: Progressing     Problem: GASTROINTESTINAL - ADULT  Goal: Minimal or absence of nausea and/or vomiting  Description  INTERVENTIONS:  - Administer IV fluids if ordered to ensure adequate hydration  - Maintain NPO status until nausea and vomiting are resolved  - Nasogastric tube if ordered  - Administer ordered antiemetic medications as needed  - Provide nonpharmacologic comfort measures as appropriate  - Advance diet as tolerated, if ordered  - Consider nutrition services referral to assist patient with adequate nutrition and appropriate food choices  Outcome: Progressing  Goal: Maintains adequate nutritional intake  Description  INTERVENTIONS:  - Monitor percentage of each meal consumed  - Identify factors contributing to decreased intake, treat as appropriate  - Assist with meals as needed  - Monitor I&O, weight, and lab values if indicated  - Obtain nutrition services referral as needed  Outcome: Progressing     Problem: METABOLIC, FLUID AND ELECTROLYTES - ADULT  Goal: Electrolytes maintained within normal limits  Description  INTERVENTIONS:  - Monitor labs and assess patient for signs and symptoms of electrolyte imbalances  - Administer electrolyte replacement as ordered  - Monitor response to electrolyte replacements, including repeat lab results as appropriate  - Instruct patient on fluid and nutrition as appropriate  Outcome: Progressing  Goal: Fluid balance maintained  Description  INTERVENTIONS:  - Monitor labs   - Monitor I/O and WT  - Instruct patient on fluid and nutrition as appropriate  - Assess for signs & symptoms of volume excess or deficit  Outcome: Progressing     Problem: Nutrition/Hydration-ADULT  Goal: Nutrient/Hydration intake appropriate for improving, restoring or maintaining nutritional needs  Description  Monitor and assess patient's nutrition/hydration status for malnutrition  Collaborate with interdisciplinary team and initiate plan and interventions as ordered  Monitor patient's weight and dietary intake as ordered or per policy  Utilize nutrition screening tool and intervene as necessary  Determine patient's food preferences and provide high-protein, high-caloric foods as appropriate       INTERVENTIONS:  - Monitor oral intake, urinary output, labs, and treatment plans  - Assess nutrition and hydration status and recommend course of action  - Evaluate amount of meals eaten  - Assist patient with eating if necessary   - Allow adequate time for meals  - Recommend/ encourage appropriate diets, oral nutritional supplements, and vitamin/mineral supplements  - Assess need for intravenous fluids  - Provide specific nutrition/hydration education as appropriate  - Include patient/family/caregiver in decisions related to nutrition   Outcome: Progressing

## 2020-01-16 NOTE — ASSESSMENT & PLAN NOTE
Patient noted to have serum creatinine on afternoon labs of 1 31 (baseline -0 90)  Appears intrinsic given BUN/creatinine ratio less than 20  Will give gentle IV fluids overnight, monitor for improvement

## 2020-01-16 NOTE — ASSESSMENT & PLAN NOTE
· Patient with history of atrial fibrillation currently anticoagulated with warfarin; 6 mg Tuesday Thursday; 5 mg all other days  · Warfarin held in anticipation of polypectomy  · Patient also scheduled for hernia repair on 01/22/2020  Patient can resume Coumadin and hold when instructed by surgeon

## 2020-01-16 NOTE — DISCHARGE INSTRUCTIONS
Return to ED for bright red blood per rectum, syncope or passing out events, chest pain, shortness of breath, bloody vomiting, or other change in symptoms

## 2020-01-16 NOTE — CONSULTS
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  EUS and ERCP was performed yesterday with resection of the adenoma using EMR  Biliary and pancreatic stents were left in place will need to be removed later  Patient doing well after the procedure  Mildly elevated T bili after the procedure 1 9  Will follow-up as an outpatient      ______________________________________________________________________    HPI:    65-year-old male patient with past medical history significant for pancreatitis with pancreatic pseudocyst status post cyst gastrostomy, during prior endoscopies he was found to have a non poor adenoma, the adenoma was resected yesterday using EMR  Patient currently is awake alert oriented x3, denies nausea or vomiting, he has been tolerating clear liquids diet, denies abdominal pain, he is passing gas and having bowel movements    REVIEW OF SYSTEMS:    CONSTITUTIONAL: Denies any fever, chills, rigors, and weight loss  HEENT: No earache or tinnitus  Denies hearing loss or visual disturbances  CARDIOVASCULAR: No chest pain or palpitations  RESPIRATORY: Denies any cough, hemoptysis, shortness of breath or dyspnea on exertion  GASTROINTESTINAL: As noted in the History of Present Illness  GENITOURINARY: No problems with urination  Denies any hematuria or dysuria  NEUROLOGIC: No dizziness or vertigo, denies headaches  MUSCULOSKELETAL: Denies any muscle or joint pain  SKIN: Denies skin rashes or itching     ENDOCRINE: Denies excessive thirst  Denies intolerance to heat or cold   PSYCHOSOCIAL: Denies depression or anxiety  Denies any recent memory loss  Historical Information   Past Medical History:   Diagnosis Date    Atrial fibrillation ([address] )     Gastroesophageal reflux     GERD (gastroesophageal reflux disease)     Pancreatitis     Pleural effusion     Second hand smoke exposure      Past Surgical History:   Procedure Laterality Date    CHOLECYSTECTOMY      COLONOSCOPY N/A 3/21/2016    Procedure: COLONOSCOPY;  Surgeon: Linwood Peterson DO;  [facility]; Service:     ERCP N/A 1/4/2019    Procedure: ENDOSCOPIC RETROGRADE CHOLANGIOPANCREATOGRAPHY (ERCP); Surgeon: Jb Ventura MD;  [facility]; Service: Gastroenterology    ESOPHAGOGASTRODUODENOSCOPY N/A 2/5/2019    Procedure: ESOPHAGOGASTRODUODENOSCOPY (EGD), with possible nasojejunal Dobhoff tube placement;  Surgeon: Fabiola Isbell MD;  [facility]; Service: Gastroenterology    ESOPHAGOGASTRODUODENOSCOPY N/A 4/3/2019    Procedure: ESOPHAGOGASTRODUODENOSCOPY (EGD)- endoscopic necrosectomy;  Surgeon: Jb Ventura MD;  [facility]; Service: Gastroenterology    IR IMAGE GUIDED ASPIRATION / DRAINAGE W TUBE  6/17/2019    IR THORACENTESIS  2/7/2019    IR THORACENTESIS  3/29/2019    LINEAR ENDOSCOPIC U/S N/A 3/29/2019    Procedure: LINEAR ENDOSCOPIC U/S cyst gastro;  Surgeon: Jb Ventura MD;  [facility]; Service: Gastroenterology    CT [address] N/A 5/20/2019    Procedure: Dominic Prater;  Surgeon: Tayo Bui MD;  [facility];  Service: Thoracic    CT EGD TRANSORAL BIOPSY SINGLE/MULTIPLE N/A 3/21/2016    Procedure: ESOPHAGOGASTRODUODENOSCOPY (EGD); Surgeon: Linwood Peterson DO;  [facility]; Service: General    CT ERCP DX COLLECTION SPECIMEN BRUSHING/WASHING N/A 4/26/2019    Procedure: ENDOSCOPIC RETROGRADE CHOLANGIOPANCREATOGRAPHY (ERCP); Surgeon: Jb Ventura MD;  [facility];   Service: Gastroenterology    CT ESOPHAGOGASTRODUODENOSCOPY TRANSORAL DIAGNOSTIC N/A 4/9/2019    Procedure: ESOPHAGOGASTRODUODENOSCOPY (EGD) endoscopic necrosectomy;  Surgeon: Bibi Hutton MD;  [facility]; Service: Gastroenterology    [place] ESOPHAGOGASTRODUODENOSCOPY TRANSORAL DIAGNOSTIC N/A 4/26/2019    Procedure: ESOPHAGOGASTRODUODENOSCOPY (EGD); Surgeon: Bibi Hutton MD;  [facility]; Service: Gastroenterology    WI THORACOSCOPY SURG PART PULM DECORT Left 5/20/2019    Procedure: DECORTICATION LUNG;  Surgeon: John Du MD;  [facility];  Service: Thoracic    WI THORACOSCOPY SURG PART PULM DECORT Left 5/20/2019    Procedure: THORACOSCOPY VIDEO ASSISTED SURGERY (VATS);   Surgeon: John Du MD;  [facility];  Service: Thoracic    [place] THORACOSCOPY SURG W/PLEURODESIS N/A 5/20/2019    Procedure: PLEURODESIS mechanical;  Surgeon: John Du MD;  [facility];  Service: Thoracic     Social History   Social History     Substance and Sexual Activity   Alcohol Use Not Currently     Social History     Substance and Sexual Activity   Drug Use No     Social History     Tobacco Use   Smoking Status Never Smoker   Smokeless Tobacco Never Used     Family History   Problem Relation Age of Onset    Lung cancer Mother 79        Smoker     Transient ischemic attack Father 61    Lung disease Neg Hx        Meds/Allergies     Medications Prior to Admission   Medication    metoprolol tartrate (LOPRESSOR) 25 mg tablet    pantoprazole (PROTONIX) 40 mg tablet    warfarin (COUMADIN) 3 mg tablet    acetaminophen (TYLENOL) 325 mg tablet     Current Facility-Administered Medications   Medication Dose Route Frequency    acetaminophen (TYLENOL) tablet 650 mg  650 mg Oral Q6H PRN    enoxaparin (LOVENOX) subcutaneous injection 40 mg  40 mg Subcutaneous Daily    metoprolol tartrate (LOPRESSOR) tablet 25 mg  25 mg Oral Q12H    pantoprazole (PROTONIX) EC tablet 40 mg  40 mg Oral Early Morning Facility-Administered Medications Ordered in Other Encounters   Medication Dose Route Frequency    fentaNYL (SUBLIMAZE) injection 50 mcg  50 mcg Intravenous Q3 min PRN    lactated ringers infusion  125 mL/hr Intravenous Continuous    lactated ringers infusion  100 mL/hr Intravenous Continuous    ondansetron (ZOFRAN) injection 4 mg  4 mg Intravenous Once PRN       No Known Allergies        Objective     Blood pressure 122/75, pulse 62, temperature 97 8 °F (36 6 °C), resp  rate 18, height 6' 4" (1 93 m), weight 96 5 kg (212 lb 11 9 oz), SpO2 99 %  Body mass index is 25 9 kg/m²  Intake/Output Summary (Last 24 hours) at 1/16/2020 1025  Last data filed at 1/16/2020 0436  Gross per 24 hour   Intake 840 ml   Output    Net 840 ml         PHYSICAL EXAM:      General Appearance:   Alert, cooperative, no distress   HEENT:   Normocephalic, atraumatic, anicteric  Neck:  Supple, symmetrical, trachea midline   Lungs:   Clear to auscultation bilaterally; no rales, rhonchi or wheezing; respirations unlabored    Heart[de-identified]   Regular rate and rhythm; no murmur, rub, or gallop     Abdomen:   Soft, non-tender, non-distended; normal bowel sounds; no masses, no organomegaly    Genitalia:   Deferred    Rectal:   Deferred    Extremities:  No cyanosis, clubbing or edema    Skin:  No jaundice, rashes, or lesions    Lymph nodes:  No palpable cervical lymphadenopathy          Lab Results:   Admission on 01/15/2020   Component Date Value    Sodium 01/15/2020 138     Potassium 01/15/2020 4 0     Chloride 01/15/2020 108     CO2 01/15/2020 26     ANION GAP 01/15/2020 4     BUN 01/15/2020 21     Creatinine 01/15/2020 1 31*    Glucose 01/15/2020 198*    Calcium 01/15/2020 8 8     AST 01/15/2020 16     ALT 01/15/2020 27     Alkaline Phosphatase 01/15/2020 82     Total Protein 01/15/2020 6 6     Albumin 01/15/2020 3 5     Total Bilirubin 01/15/2020 1 48*    eGFR 01/15/2020 59     WBC 01/15/2020 8 37     RBC 01/15/2020 4 99     Hemoglobin 01/15/2020 14 9     Hematocrit 01/15/2020 44 0     MCV 01/15/2020 88     MCH 01/15/2020 29 9     MCHC 01/15/2020 33 9     RDW 01/15/2020 13 0     Platelets 08/13/2092 153     MPV 01/15/2020 9 2     Protime 01/15/2020 16 1*    INR 01/15/2020 1 34*    Sodium 01/16/2020 140     Potassium 01/16/2020 4 0     Chloride 01/16/2020 110*    CO2 01/16/2020 25     ANION GAP 01/16/2020 5     BUN 01/16/2020 19     Creatinine 01/16/2020 0 97     Glucose 01/16/2020 102     Calcium 01/16/2020 8 5     AST 01/16/2020 17     ALT 01/16/2020 23     Alkaline Phosphatase 01/16/2020 71     Total Protein 01/16/2020 6 0*    Albumin 01/16/2020 3 1*    Total Bilirubin 01/16/2020 1 97*    eGFR 01/16/2020 84     WBC 01/16/2020 12 98*    RBC 01/16/2020 4 65     Hemoglobin 01/16/2020 13 9     Hematocrit 01/16/2020 41 1     MCV 01/16/2020 88     MCH 01/16/2020 29 9     MCHC 01/16/2020 33 8     RDW 01/16/2020 13 0     MPV 01/16/2020 9 3     Platelets 50/23/9350 168     nRBC 01/16/2020 0     Neutrophils Relative 01/16/2020 86*    Immat GRANS % 01/16/2020 1     Lymphocytes Relative 01/16/2020 5*    Monocytes Relative 01/16/2020 8     Eosinophils Relative 01/16/2020 0     Basophils Relative 01/16/2020 0     Neutrophils Absolute 01/16/2020 11 25*    Immature Grans Absolute 01/16/2020 0 09     Lymphocytes Absolute 01/16/2020 0 61     Monocytes Absolute 01/16/2020 1 01     Eosinophils Absolute 01/16/2020 0 00     Basophils Absolute 01/16/2020 0 02        Imaging Studies: I have personally reviewed pertinent imaging studies

## 2020-01-16 NOTE — ED ATTENDING ATTESTATION
1/16/2020  I, Jimy Nogueira DO, saw and evaluated the patient  I have discussed the patient with the resident/non-physician practitioner and agree with the resident's/non-physician practitioner's findings, Plan of Care, and MDM as documented in the resident's/non-physician practitioner's note, except where noted  All available labs and Radiology studies were reviewed  I was present for key portions of any procedure(s) performed by the resident/non-physician practitioner and I was immediately available to provide assistance  At this point I agree with the current assessment done in the Emergency Department  I have conducted an independent evaluation of this patient a history and physical is as follows:    21-year-old male patient accompanied by his wife  Yesterday he had an ERCP with polypectomy, had a bowel movement afterwards, which he thought had some blood in it  He was hospitalized overnight for observation and discharged this morning  Yesterday hemoglobin was 14 9, this morning was 13 9  He says after discharge this morning which was about 11:00 a m , he was on the way home when he had a bowel movement and noticed it to be somewhat dark, thought there may have been some blood  He was concerned and came back to the ED  Despite nurse's notes he has no abdominal pain, he has no nausea, no vomiting and no weakness  He had been on warfarin prior to the procedure however that has been held, and has not been restarted  General:  Patient is well-appearing  Head:  Atraumatic  Eyes:  Conjunctiva pink  ENT:  Mucous membranes are moist  Neck:  Supple  Cardiac:  S1-S2, without murmurs  Lungs:  Clear to auscultation bilaterally  Abdomen:  Soft, nontender, normal bowel sounds, no CVA tenderness, no tympany, no rigidity, no guarding, rectal examination shows no evidence of external lesions, no hemorrhoids  It is nontender, there is no visible blood upon withdrawal of my finger    There is no melena  Extremities:  Normal range of motion  Neurologic:  Awake, fluent speech, normal comprehension  AAOx3  Skin:  Pink warm and dry  Psychiatric:  Alert, pleasant, cooperative    Labs Reviewed   CBC AND DIFFERENTIAL - Abnormal       Result Value Ref Range Status    WBC 9 63  4 31 - 10 16 Thousand/uL Final    RBC 4 52  3 88 - 5 62 Million/uL Final    Hemoglobin 13 7  12 0 - 17 0 g/dL Final    Hematocrit 40 3  36 5 - 49 3 % Final    MCV 89  82 - 98 fL Final    MCH 30 3  26 8 - 34 3 pg Final    MCHC 34 0  31 4 - 37 4 g/dL Final    RDW 13 2  11 6 - 15 1 % Final    MPV 9 2  8 9 - 12 7 fL Final    Platelets 423  118 - 390 Thousands/uL Final    nRBC 0  /100 WBCs Final    Neutrophils Relative 78 (*) 43 - 75 % Final    Immat GRANS % 1  0 - 2 % Final    Lymphocytes Relative 13 (*) 14 - 44 % Final    Monocytes Relative 8  4 - 12 % Final    Eosinophils Relative 0  0 - 6 % Final    Basophils Relative 0  0 - 1 % Final    Neutrophils Absolute 7 53  1 85 - 7 62 Thousands/µL Final    Immature Grans Absolute 0 05  0 00 - 0 20 Thousand/uL Final    Lymphocytes Absolute 1 24  0 60 - 4 47 Thousands/µL Final    Monocytes Absolute 0 75  0 17 - 1 22 Thousand/µL Final    Eosinophils Absolute 0 04  0 00 - 0 61 Thousand/µL Final    Basophils Absolute 0 02  0 00 - 0 10 Thousands/µL Final             ED Course     On reassessment there was no change from the above physical exam findings  GI indicated there was nothing acute to do in this was most likely to be expected at the procedure  Supportive care, importance of follow-up and return precautions were discussed with the patient  Patient expressed understanding        Critical Care Time  Procedures

## 2020-01-16 NOTE — ED PROVIDER NOTES
History  Chief Complaint   Patient presents with    Post-op Problem     had ERCP yesterday to remove a polyp  reports episdode of diarrea with dark red (almost black) blood  reports weird sensation around drain area     Patient is a 57-year-old male presenting for episode of dark stools  Patient was discharged from this hospital today after ERCP performed for adenomatous polyp in the pancreatic ampulla, the mass was removed with stent placement and patient's common bile duct was dilated; patient has history of pancreatitis with pseudocyst he was then found to have a papillary adenoma  He has a history of paroxysmal atrial fibrillation, on Coumadin at home however he has been off of anticoagulation since 01/11/2020, patient is scheduled to have an inguinal hernia repair next week and is continued off of anticoagulation at this point  Patient states his diet was advanced today to solid foods from liquid and he experience to orange tinged diarrheal bowel movements last night while hospitalized  Patient states he was on way home and stopped at a gas station where he had an episode of dark colored diarrhea  He denies bright red blood in the diarrhea, he experienced no abdominal or rectal pain during bowel movement  Patient states his last colonoscopy was about 5 years ago and normal for him  He states that he feels lightheaded however he denies syncope events, he denies shortness of breath, chest pain, headache, numbness tingling or weakness in extremity  Prior to Admission Medications   Prescriptions Last Dose Informant Patient Reported?  Taking?   acetaminophen (TYLENOL) 325 mg tablet  Self Yes No   Sig: Take 650 mg by mouth every 6 (six) hours as needed for mild pain   metoprolol tartrate (LOPRESSOR) 25 mg tablet  Self No No   Sig: Take 1 tablet (25 mg total) by mouth every 12 (twelve) hours   pantoprazole (PROTONIX) 40 mg tablet  Self No No   Sig: Take 1 tablet (40 mg total) by mouth daily for 90 days warfarin (COUMADIN) 3 mg tablet  Self No No   Sig: Take 1 tablet (3 mg total) by mouth daily Start 5/25/19   Patient taking differently: Take 5 mg by mouth daily Start 5/25/19      Facility-Administered Medications: None       Past Medical History:   Diagnosis Date    Atrial fibrillation (HCC)     Gastroesophageal reflux     GERD (gastroesophageal reflux disease)     Pancreatitis     Pleural effusion     Second hand smoke exposure        Past Surgical History:   Procedure Laterality Date    CHOLECYSTECTOMY      COLONOSCOPY N/A 3/21/2016    Procedure: COLONOSCOPY;  Surgeon: Smita Pritchard DO;  [facility]; Service:     ERCP N/A 1/4/2019    Procedure: ENDOSCOPIC RETROGRADE CHOLANGIOPANCREATOGRAPHY (ERCP); Surgeon: Ayde Leblanc MD;  [facility]; Service: Gastroenterology    ESOPHAGOGASTRODUODENOSCOPY N/A 2/5/2019    Procedure: ESOPHAGOGASTRODUODENOSCOPY (EGD), with possible nasojejunal Dobhoff tube placement;  Surgeon: Tori Gonzalez MD;  [facility]; Service: Gastroenterology    ESOPHAGOGASTRODUODENOSCOPY N/A 4/3/2019    Procedure: ESOPHAGOGASTRODUODENOSCOPY (EGD)- endoscopic necrosectomy;  Surgeon: Ayde Leblanc MD;  [facility]; Service: Gastroenterology    IR IMAGE GUIDED ASPIRATION / DRAINAGE W TUBE  6/17/2019    IR THORACENTESIS  2/7/2019    IR THORACENTESIS  3/29/2019    LINEAR ENDOSCOPIC U/S N/A 3/29/2019    Procedure: LINEAR ENDOSCOPIC U/S cyst gastro;  Surgeon: Ayde Leblanc MD;  [facility]; Service: Gastroenterology    [place] [address] N/A 5/20/2019    Procedure: Lige Ing;  Surgeon: Edgardo Vo MD;  [facility];  Service: Thoracic    PA EGD TRANSORAL BIOPSY SINGLE/MULTIPLE N/A 3/21/2016    Procedure: ESOPHAGOGASTRODUODENOSCOPY (EGD); Surgeon: Smita Pritchard DO;  [facility];   Service: General    PA ERCP DX COLLECTION SPECIMEN BRUSHING/WASHING N/A 4/26/2019    Procedure: ENDOSCOPIC RETROGRADE CHOLANGIOPANCREATOGRAPHY (ERCP); Surgeon: Kyaw Hou MD;  [facility]; Service: Gastroenterology    [place] ESOPHAGOGASTRODUODENOSCOPY TRANSORAL DIAGNOSTIC N/A 4/9/2019    Procedure: ESOPHAGOGASTRODUODENOSCOPY (EGD) endoscopic necrosectomy;  Surgeon: Kyaw Hou MD;  [facility]; Service: Gastroenterology    [place] ESOPHAGOGASTRODUODENOSCOPY TRANSORAL DIAGNOSTIC N/A 4/26/2019    Procedure: ESOPHAGOGASTRODUODENOSCOPY (EGD); Surgeon: Kyaw Hou MD;  [facility]; Service: Gastroenterology    [place] THORACOSCOPY SURG PART PULM DECORT Left 5/20/2019    Procedure: DECORTICATION LUNG;  Surgeon: Bolivar Lombard, MD;  [facility];  Service: Thoracic    [place] THORACOSCOPY SURG PART PULM DECORT Left 5/20/2019    Procedure: THORACOSCOPY VIDEO ASSISTED SURGERY (VATS); Surgeon: Bolivar Lombard, MD;  [facility];  Service: Thoracic    [place] THORACOSCOPY SURG W/PLEURODESIS N/A 5/20/2019    Procedure: PLEURODESIS mechanical;  Surgeon: Bolivar Lombard, MD;  [facility];  Service: Thoracic       Family History   Problem Relation Age of Onset    Lung cancer Mother 79        Smoker     Transient ischemic attack Father 61    Lung disease Neg Hx      I have reviewed and agree with the history as documented  Social History     Tobacco Use    Smoking status: Never Smoker    Smokeless tobacco: Never Used   Substance Use Topics    Alcohol use: Not Currently    Drug use: No        Review of Systems   Constitutional: Negative for chills and fever  Respiratory: Negative for shortness of breath  Cardiovascular: Negative for chest pain  Gastrointestinal: Positive for diarrhea  Negative for abdominal pain, anal bleeding, constipation, nausea and vomiting  Genitourinary: Negative for flank pain  Musculoskeletal: Negative for back pain  Neurological: Positive for light-headedness  Negative for headaches  All other systems reviewed and are negative        Physical Exam  ED Triage Vitals   Temperature Pulse Respirations Blood Pressure SpO2   01/16/20 1300 01/16/20 1300 01/16/20 1300 01/16/20 1300 01/16/20 1300   97 6 °F (36 4 °C) 79 18 142/87 98 %      Temp Source Heart Rate Source Patient Position - Orthostatic VS BP Location FiO2 (%)   01/16/20 1300 01/16/20 1300 01/16/20 1300 01/16/20 1300 --   Oral Monitor Sitting Right arm       Pain Score       01/16/20 1430       2             Orthostatic Vital Signs  Vitals:    01/16/20 1300 01/16/20 1430 01/16/20 1515 01/16/20 1600   BP: 142/87 115/66 125/68 116/70   Pulse: 79 62 66 56   Patient Position - Orthostatic VS: Sitting Lying Lying Lying       Physical Exam   Constitutional: He appears well-developed and well-nourished  No distress  HENT:   Head: Normocephalic and atraumatic  Nose: Nose normal    Eyes: Conjunctivae are normal  Right eye exhibits no discharge  Left eye exhibits no discharge  Cardiovascular: Normal rate and regular rhythm  Pulmonary/Chest: Effort normal and breath sounds normal  No stridor  No respiratory distress  He has no wheezes  He has no rales  Abdominal: Soft  He exhibits no distension  There is no tenderness  There is no rebound and no guarding  Musculoskeletal: He exhibits no edema or deformity  Neurological: He is alert  He exhibits normal muscle tone  Coordination normal    Skin: Skin is warm  He is not diaphoretic  No pallor  Vitals reviewed        ED Medications  Medications - No data to display    Diagnostic Studies  Results Reviewed     Procedure Component Value Units Date/Time    CBC and differential [[id number]]  (Abnormal) Collected:  01/16/20 1502    Lab Status:  Final result Specimen:  Blood from Arm, Left Updated:  01/16/20 1520     WBC 9 63 Thousand/uL      RBC 4 52 Million/uL      Hemoglobin 13 7 g/dL      Hematocrit 40 3 %      MCV 89 fL      MCH 30 3 pg      MCHC 34 0 g/dL      RDW 13 2 %      MPV 9 2 fL      Platelets 855 Thousands/uL      nRBC 0 /100 WBCs      Neutrophils Relative 78 %      Immat GRANS % 1 %      Lymphocytes Relative 13 %      Monocytes Relative 8 %      Eosinophils Relative 0 %      Basophils Relative 0 %      Neutrophils Absolute 7 53 Thousands/µL      Immature Grans Absolute 0 05 Thousand/uL      Lymphocytes Absolute 1 24 Thousands/µL      Monocytes Absolute 0 75 Thousand/µL      Eosinophils Absolute 0 04 Thousand/µL      Basophils Absolute 0 02 Thousands/µL                  No orders to display         Procedures  Procedures      ED Course  ED Course as of Jan 18 1904   Thu Jan 16, 2020   1524 13 9 this AM, 14 9 yesterday (date of procedure)   Hemoglobin: 13 7   1553 Dr Corinne Hoar, on call for GI, discussed patient over [address]  He feels this episode if likely old blood from the procedure and, if hemoglobin is stable, can be discharged  MDM  Number of Diagnoses or Management Options  Postoperative complication:   Diagnosis management comments: Patient presenting for episode of dark stool after discharge from hospital and receiving ERCP  Change in bowels likely from operative bleeding, GI was reached via [employer] and agree  Hemoglobin stable from this morning  Patient was discharged  Disposition  Final diagnoses:   Postoperative complication     Time reflects when diagnosis was documented in both MDM as applicable and the Disposition within this note     Time User Action Codes Description Comment    1/16/2020  3:59 PM Cheri Mcfarland Add [T81  9XXA] Postoperative complication       ED Disposition     ED Disposition Condition Date/Time Comment    Discharge Stable u Jan 16, 2020  3:57 PM Kirby Hernandez discharge to home/self care  Follow-up Information     Follow up With Specialties Details Why Contact Info Additional Madhavi Salazar MD Gastroenterology Call  Follow-up with your GI specialist reagrding your symptoms   [address] [facility] Emergency Department Emergency Medicine   [address] [id number]  [phone]  ED, [address], [address]   [phone]          Discharge Medication List as of 1/16/2020  4:02 PM      CONTINUE these medications which have NOT CHANGED    Details   acetaminophen (TYLENOL) 325 mg tablet Take 650 mg by mouth every 6 (six) hours as needed for mild pain, Historical Med      metoprolol tartrate (LOPRESSOR) 25 mg tablet Take 1 tablet (25 mg total) by mouth every 12 (twelve) hours, Starting Wed 7/17/2019, Normal      pantoprazole (PROTONIX) 40 mg tablet Take 1 tablet (40 mg total) by mouth daily for 90 days, Starting Tue 7/16/2019, Until Thu 1/16/2020, Normal      warfarin (COUMADIN) 3 mg tablet Take 1 tablet (3 mg total) by mouth daily Start 5/25/19, Starting Sat 5/25/2019, Print           No discharge procedures on file  ED Provider  Attending physically available and evaluated Roger Sloan I managed the patient along with the ED Attending      Electronically Signed by         Alicia Serra DO  01/18/20 7826

## 2020-01-16 NOTE — SOCIAL WORK
Met with pt & explained Cm role  Pt lives with wife in split level home with bed & basth on 1st flr & 2 [address]  Was [employer], works & drives  No dme  H/o [facility]  No h/o rhb  No h/o MH,D&A tx  Uses  [address]  No POA  Emergency contact, wife Allison Noel [phone]  CM reviewed d/c planning process including the following: identifying help at home, patient preference for d/c planning needs, Discharge Lounge, [facility] Meds to Bed program, availability of treatment team to discuss questions or concerns patient and/or family may have regarding understanding medications and recognizing signs and symptoms once discharged  CM also encouraged patient to follow up with all recommended appointments after discharge  Patient advised of importance for patient and family to participate in managing patients medical well being

## 2020-01-16 NOTE — ASSESSMENT & PLAN NOTE
· Patient with abnormal findings on abdominal CT; ultrasound resulted in the following:  One 12 mm adenomatous-appearing polyp in the ampullary region  No intraductal spread was seen  There was limited to the mucosal surface  · Moderate hyperechoic parenchymal changes  Overall the pancreas appeared to be normal   The pancreatic duct was normal caliber with mild dilation at the ampullary region to 5 mm  The common bile duct appeared dilated  The bile duct was dilated to approximately 11 mm      Mass was removed during GI procedure, requested patient be observed overnight and discharged tomorrow pending repeat labs

## 2020-01-16 NOTE — H&P
H&P- Lrary Pocahontas 1960, 60 y o  male MRN: [id number]    Unit/Bed#: -01 Encounter: [id number]    Primary Care Provider: Florin Canseco MD   Date and time admitted to hospital: 1/15/2020 11:44 AM        Inguinal hernia of right side without obstruction or gangrene  Assessment & Plan  Patient noted to have right-sided inguinal hernia; to undergo surgery on 01/22  Defer to primary team need for warfarin as patient has surgery pending on 01/22    GERD (gastroesophageal reflux disease)  Assessment & Plan  Continue PPI    Ampullary adenoma  Assessment & Plan  · Patient with abnormal findings on abdominal CT; ultrasound resulted in the following:  One 12 mm adenomatous-appearing polyp in the ampullary region  No intraductal spread was seen  There was limited to the mucosal surface  · Moderate hyperechoic parenchymal changes  Overall the pancreas appeared to be normal   The pancreatic duct was normal caliber with mild dilation at the ampullary region to 5 mm  The common bile duct appeared dilated  The bile duct was dilated to approximately 11 mm      Mass was removed during GI procedure, requested patient be observed overnight and discharged tomorrow pending repeat labs  Paroxysmal atrial fibrillation [facility])  Assessment & Plan  Patient with history of atrial fibrillation currently anticoagulated with warfarin; 6 mg Tuesday Thursday; 5 mg all other days  Warfarin held in anticipation of polypectomy  At this point, will defer to primary team to restart warfarin as patient has inguinal hernia surgery scheduled on 01/22 and warfarin will need to be held 3 days in advance  Lovenox for DVT prophylaxis      VTE Prophylaxis: Enoxaparin (Lovenox)  / sequential compression device   Code Status:  Full  POLST: POLST form is not discussed and not completed at this time    Discussion with family: Discussed treatment plan with family and patient who agree with current plan; encouraged to ask questions and participate  Anticipated Length of Stay:  Patient will be admitted on an Observation basis with an anticipated length of stay of  less than 2 midnights  Justification for Hospital Stay:  Observation status post ERCP    Total Time for Visit, including Counseling / Coordination of Care: 45 minutes  Greater than 50% of this total time spent on direct patient counseling and coordination of care  Chief Complaint:   Polypectomy    History of Present Illness:    Marcia Jha is a 60 y o  male with past medical history of proximal atrial fibrillation (on Coumadin), GERD, right-sided inguinal hernia, and ampullary adenoma who presents for ERCP to remove ampullary adenoma mass  Patient underwent the procedure with no complications, but GI has requested to observe the patient overnight with repeat labs in the [facility] Patient reports no complaints and vital signs are stable  Review of Systems:    Review of Systems   Constitutional: Negative for activity change, appetite change, chills, diaphoresis and fever  HENT: Negative for congestion, drooling, facial swelling, nosebleeds, sore throat and trouble swallowing  Eyes: Negative for pain, discharge, itching and visual disturbance  Respiratory: Negative for apnea, cough, chest tightness and shortness of breath  Cardiovascular: Negative for chest pain, palpitations and leg swelling  Gastrointestinal: Negative for abdominal pain, anal bleeding, blood in stool, constipation, diarrhea, nausea and vomiting  Endocrine: Negative  Genitourinary: Negative for difficulty urinating, dysuria, flank pain, frequency and hematuria  Musculoskeletal: Negative for arthralgias, back pain and gait problem  Skin: Negative  Allergic/Immunologic: Negative  Neurological: Negative for dizziness, seizures, syncope, facial asymmetry, weakness and headaches  Psychiatric/Behavioral: Negative for agitation, confusion and hallucinations         Past Medical and Surgical History:     Past Medical History:   Diagnosis Date    Atrial fibrillation (HCC)     Gastroesophageal reflux     GERD (gastroesophageal reflux disease)     Pancreatitis     Pleural effusion     Second hand smoke exposure        Past Surgical History:   Procedure Laterality Date    CHOLECYSTECTOMY      COLONOSCOPY N/A 3/21/2016    Procedure: COLONOSCOPY;  Surgeon: Adriana Lewis DO;  [facility]; Service:     ERCP N/A 1/4/2019    Procedure: ENDOSCOPIC RETROGRADE CHOLANGIOPANCREATOGRAPHY (ERCP); Surgeon: Ramiro Hudson MD;  [facility]; Service: Gastroenterology    ESOPHAGOGASTRODUODENOSCOPY N/A 2/5/2019    Procedure: ESOPHAGOGASTRODUODENOSCOPY (EGD), with possible nasojejunal Dobhoff tube placement;  Surgeon: Yogi Kaplan MD;  [facility]; Service: Gastroenterology    ESOPHAGOGASTRODUODENOSCOPY N/A 4/3/2019    Procedure: ESOPHAGOGASTRODUODENOSCOPY (EGD)- endoscopic necrosectomy;  Surgeon: Ramiro Hudson MD;  [facility]; Service: Gastroenterology    IR IMAGE GUIDED ASPIRATION / DRAINAGE W TUBE  6/17/2019    IR THORACENTESIS  2/7/2019    IR THORACENTESIS  3/29/2019    LINEAR ENDOSCOPIC U/S N/A 3/29/2019    Procedure: LINEAR ENDOSCOPIC U/S cyst gastro;  Surgeon: Ramiro Hudson MD;  [facility]; Service: Gastroenterology    [place] [address] N/A 5/20/2019    Procedure: Nuzhat Pinta;  Surgeon: Jasmin Olivarez MD;  [facility];  Service: Thoracic    [place] EGD TRANSORAL BIOPSY SINGLE/MULTIPLE N/A 3/21/2016    Procedure: ESOPHAGOGASTRODUODENOSCOPY (EGD); Surgeon: Adriana Lewis DO;  [facility]; Service: General    [place] ERCP DX COLLECTION SPECIMEN BRUSHING/WASHING N/A 4/26/2019    Procedure: ENDOSCOPIC RETROGRADE CHOLANGIOPANCREATOGRAPHY (ERCP); Surgeon: Ramiro Hudson MD;  [facility];   Service: Gastroenterology    [place] ESOPHAGOGASTRODUODENOSCOPY TRANSORAL DIAGNOSTIC N/A 4/9/2019    Procedure: ESOPHAGOGASTRODUODENOSCOPY (EGD) endoscopic necrosectomy;  Surgeon: Hillary Thomas MD;  [facility]; Service: Gastroenterology    [place] ESOPHAGOGASTRODUODENOSCOPY TRANSORAL DIAGNOSTIC N/A 4/26/2019    Procedure: ESOPHAGOGASTRODUODENOSCOPY (EGD); Surgeon: Hillary Thomas MD;  [facility]; Service: Gastroenterology    [place] THORACOSCOPY SURG PART PULM DECORT Left 5/20/2019    Procedure: DECORTICATION LUNG;  Surgeon: Luly Muniz MD;  [facility];  Service: Thoracic    [place] THORACOSCOPY SURG PART PULM DECORT Left 5/20/2019    Procedure: THORACOSCOPY VIDEO ASSISTED SURGERY (VATS); Surgeon: Luly Muniz MD;  [facility];  Service: Thoracic    [place] THORACOSCOPY SURG W/PLEURODESIS N/A 5/20/2019    Procedure: PLEURODESIS mechanical;  Surgeon: Luly Muniz MD;  [facility];  Service: Thoracic       Meds/Allergies:    Prior to Admission medications    Medication Sig Start Date End Date Taking? Authorizing Provider   metoprolol tartrate (LOPRESSOR) 25 mg tablet Take 1 tablet (25 mg total) by mouth every 12 (twelve) hours 7/17/19  Yes Ward Bae DO   pantoprazole (PROTONIX) 40 mg tablet Take 1 tablet (40 mg total) by mouth daily for 90 days 7/16/19 1/15/20 Yes Angella Chase PA-C   warfarin (COUMADIN) 3 mg tablet Take 1 tablet (3 mg total) by mouth daily Start 5/25/19  Patient taking differently: Take 5 mg by mouth daily Start 5/25/19 5/25/19  Yes Fide Christian PA-C   acetaminophen (TYLENOL) 325 mg tablet Take 650 mg by mouth every 6 (six) hours as needed for mild pain    Historical Provider, MD     I have reviewed home medications with patient personally      Allergies: No Known Allergies    Social History:     Marital Status: /Civil Union   Occupation:   Patient Pre-hospital Living Situation:  At home with wife  Patient Pre-hospital Level of Mobility:  Full  Patient Pre-hospital Diet Restrictions:  None  Substance Use History:   Social History Substance and Sexual Activity   Alcohol Use Not Currently     Social History     Tobacco Use   Smoking Status Never Smoker   Smokeless Tobacco Never Used     Social History     Substance and Sexual Activity   Drug Use No       Family History:    Family History   Problem Relation Age of Onset    Lung cancer Mother 79        Smoker     Transient ischemic attack Father 61    Lung disease Neg Hx        Physical Exam:     Vitals:   Blood Pressure: 120/83 (01/15/20 1541)  Pulse: 81 (01/15/20 1541)  Temperature: 97 7 °F (36 5 °C) (01/15/20 1541)  Temp Source: Oral (01/15/20 1541)  Respirations: 16 (01/15/20 1541)  Height: 6' 4" (193 cm) (01/15/20 1202)  Weight - Scale: 96 5 kg (212 lb 11 9 oz) (01/15/20 1202)  SpO2: 96 % (01/15/20 1541)    Physical Exam   Constitutional: He is oriented to person, place, and time  He appears well-developed and well-nourished  No distress  HENT:   Head: Normocephalic and atraumatic  Nose: Nose normal    Mouth/Throat: No oropharyngeal exudate  Eyes: Pupils are equal, round, and reactive to light  EOM are normal  Right eye exhibits no discharge  Left eye exhibits no discharge  No scleral icterus  Neck: Normal range of motion  Neck supple  No JVD present  No tracheal deviation present  No thyromegaly present  Cardiovascular: Normal rate, regular rhythm and normal heart sounds  Exam reveals no gallop and no friction rub  No murmur heard  Pulmonary/Chest: Effort normal and breath sounds normal  No stridor  No respiratory distress  He has no wheezes  He has no rales  Abdominal: Soft  Bowel sounds are normal  He exhibits no distension and no mass  There is no rebound and no guarding  Genitourinary:   Genitourinary Comments: Right inguinal hernia   Musculoskeletal: Normal range of motion  He exhibits no deformity  Neurological: He is alert and oriented to person, place, and time  Skin: Skin is warm and dry  He is not diaphoretic     Psychiatric: He has a normal mood and affect  His behavior is normal    Nursing note and vitals reviewed  Additional Data:     Lab Results: I have personally reviewed pertinent reports  Results from last 7 days   Lab Units 01/15/20  1401   WBC Thousand/uL 8 37   HEMOGLOBIN g/dL 14 9   HEMATOCRIT % 44 0   PLATELETS Thousands/uL 153     Results from last 7 days   Lab Units 01/15/20  1401   SODIUM mmol/L 138   POTASSIUM mmol/L 4 0   CHLORIDE mmol/L 108   CO2 mmol/L 26   BUN mg/dL 21   CREATININE mg/dL 1 31*   ANION GAP mmol/L 4   CALCIUM mg/dL 8 8   ALBUMIN g/dL 3 5   TOTAL BILIRUBIN mg/dL 1 48*   ALK PHOS U/L 82   ALT U/L 27   AST U/L 16   GLUCOSE RANDOM mg/dL 198*     Results from last 7 days   Lab Units 01/15/20  1401   INR  1 34*                   Imaging: I have personally reviewed pertinent reports  No orders to display       EKG, Pathology, and Other Studies Reviewed on Admission:  ERCP    Allscripts / Epic Records Reviewed: Yes     ** Please Note: This note has been constructed using a voice recognition system   **

## 2020-01-16 NOTE — DISCHARGE SUMMARY
Discharge- Leesacharissa Fail 1960, 60 y o  male MRN: [id number]  Unit/Bed#: -01 Encounter: [id number]  Primary Care Provider: Linda Patel MD   Date and time admitted to hospital: 1/15/2020 11:44 AM    * Ampullary adenoma  Assessment & Plan  · Patient with abnormal findings on abdominal CT; ultrasound resulted in the following:  One 12 mm adenomatous-appearing polyp in the ampullary region  No intraductal spread was seen  There was limited to the mucosal surface  · Moderate hyperechoic parenchymal changes  Overall the pancreas appeared to be normal   The pancreatic duct was normal caliber with mild dilation at the ampullary region to 5 mm  The common bile duct appeared dilated  The bile duct was dilated to approximately 11 mm      Mass was removed during GI procedure, requested patient be observed overnight and discharged tomorrow pending repeat labs  Paroxysmal atrial fibrillation [facility])  Assessment & Plan  · Patient with history of atrial fibrillation currently anticoagulated with warfarin; 6 mg Tuesday Thursday; 5 mg all other days  · Warfarin held in anticipation of polypectomy  · Patient also scheduled for hernia repair on 01/22/2020  Patient can resume Coumadin and hold when instructed by surgeon      AZ (acute kidney injury) (HCC)resolved as of 1/16/2020  Assessment & Plan  Patient noted to have serum creatinine on afternoon labs of 1 31 (baseline -0 90)  Appears intrinsic given BUN/creatinine ratio less than 20  Will give gentle IV fluids overnight, monitor for improvement      Discharging Physician / Practitioner: Noemy Mendoza PA-C  PCP: Linda Patel MD  Admission Date:   Admission Orders (From admission, onward)     Ordered        01/15/20 1206  Place in Observation  Once                   Discharge Date: 01/16/20    Resolved Problems  Date Reviewed: 1/16/2020          Resolved    AZ (acute kidney injury) ([facility] [address] ) 1/16/2020     Resolved by  Noemy Mendoza PA-C Consultations During Hospital Stay:  · GI    Procedures Performed:   · ERCP with polypectomy    Significant Findings / Test Results:   · None    Incidental Findings:   · None     Test Results Pending at Discharge (will require follow up): · Final pathology of polypectomy     Outpatient Tests Requested:  · None    Complications:  Mild AZ    Reason for Admission:  Observation following ERCP    Hospital Course:     Sybil Conrad is a 60 y o  male patient who originally presented to the hospital on 1/15/2020 due to observation following ERCP and polypectomy  The patient was on Coumadin which has been on hold however GI wanted to observe him overnight for any bleeding  The patient tolerated the procedure well and had no difficulty with oral intake afterward  He had no pain complaints  He was discharged home and will follow-up with GI for the results of the pathology  Please see above list of diagnoses and related plan for additional information  Condition at Discharge: stable     Discharge Day Visit / Exam:     * Please refer to separate progress note for these details *    Discussion with Family:  None    Discharge instructions/Information to patient and family:   See after visit summary for information provided to patient and family  Provisions for Follow-Up Care:  See after visit summary for information related to follow-up care and any pertinent home health orders  Disposition:     Home    For Discharges to [facility] SNF:   · Not Applicable to this Patient - Not Applicable to this Patient    Planned Readmission:  No     Discharge Statement:  I spent 45 minutes discharging the patient  This time was spent on the day of discharge  I had direct contact with the patient on the day of discharge   Greater than 50% of the total time was spent examining patient, answering all patient questions, arranging and discussing plan of care with patient as well as directly providing post-discharge instructions  Additional time then spent on discharge activities  Discharge Medications:  See after visit summary for reconciled discharge medications provided to patient and family        ** Please Note: This note has been constructed using a voice recognition system **

## 2020-01-16 NOTE — PLAN OF CARE
Problem: PAIN - ADULT  Goal: Verbalizes/displays adequate comfort level or baseline comfort level  Description  Interventions:  - Encourage patient to monitor pain and request assistance  - Assess pain using appropriate pain scale  - Administer analgesics based on type and severity of pain and evaluate response  - Implement non-pharmacological measures as appropriate and evaluate response  - Consider cultural and social influences on pain and pain management  - Notify physician/advanced practitioner if interventions unsuccessful or patient reports new pain  1/16/2020 1159 by Hernan Maguire RN  Outcome: Completed  1/16/2020 0813 by Hernan Maguire RN  Outcome: Progressing     Problem: INFECTION - ADULT  Goal: Absence or prevention of progression during hospitalization  Description  INTERVENTIONS:  - Assess and monitor for signs and symptoms of infection  - Monitor lab/diagnostic results  - Monitor all insertion sites, i e  indwelling lines, tubes, and drains  - Administer medications as ordered  - Instruct and encourage patient and family to use good hand hygiene technique  - Identify and instruct in appropriate isolation precautions for identified infection/condition   1/16/2020 1159 by Hernan Maguire RN  Outcome: Completed  1/16/2020 0813 by Hernan Maguire RN  Outcome: Progressing  Goal: Absence of fever/infection during neutropenic period  Description  INTERVENTIONS:  - Monitor WBC    1/16/2020 1159 by Hernan Maguire RN  Outcome: Completed  1/16/2020 0813 by Hernan Maguire RN  Outcome: Progressing     Problem: SAFETY ADULT  Goal: Patient will remain free of falls  Description  INTERVENTIONS:  - Assess patient frequently for physical needs  -  Identify cognitive and physical deficits and behaviors that affect risk of falls    -  [place] fall precautions as indicated by assessment   - Educate patient/family on patient safety including physical limitations  - Instruct patient to call for assistance with activity based on assessment  - Modify environment to reduce risk of injury  - Consider OT/PT consult to assist with strengthening/mobility  1/16/2020 1159 by Ramiro Aguero RN  Outcome: Completed  1/16/2020 0813 by Ramiro Aguero RN  Outcome: Progressing  Goal: Maintain or return to baseline ADL function  Description  INTERVENTIONS:  -  Assess patient's ability to carry out ADLs; assess patient's baseline for ADL function and identify physical deficits which impact ability to perform ADLs (bathing, care of mouth/teeth, toileting, grooming, dressing, etc )  - Assess/evaluate cause of self-care deficits   - Assess range of motion  - Assess patient's mobility; develop plan if impaired  - Assess patient's need for assistive devices and provide as appropriate  - Encourage maximum independence but intervene and supervise when necessary  - Involve family in performance of ADLs  - Assess for home care needs following discharge   - Consider OT consult to assist with ADL evaluation and planning for discharge  - Provide patient education as appropriate  1/16/2020 1159 by Ramiro Aguero RN  Outcome: Completed  1/16/2020 0813 by Ramiro Aguero RN  Outcome: Progressing  Goal: Maintain or return mobility status to optimal level  Description  INTERVENTIONS:  - Assess patient's baseline mobility status (ambulation, transfers, stairs, etc )    - Identify cognitive and physical deficits and behaviors that affect mobility  - Identify mobility aids required to assist with transfers and/or ambulation (gait belt, sit-to-stand, lift, walker, cane, etc )  - [place] fall precautions as indicated by assessment  - Record patient progress and toleration of activity level on Mobility SBAR; progress patient to next Phase/Stage  - Instruct patient to call for assistance with activity based on assessment  - Consider rehabilitation consult to assist with strengthening/weightbearing, etc   1/16/2020 1159 by Jovita Angel Kelsey Page RN  Outcome: Completed  1/16/2020 0813 by Dmitri Garcia RN  Outcome: Progressing     Problem: DISCHARGE PLANNING  Goal: Discharge to home or other facility with appropriate resources  Description  INTERVENTIONS:  - Identify barriers to discharge w/patient and caregiver  - Arrange for needed discharge resources and transportation as appropriate  - Identify discharge learning needs (meds, wound care, etc )  - Arrange for interpretive services to assist at discharge as needed  - Refer to Case Management Department for coordinating discharge planning if the patient needs post-hospital services based on physician/advanced practitioner order or complex needs related to functional status, cognitive ability, or social support system  1/16/2020 1159 by Dmitri Garcia RN  Outcome: Completed  1/16/2020 0813 by Dmitri Garcia RN  Outcome: Progressing     Problem: Knowledge Deficit  Goal: Patient/family/caregiver demonstrates understanding of disease process, treatment plan, medications, and discharge instructions  Description  Complete learning assessment and assess knowledge base    Interventions:  - Provide teaching at level of understanding  - Provide teaching via preferred learning methods  1/16/2020 1159 by Dmitri Garcia RN  Outcome: Completed  1/16/2020 0813 by Dmitri Garcia RN  Outcome: Progressing     Problem: GASTROINTESTINAL - ADULT  Goal: Minimal or absence of nausea and/or vomiting  Description  INTERVENTIONS:  - Administer IV fluids if ordered to ensure adequate hydration  - Maintain NPO status until nausea and vomiting are resolved  - Nasogastric tube if ordered  - Administer ordered antiemetic medications as needed  - Provide nonpharmacologic comfort measures as appropriate  - Advance diet as tolerated, if ordered  - Consider nutrition services referral to assist patient with adequate nutrition and appropriate food choices  1/16/2020 1159 by Dmitri Garcia RN  Outcome: Completed  1/16/2020 0813 by Lan Ferguson RN  Outcome: Progressing  Goal: Maintains adequate nutritional intake  Description  INTERVENTIONS:  - Monitor percentage of each meal consumed  - Identify factors contributing to decreased intake, treat as appropriate  - Assist with meals as needed  - Monitor I&O, weight, and lab values if indicated  - Obtain nutrition services referral as needed  1/16/2020 1159 by Lan Ferguson RN  Outcome: Completed  1/16/2020 0813 by Lan Ferguson RN  Outcome: Progressing     Problem: METABOLIC, FLUID AND ELECTROLYTES - ADULT  Goal: Electrolytes maintained within normal limits  Description  INTERVENTIONS:  - Monitor labs and assess patient for signs and symptoms of electrolyte imbalances  - Administer electrolyte replacement as ordered  - Monitor response to electrolyte replacements, including repeat lab results as appropriate  - Instruct patient on fluid and nutrition as appropriate  1/16/2020 1159 by Lan Ferguson RN  Outcome: Completed  1/16/2020 0813 by Lan Ferguson RN  Outcome: Progressing  Goal: Fluid balance maintained  Description  INTERVENTIONS:  - Monitor labs   - Monitor I/O and WT  - Instruct patient on fluid and nutrition as appropriate  - Assess for signs & symptoms of volume excess or deficit  1/16/2020 1159 by Lan Ferguson RN  Outcome: Completed  1/16/2020 0813 by Lan Ferguson RN  Outcome: Progressing     Problem: Nutrition/Hydration-ADULT  Goal: Nutrient/Hydration intake appropriate for improving, restoring or maintaining nutritional needs  Description  Monitor and assess patient's nutrition/hydration status for malnutrition  Collaborate with interdisciplinary team and initiate plan and interventions as ordered  Monitor patient's weight and dietary intake as ordered or per policy  Utilize nutrition screening tool and intervene as necessary  Determine patient's food preferences and provide high-protein, high-caloric foods as appropriate  INTERVENTIONS:  - Monitor oral intake, urinary output, labs, and treatment plans  - Assess nutrition and hydration status and recommend course of action  - Evaluate amount of meals eaten  - Assist patient with eating if necessary   - Allow adequate time for meals  - Recommend/ encourage appropriate diets, oral nutritional supplements, and vitamin/mineral supplements  - Assess need for intravenous fluids  - Provide specific nutrition/hydration education as appropriate  - Include patient/family/caregiver in decisions related to nutrition   1/16/2020 1159 by Kalie Tejeda RN  Outcome: Completed  1/16/2020 0813 by Kalie Tejeda RN  Outcome: Progressing

## 2020-01-17 ENCOUNTER — TELEPHONE (OUTPATIENT)
Dept: SURGERY | Facility: CLINIC | Age: 60
End: 2020-01-17

## 2020-01-17 NOTE — TELEPHONE ENCOUNTER
Darian Guile, RN sent me a message regarding patient having a ERCP done on 1/15/20  The patient is questioning about having biliary & pancreatic stents and if wanted to know if the surgery can still be done  Sent the message and email to Dr Comfort Avila she is at a conference returning Monday  Waiting on her reply

## 2020-01-20 ENCOUNTER — TELEPHONE (OUTPATIENT)
Dept: SURGERY | Facility: CLINIC | Age: 60
End: 2020-01-20

## 2020-01-20 NOTE — TELEPHONE ENCOUNTER
Left message with Allie Aldana, spouse, for patient to inform him that Dr Bibiana Vega has no problem with the stents and proceeding with the surgery

## 2020-01-21 NOTE — ANESTHESIA PREPROCEDURE EVALUATION
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),        Negative  ROS        Endo/Other  Negative endo/other ROS      GYN       Hematology  Negative hematology ROS      Musculoskeletal  Negative musculoskeletal ROS        Neurology  Negative neurology ROS      Psychology   Negative psychology ROS              Physical Exam    Airway    Mallampati score: II  TM Distance: >3 FB  Neck ROM: full     Dental   No notable dental hx     Cardiovascular  Rhythm: regular, Rate: normal,     Pulmonary  Pulmonary exam normal Breath sounds clear to auscultation,     Other Findings        Anesthesia Plan  ASA Score- 3     Anesthesia Type- general with ASA Monitors  Additional Monitors:   Airway Plan: LMA  Plan Factors-    Induction- intravenous  Postoperative Plan- Plan for postoperative opioid use  Informed Consent- Anesthetic plan and risks discussed with patient  I personally reviewed this patient with the CRNA  Discussed and agreed on the Anesthesia Plan with the CRNA           NPO verified  NKDA  Patient normally takes metoprolol BID and last took a dose yesterday evening (1/21/20)  Last dose of warfarin was 1/19/20  Plan:  GA, LMA    Risks and benefits discussed with patient  Questions answered  Patient consented

## 2020-01-22 ENCOUNTER — HOSPITAL ENCOUNTER (OUTPATIENT)
Facility: HOSPITAL | Age: 60
Setting detail: OUTPATIENT SURGERY
Discharge: HOME | End: 2020-01-22
Attending: SURGERY | Admitting: SURGERY
Payer: COMMERCIAL

## 2020-01-22 ENCOUNTER — ANESTHESIA (OUTPATIENT)
Dept: PERIOP | Facility: HOSPITAL | Age: 60
End: 2020-01-22
Payer: COMMERCIAL

## 2020-01-22 VITALS
HEIGHT: 76 IN | WEIGHT: 212 LBS | DIASTOLIC BLOOD PRESSURE: 72 MMHG | TEMPERATURE: 95.8 F | HEART RATE: 82 BPM | SYSTOLIC BLOOD PRESSURE: 127 MMHG | OXYGEN SATURATION: 97 % | RESPIRATION RATE: 20 BRPM | BODY MASS INDEX: 25.82 KG/M2

## 2020-01-22 DIAGNOSIS — K40.90 INGUINAL HERNIA OF RIGHT SIDE WITHOUT OBSTRUCTION OR GANGRENE: Primary | ICD-10-CM

## 2020-01-22 DIAGNOSIS — D13.5 AMPULLARY ADENOMA: ICD-10-CM

## 2020-01-22 PROCEDURE — NC001 PR NO CHARGE: Performed by: SURGERY

## 2020-01-22 PROCEDURE — 49505 PRP I/HERN INIT REDUC >5 YR: CPT | Performed by: SURGERY

## 2020-01-22 PROCEDURE — C1781 MESH (IMPLANTABLE): HCPCS | Performed by: SURGERY

## 2020-01-22 DEVICE — BARD MESH
Type: IMPLANTABLE DEVICE | Site: INGUINAL | Status: FUNCTIONAL
Brand: BARD MESH

## 2020-01-22 RX ORDER — ONDANSETRON 2 MG/ML
4 INJECTION INTRAMUSCULAR; INTRAVENOUS ONCE AS NEEDED
Status: DISCONTINUED | OUTPATIENT
Start: 2020-01-22 | End: 2020-01-22 | Stop reason: HOSPADM

## 2020-01-22 RX ORDER — PROPOFOL 10 MG/ML
INJECTION, EMULSION INTRAVENOUS AS NEEDED
Status: DISCONTINUED | OUTPATIENT
Start: 2020-01-22 | End: 2020-01-22 | Stop reason: SURG

## 2020-01-22 RX ORDER — ONDANSETRON 2 MG/ML
4 INJECTION INTRAMUSCULAR; INTRAVENOUS EVERY 6 HOURS PRN
Status: DISCONTINUED | OUTPATIENT
Start: 2020-01-22 | End: 2020-01-22 | Stop reason: HOSPADM

## 2020-01-22 RX ORDER — KETOROLAC TROMETHAMINE 30 MG/ML
INJECTION, SOLUTION INTRAMUSCULAR; INTRAVENOUS AS NEEDED
Status: DISCONTINUED | OUTPATIENT
Start: 2020-01-22 | End: 2020-01-22 | Stop reason: SURG

## 2020-01-22 RX ORDER — OXYCODONE HYDROCHLORIDE 5 MG/1
5 TABLET ORAL EVERY 4 HOURS PRN
Qty: 30 TABLET | Refills: 0 | Status: SHIPPED | OUTPATIENT
Start: 2020-01-22 | End: 2020-02-01

## 2020-01-22 RX ORDER — ONDANSETRON 2 MG/ML
INJECTION INTRAMUSCULAR; INTRAVENOUS AS NEEDED
Status: DISCONTINUED | OUTPATIENT
Start: 2020-01-22 | End: 2020-01-22 | Stop reason: SURG

## 2020-01-22 RX ORDER — FENTANYL CITRATE/PF 50 MCG/ML
25 SYRINGE (ML) INJECTION
Status: DISCONTINUED | OUTPATIENT
Start: 2020-01-22 | End: 2020-01-22 | Stop reason: HOSPADM

## 2020-01-22 RX ORDER — ACETAMINOPHEN 325 MG/1
975 TABLET ORAL ONCE
Status: COMPLETED | OUTPATIENT
Start: 2020-01-22 | End: 2020-01-22

## 2020-01-22 RX ORDER — DEXAMETHASONE SODIUM PHOSPHATE 10 MG/ML
INJECTION, SOLUTION INTRAMUSCULAR; INTRAVENOUS AS NEEDED
Status: DISCONTINUED | OUTPATIENT
Start: 2020-01-22 | End: 2020-01-22 | Stop reason: SURG

## 2020-01-22 RX ORDER — SODIUM CHLORIDE, SODIUM LACTATE, POTASSIUM CHLORIDE, CALCIUM CHLORIDE 600; 310; 30; 20 MG/100ML; MG/100ML; MG/100ML; MG/100ML
75 INJECTION, SOLUTION INTRAVENOUS CONTINUOUS
Status: DISCONTINUED | OUTPATIENT
Start: 2020-01-22 | End: 2020-01-22 | Stop reason: HOSPADM

## 2020-01-22 RX ORDER — CEFAZOLIN SODIUM 1 G/3ML
INJECTION, POWDER, FOR SOLUTION INTRAMUSCULAR; INTRAVENOUS AS NEEDED
Status: DISCONTINUED | OUTPATIENT
Start: 2020-01-22 | End: 2020-01-22 | Stop reason: SURG

## 2020-01-22 RX ORDER — FENTANYL CITRATE 50 UG/ML
INJECTION, SOLUTION INTRAMUSCULAR; INTRAVENOUS AS NEEDED
Status: DISCONTINUED | OUTPATIENT
Start: 2020-01-22 | End: 2020-01-22 | Stop reason: SURG

## 2020-01-22 RX ORDER — LIDOCAINE HYDROCHLORIDE 10 MG/ML
0.5 INJECTION, SOLUTION EPIDURAL; INFILTRATION; INTRACAUDAL; PERINEURAL ONCE AS NEEDED
Status: DISCONTINUED | OUTPATIENT
Start: 2020-01-22 | End: 2020-01-22 | Stop reason: HOSPADM

## 2020-01-22 RX ORDER — ALBUMIN, HUMAN INJ 5% 5 %
SOLUTION INTRAVENOUS CONTINUOUS PRN
Status: DISCONTINUED | OUTPATIENT
Start: 2020-01-22 | End: 2020-01-22 | Stop reason: SURG

## 2020-01-22 RX ORDER — SODIUM CHLORIDE, SODIUM LACTATE, POTASSIUM CHLORIDE, CALCIUM CHLORIDE 600; 310; 30; 20 MG/100ML; MG/100ML; MG/100ML; MG/100ML
100 INJECTION, SOLUTION INTRAVENOUS CONTINUOUS
Status: DISCONTINUED | OUTPATIENT
Start: 2020-01-22 | End: 2020-01-22 | Stop reason: HOSPADM

## 2020-01-22 RX ORDER — MIDAZOLAM HYDROCHLORIDE 2 MG/2ML
INJECTION, SOLUTION INTRAMUSCULAR; INTRAVENOUS AS NEEDED
Status: DISCONTINUED | OUTPATIENT
Start: 2020-01-22 | End: 2020-01-22 | Stop reason: SURG

## 2020-01-22 RX ORDER — OXYCODONE HYDROCHLORIDE AND ACETAMINOPHEN 5; 325 MG/1; MG/1
1 TABLET ORAL EVERY 4 HOURS PRN
Status: DISCONTINUED | OUTPATIENT
Start: 2020-01-22 | End: 2020-01-22 | Stop reason: HOSPADM

## 2020-01-22 RX ORDER — LIDOCAINE HYDROCHLORIDE 10 MG/ML
INJECTION, SOLUTION EPIDURAL; INFILTRATION; INTRACAUDAL; PERINEURAL AS NEEDED
Status: DISCONTINUED | OUTPATIENT
Start: 2020-01-22 | End: 2020-01-22 | Stop reason: SURG

## 2020-01-22 RX ORDER — EPHEDRINE SULFATE 50 MG/ML
INJECTION INTRAVENOUS AS NEEDED
Status: DISCONTINUED | OUTPATIENT
Start: 2020-01-22 | End: 2020-01-22 | Stop reason: SURG

## 2020-01-22 RX ADMIN — PHENYLEPHRINE HYDROCHLORIDE 100 MCG: 10 INJECTION INTRAVENOUS at 08:45

## 2020-01-22 RX ADMIN — FENTANYL CITRATE 25 MCG: 50 INJECTION, SOLUTION INTRAMUSCULAR; INTRAVENOUS at 10:11

## 2020-01-22 RX ADMIN — CEFAZOLIN 2000 MG: 1 INJECTION, POWDER, FOR SOLUTION INTRAVENOUS at 08:39

## 2020-01-22 RX ADMIN — FENTANYL CITRATE 25 MCG: 50 INJECTION, SOLUTION INTRAMUSCULAR; INTRAVENOUS at 08:36

## 2020-01-22 RX ADMIN — DEXAMETHASONE SODIUM PHOSPHATE 5 MG: 10 INJECTION, SOLUTION INTRAMUSCULAR; INTRAVENOUS at 08:33

## 2020-01-22 RX ADMIN — PHENYLEPHRINE HYDROCHLORIDE 40 MCG/MIN: 10 INJECTION INTRAVENOUS at 09:21

## 2020-01-22 RX ADMIN — LIDOCAINE HYDROCHLORIDE 50 MG: 10 INJECTION, SOLUTION EPIDURAL; INFILTRATION; INTRACAUDAL; PERINEURAL at 08:30

## 2020-01-22 RX ADMIN — EPHEDRINE SULFATE 5 MG: 50 INJECTION, SOLUTION INTRAVENOUS at 08:45

## 2020-01-22 RX ADMIN — EPHEDRINE SULFATE 5 MG: 50 INJECTION, SOLUTION INTRAVENOUS at 09:01

## 2020-01-22 RX ADMIN — FENTANYL CITRATE 25 MCG: 50 INJECTION, SOLUTION INTRAMUSCULAR; INTRAVENOUS at 08:51

## 2020-01-22 RX ADMIN — PHENYLEPHRINE HYDROCHLORIDE 100 MCG: 10 INJECTION INTRAVENOUS at 08:54

## 2020-01-22 RX ADMIN — EPHEDRINE SULFATE 5 MG: 50 INJECTION, SOLUTION INTRAVENOUS at 09:15

## 2020-01-22 RX ADMIN — PHENYLEPHRINE HYDROCHLORIDE 100 MCG: 10 INJECTION INTRAVENOUS at 08:39

## 2020-01-22 RX ADMIN — PHENYLEPHRINE HYDROCHLORIDE 100 MCG: 10 INJECTION INTRAVENOUS at 09:21

## 2020-01-22 RX ADMIN — KETOROLAC TROMETHAMINE 30 MG: 30 INJECTION, SOLUTION INTRAMUSCULAR at 09:34

## 2020-01-22 RX ADMIN — MIDAZOLAM 2 MG: 1 INJECTION INTRAMUSCULAR; INTRAVENOUS at 08:29

## 2020-01-22 RX ADMIN — SODIUM CHLORIDE, SODIUM LACTATE, POTASSIUM CHLORIDE, AND CALCIUM CHLORIDE: .6; .31; .03; .02 INJECTION, SOLUTION INTRAVENOUS at 08:05

## 2020-01-22 RX ADMIN — EPHEDRINE SULFATE 5 MG: 50 INJECTION, SOLUTION INTRAVENOUS at 08:54

## 2020-01-22 RX ADMIN — ALBUMIN (HUMAN): 12.5 SOLUTION INTRAVENOUS at 09:01

## 2020-01-22 RX ADMIN — EPHEDRINE SULFATE 5 MG: 50 INJECTION, SOLUTION INTRAVENOUS at 08:42

## 2020-01-22 RX ADMIN — ACETAMINOPHEN 975 MG: 325 TABLET ORAL at 08:20

## 2020-01-22 RX ADMIN — ONDANSETRON 4 MG: 2 INJECTION INTRAMUSCULAR; INTRAVENOUS at 08:33

## 2020-01-22 RX ADMIN — PHENYLEPHRINE HYDROCHLORIDE 100 MCG: 10 INJECTION INTRAVENOUS at 09:01

## 2020-01-22 RX ADMIN — PHENYLEPHRINE HYDROCHLORIDE 100 MCG: 10 INJECTION INTRAVENOUS at 08:33

## 2020-01-22 RX ADMIN — PROPOFOL 150 MG: 10 INJECTION, EMULSION INTRAVENOUS at 08:30

## 2020-01-22 RX ADMIN — EPHEDRINE SULFATE 5 MG: 50 INJECTION, SOLUTION INTRAVENOUS at 09:06

## 2020-01-22 RX ADMIN — FENTANYL CITRATE 25 MCG: 50 INJECTION, SOLUTION INTRAMUSCULAR; INTRAVENOUS at 10:17

## 2020-01-22 RX ADMIN — SODIUM CHLORIDE, SODIUM LACTATE, POTASSIUM CHLORIDE, AND CALCIUM CHLORIDE 75 ML/HR: .6; .31; .03; .02 INJECTION, SOLUTION INTRAVENOUS at 10:15

## 2020-01-22 NOTE — OP NOTE
OPERATIVE REPORT  PATIENT NAME: Tabatha Medina    :  1960  MRN: [id number]  Pt Location: [facility] 03    SURGERY DATE: 2020  Surgeon(s) and Role:     * Rachel Singh MD - Primary     * Georgette Santos MD - Assisting    Preop Diagnosis:  Right inguinal hernia [K40 90]    Post-Op Diagnosis Codes:     * Right inguinal hernia [K40 90]    Procedure(s) (LRB):  REPAIR HERNIA INGUINAL (Right)    Specimen(s):  * No specimens in log *    Estimated Blood Loss:   25 mL    Drains:  * No LDAs found *    Anesthesia Type:   General    Operative Indications:  Right inguinal hernia [K40 90]    Operative Findings:  Fat-containing indirect right inguinal hernia    Complications:   None    Procedure and Technique:  The patent was placed in the supine position on the operating room table and general anesthesia was given  The umbilicus and right lower quadrant were prepped with chloraprep  The patient was draped in sterile fashion  A timeout was called  10ml local anestheisa was infiltrated and a 6cm incision was made parallel and superior to the inguinal ligament  Subcutaneous tissue was dissected with electrocautery down to the fascia of the external oblique  This was opened in the direction of the fibers with camden  The spermatic cord was dissected free from the inguinal canal floor and encircled with a Penrose  A fat containing indirect hernia was found; there was no direct hernia  The ilioinguinal nerve was found - due to concern of it being incorporated in the mesh repair it was divided with Raman Masscristiane  A polyprolene mesh was secured at the pubic tubercle with 2-0 prolene and then secured superiorly to the conjoint tendon and inferiorly to the inguinal ligament  The tails were then secured around the spermatic cord  using a running 2-0 polene suture starting at the pubic tubercle and extending in both directions  The external oblique was closed over the mesh with vicryl   Scarpas fascia was closed with 3-0 vicryl  The skin was re-approximated with 4-0 monocryl running subcutaneous suture  Surgical glue was placed over the groin incision  Dr Hu Jasso was present throughout this operation       Patient Disposition:  PACU     SIGNATURE: Justina Shaikh MD  DATE: January 22, 2020  TIME: 9:54 AM

## 2020-01-22 NOTE — H&P
Pre-op H&P - General Surgery  Feliz Herron MRN: [id number]  Encounter: [id number]    Assessment and Plan    Inguinal hernia of right side without obstruction or gangrene  Assessment & Plan  Ok to proceed to OR today for open RIGHT inguinal hernia repair    The risks, benefits and alternatives were discussed with the patient, including bleeding, infection, injury to nearby structures and risk of hernia recurrence  All questions answered  Consent signed and placed in chart  Paroxysmal atrial fibrillation (HCC)  Assessment & Plan  Coumadin held 3 days prior for surgical intervention  OK to resume on POD 1      Chief Complaint:  Feliz Herron is a 60 y o  male who presents for open R inguinal hernia repair    Subjective  Feliz Herron presents today for open right inguinal hernia repair with mesh  He recently had his ampullary adenoma resected and has biliary stents in place  Otherwise no changes in medical history  Coumadin held for the last 3 days (Sunday)    Past Medical History  Past Medical History:   Diagnosis Date    Atrial fibrillation (HCC)     Gastroesophageal reflux     GERD (gastroesophageal reflux disease)     Pancreatitis     Pleural effusion     Second hand smoke exposure        Past Surgical History  Past Surgical History:   Procedure Laterality Date    CHOLECYSTECTOMY      COLONOSCOPY N/A 3/21/2016    Procedure: COLONOSCOPY;  Surgeon: Shanta Caldwell DO;  [facility]; Service:     ERCP N/A 1/4/2019    Procedure: ENDOSCOPIC RETROGRADE CHOLANGIOPANCREATOGRAPHY (ERCP); Surgeon: Shruthi Gee MD;  [facility]; Service: Gastroenterology    ESOPHAGOGASTRODUODENOSCOPY N/A 2/5/2019    Procedure: ESOPHAGOGASTRODUODENOSCOPY (EGD), with possible nasojejunal Dobhoff tube placement;  Surgeon: Ro Avalos MD;  [facility];   Service: Gastroenterology    ESOPHAGOGASTRODUODENOSCOPY N/A 4/3/2019    Procedure: ESOPHAGOGASTRODUODENOSCOPY (EGD)- endoscopic necrosectomy; Surgeon: Gay Andrea MD;  [facility]; Service: Gastroenterology    IR IMAGE GUIDED ASPIRATION / DRAINAGE W TUBE  6/17/2019    IR THORACENTESIS  2/7/2019    IR THORACENTESIS  3/29/2019    LINEAR ENDOSCOPIC U/S N/A 3/29/2019    Procedure: LINEAR ENDOSCOPIC U/S cyst gastro;  Surgeon: Gay Andrea MD;  [facility]; Service: Gastroenterology    [place] [address] N/A 5/20/2019    Procedure: Max Eddy;  Surgeon: Dario Galdamez MD;  [facility];  Service: Thoracic    [place] E[place] TRANSORAL BIOPSY SINGLE/MULTIPLE N/A 3/21/2016    Procedure: ESOPHAGOGASTRODUODENOSCOPY (EGD); Surgeon: Arian Venegas DO;  [facility]; Service: General    [place] ERCP DX COLLECTION SPECIMEN BRUSHING/WASHING N/A 4/26/2019    Procedure: ENDOSCOPIC RETROGRADE CHOLANGIOPANCREATOGRAPHY (ERCP); Surgeon: Gay Andrea MD;  [facility]; Service: Gastroenterology    [facility] ESOPHAGOGASTRODUODENOSCOPY TRANSORAL DIAGNOSTIC N/A 4/9/2019    Procedure: ESOPHAGOGASTRODUODENOSCOPY (EGD) endoscopic necrosectomy;  Surgeon: Gay Andrea MD;  [facility]; Service: Gastroenterology    [place] ESOPHAGOGASTRODUODENOSCOPY TRANSORAL DIAGNOSTIC N/A 4/26/2019    Procedure: ESOPHAGOGASTRODUODENOSCOPY (EGD); Surgeon: Gay Andrea MD;  [facility]; Service: Gastroenterology    [facility] THORACOSCOPY SURG PART PULM DECORT Left 5/20/2019    Procedure: DECORTICATION LUNG;  Surgeon: Dario Galdamez MD;  [facility];  Service: Thoracic    [place] THORACOSCOPY SURG PART PULM DECORT Left 5/20/2019    Procedure: THORACOSCOPY VIDEO ASSISTED SURGERY (VATS);   Surgeon: Dario Galdamez MD;  [facility];  Service: Thoracic    [facility] THORACOSCOPY SURG W/PLEURODESIS N/A 5/20/2019    Procedure: PLEURODESIS mechanical;  Surgeon: Dario Galdamez MD;  [facility];  Service: Thoracic       Family History  Family History   Problem Relation Age of Onset    Lung cancer Mother 79        Smoker     Transient ischemic attack Father 61    Lung disease Neg Hx        Social History  Social History     Socioeconomic History    Marital status: /Civil Union     Spouse name: None    Number of children: None    Years of education: None    Highest education level: None   Occupational History    None   Social Needs    Financial resource strain: None    Food insecurity:     Worry: None     Inability: None    Transportation needs:     Medical: None     Non-medical: None   Tobacco Use    Smoking status: Never Smoker    Smokeless tobacco: Never Used   Substance and Sexual Activity    Alcohol use: Not Currently    Drug use: No    Sexual activity: None   Lifestyle    Physical activity:     Days per week: None     Minutes per session: None    Stress: None   Relationships    Social connections:     Talks on phone: None     Gets together: None     Attends Gnosticist service: None     Active member of club or organization: None     Attends meetings of clubs or organizations: None     Relationship status: None    Intimate partner violence:     Fear of current or ex partner: None     Emotionally abused: None     Physically abused: None     Forced sexual activity: None   Other Topics Concern    None   Social History Narrative    None        Medications  No current facility-administered medications on file prior to encounter        Current Outpatient Medications on File Prior to Encounter   Medication Sig Dispense Refill    metoprolol tartrate (LOPRESSOR) 25 mg tablet Take 1 tablet (25 mg total) by mouth every 12 (twelve) hours 180 tablet 2    pantoprazole (PROTONIX) 40 mg tablet Take 1 tablet (40 mg total) by mouth daily for 90 days 90 tablet 3    warfarin (COUMADIN) 3 mg tablet Take 1 tablet (3 mg total) by mouth daily Start 5/25/19 (Patient taking differently: Take 5 mg by mouth daily Start 5/25/19)  0    acetaminophen (TYLENOL) 325 mg tablet Take 650 mg by mouth every 6 (six) hours as needed for mild pain         Allergies  No Known Allergies    Review of Systems   Constitutional: Negative for activity change, chills, fever and unexpected weight change  HENT: Negative  Negative for congestion, sneezing and sore throat  Eyes: Negative  Negative for pain and redness  Respiratory: Negative  Negative for chest tightness, shortness of breath and wheezing  Cardiovascular: Negative  Negative for chest pain and palpitations  Gastrointestinal: Negative for abdominal distention, abdominal pain, constipation, diarrhea, nausea and vomiting  Endocrine: Negative  Negative for cold intolerance and heat intolerance  Genitourinary: Negative  Negative for dysuria, hematuria and urgency  Musculoskeletal: Negative  Negative for arthralgias and back pain  Skin: Negative for color change and rash  Allergic/Immunologic: Negative  Negative for environmental allergies and food allergies  Neurological: Negative  Negative for dizziness and light-headedness  Hematological: Negative  Negative for adenopathy  Does not bruise/bleed easily  Psychiatric/Behavioral: Negative  Negative for agitation and sleep disturbance  The patient is not nervous/anxious  Objective  Vitals:    01/22/20 0808   BP: 119/76   Pulse: 62   Resp: 18   Temp: (!) 95 1 °F (35 1 °C)   SpO2: 96%       Physical Exam   Constitutional: He is oriented to person, place, and time  He appears well-developed and well-nourished  No distress  HENT:   Head: Normocephalic and atraumatic  Mouth/Throat: Oropharynx is clear and moist    Eyes: Pupils are equal, round, and reactive to light  Conjunctivae and EOM are normal  No scleral icterus  Neck: Normal range of motion  Neck supple  No tracheal deviation present  No thyromegaly present  Cardiovascular: Normal rate, regular rhythm, normal heart sounds and intact distal pulses  Exam reveals no gallop and no friction rub  No murmur heard    Pulmonary/Chest: Effort normal and breath sounds normal  No stridor  No respiratory distress  He has no wheezes  He has no rales  Abdominal: Soft  He exhibits no distension  There is no rebound and no guarding  Genitourinary:   Genitourinary Comments: Right inguinal hernia   Musculoskeletal: Normal range of motion  Neurological: He is alert and oriented to person, place, and time  Skin: Skin is warm and dry  He is not diaphoretic  Psychiatric: He has a normal mood and affect  His behavior is normal  Judgment and thought content normal    Nursing note and vitals reviewed

## 2020-01-22 NOTE — ANESTHESIA POSTPROCEDURE EVALUATION
Post-Op Assessment Note    CV Status:  Stable  Pain Score: 0    Pain management: adequate     Mental Status:  Sleepy   Hydration Status:  Euvolemic   PONV Controlled:  Controlled   Airway Patency:  Patent   Post Op Vitals Reviewed: Yes      Staff: CRNA           BP   137/60   Temp  97   Pulse  95   Resp   13   SpO2   98%

## 2020-01-22 NOTE — ASSESSMENT & PLAN NOTE
Ok to proceed to OR today for open RIGHT inguinal hernia repair    The risks, benefits and alternatives were discussed with the patient, including bleeding, infection, injury to nearby structures and risk of hernia recurrence  All questions answered  Consent signed and placed in chart

## 2020-02-03 ENCOUNTER — ANTICOAG VISIT (OUTPATIENT)
Dept: CARDIOLOGY CLINIC | Facility: CLINIC | Age: 60
End: 2020-02-03

## 2020-02-03 ENCOUNTER — APPOINTMENT (OUTPATIENT)
Dept: LAB | Facility: CLINIC | Age: 60
End: 2020-02-03
Payer: COMMERCIAL

## 2020-02-03 DIAGNOSIS — I48.0 PAROXYSMAL ATRIAL FIBRILLATION (HCC): ICD-10-CM

## 2020-02-05 ENCOUNTER — OFFICE VISIT (OUTPATIENT)
Dept: SURGERY | Facility: CLINIC | Age: 60
End: 2020-02-05

## 2020-02-05 ENCOUNTER — OFFICE VISIT (OUTPATIENT)
Dept: CARDIOLOGY CLINIC | Facility: CLINIC | Age: 60
End: 2020-02-05
Payer: COMMERCIAL

## 2020-02-05 VITALS — HEIGHT: 76 IN | TEMPERATURE: 96.5 F | HEART RATE: 83 BPM | WEIGHT: 218.6 LBS | BODY MASS INDEX: 26.62 KG/M2

## 2020-02-05 VITALS
DIASTOLIC BLOOD PRESSURE: 68 MMHG | SYSTOLIC BLOOD PRESSURE: 116 MMHG | HEART RATE: 61 BPM | WEIGHT: 216.5 LBS | HEIGHT: 76 IN | BODY MASS INDEX: 26.36 KG/M2

## 2020-02-05 DIAGNOSIS — Z98.890 STATUS POST RIGHT INGUINAL HERNIA REPAIR: Primary | ICD-10-CM

## 2020-02-05 DIAGNOSIS — K85.90 PLEURAL EFFUSION ASSOCIATED WITH PANCREATITIS: ICD-10-CM

## 2020-02-05 DIAGNOSIS — Z87.19 STATUS POST RIGHT INGUINAL HERNIA REPAIR: Primary | ICD-10-CM

## 2020-02-05 DIAGNOSIS — I48.0 PAROXYSMAL ATRIAL FIBRILLATION (HCC): Primary | ICD-10-CM

## 2020-02-05 DIAGNOSIS — J91.8 PLEURAL EFFUSION ASSOCIATED WITH PANCREATITIS: ICD-10-CM

## 2020-02-05 PROBLEM — K40.90 RIGHT INGUINAL HERNIA: Status: RESOLVED | Noted: 2019-12-13 | Resolved: 2020-02-05

## 2020-02-05 PROBLEM — K40.90 INGUINAL HERNIA OF RIGHT SIDE WITHOUT OBSTRUCTION OR GANGRENE: Status: RESOLVED | Noted: 2019-11-15 | Resolved: 2020-02-05

## 2020-02-05 PROCEDURE — 99214 OFFICE O/P EST MOD 30 MIN: CPT | Performed by: INTERNAL MEDICINE

## 2020-02-05 PROCEDURE — 93000 ELECTROCARDIOGRAM COMPLETE: CPT | Performed by: INTERNAL MEDICINE

## 2020-02-05 PROCEDURE — 99024 POSTOP FOLLOW-UP VISIT: CPT | Performed by: SURGERY

## 2020-02-05 NOTE — PROGRESS NOTES
Office Visit - General Surgery  Kirby Hernandez MRN: [id number]  Encounter: [id number]    Assessment and Plan    Problem List Items Addressed This Visit        Other    Status post right inguinal hernia repair - Primary     Doing well from a hernia standpoint  Allow him activity as tolerated  See him back here if needed  Chief Complaint:  Kirby Hernandez is a 60 y o  male who presents for Post-op (p/o inguinal hernia repair  Appetite good  Bowel and bladder functioning normally )    Subjective  65-year-old male status post right inguinal hernia repair  Doing well from that standpoint with no major complaints or problems  He complained 1 episode of black stools  Past Medical History  Past Medical History:   Diagnosis Date    Atrial fibrillation (HCC)     Gastroesophageal reflux     GERD (gastroesophageal reflux disease)     Pancreatitis     Pleural effusion     Second hand smoke exposure        Past Surgical History  Past Surgical History:   Procedure Laterality Date    CHOLECYSTECTOMY      COLONOSCOPY N/A 3/21/2016    Procedure: COLONOSCOPY;  Surgeon: Riana Vaca DO;  [facility]; Service:     ERCP N/A 1/4/2019    Procedure: ENDOSCOPIC RETROGRADE CHOLANGIOPANCREATOGRAPHY (ERCP); Surgeon: Kyaw Huo MD;  [facility]; Service: Gastroenterology    ESOPHAGOGASTRODUODENOSCOPY N/A 2/5/2019    Procedure: ESOPHAGOGASTRODUODENOSCOPY (EGD), with possible nasojejunal Dobhoff tube placement;  Surgeon: John Carnes MD;  [facility]; Service: Gastroenterology    ESOPHAGOGASTRODUODENOSCOPY N/A 4/3/2019    Procedure: ESOPHAGOGASTRODUODENOSCOPY (EGD)- endoscopic necrosectomy;  Surgeon: Kyaw Hou MD;  [facility];   Service: Gastroenterology    IR IMAGE GUIDED ASPIRATION / DRAINAGE W TUBE  6/17/2019    IR THORACENTESIS  2/7/2019    IR THORACENTESIS  3/29/2019    LINEAR ENDOSCOPIC U/S N/A 3/29/2019    Procedure: LINEAR ENDOSCOPIC U/S cyst gastro; Surgeon: Monalisa Land MD;  [facility]; Service: Gastroenterology    UT [address] N/A 5/20/2019    Procedure: [place];  Surgeon: Yojana Cooper MD;  [facility];  Service: Thoracic    UT EGD TRANSORAL BIOPSY SINGLE/MULTIPLE N/A 3/21/2016    Procedure: ESOPHAGOGASTRODUODENOSCOPY (EGD); Surgeon: Vick Milligan DO;  [facility]; Service: General    UT ERCP DX COLLECTION SPECIMEN BRUSHING/WASHING N/A 4/26/2019    Procedure: ENDOSCOPIC RETROGRADE CHOLANGIOPANCREATOGRAPHY (ERCP); Surgeon: Monalisa Land MD;  [facility]; Service: Gastroenterology    UT ESOPHAGOGASTRODUODENOSCOPY TRANSORAL DIAGNOSTIC N/A 4/9/2019    Procedure: ESOPHAGOGASTRODUODENOSCOPY (EGD) endoscopic necrosectomy;  Surgeon: Monalisa Land MD;  [facility]; Service: Gastroenterology    UT ESOPHAGOGASTRODUODENOSCOPY TRANSORAL DIAGNOSTIC N/A 4/26/2019    Procedure: ESOPHAGOGASTRODUODENOSCOPY (EGD); Surgeon: Monalisa Land MD;  [facility]; Service: Gastroenterology    UT REPAIR [address] HERNIA,5+Y/O,REDUCIBL Right 1/22/2020    Procedure: REPAIR HERNIA INGUINAL;  Surgeon: Reji Bingham MD;  [facility];  Service: General    UT THORACOSCOPY SURG PART PULM DECORT Left 5/20/2019    Procedure: DECORTICATION LUNG;  Surgeon: Yojana Cooper MD;  [facility];  Service: Thoracic    UT THORACOSCOPY SURG PART PULM DECORT Left 5/20/2019    Procedure: THORACOSCOPY VIDEO ASSISTED SURGERY (VATS);   Surgeon: Yojana Cooper MD;  [facility];  Service: Thoracic    UT THORACOSCOPY SURG W/PLEURODESIS N/A 5/20/2019    Procedure: PLEURODESIS mechanical;  Surgeon: Yojana Cooper MD;  [facility];  Service: Thoracic       Family History  Family History   Problem Relation Age of Onset    Lung cancer Mother 79        Smoker     Transient ischemic attack Father 61    Lung disease Neg Hx        Social History  Social History     Socioeconomic History    Marital status: /Civil Union     Spouse name: None    Number of children: None    Years of education: None    Highest education level: None   Occupational History    None   Social Needs    Financial resource strain: None    Food insecurity:     Worry: None     Inability: None    Transportation needs:     Medical: None     Non-medical: None   Tobacco Use    Smoking status: Never Smoker    Smokeless tobacco: Never Used   Substance and Sexual Activity    Alcohol use: Not Currently    Drug use: No    Sexual activity: None   Lifestyle    Physical activity:     Days per week: None     Minutes per session: None    Stress: None   Relationships    Social connections:     Talks on phone: None     Gets together: None     Attends Spiritism service: None     Active member of club or organization: None     Attends meetings of clubs or organizations: None     Relationship status: None    Intimate partner violence:     Fear of current or ex partner: None     Emotionally abused: None     Physically abused: None     Forced sexual activity: None   Other Topics Concern    None   Social History Narrative    None        Medications  Current Outpatient Medications on File Prior to Visit   Medication Sig Dispense Refill    acetaminophen (TYLENOL) 325 mg tablet Take 650 mg by mouth every 6 (six) hours as needed for mild pain      metoprolol tartrate (LOPRESSOR) 25 mg tablet Take 1 tablet (25 mg total) by mouth every 12 (twelve) hours 180 tablet 2    warfarin (COUMADIN) 3 mg tablet Take 1 tablet (3 mg total) by mouth daily Start 5/25/19 (Patient taking differently: Take 5 mg by mouth daily Start 5/25/19)  0    pantoprazole (PROTONIX) 40 mg tablet Take 1 tablet (40 mg total) by mouth daily for 90 days 90 tablet 3     No current facility-administered medications on file prior to visit          Allergies  No Known Allergies    Review of Systems    Objective  Vitals:    02/05/20 1006   Pulse: 83   Temp: Michelle Delfina ) 96 5 °F (35 8 °C)       Physical Exam   Abdomen:  Right groin incision healing well healing ridge present

## 2020-02-05 NOTE — PROGRESS NOTES
Cardiology Follow Up    Farhad Jean Baptiste  1960  [id number]  [facility] [phone] [id number]    1  Paroxysmal atrial fibrillation (HCC)  POCT ECG   2  Pleural effusion associated with pancreatitis         Discussion/Summary:   He continues to maintain sinus rhythm  He has been off amiodarone now for 6 months  Overall from a cardiac standpoint he is doing well  Blood pressure is well controlled  No further cardiac workup at this point time I will have him come back in 1 year to see me  Remain on anticoagulation  Questionable history of hypertension  If he truly does not have for hypertension chads Vasc score is low and can consider coming off anticoagulant from a cardiac standpoint  Interval History:  59-year-old gentleman I met in the hospital recently with episodes severe pancreatitis and volume overload secondary to low protein levels  He had no signs of heart failure normal ventricle  He was shocked several times to get out of atrial fibrillation and eventually placed on IV amiodarone to maintain sinus rhythm during acute illness  Denies any chest pain, shortness of breath, palpitations, lightheadedness, dizziness, or syncope  He has been tolerating anticoagulation well with no adverse bleeding events  Taking all medications as prescribed  Maintain sinus rhythm      Problem List     History of acute pancreatitis    Pancreatitis    Overview Signed 1/4/2019 10:19 AM by Frederick Tolentino PA-C     Added automatically from request for surgery [id number]         Delirium    Leukocytosis    Pulmonary edema    Hypokalemia    Metabolic alkalosis    Dysphagia    Metabolic encephalopathy    Paroxysmal atrial fibrillation (HCC)    Supratherapeutic INR    Abdominal pain    Pancreatic pseudocyst    Pleural effusion, left    Protein-calorie malnutrition ([facility] [address] )    Overview Signed 2/4/2019  3:51 PM by Levi Macias PA-C     Added automatically from request for surgery [id number]         Malnutrition Findings:           BMI Findings: Body mass index is 26 35 kg/m²           Abnormal CT of the abdomen        Past Medical History:   Diagnosis Date    Atrial fibrillation (HCC)     Gastroesophageal reflux     GERD (gastroesophageal reflux disease)     Pancreatitis     Pleural effusion     Second hand smoke exposure      Social History     Socioeconomic History    Marital status: /Civil Union     Spouse name: Not on file    Number of children: Not on file    Years of education: Not on file    Highest education level: Not on file   Occupational History    Not on file   Social Needs    Financial resource strain: Not on file    Food insecurity:     Worry: Not on file     Inability: Not on file    Transportation needs:     Medical: Not on file     Non-medical: Not on file   Tobacco Use    Smoking status: Never Smoker    Smokeless tobacco: Never Used   Substance and Sexual Activity    Alcohol use: Not Currently    Drug use: No    Sexual activity: Not on file   Lifestyle    Physical activity:     Days per week: Not on file     Minutes per session: Not on file    Stress: Not on file   Relationships    Social connections:     Talks on phone: Not on file     Gets together: Not on file     Attends Denominational service: Not on file     Active member of club or organization: Not on file     Attends meetings of clubs or organizations: Not on file     Relationship status: Not on file    Intimate partner violence:     Fear of current or ex partner: Not on file     Emotionally abused: Not on file     Physically abused: Not on file     Forced sexual activity: Not on file   Other Topics Concern    Not on file   Social History Narrative    Not on file      Family History   Problem Relation Age of Onset    Lung cancer Mother 79        Smoker     Transient ischemic attack Father 61    Lung disease Neg Hx      Past Surgical History:   Procedure Laterality Date    CHOLECYSTECTOMY      COLONOSCOPY N/A 3/21/2016    Procedure: COLONOSCOPY;  Surgeon: Emely Galvan DO;  [facility]; Service:     ERCP N/A 1/4/2019    Procedure: ENDOSCOPIC RETROGRADE CHOLANGIOPANCREATOGRAPHY (ERCP); Surgeon: Ingrid Clements MD;  [facility]; Service: Gastroenterology    ESOPHAGOGASTRODUODENOSCOPY N/A 2/5/2019    Procedure: ESOPHAGOGASTRODUODENOSCOPY (EGD), with possible nasojejunal Dobhoff tube placement;  Surgeon: Dereck Bonilla MD;  [facility]; Service: Gastroenterology    ESOPHAGOGASTRODUODENOSCOPY N/A 4/3/2019    Procedure: ESOPHAGOGASTRODUODENOSCOPY (EGD)- endoscopic necrosectomy;  Surgeon: Ingrid Clements MD;  [facility]; Service: Gastroenterology    IR IMAGE GUIDED ASPIRATION / DRAINAGE W TUBE  6/17/2019    IR THORACENTESIS  2/7/2019    IR THORACENTESIS  3/29/2019    LINEAR ENDOSCOPIC U/S N/A 3/29/2019    Procedure: LINEAR ENDOSCOPIC U/S cyst gastro;  Surgeon: Ingrid Clements MD;  [facility]; Service: Gastroenterology    [place] [address] N/A 5/20/2019    Procedure: Nabil Mckoy;  Surgeon: Beatriz Oliva MD;  [facility];  Service: Thoracic    CO EGD TRANSORAL BIOPSY SINGLE/MULTIPLE N/A 3/21/2016    Procedure: ESOPHAGOGASTRODUODENOSCOPY (EGD); Surgeon: Emely Galvan DO;  [facility]; Service: General    CO ERCP DX COLLECTION SPECIMEN BRUSHING/WASHING N/A 4/26/2019    Procedure: ENDOSCOPIC RETROGRADE CHOLANGIOPANCREATOGRAPHY (ERCP); Surgeon: Ingrid Clements MD;  [facility]; Service: Gastroenterology    CO ESOPHAGOGASTRODUODENOSCOPY TRANSORAL DIAGNOSTIC N/A 4/9/2019    Procedure: ESOPHAGOGASTRODUODENOSCOPY (EGD) endoscopic necrosectomy;  Surgeon: Ingrid Clements MD;  [facility]; Service: Gastroenterology    CO ESOPHAGOGASTRODUODENOSCOPY TRANSORAL DIAGNOSTIC N/A 4/26/2019    Procedure: ESOPHAGOGASTRODUODENOSCOPY (EGD);   Surgeon: Ingrid Clements MD;  [facility]; Service: Gastroenterology    CO REPAIR [facility] HERNIA,5+Y/O,REDUCIBL Right 1/22/2020    Procedure: REPAIR HERNIA INGUINAL;  Surgeon: Phoebe Segundo MD;  [facility];  Service: General    CO THORACOSCOPY SURG PART PULM DECORT Left 5/20/2019    Procedure: DECORTICATION LUNG;  Surgeon: Jasmin Olivarez MD;  [facility];  Service: Thoracic    CO THORACOSCOPY SURG PART PULM DECORT Left 5/20/2019    Procedure: THORACOSCOPY VIDEO ASSISTED SURGERY (VATS);   Surgeon: Jasmin Olivarez MD;  [facility];  Service: Thoracic    CO THORACOSCOPY SURG W/PLEURODESIS N/A 5/20/2019    Procedure: PLEURODESIS mechanical;  Surgeon: Jasmin Olivarez MD;  [facility];  Service: Thoracic       Current Outpatient Medications:     acetaminophen (TYLENOL) 325 mg tablet, Take 650 mg by mouth every 6 (six) hours as needed for mild pain, Disp: , Rfl:     metoprolol tartrate (LOPRESSOR) 25 mg tablet, Take 1 tablet (25 mg total) by mouth every 12 (twelve) hours, Disp: 180 tablet, Rfl: 2    pantoprazole (PROTONIX) 40 mg tablet, Take 1 tablet (40 mg total) by mouth daily for 90 days, Disp: 90 tablet, Rfl: 3    warfarin (COUMADIN) 3 mg tablet, Take 1 tablet (3 mg total) by mouth daily Start 5/25/19 (Patient taking differently: Take 5 mg by mouth daily Start 5/25/19), Disp: , Rfl: 0  No Known Allergies    Labs:     Chemistry        Component Value Date/Time     01/05/2015 1131    K 4 0 01/16/2020 0435    K 3 9 01/05/2015 1131     (H) 01/16/2020 0435     01/05/2015 1131    CO2 25 01/16/2020 0435    CO2 24 01/05/2019 0314    BUN 19 01/16/2020 0435    BUN 19 01/05/2015 1131    CREATININE 0 97 01/16/2020 0435    CREATININE 1 01 01/05/2015 1131        Component Value Date/Time    CALCIUM 8 5 01/16/2020 0435    CALCIUM 9 2 01/05/2015 1131    ALKPHOS 71 01/16/2020 0435    ALKPHOS 102 01/05/2015 1131    AST 17 01/16/2020 0435    AST 22 01/05/2015 1131    ALT 23 01/16/2020 0435    ALT 43 01/05/2015 1131    BILITOT 1 00 01/05/2015 1131            No results found for: CHOL  Lab Results   Component Value Date    HDL 51 08/09/2019    HDL 59 01/03/2019     Lab Results   Component Value Date    LDLCALC 87 08/09/2019    LDLCALC 77 01/03/2019     Lab Results   Component Value Date    TRIG 108 08/09/2019    TRIG 52 01/03/2019     No results found for: CHOLHDL    Imaging: Ct Abdomen Pelvis Wo Contrast    Result Date: 2/3/2019  Narrative: CT ABDOMEN AND PELVIS WITHOUT IV CONTRAST INDICATION:   Abdominal pain, unspecified  Recent history of pancreatitis  COMPARISON:  Contrast-enhanced CT of the abdomen and pelvis from January 3, 2019  TECHNIQUE:  CT examination of the abdomen and pelvis was performed without intravenous contrast   Axial, sagittal, and coronal 2D reformatted images were created from the source data and submitted for interpretation  Radiation dose length product (DLP) for this visit:  789 mGy-cm   This examination, like all CT scans performed in the [place], was performed utilizing techniques to minimize radiation dose exposure, including the use of iterative reconstruction and automated exposure control  Enteric contrast was administered  The absence of intravenous contrast limits evaluation of the abdominal and pelvic viscera  FINDINGS: ABDOMEN LOWER CHEST:  Moderate size left pleural effusion, developing since the last CT  Compressive atelectasis in the base of the left lower lobe  Subsegmental atelectasis in the base of the right lower lobe  LIVER/BILIARY TREE:  1 6 cm cyst in the dome of the right lobe  Otherwise, liver grossly normal   Stent in CBD  No intrahepatic bile duct dilatation  Tiny amount of pneumobilia in the left lobe GALLBLADDER:     Postcholecystectomy  SPLEEN:  Normal in size    Compressed by adjacent 12 x 14 cm discrete fluid collection, either loculated ascites or pseudocyst  PANCREAS:  Evaluation limited without intravenous contrast   6 x 11 x 14 cm fluid collection in the pancreatic bed, consistent with pseudocyst, extending into the mesenteric root  Pancreas appears to be grossly intact  ADRENAL GLANDS:  Unremarkable  KIDNEYS/URETERS:  Unremarkable  No hydronephrosis  2 x 5 x 10 cm fluid collection in the left posterior pararenal space, extending into the left subphrenic space  STOMACH AND BOWEL:  Stomach displaced by the large pancreatic pseudocyst   Small intestine unremarkable  Diverticulosis in the sigmoid without evidence of diverticulitis  APPENDIX:  No findings to suggest appendicitis  ABDOMINOPELVIC CAVITY: Several prominent mesenteric lymph nodes, the largest a 1 2 x 1 8 cm node at the root of the mesentery  Scattered areas of mesenteric edema  Small amount of pelvic and perihepatic ascites  No extraluminal gas  VESSELS:  Unremarkable for patient's age  PELVIS REPRODUCTIVE ORGANS:  Prostate unremarkable  URINARY BLADDER:  Unremarkable  ABDOMINAL WALL/INGUINAL REGIONS:  Unremarkable  OSSEOUS STRUCTURES:  No acute fracture or destructive osseous lesion  Impression: 1  Moderate-sized left pleural effusion with compressive atelectasis in the subjacent portion of the left lower lobe  2   6 x 11 x 14 cm fluid-filled structure in the pancreatic bed, typical of a pseudocyst, extending into the mesenteric root  3   Several additional loculated fluid collections in the abdomen as described above, probably additional pseudocysts  These include a 12 x 14 cm perisplenic collection and a 2 x 5 x 10 cm left retroperitoneal collection  4   Small amount of ascites  5   Sigmoid diverticulosis without evidence of diverticulitis  6   CBD stent in place with no intrahepatic bile duct dilatation  Workstation performed: [id number]     Xr Chest Portable    Result Date: 2/8/2019  Narrative: CHEST INDICATION:   Significant pleuritic chest pain s/p thoracentesis   COMPARISON:  January 18, 2019 EXAM PERFORMED/VIEWS:  XR CHEST PORTABLE FINDINGS:  Enteric tube terminates below the diaphragms  Right upper extremity PICC line has been removed in the interval  Cardiomediastinal silhouette appears unremarkable  Small left pleural effusion slightly similar in size from prior study  No pneumothorax  Osseous structures appear within normal limits for patient age  Impression: No pneumothorax  Stable size small to moderate left pleural effusion  Workstation performed: [id number]     Ir Thoracentesis    Result Date: 2/7/2019  Narrative: Ultrasound-guided thoracentesis Clinical History: Left pleural effusion   Technique: The patient was brought to the interventional radiology area and placed in the sitting position on the side of a stretcher  The left chest was then examined with ultrasound and the skin was marked  The skin was then prepped, and draped in usual sterile fashion  Lidocaine was administered to the skin and a small skin incision was made  Under direct ultrasound guidance, a 5 [employer] multisidehole catheter was advanced into the pleural space  1200 mL clear ricky pleural fluid was aspirated  Specimens were sent to lab as requested   The patient tolerated the procedure well and suffered no complications  Impression: Impression: 1  Successful ultrasound-guided thoracentesis yielding 1200 mL clear ricky pleural fluid  Workstation performed: [id number]       ECG:    Normal sinus rhythm      ROS    Vitals:    02/05/20 1118   BP: 116/68   Pulse: 61     Vitals:    02/05/20 1118   Weight: 98 2 kg (216 lb 8 oz)     Height: 6' 4" (193 cm)   Body mass index is 26 35 kg/m²  Physical Exam:  Vital signs reviewed  General:  Alert and cooperative, appears stated age, no acute distress  HEENT:  PERRLA, EOMI, no scleral icterus, no conjunctival pallor  Neck:  No lymphadenopathy, no thyromegaly, no carotid bruits, no elevated JVP  Heart:  Regular rate and rhythm, normal S1/S2, no S3/S4, no murmur, rubs or gallops    PMI nondisplaced  Lungs:  Clear to auscultation bilaterally, no wheezes rales or rhonchi  Abdomen:  Soft, non-tender, positive bowel sounds, no rebound or guarding,   no organomegaly   Extremities:  Normal range of motion    No clubbing, cyanosis or edema   Vascular:  2+ pedal pulses  Skin:  No rashes or lesions on exposed skin  Neurologic:  Cranial nerves II-XII grossly intact without focal deficits

## 2020-02-06 DIAGNOSIS — D13.5 AMPULLARY ADENOMA: Primary | ICD-10-CM

## 2020-02-06 NOTE — PROGRESS NOTES
He had some black colored stools and lightheadedness   Asked him to get blood work and if symptoms get worse to go to the hospital

## 2020-02-07 ENCOUNTER — APPOINTMENT (OUTPATIENT)
Dept: LAB | Facility: CLINIC | Age: 60
End: 2020-02-07
Payer: COMMERCIAL

## 2020-02-07 DIAGNOSIS — D13.5 AMPULLARY ADENOMA: ICD-10-CM

## 2020-02-07 LAB
BASOPHILS # BLD AUTO: 0.07 THOUSANDS/ΜL (ref 0–0.1)
BASOPHILS NFR BLD AUTO: 1 % (ref 0–1)
EOSINOPHIL # BLD AUTO: 0.19 THOUSAND/ΜL (ref 0–0.61)
EOSINOPHIL NFR BLD AUTO: 3 % (ref 0–6)
ERYTHROCYTE [DISTWIDTH] IN BLOOD BY AUTOMATED COUNT: 13.1 % (ref 11.6–15.1)
HCT VFR BLD AUTO: 46.1 % (ref 36.5–49.3)
HGB BLD-MCNC: 15.4 G/DL (ref 12–17)
IMM GRANULOCYTES # BLD AUTO: 0.05 THOUSAND/UL (ref 0–0.2)
IMM GRANULOCYTES NFR BLD AUTO: 1 % (ref 0–2)
LYMPHOCYTES # BLD AUTO: 1.28 THOUSANDS/ΜL (ref 0.6–4.47)
LYMPHOCYTES NFR BLD AUTO: 23 % (ref 14–44)
MCH RBC QN AUTO: 30.2 PG (ref 26.8–34.3)
MCHC RBC AUTO-ENTMCNC: 33.4 G/DL (ref 31.4–37.4)
MCV RBC AUTO: 90 FL (ref 82–98)
MONOCYTES # BLD AUTO: 0.32 THOUSAND/ΜL (ref 0.17–1.22)
MONOCYTES NFR BLD AUTO: 6 % (ref 4–12)
NEUTROPHILS # BLD AUTO: 3.64 THOUSANDS/ΜL (ref 1.85–7.62)
NEUTS SEG NFR BLD AUTO: 66 % (ref 43–75)
NRBC BLD AUTO-RTO: 0 /100 WBCS
PLATELET # BLD AUTO: 178 THOUSANDS/UL (ref 149–390)
PMV BLD AUTO: 9.6 FL (ref 8.9–12.7)
RBC # BLD AUTO: 5.1 MILLION/UL (ref 3.88–5.62)
WBC # BLD AUTO: 5.55 THOUSAND/UL (ref 4.31–10.16)

## 2020-02-07 PROCEDURE — 85025 COMPLETE CBC W/AUTO DIFF WBC: CPT

## 2020-02-10 ENCOUNTER — APPOINTMENT (OUTPATIENT)
Dept: LAB | Facility: CLINIC | Age: 60
End: 2020-02-10
Payer: COMMERCIAL

## 2020-02-10 ENCOUNTER — ANTICOAG VISIT (OUTPATIENT)
Dept: CARDIOLOGY CLINIC | Facility: CLINIC | Age: 60
End: 2020-02-10

## 2020-02-10 DIAGNOSIS — I48.0 PAROXYSMAL ATRIAL FIBRILLATION (HCC): ICD-10-CM

## 2020-02-10 LAB
INR PPP: 1.82 (ref 0.84–1.19)
PROTHROMBIN TIME: 20.6 SECONDS (ref 11.6–14.5)

## 2020-02-10 PROCEDURE — 36415 COLL VENOUS BLD VENIPUNCTURE: CPT

## 2020-02-10 PROCEDURE — 85610 PROTHROMBIN TIME: CPT

## 2020-02-12 ENCOUNTER — TELEPHONE (OUTPATIENT)
Dept: GASTROENTEROLOGY | Facility: CLINIC | Age: 60
End: 2020-02-12

## 2020-02-12 NOTE — TELEPHONE ENCOUNTER
----- Message from Loretta Islas MD sent at 2/12/2020  9:07 AM EST -----  Please call patient :  Blood work was normal  Follow up in office

## 2020-02-12 NOTE — TELEPHONE ENCOUNTER
Per pt consent, spoke to pts wife to give non urgent results  She verbalized understanding  She did mention that she got a call from Dr Miranda to cancel pt's upcoming appointment on the 18th and to schedule the ERCP  Provided her with the number  Do you want me to go ahead and cancel that appointment?

## 2020-02-17 ENCOUNTER — ANTICOAG VISIT (OUTPATIENT)
Dept: CARDIOLOGY CLINIC | Facility: CLINIC | Age: 60
End: 2020-02-17

## 2020-02-17 ENCOUNTER — APPOINTMENT (OUTPATIENT)
Dept: LAB | Facility: CLINIC | Age: 60
End: 2020-02-17
Payer: COMMERCIAL

## 2020-02-17 DIAGNOSIS — I48.0 PAROXYSMAL ATRIAL FIBRILLATION (HCC): ICD-10-CM

## 2020-02-17 NOTE — PROGRESS NOTES
SPOKE TO WIFE / W CHANGES TO DOSING   HE IS TO TAKE 5 MG ON SUN AND THUR AND 6 MG ALL OTHER DAYS    TEST IN 1 WK

## 2020-02-18 NOTE — TELEPHONE ENCOUNTER
ERCP scheduled with Dr Miranda in [place] on 2/27/20  I gave patient verbal instructions/mailed  Medication clearance faxed to Dr Bae

## 2020-02-19 ENCOUNTER — TELEPHONE (OUTPATIENT)
Dept: CARDIOLOGY CLINIC | Facility: CLINIC | Age: 60
End: 2020-02-19

## 2020-02-19 NOTE — TELEPHONE ENCOUNTER
Received a call from [address] [place] [address] asking if pt can hold coumadin 5 days prior to ERCP scheduled for 2/27/2020     #[phone]

## 2020-02-20 ENCOUNTER — TELEPHONE (OUTPATIENT)
Dept: GASTROENTEROLOGY | Facility: CLINIC | Age: 60
End: 2020-02-20

## 2020-02-20 ENCOUNTER — DOCUMENTATION (OUTPATIENT)
Dept: HEMATOLOGY ONCOLOGY | Facility: CLINIC | Age: 60
End: 2020-02-20

## 2020-02-20 NOTE — TELEPHONE ENCOUNTER
Per Cher Neville at [facility] no prior auth required for code 10600   Call reference #[id number]

## 2020-02-20 NOTE — PROGRESS NOTES
Rectal/GI Multidisciplinary Case Review date: 2/20/2020      Presenting Doctor: Dr Gerson Quintana      Diagnosis: Ampullary Adenoma      Butch Tonja was presented at the Rectal/GI Multidisciplinary Conference today  PHYSICIAN RECOMMENDED PLAN:    -Surveillance    Team agreed to plan

## 2020-02-23 ENCOUNTER — OFFICE VISIT (OUTPATIENT)
Dept: URGENT CARE | Facility: CLINIC | Age: 60
End: 2020-02-23
Payer: COMMERCIAL

## 2020-02-23 VITALS
WEIGHT: 217 LBS | TEMPERATURE: 95.2 F | HEIGHT: 76 IN | RESPIRATION RATE: 18 BRPM | BODY MASS INDEX: 26.42 KG/M2 | OXYGEN SATURATION: 97 % | DIASTOLIC BLOOD PRESSURE: 78 MMHG | HEART RATE: 68 BPM | SYSTOLIC BLOOD PRESSURE: 102 MMHG

## 2020-02-23 DIAGNOSIS — H10.9 BACTERIAL CONJUNCTIVITIS OF RIGHT EYE: Primary | ICD-10-CM

## 2020-02-23 PROCEDURE — 99213 OFFICE O/P EST LOW 20 MIN: CPT | Performed by: PHYSICIAN ASSISTANT

## 2020-02-23 RX ORDER — TOBRAMYCIN 3 MG/ML
1 SOLUTION/ DROPS OPHTHALMIC
Qty: 1 BOTTLE | Refills: 0 | Status: SHIPPED | OUTPATIENT
Start: 2020-02-23 | End: 2021-08-19

## 2020-02-23 NOTE — PROGRESS NOTES
NAME: Roger Sloan is a 60 y o  male  : 1960    MRN: [id number]      Assessment and Plan   Bacterial conjunctivitis of right eye [H10 9]  1  Bacterial conjunctivitis of right eye  tobramycin (Tobrex) 0 3 % SOLN           Patient Instructions   Patient Instructions   Use eye drops as directed  Warm compresses  Wash sheets and towels  If no improvement in 3-4 days f/u with PCP   If anything changes or worsens f/u sooner     Proceed to ER if symptoms worsen  Chief Complaint     Chief Complaint   Patient presents with    Conjunctivitis     sx noticed yesterday; awoke this am to right eye caked shut         History of Present Illness   Patient with no reported medical problems presents complaining of right eye redness and irritation x1 day  States yesterday started with right eye  States he started having yellow discharge coming out of it  Reports unable to open his eyes this morning due to crusting  States he tried hot compresses and then was able to open it  Reports he has been continuing to have yellow discharge  Reports irritation but denies pain  Reports itching  Denies any injury or inciting event  Denies foreign body sensation  Denies blurry or double vision  Review of Systems   Review of Systems   Constitutional: Negative for chills and fever  Eyes: Positive for pain (Irritation), discharge, redness and itching  Negative for photophobia and visual disturbance           Current Medications       Current Outpatient Medications:     acetaminophen (TYLENOL) 325 mg tablet, Take 650 mg by mouth every 6 (six) hours as needed for mild pain, Disp: , Rfl:     metoprolol tartrate (LOPRESSOR) 25 mg tablet, Take 1 tablet (25 mg total) by mouth every 12 (twelve) hours, Disp: 180 tablet, Rfl: 2    pantoprazole (PROTONIX) 40 mg tablet, Take 1 tablet (40 mg total) by mouth daily for 90 days, Disp: 90 tablet, Rfl: 3    warfarin (COUMADIN) 3 mg tablet, Take 1 tablet (3 mg total) by mouth daily Start 5/25/19 (Patient taking differently: Take 5 mg by mouth daily Start 5/25/19), Disp: , Rfl: 0    tobramycin (Tobrex) 0 3 % SOLN, Administer 1 drop to the right eye every 4 (four) hours while awake, Disp: 1 Bottle, Rfl: 0    Current Allergies     Allergies as of 02/23/2020    (No Known Allergies)              Past Medical History:   Diagnosis Date    Atrial fibrillation (HCC)     Gastroesophageal reflux     GERD (gastroesophageal reflux disease)     Pancreatitis     Pleural effusion     Second hand smoke exposure        Past Surgical History:   Procedure Laterality Date    CHOLECYSTECTOMY      COLONOSCOPY N/A 3/21/2016    Procedure: COLONOSCOPY;  Surgeon: Shanice Francis DO;  [facility]; Service:     ERCP N/A 1/4/2019    Procedure: ENDOSCOPIC RETROGRADE CHOLANGIOPANCREATOGRAPHY (ERCP); Surgeon: Hillary Thomas MD;  [facility]; Service: Gastroenterology    ESOPHAGOGASTRODUODENOSCOPY N/A 2/5/2019    Procedure: ESOPHAGOGASTRODUODENOSCOPY (EGD), with possible nasojejunal Dobhoff tube placement;  Surgeon: Jeannette Jones MD;  [facility]; Service: Gastroenterology    ESOPHAGOGASTRODUODENOSCOPY N/A 4/3/2019    Procedure: ESOPHAGOGASTRODUODENOSCOPY (EGD)- endoscopic necrosectomy;  Surgeon: Hillary Thomas MD;  [facility]; Service: Gastroenterology    IR IMAGE GUIDED ASPIRATION / DRAINAGE W TUBE  6/17/2019    IR THORACENTESIS  2/7/2019    IR THORACENTESIS  3/29/2019    LINEAR ENDOSCOPIC U/S N/A 3/29/2019    Procedure: LINEAR ENDOSCOPIC U/S cyst gastro;  Surgeon: Hillary Thomas MD;  [facility]; Service: Gastroenterology    NC [address] N/A 5/20/2019    Procedure: Stefania Pond;  Surgeon: Luly Muniz MD;  [facility];  Service: Thoracic    NC EGD TRANSORAL BIOPSY SINGLE/MULTIPLE N/A 3/21/2016    Procedure: ESOPHAGOGASTRODUODENOSCOPY (EGD); Surgeon: Shanice Francis DO;  [facility];   Service: General    NC ERCP DX COLLECTION SPECIMEN BRUSHING/WASHING N/A 4/26/2019    Procedure: ENDOSCOPIC RETROGRADE CHOLANGIOPANCREATOGRAPHY (ERCP); Surgeon: Esha Best MD;  [facility]; Service: Gastroenterology    GA ESOPHAGOGASTRODUODENOSCOPY TRANSORAL DIAGNOSTIC N/A 4/9/2019    Procedure: ESOPHAGOGASTRODUODENOSCOPY (EGD) endoscopic necrosectomy;  Surgeon: Esha Best MD;  [facility]; Service: Gastroenterology    GA ESOPHAGOGASTRODUODENOSCOPY TRANSORAL DIAGNOSTIC N/A 4/26/2019    Procedure: ESOPHAGOGASTRODUODENOSCOPY (EGD); Surgeon: Esha Best MD;  [facility]; Service: Gastroenterology    GA REPAIR [address] HERNIA,5+Y/O,REDUCIBL Right 1/22/2020    Procedure: REPAIR HERNIA INGUINAL;  Surgeon: Cam Garcia MD;  [facility];  Service: General    GA THORACOSCOPY SURG PART PULM DECORT Left 5/20/2019    Procedure: DECORTICATION LUNG;  Surgeon: Alexandra Feliz MD;  [facility];  Service: Thoracic    GA THORACOSCOPY SURG PART PULM DECORT Left 5/20/2019    Procedure: THORACOSCOPY VIDEO ASSISTED SURGERY (VATS); Surgeon: Alexandra Feliz MD;  [facility];  Service: Thoracic    GA THORACOSCOPY SURG W/PLEURODESIS N/A 5/20/2019    Procedure: PLEURODESIS mechanical;  Surgeon: Alexandra Feliz MD;  [facility];  Service: Thoracic       Family History   Problem Relation Age of Onset    Lung cancer Mother 79        Smoker     Transient ischemic attack Father 61    Lung disease Neg Hx          Medications have been verified      The following portions of the patient's history were reviewed and updated as appropriate: allergies, current medications, past family history, past medical history, past social history, past surgical history and problem list     Objective   /78   Pulse 68   Temp (!) 95 2 °F (35 1 °C)   Resp 18   Ht 6' 4" (1 93 m)   Wt 98 4 kg (217 lb)   SpO2 97%   BMI 26 41 kg/m²      Physical Exam     Physical Exam   Constitutional: He appears well-developed and well-nourished  No distress  Eyes: Pupils are equal, round, and reactive to light  EOM are normal  Left eye exhibits no discharge  EOMI without pain  Right eye:  Upper and lower eyelids are slightly erythematous and edematous  Conjunctiva is erythematous, injected and slightly swollen  Yellow discharge noted and medial canthus  Yellow crusting to lid margins  No foreign bodies noted  Skin: He is not diaphoretic  Nursing note and vitals reviewed

## 2020-02-23 NOTE — PATIENT INSTRUCTIONS
Use eye drops as directed  Warm compresses  Wash sheets and towels  If no improvement in 3-4 days f/u with PCP   If anything changes or worsens f/u sooner

## 2020-02-24 ENCOUNTER — APPOINTMENT (OUTPATIENT)
Dept: LAB | Facility: CLINIC | Age: 60
End: 2020-02-24
Payer: COMMERCIAL

## 2020-02-24 DIAGNOSIS — I48.0 PAROXYSMAL ATRIAL FIBRILLATION (HCC): ICD-10-CM

## 2020-02-25 ENCOUNTER — ANTICOAG VISIT (OUTPATIENT)
Dept: CARDIOLOGY CLINIC | Facility: CLINIC | Age: 60
End: 2020-02-25

## 2020-02-25 DIAGNOSIS — I48.0 PAROXYSMAL ATRIAL FIBRILLATION (HCC): ICD-10-CM

## 2020-02-27 ENCOUNTER — HOSPITAL ENCOUNTER (OUTPATIENT)
Dept: RADIOLOGY | Facility: HOSPITAL | Age: 60
Discharge: HOME | End: 2020-02-27
Payer: COMMERCIAL

## 2020-02-27 ENCOUNTER — ANESTHESIA (OUTPATIENT)
Dept: GASTROENTEROLOGY | Facility: HOSPITAL | Age: 60
End: 2020-02-27

## 2020-02-27 ENCOUNTER — HOSPITAL ENCOUNTER (OUTPATIENT)
Dept: GASTROENTEROLOGY | Facility: HOSPITAL | Age: 60
Setting detail: OUTPATIENT SURGERY
Discharge: HOME | End: 2020-02-27
Attending: INTERNAL MEDICINE | Admitting: INTERNAL MEDICINE
Payer: COMMERCIAL

## 2020-02-27 ENCOUNTER — ANESTHESIA EVENT (OUTPATIENT)
Dept: GASTROENTEROLOGY | Facility: HOSPITAL | Age: 60
End: 2020-02-27

## 2020-02-27 VITALS
TEMPERATURE: 97.4 F | RESPIRATION RATE: 16 BRPM | BODY MASS INDEX: 26.42 KG/M2 | OXYGEN SATURATION: 96 % | WEIGHT: 217 LBS | HEIGHT: 76 IN | SYSTOLIC BLOOD PRESSURE: 126 MMHG | DIASTOLIC BLOOD PRESSURE: 79 MMHG | HEART RATE: 72 BPM

## 2020-02-27 DIAGNOSIS — K80.50 STONES COMMON DUCT: ICD-10-CM

## 2020-02-27 DIAGNOSIS — D13.5 AMPULLARY ADENOMA: ICD-10-CM

## 2020-02-27 PROCEDURE — 43276 ERCP STENT EXCHANGE W/DILATE: CPT | Performed by: INTERNAL MEDICINE

## 2020-02-27 PROCEDURE — 88305 TISSUE EXAM BY PATHOLOGIST: CPT | Performed by: PATHOLOGY

## 2020-02-27 PROCEDURE — C1769 GUIDE WIRE: HCPCS

## 2020-02-27 PROCEDURE — C2617 STENT, NON-COR, TEM W/O DEL: HCPCS

## 2020-02-27 PROCEDURE — 43261 ENDO CHOLANGIOPANCREATOGRAPH: CPT | Performed by: INTERNAL MEDICINE

## 2020-02-27 PROCEDURE — 74328 X-RAY BILE DUCT ENDOSCOPY: CPT

## 2020-02-27 PROCEDURE — 43264 ERCP REMOVE DUCT CALCULI: CPT | Performed by: INTERNAL MEDICINE

## 2020-02-27 RX ORDER — SUCCINYLCHOLINE/SOD CL,ISO/PF 100 MG/5ML
SYRINGE (ML) INTRAVENOUS AS NEEDED
Status: DISCONTINUED | OUTPATIENT
Start: 2020-02-27 | End: 2020-02-27 | Stop reason: SURG

## 2020-02-27 RX ORDER — SODIUM CHLORIDE, SODIUM LACTATE, POTASSIUM CHLORIDE, CALCIUM CHLORIDE 600; 310; 30; 20 MG/100ML; MG/100ML; MG/100ML; MG/100ML
INJECTION, SOLUTION INTRAVENOUS CONTINUOUS PRN
Status: DISCONTINUED | OUTPATIENT
Start: 2020-02-27 | End: 2020-02-27 | Stop reason: SURG

## 2020-02-27 RX ORDER — DEXAMETHASONE SODIUM PHOSPHATE 10 MG/ML
INJECTION, SOLUTION INTRAMUSCULAR; INTRAVENOUS AS NEEDED
Status: DISCONTINUED | OUTPATIENT
Start: 2020-02-27 | End: 2020-02-27 | Stop reason: SURG

## 2020-02-27 RX ORDER — ONDANSETRON 2 MG/ML
INJECTION INTRAMUSCULAR; INTRAVENOUS AS NEEDED
Status: DISCONTINUED | OUTPATIENT
Start: 2020-02-27 | End: 2020-02-27 | Stop reason: SURG

## 2020-02-27 RX ORDER — PROPOFOL 10 MG/ML
INJECTION, EMULSION INTRAVENOUS AS NEEDED
Status: DISCONTINUED | OUTPATIENT
Start: 2020-02-27 | End: 2020-02-27 | Stop reason: SURG

## 2020-02-27 RX ORDER — FENTANYL CITRATE 50 UG/ML
INJECTION, SOLUTION INTRAMUSCULAR; INTRAVENOUS AS NEEDED
Status: DISCONTINUED | OUTPATIENT
Start: 2020-02-27 | End: 2020-02-27 | Stop reason: SURG

## 2020-02-27 RX ORDER — LIDOCAINE HYDROCHLORIDE 10 MG/ML
INJECTION, SOLUTION EPIDURAL; INFILTRATION; INTRACAUDAL; PERINEURAL AS NEEDED
Status: DISCONTINUED | OUTPATIENT
Start: 2020-02-27 | End: 2020-02-27 | Stop reason: SURG

## 2020-02-27 RX ORDER — EPHEDRINE SULFATE 50 MG/ML
INJECTION INTRAVENOUS AS NEEDED
Status: DISCONTINUED | OUTPATIENT
Start: 2020-02-27 | End: 2020-02-27 | Stop reason: SURG

## 2020-02-27 RX ADMIN — EPHEDRINE SULFATE 5 MG: 50 INJECTION, SOLUTION INTRAVENOUS at 15:07

## 2020-02-27 RX ADMIN — PROPOFOL 200 MG: 10 INJECTION, EMULSION INTRAVENOUS at 14:50

## 2020-02-27 RX ADMIN — Medication 100 MG: at 14:50

## 2020-02-27 RX ADMIN — PHENYLEPHRINE HYDROCHLORIDE 200 MCG: 10 INJECTION INTRAVENOUS at 15:14

## 2020-02-27 RX ADMIN — ONDANSETRON 4 MG: 2 INJECTION INTRAMUSCULAR; INTRAVENOUS at 15:13

## 2020-02-27 RX ADMIN — DEXAMETHASONE SODIUM PHOSPHATE 5 MG: 10 INJECTION, SOLUTION INTRAMUSCULAR; INTRAVENOUS at 15:13

## 2020-02-27 RX ADMIN — PHENYLEPHRINE HYDROCHLORIDE 200 MCG: 10 INJECTION INTRAVENOUS at 15:04

## 2020-02-27 RX ADMIN — IOHEXOL 15 ML: 240 INJECTION, SOLUTION INTRATHECAL; INTRAVASCULAR; INTRAVENOUS; ORAL at 16:22

## 2020-02-27 RX ADMIN — FENTANYL CITRATE 50 MCG: 50 INJECTION, SOLUTION INTRAMUSCULAR; INTRAVENOUS at 14:50

## 2020-02-27 RX ADMIN — PHENYLEPHRINE HYDROCHLORIDE 40 MCG/MIN: 10 INJECTION INTRAVENOUS at 15:17

## 2020-02-27 RX ADMIN — INDOMETHACIN 100 MG: 50 SUPPOSITORY RECTAL at 15:51

## 2020-02-27 RX ADMIN — PHENYLEPHRINE HYDROCHLORIDE 100 MCG: 10 INJECTION INTRAVENOUS at 14:58

## 2020-02-27 RX ADMIN — LIDOCAINE HYDROCHLORIDE 50 MG: 10 INJECTION, SOLUTION EPIDURAL; INFILTRATION; INTRACAUDAL; PERINEURAL at 14:50

## 2020-02-27 RX ADMIN — EPHEDRINE SULFATE 5 MG: 50 INJECTION, SOLUTION INTRAVENOUS at 15:18

## 2020-02-27 RX ADMIN — SODIUM CHLORIDE, SODIUM LACTATE, POTASSIUM CHLORIDE, AND CALCIUM CHLORIDE: .6; .31; .03; .02 INJECTION, SOLUTION INTRAVENOUS at 14:48

## 2020-02-27 NOTE — ANESTHESIA POSTPROCEDURE EVALUATION
Post-Op Assessment Note    CV Status:  Stable    Pain management: adequate     Mental Status:  Alert and awake   Hydration Status:  Euvolemic   PONV Controlled:  Controlled   Airway Patency:  Patent   Post Op Vitals Reviewed: Yes      Staff: CRNA, Anesthesiologist   Comments: Pt awake, able to maintain own airway, VSS, report to GI recovery RN          /79 (02/27/20 1626)    Temp (!) 97 4 °F (36 3 °C) (02/27/20 1626)    Pulse 77 (02/27/20 1626)   Resp 16 (02/27/20 1626)    SpO2 99 % (02/27/20 1626)

## 2020-02-27 NOTE — H&P
History and Physical - [facility]  Greg López 60 y o  male MRN: [id number]    HPI: Greg López is a 60y o  year old male who presents with history of ampullary adenoma status post resection  Base of the adenoma was positive for tubular adenoma  At this time we of evaluating him for stent removal and re-evaluation of the ampullectomy site  Review of Systems    Historical Information   Past Medical History:   Diagnosis Date    Atrial fibrillation ([address] )     Gastroesophageal reflux     GERD (gastroesophageal reflux disease)     Pancreatitis     Pleural effusion     Second hand smoke exposure      Past Surgical History:   Procedure Laterality Date    CHOLECYSTECTOMY      COLONOSCOPY N/A 3/21/2016    Procedure: COLONOSCOPY;  Surgeon: Nirmal Fuller DO;  [facility]; Service:     ERCP N/A 1/4/2019    Procedure: ENDOSCOPIC RETROGRADE CHOLANGIOPANCREATOGRAPHY (ERCP); Surgeon: Isa Lo MD;  [facility]; Service: Gastroenterology    ESOPHAGOGASTRODUODENOSCOPY N/A 2/5/2019    Procedure: ESOPHAGOGASTRODUODENOSCOPY (EGD), with possible nasojejunal Dobhoff tube placement;  Surgeon: Angelika Frazier MD;  [facility]; Service: Gastroenterology    ESOPHAGOGASTRODUODENOSCOPY N/A 4/3/2019    Procedure: ESOPHAGOGASTRODUODENOSCOPY (EGD)- endoscopic necrosectomy;  Surgeon: Isa Lo MD;  [facility]; Service: Gastroenterology    IR IMAGE GUIDED ASPIRATION / DRAINAGE W TUBE  6/17/2019    IR THORACENTESIS  2/7/2019    IR THORACENTESIS  3/29/2019    LINEAR ENDOSCOPIC U/S N/A 3/29/2019    Procedure: LINEAR ENDOSCOPIC U/S cyst gastro;  Surgeon: Isa Lo MD;  [facility];   Service: Gastroenterology    [place] [address] N/A 5/20/2019    Procedure: Bob Tejada;  Surgeon: Preet Newman MD;  [facility];  Service: Thoracic    ME EGD TRANSORAL BIOPSY SINGLE/MULTIPLE N/A 3/21/2016    Procedure: ESOPHAGOGASTRODUODENOSCOPY (EGD); Surgeon: Mahogany Cadena DO;  [facility]; Service: General    MI ERCP DX COLLECTION SPECIMEN BRUSHING/WASHING N/A 4/26/2019    Procedure: ENDOSCOPIC RETROGRADE CHOLANGIOPANCREATOGRAPHY (ERCP); Surgeon: Main Paula MD;  [facility]; Service: Gastroenterology    MI ESOPHAGOGASTRODUODENOSCOPY TRANSORAL DIAGNOSTIC N/A 4/9/2019    Procedure: ESOPHAGOGASTRODUODENOSCOPY (EGD) endoscopic necrosectomy;  Surgeon: Main Paula MD;  [facility]; Service: Gastroenterology    MI ESOPHAGOGASTRODUODENOSCOPY TRANSORAL DIAGNOSTIC N/A 4/26/2019    Procedure: ESOPHAGOGASTRODUODENOSCOPY (EGD); Surgeon: Main Paula MD;  [facility]; Service: Gastroenterology    MI REPAIR [address] HERNIA,5+Y/O,REDUCIBL Right 1/22/2020    Procedure: REPAIR HERNIA INGUINAL;  Surgeon: Tania Stevenson MD;  [facility];  Service: General    MI THORACOSCOPY SURG PART PULM DECORT Left 5/20/2019    Procedure: DECORTICATION LUNG;  Surgeon: Hellen Miguel MD;  [facility];  Service: Thoracic    MI THORACOSCOPY SURG PART PULM DECORT Left 5/20/2019    Procedure: THORACOSCOPY VIDEO ASSISTED SURGERY (VATS);   Surgeon: Hellen Miguel MD;  [facility];  Service: Thoracic    MI THORACOSCOPY SURG W/PLEURODESIS N/A 5/20/2019    Procedure: PLEURODESIS mechanical;  Surgeon: Hellen Miguel MD;  [facility];  Service: Thoracic     Social History   Social History     Substance and Sexual Activity   Alcohol Use Not Currently     Social History     Substance and Sexual Activity   Drug Use No     Social History     Tobacco Use   Smoking Status Never Smoker   Smokeless Tobacco Never Used     Family History   Problem Relation Age of Onset    Lung cancer Mother 79        Smoker     Transient ischemic attack Father 61    Lung disease Neg Hx        Meds/Allergies       (Not in a hospital admission)    No Known Allergies    Objective     /75   Pulse 65 Temp (!) 95 1 °F (35 1 °C) (Tympanic)   Resp 18   Ht 6' 4" (1 93 m)   Wt 98 4 kg (217 lb)   SpO2 98%   BMI 26 41 kg/m²       PHYSICAL EXAM    Gen: NAD  CV: RRR  CHEST: Clear  ABD: soft, NT/ND  EXT: no edema  Neuro: AAO      ASSESSMENT/PLAN:  This is a 60y o  year old male here for ERCP for evaluation of the ampullectomy site and possible APC  He may require stents to be replaced      PLAN:   Procedure:  ERCP

## 2020-02-27 NOTE — ANESTHESIA PREPROCEDURE EVALUATION
1  28 0 60 - 4 47 Thousands/µL    Monocytes Absolute 0 32 0 17 - 1 22 Thousand/µL    Eosinophils Absolute 0 19 0 00 - 0 61 Thousand/µL    Basophils Absolute 0 07 0 00 - 0 10 Thousands/µL   Protime-INR    Collection Time: 02/10/20 10:53 AM   Result Value Ref Range    Protime 20 6 (H) 11 6 - 14 5 seconds    INR 1 82 (H) 0 84 - 1 19   Protime-INR    Collection Time: 02/17/20 10:34 AM   Result Value Ref Range    Protime 27 9 (H) 11 6 - 14 5 seconds    INR 2 67 (H) 0 84 - 1 19   Protime-INR    Collection Time: 02/24/20  1:05 PM   Result Value Ref Range    Protime 29 7 (H) 11 6 - 14 5 seconds    INR 2 89 (H) 0 84 - 1 19       Physical Exam    Airway    Mallampati score: II  TM Distance: >3 FB  Neck ROM: full     Dental   No notable dental hx     Cardiovascular  Comment: Negative ROS, Rhythm: regular, Rate: normal, No murmur,     Pulmonary  Breath sounds clear to auscultation,     Other Findings        Anesthesia Plan  ASA Score- 2     Anesthesia Type- general with ASA Monitors  Additional Monitors:   Airway Plan: ETT  Plan Factors-    Induction- intravenous  Postoperative Plan- Plan for postoperative opioid use  Planned trial extubation    Informed Consent- Anesthetic plan and risks discussed with patient  I personally reviewed this patient with the CRNA  Discussed and agreed on the Anesthesia Plan with the CRNA  Nadine Kimbrough

## 2020-02-28 ENCOUNTER — TELEPHONE (OUTPATIENT)
Dept: GASTROENTEROLOGY | Facility: MEDICAL CENTER | Age: 60
End: 2020-02-28

## 2020-02-28 NOTE — TELEPHONE ENCOUNTER
Pt came into office looking for back to work paperwork, talked with Shanta Shah, he was not aware of the pt needing paperwork, will call the patient

## 2020-03-02 ENCOUNTER — TELEPHONE (OUTPATIENT)
Dept: OTHER | Facility: OTHER | Age: 60
End: 2020-03-02

## 2020-03-02 NOTE — TELEPHONE ENCOUNTER
PATIENT SAID HE MISSED CALL FROM THE DR AND WOULD LIKE A CALL BACK   HE IS STILL WAITING TO BE RELEASED SO HE CAN GO BACK TO WORK PLEASE CALL HIS CELL

## 2020-03-02 NOTE — TELEPHONE ENCOUNTER
Pt called looking for an update on his paperwork to go back to work  Please advise   Pt can be reached at [phone]

## 2020-03-04 ENCOUNTER — TELEPHONE (OUTPATIENT)
Dept: GASTROENTEROLOGY | Facility: CLINIC | Age: 60
End: 2020-03-04

## 2020-03-04 ENCOUNTER — DOCUMENTATION (OUTPATIENT)
Dept: GASTROENTEROLOGY | Facility: MEDICAL CENTER | Age: 60
End: 2020-03-04

## 2020-03-04 NOTE — TELEPHONE ENCOUNTER
Pt called back for assistance   His job is in [employer] back #  [phone] or [phone]

## 2020-03-04 NOTE — TELEPHONE ENCOUNTER
GI lab called stated Pt showed up asking for a letter from Dr Miranda to release pt to return to work  Let the tech know that something will have to be written up and have the Dr Eugene Wolff the paper  Pt asked to either have it mailed or emailed to him   Phone number is [phone], pt asked to touch base with him on form

## 2020-03-04 NOTE — PROGRESS NOTES
To whom it May concern  Subject : Lamar Willoughby  : 1960    This is to state that Mr Lamar Willoughby has been a patient of mine over the past year  Over the course of the year he has had multiple endoscopic and surgical procedures and is at this time doing well  I would recommend that he resume his daily activities as prior  This would include him returning to work  However he may require intermittent visits for appointments and endoscopic procedures in the future  Please do not hesitate to contact me with any questions or concerns that you may have  Sincerely,      MD Pasha Var[facility]  [place] [address]

## 2020-03-05 ENCOUNTER — TELEPHONE (OUTPATIENT)
Dept: GASTROENTEROLOGY | Facility: MEDICAL CENTER | Age: 60
End: 2020-03-05

## 2020-03-09 ENCOUNTER — APPOINTMENT (OUTPATIENT)
Dept: LAB | Facility: CLINIC | Age: 60
End: 2020-03-09
Payer: COMMERCIAL

## 2020-03-10 ENCOUNTER — ANTICOAG VISIT (OUTPATIENT)
Dept: CARDIOLOGY CLINIC | Facility: CLINIC | Age: 60
End: 2020-03-10

## 2020-03-10 DIAGNOSIS — I48.0 PAROXYSMAL ATRIAL FIBRILLATION (HCC): ICD-10-CM

## 2020-03-27 ENCOUNTER — TELEPHONE (OUTPATIENT)
Dept: PULMONOLOGY | Facility: CLINIC | Age: 60
End: 2020-03-27

## 2020-03-27 NOTE — TELEPHONE ENCOUNTER
lvm asking pt if he would like to use the video visit option for his appt   Asked that he call the office back

## 2020-04-06 ENCOUNTER — APPOINTMENT (OUTPATIENT)
Dept: LAB | Facility: CLINIC | Age: 60
End: 2020-04-06
Payer: COMMERCIAL

## 2020-04-06 ENCOUNTER — ANTICOAG VISIT (OUTPATIENT)
Dept: CARDIOLOGY CLINIC | Facility: CLINIC | Age: 60
End: 2020-04-06

## 2020-04-06 DIAGNOSIS — I48.0 PAROXYSMAL ATRIAL FIBRILLATION (HCC): ICD-10-CM

## 2020-04-12 DIAGNOSIS — I48.0 PAROXYSMAL ATRIAL FIBRILLATION (HCC): ICD-10-CM

## 2020-05-11 ENCOUNTER — APPOINTMENT (OUTPATIENT)
Dept: LAB | Facility: CLINIC | Age: 60
End: 2020-05-11
Payer: COMMERCIAL

## 2020-05-11 ENCOUNTER — TRANSCRIBE ORDERS (OUTPATIENT)
Dept: LAB | Facility: CLINIC | Age: 60
End: 2020-05-11

## 2020-05-11 ENCOUNTER — ANTICOAG VISIT (OUTPATIENT)
Dept: CARDIOLOGY CLINIC | Facility: CLINIC | Age: 60
End: 2020-05-11

## 2020-05-11 DIAGNOSIS — K40.90 UNILATERAL INGUINAL HERNIA WITHOUT OBSTRUCTION OR GANGRENE, RECURRENCE NOT SPECIFIED: Primary | ICD-10-CM

## 2020-05-11 DIAGNOSIS — I48.0 PAROXYSMAL ATRIAL FIBRILLATION (HCC): ICD-10-CM

## 2020-05-11 DIAGNOSIS — K40.90 UNILATERAL INGUINAL HERNIA WITHOUT OBSTRUCTION OR GANGRENE, RECURRENCE NOT SPECIFIED: ICD-10-CM

## 2020-05-11 LAB
INR PPP: 2.06 (ref 0.84–1.19)
PROTHROMBIN TIME: 22.7 SECONDS (ref 11.6–14.5)

## 2020-05-11 PROCEDURE — 85610 PROTHROMBIN TIME: CPT

## 2020-06-04 ENCOUNTER — PREP FOR PROCEDURE (OUTPATIENT)
Dept: GASTROENTEROLOGY | Facility: CLINIC | Age: 60
End: 2020-06-04

## 2020-06-04 ENCOUNTER — TELEPHONE (OUTPATIENT)
Dept: GASTROENTEROLOGY | Facility: AMBULARY SURGERY CENTER | Age: 60
End: 2020-06-04

## 2020-06-04 ENCOUNTER — TELEPHONE (OUTPATIENT)
Dept: CARDIOLOGY CLINIC | Facility: CLINIC | Age: 60
End: 2020-06-04

## 2020-06-04 DIAGNOSIS — Z20.822 ENCOUNTER FOR LABORATORY TESTING FOR COVID-19 VIRUS: ICD-10-CM

## 2020-06-04 DIAGNOSIS — D13.5 AMPULLARY ADENOMA: Primary | ICD-10-CM

## 2020-06-08 ENCOUNTER — APPOINTMENT (OUTPATIENT)
Dept: LAB | Facility: CLINIC | Age: 60
End: 2020-06-08
Payer: COMMERCIAL

## 2020-06-08 DIAGNOSIS — I48.0 PAROXYSMAL ATRIAL FIBRILLATION (HCC): Primary | ICD-10-CM

## 2020-06-08 LAB
INR PPP: 1.58 (ref 0.84–1.19)
PROTHROMBIN TIME: 18.4 SECONDS (ref 11.6–14.5)

## 2020-06-08 PROCEDURE — 85610 PROTHROMBIN TIME: CPT

## 2020-06-08 PROCEDURE — 36415 COLL VENOUS BLD VENIPUNCTURE: CPT

## 2020-06-09 ENCOUNTER — ANTICOAG VISIT (OUTPATIENT)
Dept: CARDIOLOGY CLINIC | Facility: CLINIC | Age: 60
End: 2020-06-09

## 2020-06-09 DIAGNOSIS — I48.0 PAROXYSMAL ATRIAL FIBRILLATION (HCC): ICD-10-CM

## 2020-06-15 ENCOUNTER — TELEPHONE (OUTPATIENT)
Dept: GASTROENTEROLOGY | Facility: CLINIC | Age: 60
End: 2020-06-15

## 2020-06-19 DIAGNOSIS — Z20.822 ENCOUNTER FOR LABORATORY TESTING FOR COVID-19 VIRUS: ICD-10-CM

## 2020-06-19 PROCEDURE — U0003 INFECTIOUS AGENT DETECTION BY NUCLEIC ACID (DNA OR RNA); SEVERE ACUTE RESPIRATORY SYNDROME CORONAVIRUS 2 (SARS-COV-2) (CORONAVIRUS DISEASE [COVID-19]), AMPLIFIED PROBE TECHNIQUE, MAKING USE OF HIGH THROUGHPUT TECHNOLOGIES AS DESCRIBED BY CMS-2020-01-R: HCPCS

## 2020-06-20 LAB — SARS-COV-2 RNA SPEC QL NAA+PROBE: NOT DETECTED

## 2020-06-24 ENCOUNTER — ANESTHESIA EVENT (OUTPATIENT)
Dept: GASTROENTEROLOGY | Facility: HOSPITAL | Age: 60
End: 2020-06-24

## 2020-06-24 ENCOUNTER — ANESTHESIA (OUTPATIENT)
Dept: GASTROENTEROLOGY | Facility: HOSPITAL | Age: 60
End: 2020-06-24

## 2020-06-24 ENCOUNTER — HOSPITAL ENCOUNTER (OUTPATIENT)
Dept: GASTROENTEROLOGY | Facility: HOSPITAL | Age: 60
Setting detail: OUTPATIENT SURGERY
Discharge: HOME | End: 2020-06-24
Attending: INTERNAL MEDICINE | Admitting: INTERNAL MEDICINE
Payer: COMMERCIAL

## 2020-06-24 ENCOUNTER — HOSPITAL ENCOUNTER (OUTPATIENT)
Dept: RADIOLOGY | Facility: HOSPITAL | Age: 60
Discharge: HOME | End: 2020-06-24
Payer: COMMERCIAL

## 2020-06-24 VITALS
WEIGHT: 217 LBS | SYSTOLIC BLOOD PRESSURE: 118 MMHG | TEMPERATURE: 98 F | OXYGEN SATURATION: 96 % | HEIGHT: 76 IN | RESPIRATION RATE: 18 BRPM | BODY MASS INDEX: 26.42 KG/M2 | DIASTOLIC BLOOD PRESSURE: 65 MMHG | HEART RATE: 57 BPM

## 2020-06-24 DIAGNOSIS — K80.50 STONES COMMON DUCT: ICD-10-CM

## 2020-06-24 DIAGNOSIS — D13.5 AMPULLARY ADENOMA: ICD-10-CM

## 2020-06-24 PROBLEM — R10.13 EPIGASTRIC PAIN: Status: ACTIVE | Noted: 2020-06-24

## 2020-06-24 PROCEDURE — 43275 ERCP REMOVE FORGN BODY DUCT: CPT | Performed by: INTERNAL MEDICINE

## 2020-06-24 PROCEDURE — 43264 ERCP REMOVE DUCT CALCULI: CPT | Performed by: INTERNAL MEDICINE

## 2020-06-24 PROCEDURE — 74328 X-RAY BILE DUCT ENDOSCOPY: CPT

## 2020-06-24 PROCEDURE — C1769 GUIDE WIRE: HCPCS

## 2020-06-24 RX ORDER — PROPOFOL 10 MG/ML
INJECTION, EMULSION INTRAVENOUS AS NEEDED
Status: DISCONTINUED | OUTPATIENT
Start: 2020-06-24 | End: 2020-06-24 | Stop reason: SURG

## 2020-06-24 RX ORDER — FENTANYL CITRATE 50 UG/ML
INJECTION, SOLUTION INTRAMUSCULAR; INTRAVENOUS AS NEEDED
Status: DISCONTINUED | OUTPATIENT
Start: 2020-06-24 | End: 2020-06-24 | Stop reason: SURG

## 2020-06-24 RX ORDER — TRAMADOL HYDROCHLORIDE 50 MG/1
50 TABLET ORAL EVERY 6 HOURS PRN
Qty: 10 TABLET | Refills: 0 | Status: SHIPPED | OUTPATIENT
Start: 2020-06-24 | End: 2020-07-04

## 2020-06-24 RX ORDER — SIMETHICONE 20 MG/.3ML
40 EMULSION ORAL EVERY 6 HOURS PRN
Status: DISCONTINUED | OUTPATIENT
Start: 2020-06-24 | End: 2020-06-28 | Stop reason: HOSPADM

## 2020-06-24 RX ORDER — SODIUM CHLORIDE, SODIUM LACTATE, POTASSIUM CHLORIDE, CALCIUM CHLORIDE 600; 310; 30; 20 MG/100ML; MG/100ML; MG/100ML; MG/100ML
INJECTION, SOLUTION INTRAVENOUS CONTINUOUS PRN
Status: DISCONTINUED | OUTPATIENT
Start: 2020-06-24 | End: 2020-06-24 | Stop reason: SURG

## 2020-06-24 RX ADMIN — FENTANYL CITRATE 50 MCG: 50 INJECTION, SOLUTION INTRAMUSCULAR; INTRAVENOUS at 16:46

## 2020-06-24 RX ADMIN — PROPOFOL 150 MG: 10 INJECTION, EMULSION INTRAVENOUS at 16:51

## 2020-06-24 RX ADMIN — PROPOFOL 30 MG: 10 INJECTION, EMULSION INTRAVENOUS at 17:02

## 2020-06-24 RX ADMIN — PROPOFOL 30 MG: 10 INJECTION, EMULSION INTRAVENOUS at 17:04

## 2020-06-24 RX ADMIN — SODIUM CHLORIDE, SODIUM LACTATE, POTASSIUM CHLORIDE, AND CALCIUM CHLORIDE: .6; .31; .03; .02 INJECTION, SOLUTION INTRAVENOUS at 16:36

## 2020-06-24 RX ADMIN — PROPOFOL 50 MG: 10 INJECTION, EMULSION INTRAVENOUS at 16:53

## 2020-06-24 RX ADMIN — IOHEXOL 13 ML: 240 INJECTION, SOLUTION INTRATHECAL; INTRAVASCULAR; INTRAVENOUS; ORAL at 17:00

## 2020-06-24 RX ADMIN — PROPOFOL 30 MG: 10 INJECTION, EMULSION INTRAVENOUS at 16:57

## 2020-06-24 RX ADMIN — PROPOFOL 30 MG: 10 INJECTION, EMULSION INTRAVENOUS at 17:00

## 2020-06-24 RX ADMIN — Medication 40 MG: at 17:58

## 2020-06-24 RX ADMIN — PROPOFOL 30 MG: 10 INJECTION, EMULSION INTRAVENOUS at 16:59

## 2020-06-25 ENCOUNTER — APPOINTMENT (EMERGENCY)
Dept: RADIOLOGY | Facility: HOSPITAL | Age: 60
DRG: 445 | End: 2020-06-25
Payer: COMMERCIAL

## 2020-06-25 ENCOUNTER — HOSPITAL ENCOUNTER (INPATIENT)
Facility: HOSPITAL | Age: 60
LOS: 2 days | Discharge: HOME | DRG: 445 | End: 2020-06-28
Attending: EMERGENCY MEDICINE | Admitting: INTERNAL MEDICINE
Payer: COMMERCIAL

## 2020-06-25 DIAGNOSIS — R78.81 GRAM-NEGATIVE BACTEREMIA: ICD-10-CM

## 2020-06-25 DIAGNOSIS — R50.9 FEVER AND CHILLS: Primary | ICD-10-CM

## 2020-06-25 DIAGNOSIS — D13.5 AMPULLARY ADENOMA: ICD-10-CM

## 2020-06-25 LAB
ALBUMIN SERPL BCP-MCNC: 3.3 G/DL (ref 3.5–5)
ALP SERPL-CCNC: 147 U/L (ref 46–116)
ALT SERPL W P-5'-P-CCNC: 709 U/L (ref 12–78)
ANION GAP SERPL CALCULATED.3IONS-SCNC: 7 MMOL/L (ref 4–13)
APTT PPP: 26 SECONDS (ref 23–37)
AST SERPL W P-5'-P-CCNC: 361 U/L (ref 5–45)
BASOPHILS # BLD AUTO: 0.01 THOUSANDS/ΜL (ref 0–0.1)
BASOPHILS NFR BLD AUTO: 0 % (ref 0–1)
BILIRUB SERPL-MCNC: 7.84 MG/DL (ref 0.2–1)
BUN SERPL-MCNC: 17 MG/DL (ref 5–25)
CALCIUM SERPL-MCNC: 8.7 MG/DL (ref 8.3–10.1)
CHLORIDE SERPL-SCNC: 106 MMOL/L (ref 100–108)
CO2 SERPL-SCNC: 25 MMOL/L (ref 21–32)
CREAT SERPL-MCNC: 1.17 MG/DL (ref 0.6–1.3)
EOSINOPHIL # BLD AUTO: 0.02 THOUSAND/ΜL (ref 0–0.61)
EOSINOPHIL NFR BLD AUTO: 0 % (ref 0–6)
ERYTHROCYTE [DISTWIDTH] IN BLOOD BY AUTOMATED COUNT: 13.2 % (ref 11.6–15.1)
GFR SERPL CREATININE-BSD FRML MDRD: 67 ML/MIN/1.73SQ M
GLUCOSE SERPL-MCNC: 138 MG/DL (ref 65–140)
HCT VFR BLD AUTO: 43.7 % (ref 36.5–49.3)
HGB BLD-MCNC: 14.9 G/DL (ref 12–17)
IMM GRANULOCYTES # BLD AUTO: 0.03 THOUSAND/UL (ref 0–0.2)
IMM GRANULOCYTES NFR BLD AUTO: 0 % (ref 0–2)
INR PPP: 1.3 (ref 0.84–1.19)
LACTATE SERPL-SCNC: 1.4 MMOL/L (ref 0.5–2)
LIPASE SERPL-CCNC: 56 U/L (ref 73–393)
LYMPHOCYTES # BLD AUTO: 0.23 THOUSANDS/ΜL (ref 0.6–4.47)
LYMPHOCYTES NFR BLD AUTO: 3 % (ref 14–44)
MCH RBC QN AUTO: 30 PG (ref 26.8–34.3)
MCHC RBC AUTO-ENTMCNC: 34.1 G/DL (ref 31.4–37.4)
MCV RBC AUTO: 88 FL (ref 82–98)
MONOCYTES # BLD AUTO: 0.68 THOUSAND/ΜL (ref 0.17–1.22)
MONOCYTES NFR BLD AUTO: 10 % (ref 4–12)
NEUTROPHILS # BLD AUTO: 5.95 THOUSANDS/ΜL (ref 1.85–7.62)
NEUTS SEG NFR BLD AUTO: 87 % (ref 43–75)
NRBC BLD AUTO-RTO: 0 /100 WBCS
PLATELET # BLD AUTO: 117 THOUSANDS/UL (ref 149–390)
PMV BLD AUTO: 9.5 FL (ref 8.9–12.7)
POTASSIUM SERPL-SCNC: 3.9 MMOL/L (ref 3.5–5.3)
PROT SERPL-MCNC: 6.6 G/DL (ref 6.4–8.2)
PROTHROMBIN TIME: 16.2 SECONDS (ref 11.6–14.5)
RBC # BLD AUTO: 4.96 MILLION/UL (ref 3.88–5.62)
SODIUM SERPL-SCNC: 138 MMOL/L (ref 136–145)
WBC # BLD AUTO: 6.92 THOUSAND/UL (ref 4.31–10.16)

## 2020-06-25 PROCEDURE — 36415 COLL VENOUS BLD VENIPUNCTURE: CPT | Performed by: EMERGENCY MEDICINE

## 2020-06-25 PROCEDURE — 93005 ELECTROCARDIOGRAM TRACING: CPT

## 2020-06-25 PROCEDURE — 96361 HYDRATE IV INFUSION ADD-ON: CPT

## 2020-06-25 PROCEDURE — 87077 CULTURE AEROBIC IDENTIFY: CPT | Performed by: EMERGENCY MEDICINE

## 2020-06-25 PROCEDURE — 85610 PROTHROMBIN TIME: CPT | Performed by: EMERGENCY MEDICINE

## 2020-06-25 PROCEDURE — 85730 THROMBOPLASTIN TIME PARTIAL: CPT | Performed by: EMERGENCY MEDICINE

## 2020-06-25 PROCEDURE — 80053 COMPREHEN METABOLIC PANEL: CPT | Performed by: EMERGENCY MEDICINE

## 2020-06-25 PROCEDURE — 85025 COMPLETE CBC W/AUTO DIFF WBC: CPT | Performed by: EMERGENCY MEDICINE

## 2020-06-25 PROCEDURE — 84145 PROCALCITONIN (PCT): CPT | Performed by: EMERGENCY MEDICINE

## 2020-06-25 PROCEDURE — U0003 INFECTIOUS AGENT DETECTION BY NUCLEIC ACID (DNA OR RNA); SEVERE ACUTE RESPIRATORY SYNDROME CORONAVIRUS 2 (SARS-COV-2) (CORONAVIRUS DISEASE [COVID-19]), AMPLIFIED PROBE TECHNIQUE, MAKING USE OF HIGH THROUGHPUT TECHNOLOGIES AS DESCRIBED BY CMS-2020-01-R: HCPCS | Performed by: EMERGENCY MEDICINE

## 2020-06-25 PROCEDURE — 87186 SC STD MICRODIL/AGAR DIL: CPT | Performed by: EMERGENCY MEDICINE

## 2020-06-25 PROCEDURE — 99285 EMERGENCY DEPT VISIT HI MDM: CPT

## 2020-06-25 PROCEDURE — 87040 BLOOD CULTURE FOR BACTERIA: CPT | Performed by: EMERGENCY MEDICINE

## 2020-06-25 PROCEDURE — 83605 ASSAY OF LACTIC ACID: CPT | Performed by: EMERGENCY MEDICINE

## 2020-06-25 PROCEDURE — 83690 ASSAY OF LIPASE: CPT | Performed by: EMERGENCY MEDICINE

## 2020-06-25 RX ADMIN — SODIUM CHLORIDE 1000 ML: 0.9 INJECTION, SOLUTION INTRAVENOUS at 23:06

## 2020-06-25 NOTE — LETTER
[address] [place] [address]  Dept: [phone]    June 28, 2020     Patient: Odette Chapman   YOB: 1960   Date of Visit: 6/25/2020       To Whom it May Concern:    Shilpa Adams is under my professional care  He was seen in the hospital from 6/25/2020   to 06/28/20  He may return to work on 6/30/2020 without limitations  If you have any questions or concerns, please don't hesitate to call           Sincerely,          Evelyn Salazar PA-C

## 2020-06-26 ENCOUNTER — ANESTHESIA (INPATIENT)
Dept: GASTROENTEROLOGY | Facility: HOSPITAL | Age: 60
DRG: 445 | End: 2020-06-26
Payer: COMMERCIAL

## 2020-06-26 ENCOUNTER — APPOINTMENT (INPATIENT)
Dept: GASTROENTEROLOGY | Facility: HOSPITAL | Age: 60
DRG: 445 | End: 2020-06-26
Payer: COMMERCIAL

## 2020-06-26 ENCOUNTER — ANESTHESIA EVENT (INPATIENT)
Dept: GASTROENTEROLOGY | Facility: HOSPITAL | Age: 60
DRG: 445 | End: 2020-06-26
Payer: COMMERCIAL

## 2020-06-26 ENCOUNTER — APPOINTMENT (INPATIENT)
Dept: RADIOLOGY | Facility: HOSPITAL | Age: 60
DRG: 445 | End: 2020-06-26
Payer: COMMERCIAL

## 2020-06-26 ENCOUNTER — APPOINTMENT (EMERGENCY)
Dept: RADIOLOGY | Facility: HOSPITAL | Age: 60
DRG: 445 | End: 2020-06-26
Payer: COMMERCIAL

## 2020-06-26 PROBLEM — R79.1 SUBTHERAPEUTIC INTERNATIONAL NORMALIZED RATIO (INR): Status: ACTIVE | Noted: 2020-06-26

## 2020-06-26 PROBLEM — R50.9 FEVER AND CHILLS: Status: ACTIVE | Noted: 2020-06-26

## 2020-06-26 LAB
ATRIAL RATE: 108 BPM
BACTERIA UR QL AUTO: NORMAL /HPF
BILIRUB UR QL STRIP: ABNORMAL
CLARITY UR: CLEAR
COLOR UR: ABNORMAL
COLOR, POC: NORMAL
GLUCOSE UR STRIP-MCNC: NEGATIVE MG/DL
HGB UR QL STRIP.AUTO: ABNORMAL
HYALINE CASTS #/AREA URNS LPF: NORMAL /LPF
KETONES UR STRIP-MCNC: NEGATIVE MG/DL
LEUKOCYTE ESTERASE UR QL STRIP: NEGATIVE
NITRITE UR QL STRIP: NEGATIVE
NON-SQ EPI CELLS URNS QL MICRO: NORMAL /HPF
P AXIS: 54 DEGREES
PH UR STRIP.AUTO: 7 [PH] (ref 4.5–8)
PR INTERVAL: 144 MS
PROCALCITONIN SERPL-MCNC: 0.82 NG/ML
PROCALCITONIN SERPL-MCNC: 1.57 NG/ML
PROT UR STRIP-MCNC: ABNORMAL MG/DL
QRS AXIS: -37 DEGREES
QRSD INTERVAL: 86 MS
QT INTERVAL: 312 MS
QTC INTERVAL: 418 MS
RBC #/AREA URNS AUTO: NORMAL /HPF
SARS-COV-2 RNA RESP QL NAA+PROBE: NEGATIVE
SP GR UR STRIP.AUTO: 1.01 (ref 1–1.03)
T WAVE AXIS: 42 DEGREES
UROBILINOGEN UR QL STRIP.AUTO: 1 E.U./DL
VENTRICULAR RATE: 108 BPM
WBC #/AREA URNS AUTO: NORMAL /HPF

## 2020-06-26 PROCEDURE — 74328 X-RAY BILE DUCT ENDOSCOPY: CPT

## 2020-06-26 PROCEDURE — 88305 TISSUE EXAM BY PATHOLOGIST: CPT | Performed by: PATHOLOGY

## 2020-06-26 PROCEDURE — 84145 PROCALCITONIN (PCT): CPT | Performed by: INTERNAL MEDICINE

## 2020-06-26 PROCEDURE — 81001 URINALYSIS AUTO W/SCOPE: CPT

## 2020-06-26 PROCEDURE — 0F798DZ DILATION OF COMMON BILE DUCT WITH INTRALUMINAL DEVICE, VIA NATURAL OR ARTIFICIAL OPENING ENDOSCOPIC: ICD-10-PCS | Performed by: INTERNAL MEDICINE

## 2020-06-26 PROCEDURE — 99223 1ST HOSP IP/OBS HIGH 75: CPT | Performed by: INTERNAL MEDICINE

## 2020-06-26 PROCEDURE — 96361 HYDRATE IV INFUSION ADD-ON: CPT

## 2020-06-26 PROCEDURE — C2617 STENT, NON-COR, TEM W/O DEL: HCPCS

## 2020-06-26 PROCEDURE — 43261 ENDO CHOLANGIOPANCREATOGRAPH: CPT | Performed by: INTERNAL MEDICINE

## 2020-06-26 PROCEDURE — 99285 EMERGENCY DEPT VISIT HI MDM: CPT | Performed by: EMERGENCY MEDICINE

## 2020-06-26 PROCEDURE — 74177 CT ABD & PELVIS W/CONTRAST: CPT

## 2020-06-26 PROCEDURE — 0FC98ZZ EXTIRPATION OF MATTER FROM COMMON BILE DUCT, VIA NATURAL OR ARTIFICIAL OPENING ENDOSCOPIC: ICD-10-PCS | Performed by: INTERNAL MEDICINE

## 2020-06-26 PROCEDURE — 99255 IP/OBS CONSLTJ NEW/EST HI 80: CPT | Performed by: INTERNAL MEDICINE

## 2020-06-26 PROCEDURE — 96374 THER/PROPH/DIAG INJ IV PUSH: CPT

## 2020-06-26 PROCEDURE — C1726 CATH, BAL DIL, NON-VASCULAR: HCPCS

## 2020-06-26 PROCEDURE — C1769 GUIDE WIRE: HCPCS

## 2020-06-26 PROCEDURE — 43264 ERCP REMOVE DUCT CALCULI: CPT | Performed by: INTERNAL MEDICINE

## 2020-06-26 PROCEDURE — 87040 BLOOD CULTURE FOR BACTERIA: CPT | Performed by: INTERNAL MEDICINE

## 2020-06-26 PROCEDURE — 36415 COLL VENOUS BLD VENIPUNCTURE: CPT | Performed by: INTERNAL MEDICINE

## 2020-06-26 PROCEDURE — 43274 ERCP DUCT STENT PLACEMENT: CPT | Performed by: INTERNAL MEDICINE

## 2020-06-26 PROCEDURE — 93010 ELECTROCARDIOGRAM REPORT: CPT | Performed by: INTERNAL MEDICINE

## 2020-06-26 RX ORDER — ONDANSETRON 2 MG/ML
4 INJECTION INTRAMUSCULAR; INTRAVENOUS EVERY 6 HOURS PRN
Status: DISCONTINUED | OUTPATIENT
Start: 2020-06-26 | End: 2020-06-28 | Stop reason: HOSPADM

## 2020-06-26 RX ORDER — PROPOFOL 10 MG/ML
INJECTION, EMULSION INTRAVENOUS AS NEEDED
Status: DISCONTINUED | OUTPATIENT
Start: 2020-06-26 | End: 2020-06-26 | Stop reason: SURG

## 2020-06-26 RX ORDER — SUCCINYLCHOLINE/SOD CL,ISO/PF 100 MG/5ML
SYRINGE (ML) INTRAVENOUS AS NEEDED
Status: DISCONTINUED | OUTPATIENT
Start: 2020-06-26 | End: 2020-06-26

## 2020-06-26 RX ORDER — WARFARIN SODIUM 5 MG/1
5 TABLET ORAL
Status: DISCONTINUED | OUTPATIENT
Start: 2020-06-26 | End: 2020-06-26

## 2020-06-26 RX ORDER — HYDROMORPHONE HCL/PF 1 MG/ML
0.2 SYRINGE (ML) INJECTION
Status: DISCONTINUED | OUTPATIENT
Start: 2020-06-26 | End: 2020-06-28 | Stop reason: HOSPADM

## 2020-06-26 RX ORDER — PANTOPRAZOLE SODIUM 40 MG/1
40 TABLET, DELAYED RELEASE ORAL
Status: DISCONTINUED | OUTPATIENT
Start: 2020-06-26 | End: 2020-06-28 | Stop reason: HOSPADM

## 2020-06-26 RX ORDER — EPHEDRINE SULFATE 50 MG/ML
INJECTION INTRAVENOUS AS NEEDED
Status: DISCONTINUED | OUTPATIENT
Start: 2020-06-26 | End: 2020-06-26 | Stop reason: SURG

## 2020-06-26 RX ORDER — SUCCINYLCHOLINE/SOD CL,ISO/PF 100 MG/5ML
SYRINGE (ML) INTRAVENOUS AS NEEDED
Status: DISCONTINUED | OUTPATIENT
Start: 2020-06-26 | End: 2020-06-26 | Stop reason: SURG

## 2020-06-26 RX ORDER — ONDANSETRON 2 MG/ML
INJECTION INTRAMUSCULAR; INTRAVENOUS AS NEEDED
Status: DISCONTINUED | OUTPATIENT
Start: 2020-06-26 | End: 2020-06-26 | Stop reason: SURG

## 2020-06-26 RX ORDER — DEXAMETHASONE SODIUM PHOSPHATE 10 MG/ML
INJECTION, SOLUTION INTRAMUSCULAR; INTRAVENOUS AS NEEDED
Status: DISCONTINUED | OUTPATIENT
Start: 2020-06-26 | End: 2020-06-26 | Stop reason: SURG

## 2020-06-26 RX ORDER — ACETAMINOPHEN 325 MG/1
650 TABLET ORAL EVERY 6 HOURS PRN
Status: DISCONTINUED | OUTPATIENT
Start: 2020-06-26 | End: 2020-06-28 | Stop reason: HOSPADM

## 2020-06-26 RX ORDER — SODIUM CHLORIDE, SODIUM LACTATE, POTASSIUM CHLORIDE, CALCIUM CHLORIDE 600; 310; 30; 20 MG/100ML; MG/100ML; MG/100ML; MG/100ML
INJECTION, SOLUTION INTRAVENOUS CONTINUOUS PRN
Status: DISCONTINUED | OUTPATIENT
Start: 2020-06-26 | End: 2020-06-26 | Stop reason: SURG

## 2020-06-26 RX ORDER — FENTANYL CITRATE 50 UG/ML
INJECTION, SOLUTION INTRAMUSCULAR; INTRAVENOUS AS NEEDED
Status: DISCONTINUED | OUTPATIENT
Start: 2020-06-26 | End: 2020-06-26 | Stop reason: SURG

## 2020-06-26 RX ORDER — TRAMADOL HYDROCHLORIDE 50 MG/1
50 TABLET ORAL EVERY 6 HOURS PRN
Status: DISCONTINUED | OUTPATIENT
Start: 2020-06-26 | End: 2020-06-28 | Stop reason: HOSPADM

## 2020-06-26 RX ADMIN — PHENYLEPHRINE HYDROCHLORIDE 30 MCG/MIN: 10 INJECTION INTRAVENOUS at 13:28

## 2020-06-26 RX ADMIN — SODIUM CHLORIDE 1000 ML: 0.9 INJECTION, SOLUTION INTRAVENOUS at 00:52

## 2020-06-26 RX ADMIN — PANTOPRAZOLE SODIUM 40 MG: 40 TABLET, DELAYED RELEASE ORAL at 06:11

## 2020-06-26 RX ADMIN — ACETAMINOPHEN 650 MG: 325 TABLET ORAL at 06:11

## 2020-06-26 RX ADMIN — PIPERACILLIN SODIUM AND TAZOBACTAM SODIUM 3.38 G: 36; 4.5 INJECTION, POWDER, FOR SOLUTION INTRAVENOUS at 13:50

## 2020-06-26 RX ADMIN — Medication 100 MG: at 12:50

## 2020-06-26 RX ADMIN — IOHEXOL 100 ML: 350 INJECTION, SOLUTION INTRAVENOUS at 00:18

## 2020-06-26 RX ADMIN — PIPERACILLIN SODIUM AND TAZOBACTAM SODIUM 3.38 G: 36; 4.5 INJECTION, POWDER, FOR SOLUTION INTRAVENOUS at 07:53

## 2020-06-26 RX ADMIN — METOPROLOL TARTRATE 25 MG: 25 TABLET, FILM COATED ORAL at 16:19

## 2020-06-26 RX ADMIN — FENTANYL CITRATE 100 MCG: 50 INJECTION, SOLUTION INTRAMUSCULAR; INTRAVENOUS at 12:50

## 2020-06-26 RX ADMIN — PIPERACILLIN SODIUM AND TAZOBACTAM SODIUM 4.5 G: 36; 4.5 INJECTION, POWDER, FOR SOLUTION INTRAVENOUS at 00:53

## 2020-06-26 RX ADMIN — PHENYLEPHRINE HYDROCHLORIDE 300 MCG: 10 INJECTION INTRAVENOUS at 13:09

## 2020-06-26 RX ADMIN — PROPOFOL 150 MG: 10 INJECTION, EMULSION INTRAVENOUS at 12:50

## 2020-06-26 RX ADMIN — ONDANSETRON 4 MG: 2 INJECTION INTRAMUSCULAR; INTRAVENOUS at 13:17

## 2020-06-26 RX ADMIN — EPHEDRINE SULFATE 5 MG: 50 INJECTION, SOLUTION INTRAVENOUS at 13:24

## 2020-06-26 RX ADMIN — INDOMETHACIN 100 MG: 50 SUPPOSITORY RECTAL at 13:06

## 2020-06-26 RX ADMIN — ONDANSETRON 4 MG: 2 INJECTION INTRAMUSCULAR; INTRAVENOUS at 21:58

## 2020-06-26 RX ADMIN — SODIUM CHLORIDE, SODIUM LACTATE, POTASSIUM CHLORIDE, AND CALCIUM CHLORIDE: .6; .31; .03; .02 INJECTION, SOLUTION INTRAVENOUS at 12:41

## 2020-06-26 RX ADMIN — PIPERACILLIN SODIUM AND TAZOBACTAM SODIUM 3.38 G: 36; 4.5 INJECTION, POWDER, FOR SOLUTION INTRAVENOUS at 20:29

## 2020-06-26 RX ADMIN — DEXAMETHASONE SODIUM PHOSPHATE 5 MG: 10 INJECTION, SOLUTION INTRAMUSCULAR; INTRAVENOUS at 13:19

## 2020-06-26 RX ADMIN — EPHEDRINE SULFATE 10 MG: 50 INJECTION, SOLUTION INTRAVENOUS at 13:07

## 2020-06-26 RX ADMIN — PHENYLEPHRINE HYDROCHLORIDE 300 MCG: 10 INJECTION INTRAVENOUS at 13:10

## 2020-06-26 NOTE — ED PROVIDER NOTES
History  Chief Complaint   Patient presents with    Post-op Problem     Pt reports ERCP yesterday, started with chills, fever, and headache this morning; pt reports going to work and started feeling worse, came home and came to ED      Patient is a 24-year-old with history pancreatitis with very complicated course with numerous surgeries and ERCPs who presents with fever, chills, fatigue following an ERCP  ERCP reports performed yesterday afternoon for stent removal   He was under general anesthesia but procedure was very short  Yesterday evening, he developed subjective fever, chills, nausea without vomiting, and generalized weakness  He also reports severe, sharp epigastric abdominal pain  No associated vomiting, cough, shortness of breath, headache, diarrhea, constipation, urinary symptoms, leg swelling, rash  Prior to Admission Medications   Prescriptions Last Dose Informant Patient Reported?  Taking?   acetaminophen (TYLENOL) 325 mg tablet  Self Yes Yes   Sig: Take 650 mg by mouth every 6 (six) hours as needed for mild pain   metoprolol tartrate (LOPRESSOR) 25 mg tablet 6/25/2020 at Unknown time  No Yes   Sig: TAKE 1 TABLET EVERY 12 HOURS   pantoprazole (PROTONIX) 40 mg tablet 6/26/2020 at Unknown time Self No Yes   Sig: Take 1 tablet (40 mg total) by mouth daily for 90 days   tobramycin (Tobrex) 0 3 % SOLN   No Yes   Sig: Administer 1 drop to the right eye every 4 (four) hours while awake   traMADol (ULTRAM) 50 mg tablet   No Yes   Sig: Take 1 tablet (50 mg total) by mouth every 6 (six) hours as needed for moderate pain for up to 10 days   warfarin (COUMADIN) 3 mg tablet 6/21/2020 Self No Yes   Sig: Take 1 tablet (3 mg total) by mouth daily Start 5/25/19   Patient taking differently: Take 5 mg by mouth daily Start 5/25/19      Facility-Administered Medications: None       Past Medical History:   Diagnosis Date    Atrial fibrillation (HCC)     Gastroesophageal reflux     GERD (gastroesophageal reflux disease)     Pancreatitis     Pleural effusion     Second hand smoke exposure        Past Surgical History:   Procedure Laterality Date    CHOLECYSTECTOMY      COLONOSCOPY N/A 3/21/2016    Procedure: COLONOSCOPY;  Surgeon: Gama Pretty DO;  [facility]; Service:     ERCP N/A 1/4/2019    Procedure: ENDOSCOPIC RETROGRADE CHOLANGIOPANCREATOGRAPHY (ERCP); Surgeon: Jodi Burris MD;  [facility]; Service: Gastroenterology    ESOPHAGOGASTRODUODENOSCOPY N/A 2/5/2019    Procedure: ESOPHAGOGASTRODUODENOSCOPY (EGD), with possible nasojejunal Dobhoff tube placement;  Surgeon: Sabas White MD;  [facility]; Service: Gastroenterology    ESOPHAGOGASTRODUODENOSCOPY N/A 4/3/2019    Procedure: ESOPHAGOGASTRODUODENOSCOPY (EGD)- endoscopic necrosectomy;  Surgeon: Jodi Burris MD;  [facility]; Service: Gastroenterology    IR IMAGE GUIDED ASPIRATION / DRAINAGE W TUBE  6/17/2019    IR THORACENTESIS  2/7/2019    IR THORACENTESIS  3/29/2019    LINEAR ENDOSCOPIC U/S N/A 3/29/2019    Procedure: LINEAR ENDOSCOPIC U/S cyst gastro;  Surgeon: Jodi Burris MD;  [facility]; Service: Gastroenterology    PA [address] N/A 5/20/2019    Procedure: Ty Lorenza;  Surgeon: Sadia Willson MD;  [facility];  Service: Thoracic    PA EGD TRANSORAL BIOPSY SINGLE/MULTIPLE N/A 3/21/2016    Procedure: ESOPHAGOGASTRODUODENOSCOPY (EGD); Surgeon: Gama Pretty DO;  [facility]; Service: General    PA ERCP DX COLLECTION SPECIMEN BRUSHING/WASHING N/A 4/26/2019    Procedure: ENDOSCOPIC RETROGRADE CHOLANGIOPANCREATOGRAPHY (ERCP); Surgeon: Jodi Burris MD;  [facility]; Service: Gastroenterology    PA ESOPHAGOGASTRODUODENOSCOPY TRANSORAL DIAGNOSTIC N/A 4/9/2019    Procedure: ESOPHAGOGASTRODUODENOSCOPY (EGD) endoscopic necrosectomy;  Surgeon: Jodi Burris MD;  [facility];   Service: Gastroenterology    PA ESOPHAGOGASTRODUODENOSCOPY TRANSORAL DIAGNOSTIC N/A 4/26/2019    Procedure: ESOPHAGOGASTRODUODENOSCOPY (EGD); Surgeon: Tiarra Ashton MD;  [facility]; Service: Gastroenterology    MN REPAIR [address] HERNIA,5+Y/O,REDUCIBL Right 1/22/2020    Procedure: REPAIR HERNIA INGUINAL;  Surgeon: Warden Pushpa MD;  [facility];  Service: General    MN THORACOSCOPY SURG PART PULM DECORT Left 5/20/2019    Procedure: DECORTICATION LUNG;  Surgeon: Malika Arnett MD;  [facility];  Service: Thoracic    MN THORACOSCOPY SURG PART PULM DECORT Left 5/20/2019    Procedure: THORACOSCOPY VIDEO ASSISTED SURGERY (VATS); Surgeon: Malika Arnett MD;  [facility];  Service: Thoracic    MN THORACOSCOPY SURG W/PLEURODESIS N/A 5/20/2019    Procedure: PLEURODESIS mechanical;  Surgeon: Malika Arnett MD;  [facility];  Service: Thoracic       Family History   Problem Relation Age of Onset    Lung cancer Mother 79        Smoker     Transient ischemic attack Father 61    Lung disease Neg Hx      I have reviewed and agree with the history as documented  E-Cigarette/Vaping     E-Cigarette/Vaping Substances     Social History     Tobacco Use    Smoking status: Never Smoker    Smokeless tobacco: Never Used   Substance Use Topics    Alcohol use: Not Currently    Drug use: No        Review of Systems   Constitutional: Positive for chills, diaphoresis, fatigue and fever  HENT: Negative for congestion and sore throat  Eyes: Negative for visual disturbance  Respiratory: Negative for cough and shortness of breath  Cardiovascular: Negative for chest pain  Gastrointestinal: Positive for abdominal pain and nausea  Negative for diarrhea and vomiting  Endocrine: Negative for polyuria  Genitourinary: Negative for difficulty urinating and dysuria  Musculoskeletal: Negative for arthralgias  Skin: Negative for rash  Neurological: Positive for light-headedness  Negative for dizziness and headaches     All other systems reviewed and are negative  Physical Exam  ED Triage Vitals [06/25/20 2217]   Temperature Pulse Respirations Blood Pressure SpO2   100 °F (37 8 °C) 96 18 158/86 96 %      Temp Source Heart Rate Source Patient Position - Orthostatic VS BP Location FiO2 (%)   Oral Monitor Lying Right arm --      Pain Score       8             Orthostatic Vital Signs  Vitals:    06/26/20 1358 06/26/20 1403 06/26/20 1408 06/26/20 1423   BP: 140/70 140/70 133/70 144/82   Pulse: 79 77 81 81   Patient Position - Orthostatic VS:           Physical Exam   Constitutional: He is oriented to person, place, and time  He appears well-developed and well-nourished  No distress  HENT:   Head: Normocephalic and atraumatic  Right Ear: External ear normal    Left Ear: External ear normal    Mouth/Throat: No oropharyngeal exudate  Eyes: Pupils are equal, round, and reactive to light  EOM are normal  No scleral icterus  Neck: Normal range of motion  Neck supple  Cardiovascular: Normal rate, regular rhythm and normal heart sounds  Pulmonary/Chest: Effort normal and breath sounds normal  No respiratory distress  Abdominal: Soft  Bowel sounds are normal  There is tenderness in the epigastric area  There is no rigidity, no rebound and no guarding  Musculoskeletal: Normal range of motion  He exhibits no edema  Neurological: He is alert and oriented to person, place, and time  Skin: Skin is warm and dry  No rash noted  Psychiatric: He has a normal mood and affect  Nursing note and vitals reviewed        ED Medications  Medications   acetaminophen (TYLENOL) tablet 650 mg (650 mg Oral Given 6/26/20 0611)   metoprolol tartrate (LOPRESSOR) tablet 25 mg (25 mg Oral Not Given 6/26/20 0313)   pantoprazole (PROTONIX) EC tablet 40 mg (40 mg Oral Given 6/26/20 0611)   traMADol (ULTRAM) tablet 50 mg (has no administration in time range)   ondansetron (ZOFRAN) injection 4 mg (has no administration in time range)   HYDROmorphone (DILAUDID) injection 0 2 mg (has no administration in time range)   piperacillin-tazobactam (ZOSYN) 3 375 g in sodium chloride 0 9 % 100 mL IVPB (3 375 g Intravenous New Bag 6/26/20 1350)   indomethacin (INDOCIN) rectal suppository 100 mg (has no administration in time range)   sodium chloride 0 9 % bolus 1,000 mL (0 mL Intravenous Stopped 6/26/20 0037)   piperacillin-tazobactam (ZOSYN) 4 5 g in sodium chloride 0 9 % 100 mL IVPB (0 g Intravenous Stopped 6/26/20 0223)   sodium chloride 0 9 % bolus 1,000 mL (0 mL Intravenous Stopped 6/26/20 0223)   iohexol (OMNIPAQUE) 350 MG/ML injection (MULTI-DOSE) 100 mL (100 mL Intravenous Given 6/26/20 0018)   indomethacin (INDOCIN) rectal suppository (100 mg Rectal Given 6/26/20 1306)   iohexol (OMNIPAQUE) 240 MG/ML solution 100 mL (16 mL Other Given by Other 6/26/20 1300)       Diagnostic Studies  Results Reviewed     Procedure Component Value Units Date/Time    Blood culture #1 [[id number]] Collected:  06/25/20 2257    Lab Status:  Preliminary result Specimen:  Blood from Arm, Left Updated:  06/26/20 1014     Blood Culture Received in Microbiology Lab  Culture in Progress  Blood culture #2 [[id number]] Collected:  06/25/20 2256    Lab Status:  Preliminary result Specimen:  Blood from Arm, Right Updated:  06/26/20 0802     Blood Culture Received in Microbiology Lab  Culture in Progress      Procalcitonin Reflex [[id number]]  (Abnormal) Collected:  06/26/20 0613    Lab Status:  Final result Specimen:  Blood from Arm, Left Updated:  06/26/20 0735     Procalcitonin 0 82 ng/ml     POCT urinalysis dipstick [[id number]]  (Normal) Resulted:  06/26/20 0222    Lab Status:  Final result Specimen:  Urine Updated:  06/26/20 0222     Color, UA see chart    Urine Microscopic [[id number]]  (Normal) Collected:  06/26/20 0056    Lab Status:  Final result Specimen:  Urine, Clean Catch Updated:  06/26/20 0138     RBC, UA None Seen /hpf      WBC, UA None Seen /hpf      Epithelial Cells None Seen /hpf Bacteria, UA None Seen /hpf      Hyaline Casts, UA None Seen /lpf     Urine Macroscopic, POC [[id number]]  (Abnormal) Collected:  06/26/20 0056    Lab Status:  Final result Specimen:  Urine Updated:  06/26/20 0056     Color, UA [place]     Clarity, UA Clear     pH, UA 7 0     Leukocytes, UA Negative     Nitrite, UA Negative     Protein, UA 30 (1+) mg/dl      Glucose, UA Negative mg/dl      Ketones, UA Negative mg/dl      Urobilinogen, UA 1 0 E U /dl      Bilirubin, UA Interference- unable to analyze     Blood, UA Trace     Specific [place], UA 1 010    Narrative:       CLINITEK RESULT    Novel Coronavirus Angelita Oviedo [[id number]]  (Normal) Collected:  06/25/20 2258    Lab Status:  Final result Specimen:  Nares from Nose Updated:  06/26/20 0027     SARS-CoV-2 Negative    Narrative: The specimen collection materials, transport medium, and/or testing methodology utilized in the production of these test results have been proven to be reliable in a limited validation with an abbreviated program under the Emergency Utilization Authorization provided by the FDA  Testing reported as "Presumptive positive" will be confirmed with secondary testing with a reference laboratory to ensure result accuracy  Clinical caution and judgement should be used with the interpretation of these results with consideration of the clinical impression and other laboratory testing  Testing reported as "Positive" or "Negative" has been proven to be accurate according to standard laboratory validation requirements  All testing is performed with control materials showing appropriate reactivity at standard intervals        Procalcitonin [[id number]]  (Abnormal) Collected:  06/25/20 2258    Lab Status:  Final result Specimen:  Blood from Arm, Left Updated:  06/26/20 0010     Procalcitonin 1 57 ng/ml     Lactic acid [[id number]]  (Normal) Collected:  06/25/20 2258    Lab Status:  Final result Specimen:  Blood from Arm, Left Updated: 06/25/20 2334     LACTIC ACID 1 4 mmol/L     Narrative:       Result may be elevated if tourniquet was used during collection      Protime-INR [[id number]]  (Abnormal) Collected:  06/25/20 2258    Lab Status:  Final result Specimen:  Blood from Arm, Left Updated:  06/25/20 2332     Protime 16 2 seconds      INR 1 30    APTT [[id number]]  (Normal) Collected:  06/25/20 2258    Lab Status:  Final result Specimen:  Blood from Arm, Left Updated:  06/25/20 2332     PTT 26 seconds     Comprehensive metabolic panel [[id number]]  (Abnormal) Collected:  06/25/20 2258    Lab Status:  Final result Specimen:  Blood from Arm, Left Updated:  06/25/20 2328     Sodium 138 mmol/L      Potassium 3 9 mmol/L      Chloride 106 mmol/L      CO2 25 mmol/L      ANION GAP 7 mmol/L      BUN 17 mg/dL      Creatinine 1 17 mg/dL      Glucose 138 mg/dL      Calcium 8 7 mg/dL       U/L       U/L      Alkaline Phosphatase 147 U/L      Total Protein 6 6 g/dL      Albumin 3 3 g/dL      Total Bilirubin 7 84 mg/dL      eGFR 67 ml/min/1 73sq m     Narrative:       [place] guidelines for Chronic Kidney Disease (CKD):     Stage 1 with normal or high GFR (GFR > 90 mL/min/1 73 square meters)    Stage 2 Mild CKD (GFR = 60-89 mL/min/1 73 square meters)    Stage 3A Moderate CKD (GFR = 45-59 mL/min/1 73 square meters)    Stage 3B Moderate CKD (GFR = 30-44 mL/min/1 73 square meters)    Stage 4 Severe CKD (GFR = 15-29 mL/min/1 73 square meters)    Stage 5 End Stage CKD (GFR <15 mL/min/1 73 square meters)  Note: GFR calculation is accurate only with a steady state creatinine    Lipase [[id number]]  (Abnormal) Collected:  06/25/20 2258    Lab Status:  Final result Specimen:  Blood from Arm, Left Updated:  06/25/20 2320     Lipase 56 u/L     CBC and differential [[id number]]  (Abnormal) Collected:  06/25/20 2258    Lab Status:  Final result Specimen:  Blood from Arm, Left Updated:  06/25/20 2315     WBC 6 92 Thousand/uL RBC 4 96 Million/uL      Hemoglobin 14 9 g/dL      Hematocrit 43 7 %      MCV 88 fL      MCH 30 0 pg      MCHC 34 1 g/dL      RDW 13 2 %      MPV 9 5 fL      Platelets 522 Thousands/uL      nRBC 0 /100 WBCs      Neutrophils Relative 87 %      Immat GRANS % 0 %      Lymphocytes Relative 3 %      Monocytes Relative 10 %      Eosinophils Relative 0 %      Basophils Relative 0 %      Neutrophils Absolute 5 95 Thousands/µL      Immature Grans Absolute 0 03 Thousand/uL      Lymphocytes Absolute 0 23 Thousands/µL      Monocytes Absolute 0 68 Thousand/µL      Eosinophils Absolute 0 02 Thousand/µL      Basophils Absolute 0 01 Thousands/µL                  CT abdomen pelvis with contrast   Final Result by Edson Marte MD (06/26 0122)      Prior cholecystectomy  The common bile duct measures approximately 11 mm; this is similar to January 6, 2020  Prostate is enlarged, measuring approximately 5 9 cm transverse dimension  Clinical and laboratory correlation is recommended  Colonic diverticulosis without evidence of acute diverticulitis  No evidence of bowel obstruction  Normal appendix  Other findings as described above, please see discussion              Workstation performed: [id number]         [place] ERCP biliary only    (Results Pending)         Procedures  ECG 12 Lead Documentation Only  Date/Time: 6/26/2020 12:11 AM  Performed by: Cong Ortiz MD  Authorized by: Cong Ortiz MD     Indications / Diagnosis:  Sepsis  ECG reviewed by me, the ED Provider: yes    Patient location:  ED  Previous ECG:     Previous ECG:  Compared to current    Similarity:  Changes noted  Interpretation:     Interpretation: abnormal    Rate:     ECG rate:  108    ECG rate assessment: tachycardic    Rhythm:     Rhythm: sinus rhythm    Ectopy:     Ectopy: none    QRS:     QRS axis:  Left  Conduction:     Conduction: normal    ST segments:     ST segments:  Normal  T waves:     T waves: normal            ED Course  ED Course as of Jun 26 1521   Fri Jun 26, 2020   0005 GI: agree with fluids, antibiotics, CT; if needed can do RUQ US; will see in a m           US AUDIT      Most Recent Value   Initial Alcohol Screen: US AUDIT-C    1  How often do you have a drink containing alcohol?  0 Filed at: 06/25/2020 2352   2  How many drinks containing alcohol do you have on a typical day you are drinking? 0 Filed at: 06/25/2020 2352   3a  Male UNDER 65: How often do you have five or more drinks on one occasion? 0 Filed at: 06/25/2020 2352   3b  FEMALE Any Age, or MALE 65+: How often do you have 4 or more drinks on one occassion? 0 Filed at: 06/25/2020 2352   Audit-C Score  0 Filed at: 06/25/2020 2352                  SAVI/DAST-10      Most Recent Value   How many times in the past year have you    Used an illegal drug or used a prescription medication for non-medical reasons? Never Filed at: 06/25/2020 2352              Initial Sepsis Screening     Row Name 06/26/20 0001                Is the patient's history suggestive of a new or worsening infection? (!) Yes (Proceed)  -VH        Suspected source of infection  acute abdominal infection  -VH        Are two or more of the following signs & symptoms of infection both present and new to the patient? No  -VH        Indicate SIRS criteria  Tachycardia > 90 bpm  -VH        If the answer is yes to both questions, suspicion of sepsis is present          If severe sepsis is present AND tissue hypoperfusion perists in the hour after fluid resuscitation or lactate > 4, the patient meets criteria for SEPTIC SHOCK          Are any of the following organ dysfunction criteria present within 6 hours of suspected infection and SIRS criteria that are NOT considered to be chronic conditions?           Organ dysfunction          Date of presentation of severe sepsis          Time of presentation of severe sepsis          Tissue hypoperfusion persists in the hour after crystalloid fluid administration, evidenced, by either:          Was hypotension present within one hour of the conclusion of crystalloid fluid administration?         Date of presentation of septic shock          Time of presentation of septic shock            User Key  (r) = Recorded By, (t) = Taken By, (c) = Cosigned By    [address] Name Provider Type    Chadwick Whitaker MD Physician                      MDM  Number of Diagnoses or Management Options  Diagnosis management comments: Patient with fever at home following an ERCP  Exam shows mild tachycardia, tired-appearing gentleman with mild epigastric tenderness  Concerning for pos-ERCP  colangitis or other complication  Sepsis labs obtained and are significant for elevated bilirubin, LFTs  Will treat with broad-spectrum antibiotics, admit for further care           Disposition  Final diagnoses:   Fever and chills   Ampullary adenoma     Time reflects when diagnosis was documented in both MDM as applicable and the Disposition within this note     Time User Action Codes Description Comment    6/26/2020 12:52 AM Miguel WESTFALL Add [R50 9] Fever and chills     6/26/2020 12:52 AM Chapin Jewell Modify [R50 9] Fever and chills     6/26/2020  8:06 AM Richard Reddy Add [D13 5] Ampullary adenoma       ED Disposition     None      Follow-up Information    None         Current Discharge Medication List      CONTINUE these medications which have NOT CHANGED    Details   acetaminophen (TYLENOL) 325 mg tablet Take 650 mg by mouth every 6 (six) hours as needed for mild pain      metoprolol tartrate (LOPRESSOR) 25 mg tablet TAKE 1 TABLET EVERY 12 HOURS  Qty: 180 tablet, Refills: 3    Associated Diagnoses: Paroxysmal atrial fibrillation (HCC)      pantoprazole (PROTONIX) 40 mg tablet Take 1 tablet (40 mg total) by mouth daily for 90 days  Qty: 90 tablet, Refills: 3    Associated Diagnoses: Pancreatic pseudocyst      tobramycin (Tobrex) 0 3 % SOLN Administer 1 drop to the right eye every 4 (four) hours while awake  Qty: 1 Bottle, Refills: 0    Associated Diagnoses: Bacterial conjunctivitis of right eye      traMADol (ULTRAM) 50 mg tablet Take 1 tablet (50 mg total) by mouth every 6 (six) hours as needed for moderate pain for up to 10 days  Qty: 10 tablet, Refills: 0    Associated Diagnoses: Ampullary adenoma      warfarin (COUMADIN) 3 mg tablet Take 1 tablet (3 mg total) by mouth daily Start 5/25/19  Refills: 0    Associated Diagnoses: Pleural effusion, left           No discharge procedures on file  PDMP Review     None           ED Provider  Attending physically available and evaluated Beena Patino I managed the patient along with the ED Attending      Electronically Signed by         Jone Fairchild MD  06/26/20 9775

## 2020-06-26 NOTE — ASSESSMENT & PLAN NOTE
Would continue Ultram for moderate pain  Dilaudid 0 2 mg every 3 hours as needed for any severe pain

## 2020-06-26 NOTE — ASSESSMENT & PLAN NOTE
Postoperative fever and chills  Procalcitonin elevated although WBC count is similar to previous  Would continue Zosyn for now  Infectious disease and GI consult  Also trend hepatic profile  Trend lipase

## 2020-06-26 NOTE — UTILIZATION REVIEW
Initial Clinical Review    Admission: Date/Time/Statement: Admission Orders (From admission, onward)     Ordered        06/26/20 0055  Inpatient Admission  Once                   Orders Placed This Encounter   Procedures    Inpatient Admission     Standing Status:   Standing     Number of Occurrences:   1     Order Specific Question:   Admitting Physician     Answer:   Anderson Harris [[id number]]     Order Specific Question:   Level of Care     Answer:   Med Surg [[id number]]     Order Specific Question:   Estimated length of stay     Answer:   More than 2 Midnights     Order Specific Question:   Certification     Answer:   I certify that inpatient services are medically necessary for this patient for a duration of greater than two midnights  See H&P and MD Progress Notes for additional information about the patient's course of treatment  ED Arrival Information     Expected Arrival Acuity Means of Arrival Escorted By Service Admission Type    - 6/25/2020 22:01 Urgent Walk-In Family Member Hospitalist Urgent    Arrival Complaint    Post Op Problem        Chief Complaint   Patient presents with    Post-op Problem     Pt reports ERCP yesterday, started with chills, fever, and headache this morning; pt reports going to work and started feeling worse, came home and came to ED      Assessment/Plan:    60year old male presents to ed from home for evaluation and treatment of fever, chills and fatigue after ERCP performed 1 day ago  PMHX: AFIB, GERD, cholecystectomy, pancreatitis  Physical examination significant for temp 100, pro calcitonin elevation to 1 57, ALT  09, , ALK PHOS 147, T BILI 7 84  CT shows  cbd 11 no change from previous  Ekg with tachycardia  Blood cultures drawn  Treated in ed with  9% ns bolus,  Iv zosyn, po protonix, po tylenol   Admit to inpatient medical surgical for abdominal pain, hyperbilirubinemia, fever chills    Plan to continue iv zosyn, consult gastroenterology and infectious disease, trend hepatic profile, and lipase and procalcitonin, pain management     6-26  Consult gastroenterology   Plan for ERCP 1215  today, hold coumadin, trend LFTs, INR  Continue iv zosyn     ED Triage Vitals [06/25/20 2217]   100 °F (37 8 °C) 96 18 158/86 96 %      Oral Monitor         Pain Score       8       06/24/20 98 4 kg (217 lb)     Additional Vital Signs:      Date/Time  Temp  Pulse  Resp  BP  MAP (mmHg)  SpO2  O2 Device  Patient Position - Orthostatic VS   06/26/20 0900    70  18  145/67  97  94 %       06/26/20 0845    74  19      93 %       06/26/20 0830    98  16  138/85  106  95 %       06/26/20 0754  97 6 °F (36 4 °C)  97  18  138/73    94 %  None (Room air)  Lying   06/26/20 0500    78  21      94 %  None (Room air)     06/26/20 0230    80  20  157/86  114  95 %  None (Room air)  Lying   06/26/20 0030    86  24Abnormal   160/91  117  96 %  None (Room air)  Lying             Pertinent Labs/Diagnostic Test Results:     ECG 12 Lead Documentation Only  Date/Time: 6/26/2020 12:11 AM       Indications / Diagnosis:  Sepsis  ECG reviewed by me, the ED Provider: yes    Patient location:  ED  Previous ECG:     Previous ECG:  Compared to current    Similarity:  Changes noted  Interpretation:     Interpretation: abnormal    Rate:     ECG rate:  108    ECG rate assessment: tachycardic    Rhythm:     Rhythm: sinus rhythm    Ectopy:     Ectopy: none    QRS:     QRS axis:  Left  Conduction:     Conduction: normal    ST segments:     ST segments:  Normal  T waves:     T waves: normal         CT abdomen pelvis with contrast   Final (06/26 0122)      Prior cholecystectomy  The common bile duct measures approximately 11 mm; this is similar to January 6, 2020  Prostate is enlarged, measuring approximately 5 9 cm transverse dimension  Clinical and laboratory correlation is recommended  Colonic diverticulosis without evidence of acute diverticulitis  No evidence of bowel obstruction    Normal appendix  Results from last 7 days   Lab Units 06/25/20  2258 06/19/20  1341   SARS-COV-2  Negative Not Detected     Results from last 7 days   Lab Units 06/25/20  2258   WBC Thousand/uL 6 92   HEMOGLOBIN g/dL 14 9   HEMATOCRIT % 43 7   PLATELETS Thousands/uL 117*   NEUTROS ABS Thousands/µL 5 95         Results from last 7 days   Lab Units 06/25/20  2258   SODIUM mmol/L 138   POTASSIUM mmol/L 3 9   CHLORIDE mmol/L 106   CO2 mmol/L 25   ANION GAP mmol/L 7   BUN mg/dL 17   CREATININE mg/dL 1 17   EGFR ml/min/1 73sq m 67   CALCIUM mg/dL 8 7     Results from last 7 days   Lab Units 06/25/20  2258   AST U/L 361*   ALT U/L 709*   ALK PHOS U/L 147*   TOTAL PROTEIN g/dL 6 6   ALBUMIN g/dL 3 3*   TOTAL BILIRUBIN mg/dL 7 84*         Results from last 7 days   Lab Units 06/25/20  2258   GLUCOSE RANDOM mg/dL 138     Results from last 7 days   Lab Units 06/25/20  2258   PROTIME seconds 16 2*   INR  1 30*   PTT seconds 26         Results from last 7 days   Lab Units 06/26/20  0613 06/25/20  2258   PROCALCITONIN ng/ml 0 82* 1 57*     Results from last 7 days   Lab Units 06/25/20  2258   LACTIC ACID mmol/L 1 4     Results from last 7 days   Lab Units 06/25/20  2258   LIPASE u/L 56*     Results from last 7 days   Lab Units 06/26/20  0222 06/26/20  0056   CLARITY UA   --  Clear   COLOR UA  see chart Marina   SPEC GRAV UA   --  1 010   PH UA   --  7 0   GLUCOSE UA mg/dl  --  Negative   KETONES UA mg/dl  --  Negative   BLOOD UA   --  Trace*   PROTEIN UA mg/dl  --  30 (1+)*   NITRITE UA   --  Negative   BILIRUBIN UA   --  Interference- unable to analyze*   UROBILINOGEN UA E U /dl  --  1 0   LEUKOCYTES UA   --  Negative   WBC UA /hpf  --  None Seen   RBC UA /hpf  --  None Seen   BACTERIA UA /hpf  --  None Seen   EPITHELIAL CELLS WET PREP /hpf  --  None Seen       Results from last 7 days   Lab Units 06/25/20 2257 06/25/20  2256   BLOOD CULTURE  Received in Microbiology Lab  Culture in Progress  Received in Microbiology Lab  Culture in Progress  ED Treatment:   Medication Administration from 06/25/2020 2200 to 06/26/2020 1142       Date/Time Order Dose Route Action     06/25/2020 2306 sodium chloride 0 9 % bolus 1,000 mL 1,000 mL Intravenous New Bag     06/26/2020 0053 piperacillin-tazobactam (ZOSYN) 4 5 g in sodium chloride 0 9 % 100 mL IVPB 4 5 g Intravenous New Bag     06/26/2020 0052 sodium chloride 0 9 % bolus 1,000 mL 1,000 mL Intravenous New Bag     06/26/2020 6290 acetaminophen (TYLENOL) tablet 650 mg 650 mg Oral Given     06/26/2020 0611 pantoprazole (PROTONIX) EC tablet 40 mg 40 mg Oral Given     06/26/2020 0753 piperacillin-tazobactam (ZOSYN) 3 375 g in sodium chloride 0 9 % 100 mL IVPB 3 375 g Intravenous New Bag        Past Medical History:   Diagnosis    Atrial fibrillation (HCC)    Gastroesophageal reflux    GERD (gastroesophageal reflux disease)    Pancreatitis    Pleural effusion    Second hand smoke exposure     Present on Admission:   Paroxysmal atrial fibrillation (HCC)   GERD (gastroesophageal reflux disease)   Epigastric pain      Admitting Diagnosis: Post-operative infection [T81 40XA]     Age/Sex: 60 y o  male  Admission Orders:  Scheduled Medications:    Medications:  metoprolol tartrate 25 mg Oral Q12H   pantoprazole 40 mg Oral Early Morning   piperacillin-tazobactam 3 375 g Intravenous Q6H     Continuous IV Infusions:     PRN Meds:    acetaminophen 650 mg Oral Q6H PRN   HYDROmorphone 0 2 mg Intravenous Q3H PRN   ondansetron 4 mg Intravenous Q6H PRN   traMADol 50 mg Oral Q6H PRN       IP CONSULT TO INFECTIOUS DISEASES  IP CONSULT TO GASTROENTEROLOGY    Network Utilization Review Department  [email or web address]  ATTENTION: Please call with any questions or concerns to [phone] and carefully listen to the prompts so that you are directed to the right person   All voicemails are confidential   Sudie Crigler all requests for admission clinical reviews, approved or denied determinations and any other requests to dedicated fax number below belonging to the campus where the patient is receiving treatment   List of dedicated fax numbers for the Facilities:  [address] DENIALS (Administrative/Medical Necessity) [phone]   [address] (Maternity/NICU/Pediatrics) [phone]   Jannie Ang [phone]   [facility] [phone]   [facility] [phone]   [facility] [phone]   [address] [place] [address] [phone]   [facility]  [phone]   [address] [facility] [address]  [address] [facility] [address] [facility] [phone]

## 2020-06-26 NOTE — CONSULTS
Consultation - [facility]  Eulalia Becerra 60 y o  male MRN: [id number]  Unit/Bed#: ED 12 Encounter: [id number]     Inpatient consult to gastroenterology  Consult performed by: Flavia Caputo MD  Consult ordered by: Dariela Cheatham MD      Reason for Consult / Principal Problem:   Hyperbilirubinemia post ERCP, Abdominal Pain    ASSESSMENT AND PLAN:    Eulalia Becerra is a 60 y o  old male with PMH: Ampullary adenoma status post resection, Pancreatitis c/b  Pseudocyst s/p cyst gastrostomy, GERD, who presented with epigastric pain and jaundice  #Hyperbilirubinemia post ERCP  #Jaundice  Patient noted to have bilirubin up to 7 8 post ERCP  Unfortunately did not have a bilirubin level prior to his ERCP but he was noted to have bilirubin of 2 in January 2020  Patient states he developed pale stools and dark urine concerning for obstructive symptoms  It is possible the patient developed a blood clot following manipulation during ERCP in the setting of previously being on Coumadin  Could also have some edema which caused obstructive picture  Less likely that he had recurrence of adenoma given that on last ERCP was no longer noted  Given his acute change in clinical status will plan for repeat ERCP to determine etiology of hyperbilirubinemia  Less likely to be pancreatitis given normal lipase and no evidence of stranding or inflammation seen on CT  Plan:  -keep NPO for ERCP today  -CMP qdaily  -Hold Coumadin  -check INR in am    #Hx of Pancreatitis c/b Pseudocyst  Patient with history of pancreatitis c/b pseudocyst formation  Patient underwent pseudocyst drainage in the past then ultimately underwent endoscopy for cyst gastrostomy which was done in April 2019 with removal in September 2019   Since that time patient does not have further evidence of cysts on recent imaging   -avoid alcohol    ______________________________________________________________________    HPI:    Patient presented to the ED on 06/25/2020 after he complained of abdominal pain following most recent ERCP  Lipase was normal, however bilirubin levels elevated up to 7 8  Patient also complaining of nausea and vomiting as well as dark urine and pale stools  CT abdomen pelvis showed common bile duct dilation to 11 mm which was the same as January 2020  Noted prior cholecystectomy  Also noted to have colonic diverticulosis without evidence of diverticulitis  Patient denies any hematemesis, melena, or hematochezia  Does not endorse any weight loss  Pancreatic hx:  Patient has history pancreatitis flares complicated by pancreatic pseudocyst status post cyst gastrostomy  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 on 01/15/2020  He then underwent ERCP on 2/4/20 where a plastic stent was removed from the common bile duct  At that time ampulla noted to have small polypoid area which was removed using a snare  A replacement plastic stent was placed in the common bile duct and pancreatic duct  APC of the periampullary region was also done  Patient then returned and recently underwent ERCP with Dr Roger Montana on 06/24/2020 at which time patient had successful removal of previously placed stents with extraction of some stones  There was no residual adenoma seen after resection done in January  REVIEW OF SYSTEMS:    CONSTITUTIONAL: Denies any fever, chills, rigors, and weight loss  HEENT: No earache or tinnitus  Denies hearing loss or visual disturbances  CARDIOVASCULAR: No chest pain or palpitations  RESPIRATORY: Denies any cough, hemoptysis, shortness of breath or dyspnea on exertion  GASTROINTESTINAL: As noted in the History of Present Illness  GENITOURINARY: No problems with urination  Denies any hematuria or dysuria  NEUROLOGIC: No dizziness or vertigo, denies headaches  MUSCULOSKELETAL: Denies any muscle or joint pain  SKIN: Denies skin rashes or itching     ENDOCRINE: Denies excessive thirst  Denies intolerance to heat or cold  PSYCHOSOCIAL: Denies depression or anxiety  Denies any recent memory loss  Historical Information   Past Medical History:   Diagnosis Date    Atrial fibrillation ([address] )     Gastroesophageal reflux     GERD (gastroesophageal reflux disease)     Pancreatitis     Pleural effusion     Second hand smoke exposure      Past Surgical History:   Procedure Laterality Date    CHOLECYSTECTOMY      COLONOSCOPY N/A 3/21/2016    Procedure: COLONOSCOPY;  Surgeon: Carlito Araujo DO;  [facility]; Service:     ERCP N/A 1/4/2019    Procedure: ENDOSCOPIC RETROGRADE CHOLANGIOPANCREATOGRAPHY (ERCP); Surgeon: Mendez Jean-Baptiste MD;  [facility]; Service: Gastroenterology    ESOPHAGOGASTRODUODENOSCOPY N/A 2/5/2019    Procedure: ESOPHAGOGASTRODUODENOSCOPY (EGD), with possible nasojejunal Dobhoff tube placement;  Surgeon: Nya Egan MD;  [facility]; Service: Gastroenterology    ESOPHAGOGASTRODUODENOSCOPY N/A 4/3/2019    Procedure: ESOPHAGOGASTRODUODENOSCOPY (EGD)- endoscopic necrosectomy;  Surgeon: Mendez Jean-Baptiste MD;  [facility]; Service: Gastroenterology    IR IMAGE GUIDED ASPIRATION / DRAINAGE W TUBE  6/17/2019    IR THORACENTESIS  2/7/2019    IR THORACENTESIS  3/29/2019    LINEAR ENDOSCOPIC U/S N/A 3/29/2019    Procedure: LINEAR ENDOSCOPIC U/S cyst gastro;  Surgeon: Mendez Jean-Baptiste MD;  [facility]; Service: Gastroenterology    [place] [address] N/A 5/20/2019    Procedure: Raynaldo Hunger;  Surgeon: Naseem Beck MD;  [facility];  Service: Thoracic    [place] EGD TRANSORAL BIOPSY SINGLE/MULTIPLE N/A 3/21/2016    Procedure: ESOPHAGOGASTRODUODENOSCOPY (EGD); Surgeon: Carlito Araujo DO;  [facility]; Service: General    [place] ERCP DX COLLECTION SPECIMEN BRUSHING/WASHING N/A 4/26/2019    Procedure: ENDOSCOPIC RETROGRADE CHOLANGIOPANCREATOGRAPHY (ERCP); Surgeon: Mendez Jean-Baptiste MD;  [facility];   Service: Gastroenterology    [place] ESOPHAGOGASTRODUODENOSCOPY TRANSORAL DIAGNOSTIC N/A 4/9/2019    Procedure: ESOPHAGOGASTRODUODENOSCOPY (EGD) endoscopic necrosectomy;  Surgeon: Vivienne Houston MD;  [facility]; Service: Gastroenterology    [place] ESOPHAGOGASTRODUODENOSCOPY TRANSORAL DIAGNOSTIC N/A 4/26/2019    Procedure: ESOPHAGOGASTRODUODENOSCOPY (EGD); Surgeon: Vivienne Houston MD;  [facility]; Service: Gastroenterology    [place] REPAIR [address] HERNIA,5+Y/O,REDUCIBL Right 1/22/2020    Procedure: REPAIR HERNIA INGUINAL;  Surgeon: Emile Martin MD;  [facility];  Service: General    [place] THORACOSCOPY SURG PART PULM DECORT Left 5/20/2019    Procedure: DECORTICATION LUNG;  Surgeon: Duane Hazel, MD;  [facility];  Service: Thoracic    KS THORACOSCOPY SURG PART PULM DECORT Left 5/20/2019    Procedure: THORACOSCOPY VIDEO ASSISTED SURGERY (VATS);   Surgeon: Duane Hazel, MD;  [facility];  Service: Thoracic    [place] THORACOSCOPY SURG W/PLEURODESIS N/A 5/20/2019    Procedure: PLEURODESIS mechanical;  Surgeon: Duane Hazel, MD;  [facility];  Service: Thoracic     Social History   Social History     Substance and Sexual Activity   Alcohol Use Not Currently     Social History     Substance and Sexual Activity   Drug Use No     Social History     Tobacco Use   Smoking Status Never Smoker   Smokeless Tobacco Never Used     Family History   Problem Relation Age of Onset    Lung cancer Mother 79        Smoker     Transient ischemic attack Father 61    Lung disease Neg Hx        Meds/Allergies       (Not in a hospital admission)  Current Facility-Administered Medications   Medication Dose Route Frequency    acetaminophen (TYLENOL) tablet 650 mg  650 mg Oral Q6H PRN    HYDROmorphone (DILAUDID) injection 0 2 mg  0 2 mg Intravenous Q3H PRN    metoprolol tartrate (LOPRESSOR) tablet 25 mg  25 mg Oral Q12H    ondansetron (ZOFRAN) injection 4 mg  4 mg Intravenous Q6H PRN    pantoprazole (PROTONIX) EC tablet 40 mg  40 mg Oral Early Morning    piperacillin-tazobactam (ZOSYN) 3 375 g in sodium chloride 0 9 % 100 mL IVPB  3 375 g Intravenous Q6H    traMADol (ULTRAM) tablet 50 mg  50 mg Oral Q6H PRN     Facility-Administered Medications Ordered in Other Encounters   Medication Dose Route Frequency    simethicone (MYLICON) oral suspension 40 mg  40 mg Oral Q6H PRN     No Known Allergies    Objective     Blood pressure 145/67, pulse 70, temperature 97 6 °F (36 4 °C), temperature source Oral, resp  rate 18, SpO2 94 %  There is no height or weight on file to calculate BMI  Intake/Output Summary (Last 24 hours) at 6/26/2020 1056  Last data filed at 6/26/2020 9241  Gross per 24 hour   Intake 2200 ml   Output    Net 2200 ml       PHYSICAL EXAM:      General Appearance:   Alert, cooperative, no distress   HEENT:   Normocephalic, atraumatic, anicteric  Neck:   Supple, symmetrical, trachea midline   Lungs:   Clear to auscultation bilaterally; no rales, rhonchi or wheezing; respirations unlabored    Heart:   Regular rate and rhythm; no murmur, rub, or gallop     Abdomen:   Soft, non-tender, non-distended; normal bowel sounds; no masses, no organomegaly    Genitalia:   Deferred    Rectal:   Deferred    Extremities:  No cyanosis, clubbing or edema    Pulses:  2+ and symmetric all extremities    Skin:  No jaundice, rashes, or lesions    Lymph nodes:  No palpable cervical lymphadenopathy      Lab Results:   Admission on 06/25/2020   Component Date Value    WBC 06/25/2020 6 92     RBC 06/25/2020 4 96     Hemoglobin 06/25/2020 14 9     Hematocrit 06/25/2020 43 7     MCV 06/25/2020 88     MCH 06/25/2020 30 0     MCHC 06/25/2020 34 1     RDW 06/25/2020 13 2     MPV 06/25/2020 9 5     Platelets 30/01/4827 117*    nRBC 06/25/2020 0     Neutrophils Relative 06/25/2020 87*    Immat GRANS % 06/25/2020 0     Lymphocytes Relative 06/25/2020 3*    Monocytes Relative 06/25/2020 10     Eosinophils Relative 06/25/2020 0     Basophils Relative 06/25/2020 0     Neutrophils Absolute 06/25/2020 5 95     Immature Grans Absolute 06/25/2020 0 03     Lymphocytes Absolute 06/25/2020 0 23*    Monocytes Absolute 06/25/2020 0 68     Eosinophils Absolute 06/25/2020 0 02     Basophils Absolute 06/25/2020 0 01     Sodium 06/25/2020 138     Potassium 06/25/2020 3 9     Chloride 06/25/2020 106     CO2 06/25/2020 25     ANION GAP 06/25/2020 7     BUN 06/25/2020 17     Creatinine 06/25/2020 1 17     Glucose 06/25/2020 138     Calcium 06/25/2020 8 7     AST 06/25/2020 361*    ALT 06/25/2020 709*    Alkaline Phosphatase 06/25/2020 147*    Total Protein 06/25/2020 6 6     Albumin 06/25/2020 3 3*    Total Bilirubin 06/25/2020 7 84*    eGFR 06/25/2020 67     LACTIC ACID 06/25/2020 1 4     Procalcitonin 06/25/2020 1 57*    Protime 06/25/2020 16 2*    INR 06/25/2020 1 30*    PTT 06/25/2020 26     Blood Culture 06/25/2020 Received in Microbiology Lab  Culture in Progress   Blood Culture 06/25/2020 Received in Microbiology Lab  Culture in Progress       Color, UA 06/26/2020 see chart     SARS-CoV-2 06/25/2020 Negative     Lipase 06/25/2020 56*    Procalcitonin 06/26/2020 0 82*    Color, UA 06/26/2020 Marina     Clarity, UA 06/26/2020 Clear     pH, UA 06/26/2020 7 0     Leukocytes, UA 06/26/2020 Negative     Nitrite, UA 06/26/2020 Negative     Protein, UA 06/26/2020 30 (1+)*    Glucose, UA 06/26/2020 Negative     Ketones, UA 06/26/2020 Negative     Urobilinogen, UA 06/26/2020 1 0     Bilirubin, UA 06/26/2020 Interference- unable to analyze*    Blood, UA 06/26/2020 Trace*    Specific [place], UA 06/26/2020 1 010     RBC, UA 06/26/2020 None Seen     WBC, UA 06/26/2020 None Seen     Epithelial Cells 06/26/2020 None Seen     Bacteria, UA 06/26/2020 None Seen     Hyaline Casts, UA 06/26/2020 None Seen        Imaging Studies: I have personally reviewed pertinent imaging studies  Fl Ercp Biliary Only    Result Date: 6/25/2020  Narrative: ERCP INDICATION:  History of papillary adenoma  Follow-up  COMPARISON:  ERCP February 27, 2020 and CT abdomen pelvis January 6, 2020  IMAGES:  5 FLUOROSCOPY TIME:   34 SECONDS CONTRAST: 13 mL of iohexol (OMNIPAQUE) FINDINGS: Fluoroscopic guidance was provided for performance of ERCP  BILIARY: There are stents present in both the common bile duct and pancreatic duct  Delayed images demonstrate mild dilatation of the common bile duct as well as the cystic duct remnant  There is flow of contrast material into the small bowel  Impression: Fluoroscopic guidance for ERCP  Please see procedure report for full details  Workstation performed: [id number]     Ct Abdomen Pelvis With Contrast    Result Date: 6/26/2020  Narrative: CT ABDOMEN AND PELVIS WITH IV CONTRAST INDICATION:   ERCP complication  On June 24, 2020 the patient underwent ERCP, removal of common bile duct stent, removal of pancreatic duct stent, and balloon removal of sludge within the common bile duct  Chills, fever, fatigue, nausea, epigastric abdominal pain  Epigastric tenderness on physical examination  Prior history of pancreatitis  The patient tested negative for COVID-19 on June 25, 2020  COMPARISON:  CT dated January 6, 2020 and report of ERCP dated June 24, 2020  TECHNIQUE:  CT examination of the abdomen and pelvis was performed  Axial, sagittal, and coronal 2D reformatted images were created from the source data and submitted for interpretation  Radiation dose length product (DLP) for this visit:  462 85 mGy-cm   This examination, like all CT scans performed in the [facility], was performed utilizing techniques to minimize radiation dose exposure, including the use of iterative  reconstruction and automated exposure control  IV Contrast:  100 mL of iohexol (OMNIPAQUE) Enteric Contrast:  Enteric contrast was not administered   FINDINGS: ABDOMEN LOWER CHEST:  Mild dependent changes are present  Pleural thickening at the left base appears similar to the prior study  LIVER/BILIARY TREE:  There is an approximately 1 7 cm cyst in the right lobe of the liver on series 2 image 12-14, similar to the prior study  The common bile duct measures approximately 11 mm, similar to January 6, 2020  GALLBLADDER:  Gallbladder is surgically absent  SPLEEN:  Unremarkable  PANCREAS:  The pancreas is mildly atrophic, similar to the prior study  ADRENAL GLANDS:  Unremarkable  KIDNEYS/URETERS:  Unremarkable  No hydronephrosis  STOMACH AND BOWEL:  There is no evidence of bowel obstruction  There is colonic diverticulosis without evidence of acute diverticulitis  APPENDIX:  A normal appendix was visualized  ABDOMINOPELVIC CAVITY:  No ascites  No pneumoperitoneum  No lymphadenopathy  VESSELS:  Unremarkable for patient's age  PELVIS REPRODUCTIVE ORGANS:  The prostate is enlarged, measuring approximately 5 9 cm transverse dimension  Clinical and laboratory correlation is recommended  URINARY BLADDER:  There is a small urinary bladder diverticulum arising from the left posterolateral urinary bladder  ABDOMINAL WALL/INGUINAL REGIONS:  Unremarkable  OSSEOUS STRUCTURES:  No acute fracture or destructive osseous lesion  Impression: Prior cholecystectomy  The common bile duct measures approximately 11 mm; this is similar to January 6, 2020  Prostate is enlarged, measuring approximately 5 9 cm transverse dimension  Clinical and laboratory correlation is recommended  Colonic diverticulosis without evidence of acute diverticulitis  No evidence of bowel obstruction  Normal appendix  Other findings as described above, please see discussion   Workstation performed: [id number]       ---------------------------------------------------  Note Electronically Signed By:    MD Carly Mcguire  Gastroenterology Fellow PGY-4  PI #: [id number]

## 2020-06-26 NOTE — ED NOTES
Pt to GI lab via stretcher with belongings, to be taken to IP bed after procedure        Dionne Hays RN  06/26/20 0612

## 2020-06-26 NOTE — H&P
H&P- Shilpi Choudhury 1960, 60 y o  male MRN: [id number]    Unit/Bed#: ED 12 Encounter: [id number]    Primary Care Provider: Janeth Barber MD   Date and time admitted to hospital: 6/25/2020 10:13 PM        * Fever and chills  Assessment & Plan  Postoperative fever and chills  Procalcitonin elevated although WBC count is similar to previous  Would continue Zosyn for now  Infectious disease and GI consult  Also trend hepatic profile  Trend lipase  Epigastric pain  Assessment & Plan  Would continue Ultram for moderate pain  Dilaudid 0 2 mg every 3 hours as needed for any severe pain  GERD (gastroesophageal reflux disease)  Assessment & Plan  On Protonix  Paroxysmal atrial fibrillation (HCC)  Assessment & Plan  Continue metoprolol tartrate 25 mg twice daily  Subtherapeutic international normalized ratio (INR)  Assessment & Plan  Would continue Coumadin 5 mg daily  Recheck PT INR tomorrow  VTE Prophylaxis: Warfarin (Coumadin)  / sequential compression device   Code Status: Prior full Code as discussed with patient in front of wife  POLST: There is no POLST form on file for this patient (pre-hospital)    Anticipated Length of Stay:  Patient will be admitted on an Inpatient basis with an anticipated length of stay of  greater than 2 midnights  Justification for Hospital Stay: Please see detailed plans noted above  Chief Complaint:     Epigastric pain with note of fever  History of Present Illness:  Shilpi Choudhury is a 60 y o  male who has a past medical history significant for acute pancreatitis and pseudocyst and with status post placement of pancreatic stent  That stent has been removed yesterday by gastroenterology  After that, he then experienced some epigastric pain with note of increased fever tonight  With that the patient went to the emergency room to be evaluated  Lipase is 56 with lactic acid at 1 4  Procalcitonin is 1 57    Patient was then started on Marlena     Currently, patient is currently feeling more comfortable  He just has a mild headache  Patient initially has epigastric discomfort which currently is much better  Also has some nausea  No diarrhea  Review of Systems:    Constitutional:  Denies fever or chills   Eyes:  Denies change in visual acuity   HENT:  Denies nasal congestion or sore throat   Respiratory:  Denies cough or shortness of breath   Cardiovascular:  Denies chest pain or edema   GI:  Epigastric abdominal pain, nausea without vomiting  No bloody stools  No diarrhea  :  Denies dysuria   Musculoskeletal:  Denies back pain or joint pain   Integument:  Denies rash   Neurologic:  Denies headache, focal weakness or sensory changes   Endocrine:  Denies polyuria or polydipsia   Lymphatic:  Denies swollen glands   Psychiatric:  Denies depression or anxiety     Past Medical and Surgical History:   Past Medical History:   Diagnosis Date    Atrial fibrillation (HCC)     Gastroesophageal reflux     GERD (gastroesophageal reflux disease)     Pancreatitis     Pleural effusion     Second hand smoke exposure      Past Surgical History:   Procedure Laterality Date    CHOLECYSTECTOMY      COLONOSCOPY N/A 3/21/2016    Procedure: COLONOSCOPY;  Surgeon: Misbah De La Cruz DO;  [facility]; Service:     ERCP N/A 1/4/2019    Procedure: ENDOSCOPIC RETROGRADE CHOLANGIOPANCREATOGRAPHY (ERCP); Surgeon: Omar Mcneil MD;  [facility]; Service: Gastroenterology    ESOPHAGOGASTRODUODENOSCOPY N/A 2/5/2019    Procedure: ESOPHAGOGASTRODUODENOSCOPY (EGD), with possible nasojejunal Dobhoff tube placement;  Surgeon: Joe Patrick MD;  [facility]; Service: Gastroenterology    ESOPHAGOGASTRODUODENOSCOPY N/A 4/3/2019    Procedure: ESOPHAGOGASTRODUODENOSCOPY (EGD)- endoscopic necrosectomy;  Surgeon: Omar Mcneil MD;  [facility];   Service: Gastroenterology    IR IMAGE GUIDED ASPIRATION / DRAINAGE W TUBE  6/17/2019    IR THORACENTESIS  2/7/2019    IR THORACENTESIS  3/29/2019    LINEAR ENDOSCOPIC U/S N/A 3/29/2019    Procedure: LINEAR ENDOSCOPIC U/S cyst gastro;  Surgeon: Renato Ojeda MD;  [facility]; Service: Gastroenterology    [place] [address] N/A 5/20/2019    Procedure: Rosemarie Axe;  Surgeon: Cathy Robbins MD;  [facility];  Service: Thoracic    IA EGD TRANSORAL BIOPSY SINGLE/MULTIPLE N/A 3/21/2016    Procedure: ESOPHAGOGASTRODUODENOSCOPY (EGD); Surgeon: Ida Hopkins DO;  [facility]; Service: General    IA ERCP DX COLLECTION SPECIMEN BRUSHING/WASHING N/A 4/26/2019    Procedure: ENDOSCOPIC RETROGRADE CHOLANGIOPANCREATOGRAPHY (ERCP); Surgeon: Renato Ojeda MD;  [facility]; Service: Gastroenterology    IA ESOPHAGOGASTRODUODENOSCOPY TRANSORAL DIAGNOSTIC N/A 4/9/2019    Procedure: ESOPHAGOGASTRODUODENOSCOPY (EGD) endoscopic necrosectomy;  Surgeon: Renato Ojeda MD;  [facility]; Service: Gastroenterology    IA ESOPHAGOGASTRODUODENOSCOPY TRANSORAL DIAGNOSTIC N/A 4/26/2019    Procedure: ESOPHAGOGASTRODUODENOSCOPY (EGD); Surgeon: Renato Ojeda MD;  [facility]; Service: Gastroenterology    IA REPAIR [address] HERNIA,5+Y/O,REDUCIBL Right 1/22/2020    Procedure: REPAIR HERNIA INGUINAL;  Surgeon: Selvin Hernandez MD;  [facility];  Service: General    IA THORACOSCOPY SURG PART PULM DECORT Left 5/20/2019    Procedure: DECORTICATION LUNG;  Surgeon: Cathy Robbins MD;  [facility];  Service: Thoracic    IA THORACOSCOPY SURG PART PULM DECORT Left 5/20/2019    Procedure: THORACOSCOPY VIDEO ASSISTED SURGERY (VATS);   Surgeon: Cathy Robbins MD;  [facility];  Service: Thoracic    IA THORACOSCOPY SURG W/PLEURODESIS N/A 5/20/2019    Procedure: PLEURODESIS mechanical;  Surgeon: Cathy Robbins MD;  [facility];  Service: Thoracic       Meds/Allergies:    (Not in a hospital admission)  acetaminophen (TYLENOL) 325 mg tablet Take 650 mg by mouth every 6 (six) hours as needed for mild pain Estuardo Box MD Reordered   Ordered as: acetaminophen (TYLENOL) tablet 650 mg - 650 mg, Oral, Every 6 hours PRN, mild pain, Starting Fri 6/26/20 at 0048   metoprolol tartrate (LOPRESSOR) 25 mg tablet TAKE 1 TABLET EVERY 12 HOURS Estuardo Box MD Reordered   Ordered as: metoprolol tartrate (LOPRESSOR) tablet 25 mg - 25 mg, Oral, Every 12 hours, First dose on Fri 6/26/20 at 1559 [facility] for heart rate less than 50 beats per minute  LOOK ALIKE SOUND ALIKE MED Hold for systolic blood pressure less than (mmHg): 110   pantoprazole (PROTONIX) 40 mg tablet Take 1 tablet (40 mg total) by mouth daily for 90 days Estuardo Box MD Reordered   Ordered as: pantoprazole (PROTONIX) EC tablet 40 mg - 40 mg, Oral, Daily, First dose on Fri 6/26/20 at 0900 Swallow whole; do not crush, chew or split  LOOK ALIKE SOUND ALIKE MED    tobramycin (Tobrex) 0 3 % SOLN Administer 1 drop to the right eye every 4 (four) hours while awake Estuardo Box MD Not Ordered   traMADol (ULTRAM) 50 mg tablet Take 1 tablet (50 mg total) by mouth every 6 (six) hours as needed for moderate pain for up to 10 days Estuardo Box MD Reordered   Ordered as: traMADol Eluterio Rastafari) tablet 50 mg - 50 mg, Oral, Every 6 hours PRN, moderate pain, Starting Fri 6/26/20 at 0055, For 8 days High Alert Medication  LOOK ALIKE SOUND ALIKE MED    warfarin (COUMADIN) 3 mg tablet Take 1 tablet (3 mg total) by mouth daily Start 5/25/19 Estuardo Box MD Reordered    Patient taking differently: Take 5 mg by mouth daily Start 5/25/19     Ordered as: warfarin (COUMADIN) tablet 5 mg - 5 mg, Oral, Daily (warfarin), First dose on Fri 6/26/20 at 1800 High alert medication  Food-drug interaction teaching and documentation must be done   **DISPOSE EMPTY PACKAGING AND LEFTOVER MEDICATION IN 8 GALLON BLACK CONTAINER** Indication: INR goal: 2-3       Allergies: No Known Allergies  History:  Marital Status: /Civil Union   Occupation:  He still works in [employer]  Patient Pre-hospital Living Situation:  Lives at home with wife  Patient Pre-hospital Level of Mobility:  Ambulatory  Patient Pre-hospital Diet Restrictions:  Regular diet  Substance Use History:   Social History     Substance and Sexual Activity   Alcohol Use Not Currently     Social History     Tobacco Use   Smoking Status Never Smoker   Smokeless Tobacco Never Used     Social History     Substance and Sexual Activity   Drug Use No       Family History:  Family History   Problem Relation Age of Onset    Lung cancer Mother 79        Smoker     Transient ischemic attack Father 61    Lung disease Neg Hx        Physical Exam:     Vitals:   Blood Pressure: 160/91 (06/26/20 0030)  Pulse: 86 (06/26/20 0030)  Temperature: 100 °F (37 8 °C) (06/25/20 2217)  Temp Source: Oral (06/25/20 2217)  Respirations: (!) 24 (06/26/20 0030)  SpO2: 96 % (06/26/20 0030)    Constitutional:  Well developed, well nourished, no acute distress, non-toxic appearance somewhat sweaty  Eyes:  PERRL, conjunctiva normal   HENT:  Atraumatic, external ears normal, nose normal, oropharynx moist, no pharyngeal exudates  Neck- normal range of motion, no tenderness, supple   Respiratory:  No respiratory distress, normal breath sounds, no rales, no wheezing   Cardiovascular:  Normal rate, normal rhythm, no murmurs, no gallops, no rubs   GI:  Soft, nondistended, normal bowel sounds, nontender, but with epigastric discomfort, no organomegaly, no mass, no rebound, no guarding   :  No costovertebral angle tenderness   Musculoskeletal:  No edema, no tenderness, no deformities   Back- no tenderness  Integument:  Well hydrated, no rash   Lymphatic:  No lymphadenopathy noted   Neurologic:  Alert &awake, communicative, CN 2-12 normal, normal motor function, normal sensory function, no focal deficits noted   Psychiatric:  Speech and behavior appropriate       Lab Results: I have personally reviewed pertinent reports  Results from last 7 days   Lab Units 06/25/20  2258   WBC Thousand/uL 6 92   HEMOGLOBIN g/dL 14 9   HEMATOCRIT % 43 7   PLATELETS Thousands/uL 117*   NEUTROS PCT % 87*   LYMPHS PCT % 3*   MONOS PCT % 10   EOS PCT % 0     Results from last 7 days   Lab Units 06/25/20  2258   POTASSIUM mmol/L 3 9   CHLORIDE mmol/L 106   CO2 mmol/L 25   BUN mg/dL 17   CREATININE mg/dL 1 17   CALCIUM mg/dL 8 7   ALK PHOS U/L 147*   ALT U/L 709*   AST U/L 361*     Results from last 7 days   Lab Units 06/25/20  2258   INR  1 30*           Imaging: I have personally reviewed pertinent reports  Fl Ercp Biliary Only    Result Date: 6/25/2020  Narrative: ERCP INDICATION:  History of papillary adenoma  Follow-up  COMPARISON:  ERCP February 27, 2020 and CT abdomen pelvis January 6, 2020  IMAGES:  5 FLUOROSCOPY TIME:   34 SECONDS CONTRAST: 13 mL of iohexol (OMNIPAQUE) FINDINGS: Fluoroscopic guidance was provided for performance of ERCP  BILIARY: There are stents present in both the common bile duct and pancreatic duct  Delayed images demonstrate mild dilatation of the common bile duct as well as the cystic duct remnant  There is flow of contrast material into the small bowel  Impression: Fluoroscopic guidance for ERCP  Please see procedure report for full details  Workstation performed: [id number]         ** Please Note: Dragon 360 Dictation voice to text software was used in the creation of this document   **

## 2020-06-26 NOTE — CONSULTS
Consultation - Infectious Disease   [facility] 60 y o  male MRN: [id number]  Unit/Bed#: -01 Encounter: [id number]      Inpatient consult to Infectious Diseases  Consult performed by: Del Ramirez MD  Consult ordered by: Ling Simms MD          IMPRESSION & RECOMMENDATIONS:   Impression:  1  Gram-negative ang cholangitis secondary to below   2  Ampullary adenoma S/P ERCP, sphincterotomy, stone removal and stent placement    Recommendations:    Discuss with the primary service  1  Await final identification of gram-negative ang blood isolate  2  Pending above agree with piperacillin/tazobactam 3 375 g q 6 hours IV       HISTORY OF PRESENT ILLNESS:    Reason for Consult:  Postprocedure fever  HPI: [facility] Service is a 60y o  year old male who has a history of ampullary adenoma S/P resection, 1/15/20 pancreatitis complicated with pseudocyst ERCP with stent removal fem replacement 2/4/20 S/P cyst gastrostomy 4/19 with removal 9/19 and recent ERCP with stent removal and stone extractions  on 06/24  Following his ERCP developed malaise, abdominal pain jaundice, and temperature elevation  Here 1 of 2 blood cultures 6/25 are showing gram-negative rods  He has had 2 COVID-19 PCR test which are negative  Initial procalcitonin was 1 57 and in 24 hours has decreased to 0 82  Patient was started today on piperacillin/tazobactam 3 375 g q 6 hours IV    Review of Systems   Constitutional: Positive for activity change, appetite change, chills, diaphoresis, fatigue and fever  Gastrointestinal: Positive for abdominal pain and nausea  Neurological: Positive for weakness  A [id number] point system-based review of systems is otherwise negative      PAST MEDICAL HISTORY:  Past Medical History:   Diagnosis Date    Atrial fibrillation (HCC)     Gastroesophageal reflux     GERD (gastroesophageal reflux disease)     Pancreatitis     Pleural effusion     Second hand smoke exposure      Past Surgical History:   Procedure Laterality Date    CHOLECYSTECTOMY      COLONOSCOPY N/A 3/21/2016    Procedure: COLONOSCOPY;  Surgeon: Mague Hernadez DO;  [facility]; Service:     ERCP N/A 1/4/2019    Procedure: ENDOSCOPIC RETROGRADE CHOLANGIOPANCREATOGRAPHY (ERCP); Surgeon: Donald Rocha MD;  [facility]; Service: Gastroenterology    ESOPHAGOGASTRODUODENOSCOPY N/A 2/5/2019    Procedure: ESOPHAGOGASTRODUODENOSCOPY (EGD), with possible nasojejunal Dobhoff tube placement;  Surgeon: Tray Jackson MD;  [facility]; Service: Gastroenterology    ESOPHAGOGASTRODUODENOSCOPY N/A 4/3/2019    Procedure: ESOPHAGOGASTRODUODENOSCOPY (EGD)- endoscopic necrosectomy;  Surgeon: Donald Rocha MD;  [facility]; Service: Gastroenterology    IR IMAGE GUIDED ASPIRATION / DRAINAGE W TUBE  6/17/2019    IR THORACENTESIS  2/7/2019    IR THORACENTESIS  3/29/2019    LINEAR ENDOSCOPIC U/S N/A 3/29/2019    Procedure: LINEAR ENDOSCOPIC U/S cyst gastro;  Surgeon: Donald Rocha MD;  [facility]; Service: Gastroenterology    [place] [address] N/A 5/20/2019    Procedure: Sridhar Garcia;  Surgeon: Belia Parada MD;  [facility];  Service: Thoracic    [place] EGD TRANSORAL BIOPSY SINGLE/MULTIPLE N/A 3/21/2016    Procedure: ESOPHAGOGASTRODUODENOSCOPY (EGD); Surgeon: Mague Hernadez DO;  [facility]; Service: General    [place] ERCP DX COLLECTION SPECIMEN BRUSHING/WASHING N/A 4/26/2019    Procedure: ENDOSCOPIC RETROGRADE CHOLANGIOPANCREATOGRAPHY (ERCP); Surgeon: Donald Rocha MD;  [facility]; Service: Gastroenterology    [place] ESOPHAGOGASTRODUODENOSCOPY TRANSORAL DIAGNOSTIC N/A 4/9/2019    Procedure: ESOPHAGOGASTRODUODENOSCOPY (EGD) endoscopic necrosectomy;  Surgeon: Donald Rocha MD;  [facility]; Service: Gastroenterology    KS ESOPHAGOGASTRODUODENOSCOPY TRANSORAL DIAGNOSTIC N/A 4/26/2019    Procedure: ESOPHAGOGASTRODUODENOSCOPY (EGD);   Surgeon: Donald Rocha MD; [facility]; Service: Gastroenterology    [place] REPAIR [address] HERNIA,5+Y/O,REDUCIBL Right 2020    Procedure: REPAIR HERNIA INGUINAL;  Surgeon: Marifer Cook MD;  [facility];  Service: General    VT THORACOSCOPY SURG PART PULM DECORT Left 2019    Procedure: DECORTICATION LUNG;  Surgeon: Umesh Valencia MD;  [facility];  Service: Thoracic    VT THORACOSCOPY SURG PART PULM DECORT Left 2019    Procedure: THORACOSCOPY VIDEO ASSISTED SURGERY (VATS); Surgeon: Umesh Valencia MD;  [facility];  Service: Thoracic    [place] THORACOSCOPY SURG W/PLEURODESIS N/A 2019    Procedure: PLEURODESIS mechanical;  Surgeon: Umesh Valencia MD;  [facility];  Service: Thoracic       FAMILY HISTORY:  Non-contributory    SOCIAL HISTORY:  Social History   /Civil Union  Social History     Substance and Sexual Activity   Alcohol Use Not Currently     Social History     Substance and Sexual Activity   Drug Use No     Social History     Tobacco Use   Smoking Status Never Smoker   Smokeless Tobacco Never Used       ALLERGIES:  No Known Allergies    MEDICATIONS:  All current active medications have been reviewed        PHYSICAL EXAM:  Temp:  [97 6 °F (36 4 °C)-100 °F (37 8 °C)] 98 6 °F (37 °C)  HR:  [70-98] 75  Resp:  [16-24] 18  BP: (133-160)/(67-91) 137/83  SpO2:  [92 %-99 %] 92 %  Temp (24hrs), Av 8 °F (37 1 °C), Min:97 6 °F (36 4 °C), Max:100 °F (37 8 °C)  Current: Temperature: 98 6 °F (37 °C)    Intake/Output Summary (Last 24 hours) at 2020 1839  Last data filed at 2020 8553  Gross per 24 hour   Intake 2200 ml   Output    Net 2200 ml       General Appearance:  Appearing well, nontoxic, and in no distress, appears stated age   Head:  Normocephalic, without obvious abnormality, atraumatic   Eyes:  PERRL, conjunctiva pink and sclera anicteric, both eyes   Nose: Nares normal, mucosa normal, no drainage   Throat: Oropharynx moist without lesions; lips, mucosa, and tongue normal; teeth and gums normal   Neck: Supple, symmetrical, trachea midline, no adenopathy, no tenderness/mass/nodules   Back:   Symmetric, no curvature, ROM normal, no CVA tenderness   Lungs:   Clear to auscultation bilaterally, no audible wheezes, rhonchi and rales, respirations unlabored   Chest Wall:  No tenderness or deformity   Heart:  Regular rate and rhythm, S1, S2 normal, no murmur, rub or gallop   Abdomen:   Soft, non-tender, non-distended, positive bowel sounds, no masses, no organomegaly    No CVA tenderness   Extremities: Extremities normal, atraumatic, no cyanosis, clubbing or edema   Skin: Skin color, texture, turgor normal, no rashes or lesions  No draining wounds noted  Lymph nodes: Cervical, supraclavicular, and axillary nodes normal   Neurologic: Alert and oriented times 3, extremity strength 5/5 and symmetric           Invasive Devices:   Peripheral IV 06/25/20 Left Antecubital (Active)   Site Assessment Other (Comment) 6/26/2020 12:45 PM   Dressing Type Transparent 6/25/2020 11:06 PM   Line Status Blood return noted; Flushed;Saline locked 6/25/2020 11:06 PM   Dressing Status Clean;Dry; Intact 6/25/2020 11:06 PM       Peripheral IV 06/26/20 Right Hand (Active)   Site Assessment Clean;Dry; Intact 6/26/2020 12:47 PM       LABS, IMAGING, & OTHER STUDIES:  Lab Results:      I have personally reviewed pertinent labs  Results from last 7 days   Lab Units 06/25/20  2258   WBC Thousand/uL 6 92   HEMOGLOBIN g/dL 14 9   PLATELETS Thousands/uL 117*     Results from last 7 days   Lab Units 06/25/20  2258   SODIUM mmol/L 138   POTASSIUM mmol/L 3 9   CHLORIDE mmol/L 106   CO2 mmol/L 25   BUN mg/dL 17   CREATININE mg/dL 1 17   EGFR ml/min/1 73sq m 67   CALCIUM mg/dL 8 7   AST U/L 361*   ALT U/L 709*   ALK PHOS U/L 147*     Results from last 7 days   Lab Units 06/25/20 2257 06/25/20 2256   BLOOD CULTURE  Received in Microbiology Lab  Culture in Progress    --    GRAM STAIN RESULT --  Gram negative rods*       Imaging Studies:   I have personally reviewed pertinent imaging study reports and images in PACS  EKG, Pathology, and Other Studies:   I have personally reviewed pertinent reports

## 2020-06-26 NOTE — PROGRESS NOTES
Seen and examined patient:     Mr Neville Crespo is a 61 yo male with history of GERD, A fib presented with fever and chills  He also was noted to have dark urine and pale stools  He has dilated bile duct in CT scan  He has recent history of ampullary adenoma s/p resection, complicated by pseudocyst, s/p cyst gastrostomy  He is scheduled for ERCP this morning  He is NPO by mouth  He is supratheraputic with INR  Vitals:    06/26/20 1423   BP: 144/82   Pulse: 81   Resp: 18   Temp:    SpO2: 94%     General:   HEENT:pink and moist skin  CHEST: clear no wheezing  CVS: s1, S2, no m/r/g  ABD: soft, distended  EXT: no edema, no cyanosis and no clubbing  CNS: patient has no focal deficits  Plan   Continue monitoring vitals closely  Hydration and diet  GI recommendations appreciated  Follow ERCP results: removed sludge, dilation with balloon is done  A stent was placed  Holding coumadin, monitor INR daily, consider vitamin K 2 5 mg po if it does not improve    zofran for nausea/vomiting  Of note: he has elevated procalcitonin (probably GI source), although temp is low,he has gram negative rods in one set of blood cultures and no white count, it is improved with antibiotics, covid 19 neg  Follow speciation and sensitivities from blood cultures  Continue with zosyn

## 2020-06-27 PROBLEM — K80.33 CALCULUS OF BILE DUCT WITH ACUTE CHOLANGITIS WITH OBSTRUCTION: Status: ACTIVE | Noted: 2020-06-27

## 2020-06-27 PROBLEM — R79.1 SUBTHERAPEUTIC INTERNATIONAL NORMALIZED RATIO (INR): Status: RESOLVED | Noted: 2020-06-26 | Resolved: 2020-06-27

## 2020-06-27 PROBLEM — R78.81 GRAM-NEGATIVE BACTEREMIA: Status: ACTIVE | Noted: 2020-06-26

## 2020-06-27 PROBLEM — R10.13 EPIGASTRIC PAIN: Status: RESOLVED | Noted: 2020-06-24 | Resolved: 2020-06-27

## 2020-06-27 LAB
ALBUMIN SERPL BCP-MCNC: 2.7 G/DL (ref 3.5–5)
ALP SERPL-CCNC: 93 U/L (ref 46–116)
ALT SERPL W P-5'-P-CCNC: 316 U/L (ref 12–78)
ANION GAP SERPL CALCULATED.3IONS-SCNC: 7 MMOL/L (ref 4–13)
APTT PPP: 25 SECONDS (ref 23–37)
AST SERPL W P-5'-P-CCNC: 75 U/L (ref 5–45)
BASOPHILS # BLD AUTO: 0 THOUSANDS/ΜL (ref 0–0.1)
BASOPHILS NFR BLD AUTO: 0 % (ref 0–1)
BILIRUB SERPL-MCNC: 2.04 MG/DL (ref 0.2–1)
BUN SERPL-MCNC: 12 MG/DL (ref 5–25)
CALCIUM SERPL-MCNC: 7.9 MG/DL (ref 8.3–10.1)
CHLORIDE SERPL-SCNC: 110 MMOL/L (ref 100–108)
CO2 SERPL-SCNC: 24 MMOL/L (ref 21–32)
CREAT SERPL-MCNC: 0.88 MG/DL (ref 0.6–1.3)
EOSINOPHIL # BLD AUTO: 0.01 THOUSAND/ΜL (ref 0–0.61)
EOSINOPHIL NFR BLD AUTO: 0 % (ref 0–6)
ERYTHROCYTE [DISTWIDTH] IN BLOOD BY AUTOMATED COUNT: 13.2 % (ref 11.6–15.1)
GFR SERPL CREATININE-BSD FRML MDRD: 93 ML/MIN/1.73SQ M
GLUCOSE SERPL-MCNC: 128 MG/DL (ref 65–140)
HCT VFR BLD AUTO: 36.6 % (ref 36.5–49.3)
HGB BLD-MCNC: 12.5 G/DL (ref 12–17)
IMM GRANULOCYTES # BLD AUTO: 0.04 THOUSAND/UL (ref 0–0.2)
IMM GRANULOCYTES NFR BLD AUTO: 1 % (ref 0–2)
INR PPP: 1.12 (ref 0.84–1.19)
LIPASE SERPL-CCNC: 51 U/L (ref 73–393)
LYMPHOCYTES # BLD AUTO: 0.39 THOUSANDS/ΜL (ref 0.6–4.47)
LYMPHOCYTES NFR BLD AUTO: 6 % (ref 14–44)
MAGNESIUM SERPL-MCNC: 1.9 MG/DL (ref 1.6–2.6)
MCH RBC QN AUTO: 30 PG (ref 26.8–34.3)
MCHC RBC AUTO-ENTMCNC: 34.2 G/DL (ref 31.4–37.4)
MCV RBC AUTO: 88 FL (ref 82–98)
MONOCYTES # BLD AUTO: 0.56 THOUSAND/ΜL (ref 0.17–1.22)
MONOCYTES NFR BLD AUTO: 9 % (ref 4–12)
NEUTROPHILS # BLD AUTO: 5.52 THOUSANDS/ΜL (ref 1.85–7.62)
NEUTS SEG NFR BLD AUTO: 84 % (ref 43–75)
NRBC BLD AUTO-RTO: 0 /100 WBCS
PHOSPHATE SERPL-MCNC: 2.6 MG/DL (ref 2.3–4.1)
PLATELET # BLD AUTO: 110 THOUSANDS/UL (ref 149–390)
PMV BLD AUTO: 9.5 FL (ref 8.9–12.7)
POTASSIUM SERPL-SCNC: 4.1 MMOL/L (ref 3.5–5.3)
PROT SERPL-MCNC: 5.5 G/DL (ref 6.4–8.2)
PROTHROMBIN TIME: 14.4 SECONDS (ref 11.6–14.5)
RBC # BLD AUTO: 4.17 MILLION/UL (ref 3.88–5.62)
SODIUM SERPL-SCNC: 141 MMOL/L (ref 136–145)
WBC # BLD AUTO: 6.52 THOUSAND/UL (ref 4.31–10.16)

## 2020-06-27 PROCEDURE — 80053 COMPREHEN METABOLIC PANEL: CPT | Performed by: INTERNAL MEDICINE

## 2020-06-27 PROCEDURE — 85610 PROTHROMBIN TIME: CPT | Performed by: INTERNAL MEDICINE

## 2020-06-27 PROCEDURE — 99232 SBSQ HOSP IP/OBS MODERATE 35: CPT | Performed by: INTERNAL MEDICINE

## 2020-06-27 PROCEDURE — 85730 THROMBOPLASTIN TIME PARTIAL: CPT | Performed by: INTERNAL MEDICINE

## 2020-06-27 PROCEDURE — 83735 ASSAY OF MAGNESIUM: CPT | Performed by: INTERNAL MEDICINE

## 2020-06-27 PROCEDURE — 85025 COMPLETE CBC W/AUTO DIFF WBC: CPT | Performed by: INTERNAL MEDICINE

## 2020-06-27 PROCEDURE — 84100 ASSAY OF PHOSPHORUS: CPT | Performed by: INTERNAL MEDICINE

## 2020-06-27 PROCEDURE — 83690 ASSAY OF LIPASE: CPT | Performed by: INTERNAL MEDICINE

## 2020-06-27 RX ADMIN — PIPERACILLIN SODIUM AND TAZOBACTAM SODIUM 3.38 G: 36; 4.5 INJECTION, POWDER, FOR SOLUTION INTRAVENOUS at 01:52

## 2020-06-27 RX ADMIN — PIPERACILLIN SODIUM AND TAZOBACTAM SODIUM 3.38 G: 36; 4.5 INJECTION, POWDER, FOR SOLUTION INTRAVENOUS at 14:17

## 2020-06-27 RX ADMIN — METOPROLOL TARTRATE 25 MG: 25 TABLET, FILM COATED ORAL at 01:51

## 2020-06-27 RX ADMIN — METOPROLOL TARTRATE 25 MG: 25 TABLET, FILM COATED ORAL at 14:17

## 2020-06-27 RX ADMIN — PANTOPRAZOLE SODIUM 40 MG: 40 TABLET, DELAYED RELEASE ORAL at 05:05

## 2020-06-27 RX ADMIN — PIPERACILLIN SODIUM AND TAZOBACTAM SODIUM 3.38 G: 36; 4.5 INJECTION, POWDER, FOR SOLUTION INTRAVENOUS at 07:53

## 2020-06-27 RX ADMIN — PIPERACILLIN SODIUM AND TAZOBACTAM SODIUM 3.38 G: 36; 4.5 INJECTION, POWDER, FOR SOLUTION INTRAVENOUS at 21:06

## 2020-06-27 NOTE — PROGRESS NOTES
Gastroenterology Progress Note   - Francheska Bazzi 60 y o  male MRN: [id number]    Unit/Bed#: -01 Encounter: [id number]      Assessment and Plan:   Principal Problem:    Calculus of bile duct with acute cholangitis with obstruction  78-year-old male patient with past medical history significant for necrotizing pancreatitis status post cyst gastrostomy, ampulloma status post resection on January 15th  and stent placement that were exchanged on February  Patient had recent removal of stents on June 24th and developed cholangitis after ERCP  ERCP was repeated yesterday, he was found to have sludge in the CBD that was removed, another plastic stent was placed  Currently his bilirubin is decreasing 2 04 from 7 8  Currently afebrile with normal white count  Growing gram-negative rods in 1/2 cultures, repeat cultures are pending, being followed by ID, continue Zosyn, transition to p o  Antibiotics as per ID recommendations  Patient will need follow-up in 4 weeks to discuss removal of stents        Subjective:   Patient seen and examined at the bed, denies any events overnight, currently is tolerating PO route, denies nausea or vomiting, is passing flatus and having bowel movements, denies chest pain or shortness of breath, no abdominal pain, patient is ambulating     Objective:     Vitals: Blood pressure 110/65, pulse 60, temperature 97 8 °F (36 6 °C), temperature source Oral, resp  rate 18, height 6' 4" (1 93 m), weight 95 7 kg (211 lb), SpO2 94 %  ,Body mass index is 25 68 kg/m²  Intake/Output Summary (Last 24 hours) at 6/27/2020 1259  Last data filed at 6/27/2020 0530  Gross per 24 hour   Intake 240 ml   Output    Net 240 ml       Physical Exam:   Physical Exam   Constitutional: He is oriented to person, place, and time  He appears well-developed and well-nourished  No distress  HENT:   Head: Normocephalic and atraumatic     Mouth/Throat: Oropharynx is clear and moist    Eyes: EOM are normal  Scleral icterus is present  Neck: Normal range of motion  Neck supple  Cardiovascular: Normal rate  Pulmonary/Chest: Effort normal and breath sounds normal    Abdominal: Soft  Bowel sounds are normal  He exhibits no mass  There is no tenderness  There is no rebound and no guarding  Neurological: He is alert and oriented to person, place, and time  Skin: Skin is warm and dry  Nursing note and vitals reviewed  Invasive Devices     Peripheral Intravenous Line            Peripheral IV 06/25/20 Left Antecubital 1 day    Peripheral IV 06/26/20 Right Hand 1 day                Lab Results:  Results from last 7 days   Lab Units 06/27/20  0443   WBC Thousand/uL 6 52   HEMOGLOBIN g/dL 12 5   HEMATOCRIT % 36 6   PLATELETS Thousands/uL 110*   NEUTROS PCT % 84*   LYMPHS PCT % 6*   MONOS PCT % 9   EOS PCT % 0     Results from last 7 days   Lab Units 06/27/20  0444   POTASSIUM mmol/L 4 1   CHLORIDE mmol/L 110*   CO2 mmol/L 24   BUN mg/dL 12   CREATININE mg/dL 0 88   CALCIUM mg/dL 7 9*   ALK PHOS U/L 93   ALT U/L 316*   AST U/L 75*     Results from last 7 days   Lab Units 06/27/20  0443   INR  1 12     Results from last 7 days   Lab Units 06/27/20  0444   LIPASE u/L 51*       Imaging Studies: I have personally reviewed pertinent imaging studies  Fl Ercp Biliary Only    Result Date: 6/26/2020  Impression: Images provided for ERCP with findings as stated above  Please refer to the endoscopic report for details  Workstation performed: [id number]     Ct Abdomen Pelvis With Contrast    Result Date: 6/26/2020  Impression: Prior cholecystectomy  The common bile duct measures approximately 11 mm; this is similar to January 6, 2020  Prostate is enlarged, measuring approximately 5 9 cm transverse dimension  Clinical and laboratory correlation is recommended  Colonic diverticulosis without evidence of acute diverticulitis  No evidence of bowel obstruction  Normal appendix   Other findings as described above, please see discussion   Workstation performed: [id number]

## 2020-06-27 NOTE — PLAN OF CARE
Problem: PAIN - ADULT  Goal: Verbalizes/displays adequate comfort level or baseline comfort level  Description  Interventions:  - Encourage patient to monitor pain and request assistance  - Assess pain using appropriate pain scale  - Administer analgesics based on type and severity of pain and evaluate response  - Implement non-pharmacological measures as appropriate and evaluate response  - Consider cultural and social influences on pain and pain management  - Notify physician/advanced practitioner if interventions unsuccessful or patient reports new pain  Outcome: Progressing     Problem: INFECTION - ADULT  Goal: Absence or prevention of progression during hospitalization  Description  INTERVENTIONS:  - Assess and monitor for signs and symptoms of infection  - Monitor lab/diagnostic results  - Monitor all insertion sites, i e  indwelling lines, tubes, and drains  - Monitor endotracheal if appropriate and nasal secretions for changes in amount and color  - [place] appropriate cooling/warming therapies per order  - Administer medications as ordered  - Instruct and encourage patient and family to use good hand hygiene technique  - Identify and instruct in appropriate isolation precautions for identified infection/condition  Outcome: Progressing  Goal: Absence of fever/infection during neutropenic period  Description  INTERVENTIONS:  - Monitor WBC    Outcome: Progressing     Problem: SAFETY ADULT  Goal: Patient will remain free of falls  Description  INTERVENTIONS:  - Assess patient frequently for physical needs  -  Identify cognitive and physical deficits and behaviors that affect risk of falls    -  [place] fall precautions as indicated by assessment   - Educate patient/family on patient safety including physical limitations  - Instruct patient to call for assistance with activity based on assessment  - Modify environment to reduce risk of injury  - Consider OT/PT consult to assist with strengthening/mobility  Outcome: Progressing  Goal: Maintain or return to baseline ADL function  Description  INTERVENTIONS:  -  Assess patient's ability to carry out ADLs; assess patient's baseline for ADL function and identify physical deficits which impact ability to perform ADLs (bathing, care of mouth/teeth, toileting, grooming, dressing, etc )  - Assess/evaluate cause of self-care deficits   - Assess range of motion  - Assess patient's mobility; develop plan if impaired  - Assess patient's need for assistive devices and provide as appropriate  - Encourage maximum independence but intervene and supervise when necessary  - Involve family in performance of ADLs  - Assess for home care needs following discharge   - Consider OT consult to assist with ADL evaluation and planning for discharge  - Provide patient education as appropriate  Outcome: Progressing  Goal: Maintain or return mobility status to optimal level  Description  INTERVENTIONS:  - Assess patient's baseline mobility status (ambulation, transfers, stairs, etc )    - Identify cognitive and physical deficits and behaviors that affect mobility  - Identify mobility aids required to assist with transfers and/or ambulation (gait belt, sit-to-stand, lift, walker, cane, etc )  - [place] fall precautions as indicated by assessment  - Record patient progress and toleration of activity level on Mobility SBAR; progress patient to next Phase/Stage  - Instruct patient to call for assistance with activity based on assessment  - Consider rehabilitation consult to assist with strengthening/weightbearing, etc   Outcome: Progressing     Problem: DISCHARGE PLANNING  Goal: Discharge to home or other facility with appropriate resources  Description  INTERVENTIONS:  - Identify barriers to discharge w/patient and caregiver  - Arrange for needed discharge resources and transportation as appropriate  - Identify discharge learning needs (meds, wound care, etc )  - Arrange for interpretive services to assist at discharge as needed  - Refer to Case Management Department for coordinating discharge planning if the patient needs post-hospital services based on physician/advanced practitioner order or complex needs related to functional status, cognitive ability, or social support system  Outcome: Progressing

## 2020-06-27 NOTE — PROGRESS NOTES
Progress Note - Dallin Amado 1960, 60 y o  male MRN: [id number]    Unit/Bed#: -01 Encounter: [id number]    Primary Care Provider: Soha Skaggs MD   Date and time admitted to hospital: 2020 10:13 PM        * Gram-negative bacteremia  Assessment & Plan  · Awaiting final identification  · Continue IV Zosyn  · Clinically improving    Calculus of bile duct with acute cholangitis with obstruction  Assessment & Plan  · Status post ERCP yesterday  · Clinically improving LFTs  · Continue to trend  · Continue IV antibiotics    Paroxysmal atrial fibrillation (HCC)  Assessment & Plan  · Continue metoprolol tartrate 25 mg twice daily  · Restart oral Coumadin  · Check INR daily      VTE Pharmacologic Prophylaxis:   Pharmacologic: Warfarin (Coumadin)  Mechanical VTE Prophylaxis in Place: Yes    Patient Centered Rounds: I have performed bedside rounds with nursing staff today  Discussions with Specialists or Other Care Team Provider:     Education and Discussions with Family / Patient:  Offered to call patient's family patient refused    Time Spent for Care: 30 minutes  More than 50% of total time spent on counseling and coordination of care as described above  Current Length of Stay: 1 day(s)    Current Patient Status: Inpatient   Certification Statement: The patient will continue to require additional inpatient hospital stay due to Gram-negative bacteremia    Discharge Plan:  Possible discharge in 1-2 days depending on blood culture results    Code Status: Level 1 - Full Code      Subjective:   Patient feels much better no nausea vomiting diarrhea    Objective:     Vitals:   Temp (24hrs), Av 4 °F (36 9 °C), Min:97 4 °F (36 3 °C), Max:99 2 °F (37 3 °C)    Temp:  [97 4 °F (36 3 °C)-99 2 °F (37 3 °C)] 97 8 °F (36 6 °C)  HR:  [57-98] 60  Resp:  [16-19] 18  BP: (110-159)/(65-89) 110/65  SpO2:  [92 %-99 %] 94 %  Body mass index is 25 68 kg/m²       Input and Output Summary (last 24 hours): Intake/Output Summary (Last 24 hours) at 6/27/2020 0813  Last data filed at 6/27/2020 0530  Gross per 24 hour   Intake 340 ml   Output    Net 340 ml       Physical Exam:     Physical Exam   Constitutional: He is oriented to person, place, and time  No distress  Eyes: Conjunctivae are normal  No scleral icterus  Cardiovascular: Normal rate, regular rhythm and normal heart sounds  Exam reveals no gallop and no friction rub  No murmur heard  Pulmonary/Chest: Effort normal and breath sounds normal  No stridor  No respiratory distress  He has no wheezes  He has no rales  Abdominal: Soft  Bowel sounds are normal  He exhibits no distension and no mass  There is no tenderness  There is no rebound and no guarding  Neurological: He is alert and oriented to person, place, and time  Skin: Skin is warm and dry  He is not diaphoretic  Psychiatric: He has a normal mood and affect  Additional Data:     Labs:    Results from last 7 days   Lab Units 06/27/20  0443   WBC Thousand/uL 6 52   HEMOGLOBIN g/dL 12 5   HEMATOCRIT % 36 6   PLATELETS Thousands/uL 110*   NEUTROS PCT % 84*   LYMPHS PCT % 6*   MONOS PCT % 9   EOS PCT % 0     Results from last 7 days   Lab Units 06/27/20  0444   SODIUM mmol/L 141   POTASSIUM mmol/L 4 1   CHLORIDE mmol/L 110*   CO2 mmol/L 24   BUN mg/dL 12   CREATININE mg/dL 0 88   ANION GAP mmol/L 7   CALCIUM mg/dL 7 9*   ALBUMIN g/dL 2 7*   TOTAL BILIRUBIN mg/dL 2 04*   ALK PHOS U/L 93   ALT U/L 316*   AST U/L 75*   GLUCOSE RANDOM mg/dL 128     Results from last 7 days   Lab Units 06/27/20  0443   INR  1 12             Results from last 7 days   Lab Units 06/26/20  0613 06/25/20  2258   LACTIC ACID mmol/L  --  1 4   PROCALCITONIN ng/ml 0 82* 1 57*           * I Have Reviewed All Lab Data Listed Above  * Additional Pertinent Lab Tests Reviewed:  All Labs Within Last 24 Hours Reviewed    Imaging:    Imaging Reports Reviewed Today Include:   Imaging Personally Reviewed by Myself Includes: Recent Cultures (last 7 days):     Results from last 7 days   Lab Units 06/26/20 2027 06/26/20 2026 06/25/20 2257 06/25/20 2256   BLOOD CULTURE  Received in Microbiology Lab  Culture in Progress  Received in Microbiology Lab  Culture in Progress  No Growth at 24 hrs   --    GRAM STAIN RESULT   --   --   --  Gram negative rods*       Last 24 Hours Medication List:     Current Facility-Administered Medications:  acetaminophen 650 mg Oral Q6H PRN Minus MD Kristina    HYDROmorphone 0 2 mg Intravenous Q3H PRN Minus MD Kristina    indomethacin 100 mg Rectal Once Serafin Benoit MD    metoprolol tartrate 25 mg Oral Q12H Minus MD Kristina    ondansetron 4 mg Intravenous Q6H PRN Minus MD Kristina    pantoprazole 40 mg Oral Early Morning Minus MD Kristina    piperacillin-tazobactam 3 375 g Intravenous Q6H Minus MD Kristina Last Rate: 3 375 g (06/27/20 0753)   traMADol 50 mg Oral Q6H PRN Minus MD Kristina      Facility-Administered Medications Ordered in Other Encounters:  simethicone 40 mg Oral Q6H PRN Serafin Benoit MD        Today, Patient Was Seen By: Gibran Rocha MD    ** Please Note: Dictation voice to text software may have been used in the creation of this document   **

## 2020-06-27 NOTE — ED ATTENDING ATTESTATION
6/25/2020  IEleanor DO, saw and evaluated the patient  I have discussed the patient with the resident/non-physician practitioner and agree with the resident's/non-physician practitioner's findings, Plan of Care, and MDM as documented in the resident's/non-physician practitioner's note, except where noted  All available labs and Radiology studies were reviewed  I was present for key portions of any procedure(s) performed by the resident/non-physician practitioner and I was immediately available to provide assistance  At this point I agree with the current assessment done in the Emergency Department  I have conducted an independent evaluation of this patient a history and physical is as follows:    60 yom with myalgias, fever, lethargy  Patient had ercp stent removal yestreday  Felt "terrible" since then  No pain other than myalgias  Complicated medical hx in terms of biliary disease/pancreatitis  S/p brian  Exam benign other than fever    A/P: plan ct abd to assess for cholangitis, abscess   Ivf, abx, septic workup    ED Course         Critical Care Time  Procedures

## 2020-06-28 VITALS
OXYGEN SATURATION: 98 % | BODY MASS INDEX: 25.94 KG/M2 | DIASTOLIC BLOOD PRESSURE: 77 MMHG | HEART RATE: 67 BPM | TEMPERATURE: 98.3 F | HEIGHT: 76 IN | RESPIRATION RATE: 18 BRPM | WEIGHT: 213 LBS | SYSTOLIC BLOOD PRESSURE: 117 MMHG

## 2020-06-28 LAB
BACTERIA BLD CULT: ABNORMAL
GRAM STN SPEC: ABNORMAL

## 2020-06-28 PROCEDURE — 99239 HOSP IP/OBS DSCHRG MGMT >30: CPT | Performed by: PHYSICIAN ASSISTANT

## 2020-06-28 RX ORDER — WARFARIN SODIUM 1 MG/1
3 TABLET ORAL
Status: DISCONTINUED | OUTPATIENT
Start: 2020-06-28 | End: 2020-06-28 | Stop reason: HOSPADM

## 2020-06-28 RX ORDER — CEPHALEXIN 500 MG/1
500 CAPSULE ORAL EVERY 6 HOURS SCHEDULED
Qty: 20 CAPSULE | Refills: 0 | Status: SHIPPED | OUTPATIENT
Start: 2020-06-28 | End: 2020-07-03

## 2020-06-28 RX ORDER — WARFARIN SODIUM 5 MG/1
5 TABLET ORAL
Status: DISCONTINUED | OUTPATIENT
Start: 2020-06-28 | End: 2020-06-28

## 2020-06-28 RX ADMIN — PANTOPRAZOLE SODIUM 40 MG: 40 TABLET, DELAYED RELEASE ORAL at 05:10

## 2020-06-28 RX ADMIN — METOPROLOL TARTRATE 25 MG: 25 TABLET, FILM COATED ORAL at 07:56

## 2020-06-28 RX ADMIN — CEFTRIAXONE SODIUM 1000 MG: 10 INJECTION, POWDER, FOR SOLUTION INTRAVENOUS at 04:25

## 2020-06-28 NOTE — ASSESSMENT & PLAN NOTE
· Status post ERCP yesterday  · Clinically improving LFTs  · Continue to trend  · Continue IV antibiotics  · LFTs improving  · Patient's symptom free currently    · Continue antibiotics per ID

## 2020-06-28 NOTE — PROGRESS NOTES
Progress Note - Albin Shay 1960, 60 y o  male MRN: [id number]  Unit/Bed#: -01 Encounter: [id number]  Primary Care Provider: Ryan Alejandro MD   Date and time admitted to hospital: 6/25/2020 10:13 PM    Calculus of bile duct with acute cholangitis with obstruction  Assessment & Plan  · Status post ERCP yesterday  · Clinically improving LFTs  · Continue to trend  · Continue IV antibiotics  · LFTs improving  · Patient's symptom free currently  · Continue antibiotics per ID    Paroxysmal atrial fibrillation (HCC)  Assessment & Plan  · Continue metoprolol tartrate 25 mg twice daily  · Restart oral Coumadin  · Check INR daily  · Patient never restart on oral coumadin yesterday  · Will restart coumadin today 3 mg daily   · Start lovenox for bridging until coumadin therapeutic     * Gram-negative bacteremia  Assessment & Plan  · Awaiting final identification  · Continue IV Zosyn  · Clinically improving  · Antibiotics switched to ceftriaxone per ID  · Patient can likely be transitioned to oral Keflex as 2nd set of blood cultures have been negative for 24 hours  · For sick grew E coli pansensitive  · Trend blood cultures x1 more day      VTE Pharmacologic Prophylaxis:   Pharmacologic: Enoxaparin (Lovenox)  Mechanical VTE Prophylaxis in Place: Yes    Patient Centered Rounds: I have performed bedside rounds with nursing staff today  Discussions with Specialists or Other Care Team Provider: GI and ID     Education and Discussions with Family / Patient: patient     Time Spent for Care: 45 minutes  More than 50% of total time spent on counseling and coordination of care as described above      Current Length of Stay: 2 day(s)    Current Patient Status: Inpatient   Certification Statement: The patient will continue to require additional inpatient hospital stay due to will need to have INR therapeutic     Discharge Plan: stable once INR therapeutic     Code Status: Level 1 - Full Code      Subjective:   Patient states that he is feeling much better today  He is not having any abdominal symptoms  Objective:     Vitals:   Temp (24hrs), Av °F (36 7 °C), Min:97 7 °F (36 5 °C), Max:98 3 °F (36 8 °C)    Temp:  [97 7 °F (36 5 °C)-98 3 °F (36 8 °C)] 98 3 °F (36 8 °C)  HR:  [51-60] 60  Resp:  [18] 18  BP: (110-123)/(64-76) 123/76  SpO2:  [93 %-95 %] 93 %  Body mass index is 25 93 kg/m²  Input and Output Summary (last 24 hours): Intake/Output Summary (Last 24 hours) at 2020 0852  Last data filed at 2020 1100  Gross per 24 hour   Intake 180 ml   Output    Net 180 ml       Physical Exam:     Physical Exam   Constitutional: He is oriented to person, place, and time  He appears well-developed and well-nourished  No distress  HENT:   Head: Normocephalic and atraumatic  Eyes: Pupils are equal, round, and reactive to light  Conjunctivae and EOM are normal  Right eye exhibits no discharge  Left eye exhibits no discharge  Neck: Normal range of motion  Neck supple  No JVD present  Cardiovascular: Normal rate, regular rhythm and normal heart sounds  No murmur heard  Pulmonary/Chest: Effort normal and breath sounds normal  He has no wheezes  He has no rales  Abdominal: Soft  Bowel sounds are normal  He exhibits no distension  There is no tenderness  Musculoskeletal: Normal range of motion  He exhibits no edema or tenderness  No lower extremity edema, Homans sign negative bilaterally  Neurological: He is alert and oriented to person, place, and time  No cranial nerve deficit or sensory deficit  Skin: Skin is warm and dry  Capillary refill takes less than 2 seconds  He is not diaphoretic  No erythema  No pallor  Psychiatric: He has a normal mood and affect  Nursing note and vitals reviewed        Additional Data:     Labs:    Results from last 7 days   Lab Units 20  0443   WBC Thousand/uL 6 52   HEMOGLOBIN g/dL 12 5   HEMATOCRIT % 36 6   PLATELETS Thousands/uL 110*   NEUTROS PCT % 84*   LYMPHS PCT % 6*   MONOS PCT % 9   EOS PCT % 0     Results from last 7 days   Lab Units 06/27/20  0444   SODIUM mmol/L 141   POTASSIUM mmol/L 4 1   CHLORIDE mmol/L 110*   CO2 mmol/L 24   BUN mg/dL 12   CREATININE mg/dL 0 88   ANION GAP mmol/L 7   CALCIUM mg/dL 7 9*   ALBUMIN g/dL 2 7*   TOTAL BILIRUBIN mg/dL 2 04*   ALK PHOS U/L 93   ALT U/L 316*   AST U/L 75*   GLUCOSE RANDOM mg/dL 128     Results from last 7 days   Lab Units 06/27/20  0443   INR  1 12             Results from last 7 days   Lab Units 06/26/20 0613 06/25/20  2258   LACTIC ACID mmol/L  --  1 4   PROCALCITONIN ng/ml 0 82* 1 57*           * I Have Reviewed All Lab Data Listed Above  * Additional Pertinent Lab Tests Reviewed: Daniel 65 Admission Reviewed    Imaging:    Imaging Reports Reviewed Today Include: none   Imaging Personally Reviewed by Myself Includes:  None     Recent Cultures (last 7 days):     Results from last 7 days   Lab Units 06/26/20 2027 06/26/20 2026 06/25/20 2257 06/25/20 2256   BLOOD CULTURE  No Growth at 24 hrs  No Growth at 24 hrs  No Growth at 48 hrs   Escherichia coli*   GRAM STAIN RESULT   --   --   --  Gram negative rods*       Last 24 Hours Medication List:     Current Facility-Administered Medications:  acetaminophen 650 mg Oral Q6H PRN Isak Aleman MD    cefTRIAXone 1,000 mg Intravenous Q24H Bernie Newman MD Last Rate: 1,000 mg (06/28/20 0425)   enoxaparin 1 mg/kg Subcutaneous Q12H [facility] Alfredo Perez PA-C    HYDROmorphone 0 2 mg Intravenous Q3H PRN Isak Aleman MD    indomethacin 100 mg Rectal Once Ashutosh Chauhan MD    metoprolol tartrate 25 mg Oral Q12H Isak Aleman MD    ondansetron 4 mg Intravenous Q6H PRN Isak Aleman MD    pantoprazole 40 mg Oral Early Morning Isak Aleman MD    traMADol 50 mg Oral Q6H PRN Isak Aleman MD    warfarin 3 mg Oral Daily (warfarin) Isamar Jagjit, PA-C         Today, Patient Was Seen By: Isamar Danielson, ANJUM    ** Please Note: Dictation voice to text software may have been used in the creation of this document   **

## 2020-06-28 NOTE — PROGRESS NOTES
Progress Note - Infectious Disease   Tish Handing 60 y o  male MRN: [id number]  Unit/Bed#: - Encounter: [id number]      Impression:  1  E coli cholangitis secondary to 2   2  Ampullary adenoma S/P ERCP, sphincterotomy, stone removal and stent placement     Recommendations:  Patient is afebrile with normal WBC count  1  E coli has been identified in 1 of 2 blood culture isolates  Susceptibilities are pending  2  We will change piperacillin/tazobactam to ceftriaxone in a m  1 g Q 24 hours IV pending final E coli susceptibilities    Antibiotics:  1  Piperacillin/tazobactam 3 375 g q 6 hours IV, day 2 Rx to be transitioned to ceftriaxone 1 g Q 24 hours IV in early a m  Depending on susceptibilities we may be able to switch shortly to p o  Rx to complete 1 week total therapy  Subjective: The patient has no complaints  He said that he is feeling stronger and denies abdominal pain  Denies fevers, chills, or sweats  Denies nausea, vomiting, or diarrhea  Objective:  Vitals:  Temp:  [97 4 °F (36 3 °C)-97 8 °F (36 6 °C)] 97 7 °F (36 5 °C)  HR:  [51-60] 51  Resp:  [18-20] 18  BP: (110-136)/(59-85) 110/70  SpO2:  [94 %-95 %] 95 %  Temp (24hrs), Av 6 °F (36 4 °C), Min:97 4 °F (36 3 °C), Max:97 8 °F (36 6 °C)  Current: Temperature: 97 7 °F (36 5 °C)    Physical Exam:     General Appearance:  Alert, nontoxic, no acute distress  Throat: Oropharynx moist without lesions  Lips, mucosa, and tongue normal   Neck: Supple, symmetrical, trachea midline, no adenopathy,  no tenderness/mass/nodules   Lungs:   Clear to auscultation bilaterally, no audible wheezes, rhonchi or rales; respirations unlabored   Heart:  Regular rate and rhythm, S1, S2 normal, no murmur, rub or gallop   Abdomen:   Soft, non-tender, non-distended, positive bowel sounds    No masses, no organomegaly    No CVA tenderness   Extremities: Extremities normal, atraumatic, no clubbing, cyanosis or edema   Skin: Skin color, texture, turgor normal, no rashes or lesions  No draining wounds noted  Invasive Devices     Peripheral Intravenous Line            Peripheral IV 06/26/20 Right Hand 1 day                Labs, Imaging, & Other studies:   All pertinent labs were personally reviewed  Results from last 7 days   Lab Units 06/27/20 0443 06/25/20  2258   WBC Thousand/uL 6 52 6 92   HEMOGLOBIN g/dL 12 5 14 9   PLATELETS Thousands/uL 110* 117*     Results from last 7 days   Lab Units 06/27/20 0444 06/25/20  2258   SODIUM mmol/L 141 138   POTASSIUM mmol/L 4 1 3 9   CHLORIDE mmol/L 110* 106   CO2 mmol/L 24 25   BUN mg/dL 12 17   CREATININE mg/dL 0 88 1 17   EGFR ml/min/1 73sq m 93 67   CALCIUM mg/dL 7 9* 8 7   AST U/L 75* 361*   ALT U/L 316* 709*   ALK PHOS U/L 93 147*     Results from last 7 days   Lab Units 06/26/20 2027 06/26/20 2026 06/25/20 2257 06/25/20 2256   BLOOD CULTURE  Received in Microbiology Lab  Culture in Progress  Received in Microbiology Lab  Culture in Progress  No Growth at 24 hrs   Escherichia coli*   GRAM STAIN RESULT   --   --   --  Gram negative rods*

## 2020-06-28 NOTE — ASSESSMENT & PLAN NOTE
· Awaiting final identification  · Continue IV Zosyn  · Clinically improving  · Antibiotics switched to ceftriaxone per ID  · Patient can likely be transitioned to oral Keflex as 2nd set of blood cultures have been negative for 24 hours  · For sick grew E coli pansensitive    · Trend blood cultures x1 more day

## 2020-06-28 NOTE — DISCHARGE SUMMARY
Discharge- Kenny Forget 1960, 60 y o  male MRN: [id number]    Unit/Bed#: -01 Encounter: [id number]    Primary Care Provider: Magalys Clemens MD   Date and time admitted to hospital: 6/25/2020 10:13 PM        Calculus of bile duct with acute cholangitis with obstruction  Assessment & Plan  · Status post ERCP yesterday  · Clinically improving LFTs  · Continue to trend  · Continue IV antibiotics  · LFTs improving  · Patient's symptom free currently  · Continue antibiotics per ID    Paroxysmal atrial fibrillation (HCC)  Assessment & Plan  · Continue metoprolol tartrate 25 mg twice daily  · Restart oral Coumadin  · Check INR daily  · Patient never restart on oral coumadin yesterday  · Will restart coumadin today 3 mg daily   · Start lovenox for bridging until coumadin therapeutic     * Gram-negative bacteremia  Assessment & Plan  · Awaiting final identification  · Continue IV Zosyn  · Clinically improving  · Antibiotics switched to ceftriaxone per ID  · Patient can likely be transitioned to oral Keflex as 2nd set of blood cultures have been negative for 24 hours  · For sick grew E coli pansensitive  · Patient to be discharged today on keflex 500 mg q 6 hours for 5 more days per ID       Discharging Physician / Practitioner: Nuha Hendrix PA-C  PCP: Magalys Clemens MD  Admission Date:   Admission Orders (From admission, onward)     Ordered        06/26/20 0055  Inpatient Admission  Once                   Discharge Date: 06/28/20    Resolved Problems  Date Reviewed: 6/28/2020          Resolved    Epigastric pain 6/27/2020     Resolved by  Britany Kwon MD    Subtherapeutic international normalized ratio (INR) 6/27/2020     Resolved by  Britany Kwon MD          Consultations During Hospital Stay:  · Gastroenterology  · Infectious disease    Procedures Performed:   · ERCP    Significant Findings / Test Results:   · Cholelithiasis found common bile duct      Incidental Findings: · Non     Test Results Pending at Discharge (will require follow up): · None     Outpatient Tests Requested:  · INR    Complications:  None    Reason for Admission:  Cholangitis    Hospital Course:     Homero Urena is a 60 y o  male patient who originally presented to the hospital on 6/25/2020 due to fever and chills  Patient was in originally admitted on 02/26 for fever and chills  He was started on Zosyn for antibiotic coverage  Infectious disease was consulted and Zosyn was continued at this time  Gastroenterology also saw patient given his hyperbilirubinemia and jaundice  Patient did have repeat ERCP done during his hospitalization on 06/26  Patient was seen by infectious disease for antibiotic regimen guidance  Patient was switched to IV ceftriaxone as his blood cultures grew out E coli pansensitive  Patient to be discharged on 06/28 on Keflex per ID  Instructed to finish complete antibiotic course  Please see above list of diagnoses and related plan for additional information  Condition at Discharge: good     Discharge Day Visit / Exam:     Subjective:  Feeling much improved   Vitals: Blood Pressure: 117/77 (06/28/20 0900)  Pulse: 67 (06/28/20 0900)  Temperature: 98 3 °F (36 8 °C) (06/27/20 2310)  Temp Source: Oral (06/27/20 2310)  Respirations: 18 (06/28/20 0900)  Height: 6' 4" (193 cm) (06/26/20 1232)  Weight - Scale: 96 6 kg (213 lb) (06/28/20 0530)  SpO2: 98 % (06/28/20 0900)  Exam:   Physical Exam   Constitutional: He is oriented to person, place, and time  He appears well-developed and well-nourished  No distress  HENT:   Head: Normocephalic and atraumatic  Eyes: Pupils are equal, round, and reactive to light  Conjunctivae and EOM are normal  Right eye exhibits no discharge  Neck: Normal range of motion  Neck supple  No JVD present  Cardiovascular: Normal rate, regular rhythm and normal heart sounds  No murmur heard    Pulmonary/Chest: Effort normal and breath sounds normal  He has no wheezes  He has no rales  Abdominal: Soft  Bowel sounds are normal  He exhibits no distension  There is no tenderness  Musculoskeletal: Normal range of motion  He exhibits no edema  Neurological: He is alert and oriented to person, place, and time  No sensory deficit  Skin: Skin is warm and dry  Capillary refill takes less than 2 seconds  He is not diaphoretic  No erythema  No pallor  Psychiatric: He has a normal mood and affect  Nursing note and vitals reviewed  Discussion with Family: patient     Discharge instructions/Information to patient and family:   See after visit summary for information provided to patient and family  Provisions for Follow-Up Care:  See after visit summary for information related to follow-up care and any pertinent home health orders  Disposition:     Home    For Discharges to [facility]:   · Not Applicable to this Patient - Not Applicable to this Patient    Planned Readmission: none      Discharge Statement:  I spent 45 minutes discharging the patient  This time was spent on the day of discharge  I had direct contact with the patient on the day of discharge  Greater than 50% of the total time was spent examining patient, answering all patient questions, arranging and discussing plan of care with patient as well as directly providing post-discharge instructions  Additional time then spent on discharge activities  Discharge Medications:  See after visit summary for reconciled discharge medications provided to patient and family        ** Please Note: This note has been constructed using a voice recognition system **

## 2020-06-28 NOTE — ASSESSMENT & PLAN NOTE
· Continue metoprolol tartrate 25 mg twice daily  · Restart oral Coumadin  · Check INR daily  · Patient never restart on oral coumadin yesterday     · Will restart coumadin today 3 mg daily   · Start lovenox for bridging until coumadin therapeutic

## 2020-06-28 NOTE — ASSESSMENT & PLAN NOTE
· Awaiting final identification  · Continue IV Zosyn  · Clinically improving  · Antibiotics switched to ceftriaxone per ID  · Patient can likely be transitioned to oral Keflex as 2nd set of blood cultures have been negative for 24 hours  · For sick grew E coli pansensitive    · Patient to be discharged today on keflex 500 mg q 6 hours for 5 more days per ID

## 2020-06-29 NOTE — UTILIZATION REVIEW
Encounter addended by: De Juárez MD on: 7/5/2017  1:14 PM<BR>    Actions taken: Pend clinical note Notification of Discharge  This is a Notification of Discharge from our facility [address]  Please be advised that this patient has been discharge from our facility  Below you will find the admission and discharge date and time including the patients disposition  PRESENTATION DATE: 6/25/2020 10:13 PM  OBS ADMISSION DATE:   IP ADMISSION DATE: 6/26/20 0050   DISCHARGE DATE: 6/28/2020 11:52 AM  DISPOSITION: Home/Self Care Home/Self Care   Admission Orders listed below:  Admission Orders (From admission, onward)     Ordered        06/26/20 0055  Inpatient Admission  Once                   Please contact the UR Department if additional information is required to close this patient's authorization/case  [address] Utilization Review Department  Main: [phone] x carefully listen to the prompts  All voicemails are confidential   [email or web address]  Send all requests for admission clinical reviews, approved or denied determinations and any other requests to dedicated fax number below belonging to the campus where the patient is receiving treatment   List of dedicated fax numbers:  [address] DENIALS (Administrative/Medical Necessity) [phone]   [address] (Maternity/NICU/Pediatrics) [phone]   Abdirizak Forde [phone]   [facility] [phone]   [facility] [phone]   [address] [phone]   [facility]  [phone]   [address] [facility] [address]  [address] [address] [address] [facility] [phone]

## 2020-06-30 ENCOUNTER — PREP FOR PROCEDURE (OUTPATIENT)
Dept: GASTROENTEROLOGY | Facility: CLINIC | Age: 60
End: 2020-06-30

## 2020-06-30 ENCOUNTER — TELEPHONE (OUTPATIENT)
Dept: CARDIOLOGY CLINIC | Facility: CLINIC | Age: 60
End: 2020-06-30

## 2020-06-30 ENCOUNTER — TELEPHONE (OUTPATIENT)
Dept: GASTROENTEROLOGY | Facility: CLINIC | Age: 60
End: 2020-06-30

## 2020-06-30 DIAGNOSIS — Z20.822 ENCOUNTER FOR LABORATORY TESTING FOR COVID-19 VIRUS: ICD-10-CM

## 2020-06-30 DIAGNOSIS — K80.33 CALCULUS OF BILE DUCT WITH ACUTE CHOLANGITIS WITH OBSTRUCTION: Primary | ICD-10-CM

## 2020-06-30 NOTE — TELEPHONE ENCOUNTER
Pt to have ERCP on 8/11/20  Meg Nevarez would like a 5 day hold on Eliquis prior to procedure        Please advise

## 2020-07-01 LAB
BACTERIA BLD CULT: NORMAL

## 2020-07-19 DIAGNOSIS — K86.3 PANCREATIC PSEUDOCYST: ICD-10-CM

## 2020-07-20 ENCOUNTER — APPOINTMENT (OUTPATIENT)
Dept: LAB | Facility: CLINIC | Age: 60
End: 2020-07-20
Payer: COMMERCIAL

## 2020-07-20 RX ORDER — PANTOPRAZOLE SODIUM 40 MG/1
TABLET, DELAYED RELEASE ORAL
Qty: 90 TABLET | Refills: 3 | Status: SHIPPED | OUTPATIENT
Start: 2020-07-20 | End: 2021-10-29

## 2020-07-21 ENCOUNTER — ANTICOAG VISIT (OUTPATIENT)
Dept: CARDIOLOGY CLINIC | Facility: CLINIC | Age: 60
End: 2020-07-21

## 2020-07-21 DIAGNOSIS — I48.0 PAROXYSMAL ATRIAL FIBRILLATION (HCC): ICD-10-CM

## 2020-08-03 ENCOUNTER — APPOINTMENT (OUTPATIENT)
Dept: LAB | Facility: CLINIC | Age: 60
End: 2020-08-03
Payer: COMMERCIAL

## 2020-08-03 ENCOUNTER — ANTICOAG VISIT (OUTPATIENT)
Dept: CARDIOLOGY CLINIC | Facility: CLINIC | Age: 60
End: 2020-08-03

## 2020-08-03 DIAGNOSIS — I48.0 PAROXYSMAL ATRIAL FIBRILLATION (HCC): ICD-10-CM

## 2020-08-07 DIAGNOSIS — Z20.822 ENCOUNTER FOR LABORATORY TESTING FOR COVID-19 VIRUS: ICD-10-CM

## 2020-08-07 PROCEDURE — U0003 INFECTIOUS AGENT DETECTION BY NUCLEIC ACID (DNA OR RNA); SEVERE ACUTE RESPIRATORY SYNDROME CORONAVIRUS 2 (SARS-COV-2) (CORONAVIRUS DISEASE [COVID-19]), AMPLIFIED PROBE TECHNIQUE, MAKING USE OF HIGH THROUGHPUT TECHNOLOGIES AS DESCRIBED BY CMS-2020-01-R: HCPCS

## 2020-08-08 LAB — SARS-COV-2 RNA SPEC QL NAA+PROBE: NOT DETECTED

## 2020-08-10 ENCOUNTER — ANESTHESIA EVENT (OUTPATIENT)
Dept: GASTROENTEROLOGY | Facility: HOSPITAL | Age: 60
End: 2020-08-10

## 2020-08-11 ENCOUNTER — ANESTHESIA (OUTPATIENT)
Dept: GASTROENTEROLOGY | Facility: HOSPITAL | Age: 60
End: 2020-08-11

## 2020-08-11 ENCOUNTER — HOSPITAL ENCOUNTER (OUTPATIENT)
Dept: RADIOLOGY | Facility: HOSPITAL | Age: 60
Discharge: HOME | End: 2020-08-11
Payer: COMMERCIAL

## 2020-08-11 ENCOUNTER — HOSPITAL ENCOUNTER (OUTPATIENT)
Dept: GASTROENTEROLOGY | Facility: HOSPITAL | Age: 60
Setting detail: OUTPATIENT SURGERY
Discharge: HOME | End: 2020-08-11
Attending: INTERNAL MEDICINE
Payer: COMMERCIAL

## 2020-08-11 VITALS
RESPIRATION RATE: 16 BRPM | DIASTOLIC BLOOD PRESSURE: 62 MMHG | WEIGHT: 213 LBS | SYSTOLIC BLOOD PRESSURE: 111 MMHG | BODY MASS INDEX: 25.94 KG/M2 | OXYGEN SATURATION: 96 % | HEART RATE: 68 BPM | TEMPERATURE: 96.9 F | HEIGHT: 76 IN

## 2020-08-11 DIAGNOSIS — K80.33 CALCULUS OF BILE DUCT WITH ACUTE CHOLANGITIS WITH OBSTRUCTION: ICD-10-CM

## 2020-08-11 DIAGNOSIS — K80.50 STONES COMMON DUCT: ICD-10-CM

## 2020-08-11 PROBLEM — D68.9 COAGULATION DISORDER (HCC): Status: ACTIVE | Noted: 2020-08-11

## 2020-08-11 PROCEDURE — 43262 ENDO CHOLANGIOPANCREATOGRAPH: CPT | Performed by: INTERNAL MEDICINE

## 2020-08-11 PROCEDURE — C1769 GUIDE WIRE: HCPCS

## 2020-08-11 PROCEDURE — 43275 ERCP REMOVE FORGN BODY DUCT: CPT | Performed by: INTERNAL MEDICINE

## 2020-08-11 PROCEDURE — 74328 X-RAY BILE DUCT ENDOSCOPY: CPT

## 2020-08-11 PROCEDURE — 43264 ERCP REMOVE DUCT CALCULI: CPT | Performed by: INTERNAL MEDICINE

## 2020-08-11 RX ORDER — LIDOCAINE HYDROCHLORIDE 10 MG/ML
INJECTION, SOLUTION EPIDURAL; INFILTRATION; INTRACAUDAL; PERINEURAL AS NEEDED
Status: DISCONTINUED | OUTPATIENT
Start: 2020-08-11 | End: 2020-08-11

## 2020-08-11 RX ORDER — URSODIOL 300 MG/1
300 CAPSULE ORAL 2 TIMES DAILY
Qty: 60 CAPSULE | Refills: 2 | Status: SHIPPED | OUTPATIENT
Start: 2020-08-11 | End: 2020-11-16

## 2020-08-11 RX ORDER — FENTANYL CITRATE 50 UG/ML
INJECTION, SOLUTION INTRAMUSCULAR; INTRAVENOUS AS NEEDED
Status: DISCONTINUED | OUTPATIENT
Start: 2020-08-11 | End: 2020-08-11

## 2020-08-11 RX ORDER — SODIUM CHLORIDE, SODIUM LACTATE, POTASSIUM CHLORIDE, CALCIUM CHLORIDE 600; 310; 30; 20 MG/100ML; MG/100ML; MG/100ML; MG/100ML
INJECTION, SOLUTION INTRAVENOUS CONTINUOUS PRN
Status: DISCONTINUED | OUTPATIENT
Start: 2020-08-11 | End: 2020-08-11

## 2020-08-11 RX ORDER — SODIUM CHLORIDE 9 MG/ML
20 INJECTION, SOLUTION INTRAVENOUS CONTINUOUS
Status: CANCELLED | OUTPATIENT
Start: 2020-08-11

## 2020-08-11 RX ORDER — ONDANSETRON 2 MG/ML
INJECTION INTRAMUSCULAR; INTRAVENOUS AS NEEDED
Status: DISCONTINUED | OUTPATIENT
Start: 2020-08-11 | End: 2020-08-11

## 2020-08-11 RX ORDER — PROPOFOL 10 MG/ML
INJECTION, EMULSION INTRAVENOUS AS NEEDED
Status: DISCONTINUED | OUTPATIENT
Start: 2020-08-11 | End: 2020-08-11

## 2020-08-11 RX ORDER — SODIUM CHLORIDE 9 MG/ML
125 INJECTION, SOLUTION INTRAVENOUS CONTINUOUS
Status: CANCELLED | OUTPATIENT
Start: 2020-08-11

## 2020-08-11 RX ORDER — ONDANSETRON 2 MG/ML
4 INJECTION INTRAMUSCULAR; INTRAVENOUS ONCE AS NEEDED
Status: CANCELLED | OUTPATIENT
Start: 2020-08-11

## 2020-08-11 RX ORDER — SUCCINYLCHOLINE/SOD CL,ISO/PF 100 MG/5ML
SYRINGE (ML) INTRAVENOUS AS NEEDED
Status: DISCONTINUED | OUTPATIENT
Start: 2020-08-11 | End: 2020-08-11

## 2020-08-11 RX ORDER — DEXAMETHASONE SODIUM PHOSPHATE 10 MG/ML
INJECTION, SOLUTION INTRAMUSCULAR; INTRAVENOUS AS NEEDED
Status: DISCONTINUED | OUTPATIENT
Start: 2020-08-11 | End: 2020-08-11

## 2020-08-11 RX ADMIN — Medication 100 MG: at 12:48

## 2020-08-11 RX ADMIN — PHENYLEPHRINE HYDROCHLORIDE 20 MCG/MIN: 10 INJECTION INTRAVENOUS at 12:51

## 2020-08-11 RX ADMIN — FENTANYL CITRATE 25 MCG: 50 INJECTION, SOLUTION INTRAMUSCULAR; INTRAVENOUS at 13:41

## 2020-08-11 RX ADMIN — PHENYLEPHRINE HYDROCHLORIDE 100 MCG: 10 INJECTION INTRAVENOUS at 12:51

## 2020-08-11 RX ADMIN — IOHEXOL 22 ML: 240 INJECTION, SOLUTION INTRATHECAL; INTRAVASCULAR; INTRAVENOUS; ORAL at 13:10

## 2020-08-11 RX ADMIN — FENTANYL CITRATE 25 MCG: 50 INJECTION, SOLUTION INTRAMUSCULAR; INTRAVENOUS at 13:43

## 2020-08-11 RX ADMIN — DEXAMETHASONE SODIUM PHOSPHATE 10 MG: 10 INJECTION, SOLUTION INTRAMUSCULAR; INTRAVENOUS at 12:48

## 2020-08-11 RX ADMIN — PROPOFOL 200 MG: 10 INJECTION, EMULSION INTRAVENOUS at 12:48

## 2020-08-11 RX ADMIN — ONDANSETRON 4 MG: 2 INJECTION INTRAMUSCULAR; INTRAVENOUS at 13:43

## 2020-08-11 RX ADMIN — SODIUM CHLORIDE, SODIUM LACTATE, POTASSIUM CHLORIDE, AND CALCIUM CHLORIDE: .6; .31; .03; .02 INJECTION, SOLUTION INTRAVENOUS at 12:26

## 2020-08-11 RX ADMIN — LIDOCAINE HYDROCHLORIDE 80 MG: 10 INJECTION, SOLUTION EPIDURAL; INFILTRATION; INTRACAUDAL; PERINEURAL at 12:48

## 2020-08-11 RX ADMIN — FENTANYL CITRATE 50 MCG: 50 INJECTION, SOLUTION INTRAMUSCULAR; INTRAVENOUS at 12:47

## 2020-08-11 NOTE — H&P
History and Physical -  [facility]  Odette Chapman 60 y o  male MRN: [id number]    HPI: Odette Chapman is a 60y o  year old male who presents with choledocholithiasis/ ampullary adenoma s/p resection  Plan for stent removal - was previoulsy removed but he developed cholangitis  Review of Systems    Historical Information   Past Medical History:   Diagnosis Date    Atrial fibrillation ([facility] [address] )     Epigastric pain 6/24/2020    Gastroesophageal reflux     GERD (gastroesophageal reflux disease)     Pancreatitis     Pleural effusion     Second hand smoke exposure      Past Surgical History:   Procedure Laterality Date    CHOLECYSTECTOMY      COLONOSCOPY N/A 3/21/2016    Procedure: COLONOSCOPY;  Surgeon: Radha Alexandra DO;  [facility]; Service:     ERCP N/A 1/4/2019    Procedure: ENDOSCOPIC RETROGRADE CHOLANGIOPANCREATOGRAPHY (ERCP); Surgeon: Vale Laughlin MD;  [facility]; Service: Gastroenterology    ESOPHAGOGASTRODUODENOSCOPY N/A 2/5/2019    Procedure: ESOPHAGOGASTRODUODENOSCOPY (EGD), with possible nasojejunal Dobhoff tube placement;  Surgeon: Adalid Kelley MD;  [facility]; Service: Gastroenterology    ESOPHAGOGASTRODUODENOSCOPY N/A 4/3/2019    Procedure: ESOPHAGOGASTRODUODENOSCOPY (EGD)- endoscopic necrosectomy;  Surgeon: Vale Laughlin MD;  [facility]; Service: Gastroenterology    IR IMAGE GUIDED ASPIRATION / DRAINAGE W TUBE  6/17/2019    IR THORACENTESIS  2/7/2019    IR THORACENTESIS  3/29/2019    LINEAR ENDOSCOPIC U/S N/A 3/29/2019    Procedure: LINEAR ENDOSCOPIC U/S cyst gastro;  Surgeon: Vale Laughlin MD;  [facility]; Service: Gastroenterology    ID [address] N/A 5/20/2019    Procedure: Anaya Correia;  Surgeon: Angel Awan MD;  [facility];  Service: Thoracic    ID EGD TRANSORAL BIOPSY SINGLE/MULTIPLE N/A 3/21/2016    Procedure: ESOPHAGOGASTRODUODENOSCOPY (EGD);   Surgeon: Hurman Phoenix DO Fely;  [facility]; Service: General    OH ERCP DX COLLECTION SPECIMEN BRUSHING/WASHING N/A 4/26/2019    Procedure: ENDOSCOPIC RETROGRADE CHOLANGIOPANCREATOGRAPHY (ERCP); Surgeon: Izabel Crespo MD;  [facility]; Service: Gastroenterology    [place] ESOPHAGOGASTRODUODENOSCOPY TRANSORAL DIAGNOSTIC N/A 4/9/2019    Procedure: ESOPHAGOGASTRODUODENOSCOPY (EGD) endoscopic necrosectomy;  Surgeon: Izabel Crespo MD;  [facility]; Service: Gastroenterology    [place] ESOPHAGOGASTRODUODENOSCOPY TRANSORAL DIAGNOSTIC N/A 4/26/2019    Procedure: ESOPHAGOGASTRODUODENOSCOPY (EGD); Surgeon: Izabel Crespo MD;  [facility]; Service: Gastroenterology    [place] REPAIR [address] HERNIA,5+Y/O,REDUCIBL Right 1/22/2020    Procedure: REPAIR HERNIA INGUINAL;  Surgeon: Moody Palomo MD;  [facility];  Service: General    OH THORACOSCOPY SURG PART PULM DECORT Left 5/20/2019    Procedure: DECORTICATION LUNG;  Surgeon: Evelyn Limon MD;  [facility];  Service: Thoracic    OH THORACOSCOPY SURG PART PULM DECORT Left 5/20/2019    Procedure: THORACOSCOPY VIDEO ASSISTED SURGERY (VATS);   Surgeon: Evelyn Limon MD;  [facility];  Service: Thoracic    [place] THORACOSCOPY SURG W/PLEURODESIS N/A 5/20/2019    Procedure: PLEURODESIS mechanical;  Surgeon: Evelyn Limon MD;  [facility];  Service: Thoracic     Social History   Social History     Substance and Sexual Activity   Alcohol Use Not Currently     Social History     Substance and Sexual Activity   Drug Use No     Social History     Tobacco Use   Smoking Status Never Smoker   Smokeless Tobacco Never Used     Family History   Problem Relation Age of Onset    Lung cancer Mother 79        Smoker     Transient ischemic attack Father 61    Lung disease Neg Hx        Meds/Allergies     (Not in a hospital admission)      No Known Allergies    Objective     /78   Pulse 65   Temp (!) 97 2 °F (36 2 °C) (Tympanic)   Resp 18 Ht 6' 4" (1 93 m)   Wt 96 6 kg (213 lb)   SpO2 96%   BMI 25 93 kg/m²       PHYSICAL EXAM    Gen: NAD  CV: RRR  CHEST: Clear  ABD: soft, NT/ND  EXT: no edema  Neuro: AAO      ASSESSMENT/PLAN:  This is a 60y o  year old male here for ERCP for stent removal      PLAN:   Procedure: ERCP

## 2020-08-11 NOTE — ANESTHESIA POSTPROCEDURE EVALUATION
Post-Op Assessment Note    CV Status:  Stable  Pain Score: 0    Pain management: adequate     Mental Status:  Alert and awake   Hydration Status:  Euvolemic and stable   PONV Controlled:  Controlled   Airway Patency:  Patent      Post Op Vitals Reviewed: Yes      Staff: Anesthesiologist, CRNA   Comments: Report given to Recovering RN,VSS,  Pt states he is comfortable        No complications documented      /65 (08/11/20 1349)    Temp (!) 96 9 °F (36 1 °C) (08/11/20 1349)    Pulse 69 (08/11/20 1349)   Resp 16 (08/11/20 1349)    SpO2 100 % (08/11/20 1349)

## 2020-08-11 NOTE — ANESTHESIA PREPROCEDURE EVALUATION
Procedure:  ERCP    Relevant Problems   CARDIO   (+) Paroxysmal atrial fibrillation (HCC)      GI/HEPATIC   (+) Ampullary adenoma   (+) GERD (gastroesophageal reflux disease)   (+) Pancreatic pseudocyst      HEMATOLOGY   (+) Coagulation disorder (HCC) (on anticoagulation)      PULMONARY   (+) Pleural effusion associated with pancreatitis      Other   (+) Calculus of bile duct with acute cholangitis with obstruction        Physical Exam    Airway    Mallampati score: III  TM Distance: >3 FB  Neck ROM: full     Dental   Comment: discolored,     Cardiovascular      Pulmonary      Other Findings        Anesthesia Plan  ASA Score- 3     Anesthesia Type- general with ASA Monitors  Additional Monitors:   Airway Plan: ETT  Plan Factors-    Chart reviewed  EKG reviewed  Existing labs reviewed  Patient summary reviewed  Induction- intravenous  Postoperative Plan-     Informed Consent- Anesthetic plan and risks discussed with patient  I personally reviewed this patient with the CRNA  Discussed and agreed on the Anesthesia Plan with the CRNA  Enmanuel Moeller

## 2020-08-17 ENCOUNTER — TELEPHONE (OUTPATIENT)
Dept: GASTROENTEROLOGY | Facility: CLINIC | Age: 60
End: 2020-08-17

## 2020-08-17 NOTE — TELEPHONE ENCOUNTER
Patient states he went to pharmacy twice to  ursodiol and was told script was not available  Spoke to [facility]  She confirmed that urosdiol script was there and approved  She will contact patient to clarify

## 2020-08-17 NOTE — TELEPHONE ENCOUNTER
Patients GI provider:  Dr Debi Garcia    Number to return call: [phone]    Reason for call: Pt calling stating the pharmacy did not received the script for his ursodiol   Please resend    Scheduled procedure/appointment date if applicable: NA

## 2020-08-31 ENCOUNTER — APPOINTMENT (OUTPATIENT)
Dept: LAB | Facility: CLINIC | Age: 60
End: 2020-08-31
Payer: COMMERCIAL

## 2020-08-31 ENCOUNTER — ANTICOAG VISIT (OUTPATIENT)
Dept: CARDIOLOGY CLINIC | Facility: CLINIC | Age: 60
End: 2020-08-31

## 2020-08-31 DIAGNOSIS — I48.0 PAROXYSMAL ATRIAL FIBRILLATION (HCC): ICD-10-CM

## 2020-09-15 ENCOUNTER — OFFICE VISIT (OUTPATIENT)
Dept: GASTROENTEROLOGY | Facility: MEDICAL CENTER | Age: 60
End: 2020-09-15
Payer: COMMERCIAL

## 2020-09-15 VITALS
HEIGHT: 76 IN | BODY MASS INDEX: 24.96 KG/M2 | SYSTOLIC BLOOD PRESSURE: 124 MMHG | HEART RATE: 84 BPM | TEMPERATURE: 98 F | WEIGHT: 205 LBS | DIASTOLIC BLOOD PRESSURE: 80 MMHG

## 2020-09-15 DIAGNOSIS — K86.3 PANCREATIC PSEUDOCYST: Primary | ICD-10-CM

## 2020-09-15 DIAGNOSIS — D13.5 AMPULLARY ADENOMA: ICD-10-CM

## 2020-09-15 DIAGNOSIS — K80.33 CALCULUS OF BILE DUCT WITH ACUTE CHOLANGITIS WITH OBSTRUCTION: ICD-10-CM

## 2020-09-15 DIAGNOSIS — K21.9 GASTROESOPHAGEAL REFLUX DISEASE WITHOUT ESOPHAGITIS: ICD-10-CM

## 2020-09-15 PROCEDURE — 99214 OFFICE O/P EST MOD 30 MIN: CPT | Performed by: INTERNAL MEDICINE

## 2020-09-15 NOTE — PROGRESS NOTES
Outpatient Follow up  Ethan Maravillaukvida [address] [place] [id number]  Nabil Hays MD  Ph : [phone]  Fax : [phone]  Mobile : [phone]  Email : [email or web address]  Also available on [employer]    Dallin Amado 60 y o  male MRN: [id number]    PCP: Soha Skaggs MD  Referring: No referring provider defined for this encounter  Dallin Amado presented for a follow up visit  My recommendations are included  Please do not hesitate to contact me with any questions you may have  ASSESSMENT AND PLAN:      No problem-specific Assessment & Plan notes found for this encounter  Diagnoses and all orders for this visit:    Pancreatic pseudocyst    Ampullary adenoma    Gastroesophageal reflux disease without esophagitis    Calculus of bile duct with acute cholangitis with obstruction  -     Comprehensive metabolic panel; Future      1  History of acute necrotizing pancreatitis with pancreatic fluid collection status post endoscopic cyst gastrostomy with subsequent resolution and removal of the lumen opposing stent  He is doing well from that standpoint  2  Ampullary adenoma  Status post ampullectomy  The stents have not been removed  No residual am biliary adenoma was seen  3  Choledocholithiasis  Continue ursodiol until next appointment and at that time his labs are normal and he is doing well I would recommend discontinuing it  4  Colon cancer screening  Recommend colonoscopy at the next appointment  5  Follow up in 6 months    ______________________________________________________________________    SUBJECTIVE:  61 y/o gentleman with h/o pancreatitis with pancreatic fluid collection s/p cyst gastrostomy and then had tubular adenoma/ ampullary which was removed  He had his stents removed then developed cholangitis  He had another stent placed and then this was removed  He is doing well now     He does c/o burping and belching at night  No artificial sweeteners  He has tried Gas-X and that may work  He works 2nd shift  He is on a probiotics  He is taking ursodiol and is only taking it once a day  He is on protonix once a day  He has had a colonoscopy done in the past with EPTREVIN about 4 years ago  REVIEW OF SYSTEMS IS OTHERWISE NEGATIVE  Historical Information   Past Medical History:   Diagnosis Date    Atrial fibrillation ([address] )     Epigastric pain 6/24/2020    Gastroesophageal reflux     GERD (gastroesophageal reflux disease)     Pancreatitis     Pleural effusion     Second hand smoke exposure      Past Surgical History:   Procedure Laterality Date    CHOLECYSTECTOMY      COLONOSCOPY N/A 3/21/2016    Procedure: COLONOSCOPY;  Surgeon: Lety Valdes DO;  [facility]; Service:     ERCP N/A 1/4/2019    Procedure: ENDOSCOPIC RETROGRADE CHOLANGIOPANCREATOGRAPHY (ERCP); Surgeon: Nabil Hays MD;  [facility]; Service: Gastroenterology    ERCP  08/11/2020    ESOPHAGOGASTRODUODENOSCOPY N/A 2/5/2019    Procedure: ESOPHAGOGASTRODUODENOSCOPY (EGD), with possible nasojejunal Dobhoff tube placement;  Surgeon: Luis E Segundo MD;  [facility]; Service: Gastroenterology    ESOPHAGOGASTRODUODENOSCOPY N/A 4/3/2019    Procedure: ESOPHAGOGASTRODUODENOSCOPY (EGD)- endoscopic necrosectomy;  Surgeon: Nabil Hays MD;  [facility]; Service: Gastroenterology    IR IMAGE GUIDED ASPIRATION / DRAINAGE W TUBE  6/17/2019    IR THORACENTESIS  2/7/2019    IR THORACENTESIS  3/29/2019    LINEAR ENDOSCOPIC U/S N/A 3/29/2019    Procedure: LINEAR ENDOSCOPIC U/S cyst gastro;  Surgeon: Nabil Hays MD;  [facility];   Service: Gastroenterology    [place] [address] N/A 5/20/2019    Procedure: Sid Gannon;  Surgeon: Angela Jeter MD;  [facility];  Service: Thoracic    [place] EGD TRANSORAL BIOPSY SINGLE/MULTIPLE N/A 3/21/2016    Procedure: ESOPHAGOGASTRODUODENOSCOPY (EGD); Surgeon: Heaven Brennan DO;  [facility]; Service: General    [place] ERCP DX COLLECTION SPECIMEN BRUSHING/WASHING N/A 4/26/2019    Procedure: ENDOSCOPIC RETROGRADE CHOLANGIOPANCREATOGRAPHY (ERCP); Surgeon: Kirsten Bui MD;  [facility]; Service: Gastroenterology    [place] ESOPHAGOGASTRODUODENOSCOPY TRANSORAL DIAGNOSTIC N/A 4/9/2019    Procedure: ESOPHAGOGASTRODUODENOSCOPY (EGD) endoscopic necrosectomy;  Surgeon: Kirsten Bui MD;  [facility]; Service: Gastroenterology    [place] ESOPHAGOGASTRODUODENOSCOPY TRANSORAL DIAGNOSTIC N/A 4/26/2019    Procedure: ESOPHAGOGASTRODUODENOSCOPY (EGD); Surgeon: Kirsten Bui MD;  [facility]; Service: Gastroenterology    [place] REPAIR [address] HERNIA,5+Y/O,REDUCIBL Right 1/22/2020    Procedure: REPAIR HERNIA INGUINAL;  Surgeon: Milton Emanuel MD;  [facility];  Service: General    [place] THORACOSCOPY SURG PART PULM DECORT Left 5/20/2019    Procedure: DECORTICATION LUNG;  Surgeon: Ousmane Leonard MD;  [facility];  Service: Thoracic    [place] THORACOSCOPY SURG PART PULM DECORT Left 5/20/2019    Procedure: THORACOSCOPY VIDEO ASSISTED SURGERY (VATS);   Surgeon: Ousmane Leonard MD;  [facility];  Service: Thoracic    [place] THORACOSCOPY SURG W/PLEURODESIS N/A 5/20/2019    Procedure: PLEURODESIS mechanical;  Surgeon: Ousmane Leonard MD;  [facility];  Service: Thoracic    UPPER GASTROINTESTINAL ENDOSCOPY  09/19/2019     Social History   Social History     Substance and Sexual Activity   Alcohol Use Not Currently     Social History     Substance and Sexual Activity   Drug Use No     Social History     Tobacco Use   Smoking Status Never Smoker   Smokeless Tobacco Never Used     Family History   Problem Relation Age of Onset    Lung cancer Mother 79        Smoker     Transient ischemic attack Father 61    Lung disease Neg Hx        Meds/Allergies       Current Outpatient Medications:    acetaminophen (TYLENOL) 325 mg tablet    metoprolol tartrate (LOPRESSOR) 25 mg tablet    pantoprazole (PROTONIX) 40 mg tablet    tobramycin (Tobrex) 0 3 % SOLN    warfarin (COUMADIN) 3 mg tablet    ursodiol (ACTIGALL) 300 mg capsule    No Known Allergies        Objective     Blood pressure 124/80, pulse 84, temperature 98 °F (36 7 °C), temperature source Tympanic, height 6' 4" (1 93 m), weight 93 kg (205 lb)  Body mass index is 24 95 kg/m²  PHYSICAL EXAM:      Physical Exam  Constitutional:       Appearance: Normal appearance  He is well-developed  HENT:      Head: Normocephalic and atraumatic  Eyes:      General: No scleral icterus  Conjunctiva/sclera: Conjunctivae normal       Pupils: Pupils are equal, round, and reactive to light  Neck:      Musculoskeletal: Normal range of motion  Cardiovascular:      Rate and Rhythm: Normal rate and regular rhythm  Heart sounds: Normal heart sounds  Pulmonary:      Effort: Pulmonary effort is normal  No respiratory distress  Breath sounds: Normal breath sounds  Abdominal:      General: Bowel sounds are normal  There is no distension  Palpations: Abdomen is soft  There is no mass  Tenderness: There is no abdominal tenderness  Hernia: No hernia is present  Musculoskeletal: Normal range of motion  Lymphadenopathy:      Cervical: No cervical adenopathy  Skin:     General: Skin is warm  Neurological:      Mental Status: He is alert and oriented to person, place, and time  Psychiatric:         Behavior: Behavior normal          Thought Content: Thought content normal          Lab Results:   No visits with results within 1 Day(s) from this visit  Latest known visit with results is: Ancillary Orders on 08/28/2020   Component Date Value    Protime 08/31/2020 23 9*    INR 08/31/2020 2 15*         Radiology Results:   No results found

## 2020-10-05 ENCOUNTER — APPOINTMENT (OUTPATIENT)
Dept: LAB | Facility: CLINIC | Age: 60
End: 2020-10-05
Payer: COMMERCIAL

## 2020-10-06 ENCOUNTER — ANTICOAG VISIT (OUTPATIENT)
Dept: CARDIOLOGY CLINIC | Facility: CLINIC | Age: 60
End: 2020-10-06

## 2020-10-06 DIAGNOSIS — I48.0 PAROXYSMAL ATRIAL FIBRILLATION (HCC): ICD-10-CM

## 2020-10-07 RX ORDER — WARFARIN SODIUM 2 MG/1
TABLET ORAL
Qty: 225 TABLET | Refills: 3 | Status: SHIPPED | OUTPATIENT
Start: 2020-10-07 | End: 2021-04-26

## 2020-11-03 ENCOUNTER — LAB (OUTPATIENT)
Dept: LAB | Facility: CLINIC | Age: 60
End: 2020-11-03
Payer: COMMERCIAL

## 2020-11-03 ENCOUNTER — ANTICOAG VISIT (OUTPATIENT)
Dept: CARDIOLOGY CLINIC | Facility: CLINIC | Age: 60
End: 2020-11-03

## 2020-11-03 ENCOUNTER — TRANSCRIBE ORDERS (OUTPATIENT)
Dept: LAB | Facility: CLINIC | Age: 60
End: 2020-11-03

## 2020-11-03 DIAGNOSIS — I48.0 PAROXYSMAL ATRIAL FIBRILLATION (HCC): ICD-10-CM

## 2020-11-14 DIAGNOSIS — K80.33 CALCULUS OF BILE DUCT WITH ACUTE CHOLANGITIS WITH OBSTRUCTION: ICD-10-CM

## 2020-11-16 RX ORDER — URSODIOL 300 MG/1
CAPSULE ORAL
Qty: 180 CAPSULE | Refills: 0 | Status: SHIPPED | OUTPATIENT
Start: 2020-11-16 | End: 2021-08-19

## 2020-11-17 ENCOUNTER — LAB (OUTPATIENT)
Dept: LAB | Facility: CLINIC | Age: 60
End: 2020-11-17
Payer: COMMERCIAL

## 2020-11-17 ENCOUNTER — ANTICOAG VISIT (OUTPATIENT)
Dept: CARDIOLOGY CLINIC | Facility: CLINIC | Age: 60
End: 2020-11-17

## 2020-11-17 DIAGNOSIS — I48.0 PAROXYSMAL ATRIAL FIBRILLATION (HCC): ICD-10-CM

## 2020-12-01 ENCOUNTER — ANTICOAG VISIT (OUTPATIENT)
Dept: CARDIOLOGY CLINIC | Facility: CLINIC | Age: 60
End: 2020-12-01

## 2020-12-01 ENCOUNTER — LAB (OUTPATIENT)
Dept: LAB | Facility: CLINIC | Age: 60
End: 2020-12-01
Payer: COMMERCIAL

## 2020-12-01 DIAGNOSIS — I48.0 PAROXYSMAL ATRIAL FIBRILLATION (HCC): ICD-10-CM

## 2020-12-15 ENCOUNTER — ANTICOAG VISIT (OUTPATIENT)
Dept: CARDIOLOGY CLINIC | Facility: CLINIC | Age: 60
End: 2020-12-15

## 2020-12-15 ENCOUNTER — LAB (OUTPATIENT)
Dept: LAB | Facility: CLINIC | Age: 60
End: 2020-12-15
Payer: COMMERCIAL

## 2020-12-15 ENCOUNTER — TRANSCRIBE ORDERS (OUTPATIENT)
Dept: LAB | Facility: CLINIC | Age: 60
End: 2020-12-15

## 2020-12-15 DIAGNOSIS — I48.0 PAROXYSMAL ATRIAL FIBRILLATION (HCC): ICD-10-CM

## 2021-01-05 ENCOUNTER — LAB (OUTPATIENT)
Dept: LAB | Facility: CLINIC | Age: 61
End: 2021-01-05
Payer: COMMERCIAL

## 2021-01-05 ENCOUNTER — ANTICOAG VISIT (OUTPATIENT)
Dept: CARDIOLOGY CLINIC | Facility: CLINIC | Age: 61
End: 2021-01-05

## 2021-01-05 DIAGNOSIS — I48.0 PAROXYSMAL ATRIAL FIBRILLATION (HCC): ICD-10-CM

## 2021-01-20 ENCOUNTER — TELEPHONE (OUTPATIENT)
Dept: GASTROENTEROLOGY | Facility: CLINIC | Age: 61
End: 2021-01-20

## 2021-01-20 NOTE — TELEPHONE ENCOUNTER
Patients GI provider:  Dr Issa [facility]    Number to return call: [phone]    Reason for call: Pt calling stating he is struggling with alot of  gas  Pt thinks he may have EPI which is interrupting his sleep  Pt would like to know what he can take for it?     Scheduled procedure/appointment date if applicable: Appt  1/70/05

## 2021-01-21 DIAGNOSIS — R14.2 BELCHING: Primary | ICD-10-CM

## 2021-01-21 NOTE — TELEPHONE ENCOUNTER
Patient of Hussein Cordova, last seen 9/15/20    History of pancreatic pseudocyst, ampullary adenoma, GERD, calculus of bile duct w acute cholangitis with obstruction    Patient called back  He states that he has a lot of burping that is really bothering him  This has been going on for months but it is really interfering with his sleep because he can't stop burping and feels a lot of chest pressure until he burps  Denies nausea, vomiting, changes in appetite  Normal, regular bowel movements, no bloating  He takes 40 mg daily protonix and uses gas x as needed to assist with symptoms  He denies any heartburn or reflux  He is concerned this is a symptom of pancreatic insufficiency  He states he notices the burping no matter what he eats or how much he eats   Please advise, thanks

## 2021-01-26 NOTE — TELEPHONE ENCOUNTER
Called patient and spoke with him  He states he wanted to be tested for h pylori  When I had spoke with him a few days ago, his only symptom was burping, so I asked if that had changed  He stated it hadn't he was still having burping and trying to figure out what the cause was  He states he did not start pepcid as we had recommended and did not provide stool test  I explained that we should try these things first as they may improve symptoms and show us the problem  Patient denies having any abdominal pain or nausea  I explained that those were typically symptoms associated with h pylori   Patient verbalized understanding and will follow our recommendations

## 2021-01-28 NOTE — TELEPHONE ENCOUNTER
Patient called back and we had the exact same discussion as 1/26  He states he wanted to be tested for h pylori  I explained that we can do a stool test for h pylori, but for test to be accurate, he would have to stop pantoprazole for 2 weeks prior, which would likely make his symptoms of burping worse  I asked if he had tried taking pepcid at bedtime as we had recommended  Patient states he did not  He also did not complete the pancreatic elastase test we had ordered at his request  I strongly recommended that he try these recommendations first so we can relieve his symptoms  Patient states he "needs to come clean" and has been taking something called theragest, which is pancreatic enzymes  I suggested if he has been taking this it may affect the results we get with the pancreatic elastase test  He states he just wants his constant burping to stop  I again recommended he try taking pepcid in addition to his pantoprazole  He states he will do this

## 2021-02-09 ENCOUNTER — LAB (OUTPATIENT)
Dept: LAB | Facility: CLINIC | Age: 61
End: 2021-02-09
Payer: COMMERCIAL

## 2021-02-09 ENCOUNTER — ANTICOAG VISIT (OUTPATIENT)
Dept: CARDIOLOGY CLINIC | Facility: CLINIC | Age: 61
End: 2021-02-09

## 2021-02-09 DIAGNOSIS — I48.0 PAROXYSMAL ATRIAL FIBRILLATION (HCC): ICD-10-CM

## 2021-02-17 ENCOUNTER — TELEPHONE (OUTPATIENT)
Dept: GASTROENTEROLOGY | Facility: AMBULARY SURGERY CENTER | Age: 61
End: 2021-02-17

## 2021-02-17 DIAGNOSIS — R10.84 GENERALIZED ABDOMINAL PAIN: ICD-10-CM

## 2021-02-17 DIAGNOSIS — R14.2 BELCHING: Primary | ICD-10-CM

## 2021-02-17 NOTE — TELEPHONE ENCOUNTER
Patients GI provider:  Dr Posada Life    Number to return call: [phone]    Reason for call: Pt calling requesting to speak with someone regarding experiencing gas pain       Scheduled procedure/appointment date if applicable: Apt/procedure   3/24/21

## 2021-02-17 NOTE — TELEPHONE ENCOUNTER
We can do hyplori testing    And encourage him to do fecal elastase testing which will evaluate for epi

## 2021-02-17 NOTE — TELEPHONE ENCOUNTER
Hx- choledocholithiasis, pancreatic pseudocyst, ampulla adenoma, gerd  Also see 1/20/21 encounter    Patient called back to discuss on going symptoms  Reports constant 7/10 chest and stomach "gas pressure" that wakes him through out the night  Abdomen soft/ passing flatus  Denies fever, diarrhea or n/v   Normal BM every 1-2 days  Patient is concerned he has h pylori and requesting testing  Previously ordered pancreatic elastase testing not completed  He also has concern he may have EPI and diverticulitis  He states he stopped his pantoprazole x 2 weeks for h pylori stool testing  Taking Pepcid daily, gas x probiotic and ursodiol -decreased to once daily  Ok to order h pylori stool testing? Other recommendations?  Last imaging 8/11/20

## 2021-03-02 ENCOUNTER — TRANSCRIBE ORDERS (OUTPATIENT)
Dept: LAB | Facility: CLINIC | Age: 61
End: 2021-03-02

## 2021-03-02 ENCOUNTER — LAB (OUTPATIENT)
Dept: LAB | Facility: CLINIC | Age: 61
End: 2021-03-02
Payer: COMMERCIAL

## 2021-03-02 DIAGNOSIS — K80.33 CALCULUS OF BILE DUCT WITH ACUTE CHOLANGITIS WITH OBSTRUCTION: ICD-10-CM

## 2021-03-02 LAB
ALBUMIN SERPL BCP-MCNC: 4.1 G/DL (ref 3.5–5)
ALP SERPL-CCNC: 76 U/L (ref 46–116)
ALT SERPL W P-5'-P-CCNC: 28 U/L (ref 12–78)
ANION GAP SERPL CALCULATED.3IONS-SCNC: 4 MMOL/L (ref 4–13)
AST SERPL W P-5'-P-CCNC: 15 U/L (ref 5–45)
BILIRUB SERPL-MCNC: 1.7 MG/DL (ref 0.2–1)
BUN SERPL-MCNC: 17 MG/DL (ref 5–25)
CALCIUM SERPL-MCNC: 8.8 MG/DL (ref 8.3–10.1)
CHLORIDE SERPL-SCNC: 109 MMOL/L (ref 100–108)
CO2 SERPL-SCNC: 30 MMOL/L (ref 21–32)
CREAT SERPL-MCNC: 1.19 MG/DL (ref 0.6–1.3)
GFR SERPL CREATININE-BSD FRML MDRD: 66 ML/MIN/1.73SQ M
GLUCOSE P FAST SERPL-MCNC: 100 MG/DL (ref 65–99)
POTASSIUM SERPL-SCNC: 3.8 MMOL/L (ref 3.5–5.3)
PROT SERPL-MCNC: 6.5 G/DL (ref 6.4–8.2)
SODIUM SERPL-SCNC: 143 MMOL/L (ref 136–145)

## 2021-03-02 PROCEDURE — 80053 COMPREHEN METABOLIC PANEL: CPT

## 2021-03-03 ENCOUNTER — ANTICOAG VISIT (OUTPATIENT)
Dept: CARDIOLOGY CLINIC | Facility: CLINIC | Age: 61
End: 2021-03-03

## 2021-03-03 ENCOUNTER — LAB (OUTPATIENT)
Dept: LAB | Facility: CLINIC | Age: 61
End: 2021-03-03
Payer: COMMERCIAL

## 2021-03-03 DIAGNOSIS — I48.0 PAROXYSMAL ATRIAL FIBRILLATION (HCC): ICD-10-CM

## 2021-03-03 DIAGNOSIS — R14.2 BELCHING: ICD-10-CM

## 2021-03-03 DIAGNOSIS — R10.84 GENERALIZED ABDOMINAL PAIN: ICD-10-CM

## 2021-03-03 PROCEDURE — 87338 HPYLORI STOOL AG IA: CPT

## 2021-03-04 LAB — H PYLORI AG STL QL IA: NEGATIVE

## 2021-03-24 ENCOUNTER — TELEPHONE (OUTPATIENT)
Dept: GASTROENTEROLOGY | Facility: MEDICAL CENTER | Age: 61
End: 2021-03-24

## 2021-03-24 ENCOUNTER — OFFICE VISIT (OUTPATIENT)
Dept: GASTROENTEROLOGY | Facility: MEDICAL CENTER | Age: 61
End: 2021-03-24
Payer: COMMERCIAL

## 2021-03-24 VITALS
BODY MASS INDEX: 25.09 KG/M2 | TEMPERATURE: 97.6 F | HEART RATE: 78 BPM | SYSTOLIC BLOOD PRESSURE: 124 MMHG | HEIGHT: 76 IN | DIASTOLIC BLOOD PRESSURE: 70 MMHG | WEIGHT: 206 LBS

## 2021-03-24 DIAGNOSIS — K21.9 GASTROESOPHAGEAL REFLUX DISEASE WITHOUT ESOPHAGITIS: Primary | ICD-10-CM

## 2021-03-24 DIAGNOSIS — Z12.11 COLON CANCER SCREENING: ICD-10-CM

## 2021-03-24 PROCEDURE — 99214 OFFICE O/P EST MOD 30 MIN: CPT | Performed by: INTERNAL MEDICINE

## 2021-03-24 RX ORDER — PANTOPRAZOLE SODIUM 20 MG/1
20 TABLET, DELAYED RELEASE ORAL DAILY
Qty: 30 TABLET | Refills: 2 | Status: SHIPPED | OUTPATIENT
Start: 2021-03-24 | End: 2021-08-19 | Stop reason: HOSPADM

## 2021-03-24 RX ORDER — SODIUM, POTASSIUM,MAG SULFATES 17.5-3.13G
1 SOLUTION, RECONSTITUTED, ORAL ORAL ONCE
Qty: 2 BOTTLE | Refills: 0 | Status: SHIPPED | OUTPATIENT
Start: 2021-03-24 | End: 2021-08-19 | Stop reason: HOSPADM

## 2021-03-24 NOTE — TELEPHONE ENCOUNTER
Our mutual patient is scheduled for procedure: On: 8/19/21     With: Dr Kel Anderson     Anne-Marie Martinez is taking the following blood thinner:         Coumadin                 Can this be stopped _5_ days prior to the procedure?        Physician Approving clearance: ________________________

## 2021-03-24 NOTE — PATIENT INSTRUCTIONS
1  Avoid artificial sweeteners  2  FODMAP diet  3  Try to drink half hour before or after a meal and not during  4  Eat slow  5  Try Gas - X before evening meals  6  Colonoscopy and EGD in 8/21  7  Probiotics  8  Protonix 20 mg daily  9  Stop the ursodiol  Probiotic (By mouth)   May increase the number of healthy bacteria in your stomach and intestines  Brand Name(s): 5X Probiotic, Abatinex, Acidophilus Probiotic Blend, Adult Probiotic, Align, Azo Complete Feminine Balance, BD Lactinex, BaciCap, Bacid, Bifidonate, BioGaia, BioGaia Gastrus, Children's Chewable Probiotic, Culturelle, Culturelle Advanced Immune Defense   There may be other brand names for this medicine  When This Medicine Should Not Be Used: This medicine is not right for everyone  Do not use it if you had an allergic reaction to a probiotic, such as acidophilus, bifidobacterium, lactobacillus, saccharomyces, or streptococcus thermophilus  How to Use This Medicine:   Capsule, Delayed Release Capsule, Liquid, Powder, Tablet, Stick, Spray, Chewable Tablet, Coated Tablet, Wafer  · Your doctor will tell you how much medicine to use  Do not use more than directed  · Follow the instructions on the medicine label if you are using this medicine without a prescription  · Tablet or delayed-release capsule: Swallow whole  Do not chew, crush, or break  · Powder:  Be careful to not breathe in the powder, because it may bother your lungs  Do not get the powder on your skin  If you mix the powder in food or liquid, do this right before you take the medicine  Do not mix it and then save it for later  · Chewable tablet or wafer:  Chew it completely before you swallow  · Measure the oral liquid medicine with a marked measuring spoon, oral syringe, or medicine cup  · Missed dose: Take a dose as soon as you remember  If it is almost time for your next dose, wait until then and take a regular dose   Do not take extra medicine to make up for a missed dose  · Some brands must be stored in the refrigerator, and some other brands must be stored at room temperature  Follow the directions on the label  Ask your pharmacist if you are not sure how to store your medicine  Drugs and Foods to Avoid:      Ask your doctor or pharmacist before using any other medicine, including over-the-counter medicines, vitamins, and herbal products  Warnings While Using This Medicine:   · Different brands of this medicine will have different warnings, because the specific ingredients will be different  Read the label on your medicine carefully  Ask your doctor or pharmacist if you have questions  · Tell your doctor if you are pregnant or breastfeeding, or if you are allergic to milk, lactose, yeast, or soy  · Ask your doctor before you use this medicine if you have a central line (port or catheter), or if you have a fever  · Keep all medicine out of the reach of children  Never share your medicine with anyone  Possible Side Effects While Using This Medicine:   Call your doctor right away if you notice any of these side effects:  · Allergic reaction: Itching or hives, swelling in your face or hands, swelling or tingling in your mouth or throat, chest tightness, trouble breathing  If you notice these less serious side effects, talk with your doctor:   · Mild diarrhea, constipation, nausea, or vomiting  · Mild gas or cramps  If you notice other side effects that you think are caused by this medicine, tell your doctor  Call your doctor for medical advice about side effects  You may report side effects to FDA at 0-839-FDA-5970  © Copyright [address] [place] [address] Drive Information is for End User's use only and may not be sold, redistributed or otherwise used for commercial purposes  The above information is an  only  It is not intended as medical advice for individual conditions or treatments   Talk to your doctor, nurse or pharmacist before following any medical regimen to see if it is safe and effective for you  The patient is scheduled at [facility] for a colon/egd with Dr Miranda on 8/19/2021  suprep prep instructions have been gone over in the office, with the patient, by the MA  The patient is aware that they will receive a call with the arrival time the day prior to procedure and that they will need a  the day of the procedure   I have asked the patient to call with any questions that they might have prior to procedure     Am blood thinner

## 2021-03-24 NOTE — PROGRESS NOTES
Outpatient Follow up  [id number] [address]  Antwan Lewis MD  Ph : [phone]  Fax : [phone]  Mobile : [phone]  Email : [email or web address]  Also available on [employer] Text    Daryn Silver 63 y o  male MRN: [id number]    PCP: Howard Zazueta MD  Referring: No referring provider defined for this encounter  Daryn Silver presented for a follow up visit  My recommendations are included  Please do not hesitate to contact me with any questions you may have  ASSESSMENT AND PLAN:      No problem-specific Assessment & Plan notes found for this encounter  Diagnoses and all orders for this visit:    Gastroesophageal reflux disease without esophagitis  -     pantoprazole (PROTONIX) 20 mg tablet; Take 1 tablet (20 mg total) by mouth daily  -     EGD; Future    Colon cancer screening  -     Colonoscopy; Future  -     Suprep Bowel Prep Kit 17 5-3 13-1 6 GM/177ML SOLN; Take 1 kit by mouth once for 1 dose Please follow instructions as per the office  Other orders  -     Diet NPO; Sips with meds; Standing  -     Void on call to OR; Standing  -     Insert peripheral IV; Standing          79-year-old gentleman with history of pancreatitis complicated by pancreatic pseudocyst and also ampullary adenoma  He is doing well from that standpoint  He is complaining of dyspepsia at this time  1  Avoid artificial sweeteners  2  FODMAP diet  3  Try to drink half hour before or after a meal and not during  4  Eat slow  5  Try Gas - X before evening meals  6  Colonoscopy and EGD in 8/21  7  Probiotics  8  Protonix 20 mg daily  9  Stop the ursodiol  10  EGD and colonoscopy this year   ______________________________________________________________________    SUBJECTIVE:  62 y/o with  History of pancreatitis and pancreatic pseudocyst which had required endoscopic decompression    This was successful  He also 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  After that once the stents were removed he had an episode of cholangitis for which she had repeated stents and clearing of the duct  He is doing well at this time  He complains of excessive gas and belching  Mostly night  This probably started about 6 months ago  He was on antibiotics then and since then has also started with ursodiol  He did have a hiatal hernia  He did have some esophagitis but biopsies negative for Choudhary's esophagus  He is on protonix once daily  He had a colonoscopy done about 10 years ago  No change in bowel habits  No bleeding  No family h/o GI malignancy  REVIEW OF SYSTEMS IS OTHERWISE NEGATIVE  Historical Information   Past Medical History:   Diagnosis Date    Atrial fibrillation ([address] )     Epigastric pain 6/24/2020    Gastroesophageal reflux     GERD (gastroesophageal reflux disease)     Pancreatitis     Pleural effusion     Second hand smoke exposure      Past Surgical History:   Procedure Laterality Date    CHOLECYSTECTOMY      COLONOSCOPY N/A 3/21/2016    Procedure: COLONOSCOPY;  Surgeon: Trell Mistry DO;  [facility]; Service:     ERCP N/A 1/4/2019    Procedure: ENDOSCOPIC RETROGRADE CHOLANGIOPANCREATOGRAPHY (ERCP); Surgeon: Hilton Phillip MD;  [facility]; Service: Gastroenterology    ERCP  08/11/2020    ESOPHAGOGASTRODUODENOSCOPY N/A 2/5/2019    Procedure: ESOPHAGOGASTRODUODENOSCOPY (EGD), with possible nasojejunal Dobhoff tube placement;  Surgeon: Tani Varner MD;  [facility]; Service: Gastroenterology    ESOPHAGOGASTRODUODENOSCOPY N/A 4/3/2019    Procedure: ESOPHAGOGASTRODUODENOSCOPY (EGD)- endoscopic necrosectomy;  Surgeon: Hliton Phillip MD;  [facility];   Service: Gastroenterology    IR IMAGE GUIDED ASPIRATION / DRAINAGE W TUBE  6/17/2019    IR THORACENTESIS  2/7/2019    IR THORACENTESIS  3/29/2019    LINEAR ENDOSCOPIC U/S N/A 3/29/2019 Procedure: LINEAR ENDOSCOPIC U/S cyst gastro;  Surgeon: Zoe Collins MD;  [facility]; Service: Gastroenterology    PA [address] N/A 5/20/2019    Procedure: Gio Harden;  Surgeon: Ambrose Pederson MD;  [facility];  Service: Thoracic    PA EGD TRANSORAL BIOPSY SINGLE/MULTIPLE N/A 3/21/2016    Procedure: ESOPHAGOGASTRODUODENOSCOPY (EGD); Surgeon: Stella Samuels DO;  [facility]; Service: General    PA ERCP DX COLLECTION SPECIMEN BRUSHING/WASHING N/A 4/26/2019    Procedure: ENDOSCOPIC RETROGRADE CHOLANGIOPANCREATOGRAPHY (ERCP); Surgeon: Zoe Collins MD;  [facility]; Service: Gastroenterology    PA ESOPHAGOGASTRODUODENOSCOPY TRANSORAL DIAGNOSTIC N/A 4/9/2019    Procedure: ESOPHAGOGASTRODUODENOSCOPY (EGD) endoscopic necrosectomy;  Surgeon: Zoe Collins MD;  [facility]; Service: Gastroenterology    PA ESOPHAGOGASTRODUODENOSCOPY TRANSORAL DIAGNOSTIC N/A 4/26/2019    Procedure: ESOPHAGOGASTRODUODENOSCOPY (EGD); Surgeon: Zoe Collins MD;  [facility]; Service: Gastroenterology    PA REPAIR [address] HERNIA,5+Y/O,REDUCIBL Right 1/22/2020    Procedure: REPAIR HERNIA INGUINAL;  Surgeon: Rosemarie Wallace MD;  [facility];  Service: General    PA THORACOSCOPY SURG PART PULM DECORT Left 5/20/2019    Procedure: DECORTICATION LUNG;  Surgeon: Ambrose Pederson MD;  [facility];  Service: Thoracic    PA THORACOSCOPY SURG PART PULM DECORT Left 5/20/2019    Procedure: THORACOSCOPY VIDEO ASSISTED SURGERY (VATS);   Surgeon: Ambrose Pederson MD;  [facility];  Service: Thoracic    PA THORACOSCOPY SURG W/PLEURODESIS N/A 5/20/2019    Procedure: PLEURODESIS mechanical;  Surgeon: Ambrose Pederson MD;  [facility];  Service: Thoracic    UPPER GASTROINTESTINAL ENDOSCOPY  09/19/2019     Social History   Social History     Substance and Sexual Activity   Alcohol Use Not Currently     Social History     Substance and Sexual Activity   Drug Use No     Social History     Tobacco Use   Smoking Status Never Smoker   Smokeless Tobacco Never Used     Family History   Problem Relation Age of Onset    Lung cancer Mother 79        Smoker     Transient ischemic attack Father 61    Lung disease Neg Hx        Meds/Allergies       Current Outpatient Medications:     acetaminophen (TYLENOL) 325 mg tablet    metoprolol tartrate (LOPRESSOR) 25 mg tablet    pantoprazole (PROTONIX) 40 mg tablet    tobramycin (Tobrex) 0 3 % SOLN    ursodiol (ACTIGALL) 300 mg capsule    warfarin (COUMADIN) 2 mg tablet    warfarin (COUMADIN) 3 mg tablet    pantoprazole (PROTONIX) 20 mg tablet    Suprep Bowel Prep Kit 17 5-3 13-1 6 GM/177ML SOLN    No Known Allergies        Objective     Blood pressure 124/70, pulse 78, temperature 97 6 °F (36 4 °C), temperature source Tympanic, height 6' 4" (1 93 m), weight 93 4 kg (206 lb)  Body mass index is 25 08 kg/m²  PHYSICAL EXAM:      Physical Exam  Constitutional:       Appearance: Normal appearance  He is well-developed  HENT:      Head: Normocephalic and atraumatic  Eyes:      General: No scleral icterus  Conjunctiva/sclera: Conjunctivae normal       Pupils: Pupils are equal, round, and reactive to light  Neck:      Musculoskeletal: Normal range of motion  Cardiovascular:      Rate and Rhythm: Normal rate and regular rhythm  Heart sounds: Normal heart sounds  Pulmonary:      Effort: Pulmonary effort is normal  No respiratory distress  Breath sounds: Normal breath sounds  Abdominal:      General: Bowel sounds are normal  There is no distension  Palpations: Abdomen is soft  There is no mass  Tenderness: There is no abdominal tenderness  Hernia: No hernia is present  Musculoskeletal: Normal range of motion  Lymphadenopathy:      Cervical: No cervical adenopathy  Skin:     General: Skin is warm     Neurological:      Mental Status: He is alert and oriented to person, place, and time  Psychiatric:         Behavior: Behavior normal          Thought Content: Thought content normal          Lab Results:   No visits with results within 1 Day(s) from this visit  Latest known visit with results is:   Lab on 03/03/2021   Component Date Value    H pylori Ag, Stl 03/03/2021 Negative          Radiology Results:   No results found

## 2021-03-30 ENCOUNTER — APPOINTMENT (OUTPATIENT)
Dept: LAB | Facility: CLINIC | Age: 61
End: 2021-03-30
Payer: COMMERCIAL

## 2021-03-30 ENCOUNTER — ANTICOAG VISIT (OUTPATIENT)
Dept: CARDIOLOGY CLINIC | Facility: CLINIC | Age: 61
End: 2021-03-30

## 2021-03-30 DIAGNOSIS — I48.0 PAROXYSMAL ATRIAL FIBRILLATION (HCC): ICD-10-CM

## 2021-04-13 ENCOUNTER — ANTICOAG VISIT (OUTPATIENT)
Dept: CARDIOLOGY CLINIC | Facility: CLINIC | Age: 61
End: 2021-04-13

## 2021-04-13 ENCOUNTER — APPOINTMENT (OUTPATIENT)
Dept: LAB | Facility: CLINIC | Age: 61
End: 2021-04-13
Payer: COMMERCIAL

## 2021-04-13 DIAGNOSIS — I48.0 PAROXYSMAL ATRIAL FIBRILLATION (HCC): ICD-10-CM

## 2021-04-26 ENCOUNTER — ANTICOAG VISIT (OUTPATIENT)
Dept: CARDIOLOGY CLINIC | Facility: CLINIC | Age: 61
End: 2021-04-26

## 2021-04-26 ENCOUNTER — OFFICE VISIT (OUTPATIENT)
Dept: CARDIOLOGY CLINIC | Facility: CLINIC | Age: 61
End: 2021-04-26
Payer: COMMERCIAL

## 2021-04-26 VITALS
HEART RATE: 78 BPM | DIASTOLIC BLOOD PRESSURE: 78 MMHG | HEIGHT: 76 IN | BODY MASS INDEX: 24.67 KG/M2 | WEIGHT: 202.6 LBS | SYSTOLIC BLOOD PRESSURE: 116 MMHG

## 2021-04-26 DIAGNOSIS — I48.0 PAROXYSMAL ATRIAL FIBRILLATION (HCC): Primary | ICD-10-CM

## 2021-04-26 DIAGNOSIS — K85.90 PLEURAL EFFUSION ASSOCIATED WITH PANCREATITIS: ICD-10-CM

## 2021-04-26 DIAGNOSIS — I48.0 PAROXYSMAL ATRIAL FIBRILLATION (HCC): ICD-10-CM

## 2021-04-26 DIAGNOSIS — J91.8 PLEURAL EFFUSION ASSOCIATED WITH PANCREATITIS: ICD-10-CM

## 2021-04-26 DIAGNOSIS — D68.9 COAGULATION DISORDER (HCC): ICD-10-CM

## 2021-04-26 PROCEDURE — 99214 OFFICE O/P EST MOD 30 MIN: CPT | Performed by: INTERNAL MEDICINE

## 2021-04-26 PROCEDURE — 93000 ELECTROCARDIOGRAM COMPLETE: CPT | Performed by: INTERNAL MEDICINE

## 2021-04-26 RX ORDER — ASPIRIN 81 MG/1
81 TABLET, CHEWABLE ORAL DAILY
Qty: 90 TABLET | Refills: 3 | Status: SHIPPED | OUTPATIENT
Start: 2021-04-26

## 2021-04-26 NOTE — PROGRESS NOTES
Cardiology Follow Up    Ru Landry  1960  [id number]  [facility] [id number]    1  Paroxysmal atrial fibrillation (HCC)  POCT ECG    aspirin 81 mg chewable tablet   2  Coagulation disorder (HCC)  POCT ECG    aspirin 81 mg chewable tablet   3  Pleural effusion associated with pancreatitis  POCT ECG    aspirin 81 mg chewable tablet       Discussion/Summary:   Overall he has been doing excellent  There has been no recurrence off of amiodarone  No further atrial fibrillation only the episode during the hospitalization  At this point we discussed options and decided on coming off of full anticoagulation with Coumadin and transitioning to an 81 mg aspirin  If he has any further episodes of atrial fibrillation in the future would commit to lifelong anticoagulant  Blood pressures have been well controlled  Follow-up yearly  Interval History:  58-year-old gentleman I met in the hospital recently with episodes severe pancreatitis and volume overload secondary to low protein levels  He had no signs of heart failure normal ventricle  He was shocked several times to get out of atrial fibrillation and eventually placed on IV amiodarone to maintain sinus rhythm during acute illness  Overall doing well denies any chest pain, shortness of breath, palpitations, lightheadedness, dizziness, or syncope  There has been no lower extremity edema, PND, orthopnea  He has been taking all medications as prescribed  Denies any adverse bleeding on anticoagulation      Problem List     History of acute pancreatitis    Pancreatitis    Overview Signed 1/4/2019 10:19 AM by Courtney Rainey PA-C     Added automatically from request for surgery [id number]         Delirium    Leukocytosis    Pulmonary edema    Hypokalemia    Metabolic alkalosis    Dysphagia    Metabolic encephalopathy    Paroxysmal atrial fibrillation (HCC)    Supratherapeutic INR Abdominal pain    Pancreatic pseudocyst    Pleural effusion, left    [facility]calorie malnutrition ([facility]    Overview Signed 2/4/2019  3:51 PM by Heidy Grijalva PA-C     Added automatically from request for surgery [id number]         Malnutrition Findings:           BMI Findings: Body mass index is 24 66 kg/m²           Abnormal CT of the abdomen        Past Medical History:   Diagnosis Date    [facility]     Epigastric pain 6/24/2020    Gastroesophageal reflux     GERD (gastroesophageal reflux disease)     Pancreatitis     Pleural effusion     Second hand smoke exposure      Social History     Socioeconomic History    Marital status: /Civil Union     Spouse name: Not on file    Number of children: Not on file    Years of education: Not on file    Highest education level: Not on file   Occupational History    Not on file   Social Needs    Financial resource strain: Not on file    Food insecurity     Worry: Not on file     Inability: Not on file   [employer] needs     Medical: Not on file     Non-medical: Not on file   Tobacco Use    Smoking status: Never Smoker    Smokeless tobacco: Never Used   Substance and Sexual Activity    Alcohol use: Not Currently    Drug use: No    Sexual activity: Not on file   Lifestyle    Physical activity     Days per week: Not on file     Minutes per session: Not on file    Stress: Not on file   Relationships    Social connections     Talks on phone: Not on file     Gets together: Not on file     Attends Sikhism service: Not on file     Active member of club or organization: Not on file     Attends meetings of clubs or organizations: Not on file     Relationship status: Not on file    Intimate partner violence     Fear of current or ex partner: Not on file     Emotionally abused: Not on file     Physically abused: Not on file     Forced sexual activity: Not on file   Other Topics Concern    Not on file   Social History Narrative  Not on file      Family History   Problem Relation Age of Onset    Lung cancer Mother 79        Smoker     Transient ischemic attack Father 61    Lung disease Neg Hx      Past Surgical History:   Procedure Laterality Date    CHOLECYSTECTOMY      COLONOSCOPY N/A 3/21/2016    Procedure: COLONOSCOPY;  Surgeon: Solo Freeman DO;  [facility]; Service:     ERCP N/A 1/4/2019    Procedure: ENDOSCOPIC RETROGRADE CHOLANGIOPANCREATOGRAPHY (ERCP); Surgeon: Antwan Lewis MD;  [facility]; Service: Gastroenterology    ERCP  08/11/2020    ESOPHAGOGASTRODUODENOSCOPY N/A 2/5/2019    Procedure: ESOPHAGOGASTRODUODENOSCOPY (EGD), with possible nasojejunal Dobhoff tube placement;  Surgeon: Yogi Medina MD;  [facility]; Service: Gastroenterology    ESOPHAGOGASTRODUODENOSCOPY N/A 4/3/2019    Procedure: ESOPHAGOGASTRODUODENOSCOPY (EGD)- endoscopic necrosectomy;  Surgeon: Antwan Lewis MD;  [facility]; Service: Gastroenterology    IR IMAGE GUIDED ASPIRATION / DRAINAGE W TUBE  6/17/2019    IR THORACENTESIS  2/7/2019    IR THORACENTESIS  3/29/2019    LINEAR ENDOSCOPIC U/S N/A 3/29/2019    Procedure: LINEAR ENDOSCOPIC U/S cyst gastro;  Surgeon: Antwan Lewis MD;  [facility]; Service: Gastroenterology    [place] [address] N/A 5/20/2019    Procedure: Lenny Buchanan;  Surgeon: Sarah Lockhart MD;  [facility];  Service: Thoracic    AR EGD TRANSORAL BIOPSY SINGLE/MULTIPLE N/A 3/21/2016    Procedure: ESOPHAGOGASTRODUODENOSCOPY (EGD); Surgeon: Solo Freeman DO;  [facility]; Service: General    AR ERCP DX COLLECTION SPECIMEN BRUSHING/WASHING N/A 4/26/2019    Procedure: ENDOSCOPIC RETROGRADE CHOLANGIOPANCREATOGRAPHY (ERCP); Surgeon: Antwan Lewis MD;  [facility];   Service: Gastroenterology    AR ESOPHAGOGASTRODUODENOSCOPY TRANSORAL DIAGNOSTIC N/A 4/9/2019    Procedure: ESOPHAGOGASTRODUODENOSCOPY (EGD) endoscopic necrosectomy;  Surgeon: Golden Viera MD;  [facility]; Service: Gastroenterology    IL ESOPHAGOGASTRODUODENOSCOPY TRANSORAL DIAGNOSTIC N/A 4/26/2019    Procedure: ESOPHAGOGASTRODUODENOSCOPY (EGD); Surgeon: Golden Viera MD;  [facility]; Service: Gastroenterology    IL REPAIR [address] HERNIA,5+Y/O,REDUCIBL Right 1/22/2020    Procedure: REPAIR HERNIA INGUINAL;  Surgeon: Friddie Scheuermann, MD;  [facility];  Service: General    IL THORACOSCOPY SURG PART PULM DECORT Left 5/20/2019    Procedure: DECORTICATION LUNG;  Surgeon: Laureen Rodriguez MD;  [facility];  Service: Thoracic    IL THORACOSCOPY SURG PART PULM DECORT Left 5/20/2019    Procedure: THORACOSCOPY VIDEO ASSISTED SURGERY (VATS); Surgeon: Laureen Rodriguez MD;  [facility];  Service: Thoracic    IL THORACOSCOPY SURG W/PLEURODESIS N/A 5/20/2019    Procedure: PLEURODESIS mechanical;  Surgeon: Laureen Rodriguez MD;  [facility];  Service: Thoracic    UPPER GASTROINTESTINAL ENDOSCOPY  09/19/2019       Current Outpatient Medications:     acetaminophen (TYLENOL) 325 mg tablet, Take 650 mg by mouth every 6 (six) hours as needed for mild pain, Disp: , Rfl:     metoprolol tartrate (LOPRESSOR) 25 mg tablet, TAKE 1 TABLET EVERY 12 HOURS, Disp: 180 tablet, Rfl: 3    pantoprazole (PROTONIX) 40 mg tablet, TAKE 1 TABLET DAILY, Disp: 90 tablet, Rfl: 3    ursodiol (ACTIGALL) 300 mg capsule, TAKE 1 CAPSULE BY MOUTH TWICE A DAY, Disp: 180 capsule, Rfl: 0    aspirin 81 mg chewable tablet, Chew 1 tablet (81 mg total) daily, Disp: 90 tablet, Rfl: 3    pantoprazole (PROTONIX) 20 mg tablet, Take 1 tablet (20 mg total) by mouth daily, Disp: 30 tablet, Rfl: 2    Suprep Bowel Prep Kit 17 5-3 13-1 6 GM/177ML SOLN, Take 1 kit by mouth once for 1 dose Please follow instructions as per the office  , Disp: 2 Bottle, Rfl: 0    tobramycin (Tobrex) 0 3 % SOLN, Administer 1 drop to the right eye every 4 (four) hours while awake, Disp: 1 Bottle, Rfl: 0  No Known Allergies    Labs:     Chemistry        Component Value Date/Time     01/05/2015 1131    K 3 8 03/02/2021 1130    K 3 9 01/05/2015 1131     (H) 03/02/2021 1130     01/05/2015 1131    CO2 30 03/02/2021 1130    CO2 24 01/05/2019 0314    BUN 17 03/02/2021 1130    BUN 19 01/05/2015 1131    CREATININE 1 19 03/02/2021 1130    CREATININE 1 01 01/05/2015 1131        Component Value Date/Time    CALCIUM 8 8 03/02/2021 1130    CALCIUM 9 2 01/05/2015 1131    ALKPHOS 76 03/02/2021 1130    ALKPHOS 102 01/05/2015 1131    AST 15 03/02/2021 1130    AST 22 01/05/2015 1131    ALT 28 03/02/2021 1130    ALT 43 01/05/2015 1131    BILITOT 1 00 01/05/2015 1131            No results found for: CHOL  Lab Results   Component Value Date    HDL 51 08/09/2019    HDL 59 01/03/2019     Lab Results   Component Value Date    LDLCALC 87 08/09/2019    LDLCALC 77 01/03/2019     Lab Results   Component Value Date    TRIG 108 08/09/2019    TRIG 52 01/03/2019     No results found for: CHOLHDL    Imaging: Ct Abdomen Pelvis Wo Contrast    Result Date: 2/3/2019  Narrative: CT ABDOMEN AND PELVIS WITHOUT IV CONTRAST INDICATION:   Abdominal pain, unspecified  Recent history of pancreatitis  COMPARISON:  Contrast-enhanced CT of the abdomen and pelvis from January 3, 2019  TECHNIQUE:  CT examination of the abdomen and pelvis was performed without intravenous contrast   Axial, sagittal, and coronal 2D reformatted images were created from the source data and submitted for interpretation  Radiation dose length product (DLP) for this visit:  789 mGy-cm   This examination, like all CT scans performed in the [facility], was performed utilizing techniques to minimize radiation dose exposure, including the use of iterative reconstruction and automated exposure control  Enteric contrast was administered  The absence of intravenous contrast limits evaluation of the abdominal and pelvic viscera   FINDINGS: ABDOMEN LOWER CHEST:  Moderate size left pleural effusion, developing since the last CT  Compressive atelectasis in the base of the left lower lobe  Subsegmental atelectasis in the base of the right lower lobe  LIVER/BILIARY TREE:  1 6 cm cyst in the dome of the right lobe  Otherwise, liver grossly normal   Stent in CBD  No intrahepatic bile duct dilatation  Tiny amount of pneumobilia in the left lobe GALLBLADDER:     Postcholecystectomy  SPLEEN:  Normal in size  Compressed by adjacent 12 x 14 cm discrete fluid collection, either loculated ascites or pseudocyst  PANCREAS:  Evaluation limited without intravenous contrast   6 x 11 x 14 cm fluid collection in the pancreatic bed, consistent with pseudocyst, extending into the mesenteric root  Pancreas appears to be grossly intact  ADRENAL GLANDS:  Unremarkable  KIDNEYS/URETERS:  Unremarkable  No hydronephrosis  2 x 5 x 10 cm fluid collection in the left posterior pararenal space, extending into the left subphrenic space  STOMACH AND BOWEL:  Stomach displaced by the large pancreatic pseudocyst   Small intestine unremarkable  Diverticulosis in the sigmoid without evidence of diverticulitis  APPENDIX:  No findings to suggest appendicitis  ABDOMINOPELVIC CAVITY: Several prominent mesenteric lymph nodes, the largest a 1 2 x 1 8 cm node at the root of the mesentery  Scattered areas of mesenteric edema  Small amount of pelvic and perihepatic ascites  No extraluminal gas  VESSELS:  Unremarkable for patient's age  PELVIS REPRODUCTIVE ORGANS:  Prostate unremarkable  URINARY BLADDER:  Unremarkable  ABDOMINAL WALL/INGUINAL REGIONS:  Unremarkable  OSSEOUS STRUCTURES:  No acute fracture or destructive osseous lesion  Impression: 1  Moderate-sized left pleural effusion with compressive atelectasis in the subjacent portion of the left lower lobe  2   6 x 11 x 14 cm fluid-filled structure in the pancreatic bed, typical of a pseudocyst, extending into the mesenteric root   3   Several additional loculated fluid collections in the abdomen as described above, probably additional pseudocysts  These include a 12 x 14 cm perisplenic collection and a 2 x 5 x 10 cm left retroperitoneal collection  4   Small amount of ascites  5   Sigmoid diverticulosis without evidence of diverticulitis  6   CBD stent in place with no intrahepatic bile duct dilatation  Workstation performed: [id number]     Xr Chest Portable    Result Date: 2/8/2019  Narrative: CHEST INDICATION:   Significant pleuritic chest pain s/p thoracentesis  COMPARISON:  January 18, 2019 EXAM PERFORMED/VIEWS:  XR CHEST PORTABLE FINDINGS:  Enteric tube terminates below the diaphragms  Right upper extremity PICC line has been removed in the interval  Cardiomediastinal silhouette appears unremarkable  Small left pleural effusion slightly similar in size from prior study  No pneumothorax  Osseous structures appear within normal limits for patient age  Impression: No pneumothorax  Stable size small to moderate left pleural effusion  Workstation performed: [id number]     Ir Thoracentesis    Result Date: 2/7/2019  Narrative: Ultrasound-guided thoracentesis Clinical History: Left pleural effusion   Technique: The patient was brought to the interventional radiology area and placed in the sitting position on the side of a stretcher  The left chest was then examined with ultrasound and the skin was marked  The skin was then prepped, and draped in usual sterile fashion  Lidocaine was administered to the skin and a small skin incision was made  Under direct ultrasound guidance, a 5 Western Shila Yueh multisidehole catheter was advanced into the pleural space  1200 mL clear ricky pleural fluid was aspirated  Specimens were sent to lab as requested   The patient tolerated the procedure well and suffered no complications  Impression: Impression: 1  Successful ultrasound-guided thoracentesis yielding 1200 mL clear ricky pleural fluid   Workstation performed: [id number]       ECG:    Normal sinus rhythm      ROS    Vitals:    04/26/21 1302   BP: 116/78   Pulse: 78     Vitals:    04/26/21 1302   Weight: 91 9 kg (202 lb 9 6 oz)     Height: 6' 4" (193 cm)   Body mass index is 24 66 kg/m²  Physical Exam:  Vital signs reviewed  General:  Alert and cooperative, appears stated age, no acute distress  HEENT:  PERRLA, EOMI, no scleral icterus, no conjunctival pallor  Neck:  No lymphadenopathy, no thyromegaly, no carotid bruits, no elevated JVP  Heart:  Regular rate and rhythm, normal S1/S2, no S3/S4, no murmur, rubs or gallops  PMI nondisplaced  Lungs:  Clear to auscultation bilaterally, no wheezes rales or rhonchi  Abdomen:  Soft, non-tender, positive bowel sounds, no rebound or guarding,   no organomegaly   Extremities:  Normal range of motion    No clubbing, cyanosis or edema   Vascular:  2+ pedal pulses  Skin:  No rashes or lesions on exposed skin  Neurologic:  Cranial nerves II-XII grossly intact without focal deficits  Psych:  Normal mood and affect

## 2021-08-18 ENCOUNTER — TELEPHONE (OUTPATIENT)
Dept: GASTROENTEROLOGY | Facility: HOSPITAL | Age: 61
End: 2021-08-18

## 2021-08-19 ENCOUNTER — ANESTHESIA EVENT (OUTPATIENT)
Dept: GASTROENTEROLOGY | Facility: HOSPITAL | Age: 61
End: 2021-08-19

## 2021-08-19 ENCOUNTER — HOSPITAL ENCOUNTER (OUTPATIENT)
Dept: GASTROENTEROLOGY | Facility: HOSPITAL | Age: 61
Setting detail: OUTPATIENT SURGERY
Discharge: HOME | End: 2021-08-19
Attending: INTERNAL MEDICINE
Payer: COMMERCIAL

## 2021-08-19 ENCOUNTER — ANESTHESIA (OUTPATIENT)
Dept: GASTROENTEROLOGY | Facility: HOSPITAL | Age: 61
End: 2021-08-19

## 2021-08-19 VITALS
TEMPERATURE: 96.9 F | DIASTOLIC BLOOD PRESSURE: 65 MMHG | OXYGEN SATURATION: 96 % | HEART RATE: 50 BPM | WEIGHT: 200 LBS | BODY MASS INDEX: 24.36 KG/M2 | HEIGHT: 76 IN | SYSTOLIC BLOOD PRESSURE: 111 MMHG | RESPIRATION RATE: 16 BRPM

## 2021-08-19 DIAGNOSIS — Z12.11 COLON CANCER SCREENING: ICD-10-CM

## 2021-08-19 DIAGNOSIS — R63.4 UNINTENTIONAL WEIGHT LOSS: Primary | ICD-10-CM

## 2021-08-19 DIAGNOSIS — K21.9 GASTROESOPHAGEAL REFLUX DISEASE WITHOUT ESOPHAGITIS: ICD-10-CM

## 2021-08-19 PROCEDURE — 45380 COLONOSCOPY AND BIOPSY: CPT | Performed by: INTERNAL MEDICINE

## 2021-08-19 PROCEDURE — 88305 TISSUE EXAM BY PATHOLOGIST: CPT | Performed by: PATHOLOGY

## 2021-08-19 PROCEDURE — 45385 COLONOSCOPY W/LESION REMOVAL: CPT | Performed by: INTERNAL MEDICINE

## 2021-08-19 PROCEDURE — 43239 EGD BIOPSY SINGLE/MULTIPLE: CPT | Performed by: INTERNAL MEDICINE

## 2021-08-19 RX ORDER — HYDROMORPHONE HCL/PF 1 MG/ML
0.5 SYRINGE (ML) INJECTION
Status: CANCELLED | OUTPATIENT
Start: 2021-08-19

## 2021-08-19 RX ORDER — PROMETHAZINE HYDROCHLORIDE 25 MG/ML
12.5 INJECTION, SOLUTION INTRAMUSCULAR; INTRAVENOUS ONCE AS NEEDED
Status: CANCELLED | OUTPATIENT
Start: 2021-08-19

## 2021-08-19 RX ORDER — SODIUM CHLORIDE 9 MG/ML
INJECTION, SOLUTION INTRAVENOUS CONTINUOUS PRN
Status: DISCONTINUED | OUTPATIENT
Start: 2021-08-19 | End: 2021-08-19

## 2021-08-19 RX ORDER — MEPERIDINE HYDROCHLORIDE 25 MG/ML
12.5 INJECTION INTRAMUSCULAR; INTRAVENOUS; SUBCUTANEOUS
Status: CANCELLED | OUTPATIENT
Start: 2021-08-19

## 2021-08-19 RX ORDER — PROPOFOL 10 MG/ML
INJECTION, EMULSION INTRAVENOUS AS NEEDED
Status: DISCONTINUED | OUTPATIENT
Start: 2021-08-19 | End: 2021-08-19

## 2021-08-19 RX ORDER — FENTANYL CITRATE/PF 50 MCG/ML
25 SYRINGE (ML) INJECTION
Status: CANCELLED | OUTPATIENT
Start: 2021-08-19

## 2021-08-19 RX ORDER — ALBUTEROL SULFATE 2.5 MG/3ML
2.5 SOLUTION RESPIRATORY (INHALATION) ONCE AS NEEDED
Status: CANCELLED | OUTPATIENT
Start: 2021-08-19

## 2021-08-19 RX ORDER — LIDOCAINE HYDROCHLORIDE 10 MG/ML
INJECTION, SOLUTION EPIDURAL; INFILTRATION; INTRACAUDAL; PERINEURAL AS NEEDED
Status: DISCONTINUED | OUTPATIENT
Start: 2021-08-19 | End: 2021-08-19

## 2021-08-19 RX ORDER — PROPOFOL 10 MG/ML
INJECTION, EMULSION INTRAVENOUS CONTINUOUS PRN
Status: DISCONTINUED | OUTPATIENT
Start: 2021-08-19 | End: 2021-08-19

## 2021-08-19 RX ORDER — ONDANSETRON 2 MG/ML
4 INJECTION INTRAMUSCULAR; INTRAVENOUS ONCE AS NEEDED
Status: CANCELLED | OUTPATIENT
Start: 2021-08-19

## 2021-08-19 RX ADMIN — PROPOFOL 130 MCG/KG/MIN: 10 INJECTION, EMULSION INTRAVENOUS at 10:12

## 2021-08-19 RX ADMIN — PROPOFOL 50 MG: 10 INJECTION, EMULSION INTRAVENOUS at 09:51

## 2021-08-19 RX ADMIN — PROPOFOL 50 MG: 10 INJECTION, EMULSION INTRAVENOUS at 09:58

## 2021-08-19 RX ADMIN — PROPOFOL 100 MG: 10 INJECTION, EMULSION INTRAVENOUS at 09:48

## 2021-08-19 RX ADMIN — LIDOCAINE HYDROCHLORIDE 50 MG: 10 INJECTION, SOLUTION EPIDURAL; INFILTRATION; INTRACAUDAL; PERINEURAL at 09:48

## 2021-08-19 RX ADMIN — PROPOFOL 20 MG: 10 INJECTION, EMULSION INTRAVENOUS at 10:05

## 2021-08-19 RX ADMIN — PROPOFOL 50 MG: 10 INJECTION, EMULSION INTRAVENOUS at 09:54

## 2021-08-19 RX ADMIN — PROPOFOL 30 MG: 10 INJECTION, EMULSION INTRAVENOUS at 09:56

## 2021-08-19 RX ADMIN — SODIUM CHLORIDE: 0.9 INJECTION, SOLUTION INTRAVENOUS at 09:40

## 2021-08-19 RX ADMIN — PROPOFOL 50 MG: 10 INJECTION, EMULSION INTRAVENOUS at 10:12

## 2021-08-19 RX ADMIN — PROPOFOL 50 MG: 10 INJECTION, EMULSION INTRAVENOUS at 10:00

## 2021-08-19 RX ADMIN — PROPOFOL 50 MG: 10 INJECTION, EMULSION INTRAVENOUS at 10:03

## 2021-08-19 NOTE — ANESTHESIA POSTPROCEDURE EVALUATION
Post-Op Assessment Note    CV Status:  Stable    Pain management: adequate     Mental Status:  Alert and awake   Hydration Status:  Euvolemic   PONV Controlled:  Controlled   Airway Patency:  Patent      Post Op Vitals Reviewed: Yes      Staff: CRNA         No complications documented      BP   102/65   Temp 97 4   Pulse  97 4   Resp   16   SpO2   98%

## 2021-08-19 NOTE — H&P
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  Also history of cholangitis for which he had stents and clearing of the bile duct  On aspirin 81 mg daily  Not on any other anti-platelet agents or anticoagulants  REVIEW OF SYSTEMS: Per the HPI, and otherwise unremarkable  Historical Information   Past Medical History:   Diagnosis Date    [facility]     Epigastric pain 6/24/2020    Gastroesophageal reflux     GERD (gastroesophageal reflux disease)     Pancreatitis     Pleural effusion     Second hand smoke exposure      Past Surgical History:   Procedure Laterality Date    CHOLECYSTECTOMY      COLONOSCOPY N/A 3/21/2016    Procedure: COLONOSCOPY;  Surgeon: Chelsy Vaca DO;  [facility]; Service:     ERCP N/A 1/4/2019    Procedure: ENDOSCOPIC RETROGRADE CHOLANGIOPANCREATOGRAPHY (ERCP); Surgeon: Mckenzie Miguel MD;  [facility]; Service: Gastroenterology    ERCP  08/11/2020    ESOPHAGOGASTRODUODENOSCOPY N/A 2/5/2019    Procedure: ESOPHAGOGASTRODUODENOSCOPY (EGD), with possible nasojejunal Dobhoff tube placement;  Surgeon: Jory Duenas MD;  [facility]; Service: Gastroenterology    ESOPHAGOGASTRODUODENOSCOPY N/A 4/3/2019    Procedure: ESOPHAGOGASTRODUODENOSCOPY (EGD)- endoscopic necrosectomy;  Surgeon: Mckenzie Miguel MD;  [facility];   Service: Gastroenterology    IR IMAGE GUIDED ASPIRATION / DRAINAGE W TUBE  6/17/2019    IR THORACENTESIS  2/7/2019    IR THORACENTESIS  3/29/2019    LINEAR ENDOSCOPIC U/S N/A 3/29/2019    Procedure: LINEAR ENDOSCOPIC U/S cyst gastro;  Surgeon: Edgardo Ledesma MD;  [facility]; Service: Gastroenterology    [place] [address] N/A 5/20/2019    Procedure: Brittany Sharma;  Surgeon: Nedra Mccrary MD;  [facility];  Service: Thoracic    NC EGD TRANSORAL BIOPSY SINGLE/MULTIPLE N/A 3/21/2016    Procedure: ESOPHAGOGASTRODUODENOSCOPY (EGD); Surgeon: Saeid Nice DO;  [facility]; Service: General    NC ERCP DX COLLECTION SPECIMEN BRUSHING/WASHING N/A 4/26/2019    Procedure: ENDOSCOPIC RETROGRADE CHOLANGIOPANCREATOGRAPHY (ERCP); Surgeon: Edgardo Ledesma MD;  [facility]; Service: Gastroenterology    NC ESOPHAGOGASTRODUODENOSCOPY TRANSORAL DIAGNOSTIC N/A 4/9/2019    Procedure: ESOPHAGOGASTRODUODENOSCOPY (EGD) endoscopic necrosectomy;  Surgeon: Edgardo Ledesma MD;  [facility]; Service: Gastroenterology    NC ESOPHAGOGASTRODUODENOSCOPY TRANSORAL DIAGNOSTIC N/A 4/26/2019    Procedure: ESOPHAGOGASTRODUODENOSCOPY (EGD); Surgeon: Edgardo Ledesma MD;  [facility]; Service: Gastroenterology    [place] REPAIR [address] HERNIA,5+Y/O,REDUCIBL Right 1/22/2020    Procedure: REPAIR HERNIA INGUINAL;  Surgeon: Kajal Banuelos MD;  [facility];  Service: General    NC THORACOSCOPY SURG PART PULM DECORT Left 5/20/2019    Procedure: DECORTICATION LUNG;  Surgeon: Nedra Mccrary MD;  [facility];  Service: Thoracic    NC THORACOSCOPY SURG PART PULM DECORT Left 5/20/2019    Procedure: THORACOSCOPY VIDEO ASSISTED SURGERY (VATS);   Surgeon: Nedra Mccrary MD;  [facility];  Service: Thoracic    NC THORACOSCOPY SURG W/PLEURODESIS N/A 5/20/2019    Procedure: PLEURODESIS mechanical;  Surgeon: Nedra Mccrary MD;  [facility];  Service: Thoracic    UPPER GASTROINTESTINAL ENDOSCOPY  09/19/2019     Social History   Social History     Substance and Sexual Activity   Alcohol Use Not Currently     Social History Substance and Sexual Activity   Drug Use No     Social History     Tobacco Use   Smoking Status Never Smoker   Smokeless Tobacco Never Used     Family History   Problem Relation Age of Onset    Lung cancer Mother 79        Smoker     Transient ischemic attack Father 61    Lung disease Neg Hx        Meds/Allergies   (Not in a hospital admission)    No current facility-administered medications for this encounter  No Known Allergies      PHYSICAL EXAM:    Objective   Blood pressure 132/73, pulse 68, temperature (!) 95 9 °F (35 5 °C), temperature source Tympanic, resp  rate 16, height 6' 4" (1 93 m), weight 90 7 kg (200 lb), SpO2 97 %  Body mass index is 24 34 kg/m²  Gen: NAD  CV: RRR  CHEST: Clear  ABD: Soft, NT/ND  EXT: No edema      ASSESSMENT AND PLAN:  This is a 63y o  year old male here for EGD and screening colonoscopy, and he is stable and optimized for his procedure  XU Banda  Gastroenterology Fellow  [facility]  Available on [email or web address]

## 2021-08-19 NOTE — ANESTHESIA PREPROCEDURE EVALUATION
Procedure:  COLONOSCOPY  EGD    Relevant Problems   CARDIO   (+) Paroxysmal atrial fibrillation (HCC)      GI/HEPATIC   (+) GERD (gastroesophageal reflux disease)   (+) Pancreatitis (Resolved)      HEMATOLOGY   (+) Coagulation disorder (HCC)      PULMONARY   (+) Pleural effusion associated with pancreatitis        Physical Exam    Airway    Mallampati score: I  TM Distance: >3 FB  Neck ROM: full     Dental   No notable dental hx     Cardiovascular  Cardiovascular exam normal    Pulmonary  Pulmonary exam normal     Other Findings        Anesthesia Plan  ASA Score- 3     Anesthesia Type- IV sedation with anesthesia with ASA Monitors  Additional Monitors:   Airway Plan:           Plan Factors-Exercise tolerance (METS): >4 METS  Chart reviewed  EKG reviewed  Imaging results reviewed  Existing labs reviewed  Patient summary reviewed  Induction- intravenous  Postoperative Plan- Plan for postoperative opioid use  Planned trial extubation    Informed Consent- Anesthetic plan and risks discussed with patient  I personally reviewed this patient with the CRNA  Discussed and agreed on the Anesthesia Plan with the CRNA  Hernán Chávez

## 2021-09-07 ENCOUNTER — TELEPHONE (OUTPATIENT)
Dept: GASTROENTEROLOGY | Facility: CLINIC | Age: 61
End: 2021-09-07

## 2021-09-07 NOTE — TELEPHONE ENCOUNTER
----- Message from Daysi Aguila DO sent at 9/5/2021  3:35 PM EDT -----  Please call patient and inform him that the stomach and duodenal biopsies were normal   There is no evidence of infection or cancer  The biopsy of his lower esophagus/GE junction did not show evidence of Choudhary's esophagus  This is good news  However, given question of possible Choudhary's previously, I would recommend that he continue PPI daily  The polyp that was removed from the colon was a precancerous polyp  There was no evidence of cancer  He should have repeat colonoscopy in 7 years  Thank you  XU Zhao  Gastroenterology Fellow  [facility]  Available on [email or web address]

## 2021-09-13 NOTE — PROCEDURES
Insert PICC line  Date/Time: 1/15/2019 12:05 PM  Performed by: Rissa Marcelino by: Soni Miranda     Patient location:  Bedside  Other Assisting Provider: Yes (comment) Elda ARAUJO )    Consent:     Consent obtained:  Verbal (Blade Restrepo obtained consent )    Consent given by:  Spouse    Procedural risks discussed: by MD Irwin protocol:     Procedure explained and questions answered to patient or proxy's satisfaction: yes      Relevant documents present and verified: yes      Test results available and properly labeled: yes      Radiology Images displayed and confirmed  If images not available, report reviewed: yes      Required blood products, implants, devices, and special equipment available: yes      Site/side marked: yes      Immediately prior to procedure, a time out was called: yes      Patient identity confirmed:  Verbally with patient and arm band  Pre-procedure details:     Hand hygiene: Hand hygiene performed prior to insertion      Sterile barrier technique: All elements of maximal sterile technique followed      Skin preparation:  ChloraPrep    Skin preparation agent: Skin preparation agent completely dried prior to procedure    Indications:     PICC line indications: no peripheral vascular access    Anesthesia (see MAR for exact dosages):      Anesthesia method:  Local infiltration    Local anesthetic:  Lidocaine 1% w/o epi (2ml)  Procedure details:     Location:  Basilic    Vessel type: vein      Laterality:  Right    Approach: percutaneous technique used      Patient position:  Flat    Procedural supplies:  Double lumen    Catheter size:  5 Fr    Landmarks identified: yes      Ultrasound guidance: yes      Sterile ultrasound techniques: Sterile gel and sterile probe covers were used      Number of attempts:  1    Successful placement: yes      Vessel of catheter tip end:  Sherlock 3CG confirmed    Total catheter length (cm):  44    Catheter out on skin (cm):  0    Max Patient informed via my estela    flow rate:  999ml/hr     Arm circumference:  36  Post-procedure details:     Post-procedure:  Securement device placed and dressing applied    Assessment:  Blood return through all ports    Post-procedure complications: none      Patient tolerance of procedure: Tolerated well, no immediate complications  Comments:       Inserted by Abel Smith RN, cope wire used

## 2021-09-14 ENCOUNTER — APPOINTMENT (OUTPATIENT)
Dept: LAB | Facility: CLINIC | Age: 61
End: 2021-09-14
Payer: COMMERCIAL

## 2021-09-14 DIAGNOSIS — R63.4 UNINTENTIONAL WEIGHT LOSS: ICD-10-CM

## 2021-09-14 LAB
ANION GAP SERPL CALCULATED.3IONS-SCNC: 2 MMOL/L (ref 4–13)
BUN SERPL-MCNC: 14 MG/DL (ref 5–25)
CALCIUM SERPL-MCNC: 8.8 MG/DL (ref 8.3–10.1)
CHLORIDE SERPL-SCNC: 108 MMOL/L (ref 100–108)
CO2 SERPL-SCNC: 29 MMOL/L (ref 21–32)
CREAT SERPL-MCNC: 1.04 MG/DL (ref 0.6–1.3)
GFR SERPL CREATININE-BSD FRML MDRD: 77 ML/MIN/1.73SQ M
GLUCOSE SERPL-MCNC: 89 MG/DL (ref 65–140)
POTASSIUM SERPL-SCNC: 3.9 MMOL/L (ref 3.5–5.3)
SODIUM SERPL-SCNC: 139 MMOL/L (ref 136–145)

## 2021-09-14 PROCEDURE — 36415 COLL VENOUS BLD VENIPUNCTURE: CPT

## 2021-09-14 PROCEDURE — 80048 BASIC METABOLIC PNL TOTAL CA: CPT

## 2021-10-11 ENCOUNTER — APPOINTMENT (OUTPATIENT)
Dept: LAB | Facility: HOSPITAL | Age: 61
End: 2021-10-11
Attending: INTERNAL MEDICINE
Payer: COMMERCIAL

## 2021-10-11 ENCOUNTER — HOSPITAL ENCOUNTER (OUTPATIENT)
Dept: RADIOLOGY | Facility: HOSPITAL | Age: 61
Discharge: HOME | End: 2021-10-11
Payer: COMMERCIAL

## 2021-10-11 DIAGNOSIS — R63.4 UNINTENTIONAL WEIGHT LOSS: ICD-10-CM

## 2021-10-11 DIAGNOSIS — Z20.822 EXPOSURE TO COVID-19 VIRUS: ICD-10-CM

## 2021-10-11 LAB — SARS-COV-2 IGG+IGM SERPL QL IA: NORMAL

## 2021-10-11 PROCEDURE — 71260 CT THORAX DX C+: CPT

## 2021-10-11 PROCEDURE — 74177 CT ABD & PELVIS W/CONTRAST: CPT

## 2021-10-11 PROCEDURE — 86769 SARS-COV-2 COVID-19 ANTIBODY: CPT

## 2021-10-11 PROCEDURE — 36415 COLL VENOUS BLD VENIPUNCTURE: CPT

## 2021-10-11 RX ADMIN — IOHEXOL 100 ML: 350 INJECTION, SOLUTION INTRAVENOUS at 12:35

## 2021-10-13 NOTE — PROGRESS NOTES
Pt AA&Ox4 at start of shift  Lungs diminished BL, Respirations easy  IV site intact  Abd soft, tender  Complaining of abd pain, medicated per protocol  IVF boluses hung per per orders  Pt transferred to room 827, report given to new nurse  Call placed to wife to make aware  Belongings already taken home by wife earlier  Scripts were faxed to [employer] today to [phone].

## 2021-10-29 DIAGNOSIS — K86.3 PANCREATIC PSEUDOCYST: ICD-10-CM

## 2021-10-29 RX ORDER — PANTOPRAZOLE SODIUM 40 MG/1
TABLET, DELAYED RELEASE ORAL
Qty: 90 TABLET | Refills: 3 | Status: SHIPPED | OUTPATIENT
Start: 2021-10-29

## 2022-04-26 ENCOUNTER — OFFICE VISIT (OUTPATIENT)
Dept: PULMONOLOGY | Facility: CLINIC | Age: 62
End: 2022-04-26
Payer: COMMERCIAL

## 2022-04-26 VITALS
TEMPERATURE: 97.5 F | HEIGHT: 76 IN | BODY MASS INDEX: 23.94 KG/M2 | WEIGHT: 196.6 LBS | OXYGEN SATURATION: 97 % | HEART RATE: 67 BPM | DIASTOLIC BLOOD PRESSURE: 68 MMHG | SYSTOLIC BLOOD PRESSURE: 118 MMHG

## 2022-04-26 DIAGNOSIS — K85.90 PLEURAL EFFUSION ASSOCIATED WITH PANCREATITIS: Primary | ICD-10-CM

## 2022-04-26 DIAGNOSIS — J91.8 PLEURAL EFFUSION ASSOCIATED WITH PANCREATITIS: Primary | ICD-10-CM

## 2022-04-26 PROCEDURE — 99213 OFFICE O/P EST LOW 20 MIN: CPT | Performed by: INTERNAL MEDICINE

## 2022-04-26 NOTE — PROGRESS NOTES
Progress Note - Pulmonary   Emerita Ferro 57 y o  male MRN: [id number]   Encounter: [id number]      Assessment/Plan:  Patient is a 78-year-old male with past medical history significant for pancreatic pseudocyst complicated by persistent pleural effusion status post pleurodesis who presents for routine follow-up  The patient presents with a chest x-ray from his employer which he gets in the setting of asbestos exposure  Reviewed extensively his previous imaging with thoracic Radiology who notes the calcified plaques seen on the imaging are fully and completely consistent with his history of pleurodesis  Reviewed these findings and there is no evidence of calcium prior to the procedure and these appeared only after the procedure  There is no length with asbestos exposure  1  Pleural effusion associated with pancreatitis      Patient may follow up on an as-needed basis    Orders:  No orders of the defined types were placed in this encounter  Subjective: The patient is back to work full time  He has no limitations at work  He denies breathing difficulties or wheezing  He is fully vaccinated but has not covid  He denies snoring  He does report his fingers can become blue  He is getting exercise  He still has issues related to his pancreas including gas discomfort  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  He is able to hike the same distances  Inhaler Regimen:  None    Remainder of review of systems negative except as described in HPI        The following portions of the patient's history were reviewed and updated as appropriate: allergies, current medications, past family history, past medical history, past social history, past surgical history and problem list      Objective:   Vitals: Blood pressure 118/68, pulse 67, temperature 97 5 °F (36 4 °C), height 6' 4" (1 93 m), weight 89 2 kg (196 lb 9 6 oz), SpO2 97 % , RA, Body mass index is 23 93 kg/m²  Physical Exam  Gen:  Pleasant, awake, alert, oriented x 3, no acute distress  HEENT: Mucous membranes moist, no oral lesions, no thrush  NECK: No accessory muscle use, JVP not elevated  Cardiac:  RRR, single S1, single S2, no murmurs, no rubs, no gallops  Lungs:  Clear to auscultation bilaterally  Abdomen: normoactive bowel sounds, soft nontender, nondistended, no rebound or rigidity, no guarding  Extremities: no cyanosis, no clubbing, no LE edema  MSK:  Strength equal in all extremities  Derm:  No rashes/lesions noted  Neuro:  Appropriate mood/affect    Labs: I have personally reviewed pertinent lab results  Lab Results   Component Value Date    WBC 6 52 06/27/2020    WBC 5 48 01/05/2015    HGB 12 5 06/27/2020    HGB 15 4 01/05/2015     (L) 06/27/2020     01/05/2015     Lab Results   Component Value Date    CREATININE 1 04 09/14/2021    CREATININE 1 01 01/05/2015     Imaging and other studies: I have personally reviewed pertinent films in PACS with a Radiologist   CT CAP 10/11/2021  My interpretation:  Left sided calcified pleural plaques    Radiology findings:  LUNGS:  Lungs are clear  There is no tracheal or endobronchial lesion  PLEURA:  Numerous predominantly calcified left sided pleural plaques likely secondary to interval pleurodesis  No pleural effusion  HEART/GREAT VESSELS:  Unremarkable for patient's age  MEDIASTINUM AND AMRIT:  Unremarkable  CHEST WALL AND LOWER NECK:   Unremarkable  Pulmonary Function Testing:   No pulmonary function testing available for review  Kalia Pickard

## 2022-04-29 PROBLEM — R78.81 GRAM-NEGATIVE BACTEREMIA: Status: RESOLVED | Noted: 2020-06-26 | Resolved: 2022-04-29

## 2022-04-29 PROBLEM — K85.90 PLEURAL EFFUSION ASSOCIATED WITH PANCREATITIS: Status: RESOLVED | Noted: 2019-02-03 | Resolved: 2022-04-29

## 2022-04-29 PROBLEM — J91.8 PLEURAL EFFUSION ASSOCIATED WITH PANCREATITIS: Status: RESOLVED | Noted: 2019-02-03 | Resolved: 2022-04-29

## 2022-06-07 ENCOUNTER — OFFICE VISIT (OUTPATIENT)
Dept: CARDIOLOGY CLINIC | Facility: CLINIC | Age: 62
End: 2022-06-07
Payer: COMMERCIAL

## 2022-06-07 VITALS
OXYGEN SATURATION: 97 % | DIASTOLIC BLOOD PRESSURE: 74 MMHG | HEIGHT: 76 IN | SYSTOLIC BLOOD PRESSURE: 116 MMHG | HEART RATE: 66 BPM | BODY MASS INDEX: 24.23 KG/M2 | WEIGHT: 199 LBS

## 2022-06-07 DIAGNOSIS — D68.9 COAGULATION DISORDER (HCC): ICD-10-CM

## 2022-06-07 DIAGNOSIS — I48.0 PAROXYSMAL ATRIAL FIBRILLATION (HCC): Primary | ICD-10-CM

## 2022-06-07 PROCEDURE — 93000 ELECTROCARDIOGRAM COMPLETE: CPT | Performed by: INTERNAL MEDICINE

## 2022-06-07 PROCEDURE — 99214 OFFICE O/P EST MOD 30 MIN: CPT | Performed by: INTERNAL MEDICINE

## 2022-06-07 NOTE — PROGRESS NOTES
Cardiology Follow Up    Jorge Gates  1960  [id number]  [facility] [phone]    1  Paroxysmal atrial fibrillation (HCC)  POCT ECG    Echo complete w/ contrast if indicated   2  Coagulation disorder ([facility] [address] )         Discussion/Summary:   Overall he has been doing great has had no recurrence of atrial fibrillation  Anticoagulation was stopped he remains on 81 mg aspirin  He showed me some lesions on his fingernails today which appear to be in the bed of the nail itself  These are nonvascular in origin  No evidence of Raynaud's  I recommended he follow-up with a dermatologist   Prior echocardiogram showed RV dysfunction he needs a follow-up I will schedule this and see him on a yearly basis  Interval History:  69-year-old gentleman I met in the hospital recently with episodes severe pancreatitis and volume overload secondary to low protein levels  He had no signs of heart failure normal ventricle  He was shocked several times to get out of atrial fibrillation and eventually placed on IV amiodarone to maintain sinus rhythm during acute illness  Overall he has been doing well  He has good functional capacity  Denies any chest pain, shortness of breath, palpitations, lightheadedness, dizziness, or syncope  He has been taking all medications as prescribed periods no recurrence of atrial fibrillation  Functional capacity is good      Problem List     History of acute pancreatitis    Pancreatitis    Overview Signed 1/4/2019 10:19 AM by Rosalio Pacheco PA-C     Added automatically from request for surgery [id number]         Delirium    Leukocytosis    Pulmonary edema    Hypokalemia    Metabolic alkalosis    Dysphagia    Metabolic encephalopathy    Paroxysmal atrial fibrillation (HCC)    Supratherapeutic INR    Abdominal pain    Pancreatic pseudocyst    Pleural effusion, left    Protein-calorie malnutrition ([facility] [address] )    Overview Signed 2/4/2019  3:51 PM by Gale Louise PA-C     Added automatically from request for surgery [id number]         Malnutrition Findings:           BMI Findings: Body mass index is 24 22 kg/m²  Abnormal CT of the abdomen        Past Medical History:   Diagnosis Date    Atrial fibrillation ([address] )     Epigastric pain 6/24/2020    Gastroesophageal reflux     GERD (gastroesophageal reflux disease)     Pancreatitis     Pleural effusion     Pleural effusion associated with pancreatitis 2/3/2019    Second hand smoke exposure      Social History     Socioeconomic History    Marital status: /Civil Union     Spouse name: Not on file    Number of children: Not on file    Years of education: Not on file    Highest education level: Not on file   Occupational History    Not on file   Tobacco Use    Smoking status: Never Smoker    Smokeless tobacco: Never Used   Vaping Use    Vaping Use: Never used   Substance and Sexual Activity    Alcohol use: Not Currently    Drug use: No    Sexual activity: Not on file   Other Topics Concern    Not on file   Social History Narrative    Not on file     Social Determinants of Health     Financial Resource Strain: Not on file   Food Insecurity: Not on file   Transportation Needs: Not on file   Physical Activity: Not on file   Stress: Not on file   Social Connections: Not on file   Intimate Partner Violence: Not on file   Housing Stability: Not on file      Family History   Problem Relation Age of Onset    Lung cancer Mother 79        Smoker     Transient ischemic attack Father 61    Lung disease Neg Hx      Past Surgical History:   Procedure Laterality Date    CHOLECYSTECTOMY      COLONOSCOPY N/A 3/21/2016    Procedure: COLONOSCOPY;  Surgeon: Chase Lorenzo DO;  [facility]; Service:     ERCP N/A 1/4/2019    Procedure: ENDOSCOPIC RETROGRADE CHOLANGIOPANCREATOGRAPHY (ERCP); Surgeon: Delphine Ace MD;  [facility];   Service: Gastroenterology    ERCP  08/11/2020    ESOPHAGOGASTRODUODENOSCOPY N/A 2/5/2019    Procedure: ESOPHAGOGASTRODUODENOSCOPY (EGD), with possible nasojejunal Dobhoff tube placement;  Surgeon: Jose A Hernandez MD;  [facility]; Service: Gastroenterology    ESOPHAGOGASTRODUODENOSCOPY N/A 4/3/2019    Procedure: ESOPHAGOGASTRODUODENOSCOPY (EGD)- endoscopic necrosectomy;  Surgeon: Maggi Vela MD;  [facility]; Service: Gastroenterology    IR IMAGE GUIDED ASPIRATION / DRAINAGE W TUBE  6/17/2019    IR THORACENTESIS  2/7/2019    IR THORACENTESIS  3/29/2019    LINEAR ENDOSCOPIC U/S N/A 3/29/2019    Procedure: LINEAR ENDOSCOPIC U/S cyst gastro;  Surgeon: Maggi Vela MD;  [facility]; Service: Gastroenterology    MS [address] N/A 5/20/2019    Procedure: Melva Thomson;  Surgeon: Theodore Lock MD;  [facility];  Service: Thoracic    MS EGD TRANSORAL BIOPSY SINGLE/MULTIPLE N/A 3/21/2016    Procedure: ESOPHAGOGASTRODUODENOSCOPY (EGD); Surgeon: Gillian Shah DO;  [facility]; Service: General    MS ERCP DX COLLECTION SPECIMEN BRUSHING/WASHING N/A 4/26/2019    Procedure: ENDOSCOPIC RETROGRADE CHOLANGIOPANCREATOGRAPHY (ERCP); Surgeon: Maggi Vela MD;  [facility]; Service: Gastroenterology    MS ESOPHAGOGASTRODUODENOSCOPY TRANSORAL DIAGNOSTIC N/A 4/9/2019    Procedure: ESOPHAGOGASTRODUODENOSCOPY (EGD) endoscopic necrosectomy;  Surgeon: Maggi Vela MD;  [facility]; Service: Gastroenterology    MS ESOPHAGOGASTRODUODENOSCOPY TRANSORAL DIAGNOSTIC N/A 4/26/2019    Procedure: ESOPHAGOGASTRODUODENOSCOPY (EGD); Surgeon: Maggi Vela MD;  [facility];   Service: Gastroenterology    MS REPAIR [address] HERNIA,5+Y/O,REDUCIBL Right 1/22/2020    Procedure: REPAIR HERNIA INGUINAL;  Surgeon: Nikki Moffett MD;  [facility];  Service: General    MS THORACOSCOPY SURG PART PULM DECORT Left 5/20/2019    Procedure: DECORTICATION LUNG; Surgeon: Anil Hansen MD;  [facility];  Service: Thoracic    IL THORACOSCOPY SURG PART PULM DECORT Left 5/20/2019    Procedure: THORACOSCOPY VIDEO ASSISTED SURGERY (VATS);   Surgeon: Anil Hansen MD;  [facility];  Service: Thoracic    IL THORACOSCOPY SURG W/PLEURODESIS N/A 5/20/2019    Procedure: PLEURODESIS mechanical;  Surgeon: Anil Hansen MD;  [facility];  Service: Thoracic    UPPER GASTROINTESTINAL ENDOSCOPY  09/19/2019       Current Outpatient Medications:     acetaminophen (TYLENOL) 325 mg tablet, Take 650 mg by mouth every 6 (six) hours as needed for mild pain, Disp: , Rfl:     aspirin 81 mg chewable tablet, Chew 1 tablet (81 mg total) daily, Disp: 90 tablet, Rfl: 3    pantoprazole (PROTONIX) 40 mg tablet, TAKE 1 TABLET DAILY (Patient not taking: Reported on 6/7/2022), Disp: 90 tablet, Rfl: 3  No Known Allergies    Labs:     Chemistry        Component Value Date/Time     01/05/2015 1131    K 3 9 09/14/2021 1125    K 3 9 01/05/2015 1131     09/14/2021 1125     01/05/2015 1131    CO2 29 09/14/2021 1125    CO2 24 01/05/2019 0314    BUN 14 09/14/2021 1125    BUN 19 01/05/2015 1131    CREATININE 1 04 09/14/2021 1125    CREATININE 1 01 01/05/2015 1131        Component Value Date/Time    CALCIUM 8 8 09/14/2021 1125    CALCIUM 9 2 01/05/2015 1131    ALKPHOS 76 03/02/2021 1130    ALKPHOS 102 01/05/2015 1131    AST 15 03/02/2021 1130    AST 22 01/05/2015 1131    ALT 28 03/02/2021 1130    ALT 43 01/05/2015 1131    BILITOT 1 00 01/05/2015 1131            No results found for: CHOL  Lab Results   Component Value Date    HDL 51 08/09/2019    HDL 59 01/03/2019     Lab Results   Component Value Date    LDLCALC 87 08/09/2019    LDLCALC 77 01/03/2019     Lab Results   Component Value Date    TRIG 108 08/09/2019    TRIG 52 01/03/2019     No results found for: CHOLHDL    Imaging: Ct Abdomen Pelvis Wo Contrast    Result Date: 2/3/2019  Narrative: CT ABDOMEN AND PELVIS WITHOUT IV CONTRAST INDICATION:   Abdominal pain, unspecified  Recent history of pancreatitis  COMPARISON:  Contrast-enhanced CT of the abdomen and pelvis from January 3, 2019  TECHNIQUE:  CT examination of the abdomen and pelvis was performed without intravenous contrast   Axial, sagittal, and coronal 2D reformatted images were created from the source data and submitted for interpretation  Radiation dose length product (DLP) for this visit:  789 mGy-cm   This examination, like all CT scans performed in the [facility], was performed utilizing techniques to minimize radiation dose exposure, including the use of iterative reconstruction and automated exposure control  Enteric contrast was administered  The absence of intravenous contrast limits evaluation of the abdominal and pelvic viscera  FINDINGS: ABDOMEN LOWER CHEST:  Moderate size left pleural effusion, developing since the last CT  Compressive atelectasis in the base of the left lower lobe  Subsegmental atelectasis in the base of the right lower lobe  LIVER/BILIARY TREE:  1 6 cm cyst in the dome of the right lobe  Otherwise, liver grossly normal   Stent in CBD  No intrahepatic bile duct dilatation  Tiny amount of pneumobilia in the left lobe GALLBLADDER:     Postcholecystectomy  SPLEEN:  Normal in size  Compressed by adjacent 12 x 14 cm discrete fluid collection, either loculated ascites or pseudocyst  PANCREAS:  Evaluation limited without intravenous contrast   6 x 11 x 14 cm fluid collection in the pancreatic bed, consistent with pseudocyst, extending into the mesenteric root  Pancreas appears to be grossly intact  ADRENAL GLANDS:  Unremarkable  KIDNEYS/URETERS:  Unremarkable  No hydronephrosis  2 x 5 x 10 cm fluid collection in the left posterior pararenal space, extending into the left subphrenic space  STOMACH AND BOWEL:  Stomach displaced by the large pancreatic pseudocyst   Small intestine unremarkable  Diverticulosis in the sigmoid without evidence of diverticulitis  APPENDIX:  No findings to suggest appendicitis  ABDOMINOPELVIC CAVITY: Several prominent mesenteric lymph nodes, the largest a 1 2 x 1 8 cm node at the root of the mesentery  Scattered areas of mesenteric edema  Small amount of pelvic and perihepatic ascites  No extraluminal gas  VESSELS:  Unremarkable for patient's age  PELVIS REPRODUCTIVE ORGANS:  Prostate unremarkable  URINARY BLADDER:  Unremarkable  ABDOMINAL WALL/INGUINAL REGIONS:  Unremarkable  OSSEOUS STRUCTURES:  No acute fracture or destructive osseous lesion  Impression: 1  Moderate-sized left pleural effusion with compressive atelectasis in the subjacent portion of the left lower lobe  2   6 x 11 x 14 cm fluid-filled structure in the pancreatic bed, typical of a pseudocyst, extending into the mesenteric root  3   Several additional loculated fluid collections in the abdomen as described above, probably additional pseudocysts  These include a 12 x 14 cm perisplenic collection and a 2 x 5 x 10 cm left retroperitoneal collection  4   Small amount of ascites  5   Sigmoid diverticulosis without evidence of diverticulitis  6   CBD stent in place with no intrahepatic bile duct dilatation  Workstation performed: [id number]     Xr Chest Portable    Result Date: 2/8/2019  Narrative: CHEST INDICATION:   Significant pleuritic chest pain s/p thoracentesis  COMPARISON:  January 18, 2019 EXAM PERFORMED/VIEWS:  XR CHEST PORTABLE FINDINGS:  Enteric tube terminates below the diaphragms  Right upper extremity PICC line has been removed in the interval  Cardiomediastinal silhouette appears unremarkable  Small left pleural effusion slightly similar in size from prior study  No pneumothorax  Osseous structures appear within normal limits for patient age  Impression: No pneumothorax  Stable size small to moderate left pleural effusion   Workstation performed: [id number]     Ir Thoracentesis    Result Date: 2/7/2019  Narrative: Ultrasound-guided thoracentesis Clinical History: Left pleural effusion   Technique: The patient was brought to the interventional radiology area and placed in the sitting position on the side of a stretcher  The left chest was then examined with ultrasound and the skin was marked  The skin was then prepped, and draped in usual sterile fashion  Lidocaine was administered to the skin and a small skin incision was made  Under direct ultrasound guidance, a 5 [employer] Shila Yueh multisidehole catheter was advanced into the pleural space  1200 mL clear ricky pleural fluid was aspirated  Specimens were sent to lab as requested   The patient tolerated the procedure well and suffered no complications  Impression: Impression: 1  Successful ultrasound-guided thoracentesis yielding 1200 mL clear ricky pleural fluid  Workstation performed: [id number]       ECG:    Normal sinus rhythm      ROS    Vitals:    06/07/22 1431   BP: 116/74   Pulse: 66   SpO2: 97%     Vitals:    06/07/22 1431   Weight: 90 3 kg (199 lb)     Height: 6' 4" (193 cm)   Body mass index is 24 22 kg/m²  Physical Exam:  Vital signs reviewed  General:  Alert and cooperative, appears stated age, no acute distress  HEENT:  PERRLA, EOMI, no scleral icterus, no conjunctival pallor  Neck:  No lymphadenopathy, no thyromegaly, no carotid bruits, no elevated JVP  Heart:  Regular rate and rhythm, normal S1/S2, no S3/S4, no murmur, rubs or gallops  PMI nondisplaced  Lungs:  Clear to auscultation bilaterally, no wheezes rales or rhonchi  Abdomen:  Soft, non-tender, positive bowel sounds, no rebound or guarding,   no organomegaly   Extremities:  Normal range of motion    No clubbing, cyanosis or edema   Vascular:  2+ pedal pulses  Skin:  No rashes or lesions on exposed skin  Neurologic:  Cranial nerves II-XII grossly intact without focal deficits  Psych:  Normal mood and affect

## 2022-06-28 ENCOUNTER — HOSPITAL ENCOUNTER (OUTPATIENT)
Dept: NON INVASIVE DIAGNOSTICS | Facility: CLINIC | Age: 62
Discharge: HOME | End: 2022-06-28
Payer: COMMERCIAL

## 2022-06-28 VITALS
HEIGHT: 76 IN | BODY MASS INDEX: 24.23 KG/M2 | SYSTOLIC BLOOD PRESSURE: 116 MMHG | HEART RATE: 67 BPM | WEIGHT: 199 LBS | DIASTOLIC BLOOD PRESSURE: 74 MMHG

## 2022-06-28 DIAGNOSIS — I48.0 PAROXYSMAL ATRIAL FIBRILLATION (HCC): ICD-10-CM

## 2022-06-28 LAB
AORTIC ROOT: 3.6 CM
APICAL FOUR CHAMBER EJECTION FRACTION: 54 %
ASCENDING AORTA: 3.6 CM
E WAVE DECELERATION TIME: 183 MS
FRACTIONAL SHORTENING: 33 % (ref 28–44)
INTERVENTRICULAR SEPTUM IN DIASTOLE (PARASTERNAL SHORT AXIS VIEW): 1.2 CM
INTERVENTRICULAR SEPTUM: 1.2 CM (ref 0.6–1.1)
LAAS-AP2: 26.5 CM2
LAAS-AP4: 25.8 CM2
LEFT ATRIUM SIZE: 3.8 CM
LEFT INTERNAL DIMENSION IN SYSTOLE: 3.2 CM (ref 2.1–4)
LEFT VENTRICULAR INTERNAL DIMENSION IN DIASTOLE: 4.8 CM (ref 3.5–6)
LEFT VENTRICULAR POSTERIOR WALL IN END DIASTOLE: 1.1 CM
LEFT VENTRICULAR STROKE VOLUME: 68 ML
LVSV (TEICH): 68 ML
MV E'TISSUE VEL-SEP: 10 CM/S
MV PEAK A VEL: 0.48 M/S
MV PEAK E VEL: 70 CM/S
MV STENOSIS PRESSURE HALF TIME: 53 MS
MV VALVE AREA P 1/2 METHOD: 4.15 CM2
RA PRESSURE ESTIMATED: 5 MMHG
RIGHT ATRIUM AREA SYSTOLE A4C: 22.9 CM2
RIGHT VENTRICLE ID DIMENSION: 4.3 CM
RV PSP: 33 MMHG
SL CV LEFT ATRIUM LENGTH A2C: 6.5 CM
SL CV LV EF: 60
SL CV PED ECHO LEFT VENTRICLE DIASTOLIC VOLUME (MOD BIPLANE) 2D: 109 ML
SL CV PED ECHO LEFT VENTRICLE SYSTOLIC VOLUME (MOD BIPLANE) 2D: 42 ML
TR MAX PG: 28 MMHG
TR PEAK VELOCITY: 2.7 M/S
TRICUSPID VALVE PEAK REGURGITATION VELOCITY: 2.65 M/S

## 2022-06-28 PROCEDURE — 93306 TTE W/DOPPLER COMPLETE: CPT | Performed by: INTERNAL MEDICINE

## 2022-06-28 PROCEDURE — 93306 TTE W/DOPPLER COMPLETE: CPT

## 2022-08-01 ENCOUNTER — OFFICE VISIT (OUTPATIENT)
Dept: URGENT CARE | Facility: CLINIC | Age: 62
End: 2022-08-01
Payer: COMMERCIAL

## 2022-08-01 VITALS
HEART RATE: 75 BPM | DIASTOLIC BLOOD PRESSURE: 81 MMHG | TEMPERATURE: 97.5 F | OXYGEN SATURATION: 100 % | RESPIRATION RATE: 18 BRPM | SYSTOLIC BLOOD PRESSURE: 144 MMHG

## 2022-08-01 DIAGNOSIS — L73.8 BACTERIAL FOLLICULITIS: Primary | ICD-10-CM

## 2022-08-01 PROCEDURE — 99213 OFFICE O/P EST LOW 20 MIN: CPT | Performed by: PHYSICIAN ASSISTANT

## 2022-08-01 RX ORDER — CEPHALEXIN 500 MG/1
500 CAPSULE ORAL EVERY 6 HOURS SCHEDULED
Qty: 28 CAPSULE | Refills: 0 | Status: SHIPPED | OUTPATIENT
Start: 2022-08-01 | End: 2022-08-08

## 2022-08-01 NOTE — PROGRESS NOTES
[address] [facility]        NAME: Mamie Figueroa is a 57 y o  male  : 1960    MRN: [id number]  DATE: 2022  TIME: 5:49 PM    Assessment and Plan   Bacterial folliculitis [W14 1]  1  Bacterial folliculitis  cephalexin (KEFLEX) 500 mg capsule         Patient Instructions     Take antibiotic as directed with food  Recommend probiotics for side effects  Continue with over-the-counter symptom relief  Monitor for worsening symptoms   Recommend warm wet compresses & Epsom salt soaks 4x/day  If no improvement see PCP  Follow up with PCP in 3-5 days  Proceed to  ER if symptoms worsen  Chief Complaint     Chief Complaint   Patient presents with    Mass     Patient c/o a lump on his L side middle line butt cheek x 2 days          History of Present Illness       Patient presents with complaint of a painful lump in his left buttock for the past 2 days  He denies trying palliative measures  He denies fever, chills, sweats, purulent drainage, and other symptoms  He denies history of abscess or skin infections  He denies recent abx use and has no known antibiotic allergies  He denies hx of DM or other history of poor wound healing  Review of Systems   Review of Systems   Constitutional: Negative for chills and fever  HENT: Negative for ear pain and sore throat  Eyes: Negative for pain and visual disturbance  Respiratory: Negative for cough and shortness of breath  Cardiovascular: Negative for chest pain and palpitations  Gastrointestinal: Negative for abdominal pain and vomiting  Genitourinary: Negative for dysuria and hematuria  Musculoskeletal: Negative for arthralgias and back pain  Skin: Positive for color change  Negative for rash  Neurological: Negative for seizures and syncope  All other systems reviewed and are negative          Current Medications       Current Outpatient Medications:     cephalexin (KEFLEX) 500 mg capsule, Take 1 capsule (500 mg total) by mouth every 6 (six) hours for 7 days, Disp: 28 capsule, Rfl: 0    acetaminophen (TYLENOL) 325 mg tablet, Take 650 mg by mouth every 6 (six) hours as needed for mild pain, Disp: , Rfl:     aspirin 81 mg chewable tablet, Chew 1 tablet (81 mg total) daily, Disp: 90 tablet, Rfl: 3    pantoprazole (PROTONIX) 40 mg tablet, TAKE 1 TABLET DAILY (Patient not taking: Reported on 6/7/2022), Disp: 90 tablet, Rfl: 3    Current Allergies     Allergies as of 08/01/2022    (No Known Allergies)            The following portions of the patient's history were reviewed and updated as appropriate: allergies, current medications, past family history, past medical history, past social history, past surgical history and problem list      Past Medical History:   Diagnosis Date    Atrial fibrillation ([facility] [address] )     Epigastric pain 6/24/2020    Gastroesophageal reflux     GERD (gastroesophageal reflux disease)     Pancreatitis     Pleural effusion     Pleural effusion associated with pancreatitis 2/3/2019    Second hand smoke exposure        Past Surgical History:   Procedure Laterality Date    CHOLECYSTECTOMY      COLONOSCOPY N/A 3/21/2016    Procedure: COLONOSCOPY;  Surgeon: Lety Valdes DO;  [facility]; Service:     ERCP N/A 1/4/2019    Procedure: ENDOSCOPIC RETROGRADE CHOLANGIOPANCREATOGRAPHY (ERCP); Surgeon: Nabil Hays MD;  [facility]; Service: Gastroenterology    ERCP  08/11/2020    ESOPHAGOGASTRODUODENOSCOPY N/A 2/5/2019    Procedure: ESOPHAGOGASTRODUODENOSCOPY (EGD), with possible nasojejunal Dobhoff tube placement;  Surgeon: Luis E Segundo MD;  [facility]; Service: Gastroenterology    ESOPHAGOGASTRODUODENOSCOPY N/A 4/3/2019    Procedure: ESOPHAGOGASTRODUODENOSCOPY (EGD)- endoscopic necrosectomy;  Surgeon: Nabil Hays MD;  [facility];   Service: Gastroenterology    IR IMAGE GUIDED ASPIRATION / DRAINAGE W TUBE  6/17/2019    IR THORACENTESIS  2/7/2019    IR THORACENTESIS  3/29/2019    LINEAR ENDOSCOPIC U/S N/A 3/29/2019    Procedure: LINEAR ENDOSCOPIC U/S cyst gastro;  Surgeon: Caren Kuhn MD;  [facility]; Service: Gastroenterology    MT [address] N/A 5/20/2019    Procedure: Marta Turk;  Surgeon: Alpesh Person MD;  [facility];  Service: Thoracic    MT EGD TRANSORAL BIOPSY SINGLE/MULTIPLE N/A 3/21/2016    Procedure: ESOPHAGOGASTRODUODENOSCOPY (EGD); Surgeon: Pastora Tyson DO;  [facility]; Service: General    MT ERCP DX COLLECTION SPECIMEN BRUSHING/WASHING N/A 4/26/2019    Procedure: ENDOSCOPIC RETROGRADE CHOLANGIOPANCREATOGRAPHY (ERCP); Surgeon: Caren Kuhn MD;  [facility]; Service: Gastroenterology    MT ESOPHAGOGASTRODUODENOSCOPY TRANSORAL DIAGNOSTIC N/A 4/9/2019    Procedure: ESOPHAGOGASTRODUODENOSCOPY (EGD) endoscopic necrosectomy;  Surgeon: Caren Kuhn MD;  [facility]; Service: Gastroenterology    MT ESOPHAGOGASTRODUODENOSCOPY TRANSORAL DIAGNOSTIC N/A 4/26/2019    Procedure: ESOPHAGOGASTRODUODENOSCOPY (EGD); Surgeon: Caren Kuhn MD;  [facility]; Service: Gastroenterology    MT REPAIR [address] HERNIA,5+Y/O,REDUCIBL Right 1/22/2020    Procedure: REPAIR HERNIA INGUINAL;  Surgeon: Glen Luna MD;  [facility];  Service: General    MT THORACOSCOPY SURG PART PULM DECORT Left 5/20/2019    Procedure: DECORTICATION LUNG;  Surgeon: Alpesh Person MD;  [facility];  Service: Thoracic    MT THORACOSCOPY SURG PART PULM DECORT Left 5/20/2019    Procedure: THORACOSCOPY VIDEO ASSISTED SURGERY (VATS);   Surgeon: Alpesh Person MD;  [facility];  Service: Thoracic    MT THORACOSCOPY SURG W/PLEURODESIS N/A 5/20/2019    Procedure: PLEURODESIS mechanical;  Surgeon: Alpesh Person MD;  [facility];  Service: Thoracic    UPPER GASTROINTESTINAL ENDOSCOPY  09/19/2019       Family History   Problem Relation Age of Onset    Lung cancer Mother 79        Smoker     Transient ischemic attack Father 61    Lung disease Neg Hx          Medications have been verified  Objective   /81   Pulse 75   Temp 97 5 °F (36 4 °C)   Resp 18   SpO2 100%   No LMP for male patient  Physical Exam     Physical Exam  Vitals and nursing note reviewed  Constitutional:       General: He is not in acute distress  Appearance: Normal appearance  He is well-developed  He is not ill-appearing or diaphoretic  HENT:      Head: Normocephalic and atraumatic  Eyes:      Conjunctiva/sclera: Conjunctivae normal       Pupils: Pupils are equal, round, and reactive to light  Cardiovascular:      Rate and Rhythm: Normal rate and regular rhythm  Heart sounds: Normal heart sounds  Pulmonary:      Effort: Pulmonary effort is normal  No respiratory distress  Breath sounds: Normal breath sounds  No stridor  Musculoskeletal:      Cervical back: Normal range of motion and neck supple  Lymphadenopathy:      Cervical: No cervical adenopathy  Skin:     General: Skin is warm and dry  Capillary Refill: Capillary refill takes less than 2 seconds  Findings: Erythema and lesion present  No rash  Comments: approx 0 5cm minimally raised pustule of left medial buttock w/ surrounding 2 cm of erythema; TTP; no drainage; mild induration   Neurological:      Mental Status: He is alert and oriented to person, place, and time  Cranial Nerves: No cranial nerve deficit  Sensory: No sensory deficit  Psychiatric:         Behavior: Behavior normal          Thought Content:  Thought content normal

## 2022-09-22 ENCOUNTER — OFFICE VISIT (OUTPATIENT)
Dept: GASTROENTEROLOGY | Facility: MEDICAL CENTER | Age: 62
End: 2022-09-22
Payer: COMMERCIAL

## 2022-09-22 VITALS
HEART RATE: 80 BPM | DIASTOLIC BLOOD PRESSURE: 65 MMHG | WEIGHT: 198.2 LBS | SYSTOLIC BLOOD PRESSURE: 110 MMHG | TEMPERATURE: 97.2 F | BODY MASS INDEX: 24.13 KG/M2

## 2022-09-22 DIAGNOSIS — K22.70 BARRETT'S ESOPHAGUS WITHOUT DYSPLASIA: ICD-10-CM

## 2022-09-22 DIAGNOSIS — K21.00 GASTROESOPHAGEAL REFLUX DISEASE WITH ESOPHAGITIS WITHOUT HEMORRHAGE: Primary | ICD-10-CM

## 2022-09-22 DIAGNOSIS — D13.5 AMPULLARY ADENOMA: ICD-10-CM

## 2022-09-22 PROCEDURE — 99214 OFFICE O/P EST MOD 30 MIN: CPT | Performed by: INTERNAL MEDICINE

## 2022-09-22 NOTE — PATIENT INSTRUCTIONS
Scheduled date of 12/27/22 (as of today) 9/22/22  Physician performing Dr Yasmine Irizarry:  Location of procedure  [facility]:  Bowel prep reviewed with patient: EGD  Instructions reviewed with patient by: MA  Clearances:

## 2022-09-22 NOTE — PROGRESS NOTES
Outpatient Follow up  [address]  [address]  [facility] [address]  Mendez Jean-Baptiste MD  Ph : [phone]  Fax : [phone]  Mobile : [phone]  Email : [email or web address]  Also available on [employer]    Teressa Garcia 57 y o  male MRN: [id number]    PCP: Perez White MD  Referring: Perez White MD  No address on file    Teressa Garcia presented for a follow up visit  My recommendations are included  Please do not hesitate to contact me with any questions you may have  ASSESSMENT AND PLAN:      No problem-specific Assessment & Plan notes found for this encounter  Diagnoses and all orders for this visit:    Gastroesophageal reflux disease with esophagitis without hemorrhage  -     EGD; Future    Ampullary adenoma  -     EGD; Future    Choudhary's esophagus without dysplasia  -     EGD; Future    Other orders  -     Diet NPO; Sips with meds; Standing  -     Void on call to OR; Standing  -     Insert peripheral IV; Standing      57year-old with history of ampullary adenoma and severe pancreatitis with pancreatic pseudocyst status post drainage  He is doing well at this time  He is status post ampullectomy  Last endoscopy last year did not show any adenoma  Endoscopy showed esophagitis but no evidence of Choudhary's on biopsy  At this time I would recommend the following  Will schedule for a egd now with side viewing endoscope  If negative then 3 years  He has a history of colonic tubular adenoma  Repeat colonoscopy in 7 years is recommended     ______________________________________________________________________    SUBJECTIVE:  58 y/o with h/o pancreatitis and pancreatic pseudocyst and ampullary adenoma-this was resected by ampullectomy  He had an EGD done last year which showed LA Class B esophagitis  He had Choudhary's esophagus as well (short segment)   He did not have recurrent ampullary adenoma  He has been doing well  He has been taking omeprazole  He has no nocturnal symptoms  No diarrhea / constipation/ bleeding  No change in appetite or weight  REVIEW OF SYSTEMS IS OTHERWISE NEGATIVE  Historical Information   Past Medical History:   Diagnosis Date    Atrial fibrillation ([address] )     Epigastric pain 6/24/2020    Gastroesophageal reflux     GERD (gastroesophageal reflux disease)     Pancreatitis     Pleural effusion     Pleural effusion associated with pancreatitis 2/3/2019    Second hand smoke exposure      Past Surgical History:   Procedure Laterality Date    CHOLECYSTECTOMY      COLONOSCOPY N/A 3/21/2016    Procedure: COLONOSCOPY;  Surgeon: Teresa Horn DO;  [facility]; Service:     ERCP N/A 1/4/2019    Procedure: ENDOSCOPIC RETROGRADE CHOLANGIOPANCREATOGRAPHY (ERCP); Surgeon: Ryan Patiño MD;  [facility]; Service: Gastroenterology    ERCP  08/11/2020    ESOPHAGOGASTRODUODENOSCOPY N/A 2/5/2019    Procedure: ESOPHAGOGASTRODUODENOSCOPY (EGD), with possible nasojejunal Dobhoff tube placement;  Surgeon: Daquan Card MD;  [facility]; Service: Gastroenterology    ESOPHAGOGASTRODUODENOSCOPY N/A 4/3/2019    Procedure: ESOPHAGOGASTRODUODENOSCOPY (EGD)- endoscopic necrosectomy;  Surgeon: Ryan Patiño MD;  [facility]; Service: Gastroenterology    IR IMAGE GUIDED ASPIRATION / DRAINAGE W TUBE  6/17/2019    IR THORACENTESIS  2/7/2019    IR THORACENTESIS  3/29/2019    LINEAR ENDOSCOPIC U/S N/A 3/29/2019    Procedure: LINEAR ENDOSCOPIC U/S cyst gastro;  Surgeon: Ryan Patiño MD;  [facility]; Service: Gastroenterology    [place] [address] N/A 5/20/2019    Procedure: Ethan Regan;  Surgeon: Brian Rose MD;  [facility];  Service: Thoracic    MI EGD TRANSORAL BIOPSY SINGLE/MULTIPLE N/A 3/21/2016    Procedure: ESOPHAGOGASTRODUODENOSCOPY (EGD);   Surgeon: Teresa Horn DO;  [facility]; Service: General    OH ERCP DX COLLECTION SPECIMEN BRUSHING/WASHING N/A 4/26/2019    Procedure: ENDOSCOPIC RETROGRADE CHOLANGIOPANCREATOGRAPHY (ERCP); Surgeon: Halina Mckinney MD;  [facility]; Service: Gastroenterology    OH ESOPHAGOGASTRODUODENOSCOPY TRANSORAL DIAGNOSTIC N/A 4/9/2019    Procedure: ESOPHAGOGASTRODUODENOSCOPY (EGD) endoscopic necrosectomy;  Surgeon: Halina Mckinney MD;  [facility]; Service: Gastroenterology    OH ESOPHAGOGASTRODUODENOSCOPY TRANSORAL DIAGNOSTIC N/A 4/26/2019    Procedure: ESOPHAGOGASTRODUODENOSCOPY (EGD); Surgeon: Halina Mckinney MD;  [facility]; Service: Gastroenterology    OH REPAIR [address] HERNIA,5+Y/O,REDUCIBL Right 1/22/2020    Procedure: REPAIR HERNIA INGUINAL;  Surgeon: Antoinette Jameson MD;  [facility];  Service: General    OH THORACOSCOPY SURG PART PULM DECORT Left 5/20/2019    Procedure: DECORTICATION LUNG;  Surgeon: Daniel Roland MD;  [facility];  Service: Thoracic    OH THORACOSCOPY SURG PART PULM DECORT Left 5/20/2019    Procedure: THORACOSCOPY VIDEO ASSISTED SURGERY (VATS);   Surgeon: Daniel Roland MD;  [facility];  Service: Thoracic    OH THORACOSCOPY SURG W/PLEURODESIS N/A 5/20/2019    Procedure: PLEURODESIS mechanical;  Surgeon: Daniel Roland MD;  [facility];  Service: Thoracic    UPPER GASTROINTESTINAL ENDOSCOPY  09/19/2019     Social History   Social History     Substance and Sexual Activity   Alcohol Use Not Currently     Social History     Substance and Sexual Activity   Drug Use No     Social History     Tobacco Use   Smoking Status Never Smoker   Smokeless Tobacco Never Used     Family History   Problem Relation Age of Onset    Lung cancer Mother 79        Smoker     Transient ischemic attack Father 61    Lung disease Neg Hx        Meds/Allergies       Current Outpatient Medications:     acetaminophen (TYLENOL) 325 mg tablet    aspirin 81 mg chewable tablet    pantoprazole (PROTONIX) 40 mg tablet    No Known Allergies        Objective     Blood pressure 110/65, pulse 80, temperature (!) 97 2 °F (36 2 °C), temperature source Tympanic, weight 89 9 kg (198 lb 3 2 oz)  Body mass index is 24 13 kg/m²  PHYSICAL EXAM:      Physical Exam  Constitutional:       Appearance: Normal appearance  He is well-developed  HENT:      Head: Normocephalic and atraumatic  Eyes:      General: No scleral icterus  Conjunctiva/sclera: Conjunctivae normal       Pupils: Pupils are equal, round, and reactive to light  Cardiovascular:      Rate and Rhythm: Normal rate and regular rhythm  Heart sounds: Normal heart sounds  Pulmonary:      Effort: Pulmonary effort is normal  No respiratory distress  Breath sounds: Normal breath sounds  Abdominal:      General: Bowel sounds are normal  There is no distension  Palpations: Abdomen is soft  There is no mass  Tenderness: There is no abdominal tenderness  Hernia: No hernia is present  Musculoskeletal:         General: Normal range of motion  Cervical back: Normal range of motion  Lymphadenopathy:      Cervical: No cervical adenopathy  Skin:     General: Skin is warm  Neurological:      Mental Status: He is alert and oriented to person, place, and time  Psychiatric:         Behavior: Behavior normal          Thought Content: Thought content normal          Lab Results:   No visits with results within 1 Day(s) from this visit     Latest known visit with results is:   Hospital Outpatient Visit on 06/28/2022   Component Date Value    LA size 06/28/2022 3 8     Triscuspid Valve Regurgi* 06/28/2022 28 0     Tricuspid valve peak reg* 06/28/2022 2 65     LVPWd 06/28/2022 1 10     Left Atrium Area-systoli* 06/28/2022 26 5     Left Atrium Area-systoli* 06/28/2022 25 8     MV E' Tissue Velocity Se* 06/28/2022 10     TR Peak Aiden 06/28/2022 2 7     IVSd 06/28/2022 2 69     LV DIASTOLIC VOLUME (MOD* 90/81/7685 109     LEFT VENTRICLE SYSTOLIC * 68/58/8821 42     Left ventricular stroke * 06/28/2022 68 00     A4C EF 06/28/2022 54     LA length (A2C) 06/28/2022 6 50     LVIDd 06/28/2022 4 80     IVS 06/28/2022 1 2     LVIDS 06/28/2022 3 20     FS 06/28/2022 33     Asc Ao 06/28/2022 3 6     Ao root 06/28/2022 3 60     RVID d 06/28/2022 4 3     MV valve area p 1/2 meth* 06/28/2022 4 15     E wave deceleration time 06/28/2022 183     MV Peak E Aiden 06/28/2022 70     MV Peak A Aiden 06/28/2022 0 48     RAA A4C 06/28/2022 22 9     MV stenosis pressure 1/2* 06/28/2022 53     LVSV, 2D 06/28/2022 68     LV EF 06/28/2022 60     Est  RA pres 06/28/2022 5 0     Right Ventricular Peak S* 06/28/2022 33 00          Radiology Results:   No results found

## 2022-10-07 ENCOUNTER — TELEPHONE (OUTPATIENT)
Dept: GASTROENTEROLOGY | Facility: CLINIC | Age: 62
End: 2022-10-07

## 2022-10-07 NOTE — TELEPHONE ENCOUNTER
Patients GI provider:  Dr Jone Cobb    Number to return call: [phone]    Reason for call: Pt calling  Stating his insurance will be changing soon and because of that reason he would like procedure date moved up      Scheduled procedure/appointment date if applicable: procedure 43/92

## 2022-12-27 ENCOUNTER — HOSPITAL ENCOUNTER (OUTPATIENT)
Dept: GASTROENTEROLOGY | Facility: HOSPITAL | Age: 62
Setting detail: OUTPATIENT SURGERY
Discharge: HOME | End: 2022-12-27
Attending: INTERNAL MEDICINE

## 2022-12-27 ENCOUNTER — ANESTHESIA (OUTPATIENT)
Dept: GASTROENTEROLOGY | Facility: HOSPITAL | Age: 62
End: 2022-12-27

## 2022-12-27 ENCOUNTER — ANESTHESIA EVENT (OUTPATIENT)
Dept: GASTROENTEROLOGY | Facility: HOSPITAL | Age: 62
End: 2022-12-27

## 2022-12-27 VITALS
RESPIRATION RATE: 18 BRPM | HEIGHT: 76 IN | HEART RATE: 67 BPM | OXYGEN SATURATION: 96 % | TEMPERATURE: 96.3 F | BODY MASS INDEX: 23.75 KG/M2 | SYSTOLIC BLOOD PRESSURE: 113 MMHG | DIASTOLIC BLOOD PRESSURE: 73 MMHG | WEIGHT: 195 LBS

## 2022-12-27 DIAGNOSIS — D13.5 AMPULLARY ADENOMA: ICD-10-CM

## 2022-12-27 DIAGNOSIS — K22.70 BARRETT'S ESOPHAGUS WITHOUT DYSPLASIA: ICD-10-CM

## 2022-12-27 DIAGNOSIS — K21.00 GASTROESOPHAGEAL REFLUX DISEASE WITH ESOPHAGITIS WITHOUT HEMORRHAGE: ICD-10-CM

## 2022-12-27 RX ORDER — PROPOFOL 10 MG/ML
INJECTION, EMULSION INTRAVENOUS AS NEEDED
Status: DISCONTINUED | OUTPATIENT
Start: 2022-12-27 | End: 2022-12-27

## 2022-12-27 RX ORDER — SODIUM CHLORIDE 9 MG/ML
125 INJECTION, SOLUTION INTRAVENOUS CONTINUOUS
Status: DISCONTINUED | OUTPATIENT
Start: 2022-12-27 | End: 2022-12-31 | Stop reason: HOSPADM

## 2022-12-27 RX ORDER — OMEPRAZOLE 40 MG/1
40 CAPSULE, DELAYED RELEASE ORAL DAILY
Qty: 30 CAPSULE | Refills: 3 | Status: SHIPPED | OUTPATIENT
Start: 2022-12-27 | End: 2023-01-26

## 2022-12-27 RX ORDER — PROPOFOL 10 MG/ML
INJECTION, EMULSION INTRAVENOUS CONTINUOUS PRN
Status: DISCONTINUED | OUTPATIENT
Start: 2022-12-27 | End: 2022-12-27

## 2022-12-27 RX ADMIN — SODIUM CHLORIDE 125 ML/HR: 0.9 INJECTION, SOLUTION INTRAVENOUS at 13:01

## 2022-12-27 RX ADMIN — PROPOFOL 160 MCG/KG/MIN: 10 INJECTION, EMULSION INTRAVENOUS at 13:40

## 2022-12-27 RX ADMIN — PROPOFOL 80 MG: 10 INJECTION, EMULSION INTRAVENOUS at 13:40

## 2022-12-27 NOTE — ANESTHESIA POSTPROCEDURE EVALUATION
Post-Op Assessment Note    CV Status:  Stable  Pain Score: 0    Pain management: adequate  Multimodal analgesia used between 6 hours prior to anesthesia start to PACU discharge    Mental Status:  Alert   Hydration Status:  Stable   PONV Controlled:  None   Airway Patency:  Patent   Two or more mitigation strategies used for obstructive sleep apnea   Post Op Vitals Reviewed: Yes      Staff: CRNA         No notable events documented      BP 94/62 (12/27/22 1354)    Temp (!) 96 3 °F (35 7 °C) (12/27/22 1354)    Pulse 84 (12/27/22 1354)   Resp 18 (12/27/22 1354)    SpO2 97 % (12/27/22 1354)

## 2022-12-27 NOTE — H&P
History and Physical - [facility]  Didi Acuna 60 y o  male MRN: [id number]    HPI: Didi Acuna is a 60y o  year old male who presents with h/o ampullary adenoma s/p ampullectomy  Now for follow up  Review of Systems    Historical Information   Past Medical History:   Diagnosis Date   • Atrial fibrillation ([facility] [address] )    • Epigastric pain 6/24/2020   • Gastroesophageal reflux    • GERD (gastroesophageal reflux disease)    • Pancreatitis    • Pleural effusion    • Pleural effusion associated with pancreatitis 2/3/2019   • Second hand smoke exposure      Past Surgical History:   Procedure Laterality Date   • CHOLECYSTECTOMY     • COLONOSCOPY N/A 3/21/2016    Procedure: COLONOSCOPY;  Surgeon: Osbaldo Vo DO;  [facility]; Service:    • ERCP N/A 1/4/2019    Procedure: ENDOSCOPIC RETROGRADE CHOLANGIOPANCREATOGRAPHY (ERCP); Surgeon: Mahad Chacon MD;  [facility]; Service: Gastroenterology   • ERCP  08/11/2020   • ESOPHAGOGASTRODUODENOSCOPY N/A 2/5/2019    Procedure: ESOPHAGOGASTRODUODENOSCOPY (EGD), with possible nasojejunal Dobhoff tube placement;  Surgeon: Rosalinda Vogel MD;  [facility]; Service: Gastroenterology   • ESOPHAGOGASTRODUODENOSCOPY N/A 4/3/2019    Procedure: ESOPHAGOGASTRODUODENOSCOPY (EGD)- endoscopic necrosectomy;  Surgeon: Mahad Chacon MD;  [facility]; Service: Gastroenterology   • IR IMAGE GUIDED ASPIRATION / DRAINAGE W TUBE  6/17/2019   • IR THORACENTESIS  2/7/2019   • IR THORACENTESIS  3/29/2019   • LINEAR ENDOSCOPIC U/S N/A 3/29/2019    Procedure: LINEAR ENDOSCOPIC U/S cyst gastro;  Surgeon: Mahad Chacon MD;  [facility];   Service: Gastroenterology   • [place] [address] INCL FLUOR GDNCE DX W/CELL [address] N/A 5/20/2019    Procedure: Laquita Watkins;  Surgeon: Isael Cutler MD;  [facility];  Service: Thoracic   • [place] EGD TRANSORAL BIOPSY SINGLE/MULTIPLE N/A 3/21/2016    Procedure: ESOPHAGOGASTRODUODENOSCOPY (EGD); Surgeon: Ibrahima Tripathi DO;  [facility]; Service: General   • ND ERCP DX COLLECTION SPECIMEN BRUSHING/WASHING N/A 4/26/2019    Procedure: ENDOSCOPIC RETROGRADE CHOLANGIOPANCREATOGRAPHY (ERCP); Surgeon: Hilton Miles MD;  [facility]; Service: Gastroenterology   • ND ESOPHAGOGASTRODUODENOSCOPY TRANSORAL DIAGNOSTIC N/A 4/9/2019    Procedure: ESOPHAGOGASTRODUODENOSCOPY (EGD) endoscopic necrosectomy;  Surgeon: Hilton Miles MD;  [facility]; Service: Gastroenterology   • ND ESOPHAGOGASTRODUODENOSCOPY TRANSORAL DIAGNOSTIC N/A 4/26/2019    Procedure: ESOPHAGOGASTRODUODENOSCOPY (EGD); Surgeon: Hilton Miles MD;  [facility]; Service: Gastroenterology   • ND RPR 1ST INGUN HRNA AGE 5 YRS/> REDUCIBLE Right 1/22/2020    Procedure: REPAIR HERNIA INGUINAL;  Surgeon: Jossie Haines MD;  [facility];  Service: General   • ND THORACOSCOPY W/PARTIAL PULMONARY DECORTICATION Left 5/20/2019    Procedure: DECORTICATION LUNG;  Surgeon: Sebastian Yusuf MD;  [facility];  Service: Thoracic   • ND THORACOSCOPY W/PARTIAL PULMONARY DECORTICATION Left 5/20/2019    Procedure: THORACOSCOPY VIDEO ASSISTED SURGERY (VATS);   Surgeon: Sebastian Yusuf MD;  [facility];  Service: Thoracic   • ND THORACOSCOPY W/PLEURODESIS N/A 5/20/2019    Procedure: PLEURODESIS mechanical;  Surgeon: Sebastian Yusuf MD;  [facility];  Service: Thoracic   • UPPER GASTROINTESTINAL ENDOSCOPY  09/19/2019     Social History   Social History     Substance and Sexual Activity   Alcohol Use Yes    Comment: occasional wine     Social History     Substance and Sexual Activity   Drug Use No     Social History     Tobacco Use   Smoking Status Never   Smokeless Tobacco Never     Family History   Problem Relation Age of Onset   • Lung cancer Mother 79        Smoker    • Transient ischemic attack Father 61   • Lung disease Neg Hx        Meds/Allergies     (Not in a hospital admission)      No Known Allergies    Objective     /79   Pulse 66   Temp (!) 96 2 °F (35 7 °C) (Tympanic)   Resp 18   Ht 6' 4" (1 93 m)   Wt 88 5 kg (195 lb)   SpO2 98%   BMI 23 74 kg/m²       PHYSICAL EXAM    Gen: NAD  CV: RRR  CHEST: Clear  ABD: soft, NT/ND  EXT: no edema  Neuro: AAO      ASSESSMENT/PLAN:  This is a 60y o  year old male here for egd for evaluation and follow up of prior ampullectomy       PLAN:   Procedure: EGD with forward and side viewing endoscope

## 2022-12-27 NOTE — ANESTHESIA PREPROCEDURE EVALUATION
Procedure:  EGD     - denies any chest pain, palpitations, shortness of breath, syncope, lightheadedness, seizures   - denies any recent infectious symptoms such as fevers, chills, cough   - denies taking any anticoagulation medications or any issues with bleeding, bruising, clotting     - hx of PAF in setting of pancreatitis, has been in NSR since   - no home anticoagulation    ECHO (06/28/22): Interpretation Summary  •  Left Ventricle: Left ventricular cavity size is normal  Wall thickness is mildly increased  There is concentric remodeling  The left ventricular ejection fraction is 60%  Systolic function is normal  Wall motion is normal  Diastolic function is normal   •  Right Ventricle: Right ventricular cavity size is top normal in size  Systolic function is normal   •  Left Atrium: The atrium is mildly dilated  •  Right Atrium: The atrium is mildly dilated  •  Mitral Valve: There is mild regurgitation  •  Tricuspid Valve: There is mild regurgitation      EKG (06/07/22):  NSR    Relevant Problems   ANESTHESIA (within normal limits)      CARDIO   (+) Paroxysmal atrial fibrillation (HCC)      ENDO (within normal limits)      GI/HEPATIC   (+) GERD (gastroesophageal reflux disease)      /RENAL (within normal limits)      GYN (within normal limits)      HEMATOLOGY   (+) Coagulation disorder (HCC)      MUSCULOSKELETAL (within normal limits)      NEURO/PSYCH (within normal limits)      PULMONARY (within normal limits)      Digestive   (+) Choudhary's esophagus without dysplasia      Lab Results   Component Value Date    WBC 6 52 06/27/2020    HGB 12 5 06/27/2020    HCT 36 6 06/27/2020    MCV 88 06/27/2020     (L) 06/27/2020     Lab Results   Component Value Date     01/05/2015    SODIUM 139 09/14/2021    K 3 9 09/14/2021     09/14/2021    CO2 29 09/14/2021    ANIONGAP 6 01/05/2015    AGAP 2 (L) 09/14/2021    BUN 14 09/14/2021    CREATININE 1 04 09/14/2021    GLUC 89 09/14/2021    GLUF 100 (H) 03/02/2021    CALCIUM 8 8 09/14/2021    AST 15 03/02/2021    ALT 28 03/02/2021    ALKPHOS 76 03/02/2021    PROT 6 7 01/05/2015    TP 6 5 03/02/2021    BILITOT 1 00 01/05/2015    TBILI 1 70 (H) 03/02/2021    EGFR 77 09/14/2021     Lab Results   Component Value Date    PTT 25 06/27/2020     Lab Results   Component Value Date    INR 2 84 (H) 04/13/2021    INR 1 74 (H) 03/30/2021    INR 1 37 (H) 03/02/2021    PROTIME 29 6 (H) 04/13/2021    PROTIME 20 3 (H) 03/30/2021    PROTIME 16 9 (H) 03/02/2021         Physical Exam    Airway    Mallampati score: II  TM Distance: >3 FB  Neck ROM: full     Dental   No notable dental hx     Cardiovascular  Rhythm: regular, Rate: normal, Cardiovascular exam normal    Pulmonary  Breath sounds clear to auscultation,     Other Findings        Anesthesia Plan  ASA Score- 3     Anesthesia Type- IV sedation with anesthesia with ASA Monitors  Additional Monitors:   Airway Plan:           Plan Factors-Exercise tolerance (METS): >4 METS  Chart reviewed  EKG reviewed  Imaging results reviewed  Existing labs reviewed  Patient summary reviewed  Patient is not a current smoker  Patient did not smoke on day of surgery  Obstructive sleep apnea risk education given perioperatively  Induction- intravenous  Postoperative Plan-     Informed Consent- Anesthetic plan and risks discussed with patient  I personally reviewed this patient with the CRNA  Discussed and agreed on the Anesthesia Plan with the CRNA  Maura Mayfield

## 2023-01-19 NOTE — TELEPHONE ENCOUNTER
Pt to have surgery on 12/22  He is able to hold Coumadin for 3 days prior  Pt is aware      Thank you Nasal Turnover Hinge Flap Text: The defect edges were debeveled with a #15 scalpel blade.  Given the size, depth, location of the defect and the defect being full thickness a nasal turnover hinge flap was deemed most appropriate.  Using a sterile surgical marker, an appropriate hinge flap was drawn incorporating the defect. The area thus outlined was incised with a #15 scalpel blade. The flap was designed to recreate the nasal mucosal lining and the alar rim. The skin margins were undermined to an appropriate distance in all directions utilizing iris scissors.

## 2023-01-31 ENCOUNTER — TELEPHONE (OUTPATIENT)
Dept: OTHER | Facility: OTHER | Age: 63
End: 2023-01-31

## 2023-01-31 DIAGNOSIS — K21.00 GASTROESOPHAGEAL REFLUX DISEASE WITH ESOPHAGITIS WITHOUT HEMORRHAGE: Primary | ICD-10-CM

## 2023-01-31 DIAGNOSIS — K44.9 HIATAL HERNIA: ICD-10-CM

## 2023-01-31 NOTE — PROGRESS NOTES
Patient is called back for reflux  As discussed previously we would recommend laparoscopic hernia repair and Transoral Incisionless Fundoplication (TIF)  I have referred him to Dr Umu Mercado

## 2023-01-31 NOTE — TELEPHONE ENCOUNTER
Patient was diagnosed with a hiatal hernia in  December, and has been waiting for a call to schedule the procedure with Dr Barbie Elena

## 2023-01-31 NOTE — TELEPHONE ENCOUNTER
Patient is called back for reflux  As discussed previously we would recommend laparoscopic hernia repair and Transoral Incisionless Fundoplication (TIF)  I have referred him to Dr Merced Hill  Spoke to patient and relayed message from Dr Reyna Abts

## 2023-01-31 NOTE — TELEPHONE ENCOUNTER
RECOMMENDATION: Colon 08/19/2021  Repeat colonoscopy in 7 years due to a personal history of colon polyps  Obtain CT scan for weight loss       RECOMMENDATION: EGD 12/27/2022    Follow up with me in clinic      Continue acid reflux medications however would recommend changing to omeprazole 40 mg daily  Follow-up in the office to discuss other treatment options for the reflux and the hernia including laparoscopic hiatal hernia repair and TIF  Spoke to patient  Please advise

## 2023-02-09 ENCOUNTER — CONSULT (OUTPATIENT)
Dept: SURGERY | Facility: CLINIC | Age: 63
End: 2023-02-09

## 2023-02-09 VITALS
SYSTOLIC BLOOD PRESSURE: 129 MMHG | HEART RATE: 74 BPM | RESPIRATION RATE: 12 BRPM | BODY MASS INDEX: 22.45 KG/M2 | TEMPERATURE: 98.6 F | DIASTOLIC BLOOD PRESSURE: 83 MMHG | OXYGEN SATURATION: 98 % | HEIGHT: 76 IN | WEIGHT: 184.4 LBS

## 2023-02-09 DIAGNOSIS — K44.9 HIATAL HERNIA: ICD-10-CM

## 2023-02-09 DIAGNOSIS — K21.00 GASTROESOPHAGEAL REFLUX DISEASE WITH ESOPHAGITIS WITHOUT HEMORRHAGE: Primary | ICD-10-CM

## 2023-02-09 NOTE — PROGRESS NOTES
Office Visit - General Surgery  Nolvia Downing MRN: [id number]  Encounter: [id number]    Assessment and Plan  Problem List Items Addressed This Visit        Digestive    GERD (gastroesophageal reflux disease) - Primary     41-year-old male with GERD and a hiatal hernia  Plan: We had a long discussion about the pathophysiology of reflux as well as paraesophageal hernias  Given his progressive worsening of reflux symptomatology, I do think he is appropriate for surgical management of his disease at this time  I would like to obtain an upper GI series to further evaluate his anatomy, as well as esophageal manometry to rule out any esophageal motility disorders  Following these test, he will return to clinic, and we will plan for laparoscopic or robotic hiatal hernia repair with intraoperative TIF done by Dr Leopold Solo  Relevant Orders    FL UPPER GI UGI    Esophageal manometry   Other Visit Diagnoses     Hiatal hernia        Relevant Orders    FL UPPER GI UGI    Esophageal manometry          Chief Complaint:  Nolvia Downing is a 60 y o  male who presents for Hernia (Consult hiatal hernia )    Subjective  41-year-old male presents with GERD  Patient states that he has a multiple year history of reflux as well as significant belching and occasional regurgitation into his throat  He does have a history of short segment Choudhary's esophagus, and on recent endoscopy was noted to have a 3 cm hiatal hernia  Currently, he has daily symptoms of belching, fullness, heartburn, and occasional regurgitation into his throat  He sleeps on several pillows and has for several years  He denies any actual vomiting, chest pain, or shortness of breath  He has made some dietary modifications and is currently taking omeprazole once daily without significant improvement in his symptomatology    Today in the office he completed the GERD HRQL questionnaire and scored 29 with overall dissatisfaction with his current condition  Of note he does have a 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  Surgically, he has had a laparoscopic cholecystectomy, and what sounds like a VATS decortication in the past   Additionally has had an open right inguinal hernia repair with mesh  Past Medical History:   Diagnosis Date   • Atrial fibrillation ([facility] [address] )    • Epigastric pain 6/24/2020   • Gastroesophageal reflux    • GERD (gastroesophageal reflux disease)    • Pancreatitis    • Pleural effusion    • Pleural effusion associated with pancreatitis 2/3/2019   • Second hand smoke exposure        Past Surgical History:   Procedure Laterality Date   • CHOLECYSTECTOMY     • COLONOSCOPY N/A 3/21/2016    Procedure: COLONOSCOPY;  Surgeon: Vineet Velez DO;  [facility]; Service:    • ERCP N/A 1/4/2019    Procedure: ENDOSCOPIC RETROGRADE CHOLANGIOPANCREATOGRAPHY (ERCP); Surgeon: Kurt Allen MD;  [facility]: [facility]; Service: Gastroenterology   • ERCP  08/11/2020   • ESOPHAGOGASTRODUODENOSCOPY N/A 2/5/2019    Procedure: ESOPHAGOGASTRODUODENOSCOPY (EGD), with possible nasojejunal Dobhoff tube placement;  Surgeon: Jace Quinonez MD;  [facility]; Service: Gastroenterology   • ESOPHAGOGASTRODUODENOSCOPY N/A 4/3/2019    Procedure: ESOPHAGOGASTRODUODENOSCOPY (EGD)- endoscopic necrosectomy;  Surgeon: Kurt Allen MD;  [facility]; Service: Gastroenterology   • IR IMAGE GUIDED ASPIRATION / DRAINAGE W TUBE  6/17/2019   • IR THORACENTESIS  2/7/2019   • IR THORACENTESIS  3/29/2019   • LINEAR ENDOSCOPIC U/S N/A 3/29/2019    Procedure: LINEAR ENDOSCOPIC U/S cyst gastro;  Surgeon: Kurt Allen MD;  [facility];   Service: Gastroenterology   • PA [address] INCL FLUOR GDNCE DX W/CELL [address] N/A 5/20/2019    Procedure: Slmi Rodriguez;  Surgeon: Tony Stratton MD;  [facility];  Service: Thoracic   • PA EGD TRANSORAL BIOPSY SINGLE/MULTIPLE N/A 3/21/2016    Procedure: ESOPHAGOGASTRODUODENOSCOPY (EGD); Surgeon: Danielle Fried DO;  [facility]; Service: General   • CA ERCP DX COLLECTION SPECIMEN BRUSHING/WASHING N/A 4/26/2019    Procedure: ENDOSCOPIC RETROGRADE CHOLANGIOPANCREATOGRAPHY (ERCP); Surgeon: Julia Bello MD;  [facility]; Service: Gastroenterology   • CA ESOPHAGOGASTRODUODENOSCOPY TRANSORAL DIAGNOSTIC N/A 4/9/2019    Procedure: ESOPHAGOGASTRODUODENOSCOPY (EGD) endoscopic necrosectomy;  Surgeon: Julia Bello MD;  [facility]; Service: Gastroenterology   • CA ESOPHAGOGASTRODUODENOSCOPY TRANSORAL DIAGNOSTIC N/A 4/26/2019    Procedure: ESOPHAGOGASTRODUODENOSCOPY (EGD); Surgeon: Julia Bello MD;  [facility]; Service: Gastroenterology   • CA RPR 1ST INGUN HRNA AGE 5 YRS/> REDUCIBLE Right 1/22/2020    Procedure: REPAIR HERNIA INGUINAL;  Surgeon: Basil Taylor MD;  [facility];  Service: General   • CA THORACOSCOPY W/PARTIAL PULMONARY DECORTICATION Left 5/20/2019    Procedure: DECORTICATION LUNG;  Surgeon: Marc Parks MD;  [facility];  Service: Thoracic   • CA THORACOSCOPY W/PARTIAL PULMONARY DECORTICATION Left 5/20/2019    Procedure: THORACOSCOPY VIDEO ASSISTED SURGERY (VATS);   Surgeon: Marc Parks MD;  [facility];  Service: Thoracic   • CA THORACOSCOPY W/PLEURODESIS N/A 5/20/2019    Procedure: PLEURODESIS mechanical;  Surgeon: Marc Parks MD;  [facility];  Service: Thoracic   • UPPER GASTROINTESTINAL ENDOSCOPY  09/19/2019       Family History   Problem Relation Age of Onset   • Lung cancer Mother 79        Smoker    • Transient ischemic attack Father 61   • Lung disease Neg Hx        Social History     Tobacco Use   • Smoking status: Never   • Smokeless tobacco: Never   Vaping Use   • Vaping Use: Never used   Substance Use Topics   • Alcohol use: Yes     Comment: occasional wine   • Drug use: No        Medications  Current Outpatient Medications on File Prior to Visit   Medication Sig Dispense Refill   • acetaminophen (TYLENOL) 325 mg tablet Take 650 mg by mouth every 6 (six) hours as needed for mild pain     • aspirin 81 mg chewable tablet Chew 1 tablet (81 mg total) daily 90 tablet 3   • omeprazole (PriLOSEC) 40 MG capsule Take 1 capsule (40 mg total) by mouth daily 30 capsule 3     No current facility-administered medications on file prior to visit  Allergies  No Known Allergies    Review of Systems   Constitutional: Negative for appetite change, chills, diaphoresis and fever  HENT: Negative for nosebleeds and trouble swallowing  Eyes: Negative  Respiratory: Negative for cough, shortness of breath and wheezing  Cardiovascular: Negative for chest pain, palpitations and leg swelling  Gastrointestinal: Positive for abdominal distention and abdominal pain  Negative for nausea and vomiting  Heartburn  Genitourinary: Negative for difficulty urinating, flank pain and frequency  Musculoskeletal: Negative for arthralgias, joint swelling and myalgias  Skin: Negative for pallor and rash  Neurological: Negative for dizziness, facial asymmetry and speech difficulty  Hematological: Does not bruise/bleed easily  Psychiatric/Behavioral: Negative for agitation and confusion  All other systems reviewed and are negative  Objective  Vitals:    02/09/23 1029   BP: 129/83   Pulse: 74   Resp: 12   Temp: 98 6 °F (37 °C)   SpO2: 98%       Physical Exam  Vitals and nursing note reviewed  Constitutional:       General: He is not in acute distress  Appearance: Normal appearance  He is not toxic-appearing  HENT:      Head: Normocephalic and atraumatic  Mouth/Throat:      Mouth: Mucous membranes are moist    Eyes:      Extraocular Movements: Extraocular movements intact  Pupils: Pupils are equal, round, and reactive to light  Cardiovascular:      Rate and Rhythm: Normal rate and regular rhythm  Pulses: Normal pulses  Pulmonary:      Effort: Pulmonary effort is normal  No respiratory distress  Breath sounds: Normal breath sounds  No wheezing  Abdominal:      General: There is no distension  Palpations: Abdomen is soft  There is no mass  Tenderness: There is no abdominal tenderness  There is no guarding or rebound  Hernia: No hernia is present  Comments: Well-healed laparoscopic port sites, no evidence of hernia  Musculoskeletal:         General: No swelling or deformity  Normal range of motion  Cervical back: Normal range of motion and neck supple  Right lower leg: No edema  Left lower leg: No edema  Skin:     General: Skin is warm and dry  Coloration: Skin is not jaundiced  Neurological:      General: No focal deficit present  Mental Status: He is alert and oriented to person, place, and time     Psychiatric:         Mood and Affect: Mood normal          Behavior: Behavior normal

## 2023-02-13 ENCOUNTER — TELEPHONE (OUTPATIENT)
Dept: GASTROENTEROLOGY | Facility: HOSPITAL | Age: 63
End: 2023-02-13

## 2023-02-14 ENCOUNTER — APPOINTMENT (OUTPATIENT)
Dept: GASTROENTEROLOGY | Facility: HOSPITAL | Age: 63
End: 2023-02-14
Attending: SURGERY

## 2023-02-14 ENCOUNTER — HOSPITAL ENCOUNTER (OUTPATIENT)
Dept: RADIOLOGY | Facility: HOSPITAL | Age: 63
Discharge: HOME | End: 2023-02-14
Attending: SURGERY

## 2023-02-14 DIAGNOSIS — K44.9 HIATAL HERNIA: ICD-10-CM

## 2023-02-14 DIAGNOSIS — K21.00 GASTROESOPHAGEAL REFLUX DISEASE WITH ESOPHAGITIS WITHOUT HEMORRHAGE: ICD-10-CM

## 2023-02-17 ENCOUNTER — HOSPITAL ENCOUNTER (OUTPATIENT)
Dept: GASTROENTEROLOGY | Facility: HOSPITAL | Age: 63
End: 2023-02-17
Attending: SURGERY

## 2023-02-17 VITALS
HEART RATE: 74 BPM | SYSTOLIC BLOOD PRESSURE: 118 MMHG | RESPIRATION RATE: 16 BRPM | DIASTOLIC BLOOD PRESSURE: 81 MMHG | OXYGEN SATURATION: 97 % | TEMPERATURE: 97.8 F

## 2023-02-17 DIAGNOSIS — K21.00 GASTROESOPHAGEAL REFLUX DISEASE WITH ESOPHAGITIS WITHOUT HEMORRHAGE: ICD-10-CM

## 2023-02-17 DIAGNOSIS — K44.9 HIATAL HERNIA: ICD-10-CM

## 2023-02-17 NOTE — PERIOPERATIVE NURSING NOTE
Patient brought in the room and explained the esophageal manometry procedure  After the confirmation of allergies, lidocaine 2% inserted via right /left nostril and  a transnasal insertion of the High Resolution esophageal manometry catheter was inserted via left nostril  Patient given water to drink during the insertion and once the catheter inserted pressure bands of both Upper esophageal sphincter  (UES) and Lower esophageal sphincter ( LES) were observed on the color contour  Patient instructed to take a deep breath to verify placement of the catheter, diaphragmatic pinch noted on inspiration  Catheter was secured to left cheek  Patient was assisted to supine position   Patient was instructed to relax  while acclimating the catheter for about 5 minutes  A 30 second baseline resting pressure was obtained to identify the UES and LES followed by a series of 10 liquid swallows using 5 cc room temperature normal saline to assess esophageal motility and bolus transit  No10 viscous swallows done, 1 multiple rapid drink swallow using 2 cc room temperature normal saline given a total of 5 drinks  Patient instructed to sit up at the edge of the stretcher and given 5 upright liquid swallows using 5 cc room temperature normal saline and 1 rapid drink challenge using 200 cc room temperature water  At the end of the procedure the high resolution esophageal manometry catheter was removed from the nostril intact  Patient given discharge instructions and patient left in stable condition

## 2023-03-03 ENCOUNTER — OFFICE VISIT (OUTPATIENT)
Dept: SURGERY | Facility: CLINIC | Age: 63
End: 2023-03-03

## 2023-03-03 VITALS — BODY MASS INDEX: 24.77 KG/M2 | WEIGHT: 203.4 LBS | HEIGHT: 76 IN | TEMPERATURE: 96.2 F

## 2023-03-03 DIAGNOSIS — K22.70 BARRETT'S ESOPHAGUS WITHOUT DYSPLASIA: Primary | ICD-10-CM

## 2023-03-03 DIAGNOSIS — K21.00 GASTROESOPHAGEAL REFLUX DISEASE WITH ESOPHAGITIS WITHOUT HEMORRHAGE: ICD-10-CM

## 2023-03-03 NOTE — ASSESSMENT & PLAN NOTE
48-year-old male well-known to me with a small hiatal hernia, as well as Choudhary's esophagus and reflux returns to clinic today  Plan:  Plan for laparoscopic or robotic paraesophageal hernia repair with intraoperative transoral incisionless fundoplication by Dr Marcela Guo of GI, and EGD  I discussed with the patient the possibility of open repair however unlikely, as well as the use of mesh to reinforce the hiatus  We will plan to do this in the near future  The risks and benefits of surgery were discussed and the patient was amenable to proceed, and consent was signed  I discussed specific risks with her including the high risk of hernia recurrence as elucidated in the literature, as well as some initial dysphagia and trouble swallowing in the immediate postoperative  The patient was amenable to this and acknowledged understanding of requiring a postoperative modified diet  Poor (<50%)

## 2023-03-03 NOTE — PROGRESS NOTES
Office Visit - General Surgery  Denton Pickens MRN: [id number]  Encounter: [id number]    Assessment and Plan  Problem List Items Addressed This Visit        Digestive    GERD (gastroesophageal reflux disease)     67-year-old male well-known to me with a small hiatal hernia, as well as Choudhary's esophagus and reflux returns to clinic today  Plan:  Plan for laparoscopic or robotic paraesophageal hernia repair with intraoperative transoral incisionless fundoplication by Dr Vicki Siddiqui of GI, and EGD  I discussed with the patient the possibility of open repair however unlikely, as well as the use of mesh to reinforce the hiatus  We will plan to do this in the near future  The risks and benefits of surgery were discussed and the patient was amenable to proceed, and consent was signed  I discussed specific risks with her including the high risk of hernia recurrence as elucidated in the literature, as well as some initial dysphagia and trouble swallowing in the immediate postoperative  The patient was amenable to this and acknowledged understanding of requiring a postoperative modified diet  Choudhary's esophagus without dysplasia - Primary       Chief Complaint:  Denton Pickens is a 60 y o  male who presents for Results (Test results )    Subjective  67-year-old male with significant GERD returns to clinic today  Since her last visit he underwent upper GI series, as well as esophageal manometry both of which are reviewed in PACS  This is notable for a 3 cm hiatal hernia with significant reflux to the level of the thoracic inlet  Additionally on manometry, he was found to have normal esophageal manometry with a 3 cm hernia  He continues to complain of significant heartburn and reflux at night more so than during the day  He denies any chest pain, shortness of breath, or significant dysphagia  He has been able to tolerate a regular diet      Past Medical History:   Diagnosis Date   • Atrial fibrillation ([facility] [address] )    • Epigastric pain 6/24/2020   • Gastroesophageal reflux    • GERD (gastroesophageal reflux disease)    • Pancreatitis    • Pleural effusion    • Pleural effusion associated with pancreatitis 2/3/2019   • Second hand smoke exposure        Past Surgical History:   Procedure Laterality Date   • CHOLECYSTECTOMY     • COLONOSCOPY N/A 3/21/2016    Procedure: COLONOSCOPY;  Surgeon: Osbaldo Vo DO;  [facility]; Service:    • ERCP N/A 1/4/2019    Procedure: ENDOSCOPIC RETROGRADE CHOLANGIOPANCREATOGRAPHY (ERCP); Surgeon: Mahad Chacon MD;  [facility]; Service: Gastroenterology   • ERCP  08/11/2020   • ESOPHAGOGASTRODUODENOSCOPY N/A 2/5/2019    Procedure: ESOPHAGOGASTRODUODENOSCOPY (EGD), with possible nasojejunal Dobhoff tube placement;  Surgeon: Rosalinda Vogel MD;  [facility]; Service: Gastroenterology   • ESOPHAGOGASTRODUODENOSCOPY N/A 4/3/2019    Procedure: ESOPHAGOGASTRODUODENOSCOPY (EGD)- endoscopic necrosectomy;  Surgeon: Mahad Chacon MD;  [facility]; Service: Gastroenterology   • IR IMAGE GUIDED ASPIRATION / DRAINAGE W TUBE  6/17/2019   • IR THORACENTESIS  2/7/2019   • IR THORACENTESIS  3/29/2019   • LINEAR ENDOSCOPIC U/S N/A 3/29/2019    Procedure: LINEAR ENDOSCOPIC U/S cyst gastro;  Surgeon: Mahad Chacon MD;  [facility]; Service: Gastroenterology   • NC [address] INCL FLUOR GDNCE DX W/CELL [address] N/A 5/20/2019    Procedure: Laquita Watkins;  Surgeon: Isael Cutler MD;  [facility];  Service: Thoracic   • NC EGD TRANSORAL BIOPSY SINGLE/MULTIPLE N/A 3/21/2016    Procedure: ESOPHAGOGASTRODUODENOSCOPY (EGD); Surgeon: Osbaldo Vo DO;  [facility]; Service: General   • NC ERCP DX COLLECTION SPECIMEN BRUSHING/WASHING N/A 4/26/2019    Procedure: ENDOSCOPIC RETROGRADE CHOLANGIOPANCREATOGRAPHY (ERCP); Surgeon: Mahad Chacon MD;  [facility];   Service: Gastroenterology   • NC ESOPHAGOGASTRODUODENOSCOPY TRANSORAL DIAGNOSTIC N/A 4/9/2019    Procedure: ESOPHAGOGASTRODUODENOSCOPY (EGD) endoscopic necrosectomy;  Surgeon: Prabhu Bishop MD;  [facility]; Service: Gastroenterology   • MT ESOPHAGOGASTRODUODENOSCOPY TRANSORAL DIAGNOSTIC N/A 4/26/2019    Procedure: ESOPHAGOGASTRODUODENOSCOPY (EGD); Surgeon: Prabhu Bishop MD;  [facility]; Service: Gastroenterology   • MT RPR 1ST INGUN HRNA AGE 5 YRS/> REDUCIBLE Right 1/22/2020    Procedure: REPAIR HERNIA INGUINAL;  Surgeon: Nicanor Stokes MD;  [facility];  Service: General   • MT THORACOSCOPY W/PARTIAL PULMONARY DECORTICATION Left 5/20/2019    Procedure: DECORTICATION LUNG;  Surgeon: Edil Hampton MD;  [facility];  Service: Thoracic   • MT THORACOSCOPY W/PARTIAL PULMONARY DECORTICATION Left 5/20/2019    Procedure: THORACOSCOPY VIDEO ASSISTED SURGERY (VATS);   Surgeon: Edil Hampton MD;  [facility];  Service: Thoracic   • MT THORACOSCOPY W/PLEURODESIS N/A 5/20/2019    Procedure: PLEURODESIS mechanical;  Surgeon: dEil Hampton MD;  [facility];  Service: Thoracic   • UPPER GASTROINTESTINAL ENDOSCOPY  09/19/2019       Family History   Problem Relation Age of Onset   • Lung cancer Mother 79        Smoker    • Transient ischemic attack Father 61   • Lung disease Neg Hx        Social History     Tobacco Use   • Smoking status: Never   • Smokeless tobacco: Never   Vaping Use   • Vaping Use: Never used   Substance Use Topics   • Alcohol use: Yes     Comment: occasional wine   • Drug use: No        Medications  Current Outpatient Medications on File Prior to Visit   Medication Sig Dispense Refill   • acetaminophen (TYLENOL) 325 mg tablet Take 650 mg by mouth every 6 (six) hours as needed for mild pain     • aspirin 81 mg chewable tablet Chew 1 tablet (81 mg total) daily 90 tablet 3   • omeprazole (PriLOSEC) 40 MG capsule Take 1 capsule (40 mg total) by mouth daily 30 capsule 3     No current facility-administered medications on file prior to visit  Allergies  No Known Allergies    Review of Systems   Constitutional: Negative for appetite change, chills, diaphoresis and fever  HENT: Negative for nosebleeds and trouble swallowing  Eyes: Negative  Respiratory: Negative for cough, shortness of breath and wheezing  Cardiovascular: Negative for chest pain, palpitations and leg swelling  Gastrointestinal: Negative for abdominal distention, abdominal pain, nausea and vomiting  Heartburn  Genitourinary: Negative for difficulty urinating, flank pain and frequency  Musculoskeletal: Negative for arthralgias, joint swelling and myalgias  Skin: Negative for pallor and rash  Neurological: Negative for dizziness, facial asymmetry and speech difficulty  Hematological: Does not bruise/bleed easily  Psychiatric/Behavioral: Negative for agitation and confusion  All other systems reviewed and are negative  Objective  Vitals:    03/03/23 1303   Temp: (!) 96 2 °F (35 7 °C)       Physical Exam  Vitals and nursing note reviewed  Constitutional:       General: He is not in acute distress  Appearance: Normal appearance  He is not toxic-appearing  HENT:      Head: Normocephalic and atraumatic  Mouth/Throat:      Mouth: Mucous membranes are moist    Eyes:      Extraocular Movements: Extraocular movements intact  Pupils: Pupils are equal, round, and reactive to light  Cardiovascular:      Rate and Rhythm: Normal rate and regular rhythm  Pulses: Normal pulses  Pulmonary:      Effort: Pulmonary effort is normal  No respiratory distress  Breath sounds: Normal breath sounds  No wheezing  Abdominal:      General: There is no distension  Palpations: Abdomen is soft  There is no mass  Tenderness: There is no abdominal tenderness  There is no guarding or rebound  Hernia: No hernia is present        Comments: Well-healed laparoscopic port sites and the configuration of a laparoscopic cholecystectomy  Musculoskeletal:         General: No swelling or deformity  Normal range of motion  Cervical back: Normal range of motion and neck supple  Right lower leg: No edema  Left lower leg: No edema  Skin:     General: Skin is warm and dry  Coloration: Skin is not jaundiced  Neurological:      General: No focal deficit present  Mental Status: He is alert and oriented to person, place, and time     Psychiatric:         Mood and Affect: Mood normal          Behavior: Behavior normal

## 2023-03-06 DIAGNOSIS — K21.9 GASTROESOPHAGEAL REFLUX DISEASE WITHOUT ESOPHAGITIS: Primary | ICD-10-CM

## 2023-03-15 ENCOUNTER — PREP FOR PROCEDURE (OUTPATIENT)
Dept: GASTROENTEROLOGY | Facility: CLINIC | Age: 63
End: 2023-03-15

## 2023-03-15 DIAGNOSIS — K21.00 GASTROESOPHAGEAL REFLUX DISEASE WITH ESOPHAGITIS WITHOUT HEMORRHAGE: Primary | ICD-10-CM

## 2023-03-15 NOTE — DISCHARGE INSTRUCTIONS
Dear Silvia Krueger,     It was our pleasure to care for you here at [facility]  It is our hope that we were always able to exceed the expected standards for your care during your stay  You were hospitalized due to cholangitis   You were cared for on the 2nd  floor by Ana Luisa Ma PA-C under the service of Hannah Monterroso MD with the [facility] who covers for your primary care physician (PCP), Sarah Cavazos MD, while you were hospitalized  If you have any questions or concerns related to this hospitalization, you may contact us at [phone]  For follow up as well as any medication refills, we recommend that you follow up with your primary care physician  A registered nurse will reach out to you by phone within a few days after your discharge to answer any additional questions that you may have after going home  However, at this time we provide for you here, the most important instructions / recommendations at discharge:     · Notable Medication Adjustments -   · Continue taking Coumadin 3 mg  · Take Keflex 500 mg every 6 hours for 5 days  · Testing Required after Discharge -   · INR  · Important follow up information -   · Please follow-up on your INR  · Other Instructions -   · Follow-up with PCP in 1 week  · Please review this entire after visit summary as additional general instructions including medication list, appointments, activity, diet, any pertinent wound care, and other additional recommendations from your care team that may be provided for you        Sincerely,     Ana Luisa Ma PA-C Mart-1 - Negative Histology Text: MART-1 staining demonstrates a normal density and pattern of melanocytes along the dermal-epidermal junction. The surgical margins are negative for tumor cells.

## 2023-03-31 ENCOUNTER — NURSE TRIAGE (OUTPATIENT)
Dept: OTHER | Facility: OTHER | Age: 63
End: 2023-03-31

## 2023-03-31 DIAGNOSIS — K21.00 GASTROESOPHAGEAL REFLUX DISEASE WITH ESOPHAGITIS WITHOUT HEMORRHAGE: ICD-10-CM

## 2023-03-31 RX ORDER — OMEPRAZOLE 40 MG/1
40 CAPSULE, DELAYED RELEASE ORAL DAILY
Qty: 90 CAPSULE | Refills: 0 | Status: SHIPPED | OUTPATIENT
Start: 2023-03-31 | End: 2023-06-29

## 2023-03-31 NOTE — TELEPHONE ENCOUNTER
"  Reason for Disposition  • [1] Caller requesting a prescription renewal (no refills left), no triage required, AND [2] triager able to renew prescription per department policy    Answer Assessment - Initial Assessment Questions  1  DRUG NAME: \"What medicine do you need to have refilled? \"      Omeprazole 40mg daily  2  REFILLS REMAINING: \"How many refills are remaining? \" (Note: The label on the medicine or pill bottle will show how many refills are remaining  If there are no refills remaining, then a renewal may be needed )    Ordered by Dr Luis Carlos Padgett  [facility]; stated he had more than 3 days work  Medication sent to ; passed protocol  #90 sent to mail order pharmacy      Protocols used: MEDICATION REFILL AND RENEWAL CALL-ADULT-    "

## 2023-03-31 NOTE — TELEPHONE ENCOUNTER
Regarding: urgent med refill (omeproazole)  ----- Message from Leticia Hammond sent at 3/31/2023  3:50 PM EDT -----  Medication Refill Request     Name omeprazole   Dose/Frequency Take 1 capsule (40 mg total) by mouth daily  Quantity  30 capsule  Verified pharmacy   x  Verified ordering Provider   x  Does patient have enough for the next 3 days?  No

## 2023-04-26 NOTE — PRE-PROCEDURE INSTRUCTIONS
Pre-Surgery Instructions:   Medication Instructions    acetaminophen (TYLENOL) 325 mg tablet Instructed to take as needed including DOS with sips water    aspirin 81 mg chewable tablet Instructed to avoid all ASA/NSAIDs and OTC Vit/Supp from now until after surgery per anesthesia guidelines  Tylenol ok prn (prophylactic only)    omeprazole (PriLOSEC) 40 MG capsule Instructed to take per normal schedule including DOS    Medication instructions for day surgery reviewed  Please use only a sip of water to take your instructed medications  Avoid all over the counter vitamins, supplements and NSAIDS for one week prior to surgery per anesthesia guidelines  Tylenol is ok to take as needed  You will receive a call one business day prior to surgery with an arrival time and hospital directions  If your surgery is scheduled on a Monday, the hospital will be calling you on the Friday prior to your surgery  If you have not heard from anyone by 8pm, please call the hospital supervisor through the hospital  at [phone]  Gale Mcfarland [phone])  Do not eat or drink anything after midnight the night before your surgery, including candy, mints, lifesavers, or chewing gum  Do not drink alcohol 24hrs before your surgery  Try not to smoke at least 24hrs before your surgery  Pt states ASA is taken for prophylactic reasons only- instructed to stop from now until after surgery     Follow the pre surgery showering instructions as listed in the [facility] Surgical Experience Booklet or otherwise provided by your surgeon's office  Do not shave the surgical area 24 hours before surgery  Do not apply any lotions, creams, including makeup, cologne, deodorant, or perfumes after showering on the day of your surgery  No contact lenses, eye make-up, or artificial eyelashes  Remove nail polish, including gel polish, and any artificial, gel, or acrylic nails if possible   Remove all jewelry including rings and body piercing jewelry  Wear causal clothing that is easy to take on and off  Consider your type of surgery  Keep any valuables, jewelry, piercings at home  Please bring any specially ordered equipment (sling, braces) if indicated  Arrange for a responsible person to drive you to and from the hospital on the day of your surgery  Visitor Guidelines discussed  Call the surgeon's office with any new illnesses, exposures, or additional questions prior to surgery  Please reference your [facility] Surgical Experience Booklet for additional information to prepare for your upcoming surgery

## 2023-05-01 ENCOUNTER — ANESTHESIA EVENT (OUTPATIENT)
Dept: PERIOP | Facility: HOSPITAL | Age: 63
End: 2023-05-01

## 2023-05-01 NOTE — ANESTHESIA PREPROCEDURE EVALUATION
Procedure:  ESOPHAGOGASTRODUODENOSCOPY (EGD) (Abdomen)  lap paraesophageal hernia repair & EGD w/ intraoperative TIF (Abdomen)    Relevant Problems   CARDIO   (+) Paroxysmal atrial fibrillation (HCC)      GI/HEPATIC   (+) GERD (gastroesophageal reflux disease)      HEMATOLOGY   (+) Coagulation disorder (HCC)      Digestive   (+) Choudhary's esophagus without dysplasia     Hx of multiple anesthetics in the past including VATS for pleurodesis for L pleural effusion, multiple EGDs and ERCPs  Echo 6/2022:   Jes Gather: Left ventricular cavity size is normal  Wall thickness is mildly increased  There is concentric remodeling  The left ventricular ejection fraction is 60%  Systolic function is normal  Wall motion is normal  Diastolic function is normal     Right Ventricle: Right ventricular cavity size is top normal in size  Systolic function is normal     Left Atrium: The atrium is mildly dilated    Right Atrium: The atrium is mildly dilated    Mitral Valve: There is mild regurgitation    Tricuspid Valve:  There is mild regurgitation      Lab Results   Component Value Date    WBC 8 52 04/11/2023    HGB 15 9 04/11/2023    HCT 45 8 04/11/2023    MCV 88 04/11/2023     04/11/2023     Lab Results   Component Value Date    SODIUM 137 04/11/2023    K 4 3 04/11/2023     04/11/2023    CO2 28 04/11/2023    BUN 19 04/11/2023    CREATININE 1 16 04/11/2023    GLUC 68 04/11/2023    CALCIUM 9 6 04/11/2023     Lab Results   Component Value Date    INR 0 91 04/11/2023    INR 2 84 (H) 04/13/2021    INR 1 74 (H) 03/30/2021    PROTIME 12 4 04/11/2023    PROTIME 29 6 (H) 04/13/2021    PROTIME 20 3 (H) 03/30/2021     Lab Results   Component Value Date    HGBA1C 5 7 01/18/2019          Physical Exam    Airway    Mallampati score: II  TM Distance: >3 FB  Neck ROM: full     Dental   No notable dental hx     Cardiovascular      Pulmonary      Other Findings        Anesthesia Plan  ASA Score- 3     Anesthesia Type- general with ASA Monitors  Additional Monitors:   Airway Plan: ETT  Plan Factors-Exercise tolerance (METS): >4 METS  Chart reviewed  EKG reviewed  Existing labs reviewed  Patient summary reviewed  Patient is not a current smoker  Obstructive sleep apnea risk education given perioperatively  Induction- intravenous and rapid sequence induction  Postoperative Plan- Plan for postoperative opioid use  Planned trial extubation    Informed Consent- Anesthetic plan and risks discussed with patient  I personally reviewed this patient with the CRNA  Discussed and agreed on the Anesthesia Plan with the CRNA  Laverne Medina

## 2023-05-02 ENCOUNTER — APPOINTMENT (OUTPATIENT)
Dept: PERIOP | Facility: HOSPITAL | Age: 63
End: 2023-05-02
Attending: INTERNAL MEDICINE

## 2023-05-02 ENCOUNTER — HOSPITAL ENCOUNTER (OUTPATIENT)
Facility: HOSPITAL | Age: 63
Setting detail: OUTPATIENT SURGERY
Discharge: HOME | End: 2023-05-04
Attending: SURGERY | Admitting: SURGERY

## 2023-05-02 ENCOUNTER — ANESTHESIA (OUTPATIENT)
Dept: PERIOP | Facility: HOSPITAL | Age: 63
End: 2023-05-02

## 2023-05-02 VITALS
BODY MASS INDEX: 24.36 KG/M2 | WEIGHT: 200 LBS | RESPIRATION RATE: 18 BRPM | DIASTOLIC BLOOD PRESSURE: 67 MMHG | SYSTOLIC BLOOD PRESSURE: 123 MMHG | OXYGEN SATURATION: 96 % | HEART RATE: 74 BPM | TEMPERATURE: 96.8 F | HEIGHT: 76 IN

## 2023-05-02 DIAGNOSIS — Z98.890 S/P REPAIR OF PARAESOPHAGEAL HERNIA: Primary | ICD-10-CM

## 2023-05-02 DIAGNOSIS — K21.00 GASTROESOPHAGEAL REFLUX DISEASE WITH ESOPHAGITIS WITHOUT HEMORRHAGE: ICD-10-CM

## 2023-05-02 DIAGNOSIS — Z87.19 S/P REPAIR OF PARAESOPHAGEAL HERNIA: Primary | ICD-10-CM

## 2023-05-02 DEVICE — SINGLE KIT CONSISTS OF 1 EACH R2007 ESOPHYX Z+ FASTENER DELIVERY DEVICE AND 1 EACH R2375 SEROSAFUSE IMPLANTABLE FASTENER CARTRIDGE
Type: IMPLANTABLE DEVICE | Site: STOMACH | Status: FUNCTIONAL
Brand: SEROSAFUSE® IMPLANTABLE FASTENER KIT

## 2023-05-02 RX ORDER — DEXAMETHASONE SODIUM PHOSPHATE 10 MG/ML
INJECTION, SOLUTION INTRAMUSCULAR; INTRAVENOUS AS NEEDED
Status: DISCONTINUED | OUTPATIENT
Start: 2023-05-02 | End: 2023-05-02

## 2023-05-02 RX ORDER — SODIUM CHLORIDE, SODIUM LACTATE, POTASSIUM CHLORIDE, CALCIUM CHLORIDE 600; 310; 30; 20 MG/100ML; MG/100ML; MG/100ML; MG/100ML
125 INJECTION, SOLUTION INTRAVENOUS CONTINUOUS
Status: DISCONTINUED | OUTPATIENT
Start: 2023-05-02 | End: 2023-05-02

## 2023-05-02 RX ORDER — HYDROMORPHONE HCL/PF 1 MG/ML
0.5 SYRINGE (ML) INJECTION
Status: DISCONTINUED | OUTPATIENT
Start: 2023-05-02 | End: 2023-05-02 | Stop reason: HOSPADM

## 2023-05-02 RX ORDER — BUPIVACAINE HYDROCHLORIDE 2.5 MG/ML
INJECTION, SOLUTION EPIDURAL; INFILTRATION; INTRACAUDAL AS NEEDED
Status: DISCONTINUED | OUTPATIENT
Start: 2023-05-02 | End: 2023-05-02 | Stop reason: HOSPADM

## 2023-05-02 RX ORDER — CEFAZOLIN SODIUM 2 G/50ML
2000 SOLUTION INTRAVENOUS ONCE
Status: DISCONTINUED | OUTPATIENT
Start: 2023-05-02 | End: 2023-05-02

## 2023-05-02 RX ORDER — SODIUM CHLORIDE, SODIUM GLUCONATE, SODIUM ACETATE, POTASSIUM CHLORIDE, MAGNESIUM CHLORIDE, SODIUM PHOSPHATE, DIBASIC, AND POTASSIUM PHOSPHATE .53; .5; .37; .037; .03; .012; .00082 G/100ML; G/100ML; G/100ML; G/100ML; G/100ML; G/100ML; G/100ML
125 INJECTION, SOLUTION INTRAVENOUS CONTINUOUS
Status: DISCONTINUED | OUTPATIENT
Start: 2023-05-02 | End: 2023-05-03

## 2023-05-02 RX ORDER — SUCRALFATE 1 G/1
1 TABLET ORAL
Status: DISCONTINUED | OUTPATIENT
Start: 2023-05-02 | End: 2023-05-04 | Stop reason: HOSPADM

## 2023-05-02 RX ORDER — SODIUM CHLORIDE, SODIUM LACTATE, POTASSIUM CHLORIDE, CALCIUM CHLORIDE 600; 310; 30; 20 MG/100ML; MG/100ML; MG/100ML; MG/100ML
INJECTION, SOLUTION INTRAVENOUS CONTINUOUS PRN
Status: DISCONTINUED | OUTPATIENT
Start: 2023-05-02 | End: 2023-05-02

## 2023-05-02 RX ORDER — PHENYLEPHRINE HCL IN 0.9% NACL 1 MG/10 ML
SYRINGE (ML) INTRAVENOUS AS NEEDED
Status: DISCONTINUED | OUTPATIENT
Start: 2023-05-02 | End: 2023-05-02

## 2023-05-02 RX ORDER — PROMETHAZINE HYDROCHLORIDE 25 MG/ML
25 INJECTION, SOLUTION INTRAMUSCULAR; INTRAVENOUS EVERY 6 HOURS PRN
Status: DISCONTINUED | OUTPATIENT
Start: 2023-05-02 | End: 2023-05-04 | Stop reason: HOSPADM

## 2023-05-02 RX ORDER — ACETAMINOPHEN 325 MG/1
975 TABLET ORAL ONCE
Status: COMPLETED | OUTPATIENT
Start: 2023-05-02 | End: 2023-05-02

## 2023-05-02 RX ORDER — SUCCINYLCHOLINE/SOD CL,ISO/PF 100 MG/5ML
SYRINGE (ML) INTRAVENOUS AS NEEDED
Status: DISCONTINUED | OUTPATIENT
Start: 2023-05-02 | End: 2023-05-02

## 2023-05-02 RX ORDER — ROCURONIUM BROMIDE 10 MG/ML
INJECTION, SOLUTION INTRAVENOUS AS NEEDED
Status: DISCONTINUED | OUTPATIENT
Start: 2023-05-02 | End: 2023-05-02

## 2023-05-02 RX ORDER — ONDANSETRON 2 MG/ML
4 INJECTION INTRAMUSCULAR; INTRAVENOUS EVERY 6 HOURS PRN
Status: DISCONTINUED | OUTPATIENT
Start: 2023-05-02 | End: 2023-05-02

## 2023-05-02 RX ORDER — ONDANSETRON 2 MG/ML
4 INJECTION INTRAMUSCULAR; INTRAVENOUS EVERY 6 HOURS
Status: DISCONTINUED | OUTPATIENT
Start: 2023-05-02 | End: 2023-05-04 | Stop reason: HOSPADM

## 2023-05-02 RX ORDER — PROPOFOL 10 MG/ML
INJECTION, EMULSION INTRAVENOUS AS NEEDED
Status: DISCONTINUED | OUTPATIENT
Start: 2023-05-02 | End: 2023-05-02

## 2023-05-02 RX ORDER — LIDOCAINE HYDROCHLORIDE 20 MG/ML
INJECTION, SOLUTION EPIDURAL; INFILTRATION; INTRACAUDAL; PERINEURAL AS NEEDED
Status: DISCONTINUED | OUTPATIENT
Start: 2023-05-02 | End: 2023-05-02

## 2023-05-02 RX ORDER — METHOCARBAMOL 750 MG/1
750 TABLET, FILM COATED ORAL EVERY 6 HOURS SCHEDULED
Status: DISCONTINUED | OUTPATIENT
Start: 2023-05-02 | End: 2023-05-04 | Stop reason: HOSPADM

## 2023-05-02 RX ORDER — CEFAZOLIN SODIUM 1 G/3ML
INJECTION, POWDER, FOR SOLUTION INTRAMUSCULAR; INTRAVENOUS AS NEEDED
Status: DISCONTINUED | OUTPATIENT
Start: 2023-05-02 | End: 2023-05-02

## 2023-05-02 RX ORDER — ENOXAPARIN SODIUM 100 MG/ML
40 INJECTION SUBCUTANEOUS
Status: DISCONTINUED | OUTPATIENT
Start: 2023-05-02 | End: 2023-05-04 | Stop reason: HOSPADM

## 2023-05-02 RX ORDER — FENTANYL CITRATE/PF 50 MCG/ML
25 SYRINGE (ML) INJECTION
Status: DISCONTINUED | OUTPATIENT
Start: 2023-05-02 | End: 2023-05-02 | Stop reason: HOSPADM

## 2023-05-02 RX ORDER — FENTANYL CITRATE 50 UG/ML
INJECTION, SOLUTION INTRAMUSCULAR; INTRAVENOUS AS NEEDED
Status: DISCONTINUED | OUTPATIENT
Start: 2023-05-02 | End: 2023-05-02

## 2023-05-02 RX ORDER — MIDAZOLAM HYDROCHLORIDE 2 MG/2ML
INJECTION, SOLUTION INTRAMUSCULAR; INTRAVENOUS AS NEEDED
Status: DISCONTINUED | OUTPATIENT
Start: 2023-05-02 | End: 2023-05-02

## 2023-05-02 RX ORDER — ACETAMINOPHEN 325 MG/1
975 TABLET ORAL EVERY 6 HOURS SCHEDULED
Status: DISCONTINUED | OUTPATIENT
Start: 2023-05-02 | End: 2023-05-04 | Stop reason: HOSPADM

## 2023-05-02 RX ORDER — OXYCODONE HYDROCHLORIDE 5 MG/1
5 TABLET ORAL EVERY 4 HOURS PRN
Status: DISCONTINUED | OUTPATIENT
Start: 2023-05-02 | End: 2023-05-04 | Stop reason: HOSPADM

## 2023-05-02 RX ORDER — ONDANSETRON 2 MG/ML
INJECTION INTRAMUSCULAR; INTRAVENOUS AS NEEDED
Status: DISCONTINUED | OUTPATIENT
Start: 2023-05-02 | End: 2023-05-02

## 2023-05-02 RX ORDER — MAGNESIUM HYDROXIDE 1200 MG/15ML
LIQUID ORAL AS NEEDED
Status: DISCONTINUED | OUTPATIENT
Start: 2023-05-02 | End: 2023-05-02 | Stop reason: HOSPADM

## 2023-05-02 RX ORDER — EPHEDRINE SULFATE 50 MG/ML
INJECTION INTRAVENOUS AS NEEDED
Status: DISCONTINUED | OUTPATIENT
Start: 2023-05-02 | End: 2023-05-02

## 2023-05-02 RX ORDER — ONDANSETRON 2 MG/ML
4 INJECTION INTRAMUSCULAR; INTRAVENOUS ONCE AS NEEDED
Status: DISCONTINUED | OUTPATIENT
Start: 2023-05-02 | End: 2023-05-02 | Stop reason: HOSPADM

## 2023-05-02 RX ORDER — OXYCODONE HYDROCHLORIDE 5 MG/1
10 TABLET ORAL EVERY 4 HOURS PRN
Status: DISCONTINUED | OUTPATIENT
Start: 2023-05-02 | End: 2023-05-04 | Stop reason: HOSPADM

## 2023-05-02 RX ORDER — PANTOPRAZOLE SODIUM 40 MG/1
40 TABLET, DELAYED RELEASE ORAL
Status: DISCONTINUED | OUTPATIENT
Start: 2023-05-02 | End: 2023-05-04 | Stop reason: HOSPADM

## 2023-05-02 RX ORDER — MINERAL OIL
OIL (ML) MISCELLANEOUS AS NEEDED
Status: DISCONTINUED | OUTPATIENT
Start: 2023-05-02 | End: 2023-05-02 | Stop reason: HOSPADM

## 2023-05-02 RX ADMIN — Medication 200 MCG: at 08:13

## 2023-05-02 RX ADMIN — Medication 200 MCG: at 08:16

## 2023-05-02 RX ADMIN — FENTANYL CITRATE 50 MCG: 50 INJECTION INTRAMUSCULAR; INTRAVENOUS at 08:00

## 2023-05-02 RX ADMIN — EPHEDRINE SULFATE 10 MG: 50 INJECTION INTRAVENOUS at 08:40

## 2023-05-02 RX ADMIN — ROCURONIUM BROMIDE 10 MG: 10 INJECTION, SOLUTION INTRAVENOUS at 09:51

## 2023-05-02 RX ADMIN — ROCURONIUM BROMIDE 50 MG: 10 INJECTION, SOLUTION INTRAVENOUS at 08:15

## 2023-05-02 RX ADMIN — ROCURONIUM BROMIDE 10 MG: 10 INJECTION, SOLUTION INTRAVENOUS at 10:40

## 2023-05-02 RX ADMIN — CEFAZOLIN 2000 MG: 1 INJECTION, POWDER, FOR SOLUTION INTRAMUSCULAR; INTRAVENOUS at 08:28

## 2023-05-02 RX ADMIN — ROCURONIUM BROMIDE 20 MG: 10 INJECTION, SOLUTION INTRAVENOUS at 09:28

## 2023-05-02 RX ADMIN — LIDOCAINE HYDROCHLORIDE 100 MG: 20 INJECTION, SOLUTION EPIDURAL; INFILTRATION; INTRACAUDAL; PERINEURAL at 08:00

## 2023-05-02 RX ADMIN — EPHEDRINE SULFATE 10 MG: 50 INJECTION INTRAVENOUS at 08:24

## 2023-05-02 RX ADMIN — FENTANYL CITRATE 25 MCG: 50 INJECTION INTRAMUSCULAR; INTRAVENOUS at 10:53

## 2023-05-02 RX ADMIN — Medication 100 MCG: at 10:38

## 2023-05-02 RX ADMIN — ACETAMINOPHEN 975 MG: 325 TABLET ORAL at 06:40

## 2023-05-02 RX ADMIN — ENOXAPARIN SODIUM 40 MG: 40 INJECTION SUBCUTANEOUS at 13:37

## 2023-05-02 RX ADMIN — METHOCARBAMOL TABLETS 750 MG: 750 TABLET, COATED ORAL at 16:17

## 2023-05-02 RX ADMIN — Medication 100 MCG: at 11:08

## 2023-05-02 RX ADMIN — FENTANYL CITRATE 25 MCG: 50 INJECTION INTRAMUSCULAR; INTRAVENOUS at 08:45

## 2023-05-02 RX ADMIN — ONDANSETRON 4 MG: 2 INJECTION INTRAMUSCULAR; INTRAVENOUS at 11:05

## 2023-05-02 RX ADMIN — FENTANYL CITRATE 25 MCG: 50 INJECTION INTRAMUSCULAR; INTRAVENOUS at 08:49

## 2023-05-02 RX ADMIN — SUCRALFATE 1 G: 1 TABLET ORAL at 13:29

## 2023-05-02 RX ADMIN — MIDAZOLAM 2 MG: 1 INJECTION INTRAMUSCULAR; INTRAVENOUS at 07:53

## 2023-05-02 RX ADMIN — SODIUM CHLORIDE, SODIUM LACTATE, POTASSIUM CHLORIDE, AND CALCIUM CHLORIDE: .6; .31; .03; .02 INJECTION, SOLUTION INTRAVENOUS at 08:06

## 2023-05-02 RX ADMIN — PROMETHAZINE HYDROCHLORIDE 25 MG: 25 INJECTION INTRAMUSCULAR; INTRAVENOUS at 17:36

## 2023-05-02 RX ADMIN — Medication 100 MCG: at 08:39

## 2023-05-02 RX ADMIN — ROCURONIUM BROMIDE 10 MG: 10 INJECTION, SOLUTION INTRAVENOUS at 10:32

## 2023-05-02 RX ADMIN — SODIUM CHLORIDE, SODIUM GLUCONATE, SODIUM ACETATE, POTASSIUM CHLORIDE, MAGNESIUM CHLORIDE, SODIUM PHOSPHATE, DIBASIC, AND POTASSIUM PHOSPHATE 125 ML/HR: .53; .5; .37; .037; .03; .012; .00082 INJECTION, SOLUTION INTRAVENOUS at 13:42

## 2023-05-02 RX ADMIN — MORPHINE SULFATE 2 MG: 2 INJECTION, SOLUTION INTRAMUSCULAR; INTRAVENOUS at 16:18

## 2023-05-02 RX ADMIN — SUCRALFATE 1 G: 1 TABLET ORAL at 21:08

## 2023-05-02 RX ADMIN — ROCURONIUM BROMIDE 20 MG: 10 INJECTION, SOLUTION INTRAVENOUS at 10:10

## 2023-05-02 RX ADMIN — SODIUM CHLORIDE, SODIUM GLUCONATE, SODIUM ACETATE, POTASSIUM CHLORIDE, MAGNESIUM CHLORIDE, SODIUM PHOSPHATE, DIBASIC, AND POTASSIUM PHOSPHATE 125 ML/HR: .53; .5; .37; .037; .03; .012; .00082 INJECTION, SOLUTION INTRAVENOUS at 21:58

## 2023-05-02 RX ADMIN — OXYCODONE HYDROCHLORIDE 5 MG: 5 TABLET ORAL at 13:29

## 2023-05-02 RX ADMIN — EPHEDRINE SULFATE 10 MG: 50 INJECTION INTRAVENOUS at 09:39

## 2023-05-02 RX ADMIN — EPHEDRINE SULFATE 10 MG: 50 INJECTION INTRAVENOUS at 08:34

## 2023-05-02 RX ADMIN — ONDANSETRON 4 MG: 2 INJECTION INTRAMUSCULAR; INTRAVENOUS at 16:18

## 2023-05-02 RX ADMIN — SUGAMMADEX 200 MG: 100 INJECTION, SOLUTION INTRAVENOUS at 11:32

## 2023-05-02 RX ADMIN — ACETAMINOPHEN 975 MG: 325 TABLET ORAL at 20:40

## 2023-05-02 RX ADMIN — PROPOFOL 200 MG: 10 INJECTION, EMULSION INTRAVENOUS at 08:00

## 2023-05-02 RX ADMIN — ROCURONIUM BROMIDE 20 MG: 10 INJECTION, SOLUTION INTRAVENOUS at 09:38

## 2023-05-02 RX ADMIN — SODIUM CHLORIDE, SODIUM LACTATE, POTASSIUM CHLORIDE, AND CALCIUM CHLORIDE: .6; .31; .03; .02 INJECTION, SOLUTION INTRAVENOUS at 08:03

## 2023-05-02 RX ADMIN — ONDANSETRON 4 MG: 2 INJECTION INTRAMUSCULAR; INTRAVENOUS at 21:08

## 2023-05-02 RX ADMIN — Medication 100 MG: at 08:01

## 2023-05-02 RX ADMIN — ACETAMINOPHEN 975 MG: 325 TABLET ORAL at 13:29

## 2023-05-02 RX ADMIN — OXYCODONE HYDROCHLORIDE 10 MG: 5 TABLET ORAL at 20:39

## 2023-05-02 RX ADMIN — PANTOPRAZOLE SODIUM 40 MG: 40 TABLET, DELAYED RELEASE ORAL at 13:29

## 2023-05-02 RX ADMIN — DEXAMETHASONE SODIUM PHOSPHATE 10 MG: 10 INJECTION, SOLUTION INTRAMUSCULAR; INTRAVENOUS at 08:01

## 2023-05-02 NOTE — ANESTHESIA PREPROCEDURE EVALUATION
Procedure:  ESOPHAGOGASTRODUODENOSCOPY (EGD) (Abdomen)  lap paraesophageal hernia repair & EGD w/ intraoperative TIF (Abdomen)    Relevant Problems   No relevant active problems

## 2023-05-02 NOTE — DISCHARGE INSTR - AVS FIRST PAGE
Post-Operative Care Instructions             Dr Gianna MCNAIR     1  General: Amanda Correa will feel pulling sensations around the wound and/or aches and pains around the incisions  This is normal  Even minor surgery is a change in your body and this is your bodys way of reaction to it  If you have had abdominal surgery, it may help to support the incision with a small pillow or blanket for comfort when moving or coughing  2  Wound care:    Bandage/Dressing - Make sure to remove the bandage in about 48 hours, unless instructed otherwise  You usually don't have to redress the wound after 24-48 hours, unless for comfort  Keep the incision clean and dry  Let air get to it  If the Steri-Strips fall off, just keep the wound clean  Glue - Leave glue alone, it will fall off on its own, no need for an additional dressings    3  Water: You may shower over the wound, unless there are drain tubes left in place  Do not bathe or use a pool or hot tub until cleared by the physician  You may shower right over the staples, glue or Steri-Strips and rinse wound with soapy water but do not scrub incision pat dry when you are done  4  Activity: You may go up and down stairs, walk as much as you are comfortable, but walk at least 3 times each day  If you have had abdominal or hernia surgery, do not lift anything heavier than 15 pounds for at least 4 weeks  5  Diet: You may resume a regular diet  If you had a same-day surgery or overnight stay surgery, you may wish to eat lightly for a few days: soups, crackers, and sandwiches  You may resume a regular diet when ready  6  Medications: Resume all of your previous medications, unless told otherwise by the doctor  Tylenol and ibuoprofen is always fine, unless you are taking any narcotic pain medication containing Tylenol (such as Percocet, Darvocet, Vicodin, or anything containing acetaminophen)  Do not take Tylenol if you're taking these medications   You do not need to take the narcotic pain medications unless you are having significant pain and discomfort  7  Driving: He will need someone to drive you home on the day of surgery  Do not drive or make any important decisions while on narcotic pain medication or 24 hours and after anesthesia or sedation for surgery  Generally, you may drive when your off all narcotic pain medications, and you can turn in your seat comfortably to check your blind spot  8  Upset Stomach: You may take Maalox, Tums, or similar items for an upset stomach  If your narcotic pain medication causes an upset stomach, do not take it on an empty stomach  Try taking it with at least some crackers or toast      9  Constipation: Patients often experienced constipation after surgery  You may take over-the-counter medication for this, such as Metamucil, Senokot, Dulcolax, milk of magnesia, etc  You may take a suppository unless you have had anorectal surgery such as a procedure on your hemorrhoids  If you experience significant nausea or vomiting after abdominal surgery, call the office before trying any of these medications  10  Call the office: If you are experiencing any of the following, fevers above 101 5°, significant nausea or vomiting, if the wound develops drainage and/or is excessive redness around the wound, or if you have significant diarrhea or other worsening symptoms  11  Pain: You may be given a prescription for pain  This will be given to the hospital, the day of surgery  12  Sexual Activity: You may resume sexual activity when you feel ready and comfortable and your incision is sealed and healed without apparent infection risk  [facility]  Phone: [phone]        Please continue a full liquid diet until 5/7/2023, at which point you may commence the diet as below  Post-op Foregut Diet    · Foods should be very soft consistency; semi solid or fork tender      · Chew foods very well before swallowing; allow 20-30 minutes per meal     · Eat every 3-4 hours, have 3 small meals and 2-3 small snacks    · Follow this diet for at least 3 weeks after surgery or until otherwise directed by your surgeon  Acceptable Foods     · Eggs or egg beaters  · [place] yogurt  · Cottage cheese  · Cheese sticks or shredded cheese  · Soft fish (salmon, white fish, tuna)  · Beans, lentils, or lentil soup  · Tuna or egg salad  · Refried beans  · Protein shakes  · Loosely cooked/crumbled ground turkey, chicken, or beef  · Thinned oatmeal or cream of wheat  · Over cooked, soft/mushy vegetables  · Fruits canned in natural juices  · Pureed food  · Sweet potatoes, well cooked, no skin  · Winter squash, well cooked, no skin  · Applesauce  · Tomato, carrot sulaiman or butternut squash soup ·       Foods to Avoid  Solid/Tough meats: chicken, steak, hamburgers, hot dogs, pork chops, meatloaf, etc    · Bread, rice, pasta, macaroni and cheese   · High-fat foods   · High-sugar foods   · Raw vegetables   · Junk food   · Crunchy foods (chips, popcorn, nuts, pumpkin and sunflower seeds)      Patient Instructions  Before and After  Your TIF Procedure  This brochure is intended to give you a general  overview of the TIF® Procedure instructions  ALWAYS follow your doctors pre-procedure  and post-procedure instructions  Pre-Procedure Instructions  Make sure you inform your doctor about all medications  you are currently taking and provide a full  history of your medical conditions  DO NOT take any diet aids or herbal supplements  that contain ginkgo, garlic, or [place]  Alfredos  Wort for 10 days prior to surgery  DO NOT take any aspirin, blood thinners, anti-inflammatory  (arthritis) medications, vitamin E or  fish oil for 7 days prior to your procedure  Your doctor will give you additional instructions  regarding medications you are currently taking  DO NOT smoke or drink alcohol for 48 hours  prior to your procedure    DO NOT eat or drink anything for at least  12 hours before your procedure (or longer if  instructed by your doctor)  The TIF procedure  cannot be performed if there is food in your  stomach  Take the medications your doctor  has approved for you to take with a small  amount of water  Post-Procedure Instructions  Your doctor will determine whether it is necessary  for you to spend the night in the hospital after  your procedure  For the first few days, you will  experience some pain and/or discomfort in your  chest and shoulder and you may have a sore  throat, and/or some discomfort swallowing  These  symptoms should resolve within the first week after  your procedure and appropriate medication will be  provided as needed  If symptoms do not resolve  or if discomfort becomes more severe, notify your  doctor immediately  Continue to take your GERD medication after your  procedure as recommended by your doctor  Occasional heartburn is normal in healthy people  and may depend on diet and other factors such  as stress  If GERD symptoms recur, you should  contact your doctor  Even though the TIF® Procedure is incisionless, it is  still surgery  Like any surgical procedure, success  is dependent on how well you adhere to postprocedure  instructions including:  Dietary guidelines  Physical activity, driving  Medications  Return to work  Follow-up  Dietary Guidelines  The strength of your new antireflux valve is largely  determined by how well it heals  What you eat and  drink can dramatically impact the durability of your  antireflux valve  You will be asked to follow a liquid  diet followed by a mashed and soft food diet as  your newly reconstructed valve heals  If you experience heartburn, write down the food  that gave you heartburn and avoid eating it  Talk  to your doctor at your next visit about your foodassociated  symptoms   Remember, its normal for  some people (non-GERD patients) to occasionally  experience heartburn from specific foods, and this  may mean that your valve is functioning correctly  If your symptoms persist, contact your doctor  immediately  During the 6-week post-procedure period, its  important that you adhere to the following  guidelines:  Eat 4 to 5 small meals consisting of soft  foods throughout the day  Take small bites and chew your food  thoroughly for 30 seconds to avoid  swallowing a large bolus of food  Avoid foods with coarse texture: nuts, raw  fruits, and raw vegetables  Try not to vomit, cough, retch or strain  This  can significantly affect the healing and  effectiveness of your new antireflux valve  During the healing process, avoid foods and  drinks that triggered your reflux in the past   You may reintroduce them slowly after  healing  To avoid chest pain take small bites, chew for  30 seconds and gradually thicken the texture  of your food  Remain in an upright position for 1 hour  after eating  Do not eat for at least 2 hours  before bedtime  Do not drink carbonated beverages  or alcohol  Avoid spicy foods  Avoid foods and drinks that are very hot  or very cold  Follow your doctors instructions to wean  yourself off antireflux medications  Do not smoke  Avoid gas-forming, acid producing foods,  or foods that slow gastric emptying such as  tomato-based products, peppermint, black  pepper, caffeinated drinks, alcohol, onions,  green peppers, fatty foods, beans, spicy  foods, citrus fruits, and ?sharmila supplements  Taking over the counter anti-gas medications  may be helpful  The fi rst 2 weeks after your procedure are  extremely important  Thats why we ask you to be  particularly cautious with your diet  You will stay on clear liquids for the fi rst 1-3 days  after your TIF procedure  This diet contains only  fluids that are clear and very low in sugar  However,  it is not nutritionally balanced and will only be used  a few days    Avoid beverages with alcohol, caffeine,  carbonation (soft drinks), or acidic drinks (tomato,  grapefruit, and orange juice)  Foods that are allowed:  Water, plain or lightly flavored  (non-carbonated)  Milk, decaffeinated tea, caffeine-free  drinks  Diet decaffeinated drinks  (non-carbonated)  Diluted electrolyte drinks  Strained soups  Diluted, light, or diet apple or white  grape juice  Non-acidic fruit or vegetable juices  (without chunks)  Liquid puddings and creams  Sherbets or Ice-creams (without chunks)  Milkshakes  Baby food  Weeks 1 and 2: Liquid Diet  Be sure to drink a minimum of 4-8 oz of  water between each meal   Do not take large gulps  Sip clear liquids  and rest between sips  Allow 20 minutes to  drink ½ cup  You may sip on fluids all day if  you wish but at least 6-8 times per day  Take your prescribed medications  PPIs  should be taken for at least 2 weeks  and wean off according to physician  recommendation  If pills/capsules are  larger than a peanut, discuss with your  pharmacist if they can be halved, crushed  or if there are liquid options available to  minimize hard swallowing  Take vitamin/mineral supplements every  day being mindful of pill/capsule size as  noted above  This will help prevent vitamin  and mineral defi ciencies  Its helpful to eat a very low fat diet to  minimize heartburn symptoms  Restaurant foods are not recommended  during the fi rst few weeks  The following protein supplements can be  used starting on day 4:  Protein-enriched commercially available  shakes  You can also add one scoop of  concentrated protein powder to your  bowl of soup or glass of juice  During week 2, a liquid diet is still  recommended but you may add very liquid,  potato-based and non-stringy vegetable  mashes  This diet consists of high-protein full liquids and  blended solids  The portions should remain small  and not exceed ½ cup to help prevent vomiting and  proper healing of your newly reconstructed valve  Your meals will be only liquid or blended  They may  include milk, vegetable or diluted fruit juice  Sip  liquid meals very slowly  Drink 4 oz (½ cup), over  20-30 minutes  Eat 4-6 small meals each day  The amount you  will be able to eat at one time is very small and  should not exceed ½ cup  Eat foods high in protein  because they help your body heal from surgery  Tips on how to blend foods:  Cut foods into small pieces  Place food into  or   Add liquid such as broth, juice, or milk  Blend or puree until smooth  Strain foods that do not blend in a  completely smooth consistency  Season foods to taste  ? Water, plain and lightly flavored  (non-carbonated)  ? Milk, decaffeinated tea,  caffeine-free drinks  ? Diet decaffeinated drinks  (non-carbonated)  ? Electrolyte drinks  ? Apple juice or white grape juice  ? Non-acidic fruit or vegetable juices  (without chunks)  ? Strained soups  ? Liquid puddings and creams  ? Sherbets and ice-creams  (without chunks)  ? Milk-shakes  ? Baby food  Recipes  Protein Fortifi ed Breakfast Drink  ½ packet [employer] Breakfast  ½ scoop of whey protein powder  4 oz skim milk  Amount per serving (½ cup): 120 calories,  15 g protein  PB Protein Pudding  1 packet sugar-free pudding  ¼ cup dry milk  ¼ cup peanut butter  2 cups skim milk  Amount per serving (¼ cup): 100 calories,  6 g protein  Eggnog  ½ cup skim milk  ½ package [employer] Instant Breakfast  ¼ cup liquid egg substitute  Amount per serving (½ cup): 110 calories,  13 g protein  Shopping list weeks 1-2  Post Procedure Day  0-3  Day  4-14  Weeks  3-4  Weeks  5-6  Clear liquids,  low in sugar  Water (non-carbonated) ? ? ? ? Milk, decaffeinated tea, caffeine free drinks ? ? ? ? Diet and decaffeinated drinks, diluted electrolyte drinks ? ? ? ? Broth of any kind, strained soups (not tomato based) ? ? ? ? Diluted, light or diet apple or white grape juice ? ? ? ? Non-acidic fruit or vegetable juice (without chunks) ? ? ? ?   Liquid puddings and creams ? ? ? ? Sherbets, ice-creams, milk shakes (without chunks) ? ? ? ? Full liquids  Drinkable yogurt (no chunks) ? ? ? ? Protein-enriched commercially available shakes ? ? ? ?  Very liquid, potato-based mash ? ? ? ? Non-stringy vegetable mash ? ? ? ?  Baby food ? ? ? ? Soft texture,  low fat food  Cottage cheese ? ? ? ? Oatmeal ? ? ? ? Well-cooked & pureed vegetables (mashed potatoes ? ? ? ? Canned fruit (without skins) ? ? ? ? Bananas, melons, berries ? ? ? ? Soft eggs, tofu ? ? ? ? Moist, mashed boneless fi sh ? ? ? ? Well-cooked lean ground food (e g  turkey) ? ? ? ? Medium  texture  food  Small soft noodles ? ? ? ? Non-sticky rice ? ? ? ? Cereals (softened in milk) ? ? ? ? Soft cheeses ? ? ? ? Food Guide  Yes No ? ?  ? Milk, fruit and vegetable juices  ? Tea, coffee  ? Potatoes and/or vegetables to mash  ? Oatmeal  ? Puddings, ice-creams, sherbets  ? Butter and margarine  ? Soups (without chunks)  ? Tofu  ? Well-cooked ground food: slowly  introduce fi bubba ground fi sh  or turkey  ? Pasta (small noodles) and  non-sticky rice  ? Thicker soups or soups with small  pieces of vegetables  ? Sauces  ? Bananas  ? Soft cheeses  ? Vegetables (for cooking, steaming)  Shopping list weeks 3-4  Shopping list weeks 5-6  Weeks 3 and 4: Soft Diet  This diet consists of blended foods with one new  solid food added daily  Portions should be small  and not exceeding 1 cup to help prevent vomiting  and proper healing of your newly reconstructed  valve    Foods that are allowed:  Water, milk, fruit juices and  vegetable juices  Tea and coffee (in small quantities)  Mashed vegetables and/or potatoes  Oatmeal  Puddings, ice-creams, sherbets  Butter and margarine  Soups (without chunks)  Tofu  Slowly introduce well-cooked, fi bubba  ground food such as fish or turkey  Food to avoid:  Raw or undercooked food  Alcoholic and carbonated beverages  Pasta, bread  Cakes, pancakes, waffles, cookies, etc   Chips, [place] fries, popcorn, etc   Pepper and hot sauces  Dry fruits and cereals  High-fat food  Consume vitamin-rich fruit juices each day  Refrain  from acidic fruit juices like orange, lemon or lime  Plum juice and/or apricot juice will help to  avoid constipation  Weeks 5 and 6: Solid Diet  Depending on your tolerance level you  may introduce:  Overcooked pasta (small noodles) and  non-sticky rice  Thicker soups or soups with small pieces  of vegetables  Sauces  Bananas  Soft cheeses  Cooked vegetables  Meatless casseroles  Food to avoid:  Fibrous meats  Fibrous vegetables  Eat seated, in a quiet place, without stress  Chew your food thoroughly  Eat slowly  Avoid consuming large quantities of food  Avoid carbonated beverages or alcohol  If you have a burning sensation after consuming a  particular food, try to avoid it and mention it to your  physician at your next visit  A burning sensation could mean that your newly  reconstructed valve is operating correctly  However,  if your symptoms persist, contact your physician as  soon as possible  At the start of the week 7 you can eat normal food  But try to continue eating small meals  Physical Activity  Walking is permitted and encouraged after your  procedure  Begin to walk short distances, at a slow  pace, and with someone who can assist you in  case you experience any residual weakness due  to anesthesia  Gradually increase the distance and  duration of your walks until you feel you are back  to normal  You may also climb stairs, although do it  slowly for the fi rst few weeks to reduce the risk of  increasing abdominal pressure  In order to give your newly reconstructive valve  time to heal and fuse, do not lift anything over 5  pounds for the fi rst 2 weeks  During weeks 3-6, you  may lift items up to 10 pounds  Beginning in week  7, lift items you normally would    Sports and other intense exercise should be  avoided for the fi rst 6 weeks following your  procedure  Then consult with your doctor to  determine if you are ready to resume your normal  exercise routine  Driving may be resumed 1-2 days after the  procedure  Do not drive if you are taking  prescription pain medication, are experiencing  fatigue, or are feeling sore  Sex may be resumed after 7 days  Medications  Your doctor will determine your need for acidreducing  medications following your procedure  Before leaving the hospital, your physician may  prescribe pain medications  It is important that you  take this medication as prescribed  If your pain is  not well managed, contact your physician  Follow Up  After the procedure, your doctor will see you to  assess the effectiveness of the TIF procedure  Your doctor may also schedule additional follow-up  appointments  Return to Work  Most patients will be able to return to work 3-7 days  after the procedure  You and your doctor should  determine a timetable for returning to work based  on a number of factors including residual fatigue  from general anesthesia, any complications during  the procedure, your overall medical condition, and  your general need for recovery time  If you work in a job that requires  significant physical activity, you  should not resume all your normal  job functions until after your doctor  has cleared you to do so  If you are experiencing any of the  following symptoms after your  procedure, call your doctor immediately  or go to your [facility]  emergency room  Fever greater than 101º F  Increased upper  abdominal pain  Difficulty or pain while  swallowing  Sore throat lasting more than  seven days  Chest pain  Shoulder pain lasting more than  3-7 days  Any condition not improving  or worsening  Follow your doctors instructions to  wean off antireflux medications  Monitoring your progress: Its helpful to chart your progress from before  your TIF procedure and after   Please answer these  questions today and check in again eight weeks  after your procedure and to see whats changed  Rate how you are feelin = best, 3=neutral, 5 = worst  Before your  TIF procedure 0 1 2 3 4 5  How much does  heartburn bother you  on a daily basis? How much does  regurgitation bother you  on a daily basis? Do your reflux symptoms  prevent you from getting  a restful night of sleep? Does your reflux  condition impact your  daily activities? How much does coughing  bother you on a daily  basis? Does your reflux condition  impact your social life? How dependent are you  on medications to control  reflux symptoms? Overall satisfaction with  your health condition  Eight weeks after  TIF procedure 0 1 2 3 4 5  How much does  heartburn bother you  on a daily basis? How much does  regurgitation bother you  on a daily basis? Do your reflux symptoms  prevent you from getting  a restful night of sleep? Does your reflux  condition impact your  daily activities? How much does coughing  bother you on a daily  basis? Does your reflux condition  impact your social life? How dependent are you  on medications to control  reflux symptoms? Overall satisfaction with  your health condition  [address] [place] [address]  Tel: [phone] Fax: [phone]  www  [employer]  ©9260 [place]  All rights reserved  [employer], TIF,  EsophyX and [employer] are registered trademarks of [address] [place] [address] Notice: While clinical studies support the effectiveness of TIF (Transoral  Incisionless Fundoplication) procedure in treating chronic GERD (gastroesophageal  reflux disease), individual results may vary  There are no guarantees of successful outcome  The TIF procedure may not be appropriate for every individual, and it may not be  applicable to your condition  Always ask your doctor about all treatment options, as well  as their risks and benefits   Only your doctor can determine whether the TIF procedure is  appropriate for your situation    [id number]

## 2023-05-02 NOTE — H&P
H&P Exam - General Surgery   Chadwick Prince 60 y o  male MRN: [id number]  Unit/Bed#: OR [place] Encounter: [id number]    Assessment/Plan     Assessment:  76-year-old male with significant GERD and a hiatal hernia  Plan: We will plan for laparoscopic paraesophageal hernia repair with with intraoperative transoral incisionless fundoplication with Dr Yola Cunha of gastroenterology  History of Present Illness     HPI:  Chadwick Prince is a 60 y o  male who presents with medically refractory GERD  With a long history of reflux disease specifically heartburn and reflux at night more so during the day  He is able to tolerate a regular diet and does not have significant dysphagia  His work-up was notable for an upper GI series which showed a 3 cm hiatal hernia with significant reflux to the level of the thoracic inlet  Additionally he was worked up with esophageal manometry, which showed no specific motility disorders  Review of Systems   Constitutional: Negative for appetite change, chills, diaphoresis and fever  HENT: Negative for nosebleeds and trouble swallowing  Eyes: Negative  Respiratory: Negative for cough, shortness of breath and wheezing  Cardiovascular: Negative for chest pain, palpitations and leg swelling  Gastrointestinal: Negative for abdominal distention, abdominal pain, nausea and vomiting  Genitourinary: Negative for difficulty urinating, flank pain and frequency  Musculoskeletal: Negative for arthralgias, joint swelling and myalgias  Skin: Negative for pallor and rash  Neurological: Negative for dizziness, facial asymmetry and speech difficulty  Hematological: Does not bruise/bleed easily  Psychiatric/Behavioral: Negative for agitation and confusion  All other systems reviewed and are negative        Historical Information   Past Medical History:   Diagnosis Date    Atrial fibrillation ([facility] [address] )     Epigastric pain 06/24/2020    Gastroesophageal reflux     GERD (gastroesophageal reflux disease)     Irregular heart beat     Afib no problem since 2020    Pancreatitis     Pleural effusion     Pleural effusion associated with pancreatitis 02/03/2019    Second hand smoke exposure      Past Surgical History:   Procedure Laterality Date    CHOLECYSTECTOMY      COLONOSCOPY N/A 3/21/2016    Procedure: COLONOSCOPY;  Surgeon: Cintia Cornell DO;  [facility]; Service:     ERCP N/A 1/4/2019    Procedure: ENDOSCOPIC RETROGRADE CHOLANGIOPANCREATOGRAPHY (ERCP); Surgeon: Lauri Astudillo MD;  [facility]: [facility]; Service: Gastroenterology    ERCP  08/11/2020    ESOPHAGOGASTRODUODENOSCOPY N/A 2/5/2019    Procedure: ESOPHAGOGASTRODUODENOSCOPY (EGD), with possible nasojejunal Dobhoff tube placement;  Surgeon: Michael Nebwy MD;  [facility]; Service: Gastroenterology    ESOPHAGOGASTRODUODENOSCOPY N/A 4/3/2019    Procedure: ESOPHAGOGASTRODUODENOSCOPY (EGD)- endoscopic necrosectomy;  Surgeon: Lauri Astudillo MD;  [facility]; Service: Gastroenterology    IR IMAGE GUIDED ASPIRATION / DRAINAGE W TUBE  6/17/2019    IR THORACENTESIS  2/7/2019    IR THORACENTESIS  3/29/2019    LINEAR ENDOSCOPIC U/S N/A 3/29/2019    Procedure: LINEAR ENDOSCOPIC U/S cyst gastro;  Surgeon: Lauri Astudillo MD;  [facility]; Service: Gastroenterology    IN [address] INCL FLUOR GDNCE DX W/CELL Rosia Jeans [address] [place] [address] N/A 5/20/2019    Procedure: Love Glaser;  Surgeon: Ganga Dewitt MD;  [facility];  Service: Thoracic    IN EGD TRANSORAL BIOPSY SINGLE/MULTIPLE N/A 3/21/2016    Procedure: ESOPHAGOGASTRODUODENOSCOPY (EGD); Surgeon: Cintia Cornell DO;  [facility]; Service: General    IN ERCP DX COLLECTION SPECIMEN BRUSHING/WASHING N/A 4/26/2019    Procedure: ENDOSCOPIC RETROGRADE CHOLANGIOPANCREATOGRAPHY (ERCP); Surgeon: Lauri Astudillo MD;  [facility];   Service: Gastroenterology    IN ESOPHAGOGASTRODUODENOSCOPY TRANSORAL DIAGNOSTIC N/A 4/9/2019 Procedure: ESOPHAGOGASTRODUODENOSCOPY (EGD) endoscopic necrosectomy;  Surgeon: Jyoti Riggs MD;  [facility]; Service: Gastroenterology    [place] ESOPHAGOGASTRODUODENOSCOPY TRANSORAL DIAGNOSTIC N/A 4/26/2019    Procedure: ESOPHAGOGASTRODUODENOSCOPY (EGD); Surgeon: Jyoti Riggs MD;  [facility]; Service: Gastroenterology    WA RPR 1ST Anabella [place] AGE 5 YRS/> REDUCIBLE Right 1/22/2020    Procedure: REPAIR HERNIA INGUINAL;  Surgeon: Mariama Pat MD;  [facility];  Service: General    [place] THORACOSCOPY W/PARTIAL PULMONARY DECORTICATION Left 5/20/2019    Procedure: DECORTICATION LUNG;  Surgeon: Simona Claire MD;  [facility];  Service: Thoracic    [place] THORACOSCOPY W/PARTIAL PULMONARY DECORTICATION Left 5/20/2019    Procedure: THORACOSCOPY VIDEO ASSISTED SURGERY (VATS); Surgeon: Simona Claire MD;  [facility];  Service: Thoracic    [place] THORACOSCOPY W/PLEURODESIS N/A 5/20/2019    Procedure: PLEURODESIS mechanical;  Surgeon: Simona Claire MD;  [facility];  Service: Thoracic    UPPER GASTROINTESTINAL ENDOSCOPY  09/19/2019     Social History   Social History     Substance and Sexual Activity   Alcohol Use Yes    Comment: Rare wine     Social History     Substance and Sexual Activity   Drug Use No     Social History     Tobacco Use   Smoking Status Never   Smokeless Tobacco Never     E-Cigarette/Vaping    E-Cigarette Use Never User      E-Cigarette/Vaping Substances    Nicotine No     THC No     CBD No      Family History:   Family History   Problem Relation Age of Onset    Lung cancer Mother 79        Smoker     Transient ischemic attack Father 61    Lung disease Neg Hx        Meds/Allergies   PTA meds:   Prior to Admission Medications   Prescriptions Last Dose Informant Patient Reported?  Taking?   acetaminophen (TYLENOL) 325 mg tablet Unknown  Yes No   Sig: Take 650 mg by mouth every 6 (six) hours as needed for mild pain   aspirin 81 mg "chewable tablet 4/30/2023  No Yes   Sig: Chew 1 tablet (81 mg total) daily   omeprazole (PriLOSEC) 40 MG capsule 5/1/2023 at 1200  No Yes   Sig: Take 1 capsule (40 mg total) by mouth daily      Facility-Administered Medications: None     No Known Allergies    Objective   First Vitals:   Blood Pressure: 123/66 (05/02/23 0620)  Pulse: 85 (05/02/23 0620)  Temperature: (!) 97 3 °F (36 3 °C) (05/02/23 0620)  Respirations: 17 (05/02/23 0620)  Height: 6' 4\" (193 cm) (04/26/23 0945)  Weight - Scale: 92 1 kg (203 lb) (04/26/23 0945)  SpO2: 98 % (05/02/23 0620)    Current Vitals:   Blood Pressure: 123/66 (05/02/23 0620)  Pulse: 85 (05/02/23 0620)  Temperature: (!) 97 3 °F (36 3 °C) (05/02/23 0620)  Respirations: 17 (05/02/23 0620)  Height: 6' 4\" (193 cm) (05/02/23 5946)  Weight - Scale: 90 7 kg (200 lb) (05/02/23 0620)  SpO2: 98 % (05/02/23 0620)    No intake or output data in the 24 hours ending 05/02/23 0742    Invasive Devices     None                 Physical Exam  Vitals and nursing note reviewed  Constitutional:       General: He is not in acute distress  Appearance: He is well-developed  HENT:      Head: Normocephalic and atraumatic  Eyes:      Conjunctiva/sclera: Conjunctivae normal    Cardiovascular:      Rate and Rhythm: Normal rate and regular rhythm  Heart sounds: No murmur heard  Pulmonary:      Effort: Pulmonary effort is normal  No respiratory distress  Breath sounds: Normal breath sounds  Abdominal:      Palpations: Abdomen is soft  Tenderness: There is no abdominal tenderness  Musculoskeletal:         General: No swelling  Normal range of motion  Cervical back: Neck supple  Skin:     General: Skin is warm and dry  Capillary Refill: Capillary refill takes less than 2 seconds  Neurological:      General: No focal deficit present  Mental Status: He is alert and oriented to person, place, and time     Psychiatric:         Mood and Affect: Mood normal          Lab " Results: I have personally reviewed pertinent lab results  , CBC: No results found for: WBC, HGB, HCT, MCV, PLT, ADJUSTEDWBC, MCH, MCHC, RDW, MPV, NRBC, CMP: No results found for: SODIUM, K, CL, CO2, ANIONGAP, BUN, CREATININE, GLUCOSE, CALCIUM, AST, ALT, ALKPHOS, PROT, BILITOT, EGFR, Coagulation: No results found for: PT, INR, APTT, Urinalysis: No results found for: Triston Merl, SPECGRAV, PHUR, LEUKOCYTESUR, NITRITE, PROTEINUA, GLUCOSEU, KETONESU, BILIRUBINUR, BLOODU, Amylase: No results found for: AMYLASE, Lipase: No results found for: LIPASE  Imaging: I have personally reviewed pertinent films in PACS  EKG, Pathology, and Other Studies: I have personally reviewed pertinent reports        Code Status: Prior  Advance Directive and Living Will:      Power of :    POLST:

## 2023-05-02 NOTE — ANESTHESIA POSTPROCEDURE EVALUATION
Post-Op Assessment Note    CV Status:  Stable  Pain Score: 0    Pain management: adequate     Mental Status:  Alert and awake   Hydration Status:  Euvolemic   PONV Controlled:  Controlled   Airway Patency:  Patent      Post Op Vitals Reviewed: Yes      Staff: CRNA         There were no known notable events for this encounter      /60 (05/02/23 1145)    Temp (!) 96 8 °F (36 °C) (05/02/23 1145)    Pulse 86 (05/02/23 1145)   Resp   10   SpO2   100

## 2023-05-02 NOTE — PLAN OF CARE
Problem: PAIN - ADULT  Goal: Verbalizes/displays adequate comfort level or baseline comfort level  Description: Interventions:  - Encourage patient to monitor pain and request assistance  - Assess pain using appropriate pain scale  - Administer analgesics based on type and severity of pain and evaluate response  - Implement non-pharmacological measures as appropriate and evaluate response  - Consider cultural and social influences on pain and pain management  - Notify physician/advanced practitioner if interventions unsuccessful or patient reports new pain  Outcome: Progressing     Problem: INFECTION - ADULT  Goal: Absence or prevention of progression during hospitalization  Description: INTERVENTIONS:  - Assess and monitor for signs and symptoms of infection  - Monitor lab/diagnostic results  - Monitor all insertion sites, i e  indwelling lines, tubes, and drains  - Monitor endotracheal if appropriate and nasal secretions for changes in amount and color  - [place] appropriate cooling/warming therapies per order  - Administer medications as ordered  - Instruct and encourage patient and family to use good hand hygiene technique  - Identify and instruct in appropriate isolation precautions for identified infection/condition  Outcome: Progressing  Goal: Absence of fever/infection during neutropenic period  Description: INTERVENTIONS:  - Monitor WBC    Outcome: Progressing

## 2023-05-02 NOTE — OP NOTE
OPERATIVE REPORT  PATIENT NAME: Mariel Holloway    :  1960  MRN: [id number]  Pt Location: [facility] OR ROOM 04    SURGERY DATE: 2023    Surgeon(s) and Role:     * Darcy Ojeda MD - Primary     * Daisy Brody MD - Geoff Naik MD - Assisting    Preop Diagnosis:  Gastroesophageal reflux disease without esophagitis [K21 9]  Hiatal hernia [K44 9]    Post-Op Diagnosis Codes:     * Gastroesophageal reflux disease without esophagitis [K21 9]     * Hiatal hernia [K44 9]    Procedure(s):  ESOPHAGOGASTRODUODENOSCOPY (EGD)  lap paraesophageal hernia repair & EGD w/ intraoperative TIF    Specimen(s):  * No specimens in log *    Estimated Blood Loss:   Minimal    Drains:  [REMOVED] NG/OG/Enteral Tube Orogastric 12 Fr Center mouth (Removed)   Number of days: 0       Anesthesia Type:   General    Operative Indications:  Gastroesophageal reflux disease without esophagitis [K21 9]  Hiatal hernia [K409]  41-year-old male with a history of significant GERD, as well as a hiatal hernia, following failure of medical therapy presented for elective repair of his hernia  After discussion of risk and benefits, he opted to proceed back to the operating room for planned laparoscopic paraesophageal hernia repair, with intraoperative transoral incisionless fundoplication  Operative Findings:  3 cm type I hiatal hernia  Excellent intra-abdominal esophageal length, completion endoscopy following Dr Vick Leal TIF showed an excellent wrap  Significant adhesive disease along the lesser curve of the stomach, presumably related to his prior pancreatitis  Careful dissection of the right anabella off of the IVC provided additional intra-abdominal esophageal length  Complications:   None    Procedure and Technique:  The patient was correctly identified by name and medical record number in the holding area and brought to the operative suite, where he was placed in lithotomy on the operative table   SCDs were placed and he was given preoperative antibiotics within 1 hour of incision  The patient was prepped and draped in the usual fashion  A timeout was performed  Stab incision was made in the left upper quadrant and a Veress needle was introduced  The abdomen was insufflated with CO2 to a pressure of 15mmHg  The patient tolerated insufflation without complication  Using an optical entry technique a 5 mm port was placed just to the right of the umbilicus  An additional 12mm port was placed in the left upper quadrant and two 5mm ports were placed in the right upper quadrant and the left lower quadrant  Finally a right flank 5 mm port was placed for the liver tractor  Once all of the ports were placed, the liver was retracted with the liver retractor and the hernia was identified and reduced using downward gentle retraction  The patient was placed in steep reverse Trendelenburg to assist with dissection  The pars flaccida was entered using the Harmonic scalpel and dissected up to the right anabella  The hernia sac was then opened, exposing the plane between the hernia sac and the mediastinum  The sac was sharply and bluntly dissected from the pleura laterally on both sides, the aorta posteriorly and the pericardium anteriorly with the harmonic scalpel  After the hernia sac was dissected, the esophagus was mobilized first by dissecting out the right anabella and taking down the phrenoesophageal membrane, making sure to preserve the peritoneal lining of the right anabella  Circumferential mobilization of the esophagus was performed to help establish adequate intra-abdominal esophageal length of 2-3cm  The left anabella and base of the crura were also dissected during the mobilization of the esophagus  Attention was then turned to the mobilization of the fundus and the division of the short gastric vessels   With the traction of gastrosplenic omentum and countertraction of the stomach, the short gastric vessels were ligated with the harmonic scalpel along the upper third of the greater curve of the stomach  Care was taken as the superior pole of the spleen was approached to avoid injury  Retrogastric attachments were taken down and a window was created into the lesser sac  Exposure of the left anabella was achieved with further dissection of the short gastric and retroesophageal attachments  Attention was then turned to performing a tension free hiatal closure  The crura were reapproximated with 2 interrupted sutures of #0 Ethibond  At the completion of the hiatal closure, a grasper was easily passed through the hiatus and the closure did not appear to be too tight  After ensuring hemostasis, all ports were removed under direct visualization and no bleeding was noted  The left upper quadrant 12 mm port port was closed using #0 Vicryl Figure-of-eight suture  All skin incisions were closed with subcuticular #4-0 Monocryl  The wounds were dressed with glue  At the end of the procedure, all instrument and sponge counts were correct x2  The completion of our portion of the procedure Dr Tata Crawford from gastroenterology came in to perform the TIF  Please see his operative dictation for further details  The patient was brought to PACU in stable condition  I was present for the entire procedure      Patient Disposition:  PACU  and hemodynamically stable        SIGNATURE: Jaya Watt MD  DATE: May 2, 2023  TIME: 11:38 AM

## 2023-05-02 NOTE — PROGRESS NOTES
Assessment/Plan   Assessment & Plan  Post operative check  Pain control  Clear liquid diet    Subjective   Subjective  Temp:  [96 8 °F (36 °C)-97 3 °F (36 3 °C)] 96 8 °F (36 °C)  HR:  [70-86] 74  Resp:  [12-18] 18  BP: (116-125)/(60-68) 123/67  No intake/output data recorded    I/O this shift:  In: 1512 5 [I V :1512 5]  Out: 60 [Emesis/NG output:50; Blood:10]    Objective   Objective  Principal Problem:    S/P repair of paraesophageal hernia  Active Problems:    Paroxysmal atrial fibrillation (HCC)    Awake and alert  Pleasant and comfortable  GCS - 15, wife prsent in room  RRR< no complaints of chest pain  Lungs CTA bilaterally, no shortness of breath  Abdomen soft, taking clear liquids  Moving all extremities  Pain regimen reviewed

## 2023-05-03 RX ORDER — DOCUSATE SODIUM 100 MG/1
100 CAPSULE, LIQUID FILLED ORAL 2 TIMES DAILY
Status: DISCONTINUED | OUTPATIENT
Start: 2023-05-03 | End: 2023-05-04 | Stop reason: HOSPADM

## 2023-05-03 RX ORDER — POLYETHYLENE GLYCOL 3350 17 G/17G
17 POWDER, FOR SOLUTION ORAL DAILY
Status: DISCONTINUED | OUTPATIENT
Start: 2023-05-03 | End: 2023-05-04 | Stop reason: HOSPADM

## 2023-05-03 RX ADMIN — SUCRALFATE 1 G: 1 TABLET ORAL at 21:00

## 2023-05-03 RX ADMIN — ONDANSETRON 4 MG: 2 INJECTION INTRAMUSCULAR; INTRAVENOUS at 17:39

## 2023-05-03 RX ADMIN — SUCRALFATE 1 G: 1 TABLET ORAL at 11:39

## 2023-05-03 RX ADMIN — ONDANSETRON 4 MG: 2 INJECTION INTRAMUSCULAR; INTRAVENOUS at 09:23

## 2023-05-03 RX ADMIN — DOCUSATE SODIUM 100 MG: 100 CAPSULE, LIQUID FILLED ORAL at 17:30

## 2023-05-03 RX ADMIN — SUCRALFATE 1 G: 1 TABLET ORAL at 17:39

## 2023-05-03 RX ADMIN — OXYCODONE HYDROCHLORIDE 10 MG: 5 TABLET ORAL at 03:53

## 2023-05-03 RX ADMIN — ONDANSETRON 4 MG: 2 INJECTION INTRAMUSCULAR; INTRAVENOUS at 21:00

## 2023-05-03 RX ADMIN — ACETAMINOPHEN 975 MG: 325 TABLET ORAL at 17:30

## 2023-05-03 RX ADMIN — SUCRALFATE 1 G: 1 TABLET ORAL at 05:09

## 2023-05-03 RX ADMIN — METHOCARBAMOL TABLETS 750 MG: 750 TABLET, COATED ORAL at 17:30

## 2023-05-03 RX ADMIN — ACETAMINOPHEN 975 MG: 325 TABLET ORAL at 11:37

## 2023-05-03 RX ADMIN — METHOCARBAMOL TABLETS 750 MG: 750 TABLET, COATED ORAL at 05:09

## 2023-05-03 RX ADMIN — ONDANSETRON 4 MG: 2 INJECTION INTRAMUSCULAR; INTRAVENOUS at 03:53

## 2023-05-03 RX ADMIN — ENOXAPARIN SODIUM 40 MG: 40 INJECTION SUBCUTANEOUS at 09:23

## 2023-05-03 RX ADMIN — ACETAMINOPHEN 975 MG: 325 TABLET ORAL at 05:09

## 2023-05-03 RX ADMIN — PANTOPRAZOLE SODIUM 40 MG: 40 TABLET, DELAYED RELEASE ORAL at 05:09

## 2023-05-03 RX ADMIN — POLYETHYLENE GLYCOL 3350 17 G: 17 POWDER, FOR SOLUTION ORAL at 17:39

## 2023-05-03 RX ADMIN — OXYCODONE HYDROCHLORIDE 10 MG: 5 TABLET ORAL at 19:46

## 2023-05-03 RX ADMIN — OXYCODONE HYDROCHLORIDE 10 MG: 5 TABLET ORAL at 09:23

## 2023-05-03 RX ADMIN — SODIUM CHLORIDE, SODIUM GLUCONATE, SODIUM ACETATE, POTASSIUM CHLORIDE, MAGNESIUM CHLORIDE, SODIUM PHOSPHATE, DIBASIC, AND POTASSIUM PHOSPHATE 125 ML/HR: .53; .5; .37; .037; .03; .012; .00082 INJECTION, SOLUTION INTRAVENOUS at 06:05

## 2023-05-03 RX ADMIN — METHOCARBAMOL TABLETS 750 MG: 750 TABLET, COATED ORAL at 11:38

## 2023-05-03 NOTE — RESTORATIVE TECHNICIAN NOTE
Restorative Technician Note      Patient Name: Romana Christie     Restorative Tech Visit Date: 05/03/23  Note Type: Mobility  Patient Position Upon Consult: Supine  Activity Performed: Ambulated  Assistive Device: Other (Comment) (IV pole for stability; cues for management; limited due to pain)  Education Provided: Yes (Pt encouraged to ambulated throughout the day; ask for assistance if pain is limiting)  Patient Position at End of Consult: Bedside chair;  All needs within reach    Ras Cuevas  DPT, Restorative Technician

## 2023-05-03 NOTE — PROGRESS NOTES
"Progress Note - Carlton Marisol 60 y o  male MRN: [id number]    Unit/Bed#: [facility] 804-01 Encounter: [id number]      Assessment:  24-year-old male with GERD and hiatal hernia now status post laparoscopic PEH repair and TIF on 5/2    AVSS  WBC 8 52  Hemoglobin 15 9  Creatinine 1 16  Plan:  -Advance to full liquid diet  -Continue Protonix and Carafate  -Continued scheduled Zofran  -DVT prophylaxis with Lovenox  -Multimodal pain control  -Discharge home today    Subjective:   No acute events overnight  Patient reports incisional pain  This is controlled with pain medications, however states he had a tough time sleeping last night  Tolerated clears with no nausea or vomiting  Objective:     Vitals: Blood pressure 123/67, pulse 74, temperature (!) 96 8 °F (36 °C), resp  rate 18, height 6' 4\" (1 93 m), weight 90 7 kg (200 lb), SpO2 96 %  ,Body mass index is 24 34 kg/m²  Intake/Output Summary (Last 24 hours) at 5/3/2023 0845  Last data filed at 5/3/2023 0437  Gross per 24 hour   Intake 1512 5 ml   Output 860 ml   Net 652 5 ml       Physical Exam:   General: NAD  HENT: NCAT MMM  Neck: supple, no JVD  CV: nl rate  Lungs: nl wob   No resp distress  ABD: Soft, appropriately tender, nondistended, incisions clean dry and intact  Extrem: No CCE  Neuro: AAOx3       Invasive Devices     Peripheral Intravenous Line  Duration           Peripheral IV 05/02/23 Left Forearm 1 day                "

## 2023-05-03 NOTE — QUICK NOTE
Went to see patient with Dr Megha Narayan to reassure him of the plan for discharge and what his diet included    Dr Elizabeth Ribeiro also relayed message of procedure and addressed wife's question  Patient nervous to go home today, pain is becoming better controlled and will re-evaluate for discharge in the am

## 2023-05-03 NOTE — QUICK NOTE
Patient OOB in a chair  REviewing discharge plans and instructions  He states has a lot of pain and is not ready to go  Discussed taking pain regimen as ordered and will [place] back at lunch time to see if he is feeling better

## 2023-05-04 RX ORDER — PANTOPRAZOLE SODIUM 40 MG/1
40 TABLET, DELAYED RELEASE ORAL
Qty: 30 TABLET | Refills: 0 | Status: SHIPPED | OUTPATIENT
Start: 2023-05-04

## 2023-05-04 RX ORDER — OXYCODONE HYDROCHLORIDE 5 MG/1
5 TABLET ORAL EVERY 4 HOURS PRN
Qty: 30 TABLET | Refills: 0 | Status: SHIPPED | OUTPATIENT
Start: 2023-05-04 | End: 2023-05-14

## 2023-05-04 RX ORDER — ACETAMINOPHEN 325 MG/1
975 TABLET ORAL EVERY 8 HOURS
Qty: 30 TABLET | Refills: 0 | Status: SHIPPED | OUTPATIENT
Start: 2023-05-04

## 2023-05-04 RX ORDER — METHOCARBAMOL 750 MG/1
750 TABLET, FILM COATED ORAL EVERY 6 HOURS SCHEDULED
Qty: 30 TABLET | Refills: 0 | Status: SHIPPED | OUTPATIENT
Start: 2023-05-04 | End: 2023-05-11

## 2023-05-04 RX ORDER — SUCRALFATE 1 G/1
1 TABLET ORAL
Qty: 60 TABLET | Refills: 0 | Status: SHIPPED | OUTPATIENT
Start: 2023-05-04

## 2023-05-04 RX ADMIN — SUCRALFATE 1 G: 1 TABLET ORAL at 08:31

## 2023-05-04 RX ADMIN — METHOCARBAMOL TABLETS 750 MG: 750 TABLET, COATED ORAL at 05:41

## 2023-05-04 RX ADMIN — ACETAMINOPHEN 975 MG: 325 TABLET ORAL at 05:30

## 2023-05-04 RX ADMIN — PANTOPRAZOLE SODIUM 40 MG: 40 TABLET, DELAYED RELEASE ORAL at 05:31

## 2023-05-04 RX ADMIN — ONDANSETRON 4 MG: 2 INJECTION INTRAMUSCULAR; INTRAVENOUS at 05:30

## 2023-05-04 RX ADMIN — ENOXAPARIN SODIUM 40 MG: 40 INJECTION SUBCUTANEOUS at 08:31

## 2023-05-04 NOTE — DISCHARGE SUMMARY
"  Discharge Summary - Phyliss Been 60 y o  male MRN: [id number]    Unit/Bed#: [facility] 804-01 Encounter: [id number]    Admission Date:     Admitting Diagnosis: Gastroesophageal reflux disease without esophagitis [K21 9]  Hiatal hernia [K44 9]    HPI: per Dr Laina Vergara:  Debra Moraes is a 60 y o  male who presents with medically refractory GERD  With a long history of reflux disease specifically heartburn and reflux at night more so during the day  He is able to tolerate a regular diet and does not have significant dysphagia  His work-up was notable for an upper GI series which showed a 3 cm hiatal hernia with significant reflux to the level of the thoracic inlet  Additionally he was worked up with esophageal manometry, which showed no specific motility disorders  \"    Procedures Performed: No orders of the defined types were placed in this encounter  Summary of Hospital Course: 61 y/o male admitted for surgical intervention of a paraesophageal hernia  Some pain post op so kept overnight, doing well today, tolerating liquids and ambulating  Will discharge home to follow up with DR Clint Felix, and PCP  For more information please review medical records  Significant Findings, Care, Treatment and Services Provided: No results found  Complications: none    Discharge Diagnosis: Paraesophageal Hernia    Medical Problems     Resolved Problems  Date Reviewed: 6/7/2022   None         Condition at Discharge: stable         Discharge instructions/Information to patient and family:   See after visit summary for information provided to patient and family  Provisions for Follow-Up Care:  See after visit summary for information related to follow-up care and any pertinent home health orders  PCP: Zaid Bazan MD    Disposition: Home    Planned Readmission: No      Discharge Statement   I spent 30 minutes discharging the patient  This time was spent on the day of discharge   I had direct contact " with the patient on the day of discharge  Additional documentation is required if more than 30 minutes were spent on discharge  Discharge Medications:  See after visit summary for reconciled discharge medications provided to patient and family

## 2023-05-04 NOTE — PROGRESS NOTES
"General Surgery  Progress Note   Cesilia Alvarez 60 y o  male MRN: [id number]  Unit/Bed#: [facility] 804-01 Encounter: [id number]    Assessment:  21-year-old male with GERD and hiatal hernia now status post laparoscopic PEH repair and TIF on 5/2    Vital signs stable, afebrile  On room air  Advanced to fulls yesterday which he tolerated without nausea or vomiting  Passing flatus, having bowel movements  No new labs    Plan:   Full liquid diet as tolerated    Continue protonix, carafate, zofran   DVT ppx: Lovenox   Pain/ nausea control PRN   OOB/ ambulation   Incentive Spirometry      Subjective/Objective     Subjective:   Patient seen and examined at bedside, in no acute distress  No acute events overnight  Patient's pain is well controlled and improved since yesterday  He denies nausea or vomiting  Passing flatus and having bowel movements  Feels ready to go home today  Objective:   Vitals:Blood pressure 123/67, pulse 74, temperature (!) 96 8 °F (36 °C), resp  rate 18, height 6' 4\" (1 93 m), weight 90 7 kg (200 lb), SpO2 96 %  No data recorded  Intake/Output Summary (Last 24 hours) at 5/4/2023 0757  Last data filed at 5/3/2023 1801  Gross per 24 hour   Intake 2694 17 ml   Output --   Net 2694 17 ml       Invasive Devices     Peripheral Intravenous Line  Duration           Peripheral IV 05/02/23 Left Forearm 1 day                Physical Exam:  General: No acute distress, alert and oriented  CV: Well perfused, regular rate and rhythm  Lungs: Normal work of breathing, no increased respiratory effort on room air  Abdomen: Soft, appropriately tender, non-distended  Incisions clean, dry and intact    Extremities: No edema, clubbing or cyanosis  Skin: Warm, dry    Lab Results: Results: I have personally reviewed all pertinent laboratory/tests results  VTE Prophylaxis: Sequential compression device (Venodyne)  and Enoxaparin (Lovenox)    Kimmie Moore MD  5/4/2023    "

## 2023-05-04 NOTE — PLAN OF CARE
Problem: PAIN - ADULT  Goal: Verbalizes/displays adequate comfort level or baseline comfort level  Description: Interventions:  - Encourage patient to monitor pain and request assistance  - Assess pain using appropriate pain scale  - Administer analgesics based on type and severity of pain and evaluate response  - Implement non-pharmacological measures as appropriate and evaluate response  - Consider cultural and social influences on pain and pain management  - Notify physician/advanced practitioner if interventions unsuccessful or patient reports new pain  Outcome: Progressing     Problem: INFECTION - ADULT  Goal: Absence or prevention of progression during hospitalization  Description: INTERVENTIONS:  - Assess and monitor for signs and symptoms of infection  - Monitor lab/diagnostic results  - Monitor all insertion sites, i e  indwelling lines, tubes, and drains  - Monitor endotracheal if appropriate and nasal secretions for changes in amount and color  - [place] appropriate cooling/warming therapies per order  - Administer medications as ordered  - Instruct and encourage patient and family to use good hand hygiene technique  - Identify and instruct in appropriate isolation precautions for identified infection/condition  Outcome: Progressing  Goal: Absence of fever/infection during neutropenic period  Description: INTERVENTIONS:  - Monitor WBC    Outcome: Progressing     Problem: MOBILITY - ADULT  Goal: Maintain or return to baseline ADL function  Description: INTERVENTIONS:  -  Assess patient's ability to carry out ADLs; assess patient's baseline for ADL function and identify physical deficits which impact ability to perform ADLs (bathing, care of mouth/teeth, toileting, grooming, dressing, etc )  - Assess/evaluate cause of self-care deficits   - Assess range of motion  - Assess patient's mobility; develop plan if impaired  - Assess patient's need for assistive devices and provide as appropriate  - Encourage maximum independence but intervene and supervise when necessary  - Involve family in performance of ADLs  - Assess for home care needs following discharge   - Consider OT consult to assist with ADL evaluation and planning for discharge  - Provide patient education as appropriate  Outcome: Progressing  Goal: Maintains/Returns to pre admission functional level  Description: INTERVENTIONS:  - Perform BMAT or MOVE assessment daily    - Set and communicate daily mobility goal to care team and patient/family/caregiver     - Collaborate with rehabilitation services on mobility goals if consulted  - Patient ambulating independently  - Out of bed for toileting  - Record patient progress and toleration of activity level   Outcome: Progressing

## 2023-05-19 ENCOUNTER — OFFICE VISIT (OUTPATIENT)
Dept: SURGERY | Facility: CLINIC | Age: 63
End: 2023-05-19

## 2023-05-19 VITALS — WEIGHT: 200 LBS | TEMPERATURE: 97.8 F | BODY MASS INDEX: 24.34 KG/M2

## 2023-05-19 DIAGNOSIS — Z98.890 S/P REPAIR OF PARAESOPHAGEAL HERNIA: Primary | ICD-10-CM

## 2023-05-19 DIAGNOSIS — Z87.19 S/P REPAIR OF PARAESOPHAGEAL HERNIA: Primary | ICD-10-CM

## 2023-05-19 NOTE — PROGRESS NOTES
Office Visit - General Surgery  Janeth Soulier MRN: [id number]  Encounter: [id number]    Assessment and Plan  Problem List Items Addressed This Visit        Other    S/P repair of paraesophageal hernia - Primary     67-year-old male status post laparoscopic paraesophageal hernia repair with intraoperative TIF on 5/2/2023, here for follow-up    Plan:  He should continue to slowly advance his diet to include soft foods, and then advance back to all textures  He should continue chewing his food thoroughly, taking small bites, and preferentially taking 6 small meals  She continue on Protonix at this time  I like to see him back in 4 weeks for next check  Chief Complaint:  Janeth Soulier is a 60 y o  male who presents for Post-op (P/o)    Subjective  67-year-old male well-known to me status post laparoscopic paraesophageal hernia repair with intraoperative TIF on 5/2/2023, here for follow-up  Overall, he is doing quite well without significant complaints at today's visit  He has some mild left upper quadrant abdominal pain, but is otherwise been up, active, and doing his normal activities  He has been tolerating liquids and a soft diet with only 2 episodes of dysphagia, mainly with oatmeal   He denies any fevers, chills, chest pain, or shortness of breath  He is moving his bowels normally  Past Medical History:   Diagnosis Date   • Atrial fibrillation ([address] )    • Epigastric pain 06/24/2020   • Gastroesophageal reflux    • GERD (gastroesophageal reflux disease)    • Irregular heart beat     Afib no problem since 2020   • Pancreatitis    • Pleural effusion    • Pleural effusion associated with pancreatitis 02/03/2019   • Second hand smoke exposure        Past Surgical History:   Procedure Laterality Date   • CHOLECYSTECTOMY     • COLONOSCOPY N/A 3/21/2016    Procedure: COLONOSCOPY;  Surgeon: Kaitlin Arteaga DO;  [facility];   Service:    • ERCP N/A 1/4/2019    Procedure: ENDOSCOPIC RETROGRADE CHOLANGIOPANCREATOGRAPHY (ERCP); Surgeon: Kenisha Oliveira MD;  Location: [facility]; Service: Gastroenterology   • ERCP  08/11/2020   • ESOPHAGOGASTRODUODENOSCOPY N/A 2/5/2019    Procedure: ESOPHAGOGASTRODUODENOSCOPY (EGD), with possible nasojejunal Dobhoff tube placement;  Surgeon: Troy Mayo MD;  [facility]; Service: Gastroenterology   • ESOPHAGOGASTRODUODENOSCOPY N/A 4/3/2019    Procedure: ESOPHAGOGASTRODUODENOSCOPY (EGD)- endoscopic necrosectomy;  Surgeon: Kenisha Oliveira MD;  [facility]; Service: Gastroenterology   • IR IMAGE GUIDED ASPIRATION / DRAINAGE W TUBE  6/17/2019   • IR THORACENTESIS  2/7/2019   • IR THORACENTESIS  3/29/2019   • LINEAR ENDOSCOPIC U/S N/A 3/29/2019    Procedure: LINEAR ENDOSCOPIC U/S cyst gastro;  Surgeon: Kenisha Oliveira MD;  [facility]; Service: Gastroenterology   • PARAESOPHAGEAL HERNIA REPAIR N/A 5/2/2023    Procedure: lap paraesophageal hernia repair & EGD w/ intraoperative TIF;  Surgeon: Nanda Langston MD;  [facility];  Service: General   • CA [address] INCL FLUOR GDNCE DX W/CELL [address] N/A 5/20/2019    Procedure: Ariel Romero;  Surgeon: Haile Feliz MD;  [facility];  Service: Thoracic   • CA EGD TRANSORAL BIOPSY SINGLE/MULTIPLE N/A 3/21/2016    Procedure: ESOPHAGOGASTRODUODENOSCOPY (EGD); Surgeon: Kiara Mcclendon DO;  [facility]; Service: General   • CA ERCP DX COLLECTION SPECIMEN BRUSHING/WASHING N/A 4/26/2019    Procedure: ENDOSCOPIC RETROGRADE CHOLANGIOPANCREATOGRAPHY (ERCP); Surgeon: Kenisha Oliveira MD;  [facility]; Service: Gastroenterology   • CA ESOPHAGOGASTRODUODENOSCOPY TRANSORAL DIAGNOSTIC N/A 4/9/2019    Procedure: ESOPHAGOGASTRODUODENOSCOPY (EGD) endoscopic necrosectomy;  Surgeon: Kenisha Oliveira MD;  [facility]; Service: Gastroenterology   • CA ESOPHAGOGASTRODUODENOSCOPY TRANSORAL DIAGNOSTIC N/A 4/26/2019    Procedure: ESOPHAGOGASTRODUODENOSCOPY (EGD);   Surgeon: Kenisha Oliveira MD;  [facility]; Service: Gastroenterology   • [place] ESOPHAGOGASTRODUODENOSCOPY TRANSORAL DIAGNOSTIC N/A 5/2/2023    Procedure: ESOPHAGOGASTRODUODENOSCOPY (EGD); Surgeon: Prince Oh MD;  [facility];  Service: General   • WY RPR 1ST INGUN HRNA AGE 5 YRS/> REDUCIBLE Right 1/22/2020    Procedure: REPAIR HERNIA INGUINAL;  Surgeon: Tam Lancaster MD;  [facility];  Service: General   • [place] THORACOSCOPY W/PARTIAL PULMONARY DECORTICATION Left 5/20/2019    Procedure: DECORTICATION LUNG;  Surgeon: Samantha Carmichael MD;  [facility];  Service: Thoracic   • [place] THORACOSCOPY W/PARTIAL PULMONARY DECORTICATION Left 5/20/2019    Procedure: THORACOSCOPY VIDEO ASSISTED SURGERY (VATS);   Surgeon: Samantha Carmichael MD;  [facility];  Service: Thoracic   • [place] THORACOSCOPY W/PLEURODESIS N/A 5/20/2019    Procedure: PLEURODESIS mechanical;  Surgeon: Samantha Carmichael MD;  [facility];  Service: Thoracic   • UPPER GASTROINTESTINAL ENDOSCOPY  09/19/2019       Family History   Problem Relation Age of Onset   • Lung cancer Mother 79        Smoker    • Transient ischemic attack Father 61   • Lung disease Neg Hx        Social History     Tobacco Use   • Smoking status: Never   • Smokeless tobacco: Never   Vaping Use   • Vaping Use: Never used   Substance Use Topics   • Alcohol use: Yes     Comment: Rare wine   • Drug use: No        Medications  Current Outpatient Medications on File Prior to Visit   Medication Sig Dispense Refill   • acetaminophen (TYLENOL) 325 mg tablet Take 3 tablets (975 mg total) by mouth every 8 (eight) hours 30 tablet 0   • methocarbamol (ROBAXIN) 750 mg tablet Take 1 tablet (750 mg total) by mouth every 6 (six) hours for 26 doses 30 tablet 0   • pantoprazole (PROTONIX) 40 mg tablet Take 1 tablet (40 mg total) by mouth daily in the early morning 30 tablet 0   • sucralfate (CARAFATE) 1 g tablet Take 1 tablet (1 g total) by mouth 4 (four) times a day (before meals and at bedtime) 60 tablet 0     No current facility-administered medications on file prior to visit  Allergies  No Known Allergies    Review of Systems   Constitutional: Negative for appetite change, chills, diaphoresis and fever  HENT: Negative for nosebleeds and trouble swallowing  Eyes: Negative  Respiratory: Negative for cough, shortness of breath and wheezing  Cardiovascular: Negative for chest pain, palpitations and leg swelling  Gastrointestinal: Negative for abdominal distention, abdominal pain, nausea and vomiting  Genitourinary: Negative for difficulty urinating, flank pain and frequency  Musculoskeletal: Negative for arthralgias, joint swelling and myalgias  Skin: Negative for pallor and rash  Neurological: Negative for dizziness, facial asymmetry and speech difficulty  Hematological: Does not bruise/bleed easily  Psychiatric/Behavioral: Negative for agitation and confusion  All other systems reviewed and are negative  Objective  Vitals:    05/19/23 1321   Temp: 97 8 °F (36 6 °C)       Physical Exam  Vitals and nursing note reviewed  Constitutional:       General: He is not in acute distress  Appearance: Normal appearance  He is not toxic-appearing  HENT:      Head: Normocephalic and atraumatic  Mouth/Throat:      Mouth: Mucous membranes are moist    Eyes:      Extraocular Movements: Extraocular movements intact  Pupils: Pupils are equal, round, and reactive to light  Cardiovascular:      Rate and Rhythm: Normal rate and regular rhythm  Pulses: Normal pulses  Pulmonary:      Effort: Pulmonary effort is normal  No respiratory distress  Breath sounds: Normal breath sounds  No wheezing  Abdominal:      General: There is no distension  Palpations: Abdomen is soft  There is no mass  Tenderness: There is no abdominal tenderness  There is no guarding or rebound  Hernia: No hernia is present        Comments: Well-healed laparoscopic port sites  Musculoskeletal:         General: No swelling or deformity  Normal range of motion  Cervical back: Normal range of motion and neck supple  Right lower leg: No edema  Left lower leg: No edema  Skin:     General: Skin is warm and dry  Coloration: Skin is not jaundiced  Neurological:      General: No focal deficit present  Mental Status: He is alert and oriented to person, place, and time     Psychiatric:         Mood and Affect: Mood normal          Behavior: Behavior normal

## 2023-05-20 NOTE — ASSESSMENT & PLAN NOTE
41-year-old male status post laparoscopic paraesophageal hernia repair with intraoperative TIF on 5/2/2023, here for follow-up    Plan:  He should continue to slowly advance his diet to include soft foods, and then advance back to all textures  He should continue chewing his food thoroughly, taking small bites, and preferentially taking 6 small meals  She continue on Protonix at this time  I like to see him back in 4 weeks for next check

## 2023-06-16 ENCOUNTER — OFFICE VISIT (OUTPATIENT)
Dept: SURGERY | Facility: CLINIC | Age: 63
End: 2023-06-16

## 2023-06-16 VITALS
DIASTOLIC BLOOD PRESSURE: 87 MMHG | HEART RATE: 72 BPM | BODY MASS INDEX: 23.87 KG/M2 | HEIGHT: 76 IN | SYSTOLIC BLOOD PRESSURE: 137 MMHG | WEIGHT: 196 LBS

## 2023-06-16 DIAGNOSIS — Z98.890 S/P REPAIR OF PARAESOPHAGEAL HERNIA: Primary | ICD-10-CM

## 2023-06-16 DIAGNOSIS — Z87.19 S/P REPAIR OF PARAESOPHAGEAL HERNIA: Primary | ICD-10-CM

## 2023-06-16 PROCEDURE — 99024 POSTOP FOLLOW-UP VISIT: CPT | Performed by: SURGERY

## 2023-06-16 NOTE — ASSESSMENT & PLAN NOTE
56-year-old male status post laparoscopic paraesophageal hernia repair with intraoperative TIF on 5/2/2023, here for follow-up  Plan:  Overall, he is doing fairly well and has been tolerating regular foods and consistencies without significant difficulty  I would like for him to go to once daily PPI, from twice daily, as he is reflux free at this point  I like to see him back in 2 months for next check

## 2023-06-16 NOTE — PROGRESS NOTES
Assessment/Plan:    S/P repair of paraesophageal hernia  44-year-old male status post laparoscopic paraesophageal hernia repair with intraoperative TIF on 5/2/2023, here for follow-up  Plan:  Overall, he is doing fairly well and has been tolerating regular foods and consistencies without significant difficulty  I would like for him to go to once daily PPI, from twice daily, as he is reflux free at this point  I like to see him back in 2 months for next check  Diagnoses and all orders for this visit:    S/P repair of paraesophageal hernia          Subjective:      Patient ID: Jaspal Egan is a 60 y o  male  44-year-old male well-known to me status post laparoscopic paraesophageal hernia repair with preoperative TIF on 5/2/2023, here for follow-up  Since her last visit he has expanded to all foods, and has occasional episodes of dysphagia that required him to sip some water to pass things down  This is food consistency dependent, and he has no real other complaints  He denies any heartburn, or odynophagia  He denies any fevers, chills, chest pain, shortness of breath his abdominal wall discomfort has abated  He is back to doing most activities without restriction or limitation  The following portions of the patient's history were reviewed and updated as appropriate:   He  has a past medical history of Atrial fibrillation ([address] ), Epigastric pain (06/24/2020), Gastroesophageal reflux, GERD (gastroesophageal reflux disease), Irregular heart beat, Pancreatitis, Pleural effusion, Pleural effusion associated with pancreatitis (02/03/2019), and Second hand smoke exposure    He   Patient Active Problem List    Diagnosis Date Noted   • S/P repair of paraesophageal hernia 05/02/2023   • Choudhary's esophagus without dysplasia 09/22/2022   • Coagulation disorder ([address] ) 08/11/2020   • Status post right inguinal hernia repair 02/05/2020   • Colon cancer screening 11/15/2019   • GERD (gastroesophageal reflux disease) 08/07/2019   • Abnormal CT of the abdomen 02/05/2019   • Paroxysmal atrial fibrillation ([address] ) 01/23/2019   • Supratherapeutic INR 01/23/2019     He  has a past surgical history that includes Cholecystectomy; pr egd transoral biopsy single/multiple (N/A, 3/21/2016); Esophagogastroduodenoscopy (N/A, 2/5/2019); IR thoracentesis (2/7/2019); IR thoracentesis (3/29/2019); LINEAR ENDOSCOPIC U/S (N/A, 3/29/2019); Esophagogastroduodenoscopy (N/A, 4/3/2019); pr esophagogastroduodenoscopy transoral diagnostic (N/A, 4/9/2019); pr ercp dx collection specimen brushing/washing (N/A, 4/26/2019); pr esophagogastroduodenoscopy transoral diagnostic (N/A, 4/26/2019); pr brnchsc incl fluor gdnce dx w/cell washg spx (N/A, 5/20/2019); pr thoracoscopy w/partial pulmonary decortication (Left, 5/20/2019); pr thoracoscopy w/partial pulmonary decortication (Left, 5/20/2019); pr thoracoscopy w/pleurodesis (N/A, 5/20/2019); IR image guided aspiration / drainage w tube (6/17/2019); pr rpr 1st ingun hrna age 10 yrs/> reducible (Right, 1/22/2020); ERCP (N/A, 1/4/2019); ERCP (08/11/2020); Colonoscopy (N/A, 3/21/2016); Upper gastrointestinal endoscopy (09/19/2019); pr esophagogastroduodenoscopy transoral diagnostic (N/A, 5/2/2023); and Paraesophageal hernia repair (N/A, 5/2/2023)  His family history includes Lung cancer (age of onset: 79) in his mother; Transient ischemic attack (age of onset: 61) in his father  He  reports that he has never smoked  He has never used smokeless tobacco  He reports current alcohol use  He reports that he does not use drugs    Current Outpatient Medications   Medication Sig Dispense Refill   • acetaminophen (TYLENOL) 325 mg tablet Take 3 tablets (975 mg total) by mouth every 8 (eight) hours (Patient not taking: Reported on 6/16/2023) 30 tablet 0   • methocarbamol (ROBAXIN) 750 mg tablet Take 1 tablet (750 mg total) by mouth every 6 (six) hours for 26 doses 30 tablet 0   • pantoprazole (PROTONIX) 40 mg tablet Take 1 tablet (40 mg total) by mouth daily in the early morning (Patient not taking: Reported on 6/16/2023) 30 tablet 0   • sucralfate (CARAFATE) 1 g tablet Take 1 tablet (1 g total) by mouth 4 (four) times a day (before meals and at bedtime) (Patient not taking: Reported on 6/16/2023) 60 tablet 0     No current facility-administered medications for this visit  Current Outpatient Medications on File Prior to Visit   Medication Sig   • acetaminophen (TYLENOL) 325 mg tablet Take 3 tablets (975 mg total) by mouth every 8 (eight) hours (Patient not taking: Reported on 6/16/2023)   • methocarbamol (ROBAXIN) 750 mg tablet Take 1 tablet (750 mg total) by mouth every 6 (six) hours for 26 doses   • pantoprazole (PROTONIX) 40 mg tablet Take 1 tablet (40 mg total) by mouth daily in the early morning (Patient not taking: Reported on 6/16/2023)   • sucralfate (CARAFATE) 1 g tablet Take 1 tablet (1 g total) by mouth 4 (four) times a day (before meals and at bedtime) (Patient not taking: Reported on 6/16/2023)     No current facility-administered medications on file prior to visit  He has No Known Allergies       Review of Systems   Constitutional: Negative for appetite change, chills, diaphoresis and fever  HENT: Negative for nosebleeds and trouble swallowing  Eyes: Negative  Respiratory: Negative for cough, shortness of breath and wheezing  Cardiovascular: Negative for chest pain, palpitations and leg swelling  Gastrointestinal: Negative for abdominal distention, nausea and vomiting  Genitourinary: Negative for difficulty urinating, flank pain and frequency  Musculoskeletal: Negative for arthralgias, joint swelling and myalgias  Skin: Negative for pallor and rash  Neurological: Negative for dizziness, facial asymmetry and speech difficulty  Hematological: Does not bruise/bleed easily  Psychiatric/Behavioral: Negative for agitation and confusion  All other systems reviewed and are negative  "    Objective:      /87   Pulse 72   Ht 6' 4\" (1 93 m)   Wt 88 9 kg (196 lb)   BMI 23 86 kg/m²          Physical Exam  Vitals and nursing note reviewed  Constitutional:       General: He is not in acute distress  Appearance: Normal appearance  He is not toxic-appearing  HENT:      Head: Normocephalic and atraumatic  Mouth/Throat:      Mouth: Mucous membranes are moist    Eyes:      Extraocular Movements: Extraocular movements intact  Pupils: Pupils are equal, round, and reactive to light  Cardiovascular:      Rate and Rhythm: Normal rate and regular rhythm  Pulses: Normal pulses  Pulmonary:      Effort: Pulmonary effort is normal  No respiratory distress  Breath sounds: Normal breath sounds  No wheezing  Abdominal:      General: There is no distension  Palpations: Abdomen is soft  There is no mass  Tenderness: There is no abdominal tenderness  There is no guarding or rebound  Hernia: No hernia is present  Comments: Well-healed laparoscopic port sites, no evidence of hernia  Musculoskeletal:         General: No swelling or deformity  Normal range of motion  Cervical back: Normal range of motion and neck supple  Right lower leg: No edema  Left lower leg: No edema  Skin:     General: Skin is warm and dry  Coloration: Skin is not jaundiced  Neurological:      General: No focal deficit present  Mental Status: He is alert and oriented to person, place, and time     Psychiatric:         Mood and Affect: Mood normal          Behavior: Behavior normal          "

## 2023-08-23 ENCOUNTER — OFFICE VISIT (OUTPATIENT)
Dept: GASTROENTEROLOGY | Facility: MEDICAL CENTER | Age: 63
End: 2023-08-23
Payer: COMMERCIAL

## 2023-08-23 VITALS
HEART RATE: 68 BPM | SYSTOLIC BLOOD PRESSURE: 132 MMHG | TEMPERATURE: 97.6 F | WEIGHT: 197.8 LBS | BODY MASS INDEX: 24.08 KG/M2 | DIASTOLIC BLOOD PRESSURE: 84 MMHG

## 2023-08-23 DIAGNOSIS — K21.9 GASTROESOPHAGEAL REFLUX DISEASE WITHOUT ESOPHAGITIS: ICD-10-CM

## 2023-08-23 DIAGNOSIS — Z87.19 S/P REPAIR OF PARAESOPHAGEAL HERNIA: ICD-10-CM

## 2023-08-23 DIAGNOSIS — Z98.890 S/P REPAIR OF PARAESOPHAGEAL HERNIA: ICD-10-CM

## 2023-08-23 DIAGNOSIS — K59.1 FUNCTIONAL DIARRHEA: ICD-10-CM

## 2023-08-23 DIAGNOSIS — R14.0 BLOATING: ICD-10-CM

## 2023-08-23 DIAGNOSIS — K22.70 BARRETT'S ESOPHAGUS WITHOUT DYSPLASIA: Primary | ICD-10-CM

## 2023-08-23 PROCEDURE — 99214 OFFICE O/P EST MOD 30 MIN: CPT | Performed by: INTERNAL MEDICINE

## 2023-08-23 NOTE — PROGRESS NOTES
Outpatient Follow up  [address] [place] [address] [address]  [address]  [place] [address]  nAn Mcclain MD  Ph : [phone]  Fax : [phone]  Mobile : [phone]  Email : [email or web address]  Also available on [employer]    Belem Marie 60 y.o. male MRN: [id number]    PCP: Isa Nelson MD  Referring: No referring provider defined for this encounter. Belem Marie presented for a follow up visit. My recommendations are included. Please do not hesitate to contact me with any questions you may have. ASSESSMENT AND PLAN:      No problem-specific Assessment & Plan notes found for this encounter. Diagnoses and all orders for this visit:    Choudhary's esophagus without dysplasia    Gastroesophageal reflux disease without esophagitis    S/P repair of paraesophageal hernia    Bloating  -     Small intestinal bacterial overgrowth    Functional diarrhea  -     Small intestinal bacterial overgrowth         1. Bloating and diarrhea intermittently : IBS vs lactose intolerance vs SIBO. - Gas-X  - FODMAP diet  - SIBO breath test.   - Possible rifaximin. 2. GERD :   - off protonix. - repeat EGD in 2 years. 3. Ampullary adenoma :   - repeat EGD / side viewing endoscope in 2 years. 4. Colon polyp history :   - repeat in 2028. Follow up in a year.   ______________________________________________________________________    SUBJECTIVE:  59 y/o with follow up post c-TIF and hernia repair. 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 He is doing well at this time. c-TIF : 5/23 : successful. Follow up with Dr. Nayely Fuentes 6/16/ 23 - Overall, he is doing fairly well and has been tolerating regular foods and consistencies without significant difficulty. I would like for him to go to once daily PPI, from twice daily, as he is reflux free at this point. I like to see him back in 2 months for next check.     He did have an EGD in the past which showed C0M1 BE but the biopsies were unremarkable. No heartburn. He has been off the protonix. He has used it sparingly. No dysphagia. He does have bloating and gas after eating certain foods. He does have some diarrhea but this stopped once he stopped the probiotic he was on and that stopped it. He does have milk with his cereal. He does eat cheese. REVIEW OF SYSTEMS IS OTHERWISE NEGATIVE. Historical Information   Past Medical History:   Diagnosis Date   • Atrial fibrillation ([address])    • Epigastric pain 06/24/2020   • Gastroesophageal reflux    • GERD (gastroesophageal reflux disease)    • Irregular heart beat     Afib no problem since 2020   • Pancreatitis    • Pleural effusion    • Pleural effusion associated with pancreatitis 02/03/2019   • Second hand smoke exposure      Past Surgical History:   Procedure Laterality Date   • CHOLECYSTECTOMY     • COLONOSCOPY N/A 3/21/2016    Procedure: COLONOSCOPY;  Surgeon: Alla Puente DO;  Location: [facility]; Service:    • ERCP N/A 1/4/2019    Procedure: ENDOSCOPIC RETROGRADE CHOLANGIOPANCREATOGRAPHY (ERCP); Surgeon: Jakub Santoro MD;  Location: [facility]; Service: Gastroenterology   • ERCP  08/11/2020   • ESOPHAGOGASTRODUODENOSCOPY N/A 2/5/2019    Procedure: ESOPHAGOGASTRODUODENOSCOPY (EGD), with possible nasojejunal Dobhoff tube placement;  Surgeon: Erma Craig MD;  [facility]; Service: Gastroenterology   • ESOPHAGOGASTRODUODENOSCOPY N/A 4/3/2019    Procedure: ESOPHAGOGASTRODUODENOSCOPY (EGD)- endoscopic necrosectomy;  Surgeon: Jakub Santoro MD;  [facility]; Service: Gastroenterology   • IR IMAGE GUIDED ASPIRATION / DRAINAGE W TUBE  6/17/2019   • IR THORACENTESIS  2/7/2019   • IR THORACENTESIS  3/29/2019   • LINEAR ENDOSCOPIC U/S N/A 3/29/2019    Procedure: LINEAR ENDOSCOPIC U/S cyst gastro;  Surgeon: Jakub Santoro MD;  [facility];   Service: Gastroenterology   • PARAESOPHAGEAL HERNIA REPAIR N/A 5/2/2023    Procedure: lap paraesophageal hernia repair & EGD w/ intraoperative TIF;  Surgeon: Jennie Sanchez MD;  [facility];  Service: General   •  [address] INCL FLUOR GDNCE DX W/CELL [address] [facility] N/A 5/20/2019    Procedure: [place];  Surgeon: Doreen Genao MD;  [facility];  Service: Thoracic   • NH EGD TRANSORAL BIOPSY SINGLE/MULTIPLE N/A 3/21/2016    Procedure: ESOPHAGOGASTRODUODENOSCOPY (EGD); Surgeon: Alexander Cleary DO;  [facility]; Service: General   • NH ERCP DX COLLECTION SPECIMEN BRUSHING/WASHING N/A 4/26/2019    Procedure: ENDOSCOPIC RETROGRADE CHOLANGIOPANCREATOGRAPHY (ERCP); Surgeon: Abby Pryor MD;  [facility]; Service: Gastroenterology   • NH ESOPHAGOGASTRODUODENOSCOPY TRANSORAL DIAGNOSTIC N/A 4/9/2019    Procedure: ESOPHAGOGASTRODUODENOSCOPY (EGD) endoscopic necrosectomy;  Surgeon: Abby Pryor MD;  [facility]; Service: Gastroenterology   • NH ESOPHAGOGASTRODUODENOSCOPY TRANSORAL DIAGNOSTIC N/A 4/26/2019    Procedure: ESOPHAGOGASTRODUODENOSCOPY (EGD); Surgeon: Abby Pryor MD;  [facility]; Service: Gastroenterology   • NH ESOPHAGOGASTRODUODENOSCOPY TRANSORAL DIAGNOSTIC N/A 5/2/2023    Procedure: ESOPHAGOGASTRODUODENOSCOPY (EGD); Surgeon: Jennie Sanchez MD;  [facility];  Service: General   • NH RPR 1ST INGUN HRNA AGE 5 YRS/> REDUCIBLE Right 1/22/2020    Procedure: REPAIR HERNIA INGUINAL;  Surgeon: Juan Loomis MD;  [facility];  Service: General   • NH THORACOSCOPY W/PARTIAL PULMONARY DECORTICATION Left 5/20/2019    Procedure: DECORTICATION LUNG;  Surgeon: Doreen Genao MD;  [facility];  Service: Thoracic   • NH THORACOSCOPY W/PARTIAL PULMONARY DECORTICATION Left 5/20/2019    Procedure: THORACOSCOPY VIDEO ASSISTED SURGERY (VATS);   Surgeon: Doreen Genao MD;  [facility];  Service: Thoracic   • NH THORACOSCOPY W/PLEURODESIS N/A 5/20/2019    Procedure: PLEURODESIS mechanical;  Surgeon: Keegan Maynard MD;  [facility];  Service: Thoracic   • UPPER GASTROINTESTINAL ENDOSCOPY  09/19/2019     Social History   Social History     Substance and Sexual Activity   Alcohol Use Yes    Comment: Rare wine     Social History     Substance and Sexual Activity   Drug Use No     Social History     Tobacco Use   Smoking Status Never   Smokeless Tobacco Never     Family History   Problem Relation Age of Onset   • Lung cancer Mother 79        Smoker    • Transient ischemic attack Father 61   • Lung disease Neg Hx        Meds/Allergies       Current Outpatient Medications:   •  pantoprazole (PROTONIX) 40 mg tablet  •  acetaminophen (TYLENOL) 325 mg tablet  •  methocarbamol (ROBAXIN) 750 mg tablet  •  sucralfate (CARAFATE) 1 g tablet    No Known Allergies        Objective     Blood pressure 132/84, pulse 68, temperature 97.6 °F (36.4 °C), temperature source Tympanic, weight 89.7 kg (197 lb 12.8 oz). Body mass index is 24.08 kg/m². PHYSICAL EXAM:      Physical Exam  Constitutional:       Appearance: Normal appearance. He is well-developed. HENT:      Head: Normocephalic and atraumatic. Eyes:      General: No scleral icterus. Conjunctiva/sclera: Conjunctivae normal.      Pupils: Pupils are equal, round, and reactive to light. Cardiovascular:      Rate and Rhythm: Normal rate and regular rhythm. Heart sounds: Normal heart sounds. Pulmonary:      Effort: Pulmonary effort is normal. No respiratory distress. Breath sounds: Normal breath sounds. Abdominal:      General: Bowel sounds are normal. There is no distension. Palpations: Abdomen is soft. There is no mass. Tenderness: There is no abdominal tenderness. Hernia: No hernia is present. Musculoskeletal:         General: Normal range of motion. Cervical back: Normal range of motion. Lymphadenopathy:      Cervical: No cervical adenopathy. Skin:     General: Skin is warm. Neurological:      Mental Status: He is alert and oriented to person, place, and time. Psychiatric:         Behavior: Behavior normal.         Thought Content: Thought content normal.         Lab Results:   No visits with results within 1 Day(s) from this visit.    Latest known visit with results is:   Lab on 04/11/2023   Component Date Value   • ABO Grouping 04/11/2023 O    • Rh Factor 04/11/2023 Positive    • Antibody Screen 04/11/2023 Negative    • Specimen Expiration Date 04/11/2023 [id number]    • Sodium 04/11/2023 137    • Potassium 04/11/2023 4.3    • Chloride 04/11/2023 106    • CO2 04/11/2023 28    • ANION GAP 04/11/2023 3 (L)    • BUN 04/11/2023 19    • Creatinine 04/11/2023 1.16    • Glucose 04/11/2023 68    • Calcium 04/11/2023 9.6    • AST 04/11/2023 20    • ALT 04/11/2023 25    • Alkaline Phosphatase 04/11/2023 86    • Total Protein 04/11/2023 6.9    • Albumin 04/11/2023 3.7    • Total Bilirubin 04/11/2023 1.28 (H)    • eGFR 04/11/2023 66    • WBC 04/11/2023 8.52    • RBC 04/11/2023 5.21    • Hemoglobin 04/11/2023 15.9    • Hematocrit 04/11/2023 45.8    • MCV 04/11/2023 88    • MCH 04/11/2023 30.5    • MCHC 04/11/2023 34.7    • RDW 04/11/2023 13.1    • MPV 04/11/2023 10.0    • Platelets 07/46/8834 188    • nRBC 04/11/2023 0    • Neutrophils Relative 04/11/2023 75    • Immat GRANS % 04/11/2023 1    • Lymphocytes Relative 04/11/2023 12 (L)    • Monocytes Relative 04/11/2023 11    • Eosinophils Relative 04/11/2023 1    • Basophils Relative 04/11/2023 0    • Neutrophils Absolute 04/11/2023 6.48    • Immature Grans Absolute 04/11/2023 0.05    • Lymphocytes Absolute 04/11/2023 1.01    • Monocytes Absolute 04/11/2023 0.92    • Eosinophils Absolute 04/11/2023 0.04    • Basophils Absolute 04/11/2023 0.02    • PTT 04/11/2023 26    • Protime 04/11/2023 12.4    • INR 04/11/2023 0.91    • Ventricular Rate 04/11/2023 86    • Atrial Rate 04/11/2023 86    • OK Interval 04/11/2023 144    • QRSD Interval 04/11/2023 82 • QT Interval 04/11/2023 342    • QTC Interval 04/11/2023 409    • P Axis 04/11/2023 51    • QRS Axis 04/11/2023 -42    • T Wave Axis 04/11/2023 54          Radiology Results:   No results found.

## 2023-09-27 ENCOUNTER — OFFICE VISIT (OUTPATIENT)
Dept: CARDIOLOGY CLINIC | Facility: CLINIC | Age: 63
End: 2023-09-27
Payer: COMMERCIAL

## 2023-09-27 VITALS
HEIGHT: 76 IN | BODY MASS INDEX: 24.05 KG/M2 | OXYGEN SATURATION: 99 % | SYSTOLIC BLOOD PRESSURE: 118 MMHG | WEIGHT: 197.5 LBS | DIASTOLIC BLOOD PRESSURE: 82 MMHG | HEART RATE: 57 BPM

## 2023-09-27 DIAGNOSIS — I48.0 PAROXYSMAL ATRIAL FIBRILLATION (HCC): Primary | ICD-10-CM

## 2023-09-27 DIAGNOSIS — D68.9 COAGULATION DISORDER (HCC): ICD-10-CM

## 2023-09-27 DIAGNOSIS — R79.1 SUPRATHERAPEUTIC INR: ICD-10-CM

## 2023-09-27 PROCEDURE — 93000 ELECTROCARDIOGRAM COMPLETE: CPT | Performed by: INTERNAL MEDICINE

## 2023-09-27 PROCEDURE — 99214 OFFICE O/P EST MOD 30 MIN: CPT | Performed by: INTERNAL MEDICINE

## 2023-09-27 NOTE — PROGRESS NOTES
Cardiology Follow Up    Mihaela Hays  1960  [id number]  [address] [place] [id number]    1. Paroxysmal atrial fibrillation (HCC)  POCT ECG    Lipid Panel with Direct LDL reflex      2. Coagulation disorder ([address])  POCT ECG    Lipid Panel with Direct LDL reflex      3. Supratherapeutic INR            Discussion/Summary: Since getting his recent gastric surgery for reflux he has been feeling quite well denies any cardiovascular symptoms. Functional capacity is good. Blood pressure is well controlled. No recurrence of atrial fibrillation. Continue on 81 mg of aspirin. Echocardiogram from last year was personally reviewed with the patient preserved LV systolic function. Continue with diet and exercise he is not due for any testing I will see him back in 12 to 18 months. Interval History:  65-year-old gentleman I met in the hospital recently with episodes severe pancreatitis and volume overload secondary to low protein levels. He had no signs of heart failure normal ventricle. He was shocked several times to get out of atrial fibrillation and eventually placed on IV amiodarone to maintain sinus rhythm during acute illness. Overall feels well denies any chest pain, shortness of breath, palpitations, lightheadedness, dizziness, or syncope. Is been a lower extremity edema, PND, orthopnea. He has been taking all medications as prescribed.   Problem List     History of acute pancreatitis    Pancreatitis    Overview Signed 1/4/2019 10:19 AM by Mick Goldman PA-C     Added automatically from request for surgery [id number]         Delirium    Leukocytosis    Pulmonary edema    Hypokalemia    Metabolic alkalosis    Dysphagia    Metabolic encephalopathy    Paroxysmal atrial fibrillation (HCC)    Supratherapeutic INR    Abdominal pain    Pancreatic pseudocyst    Pleural effusion, left    Protein-calorie malnutrition ([address])    Overview Signed 2/4/2019  3:51 PM by Loretta Vance PA-C     Added automatically from request for surgery [id number]         Malnutrition Findings:           BMI Findings: Body mass index is 24.04 kg/m². Abnormal CT of the abdomen        Past Medical History:   Diagnosis Date   • Atrial fibrillation (HCC)    • Epigastric pain 06/24/2020   • Gastroesophageal reflux    • GERD (gastroesophageal reflux disease)    • Irregular heart beat     Afib no problem since 2020   • Pancreatitis    • Pleural effusion    • Pleural effusion associated with pancreatitis 02/03/2019   • Second hand smoke exposure      Social History     Socioeconomic History   • Marital status: /Civil Union     Spouse name: Not on file   • Number of children: Not on file   • Years of education: Not on file   • Highest education level: Not on file   Occupational History   • Not on file   Tobacco Use   • Smoking status: Never   • Smokeless tobacco: Never   Vaping Use   • Vaping Use: Never used   Substance and Sexual Activity   • Alcohol use: Yes     Comment: Rare wine   • Drug use: No   • Sexual activity: Not on file   Other Topics Concern   • Not on file   Social History Narrative   • Not on file     Social Determinants of Health     Financial Resource Strain: Not on file   Food Insecurity: Not on file   Transportation Needs: Not on file   Physical Activity: Not on file   Stress: Not on file   Social Connections: Not on file   Intimate Partner Violence: Not on file   Housing Stability: Not on file      Family History   Problem Relation Age of Onset   • Lung cancer Mother 79        Smoker    • Transient ischemic attack Father 61   • Lung disease Neg Hx      Past Surgical History:   Procedure Laterality Date   • CHOLECYSTECTOMY     • COLONOSCOPY N/A 3/21/2016    Procedure: COLONOSCOPY;  Surgeon: Adele Aguilar DO;  [facility];   Service:    • ERCP N/A 1/4/2019    Procedure: ENDOSCOPIC RETROGRADE CHOLANGIOPANCREATOGRAPHY (ERCP); Surgeon: Francisco Casey MD;  [facility]: [facility]; Service: Gastroenterology   • ERCP  08/11/2020   • ESOPHAGOGASTRODUODENOSCOPY N/A 2/5/2019    Procedure: ESOPHAGOGASTRODUODENOSCOPY (EGD), with possible nasojejunal Dobhoff tube placement;  Surgeon: Aleksey Grande MD;  [facility]; Service: Gastroenterology   • ESOPHAGOGASTRODUODENOSCOPY N/A 4/3/2019    Procedure: ESOPHAGOGASTRODUODENOSCOPY (EGD)- endoscopic necrosectomy;  Surgeon: Francisco Casey MD;  [facility]; Service: Gastroenterology   • IR IMAGE GUIDED ASPIRATION / DRAINAGE W TUBE  6/17/2019   • IR THORACENTESIS  2/7/2019   • IR THORACENTESIS  3/29/2019   • LINEAR ENDOSCOPIC U/S N/A 3/29/2019    Procedure: LINEAR ENDOSCOPIC U/S cyst gastro;  Surgeon: Francisco Casey MD;  [facility]; Service: Gastroenterology   • PARAESOPHAGEAL HERNIA REPAIR N/A 5/2/2023    Procedure: lap paraesophageal hernia repair & EGD w/ intraoperative TIF;  Surgeon: Rylie Kim MD;  [facility];  Service: General   • MT [address] [place] [address] INCL FLUOR GDNCE DX W/CELL [address] N/A 5/20/2019    Procedure: Maria Esther Cuellar;  Surgeon: Tae Guiterrez MD;  [facility];  Service: Thoracic   • MT EGD TRANSORAL BIOPSY SINGLE/MULTIPLE N/A 3/21/2016    Procedure: ESOPHAGOGASTRODUODENOSCOPY (EGD); Surgeon: Jacki Luna DO;  [facility]; Service: General   • MT ERCP DX COLLECTION SPECIMEN BRUSHING/WASHING N/A 4/26/2019    Procedure: ENDOSCOPIC RETROGRADE CHOLANGIOPANCREATOGRAPHY (ERCP); Surgeon: Francisco Casey MD;  [facility]; Service: Gastroenterology   • MT ESOPHAGOGASTRODUODENOSCOPY TRANSORAL DIAGNOSTIC N/A 4/9/2019    Procedure: ESOPHAGOGASTRODUODENOSCOPY (EGD) endoscopic necrosectomy;  Surgeon: Francisco Casey MD;  [facility]; Service: Gastroenterology   • MT ESOPHAGOGASTRODUODENOSCOPY TRANSORAL DIAGNOSTIC N/A 4/26/2019    Procedure: ESOPHAGOGASTRODUODENOSCOPY (EGD); Surgeon: Francisco Casey MD;  [facility];   Service: Gastroenterology   • KS ESOPHAGOGASTRODUODENOSCOPY TRANSORAL DIAGNOSTIC N/A 5/2/2023    Procedure: ESOPHAGOGASTRODUODENOSCOPY (EGD); Surgeon: Sarah Rosa MD;  [facility];  Service: General   • KS RPR 1ST INGUN HRNA AGE 5 YRS/> REDUCIBLE Right 1/22/2020    Procedure: REPAIR HERNIA INGUINAL;  Surgeon: Jaelyn Sousa MD;  [facility];  Service: General   • KS THORACOSCOPY W/PARTIAL PULMONARY DECORTICATION Left 5/20/2019    Procedure: DECORTICATION LUNG;  Surgeon: Karolina Cota MD;  [facility];  Service: Thoracic   • KS THORACOSCOPY W/PARTIAL PULMONARY DECORTICATION Left 5/20/2019    Procedure: THORACOSCOPY VIDEO ASSISTED SURGERY (VATS);   Surgeon: Karolina Cota MD;  [facility];  Service: Thoracic   • KS THORACOSCOPY W/PLEURODESIS N/A 5/20/2019    Procedure: PLEURODESIS mechanical;  Surgeon: Karolina Cota MD;  [facility];  Service: Thoracic   • UPPER GASTROINTESTINAL ENDOSCOPY  09/19/2019       Current Outpatient Medications:   •  aspirin (ECOTRIN LOW STRENGTH) 81 mg EC tablet, Take 81 mg by mouth daily, Disp: , Rfl:   •  acetaminophen (TYLENOL) 325 mg tablet, Take 3 tablets (975 mg total) by mouth every 8 (eight) hours (Patient not taking: Reported on 6/16/2023), Disp: 30 tablet, Rfl: 0  •  methocarbamol (ROBAXIN) 750 mg tablet, Take 1 tablet (750 mg total) by mouth every 6 (six) hours for 26 doses, Disp: 30 tablet, Rfl: 0  •  pantoprazole (PROTONIX) 40 mg tablet, Take 1 tablet (40 mg total) by mouth daily in the early morning (Patient not taking: Reported on 9/27/2023), Disp: 30 tablet, Rfl: 0  •  sucralfate (CARAFATE) 1 g tablet, Take 1 tablet (1 g total) by mouth 4 (four) times a day (before meals and at bedtime) (Patient not taking: Reported on 6/16/2023), Disp: 60 tablet, Rfl: 0  No Known Allergies    Labs:     Chemistry        Component Value Date/Time     01/05/2015 1131    K 4.3 04/11/2023 1307    K 3.9 01/05/2015 1131     04/11/2023 1307     01/05/2015 1131    CO2 28 04/11/2023 1307    CO2 24 01/05/2019 0314    BUN 19 04/11/2023 1307    BUN 19 01/05/2015 1131    CREATININE 1.16 04/11/2023 1307    CREATININE 1.01 01/05/2015 1131        Component Value Date/Time    CALCIUM 9.6 04/11/2023 1307    CALCIUM 9.2 01/05/2015 1131    ALKPHOS 86 04/11/2023 1307    ALKPHOS 102 01/05/2015 1131    AST 20 04/11/2023 1307    AST 22 01/05/2015 1131    ALT 25 04/11/2023 1307    ALT 43 01/05/2015 1131    BILITOT 1.00 01/05/2015 1131            No results found for: "CHOL"  Lab Results   Component Value Date    HDL 51 08/09/2019    HDL 59 01/03/2019     Lab Results   Component Value Date    LDLCALC 87 08/09/2019    LDLCALC 77 01/03/2019     Lab Results   Component Value Date    TRIG 108 08/09/2019    TRIG 52 01/03/2019     No results found for: "CHOLHDL"    Imaging: Ct Abdomen Pelvis Wo Contrast    Result Date: 2/3/2019  Narrative: CT ABDOMEN AND PELVIS WITHOUT IV CONTRAST INDICATION:   Abdominal pain, unspecified. Recent history of pancreatitis. COMPARISON:  Contrast-enhanced CT of the abdomen and pelvis from January 3, 2019. TECHNIQUE:  CT examination of the abdomen and pelvis was performed without intravenous contrast.  Axial, sagittal, and coronal 2D reformatted images were created from the source data and submitted for interpretation. Radiation dose length product (DLP) for this visit:  789 mGy-cm . This examination, like all CT scans performed in the [facility], was performed utilizing techniques to minimize radiation dose exposure, including the use of iterative reconstruction and automated exposure control. Enteric contrast was administered. The absence of intravenous contrast limits evaluation of the abdominal and pelvic viscera. FINDINGS: ABDOMEN LOWER CHEST:  Moderate size left pleural effusion, developing since the last CT. Compressive atelectasis in the base of the left lower lobe.   Subsegmental atelectasis in the base of the right lower lobe. LIVER/BILIARY TREE:  1.6 cm cyst in the dome of the right lobe. Otherwise, liver grossly normal.  Stent in CBD. No intrahepatic bile duct dilatation. Tiny amount of pneumobilia in the left lobe GALLBLADDER:  .  Postcholecystectomy. SPLEEN:  Normal in size. Compressed by adjacent 12 x 14 cm discrete fluid collection, either loculated ascites or pseudocyst. PANCREAS:  Evaluation limited without intravenous contrast.  6 x 11 x 14 cm fluid collection in the pancreatic bed, consistent with pseudocyst, extending into the mesenteric root. Pancreas appears to be grossly intact. ADRENAL GLANDS:  Unremarkable. KIDNEYS/URETERS:  Unremarkable. No hydronephrosis. 2 x 5 x 10 cm fluid collection in the left posterior pararenal space, extending into the left subphrenic space. STOMACH AND BOWEL:  Stomach displaced by the large pancreatic pseudocyst.  Small intestine unremarkable. Diverticulosis in the sigmoid without evidence of diverticulitis. APPENDIX:  No findings to suggest appendicitis. ABDOMINOPELVIC CAVITY: Several prominent mesenteric lymph nodes, the largest a 1.2 x 1.8 cm node at the root of the mesentery. Scattered areas of mesenteric edema. Small amount of pelvic and perihepatic ascites. No extraluminal gas. VESSELS:  Unremarkable for patient's age. PELVIS REPRODUCTIVE ORGANS:  Prostate unremarkable. URINARY BLADDER:  Unremarkable. ABDOMINAL WALL/INGUINAL REGIONS:  Unremarkable. OSSEOUS STRUCTURES:  No acute fracture or destructive osseous lesion. Impression: 1. Moderate-sized left pleural effusion with compressive atelectasis in the subjacent portion of the left lower lobe. 2.  6 x 11 x 14 cm fluid-filled structure in the pancreatic bed, typical of a pseudocyst, extending into the mesenteric root. 3.  Several additional loculated fluid collections in the abdomen as described above, probably additional pseudocysts.   These include a 12 x 14 cm perisplenic collection and a 2 x 5 x 10 cm left retroperitoneal collection. 4.  Small amount of ascites. 5.  Sigmoid diverticulosis without evidence of diverticulitis. 6.  CBD stent in place with no intrahepatic bile duct dilatation. Workstation performed: [id number]     Xr Chest Portable    Result Date: 2/8/2019  Narrative: CHEST INDICATION:   Significant pleuritic chest pain s/p thoracentesis. COMPARISON:  January 18, 2019 EXAM PERFORMED/VIEWS:  XR CHEST PORTABLE FINDINGS:  Enteric tube terminates below the diaphragms. Right upper extremity PICC line has been removed in the interval. Cardiomediastinal silhouette appears unremarkable. Small left pleural effusion slightly similar in size from prior study. No pneumothorax. Osseous structures appear within normal limits for patient age. Impression: No pneumothorax. Stable size small to moderate left pleural effusion. Workstation performed: [id number]     Ir Thoracentesis    Result Date: 2/7/2019  Narrative: Ultrasound-guided thoracentesis Clinical History: Left pleural effusion . Technique: The patient was brought to the interventional radiology area and placed in the sitting position on the side of a stretcher. The left chest was then examined with ultrasound and the skin was marked. The skin was then prepped, and draped in usual sterile fashion. Lidocaine was administered to the skin and a small skin incision was made. Under direct ultrasound guidance, a 5 [place] Yueh multisidehole catheter was advanced into the pleural space. 1200 mL clear ricky pleural fluid was aspirated. Specimens were sent to lab as requested . The patient tolerated the procedure well and suffered no complications. Impression: Impression: 1. Successful ultrasound-guided thoracentesis yielding 1200 mL clear ricky pleural fluid.  Workstation performed: [id number]       ECG:    Normal sinus rhythm      ROS    Vitals:    09/27/23 1102   BP: 118/82   Pulse: 57   SpO2: 99%     Vitals:    09/27/23 1102   Weight: 89.6 kg (197 lb 8 oz)     Height: 6' 4" (193 cm)   Body mass index is 24.04 kg/m². Physical Exam:  Vital signs reviewed  General:  Alert and cooperative, appears stated age, no acute distress  HEENT:  PERRLA, EOMI, no scleral icterus, no conjunctival pallor  Neck:  No lymphadenopathy, no thyromegaly, no carotid bruits, no elevated JVP  Heart:  Regular rate and rhythm, normal S1/S2, no S3/S4, no murmur, rubs or gallops. PMI nondisplaced  Lungs:  Clear to auscultation bilaterally, no wheezes rales or rhonchi  Abdomen:  Soft, non-tender, positive bowel sounds, no rebound or guarding,   no organomegaly   Extremities:  Normal range of motion.   No clubbing, cyanosis or edema   Vascular:  2+ pedal pulses  Skin:  No rashes or lesions on exposed skin  Neurologic:  Cranial nerves II-XII grossly intact without focal deficits  Psych:  Normal mood and affect

## 2023-10-04 ENCOUNTER — OFFICE VISIT (OUTPATIENT)
Dept: FAMILY MEDICINE CLINIC | Facility: CLINIC | Age: 63
End: 2023-10-04
Payer: COMMERCIAL

## 2023-10-04 VITALS
OXYGEN SATURATION: 96 % | DIASTOLIC BLOOD PRESSURE: 78 MMHG | RESPIRATION RATE: 16 BRPM | TEMPERATURE: 97.8 F | HEART RATE: 68 BPM | HEIGHT: 75 IN | WEIGHT: 195.2 LBS | BODY MASS INDEX: 24.27 KG/M2 | SYSTOLIC BLOOD PRESSURE: 126 MMHG

## 2023-10-04 DIAGNOSIS — R41.3 MEMORY CHANGES: ICD-10-CM

## 2023-10-04 DIAGNOSIS — Z12.5 SCREENING FOR PROSTATE CANCER: ICD-10-CM

## 2023-10-04 DIAGNOSIS — Z00.00 ANNUAL PHYSICAL EXAM: Primary | ICD-10-CM

## 2023-10-04 PROCEDURE — 99386 PREV VISIT NEW AGE 40-64: CPT | Performed by: STUDENT IN AN ORGANIZED HEALTH CARE EDUCATION/TRAINING PROGRAM

## 2023-10-04 NOTE — PROGRESS NOTES
ADULT ANNUAL [address] [facility]    NAME: Natalie Garcia  AGE: 60 y.o. SEX: male  : 1960     DATE: 10/4/2023     Assessment and Plan:     Problem List Items Addressed This Visit    None  Visit Diagnoses     Annual physical exam    -  Primary    Relevant Orders    PSA, Total Screen    Hemoglobin A1C    CBC and Platelet    Comprehensive metabolic panel    TSH, 3rd generation with Free T4 reflex    Vitamin D 25 hydroxy    Vitamin B12    Folate    Screening for prostate cancer        Relevant Orders    PSA, Total Screen    Memory changes        Relevant Orders    Vitamin B12    Folate    Ambulatory Referral to Neurology        Annual physical exam: Discussed with patient healthy eating and exercise regimen, will order for routine physical labs    Screening for prostate cancer: PSA ordered    Evaluation of memory changes: Unsure of patient's baseline however during encounter today alert and oriented, will order for B12 and folate along with other labs and refer to neurology for further evaluation and discussion    Immunizations and preventive care screenings were discussed with patient today. Appropriate education was printed on patient's after visit summary. Counseling:  · Exercise: the importance of regular exercise/physical activity was discussed. Recommend exercise 3-5 times per week for at least 30 minutes. Depression Screening and Follow-up Plan: Patient was screened for depression during today's encounter. They screened negative with a PHQ-2 score of 1. Return in about 6 months (around 2024) for Followup.      Chief Complaint:     Chief Complaint   Patient presents with   • Physical Exam      History of Present Illness:     Adult Annual Physical   Patient here for a comprehensive physical exam.     Dana Erickson is a 79-year-old male who presents to the office today to establish care and for physical exam. Patient is accompanied by his wife Abraham Alcala. Patient with past medical history of A-fib on aspirin, managed by Dr. 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 Patient with recent history of paraesophageal hernia repair in May of this year has continued to follow-up with general surgery has another appointment 10/13. Patient reports feeling well with no acute complaints or concerns at this time. However patient's wife does report some noted memory deficits/changes since recent surgeries. She states he is unable to recall things discussed a few days ago which is concerning to her and would like a referral to specialist for further evaluation. Diet and Physical Activity  · Diet/Nutrition: well balanced diet and consuming 3-5 servings of fruits/vegetables daily. · Exercise: walking, 1-2 times a week on average and 3-4 times a week on average. Depression Screening  PHQ-2/9 Depression Screening    Little interest or pleasure in doing things: 0 - not at all  Feeling down, depressed, or hopeless: 1 - several days  PHQ-2 Score: 1  PHQ-2 Interpretation: Negative depression screen       General Health  · Sleep: gets 7-8 hours of sleep on average. · Hearing: normal - bilateral.  · Vision: no vision problems and goes for regular eye exams. · Dental: regular dental visits.  Health  · Symptoms include: none     Review of Systems:     Review of Systems   Constitutional: Negative for appetite change, chills, fatigue and fever. HENT: Negative for congestion. Respiratory: Negative for cough and shortness of breath. Cardiovascular: Negative for chest pain, palpitations and leg swelling. Gastrointestinal: Negative for abdominal pain, diarrhea, nausea and vomiting. Neurological: Negative for dizziness, light-headedness and headaches.       Past Medical History:     Past Medical History:   Diagnosis Date   • Atrial fibrillation (HCC)    • GERD (gastroesophageal reflux disease)    • Irregular heart beat     Afib no problem since 2020   • Pancreatitis    • Pleural effusion       Past Surgical History:     Past Surgical History:   Procedure Laterality Date   • CHOLECYSTECTOMY     • COLONOSCOPY N/A 3/21/2016    Procedure: COLONOSCOPY;  Surgeon: Josselin Portillo DO;  [facility]; Service:    • ERCP N/A 1/4/2019    Procedure: ENDOSCOPIC RETROGRADE CHOLANGIOPANCREATOGRAPHY (ERCP); Surgeon: Dolly Mcdermott MD;  [facility]; Service: Gastroenterology   • ERCP  08/11/2020   • ESOPHAGOGASTRODUODENOSCOPY N/A 2/5/2019    Procedure: ESOPHAGOGASTRODUODENOSCOPY (EGD), with possible nasojejunal Dobhoff tube placement;  Surgeon: Bhargavi Andrade MD;  [facility]; Service: Gastroenterology   • ESOPHAGOGASTRODUODENOSCOPY N/A 4/3/2019    Procedure: ESOPHAGOGASTRODUODENOSCOPY (EGD)- endoscopic necrosectomy;  Surgeon: Dolly Mcdermott MD;  [facility]; Service: Gastroenterology   • IR IMAGE GUIDED ASPIRATION / DRAINAGE W TUBE  6/17/2019   • IR THORACENTESIS  2/7/2019   • IR THORACENTESIS  3/29/2019   • LINEAR ENDOSCOPIC U/S N/A 3/29/2019    Procedure: LINEAR ENDOSCOPIC U/S cyst gastro;  Surgeon: Dolly Mcdermott MD;  [facility]; Service: Gastroenterology   • PARAESOPHAGEAL HERNIA REPAIR N/A 5/2/2023    Procedure: lap paraesophageal hernia repair & EGD w/ intraoperative TIF;  Surgeon: Eduar uK MD;  [facility];  Service: General   •  [address] INCL FLUOR GDNCE DX W/CELL [facility] N/A 5/20/2019    Procedure: Joelalfreda Shelton;  Surgeon: Adam Salmeron MD;  [facility];  Service: Thoracic   • TN EGD TRANSORAL BIOPSY SINGLE/MULTIPLE N/A 3/21/2016    Procedure: ESOPHAGOGASTRODUODENOSCOPY (EGD); Surgeon: Josselin Portillo DO;  [facility];   Service: General   • TN ERCP DX COLLECTION SPECIMEN BRUSHING/WASHING N/A 4/26/2019    Procedure: ENDOSCOPIC RETROGRADE CHOLANGIOPANCREATOGRAPHY (ERCP); Surgeon: Xochitl Prieto MD;  [facility]; Service: Gastroenterology   • WI ESOPHAGOGASTRODUODENOSCOPY TRANSORAL DIAGNOSTIC N/A 4/9/2019    Procedure: ESOPHAGOGASTRODUODENOSCOPY (EGD) endoscopic necrosectomy;  Surgeon: Xochitl Prieto MD;  [facility]; Service: Gastroenterology   • WI ESOPHAGOGASTRODUODENOSCOPY TRANSORAL DIAGNOSTIC N/A 4/26/2019    Procedure: ESOPHAGOGASTRODUODENOSCOPY (EGD); Surgeon: Xochitl Prieto MD;  [facility]; Service: Gastroenterology   • WI ESOPHAGOGASTRODUODENOSCOPY TRANSORAL DIAGNOSTIC N/A 5/2/2023    Procedure: ESOPHAGOGASTRODUODENOSCOPY (EGD); Surgeon: Shereen Duckworth MD;  [facility];  Service: General   • WI RPR 1ST INGUN HRNA AGE 5 YRS/> REDUCIBLE Right 1/22/2020    Procedure: REPAIR HERNIA INGUINAL;  Surgeon: Tanesha Murillo MD;  [facility];  Service: General   • WI THORACOSCOPY W/PARTIAL PULMONARY DECORTICATION Left 5/20/2019    Procedure: DECORTICATION LUNG;  Surgeon: Rachel Ortiz MD;  [facility];  Service: Thoracic   • WI THORACOSCOPY W/PARTIAL PULMONARY DECORTICATION Left 5/20/2019    Procedure: THORACOSCOPY VIDEO ASSISTED SURGERY (VATS);   Surgeon: Rachel Ortiz MD;  [facility];  Service: Thoracic   • WI THORACOSCOPY W/PLEURODESIS N/A 5/20/2019    Procedure: PLEURODESIS mechanical;  Surgeon: Rachel Ortiz MD;  [facility];  Service: Thoracic   • UPPER GASTROINTESTINAL ENDOSCOPY  09/19/2019      Family History:     Family History   Problem Relation Age of Onset   • Lung cancer Mother 79        Smoker    • Transient ischemic attack Father 61   • Lung disease Neg Hx       Social History:     Social History     Socioeconomic History   • Marital status: /Civil Union     Spouse name: None   • Number of children: None   • Years of education: None   • Highest education level: None   Occupational History   • 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   Tobacco Use   • Smoking status: Never   • Smokeless tobacco: Never   Vaping Use   • Vaping Use: Never used   Substance and Sexual Activity   • Alcohol use: Yes     Comment: Rare wine   • Drug use: No   • Sexual activity: None   Other Topics Concern   • None   Social History Narrative    Drinks soda every once in a while     Coffee everyday in the morning sometimes 2 cups      Social Determinants of Health     Financial Resource Strain: Not on file   Food Insecurity: Not on file   Transportation Needs: Not on file   Physical Activity: Not on file   Stress: Not on file   Social Connections: Not on file   Intimate Partner Violence: Not on file   Housing Stability: Not on file      Current Medications:     Current Outpatient Medications   Medication Sig Dispense Refill   • aspirin (ECOTRIN LOW STRENGTH) 81 mg EC tablet Take 81 mg by mouth daily       No current facility-administered medications for this visit. Allergies:     No Known Allergies   Physical Exam:     /78   Pulse 68   Temp 97.8 °F (36.6 °C)   Resp 16   Ht 6' 2.5" (1.892 m)   Wt 88.5 kg (195 lb 3.2 oz)   SpO2 96%   BMI 24.73 kg/m²     Physical Exam  Vitals reviewed. Constitutional:       General: He is not in acute distress. Appearance: He is normal weight. HENT:      Head: Normocephalic and atraumatic. Right Ear: Tympanic membrane, ear canal and external ear normal.      Left Ear: Tympanic membrane, ear canal and external ear normal.      Nose: Nose normal.      Mouth/Throat:      Mouth: Mucous membranes are moist.   Eyes:      Extraocular Movements: Extraocular movements intact. Pupils: Pupils are equal, round, and reactive to light. Cardiovascular:      Rate and Rhythm: Normal rate and regular rhythm. Pulses: Normal pulses. Heart sounds: Normal heart sounds. Pulmonary:      Effort: Pulmonary effort is normal.      Breath sounds: Normal breath sounds. Abdominal:      Palpations: Abdomen is soft.       Tenderness: There is no abdominal tenderness. Musculoskeletal:      Cervical back: Normal range of motion and neck supple. Right lower leg: No edema. Left lower leg: No edema. Neurological:      Mental Status: He is alert and oriented to person, place, and time.           Erika Mendoza MD  [facility]

## 2023-10-05 ENCOUNTER — APPOINTMENT (OUTPATIENT)
Dept: LAB | Facility: CLINIC | Age: 63
End: 2023-10-05
Payer: COMMERCIAL

## 2023-10-05 DIAGNOSIS — Z12.5 SCREENING FOR PROSTATE CANCER: ICD-10-CM

## 2023-10-05 DIAGNOSIS — R41.3 MEMORY CHANGES: ICD-10-CM

## 2023-10-05 DIAGNOSIS — D68.9 COAGULATION DISORDER (HCC): ICD-10-CM

## 2023-10-05 DIAGNOSIS — Z00.00 ANNUAL PHYSICAL EXAM: ICD-10-CM

## 2023-10-05 DIAGNOSIS — I48.0 PAROXYSMAL ATRIAL FIBRILLATION (HCC): ICD-10-CM

## 2023-10-05 LAB
25(OH)D3 SERPL-MCNC: 20.3 NG/ML (ref 30–100)
ALBUMIN SERPL BCP-MCNC: 4.2 G/DL (ref 3.5–5)
ALP SERPL-CCNC: 70 U/L (ref 34–104)
ALT SERPL W P-5'-P-CCNC: 19 U/L (ref 7–52)
ANION GAP SERPL CALCULATED.3IONS-SCNC: 6 MMOL/L
AST SERPL W P-5'-P-CCNC: 23 U/L (ref 13–39)
BILIRUB SERPL-MCNC: 2.19 MG/DL (ref 0.2–1)
BUN SERPL-MCNC: 22 MG/DL (ref 5–25)
CALCIUM SERPL-MCNC: 9.3 MG/DL (ref 8.4–10.2)
CHLORIDE SERPL-SCNC: 104 MMOL/L (ref 96–108)
CHOLEST SERPL-MCNC: 154 MG/DL
CO2 SERPL-SCNC: 30 MMOL/L (ref 21–32)
CREAT SERPL-MCNC: 1.05 MG/DL (ref 0.6–1.3)
ERYTHROCYTE [DISTWIDTH] IN BLOOD BY AUTOMATED COUNT: 13 % (ref 11.6–15.1)
EST. AVERAGE GLUCOSE BLD GHB EST-MCNC: 105 MG/DL
FOLATE SERPL-MCNC: 8.1 NG/ML
GFR SERPL CREATININE-BSD FRML MDRD: 75 ML/MIN/1.73SQ M
GLUCOSE P FAST SERPL-MCNC: 89 MG/DL (ref 65–99)
HBA1C MFR BLD: 5.3 %
HCT VFR BLD AUTO: 46 % (ref 36.5–49.3)
HDLC SERPL-MCNC: 58 MG/DL
HGB BLD-MCNC: 15.8 G/DL (ref 12–17)
LDLC SERPL CALC-MCNC: 83 MG/DL (ref 0–100)
MCH RBC QN AUTO: 30.7 PG (ref 26.8–34.3)
MCHC RBC AUTO-ENTMCNC: 34.3 G/DL (ref 31.4–37.4)
MCV RBC AUTO: 89 FL (ref 82–98)
PLATELET # BLD AUTO: 187 THOUSANDS/UL (ref 149–390)
PMV BLD AUTO: 9.7 FL (ref 8.9–12.7)
POTASSIUM SERPL-SCNC: 4.4 MMOL/L (ref 3.5–5.3)
PROT SERPL-MCNC: 6.5 G/DL (ref 6.4–8.4)
PSA SERPL-MCNC: 2.57 NG/ML (ref 0–4)
RBC # BLD AUTO: 5.15 MILLION/UL (ref 3.88–5.62)
SODIUM SERPL-SCNC: 140 MMOL/L (ref 135–147)
TRIGL SERPL-MCNC: 63 MG/DL
TSH SERPL DL<=0.05 MIU/L-ACNC: 2.48 UIU/ML (ref 0.45–4.5)
VIT B12 SERPL-MCNC: 327 PG/ML (ref 180–914)
WBC # BLD AUTO: 4.67 THOUSAND/UL (ref 4.31–10.16)

## 2023-10-05 PROCEDURE — 80061 LIPID PANEL: CPT

## 2023-10-05 PROCEDURE — 36415 COLL VENOUS BLD VENIPUNCTURE: CPT

## 2023-10-05 PROCEDURE — 82306 VITAMIN D 25 HYDROXY: CPT

## 2023-10-05 PROCEDURE — 80053 COMPREHEN METABOLIC PANEL: CPT

## 2023-10-05 PROCEDURE — G0103 PSA SCREENING: HCPCS

## 2023-10-05 PROCEDURE — 82746 ASSAY OF FOLIC ACID SERUM: CPT

## 2023-10-05 PROCEDURE — 83036 HEMOGLOBIN GLYCOSYLATED A1C: CPT

## 2023-10-05 PROCEDURE — 82607 VITAMIN B-12: CPT

## 2023-10-05 PROCEDURE — 85027 COMPLETE CBC AUTOMATED: CPT

## 2023-10-05 PROCEDURE — 84443 ASSAY THYROID STIM HORMONE: CPT

## 2023-10-06 ENCOUNTER — TELEPHONE (OUTPATIENT)
Dept: FAMILY MEDICINE CLINIC | Facility: CLINIC | Age: 63
End: 2023-10-06

## 2023-10-06 NOTE — TELEPHONE ENCOUNTER
Please inform patient his labs have resulted   All labs look within normal range except vitamin d is low; recommend patient start otc vit d 2000 IU daily   LMOM for pt about blood work results. If he has any questions to call the office.

## 2023-10-11 ENCOUNTER — TELEPHONE (OUTPATIENT)
Dept: FAMILY MEDICINE CLINIC | Facility: CLINIC | Age: 63
End: 2023-10-11

## 2023-10-11 NOTE — TELEPHONE ENCOUNTER
Please let patient know his kidney function test was normal. Also see Dr. Saavedra note 10/6/23 and give him results.

## 2023-10-11 NOTE — TELEPHONE ENCOUNTER
Patient saw blood work results in my chart and the one results said he has chronic kidney failure stage 2

## 2023-10-13 ENCOUNTER — OFFICE VISIT (OUTPATIENT)
Dept: SURGERY | Facility: CLINIC | Age: 63
End: 2023-10-13
Payer: COMMERCIAL

## 2023-10-13 VITALS — BODY MASS INDEX: 24.79 KG/M2 | HEIGHT: 75 IN | WEIGHT: 199.4 LBS | TEMPERATURE: 97.8 F

## 2023-10-13 DIAGNOSIS — K21.9 GASTROESOPHAGEAL REFLUX DISEASE WITHOUT ESOPHAGITIS: ICD-10-CM

## 2023-10-13 DIAGNOSIS — Z87.19 S/P REPAIR OF PARAESOPHAGEAL HERNIA: Primary | ICD-10-CM

## 2023-10-13 DIAGNOSIS — R10.32 LEFT GROIN PAIN: ICD-10-CM

## 2023-10-13 DIAGNOSIS — Z98.890 S/P REPAIR OF PARAESOPHAGEAL HERNIA: Primary | ICD-10-CM

## 2023-10-13 DIAGNOSIS — K22.70 BARRETT'S ESOPHAGUS WITHOUT DYSPLASIA: ICD-10-CM

## 2023-10-13 PROCEDURE — 99214 OFFICE O/P EST MOD 30 MIN: CPT | Performed by: SURGERY

## 2023-10-13 NOTE — PROGRESS NOTES
Office Visit - General Surgery  Deborah Cosby MRN: [id number]  Encounter: [id number]    Assessment and Plan  Problem List Items Addressed This Visit          Digestive    GERD (gastroesophageal reflux disease)    Choudhary's esophagus without dysplasia       Other    S/P repair of paraesophageal hernia - Primary     68-year-old male status post laparoscopic paraesophageal hernia repair with intraoperative TIF on 5/2/2023, here for follow-up. Plan:  He is doing very well from a reflux perspective, and has made some dietary changes to help with some bloating and diarrhea. He can follow-up with Dr. Tam Jones as previously scheduled for his Choudhary's esophagus. Regarding his left groin pain, I am unable to fully discern on physical exam alone whether or not there is a left inguinal hernia. Like to obtain an ultrasound of bilateral groins to evaluate for this. He will return to clinic after the ultrasound. Other Visit Diagnoses       Left groin pain        Relevant Orders    US groin/inguinal area            Chief Complaint:  Deborah Cosby is a 58 y.o. male who presents for Follow-up (F/u paraesophageal hernia.)    Subjective  68-year-old male well-known to me status post laparoscopic paraesophageal hernia repair with intraoperative TIF on 5/2/2023, here for follow-up. Overall, he is doing quite well from an upper GI perspective with no significant complaints. He just had some mild bloating but is otherwise doing well. He denies any heartburn, dysphagia, or odynophagia. He is back to eating all foods and liquids without issue. He is returned to all activities without much change from his preoperative status. He does complain of some mild left groin pain which he feels is minimal, and is related to when he has to have a bowel movement. Today in the office he completed the GERD HR QL questionnaire and scored 4 with overall neutral satisfaction of his current condition.     Past Medical History: Diagnosis Date    Atrial fibrillation (HCC)     GERD (gastroesophageal reflux disease)     Irregular heart beat     Afib no problem since 2020    Pancreatitis     Pleural effusion        Past Surgical History:   Procedure Laterality Date    CHOLECYSTECTOMY      COLONOSCOPY N/A 3/21/2016    Procedure: COLONOSCOPY;  Surgeon: Justice Muro DO;  [facility]; Service:     ERCP N/A 1/4/2019    Procedure: ENDOSCOPIC RETROGRADE CHOLANGIOPANCREATOGRAPHY (ERCP); Surgeon: Linda Hernandez MD;  [facility]; Service: Gastroenterology    ERCP  08/11/2020    ESOPHAGOGASTRODUODENOSCOPY N/A 2/5/2019    Procedure: ESOPHAGOGASTRODUODENOSCOPY (EGD), with possible nasojejunal Dobhoff tube placement;  Surgeon: Ken Jarrell MD;  [facility]; Service: Gastroenterology    ESOPHAGOGASTRODUODENOSCOPY N/A 4/3/2019    Procedure: ESOPHAGOGASTRODUODENOSCOPY (EGD)- endoscopic necrosectomy;  Surgeon: Linda Hernandez MD;  [facility]; Service: Gastroenterology    IR IMAGE GUIDED ASPIRATION / DRAINAGE W TUBE  6/17/2019    IR THORACENTESIS  2/7/2019    IR THORACENTESIS  3/29/2019    LINEAR ENDOSCOPIC U/S N/A 3/29/2019    Procedure: LINEAR ENDOSCOPIC U/S cyst gastro;  Surgeon: Linda Hernandez MD;  [facility]; Service: Gastroenterology    PARAESOPHAGEAL HERNIA REPAIR N/A 5/2/2023    Procedure: lap paraesophageal hernia repair & EGD w/ intraoperative TIF;  Surgeon: Olean Burkitt, MD;  [facility];  Service: General     [address] INCL FLUOR GDNCE DX W/CELL Benjaman Brenham [address] [facility] N/A 5/20/2019    Procedure: Laura Cubero;  Surgeon: Ilan Zurita MD;  [facility];  Service: Thoracic    DE EGD TRANSORAL BIOPSY SINGLE/MULTIPLE N/A 3/21/2016    Procedure: ESOPHAGOGASTRODUODENOSCOPY (EGD); Surgeon: Justice Muro DO;  [facility]; Service: General    DE ERCP DX COLLECTION SPECIMEN BRUSHING/WASHING N/A 4/26/2019    Procedure: ENDOSCOPIC RETROGRADE CHOLANGIOPANCREATOGRAPHY (ERCP);   Surgeon: Rayna Gerard Bernabe Betts MD;  [facility]; Service: Gastroenterology    DC ESOPHAGOGASTRODUODENOSCOPY TRANSORAL DIAGNOSTIC N/A 4/9/2019    Procedure: ESOPHAGOGASTRODUODENOSCOPY (EGD) endoscopic necrosectomy;  Surgeon: Destiney Jimenez MD;  [facility]; Service: Gastroenterology    DC ESOPHAGOGASTRODUODENOSCOPY TRANSORAL DIAGNOSTIC N/A 4/26/2019    Procedure: ESOPHAGOGASTRODUODENOSCOPY (EGD); Surgeon: Destiney Jimenez MD;  [facility]; Service: Gastroenterology    DC ESOPHAGOGASTRODUODENOSCOPY TRANSORAL DIAGNOSTIC N/A 5/2/2023    Procedure: ESOPHAGOGASTRODUODENOSCOPY (EGD); Surgeon: Quinton Licea MD;  [facility];  Service: General    DC RPR 1ST Khurram Condon AGE 5 YRS/> REDUCIBLE Right 1/22/2020    Procedure: REPAIR HERNIA INGUINAL;  Surgeon: Cristobal Cadena MD;  [facility];  Service: General    DC THORACOSCOPY W/PARTIAL PULMONARY DECORTICATION Left 5/20/2019    Procedure: DECORTICATION LUNG;  Surgeon: Donna Lima MD;  [facility];  Service: Thoracic    DC THORACOSCOPY W/PARTIAL PULMONARY DECORTICATION Left 5/20/2019    Procedure: THORACOSCOPY VIDEO ASSISTED SURGERY (VATS);   Surgeon: Donna Lima MD;  [facility];  Service: Thoracic    DC THORACOSCOPY W/PLEURODESIS N/A 5/20/2019    Procedure: PLEURODESIS mechanical;  Surgeon: Donna Lima MD;  [facility];  Service: Thoracic    UPPER GASTROINTESTINAL ENDOSCOPY  09/19/2019       Family History   Problem Relation Age of Onset    Lung cancer Mother 79        Smoker     Transient ischemic attack Father 61    Lung disease Neg Hx        Social History     Tobacco Use    Smoking status: Never    Smokeless tobacco: Never   Vaping Use    Vaping Use: Never used   Substance Use Topics    Alcohol use: Yes     Comment: Rare wine    Drug use: No        Medications  Current Outpatient Medications on File Prior to Visit   Medication Sig Dispense Refill    aspirin (ECOTRIN LOW STRENGTH) 81 mg EC tablet Take 81 mg by mouth daily       No current facility-administered medications on file prior to visit. Allergies  No Known Allergies    Review of Systems   Constitutional:  Negative for appetite change, chills, diaphoresis and fever. HENT:  Negative for nosebleeds and trouble swallowing. Eyes: Negative. Respiratory:  Negative for cough, shortness of breath and wheezing. Cardiovascular:  Negative for chest pain, palpitations and leg swelling. Gastrointestinal:  Positive for abdominal pain. Negative for abdominal distention, nausea and vomiting. Genitourinary:  Negative for difficulty urinating, flank pain and frequency. Musculoskeletal:  Negative for arthralgias, joint swelling and myalgias. Skin:  Negative for pallor and rash. Neurological:  Negative for dizziness, facial asymmetry and speech difficulty. Hematological:  Does not bruise/bleed easily. Psychiatric/Behavioral:  Negative for agitation and confusion. All other systems reviewed and are negative. Objective  Vitals:    10/13/23 1325   Temp: 97.8 °F (36.6 °C)       Physical Exam  Vitals and nursing note reviewed. Constitutional:       General: He is not in acute distress. Appearance: Normal appearance. He is not toxic-appearing. HENT:      Head: Normocephalic and atraumatic. Mouth/Throat:      Mouth: Mucous membranes are moist.   Eyes:      Extraocular Movements: Extraocular movements intact. Pupils: Pupils are equal, round, and reactive to light. Cardiovascular:      Rate and Rhythm: Normal rate and regular rhythm. Pulses: Normal pulses. Pulmonary:      Effort: Pulmonary effort is normal. No respiratory distress. Breath sounds: Normal breath sounds. No wheezing. Abdominal:      General: There is no distension. Palpations: Abdomen is soft. There is no mass. Tenderness: There is no abdominal tenderness. There is no guarding or rebound. Hernia: No hernia is present.       Comments: Questionable small left inguinal hernia. Musculoskeletal:         General: No swelling or deformity. Normal range of motion. Cervical back: Normal range of motion and neck supple. Right lower leg: No edema. Left lower leg: No edema. Skin:     General: Skin is warm and dry. Coloration: Skin is not jaundiced. Neurological:      General: No focal deficit present. Mental Status: He is alert and oriented to person, place, and time.    Psychiatric:         Mood and Affect: Mood normal.         Behavior: Behavior normal.

## 2023-10-16 ENCOUNTER — HOSPITAL ENCOUNTER (OUTPATIENT)
Dept: RADIOLOGY | Facility: HOSPITAL | Age: 63
Discharge: HOME | End: 2023-10-16
Attending: SURGERY
Payer: COMMERCIAL

## 2023-10-16 DIAGNOSIS — R10.32 LEFT GROIN PAIN: ICD-10-CM

## 2023-10-16 PROCEDURE — 76705 ECHO EXAM OF ABDOMEN: CPT

## 2024-02-21 PROBLEM — Z12.11 COLON CANCER SCREENING: Status: RESOLVED | Noted: 2019-11-15 | Resolved: 2024-02-21

## 2024-07-22 ENCOUNTER — TELEPHONE (OUTPATIENT)
Dept: GASTROENTEROLOGY | Facility: MEDICAL CENTER | Age: 64
End: 2024-07-22

## 2024-08-06 NOTE — ASSESSMENT & PLAN NOTE
19-year-old male status post laparoscopic paraesophageal hernia repair with intraoperative TIF on 5/2/2023, here for follow-up. Plan:  He is doing very well from a reflux perspective, and has made some dietary changes to help with some bloating and diarrhea. He can follow-up with Dr. Mitchel Trivedi as previously scheduled for his Choudhary's esophagus. Regarding his left groin pain, I am unable to fully discern on physical exam alone whether or not there is a left inguinal hernia. Like to obtain an ultrasound of bilateral groins to evaluate for this. He will return to clinic after the ultrasound. Number Of Passes For Tip #3: 1 Post-Procedures Photographs: No Generator Setting For Tip #3: 10 Consent: Written consent obtained, risks reviewed including but not limited to crusting, scabbing, blistering, scarring, darker or lighter pigmentary change, and/or incomplete removal. Price (Use Numbers Only, No Special Characters Or $): 300 Immediate Post-Procedure Findings: mild erythema, no edema Treatment Endpoint: visual tightening Generator Setting For Tip #2: 14 [place]elleve Tip: 15mm Generator Settin Post-Procedure Text: Applied Duo Extender (RE) & PXR tinted spf. Post care reviewed with patient. Post-Care Instructions: I reviewed with the patient in detail post-care instructions. Patient should stay away from the sun and wear sun protection until treated areas are fully healed. Detail Level: Zone

## 2025-02-07 ENCOUNTER — APPOINTMENT (OUTPATIENT)
Dept: LAB | Facility: CLINIC | Age: 65
End: 2025-02-07
Payer: COMMERCIAL

## 2025-02-07 ENCOUNTER — OFFICE VISIT (OUTPATIENT)
Dept: FAMILY MEDICINE CLINIC | Facility: CLINIC | Age: 65
End: 2025-02-07
Payer: COMMERCIAL

## 2025-02-07 VITALS
BODY MASS INDEX: 25.85 KG/M2 | HEART RATE: 74 BPM | RESPIRATION RATE: 16 BRPM | OXYGEN SATURATION: 98 % | SYSTOLIC BLOOD PRESSURE: 110 MMHG | TEMPERATURE: 98.3 F | DIASTOLIC BLOOD PRESSURE: 72 MMHG | WEIGHT: 201.4 LBS | HEIGHT: 74 IN

## 2025-02-07 DIAGNOSIS — M25.50 ARTHRALGIA, UNSPECIFIED JOINT: Primary | ICD-10-CM

## 2025-02-07 DIAGNOSIS — M25.50 ARTHRALGIA, UNSPECIFIED JOINT: ICD-10-CM

## 2025-02-07 LAB
BASOPHILS # BLD AUTO: 0.02 THOUSANDS/ΜL (ref 0–0.1)
BASOPHILS NFR BLD AUTO: 0 % (ref 0–1)
CRP SERPL QL: <1 MG/L
EOSINOPHIL # BLD AUTO: 0.07 THOUSAND/ΜL (ref 0–0.61)
EOSINOPHIL NFR BLD AUTO: 1 % (ref 0–6)
ERYTHROCYTE [DISTWIDTH] IN BLOOD BY AUTOMATED COUNT: 13.4 % (ref 11.6–15.1)
ERYTHROCYTE [SEDIMENTATION RATE] IN BLOOD: <1 MM/HOUR (ref 0–19)
HCT VFR BLD AUTO: 48.6 % (ref 36.5–49.3)
HGB BLD-MCNC: 15.8 G/DL (ref 12–17)
IMM GRANULOCYTES # BLD AUTO: 0.02 THOUSAND/UL (ref 0–0.2)
IMM GRANULOCYTES NFR BLD AUTO: 0 % (ref 0–2)
LYMPHOCYTES # BLD AUTO: 1.54 THOUSANDS/ΜL (ref 0.6–4.47)
LYMPHOCYTES NFR BLD AUTO: 28 % (ref 14–44)
MCH RBC QN AUTO: 30.2 PG (ref 26.8–34.3)
MCHC RBC AUTO-ENTMCNC: 32.5 G/DL (ref 31.4–37.4)
MCV RBC AUTO: 93 FL (ref 82–98)
MONOCYTES # BLD AUTO: 0.32 THOUSAND/ΜL (ref 0.17–1.22)
MONOCYTES NFR BLD AUTO: 6 % (ref 4–12)
NEUTROPHILS # BLD AUTO: 3.48 THOUSANDS/ΜL (ref 1.85–7.62)
NEUTS SEG NFR BLD AUTO: 65 % (ref 43–75)
NRBC BLD AUTO-RTO: 0 /100 WBCS
PLATELET # BLD AUTO: 193 THOUSANDS/UL (ref 149–390)
PMV BLD AUTO: 10 FL (ref 8.9–12.7)
RBC # BLD AUTO: 5.24 MILLION/UL (ref 3.88–5.62)
WBC # BLD AUTO: 5.45 THOUSAND/UL (ref 4.31–10.16)

## 2025-02-07 PROCEDURE — 86140 C-REACTIVE PROTEIN: CPT

## 2025-02-07 PROCEDURE — 86039 ANTINUCLEAR ANTIBODIES (ANA): CPT

## 2025-02-07 PROCEDURE — 86038 ANTINUCLEAR ANTIBODIES: CPT

## 2025-02-07 PROCEDURE — 86225 DNA ANTIBODY NATIVE: CPT

## 2025-02-07 PROCEDURE — 99214 OFFICE O/P EST MOD 30 MIN: CPT | Performed by: STUDENT IN AN ORGANIZED HEALTH CARE EDUCATION/TRAINING PROGRAM

## 2025-02-07 PROCEDURE — 86235 NUCLEAR ANTIGEN ANTIBODY: CPT

## 2025-02-07 PROCEDURE — 36415 COLL VENOUS BLD VENIPUNCTURE: CPT

## 2025-02-07 PROCEDURE — 85025 COMPLETE CBC W/AUTO DIFF WBC: CPT

## 2025-02-07 PROCEDURE — 85652 RBC SED RATE AUTOMATED: CPT

## 2025-02-07 PROCEDURE — 86618 LYME DISEASE ANTIBODY: CPT

## 2025-02-07 PROCEDURE — 86617 LYME DISEASE ANTIBODY: CPT

## 2025-02-07 NOTE — PROGRESS NOTES
"Name: James Muhammad      : 1960      MRN: [id number]  Encounter Provider: Rosio Ga MD  Encounter Date: 2025   Encounter department: [facility]  :  Assessment & Plan  Arthralgia, unspecified joint  Given patient's symptoms, and possible exposure will order for labs to evaluate for inflammatory arthritic changes including ESR CRP IRENE CBC  Also given patient's woodcutting and joint pain patient may have possible exposure to Lyme from a tick bite we will order for Lyme titers    Will follow-up on results and update patient once available      Orders:    Sedimentation rate, automated; Future    C-reactive protein; Future    IRENE Screen w/Reflex [place]; Future    CBC and differential; Future    Lyme Total AB W Reflex to IGM/IGG; Future         History of Present Illness   James is a 64-year-old male who presents to the office today for an acute visit.  Patient reports over the last month he has noticed increasing joint pain which is generalized in nature along with feeling tired and rundown.  Patient denies any recent illnesses however does report going outside and cutting wood.  Denies any rashes or possible tick bites.  Denies any fever.      Review of Systems   Constitutional:  Positive for fatigue. Negative for appetite change, fever and unexpected weight change.   HENT:  Negative for congestion.    Respiratory:  Negative for chest tightness and shortness of breath.    Cardiovascular:  Negative for chest pain.   Musculoskeletal:  Positive for arthralgias.   Skin:  Negative for rash.       Objective   /72 (Patient Position: Sitting, Cuff Size: Standard)   Pulse 74   Temp 98.3 °F (36.8 °C) (Probe)   Resp 16   Ht 6' 2.25\" (1.886 m)   Wt 91.4 kg (201 lb 6.4 oz)   SpO2 98%   BMI 25.68 kg/m²      Physical Exam  Vitals reviewed.   Eyes:      Extraocular Movements: Extraocular movements intact.   Cardiovascular:      Rate and Rhythm: Normal rate. "      Pulses: Normal pulses.   Pulmonary:      Effort: Pulmonary effort is normal.      Breath sounds: Normal breath sounds.   Skin:     Findings: No rash.   Neurological:      Mental Status: He is alert.       Administrative Statements   I have spent a total time of 30 minutes in caring for this patient on the day of the visit/encounter including Prognosis, Risks and benefits of tx options, Risk factor reductions, Documenting in the medical record, and Obtaining or reviewing history  .

## 2025-02-08 LAB
B BURGDOR IGG SERPL QL IA: POSITIVE
B BURGDOR IGG+IGM SER QL IA: POSITIVE
B BURGDOR IGM SERPL QL IA: NEGATIVE

## 2025-02-10 ENCOUNTER — TELEPHONE (OUTPATIENT)
Age: 65
End: 2025-02-10

## 2025-02-10 NOTE — TELEPHONE ENCOUNTER
REGINA Glass    Pt is part of the Asbestos Surveillance Program at [facility].     [facility] physician requesting you call her back regarding L Lung Pleural Scarring.     Dr. Roberta Manzano  [phone]

## 2025-02-10 NOTE — TELEPHONE ENCOUNTER
Silvana SALMERON) from [facility] called in to confirm if we take WC . Patient had an abnormal testing . Silvana will call after 2pm to provide the patient WC information .

## 2025-02-11 ENCOUNTER — RESULTS FOLLOW-UP (OUTPATIENT)
Dept: FAMILY MEDICINE CLINIC | Facility: CLINIC | Age: 65
End: 2025-02-11

## 2025-02-11 DIAGNOSIS — A69.23 LYME ARTHRITIS (HCC): Primary | ICD-10-CM

## 2025-02-11 RX ORDER — DOXYCYCLINE HYCLATE 100 MG
100 TABLET ORAL 2 TIMES DAILY
Qty: 56 TABLET | Refills: 0 | Status: SHIPPED | OUTPATIENT
Start: 2025-02-11 | End: 2025-03-11

## 2025-02-11 NOTE — TELEPHONE ENCOUNTER
----- Message from Rosio Ga MD sent at 2/11/2025  9:28 AM EST -----  Will you please inform patient his labs have resulted and his Lyme titers are elevated showing Lyme infection which has been ongoing for quite some time as his 1 blood test that checks for acute infection was negative but for chronic was positive therefore will extend his antibiotic course to 28 days patient to take doxycycline antibiotic twice a day with food for better tolerance and sitting upright afterwards.  ----- Message -----  From: Lab, Background User  Sent: 2/7/2025   5:45 PM EST  To: Rosio Ga MD

## 2025-02-12 LAB
DSDNA IGG SERPL IA-ACNC: 2.4 IU/ML (ref ?–15)
NUCLEAR IGG SER IA-RTO: 0.7 RATIO (ref ?–1)

## 2025-02-12 NOTE — TELEPHONE ENCOUNTER
Phone call to Dr. Manzano: She wanted info on best imaging to review left apical pleural plaques that are on his recent CXR. I advised her CT chest w/o contrast and asked her to send him to  [facility] so that images may be compared directly.    She is the head of [employer]'s employee health program and can be contacted with any concerns.

## 2025-02-13 PROBLEM — M25.50 ARTHRALGIA: Status: ACTIVE | Noted: 2025-02-13

## 2025-02-13 LAB
ANA HOMOGEN SER QL IF: NORMAL
ANA HOMOGEN TITR SER: NORMAL {TITER}
ANA SER QL IF: POSITIVE
CENTROMERE B AB SER-ACNC: <0.7 U/ML (ref ?–10)
ENA JO1 AB SER IA-ACNC: <0.5 U/ML (ref ?–10)
ENA SCL70 IGG SER IA-ACNC: 3.5 U/ML (ref ?–10)
ENA SM IGG SER IA-ACNC: <0.8 U/ML (ref ?–10)
ENA SS-A AB SER IA-ACNC: <0.5 U/ML (ref ?–10)
ENA SS-B IGG SER IA-ACNC: 0.6 U/ML (ref ?–10)
U1 SNRNP IGG SER IA-ACNC: 2 U/ML (ref ?–10)

## 2025-02-13 NOTE — ASSESSMENT & PLAN NOTE
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

## 2025-02-21 ENCOUNTER — HOSPITAL ENCOUNTER (OUTPATIENT)
Dept: RADIOLOGY | Facility: HOSPITAL | Age: 65
Discharge: HOME | End: 2025-02-21
Payer: OTHER MISCELLANEOUS

## 2025-02-21 DIAGNOSIS — Z77.090 CONTACT WITH AND (SUSPECTED) EXPOSURE TO ASBESTOS: ICD-10-CM

## 2025-02-21 PROCEDURE — 71250 CT THORAX DX C-: CPT

## 2025-02-26 ENCOUNTER — OFFICE VISIT (OUTPATIENT)
Dept: PULMONOLOGY | Facility: CLINIC | Age: 65
End: 2025-02-26

## 2025-02-26 VITALS
BODY MASS INDEX: 24.12 KG/M2 | WEIGHT: 194 LBS | HEART RATE: 65 BPM | OXYGEN SATURATION: 100 % | SYSTOLIC BLOOD PRESSURE: 136 MMHG | TEMPERATURE: 96.5 F | HEIGHT: 75 IN | DIASTOLIC BLOOD PRESSURE: 78 MMHG

## 2025-02-26 DIAGNOSIS — J92.9 PLEURAL PLAQUE: Primary | ICD-10-CM

## 2025-02-26 PROCEDURE — 99213 OFFICE O/P EST LOW 20 MIN: CPT | Performed by: NURSE PRACTITIONER

## 2025-02-26 NOTE — ASSESSMENT & PLAN NOTE
Left-sided predominance of pleural plaques noted on CT imaging. I did compare side by side these images with Kam as well as historic imaging that occurred prior to pleurodesis. There is no evidence of calcified plaques prior to having pleurodesis in 2019. He does have concern for asbestos exposure via the workplace, rightfully so, however the imaging appears to be more consistent with calcification following left-sided VATS given its unilateral appearance on left side of the chest.    He has no report of active pulmonary symptoms at this time.

## 2025-02-27 ENCOUNTER — DOCUMENTATION (OUTPATIENT)
Dept: PULMONOLOGY | Facility: CLINIC | Age: 65
End: 2025-02-27

## 2025-02-27 NOTE — PROGRESS NOTES
Patient brought in workers compensation forms from [facility] along with an outside disc that was sent to radiology.

## 2025-03-04 ENCOUNTER — TELEPHONE (OUTPATIENT)
Dept: PULMONOLOGY | Facility: CLINIC | Age: 65
End: 2025-03-04

## 2025-03-04 NOTE — TELEPHONE ENCOUNTER
Received and sent out pts disc to radiology room on 2/27.      Disc was returned on 2/28 with a letter attached stating that the disc was unable to load due to the CD not being able to be formatted into our system & it needed [employer] formatting. Sending disc back to the patient with a note on 3/4/25

## 2025-03-09 DIAGNOSIS — A69.23 LYME ARTHRITIS (HCC): ICD-10-CM

## 2025-03-09 RX ORDER — DOXYCYCLINE HYCLATE 100 MG
100 TABLET ORAL 2 TIMES DAILY
Qty: 56 TABLET | Refills: 0 | OUTPATIENT
Start: 2025-03-09 | End: 2025-04-06

## 2025-03-13 ENCOUNTER — TELEPHONE (OUTPATIENT)
Dept: FAMILY MEDICINE CLINIC | Facility: CLINIC | Age: 65
End: 2025-03-13

## 2025-03-13 NOTE — TELEPHONE ENCOUNTER
Patient stopped in the office he is still taking Doxycline and coming to the end.      Still experiencing tingling in his arm.  And is asking if he still has the tingling is he going to have to continue taking the medication for lyme?    Just wants to know what he has to do when finished with medication?

## (undated) DEVICE — HIGH PERFORMANCE GUIDEWIRE: Brand: DREAMWIRE

## (undated) DEVICE — TUBING SUCTION 5MM X 12 FT

## (undated) DEVICE — SUT PROLENE 0 CT-1 30 IN 8424H

## (undated) DEVICE — PENROSE DRAIN, 18 X 3 8: Brand: CARDINAL HEALTH

## (undated) DEVICE — FORCEP ENDO RESCUE RAT TOOTH 230CM

## (undated) DEVICE — PANCREATIC STENT DELIVERY SYSTEM: Brand: NAVIFLEX™ RX DELIVERY SYSTEM

## (undated) DEVICE — SUT PROLENE 3-0 SH 36 IN 8522H

## (undated) DEVICE — NEEDLE 22 G X 1 1/2 SAFETY

## (undated) DEVICE — BULB SYRINGE,IRRIGATION WITH PROTECTIVE CAP: Brand: DOVER

## (undated) DEVICE — SUT MONOCRYL 4-0 PS-2 18 IN Y496G

## (undated) DEVICE — FIRST STEP BEDSIDE KIT - STAND-UP POUCH, ENDOSCOPIC CLEANING PAD - 1 POUCH: Brand: FIRST STEP BEDSIDE KIT - STAND-UP POUCH, ENDOSCOPIC CLEANING PAD

## (undated) DEVICE — STERILE THORACIC PACK: Brand: CARDINAL HEALTH

## (undated) DEVICE — GLOVE SRG BIOGEL ECLIPSE 7.5

## (undated) DEVICE — RETRIEVAL BALLOON CATHETER: Brand: EXTRACTOR™ PRO RX

## (undated) DEVICE — HEAVY DUTY TABLE COVER: Brand: CONVERTORS

## (undated) DEVICE — TRAVELKIT CONTAINS FIRST STEP KIT (200ML EP-4 KIT) AND SOILED SCOPE BAG - 1 KIT: Brand: TRAVELKIT CONTAINS FIRST STEP KIT AND SOILED SCOPE BAG

## (undated) DEVICE — PVC URETHRAL CATHETER: Brand: DOVER

## (undated) DEVICE — 3000CC GUARDIAN II: Brand: GUARDIAN

## (undated) DEVICE — NASOGASTRIC FEEDING TUBE,5 G WEIGHTED TIP, RIGID PORT, STYLET: Brand: KANGAROO

## (undated) DEVICE — 2000CC GUARDIAN II: Brand: GUARDIAN

## (undated) DEVICE — SUT VICRYL 2-0 SH 27 IN UNDYED J417H

## (undated) DEVICE — INSUFFLATION NEEDLE TO ESTABLISH PNEUMOPERITONEUM.: Brand: INSUFFLATION NEEDLE

## (undated) DEVICE — CHLORAPREP HI-LITE 26ML ORANGE

## (undated) DEVICE — TROCAR: Brand: KII SLEEVE

## (undated) DEVICE — DELIVERY SYSTEM NAVFLX 10FR X 202.5CM

## (undated) DEVICE — TROCAR: Brand: KII FIOS FIRST ENTRY

## (undated) DEVICE — SPECIMEN CONTAINER STERILE PEEL PACK

## (undated) DEVICE — OASIS DRAIN, SINGLE, INLINE & ATS COMPATIBLE: Brand: OASIS

## (undated) DEVICE — GLOVE INDICATOR PI UNDERGLOVE SZ 8 BLUE

## (undated) DEVICE — SUT VICRYL 0 CT-1 27 IN J260H

## (undated) DEVICE — GLOVE INDICATOR PI UNDERGLOVE SZ 7 BLUE

## (undated) DEVICE — ADHESIVE SKIN HIGH VISCOSITY EXOFIN 1ML

## (undated) DEVICE — 40595 XL TRENDELENBURG POSITIONING KIT: Brand: 40595 XL TRENDELENBURG POSITIONING KIT

## (undated) DEVICE — INTENDED FOR TISSUE SEPARATION, AND OTHER PROCEDURES THAT REQUIRE A SHARP SURGICAL BLADE TO PUNCTURE OR CUT.: Brand: BARD-PARKER SAFETY BLADES SIZE 15, STERILE

## (undated) DEVICE — SUT ETHIBOND 0 SH/SH 36 IN X524H

## (undated) DEVICE — Device: Brand: OMNICLOSE TROCAR SITE CLOSURE DEVICE

## (undated) DEVICE — NEEDLE SPINAL 20G X 3.5 LF

## (undated) DEVICE — PACK PBDS LAP CHOLE RF

## (undated) DEVICE — SUT VICRYL 0 UR-6 27 IN J603H

## (undated) DEVICE — SUT VICRYL PLUS 0 UR-6 27IN VCP603H

## (undated) DEVICE — ELECTRODE BLADE MOD E-Z CLEAN 2.5IN 6.4CM -0012M

## (undated) DEVICE — TUBING SMOKE EVAC W/FILTRATION DEVICE PLUMEPORT ACTIV

## (undated) DEVICE — NEEDLE 25G X 1 1/2

## (undated) DEVICE — UTILITY MARKER,BLACK WITH LABELS: Brand: DEVON

## (undated) DEVICE — SUT VICRYL 3-0 SH 27 IN J416H

## (undated) DEVICE — SYRINGE 10ML SLIP TIP LF

## (undated) DEVICE — RETRIEVER RESCUENET 5.5 X 3CM

## (undated) DEVICE — VISUALIZATION SYSTEM: Brand: CLEARIFY

## (undated) DEVICE — ANTI-FOG SOLUTION WITH FOAM PAD: Brand: DEVON

## (undated) DEVICE — PLUMEPEN PRO 10FT

## (undated) DEVICE — CAUTERY TIP POLISHER: Brand: DEVON

## (undated) DEVICE — SINGLE USE BIOPSY VALVE MAJ-210: Brand: SINGLE USE BIOPSY VALVE (STERILE)

## (undated) DEVICE — ADHESIVE SKIN HIGH VISCOSITY EXOFIN MICRO 0.5ML

## (undated) DEVICE — GLOVE PI ULTRA TOUCH SZ.6.5

## (undated) DEVICE — SINGLE TUBING WITH LARGE CONNECTOR FOR THORACIC SUCTION SYSTEM PUMP: Brand: THOPAZ TUBING SINGLE

## (undated) DEVICE — SPHINCTEROTOME: Brand: DREAMTOME™ RX 44

## (undated) DEVICE — ADHESIVE SKN CLSR HISTOACRYL FLEX 0.5ML LF

## (undated) DEVICE — INSUFLATION TUBING INSUFLOW (LEXION)

## (undated) DEVICE — HARMONIC 1100 SHEARS, 36CM SHAFT LENGTH: Brand: HARMONIC

## (undated) DEVICE — PAD GROUNDING ADULT

## (undated) DEVICE — TRAY FOLEY 16FR URIMETER SURESTEP

## (undated) DEVICE — BETHLEHEM UNIVERSAL MINOR GEN: Brand: CARDINAL HEALTH

## (undated) DEVICE — SINGLE USE SUCTION VALVE MAJ-209: Brand: SINGLE USE SUCTION VALVE (STERILE)

## (undated) DEVICE — SUT VICRYL 2-0 REEL 54 IN J286G

## (undated) DEVICE — Device: Brand: DEFENDO AIR/WATER/SUCTION AND BIOPSY VALVE

## (undated) DEVICE — INTENDED FOR TISSUE SEPARATION, AND OTHER PROCEDURES THAT REQUIRE A SHARP SURGICAL BLADE TO PUNCTURE OR CUT.: Brand: BARD-PARKER SAFETY BLADES SIZE 10, STERILE

## (undated) DEVICE — SPONGE: SPECIALTY TONSIL XR MED 100/CS: Brand: MEDICAL ACTION INDUSTRIES

## (undated) DEVICE — DRAPE FLUID WARMER (BIRD BATH)

## (undated) DEVICE — 28 FR STRAIGHT – SOFT PVC CATHETER: Brand: PVC THORACIC CATHETERS

## (undated) DEVICE — INVIEW CLEAR LEGGINGS: Brand: CONVERTORS

## (undated) DEVICE — SPECIMEN TRAP: Brand: ARGYLE

## (undated) DEVICE — SUT ETHIBOND 2-0 SH/SH 36 IN X523H

## (undated) DEVICE — 5 MM CURVED DISSECTORS WITH MONOPOLAR CAUTERY: Brand: ENDOPATH

## (undated) DEVICE — DRAPE LAPAROTOMY W/POUCHES

## (undated) DEVICE — GAUZE SPONGES,16 PLY: Brand: CURITY

## (undated) DEVICE — AIR AND WATER TUBING/CAP SET FOR OLYMPUS® SCOPES: Brand: ERBE

## (undated) DEVICE — GLOVE SRG BIOGEL 7.5

## (undated) DEVICE — INTENDED FOR TISSUE SEPARATION, AND OTHER PROCEDURES THAT REQUIRE A SHARP SURGICAL BLADE TO PUNCTURE OR CUT.: Brand: BARD-PARKER SAFETY BLADES SIZE 11, STERILE

## (undated) DEVICE — WOUND RETRACTOR AND PROTECTOR: Brand: ALEXIS WOUND PROTECTOR-RETRACTOR

## (undated) DEVICE — ADAPTOR TRACH SWIVEL